# Patient Record
Sex: FEMALE | Race: WHITE | Employment: OTHER | URBAN - NONMETROPOLITAN AREA
[De-identification: names, ages, dates, MRNs, and addresses within clinical notes are randomized per-mention and may not be internally consistent; named-entity substitution may affect disease eponyms.]

---

## 2019-11-11 ENCOUNTER — APPOINTMENT (OUTPATIENT)
Dept: GENERAL RADIOLOGY | Age: 54
DRG: 640 | End: 2019-11-11
Payer: MEDICARE

## 2019-11-11 ENCOUNTER — HOSPITAL ENCOUNTER (INPATIENT)
Age: 54
LOS: 2 days | Discharge: HOME | DRG: 640 | End: 2019-11-13
Attending: FAMILY MEDICINE | Admitting: INTERNAL MEDICINE
Payer: MEDICARE

## 2019-11-11 DIAGNOSIS — E87.20 METABOLIC ACIDOSIS: ICD-10-CM

## 2019-11-11 DIAGNOSIS — E87.79 OTHER HYPERVOLEMIA: Primary | ICD-10-CM

## 2019-11-11 DIAGNOSIS — N18.6 END STAGE RENAL FAILURE ON DIALYSIS (HCC): ICD-10-CM

## 2019-11-11 DIAGNOSIS — E87.5 HYPERKALEMIA: ICD-10-CM

## 2019-11-11 DIAGNOSIS — Z99.2 END STAGE RENAL FAILURE ON DIALYSIS (HCC): ICD-10-CM

## 2019-11-11 PROBLEM — E87.70 FLUID OVERLOAD: Status: ACTIVE | Noted: 2019-11-11

## 2019-11-11 LAB
ALBUMIN SERPL-MCNC: 3.8 G/DL (ref 3.5–5.1)
ALP BLD-CCNC: 91 U/L (ref 38–126)
ALT SERPL-CCNC: < 5 U/L (ref 11–66)
ANION GAP SERPL CALCULATED.3IONS-SCNC: 33 MEQ/L (ref 8–16)
AST SERPL-CCNC: 6 U/L (ref 5–40)
BASOPHILS # BLD: 0.2 %
BASOPHILS ABSOLUTE: 0 THOU/MM3 (ref 0–0.1)
BILIRUB SERPL-MCNC: 0.3 MG/DL (ref 0.3–1.2)
BUN BLDV-MCNC: 127 MG/DL (ref 7–22)
CALCIUM SERPL-MCNC: 8.4 MG/DL (ref 8.5–10.5)
CHLORIDE BLD-SCNC: 84 MEQ/L (ref 98–111)
CO2: 18 MEQ/L (ref 23–33)
CREAT SERPL-MCNC: 16.2 MG/DL (ref 0.4–1.2)
EKG ATRIAL RATE: 83 BPM
EKG P AXIS: 76 DEGREES
EKG P-R INTERVAL: 136 MS
EKG Q-T INTERVAL: 380 MS
EKG QRS DURATION: 92 MS
EKG QTC CALCULATION (BAZETT): 446 MS
EKG R AXIS: 47 DEGREES
EKG T AXIS: 89 DEGREES
EKG VENTRICULAR RATE: 83 BPM
EOSINOPHIL # BLD: 1.2 %
EOSINOPHILS ABSOLUTE: 0.2 THOU/MM3 (ref 0–0.4)
ERYTHROCYTE [DISTWIDTH] IN BLOOD BY AUTOMATED COUNT: 16 % (ref 11.5–14.5)
ERYTHROCYTE [DISTWIDTH] IN BLOOD BY AUTOMATED COUNT: 56.9 FL (ref 35–45)
FLU A ANTIGEN: NEGATIVE
FLU B ANTIGEN: NEGATIVE
GFR SERPL CREATININE-BSD FRML MDRD: 2 ML/MIN/1.73M2
GLUCOSE BLD-MCNC: 115 MG/DL (ref 70–108)
HCT VFR BLD CALC: 33.8 % (ref 37–47)
HEMOGLOBIN: 10.2 GM/DL (ref 12–16)
IMMATURE GRANS (ABS): 0.12 THOU/MM3 (ref 0–0.07)
IMMATURE GRANULOCYTES: 0.7 %
LYMPHOCYTES # BLD: 6.6 %
LYMPHOCYTES ABSOLUTE: 1.1 THOU/MM3 (ref 1–4.8)
MCH RBC QN AUTO: 29.3 PG (ref 26–33)
MCHC RBC AUTO-ENTMCNC: 30.2 GM/DL (ref 32.2–35.5)
MCV RBC AUTO: 97.1 FL (ref 81–99)
MONOCYTES # BLD: 5 %
MONOCYTES ABSOLUTE: 0.9 THOU/MM3 (ref 0.4–1.3)
NUCLEATED RED BLOOD CELLS: 0 /100 WBC
OSMOLALITY CALCULATION: 311.8 MOSMOL/KG (ref 275–300)
PLATELET # BLD: 416 THOU/MM3 (ref 130–400)
PMV BLD AUTO: 8.6 FL (ref 9.4–12.4)
POTASSIUM SERPL-SCNC: 5.5 MEQ/L (ref 3.5–5.2)
PRO-BNP: ABNORMAL PG/ML (ref 0–900)
RBC # BLD: 3.48 MILL/MM3 (ref 4.2–5.4)
SEG NEUTROPHILS: 86.3 %
SEGMENTED NEUTROPHILS ABSOLUTE COUNT: 14.9 THOU/MM3 (ref 1.8–7.7)
SODIUM BLD-SCNC: 135 MEQ/L (ref 135–145)
TOTAL PROTEIN: 6.6 G/DL (ref 6.1–8)
TROPONIN T: < 0.01 NG/ML
WBC # BLD: 17.3 THOU/MM3 (ref 4.8–10.8)

## 2019-11-11 PROCEDURE — 93010 ELECTROCARDIOGRAM REPORT: CPT | Performed by: INTERNAL MEDICINE

## 2019-11-11 PROCEDURE — 96374 THER/PROPH/DIAG INJ IV PUSH: CPT

## 2019-11-11 PROCEDURE — 84484 ASSAY OF TROPONIN QUANT: CPT

## 2019-11-11 PROCEDURE — 71046 X-RAY EXAM CHEST 2 VIEWS: CPT

## 2019-11-11 PROCEDURE — 6370000000 HC RX 637 (ALT 250 FOR IP): Performed by: PHYSICIAN ASSISTANT

## 2019-11-11 PROCEDURE — 94760 N-INVAS EAR/PLS OXIMETRY 1: CPT

## 2019-11-11 PROCEDURE — 99285 EMERGENCY DEPT VISIT HI MDM: CPT

## 2019-11-11 PROCEDURE — 6370000000 HC RX 637 (ALT 250 FOR IP): Performed by: FAMILY MEDICINE

## 2019-11-11 PROCEDURE — 85025 COMPLETE CBC W/AUTO DIFF WBC: CPT

## 2019-11-11 PROCEDURE — 5A1D70Z PERFORMANCE OF URINARY FILTRATION, INTERMITTENT, LESS THAN 6 HOURS PER DAY: ICD-10-PCS | Performed by: INTERNAL MEDICINE

## 2019-11-11 PROCEDURE — 99222 1ST HOSP IP/OBS MODERATE 55: CPT | Performed by: PHYSICIAN ASSISTANT

## 2019-11-11 PROCEDURE — 99221 1ST HOSP IP/OBS SF/LOW 40: CPT | Performed by: NURSE PRACTITIONER

## 2019-11-11 PROCEDURE — 83880 ASSAY OF NATRIURETIC PEPTIDE: CPT

## 2019-11-11 PROCEDURE — 90935 HEMODIALYSIS ONE EVALUATION: CPT

## 2019-11-11 PROCEDURE — 94640 AIRWAY INHALATION TREATMENT: CPT

## 2019-11-11 PROCEDURE — 87804 INFLUENZA ASSAY W/OPTIC: CPT

## 2019-11-11 PROCEDURE — 93005 ELECTROCARDIOGRAM TRACING: CPT | Performed by: FAMILY MEDICINE

## 2019-11-11 PROCEDURE — 36415 COLL VENOUS BLD VENIPUNCTURE: CPT

## 2019-11-11 PROCEDURE — 87040 BLOOD CULTURE FOR BACTERIA: CPT

## 2019-11-11 PROCEDURE — 6360000002 HC RX W HCPCS: Performed by: FAMILY MEDICINE

## 2019-11-11 PROCEDURE — 2580000003 HC RX 258: Performed by: PHYSICIAN ASSISTANT

## 2019-11-11 PROCEDURE — 80053 COMPREHEN METABOLIC PANEL: CPT

## 2019-11-11 PROCEDURE — 90935 HEMODIALYSIS ONE EVALUATION: CPT | Performed by: NURSE PRACTITIONER

## 2019-11-11 PROCEDURE — 2709999900 HC NON-CHARGEABLE SUPPLY

## 2019-11-11 PROCEDURE — 2140000000 HC CCU INTERMEDIATE R&B

## 2019-11-11 PROCEDURE — 6360000002 HC RX W HCPCS: Performed by: PHYSICIAN ASSISTANT

## 2019-11-11 RX ORDER — IPRATROPIUM BROMIDE AND ALBUTEROL SULFATE 2.5; .5 MG/3ML; MG/3ML
1 SOLUTION RESPIRATORY (INHALATION) ONCE
Status: COMPLETED | OUTPATIENT
Start: 2019-11-11 | End: 2019-11-11

## 2019-11-11 RX ORDER — IPRATROPIUM BROMIDE AND ALBUTEROL SULFATE 2.5; .5 MG/3ML; MG/3ML
1 SOLUTION RESPIRATORY (INHALATION) EVERY 4 HOURS PRN
Status: DISCONTINUED | OUTPATIENT
Start: 2019-11-11 | End: 2019-11-13

## 2019-11-11 RX ORDER — SODIUM CHLORIDE 0.9 % (FLUSH) 0.9 %
10 SYRINGE (ML) INJECTION PRN
Status: DISCONTINUED | OUTPATIENT
Start: 2019-11-11 | End: 2019-11-13 | Stop reason: HOSPADM

## 2019-11-11 RX ORDER — HYDROXYZINE HYDROCHLORIDE 25 MG/1
25 TABLET, FILM COATED ORAL 4 TIMES DAILY PRN
Status: ON HOLD | COMMUNITY
End: 2019-11-11

## 2019-11-11 RX ORDER — ONDANSETRON 2 MG/ML
4 INJECTION INTRAMUSCULAR; INTRAVENOUS EVERY 6 HOURS PRN
Status: DISCONTINUED | OUTPATIENT
Start: 2019-11-11 | End: 2019-11-13 | Stop reason: HOSPADM

## 2019-11-11 RX ORDER — NIFEDIPINE 30 MG/1
30 TABLET, FILM COATED, EXTENDED RELEASE ORAL DAILY
Status: ON HOLD | COMMUNITY
End: 2019-11-13 | Stop reason: SDUPTHER

## 2019-11-11 RX ORDER — HEPARIN SODIUM 5000 [USP'U]/ML
5000 INJECTION, SOLUTION INTRAVENOUS; SUBCUTANEOUS EVERY 8 HOURS SCHEDULED
Status: DISCONTINUED | OUTPATIENT
Start: 2019-11-11 | End: 2019-11-13 | Stop reason: HOSPADM

## 2019-11-11 RX ORDER — LOSARTAN POTASSIUM 50 MG/1
50 TABLET ORAL DAILY
Status: ON HOLD | COMMUNITY
End: 2019-11-13 | Stop reason: HOSPADM

## 2019-11-11 RX ORDER — GABAPENTIN 100 MG/1
100 CAPSULE ORAL 2 TIMES DAILY
Status: ON HOLD | COMMUNITY
End: 2019-11-13 | Stop reason: SDUPTHER

## 2019-11-11 RX ORDER — DOXEPIN HYDROCHLORIDE 10 MG/1
10 CAPSULE ORAL NIGHTLY
Status: ON HOLD | COMMUNITY
End: 2020-02-13 | Stop reason: SDUPTHER

## 2019-11-11 RX ORDER — HYDROXYZINE PAMOATE 25 MG/1
25 CAPSULE ORAL 4 TIMES DAILY PRN
Status: ON HOLD | COMMUNITY
End: 2020-02-13 | Stop reason: SDUPTHER

## 2019-11-11 RX ORDER — METHYLPREDNISOLONE SODIUM SUCCINATE 125 MG/2ML
125 INJECTION, POWDER, LYOPHILIZED, FOR SOLUTION INTRAMUSCULAR; INTRAVENOUS ONCE
Status: COMPLETED | OUTPATIENT
Start: 2019-11-11 | End: 2019-11-11

## 2019-11-11 RX ORDER — GABAPENTIN 300 MG/1
300 CAPSULE ORAL 4 TIMES DAILY
Status: ON HOLD | COMMUNITY
End: 2019-11-13 | Stop reason: HOSPADM

## 2019-11-11 RX ORDER — ALBUTEROL SULFATE 90 UG/1
1 AEROSOL, METERED RESPIRATORY (INHALATION) EVERY 6 HOURS PRN
Status: ON HOLD | COMMUNITY
End: 2020-02-13 | Stop reason: SDUPTHER

## 2019-11-11 RX ORDER — SODIUM CHLORIDE 0.9 % (FLUSH) 0.9 %
10 SYRINGE (ML) INJECTION EVERY 12 HOURS SCHEDULED
Status: DISCONTINUED | OUTPATIENT
Start: 2019-11-11 | End: 2019-11-13 | Stop reason: HOSPADM

## 2019-11-11 RX ORDER — ESCITALOPRAM OXALATE 20 MG/1
20 TABLET ORAL DAILY
Status: ON HOLD | COMMUNITY
End: 2020-02-13 | Stop reason: SDUPTHER

## 2019-11-11 RX ORDER — FUROSEMIDE 80 MG
80 TABLET ORAL 2 TIMES DAILY
Status: ON HOLD | COMMUNITY
End: 2019-11-13 | Stop reason: HOSPADM

## 2019-11-11 RX ORDER — FLUTICASONE FUROATE AND VILANTEROL 200; 25 UG/1; UG/1
1 POWDER RESPIRATORY (INHALATION) DAILY
Status: ON HOLD | COMMUNITY
End: 2020-02-13 | Stop reason: SDUPTHER

## 2019-11-11 RX ORDER — ACETAMINOPHEN 325 MG/1
650 TABLET ORAL EVERY 6 HOURS PRN
Status: DISCONTINUED | OUTPATIENT
Start: 2019-11-11 | End: 2019-11-13 | Stop reason: HOSPADM

## 2019-11-11 RX ADMIN — Medication 10 ML: at 19:48

## 2019-11-11 RX ADMIN — CEFTRIAXONE SODIUM 1 G: 1 INJECTION, POWDER, FOR SOLUTION INTRAMUSCULAR; INTRAVENOUS at 18:09

## 2019-11-11 RX ADMIN — HEPARIN SODIUM 5000 UNITS: 5000 INJECTION INTRAVENOUS; SUBCUTANEOUS at 19:46

## 2019-11-11 RX ADMIN — IPRATROPIUM BROMIDE AND ALBUTEROL SULFATE 1 AMPULE: .5; 3 SOLUTION RESPIRATORY (INHALATION) at 10:55

## 2019-11-11 RX ADMIN — ACETAMINOPHEN 650 MG: 325 TABLET ORAL at 22:56

## 2019-11-11 RX ADMIN — METHYLPREDNISOLONE SODIUM SUCCINATE 125 MG: 125 INJECTION, POWDER, FOR SOLUTION INTRAMUSCULAR; INTRAVENOUS at 10:56

## 2019-11-11 RX ADMIN — IPRATROPIUM BROMIDE AND ALBUTEROL SULFATE 1 AMPULE: .5; 3 SOLUTION RESPIRATORY (INHALATION) at 23:56

## 2019-11-11 ASSESSMENT — PAIN SCALES - GENERAL
PAINLEVEL_OUTOF10: 9
PAINLEVEL_OUTOF10: 10

## 2019-11-11 ASSESSMENT — PAIN DESCRIPTION - ORIENTATION: ORIENTATION: LOWER

## 2019-11-11 ASSESSMENT — ENCOUNTER SYMPTOMS
BACK PAIN: 0
VOMITING: 1
ABDOMINAL PAIN: 0
WHEEZING: 0
DIARRHEA: 0
NAUSEA: 1
RHINORRHEA: 0
COUGH: 0
EYE PAIN: 0
SORE THROAT: 0
EYE DISCHARGE: 0
SHORTNESS OF BREATH: 1

## 2019-11-11 ASSESSMENT — PAIN DESCRIPTION - DIRECTION: RADIATING_TOWARDS: LEFT

## 2019-11-11 ASSESSMENT — PAIN DESCRIPTION - LOCATION
LOCATION: BACK
LOCATION: BACK

## 2019-11-11 ASSESSMENT — PAIN - FUNCTIONAL ASSESSMENT: PAIN_FUNCTIONAL_ASSESSMENT: ACTIVITIES ARE NOT PREVENTED

## 2019-11-11 ASSESSMENT — PAIN DESCRIPTION - ONSET: ONSET: ON-GOING

## 2019-11-11 ASSESSMENT — PAIN DESCRIPTION - DESCRIPTORS: DESCRIPTORS: THROBBING

## 2019-11-11 ASSESSMENT — PAIN DESCRIPTION - PAIN TYPE: TYPE: CHRONIC PAIN

## 2019-11-11 ASSESSMENT — PAIN DESCRIPTION - PROGRESSION: CLINICAL_PROGRESSION: NOT CHANGED

## 2019-11-11 ASSESSMENT — PAIN DESCRIPTION - FREQUENCY: FREQUENCY: CONTINUOUS

## 2019-11-12 ENCOUNTER — TELEPHONE (OUTPATIENT)
Dept: FAMILY MEDICINE CLINIC | Age: 54
End: 2019-11-12

## 2019-11-12 LAB
ALLEN TEST: ABNORMAL
AMMONIA: 34 UMOL/L (ref 11–60)
ANION GAP SERPL CALCULATED.3IONS-SCNC: 23 MEQ/L (ref 8–16)
ANION GAP SERPL CALCULATED.3IONS-SCNC: 24 MEQ/L (ref 8–16)
BASE EXCESS (CALCULATED): 9.6 MMOL/L (ref -2.5–2.5)
BASOPHILS # BLD: 0.1 %
BASOPHILS ABSOLUTE: 0 THOU/MM3 (ref 0–0.1)
BUN BLDV-MCNC: 54 MG/DL (ref 7–22)
BUN BLDV-MCNC: 57 MG/DL (ref 7–22)
CALCIUM SERPL-MCNC: 9.2 MG/DL (ref 8.5–10.5)
CALCIUM SERPL-MCNC: 9.3 MG/DL (ref 8.5–10.5)
CHLORIDE BLD-SCNC: 81 MEQ/L (ref 98–111)
CHLORIDE BLD-SCNC: 82 MEQ/L (ref 98–111)
CO2: 27 MEQ/L (ref 23–33)
CO2: 28 MEQ/L (ref 23–33)
COLLECTED BY:: ABNORMAL
CREAT SERPL-MCNC: 10 MG/DL (ref 0.4–1.2)
CREAT SERPL-MCNC: 8.8 MG/DL (ref 0.4–1.2)
DEVICE: ABNORMAL
EOSINOPHIL # BLD: 0.5 %
EOSINOPHILS ABSOLUTE: 0.1 THOU/MM3 (ref 0–0.4)
ERYTHROCYTE [DISTWIDTH] IN BLOOD BY AUTOMATED COUNT: 15.5 % (ref 11.5–14.5)
ERYTHROCYTE [DISTWIDTH] IN BLOOD BY AUTOMATED COUNT: 15.9 % (ref 11.5–14.5)
ERYTHROCYTE [DISTWIDTH] IN BLOOD BY AUTOMATED COUNT: 53.1 FL (ref 35–45)
ERYTHROCYTE [DISTWIDTH] IN BLOOD BY AUTOMATED COUNT: 53.6 FL (ref 35–45)
FOLATE: 8.7 NG/ML (ref 4.8–24.2)
GFR SERPL CREATININE-BSD FRML MDRD: 4 ML/MIN/1.73M2
GFR SERPL CREATININE-BSD FRML MDRD: 5 ML/MIN/1.73M2
GLUCOSE BLD-MCNC: 124 MG/DL (ref 70–108)
GLUCOSE BLD-MCNC: 126 MG/DL (ref 70–108)
HCO3: 35 MMOL/L (ref 23–28)
HCT VFR BLD CALC: 33.9 % (ref 37–47)
HCT VFR BLD CALC: 34.3 % (ref 37–47)
HEMOGLOBIN: 10.8 GM/DL (ref 12–16)
HEMOGLOBIN: 10.9 GM/DL (ref 12–16)
IFIO2: 3
IMMATURE GRANS (ABS): 0.09 THOU/MM3 (ref 0–0.07)
IMMATURE GRANULOCYTES: 0.6 %
IRON SATURATION: 41 % (ref 20–50)
IRON: 76 UG/DL (ref 50–170)
LACTIC ACID: 0.9 MMOL/L (ref 0.5–2.2)
LV EF: 55 %
LVEF MODALITY: NORMAL
LYMPHOCYTES # BLD: 7.2 %
LYMPHOCYTES ABSOLUTE: 1 THOU/MM3 (ref 1–4.8)
MCH RBC QN AUTO: 29.4 PG (ref 26–33)
MCH RBC QN AUTO: 29.8 PG (ref 26–33)
MCHC RBC AUTO-ENTMCNC: 31.8 GM/DL (ref 32.2–35.5)
MCHC RBC AUTO-ENTMCNC: 31.9 GM/DL (ref 32.2–35.5)
MCV RBC AUTO: 92.5 FL (ref 81–99)
MCV RBC AUTO: 93.4 FL (ref 81–99)
MONOCYTES # BLD: 7.6 %
MONOCYTES ABSOLUTE: 1.1 THOU/MM3 (ref 0.4–1.3)
NUCLEATED RED BLOOD CELLS: 0 /100 WBC
O2 SATURATION: 93 %
PCO2: 48 MMHG (ref 35–45)
PH BLOOD GAS: 7.47 (ref 7.35–7.45)
PLATELET # BLD: 400 THOU/MM3 (ref 130–400)
PLATELET # BLD: 406 THOU/MM3 (ref 130–400)
PMV BLD AUTO: 8.6 FL (ref 9.4–12.4)
PMV BLD AUTO: 8.6 FL (ref 9.4–12.4)
PO2: 64 MMHG (ref 71–104)
POTASSIUM REFLEX MAGNESIUM: 4.2 MEQ/L (ref 3.5–5.2)
POTASSIUM SERPL-SCNC: 4.3 MEQ/L (ref 3.5–5.2)
PROCALCITONIN: 0.48 NG/ML (ref 0.01–0.09)
RBC # BLD: 3.63 MILL/MM3 (ref 4.2–5.4)
RBC # BLD: 3.71 MILL/MM3 (ref 4.2–5.4)
SEG NEUTROPHILS: 84 %
SEGMENTED NEUTROPHILS ABSOLUTE COUNT: 12.2 THOU/MM3 (ref 1.8–7.7)
SODIUM BLD-SCNC: 132 MEQ/L (ref 135–145)
SODIUM BLD-SCNC: 133 MEQ/L (ref 135–145)
SOURCE, BLOOD GAS: ABNORMAL
TOTAL IRON BINDING CAPACITY: 184 UG/DL (ref 171–450)
TSH SERPL DL<=0.05 MIU/L-ACNC: 2.17 UIU/ML (ref 0.4–4.2)
VITAMIN B-12: 424 PG/ML (ref 211–911)
WBC # BLD: 14.5 THOU/MM3 (ref 4.8–10.8)
WBC # BLD: 14.6 THOU/MM3 (ref 4.8–10.8)

## 2019-11-12 PROCEDURE — 99232 SBSQ HOSP IP/OBS MODERATE 35: CPT | Performed by: INTERNAL MEDICINE

## 2019-11-12 PROCEDURE — 2700000000 HC OXYGEN THERAPY PER DAY

## 2019-11-12 PROCEDURE — 36415 COLL VENOUS BLD VENIPUNCTURE: CPT

## 2019-11-12 PROCEDURE — 82140 ASSAY OF AMMONIA: CPT

## 2019-11-12 PROCEDURE — 97116 GAIT TRAINING THERAPY: CPT

## 2019-11-12 PROCEDURE — 6360000002 HC RX W HCPCS: Performed by: PHYSICIAN ASSISTANT

## 2019-11-12 PROCEDURE — 6370000000 HC RX 637 (ALT 250 FOR IP): Performed by: HOSPITALIST

## 2019-11-12 PROCEDURE — 84443 ASSAY THYROID STIM HORMONE: CPT

## 2019-11-12 PROCEDURE — 94640 AIRWAY INHALATION TREATMENT: CPT

## 2019-11-12 PROCEDURE — 6370000000 HC RX 637 (ALT 250 FOR IP): Performed by: PHYSICIAN ASSISTANT

## 2019-11-12 PROCEDURE — 83550 IRON BINDING TEST: CPT

## 2019-11-12 PROCEDURE — 93306 TTE W/DOPPLER COMPLETE: CPT

## 2019-11-12 PROCEDURE — 36600 WITHDRAWAL OF ARTERIAL BLOOD: CPT

## 2019-11-12 PROCEDURE — 94760 N-INVAS EAR/PLS OXIMETRY 1: CPT

## 2019-11-12 PROCEDURE — 84145 PROCALCITONIN (PCT): CPT

## 2019-11-12 PROCEDURE — 2709999900 HC NON-CHARGEABLE SUPPLY

## 2019-11-12 PROCEDURE — 99233 SBSQ HOSP IP/OBS HIGH 50: CPT | Performed by: HOSPITALIST

## 2019-11-12 PROCEDURE — 87040 BLOOD CULTURE FOR BACTERIA: CPT

## 2019-11-12 PROCEDURE — 2580000003 HC RX 258: Performed by: PHYSICIAN ASSISTANT

## 2019-11-12 PROCEDURE — 80048 BASIC METABOLIC PNL TOTAL CA: CPT

## 2019-11-12 PROCEDURE — 82746 ASSAY OF FOLIC ACID SERUM: CPT

## 2019-11-12 PROCEDURE — 97162 PT EVAL MOD COMPLEX 30 MIN: CPT

## 2019-11-12 PROCEDURE — 85025 COMPLETE CBC W/AUTO DIFF WBC: CPT

## 2019-11-12 PROCEDURE — 83605 ASSAY OF LACTIC ACID: CPT

## 2019-11-12 PROCEDURE — 83540 ASSAY OF IRON: CPT

## 2019-11-12 PROCEDURE — 82607 VITAMIN B-12: CPT

## 2019-11-12 PROCEDURE — 94669 MECHANICAL CHEST WALL OSCILL: CPT

## 2019-11-12 PROCEDURE — 2140000000 HC CCU INTERMEDIATE R&B

## 2019-11-12 PROCEDURE — 85027 COMPLETE CBC AUTOMATED: CPT

## 2019-11-12 PROCEDURE — 82803 BLOOD GASES ANY COMBINATION: CPT

## 2019-11-12 RX ORDER — ESCITALOPRAM OXALATE 20 MG/1
20 TABLET ORAL DAILY
Status: DISCONTINUED | OUTPATIENT
Start: 2019-11-12 | End: 2019-11-13 | Stop reason: HOSPADM

## 2019-11-12 RX ORDER — CALCIUM CARBONATE 200(500)MG
500 TABLET,CHEWABLE ORAL 3 TIMES DAILY PRN
Status: DISCONTINUED | OUTPATIENT
Start: 2019-11-12 | End: 2019-11-13 | Stop reason: HOSPADM

## 2019-11-12 RX ORDER — HYDRALAZINE HYDROCHLORIDE 20 MG/ML
5 INJECTION INTRAMUSCULAR; INTRAVENOUS ONCE
Status: COMPLETED | OUTPATIENT
Start: 2019-11-12 | End: 2019-11-12

## 2019-11-12 RX ORDER — NIFEDIPINE 60 MG/1
60 TABLET, EXTENDED RELEASE ORAL DAILY
Status: DISCONTINUED | OUTPATIENT
Start: 2019-11-13 | End: 2019-11-13 | Stop reason: HOSPADM

## 2019-11-12 RX ORDER — LOSARTAN POTASSIUM 50 MG/1
50 TABLET ORAL DAILY
Status: DISCONTINUED | OUTPATIENT
Start: 2019-11-13 | End: 2019-11-13

## 2019-11-12 RX ORDER — NIFEDIPINE 30 MG/1
30 TABLET, EXTENDED RELEASE ORAL DAILY
Status: DISCONTINUED | OUTPATIENT
Start: 2019-11-12 | End: 2019-11-12

## 2019-11-12 RX ORDER — FUROSEMIDE 40 MG/1
80 TABLET ORAL 2 TIMES DAILY
Status: DISCONTINUED | OUTPATIENT
Start: 2019-11-12 | End: 2019-11-12

## 2019-11-12 RX ORDER — LOSARTAN POTASSIUM 50 MG/1
50 TABLET ORAL DAILY
Status: DISCONTINUED | OUTPATIENT
Start: 2019-11-12 | End: 2019-11-12

## 2019-11-12 RX ORDER — DOXEPIN HYDROCHLORIDE 10 MG/1
10 CAPSULE ORAL NIGHTLY
Status: DISCONTINUED | OUTPATIENT
Start: 2019-11-12 | End: 2019-11-13 | Stop reason: HOSPADM

## 2019-11-12 RX ORDER — NICOTINE 21 MG/24HR
1 PATCH, TRANSDERMAL 24 HOURS TRANSDERMAL DAILY
Status: DISCONTINUED | OUTPATIENT
Start: 2019-11-12 | End: 2019-11-13 | Stop reason: HOSPADM

## 2019-11-12 RX ADMIN — FUROSEMIDE 80 MG: 40 TABLET ORAL at 08:29

## 2019-11-12 RX ADMIN — ONDANSETRON 4 MG: 2 INJECTION INTRAMUSCULAR; INTRAVENOUS at 00:57

## 2019-11-12 RX ADMIN — Medication 10 ML: at 14:04

## 2019-11-12 RX ADMIN — HYDRALAZINE HYDROCHLORIDE 5 MG: 20 INJECTION, SOLUTION INTRAMUSCULAR; INTRAVENOUS at 00:33

## 2019-11-12 RX ADMIN — CEFTRIAXONE SODIUM 1 G: 1 INJECTION, POWDER, FOR SOLUTION INTRAMUSCULAR; INTRAVENOUS at 16:00

## 2019-11-12 RX ADMIN — LOSARTAN POTASSIUM 50 MG: 50 TABLET, FILM COATED ORAL at 08:29

## 2019-11-12 RX ADMIN — Medication 2 PUFF: at 13:10

## 2019-11-12 RX ADMIN — ONDANSETRON 4 MG: 2 INJECTION INTRAMUSCULAR; INTRAVENOUS at 14:05

## 2019-11-12 RX ADMIN — HEPARIN SODIUM 5000 UNITS: 5000 INJECTION INTRAVENOUS; SUBCUTANEOUS at 11:59

## 2019-11-12 RX ADMIN — ONDANSETRON 4 MG: 2 INJECTION INTRAMUSCULAR; INTRAVENOUS at 20:26

## 2019-11-12 RX ADMIN — DOXEPIN HYDROCHLORIDE 10 MG: 10 CAPSULE ORAL at 20:43

## 2019-11-12 RX ADMIN — HEPARIN SODIUM 5000 UNITS: 5000 INJECTION INTRAVENOUS; SUBCUTANEOUS at 03:05

## 2019-11-12 RX ADMIN — HYDRALAZINE HYDROCHLORIDE 5 MG: 20 INJECTION, SOLUTION INTRAMUSCULAR; INTRAVENOUS at 03:05

## 2019-11-12 RX ADMIN — HEPARIN SODIUM 5000 UNITS: 5000 INJECTION INTRAVENOUS; SUBCUTANEOUS at 20:26

## 2019-11-12 RX ADMIN — ACETAMINOPHEN 650 MG: 325 TABLET ORAL at 12:04

## 2019-11-12 RX ADMIN — NIFEDIPINE 30 MG: 30 TABLET, FILM COATED, EXTENDED RELEASE ORAL at 08:29

## 2019-11-12 RX ADMIN — Medication 10 ML: at 08:09

## 2019-11-12 RX ADMIN — ESCITALOPRAM 20 MG: 20 TABLET, FILM COATED ORAL at 14:02

## 2019-11-12 RX ADMIN — ACETAMINOPHEN 650 MG: 325 TABLET ORAL at 18:07

## 2019-11-12 RX ADMIN — Medication 10 ML: at 20:26

## 2019-11-12 RX ADMIN — ANTACID TABLETS 500 MG: 500 TABLET, CHEWABLE ORAL at 18:07

## 2019-11-12 RX ADMIN — ONDANSETRON 4 MG: 2 INJECTION INTRAMUSCULAR; INTRAVENOUS at 08:07

## 2019-11-12 RX ADMIN — Medication 10 ML: at 16:02

## 2019-11-12 ASSESSMENT — PAIN DESCRIPTION - FREQUENCY
FREQUENCY: CONTINUOUS

## 2019-11-12 ASSESSMENT — PAIN DESCRIPTION - PAIN TYPE
TYPE: CHRONIC PAIN

## 2019-11-12 ASSESSMENT — PAIN DESCRIPTION - DIRECTION
RADIATING_TOWARDS: ACROSS ABDOMEN

## 2019-11-12 ASSESSMENT — PAIN DESCRIPTION - DESCRIPTORS
DESCRIPTORS: SHARP

## 2019-11-12 ASSESSMENT — PAIN SCALES - GENERAL
PAINLEVEL_OUTOF10: 7
PAINLEVEL_OUTOF10: 3
PAINLEVEL_OUTOF10: 7
PAINLEVEL_OUTOF10: 0
PAINLEVEL_OUTOF10: 7
PAINLEVEL_OUTOF10: 10
PAINLEVEL_OUTOF10: 3
PAINLEVEL_OUTOF10: 8

## 2019-11-12 ASSESSMENT — PAIN DESCRIPTION - ORIENTATION
ORIENTATION: RIGHT;LEFT
ORIENTATION: LOWER
ORIENTATION: RIGHT;LEFT

## 2019-11-12 ASSESSMENT — PAIN DESCRIPTION - ONSET
ONSET: ON-GOING

## 2019-11-12 ASSESSMENT — PAIN - FUNCTIONAL ASSESSMENT
PAIN_FUNCTIONAL_ASSESSMENT: ACTIVITIES ARE NOT PREVENTED

## 2019-11-12 ASSESSMENT — PAIN DESCRIPTION - LOCATION
LOCATION: ABDOMEN
LOCATION: BACK
LOCATION: ABDOMEN
LOCATION: ABDOMEN

## 2019-11-12 ASSESSMENT — PAIN DESCRIPTION - PROGRESSION
CLINICAL_PROGRESSION: NOT CHANGED

## 2019-11-13 ENCOUNTER — APPOINTMENT (OUTPATIENT)
Dept: CT IMAGING | Age: 54
DRG: 640 | End: 2019-11-13
Payer: MEDICARE

## 2019-11-13 VITALS
WEIGHT: 154.1 LBS | BODY MASS INDEX: 27.3 KG/M2 | RESPIRATION RATE: 18 BRPM | HEIGHT: 63 IN | OXYGEN SATURATION: 92 % | SYSTOLIC BLOOD PRESSURE: 112 MMHG | DIASTOLIC BLOOD PRESSURE: 70 MMHG | HEART RATE: 81 BPM | TEMPERATURE: 98.5 F

## 2019-11-13 LAB
ANION GAP SERPL CALCULATED.3IONS-SCNC: 25 MEQ/L (ref 8–16)
BASOPHILS # BLD: 0.2 %
BASOPHILS ABSOLUTE: 0 THOU/MM3 (ref 0–0.1)
BUN BLDV-MCNC: 66 MG/DL (ref 7–22)
CALCIUM SERPL-MCNC: 9 MG/DL (ref 8.5–10.5)
CHLORIDE BLD-SCNC: 83 MEQ/L (ref 98–111)
CO2: 30 MEQ/L (ref 23–33)
CREAT SERPL-MCNC: 11.9 MG/DL (ref 0.4–1.2)
EOSINOPHIL # BLD: 0.9 %
EOSINOPHILS ABSOLUTE: 0.1 THOU/MM3 (ref 0–0.4)
ERYTHROCYTE [DISTWIDTH] IN BLOOD BY AUTOMATED COUNT: 15.6 % (ref 11.5–14.5)
ERYTHROCYTE [DISTWIDTH] IN BLOOD BY AUTOMATED COUNT: 53.2 FL (ref 35–45)
GFR SERPL CREATININE-BSD FRML MDRD: 3 ML/MIN/1.73M2
GLUCOSE BLD-MCNC: 107 MG/DL (ref 70–108)
HBV SURFACE AB TITR SER: NEGATIVE {TITER}
HCT VFR BLD CALC: 33.9 % (ref 37–47)
HEMOGLOBIN: 10.8 GM/DL (ref 12–16)
HEPATITIS B CORE IGM ANTIBODY: NEGATIVE
HEPATITIS B SURFACE ANTIGEN: NEGATIVE
IMMATURE GRANS (ABS): 0.1 THOU/MM3 (ref 0–0.07)
IMMATURE GRANULOCYTES: 0.6 %
LYMPHOCYTES # BLD: 11 %
LYMPHOCYTES ABSOLUTE: 1.8 THOU/MM3 (ref 1–4.8)
MCH RBC QN AUTO: 29.6 PG (ref 26–33)
MCHC RBC AUTO-ENTMCNC: 31.9 GM/DL (ref 32.2–35.5)
MCV RBC AUTO: 92.9 FL (ref 81–99)
MONOCYTES # BLD: 7.9 %
MONOCYTES ABSOLUTE: 1.3 THOU/MM3 (ref 0.4–1.3)
NUCLEATED RED BLOOD CELLS: 0 /100 WBC
PLATELET # BLD: 416 THOU/MM3 (ref 130–400)
PMV BLD AUTO: 8.9 FL (ref 9.4–12.4)
POTASSIUM SERPL-SCNC: 4.4 MEQ/L (ref 3.5–5.2)
RBC # BLD: 3.65 MILL/MM3 (ref 4.2–5.4)
SEG NEUTROPHILS: 79.4 %
SEGMENTED NEUTROPHILS ABSOLUTE COUNT: 13 THOU/MM3 (ref 1.8–7.7)
SODIUM BLD-SCNC: 138 MEQ/L (ref 135–145)
WBC # BLD: 16.4 THOU/MM3 (ref 4.8–10.8)

## 2019-11-13 PROCEDURE — 87340 HEPATITIS B SURFACE AG IA: CPT

## 2019-11-13 PROCEDURE — 86705 HEP B CORE ANTIBODY IGM: CPT

## 2019-11-13 PROCEDURE — 97165 OT EVAL LOW COMPLEX 30 MIN: CPT

## 2019-11-13 PROCEDURE — 94761 N-INVAS EAR/PLS OXIMETRY MLT: CPT

## 2019-11-13 PROCEDURE — 94669 MECHANICAL CHEST WALL OSCILL: CPT

## 2019-11-13 PROCEDURE — 70450 CT HEAD/BRAIN W/O DYE: CPT

## 2019-11-13 PROCEDURE — 36415 COLL VENOUS BLD VENIPUNCTURE: CPT

## 2019-11-13 PROCEDURE — 97530 THERAPEUTIC ACTIVITIES: CPT

## 2019-11-13 PROCEDURE — 97535 SELF CARE MNGMENT TRAINING: CPT

## 2019-11-13 PROCEDURE — 99239 HOSP IP/OBS DSCHRG MGMT >30: CPT | Performed by: HOSPITALIST

## 2019-11-13 PROCEDURE — 6370000000 HC RX 637 (ALT 250 FOR IP): Performed by: HOSPITALIST

## 2019-11-13 PROCEDURE — 90935 HEMODIALYSIS ONE EVALUATION: CPT

## 2019-11-13 PROCEDURE — 2709999900 HC NON-CHARGEABLE SUPPLY

## 2019-11-13 PROCEDURE — 80048 BASIC METABOLIC PNL TOTAL CA: CPT

## 2019-11-13 PROCEDURE — 2580000003 HC RX 258: Performed by: PHYSICIAN ASSISTANT

## 2019-11-13 PROCEDURE — 6370000000 HC RX 637 (ALT 250 FOR IP): Performed by: PHYSICIAN ASSISTANT

## 2019-11-13 PROCEDURE — 94640 AIRWAY INHALATION TREATMENT: CPT

## 2019-11-13 PROCEDURE — 90935 HEMODIALYSIS ONE EVALUATION: CPT | Performed by: NURSE PRACTITIONER

## 2019-11-13 PROCEDURE — 86706 HEP B SURFACE ANTIBODY: CPT

## 2019-11-13 PROCEDURE — 6360000002 HC RX W HCPCS: Performed by: PHYSICIAN ASSISTANT

## 2019-11-13 PROCEDURE — 2700000000 HC OXYGEN THERAPY PER DAY

## 2019-11-13 PROCEDURE — 85025 COMPLETE CBC W/AUTO DIFF WBC: CPT

## 2019-11-13 RX ORDER — NICOTINE 21 MG/24HR
1 PATCH, TRANSDERMAL 24 HOURS TRANSDERMAL DAILY
Qty: 30 PATCH | Refills: 3 | Status: ON HOLD | OUTPATIENT
Start: 2019-11-14 | End: 2020-02-13 | Stop reason: SDUPTHER

## 2019-11-13 RX ORDER — CEFDINIR 300 MG/1
300 CAPSULE ORAL DAILY
Qty: 4 CAPSULE | Refills: 0 | Status: SHIPPED | OUTPATIENT
Start: 2019-11-13 | End: 2019-11-13 | Stop reason: SDUPTHER

## 2019-11-13 RX ORDER — PRAZOSIN HYDROCHLORIDE 1 MG/1
1 CAPSULE ORAL 2 TIMES DAILY
Status: DISCONTINUED | OUTPATIENT
Start: 2019-11-13 | End: 2019-11-13 | Stop reason: HOSPADM

## 2019-11-13 RX ORDER — NIFEDIPINE 30 MG/1
30 TABLET, FILM COATED, EXTENDED RELEASE ORAL DAILY
Qty: 30 TABLET | Refills: 1 | Status: ON HOLD | OUTPATIENT
Start: 2019-11-13 | End: 2019-11-16 | Stop reason: HOSPADM

## 2019-11-13 RX ORDER — CEFDINIR 300 MG/1
300 CAPSULE ORAL EVERY OTHER DAY
Qty: 2 CAPSULE | Refills: 0 | Status: ON HOLD | OUTPATIENT
Start: 2019-11-13 | End: 2019-11-16 | Stop reason: HOSPADM

## 2019-11-13 RX ORDER — ALBUTEROL SULFATE 90 UG/1
2 AEROSOL, METERED RESPIRATORY (INHALATION) EVERY 6 HOURS PRN
Status: DISCONTINUED | OUTPATIENT
Start: 2019-11-13 | End: 2019-11-13 | Stop reason: HOSPADM

## 2019-11-13 RX ORDER — PRAZOSIN HYDROCHLORIDE 1 MG/1
1 CAPSULE ORAL 2 TIMES DAILY
Qty: 30 CAPSULE | Refills: 1 | Status: ON HOLD | OUTPATIENT
Start: 2019-11-13 | End: 2019-11-16 | Stop reason: HOSPADM

## 2019-11-13 RX ORDER — GABAPENTIN 100 MG/1
100 CAPSULE ORAL DAILY
Qty: 90 CAPSULE | Refills: 0 | Status: ON HOLD | OUTPATIENT
Start: 2019-11-13 | End: 2020-01-03 | Stop reason: HOSPADM

## 2019-11-13 RX ADMIN — Medication 10 ML: at 09:37

## 2019-11-13 RX ADMIN — ESCITALOPRAM 20 MG: 20 TABLET, FILM COATED ORAL at 09:31

## 2019-11-13 RX ADMIN — HEPARIN SODIUM 5000 UNITS: 5000 INJECTION INTRAVENOUS; SUBCUTANEOUS at 03:19

## 2019-11-13 RX ADMIN — LOSARTAN POTASSIUM 50 MG: 50 TABLET, FILM COATED ORAL at 09:34

## 2019-11-13 RX ADMIN — ONDANSETRON 4 MG: 2 INJECTION INTRAMUSCULAR; INTRAVENOUS at 03:19

## 2019-11-13 RX ADMIN — IPRATROPIUM BROMIDE AND ALBUTEROL SULFATE 1 AMPULE: .5; 3 SOLUTION RESPIRATORY (INHALATION) at 06:47

## 2019-11-13 RX ADMIN — NIFEDIPINE 60 MG: 60 TABLET, FILM COATED, EXTENDED RELEASE ORAL at 09:34

## 2019-11-13 RX ADMIN — ACETAMINOPHEN 650 MG: 325 TABLET ORAL at 00:29

## 2019-11-13 RX ADMIN — Medication 2 PUFF: at 06:48

## 2019-11-13 RX ADMIN — TIOTROPIUM BROMIDE 18 MCG: 18 CAPSULE ORAL; RESPIRATORY (INHALATION) at 06:48

## 2019-11-13 ASSESSMENT — PAIN DESCRIPTION - DIRECTION: RADIATING_TOWARDS: ACROSS ABDOMEN

## 2019-11-13 ASSESSMENT — PAIN SCALES - GENERAL
PAINLEVEL_OUTOF10: 7
PAINLEVEL_OUTOF10: 6
PAINLEVEL_OUTOF10: 8
PAINLEVEL_OUTOF10: 0
PAINLEVEL_OUTOF10: 0

## 2019-11-13 ASSESSMENT — PAIN DESCRIPTION - LOCATION
LOCATION: ABDOMEN

## 2019-11-13 ASSESSMENT — PAIN DESCRIPTION - FREQUENCY: FREQUENCY: CONTINUOUS

## 2019-11-13 ASSESSMENT — PAIN DESCRIPTION - PAIN TYPE
TYPE: ACUTE PAIN
TYPE: ACUTE PAIN

## 2019-11-13 ASSESSMENT — PAIN DESCRIPTION - DESCRIPTORS: DESCRIPTORS: CRAMPING

## 2019-11-13 ASSESSMENT — PAIN DESCRIPTION - PROGRESSION: CLINICAL_PROGRESSION: NOT CHANGED

## 2019-11-13 ASSESSMENT — PAIN DESCRIPTION - ORIENTATION
ORIENTATION: UPPER
ORIENTATION: MID

## 2019-11-13 ASSESSMENT — PAIN DESCRIPTION - ONSET: ONSET: ON-GOING

## 2019-11-13 ASSESSMENT — PAIN - FUNCTIONAL ASSESSMENT: PAIN_FUNCTIONAL_ASSESSMENT: ACTIVITIES ARE NOT PREVENTED

## 2019-11-14 ENCOUNTER — HOSPITAL ENCOUNTER (INPATIENT)
Age: 54
LOS: 2 days | Discharge: LEFT BEFORE DISCHARGE | DRG: 383 | End: 2019-11-16
Attending: INTERNAL MEDICINE | Admitting: INTERNAL MEDICINE
Payer: MEDICARE

## 2019-11-14 ENCOUNTER — APPOINTMENT (OUTPATIENT)
Dept: CT IMAGING | Age: 54
DRG: 383 | End: 2019-11-14
Payer: MEDICARE

## 2019-11-14 ENCOUNTER — APPOINTMENT (OUTPATIENT)
Dept: GENERAL RADIOLOGY | Age: 54
DRG: 383 | End: 2019-11-14
Payer: MEDICARE

## 2019-11-14 DIAGNOSIS — R77.8 ELEVATED TROPONIN: ICD-10-CM

## 2019-11-14 DIAGNOSIS — R10.84 GENERALIZED ABDOMINAL PAIN: Primary | ICD-10-CM

## 2019-11-14 PROBLEM — R10.9 ABDOMINAL PAIN: Status: ACTIVE | Noted: 2019-11-14

## 2019-11-14 LAB
ALBUMIN SERPL-MCNC: 3.8 G/DL (ref 3.5–5.1)
ALP BLD-CCNC: 91 U/L (ref 38–126)
ALT SERPL-CCNC: < 5 U/L (ref 11–66)
ANION GAP SERPL CALCULATED.3IONS-SCNC: 21 MEQ/L (ref 8–16)
AST SERPL-CCNC: 6 U/L (ref 5–40)
BASOPHILS # BLD: 0.4 %
BASOPHILS ABSOLUTE: 0.1 THOU/MM3 (ref 0–0.1)
BILIRUB SERPL-MCNC: 0.3 MG/DL (ref 0.3–1.2)
BUN BLDV-MCNC: 43 MG/DL (ref 7–22)
CALCIUM SERPL-MCNC: 8.9 MG/DL (ref 8.5–10.5)
CHLORIDE BLD-SCNC: 84 MEQ/L (ref 98–111)
CO2: 31 MEQ/L (ref 23–33)
CREAT SERPL-MCNC: 8.5 MG/DL (ref 0.4–1.2)
EKG ATRIAL RATE: 80 BPM
EKG P AXIS: 77 DEGREES
EKG P-R INTERVAL: 130 MS
EKG Q-T INTERVAL: 446 MS
EKG QRS DURATION: 86 MS
EKG QTC CALCULATION (BAZETT): 514 MS
EKG R AXIS: 46 DEGREES
EKG T AXIS: 81 DEGREES
EKG VENTRICULAR RATE: 80 BPM
EOSINOPHIL # BLD: 0.5 %
EOSINOPHILS ABSOLUTE: 0.1 THOU/MM3 (ref 0–0.4)
ERYTHROCYTE [DISTWIDTH] IN BLOOD BY AUTOMATED COUNT: 15.5 % (ref 11.5–14.5)
ERYTHROCYTE [DISTWIDTH] IN BLOOD BY AUTOMATED COUNT: 54 FL (ref 35–45)
ETHYL ALCOHOL, SERUM: < 0.01 %
FLU A ANTIGEN: NEGATIVE
FLU B ANTIGEN: NEGATIVE
GFR SERPL CREATININE-BSD FRML MDRD: 5 ML/MIN/1.73M2
GLUCOSE BLD-MCNC: 102 MG/DL (ref 70–108)
GROUP A STREP CULTURE, REFLEX: NEGATIVE
HCT VFR BLD CALC: 36.6 % (ref 37–47)
HEMOGLOBIN: 11.2 GM/DL (ref 12–16)
IMMATURE GRANS (ABS): 0.12 THOU/MM3 (ref 0–0.07)
IMMATURE GRANULOCYTES: 0.7 %
LACTIC ACID: 1.4 MMOL/L (ref 0.5–2.2)
LIPASE: 17.9 U/L (ref 5.6–51.3)
LYMPHOCYTES # BLD: 12.2 %
LYMPHOCYTES ABSOLUTE: 2 THOU/MM3 (ref 1–4.8)
MAGNESIUM: 2.5 MG/DL (ref 1.6–2.4)
MCH RBC QN AUTO: 29.2 PG (ref 26–33)
MCHC RBC AUTO-ENTMCNC: 30.6 GM/DL (ref 32.2–35.5)
MCV RBC AUTO: 95.3 FL (ref 81–99)
MONOCYTES # BLD: 8.7 %
MONOCYTES ABSOLUTE: 1.5 THOU/MM3 (ref 0.4–1.3)
NUCLEATED RED BLOOD CELLS: 0 /100 WBC
OSMOLALITY CALCULATION: 283 MOSMOL/KG (ref 275–300)
PLATELET # BLD: 373 THOU/MM3 (ref 130–400)
PMV BLD AUTO: 8.8 FL (ref 9.4–12.4)
POTASSIUM SERPL-SCNC: 3.9 MEQ/L (ref 3.5–5.2)
PRO-BNP: ABNORMAL PG/ML (ref 0–900)
RBC # BLD: 3.84 MILL/MM3 (ref 4.2–5.4)
REFLEX THROAT C + S: NORMAL
SEG NEUTROPHILS: 77.5 %
SEGMENTED NEUTROPHILS ABSOLUTE COUNT: 13 THOU/MM3 (ref 1.8–7.7)
SODIUM BLD-SCNC: 136 MEQ/L (ref 135–145)
TOTAL PROTEIN: 7.4 G/DL (ref 6.1–8)
TROPONIN T: 0.02 NG/ML
TROPONIN T: 0.02 NG/ML
TROPONIN T: 0.04 NG/ML
WBC # BLD: 16.8 THOU/MM3 (ref 4.8–10.8)

## 2019-11-14 PROCEDURE — 74176 CT ABD & PELVIS W/O CONTRAST: CPT

## 2019-11-14 PROCEDURE — 94640 AIRWAY INHALATION TREATMENT: CPT

## 2019-11-14 PROCEDURE — 87804 INFLUENZA ASSAY W/OPTIC: CPT

## 2019-11-14 PROCEDURE — 6360000002 HC RX W HCPCS: Performed by: INTERNAL MEDICINE

## 2019-11-14 PROCEDURE — C9113 INJ PANTOPRAZOLE SODIUM, VIA: HCPCS | Performed by: INTERNAL MEDICINE

## 2019-11-14 PROCEDURE — 2580000003 HC RX 258: Performed by: INTERNAL MEDICINE

## 2019-11-14 PROCEDURE — 94760 N-INVAS EAR/PLS OXIMETRY 1: CPT

## 2019-11-14 PROCEDURE — 6360000002 HC RX W HCPCS: Performed by: PHYSICIAN ASSISTANT

## 2019-11-14 PROCEDURE — 93010 ELECTROCARDIOGRAM REPORT: CPT | Performed by: INTERNAL MEDICINE

## 2019-11-14 PROCEDURE — 99223 1ST HOSP IP/OBS HIGH 75: CPT | Performed by: INTERNAL MEDICINE

## 2019-11-14 PROCEDURE — 99285 EMERGENCY DEPT VISIT HI MDM: CPT

## 2019-11-14 PROCEDURE — 87070 CULTURE OTHR SPECIMN AEROBIC: CPT

## 2019-11-14 PROCEDURE — 84484 ASSAY OF TROPONIN QUANT: CPT

## 2019-11-14 PROCEDURE — 1200000003 HC TELEMETRY R&B

## 2019-11-14 PROCEDURE — 83690 ASSAY OF LIPASE: CPT

## 2019-11-14 PROCEDURE — 93005 ELECTROCARDIOGRAM TRACING: CPT | Performed by: PHYSICIAN ASSISTANT

## 2019-11-14 PROCEDURE — C9113 INJ PANTOPRAZOLE SODIUM, VIA: HCPCS | Performed by: PHYSICIAN ASSISTANT

## 2019-11-14 PROCEDURE — 71045 X-RAY EXAM CHEST 1 VIEW: CPT

## 2019-11-14 PROCEDURE — 6370000000 HC RX 637 (ALT 250 FOR IP): Performed by: INTERNAL MEDICINE

## 2019-11-14 PROCEDURE — 83605 ASSAY OF LACTIC ACID: CPT

## 2019-11-14 PROCEDURE — 6370000000 HC RX 637 (ALT 250 FOR IP): Performed by: PHYSICIAN ASSISTANT

## 2019-11-14 PROCEDURE — 96375 TX/PRO/DX INJ NEW DRUG ADDON: CPT

## 2019-11-14 PROCEDURE — 99221 1ST HOSP IP/OBS SF/LOW 40: CPT | Performed by: INTERNAL MEDICINE

## 2019-11-14 PROCEDURE — 36415 COLL VENOUS BLD VENIPUNCTURE: CPT

## 2019-11-14 PROCEDURE — 96374 THER/PROPH/DIAG INJ IV PUSH: CPT

## 2019-11-14 PROCEDURE — 80053 COMPREHEN METABOLIC PANEL: CPT

## 2019-11-14 PROCEDURE — 83880 ASSAY OF NATRIURETIC PEPTIDE: CPT

## 2019-11-14 PROCEDURE — 2580000003 HC RX 258: Performed by: PHYSICIAN ASSISTANT

## 2019-11-14 PROCEDURE — 85025 COMPLETE CBC W/AUTO DIFF WBC: CPT

## 2019-11-14 PROCEDURE — 83735 ASSAY OF MAGNESIUM: CPT

## 2019-11-14 PROCEDURE — 87880 STREP A ASSAY W/OPTIC: CPT

## 2019-11-14 PROCEDURE — G0480 DRUG TEST DEF 1-7 CLASSES: HCPCS

## 2019-11-14 RX ORDER — ESCITALOPRAM OXALATE 20 MG/1
20 TABLET ORAL DAILY
Status: DISCONTINUED | OUTPATIENT
Start: 2019-11-14 | End: 2019-11-16 | Stop reason: HOSPADM

## 2019-11-14 RX ORDER — ALBUTEROL SULFATE 90 UG/1
1 AEROSOL, METERED RESPIRATORY (INHALATION) EVERY 6 HOURS PRN
Status: DISCONTINUED | OUTPATIENT
Start: 2019-11-14 | End: 2019-11-16 | Stop reason: HOSPADM

## 2019-11-14 RX ORDER — PANTOPRAZOLE SODIUM 40 MG/10ML
40 INJECTION, POWDER, LYOPHILIZED, FOR SOLUTION INTRAVENOUS ONCE
Status: COMPLETED | OUTPATIENT
Start: 2019-11-14 | End: 2019-11-14

## 2019-11-14 RX ORDER — SODIUM CHLORIDE 0.9 % (FLUSH) 0.9 %
10 SYRINGE (ML) INJECTION EVERY 12 HOURS SCHEDULED
Status: DISCONTINUED | OUTPATIENT
Start: 2019-11-14 | End: 2019-11-16 | Stop reason: HOSPADM

## 2019-11-14 RX ORDER — ONDANSETRON 2 MG/ML
4 INJECTION INTRAMUSCULAR; INTRAVENOUS ONCE
Status: COMPLETED | OUTPATIENT
Start: 2019-11-14 | End: 2019-11-14

## 2019-11-14 RX ORDER — PANTOPRAZOLE SODIUM 40 MG/10ML
40 INJECTION, POWDER, LYOPHILIZED, FOR SOLUTION INTRAVENOUS DAILY
Status: DISCONTINUED | OUTPATIENT
Start: 2019-11-14 | End: 2019-11-15

## 2019-11-14 RX ORDER — SUCRALFATE 1 G/1
1 TABLET ORAL ONCE
Status: COMPLETED | OUTPATIENT
Start: 2019-11-14 | End: 2019-11-14

## 2019-11-14 RX ORDER — SODIUM CHLORIDE 0.9 % (FLUSH) 0.9 %
10 SYRINGE (ML) INJECTION PRN
Status: DISCONTINUED | OUTPATIENT
Start: 2019-11-14 | End: 2019-11-16 | Stop reason: HOSPADM

## 2019-11-14 RX ORDER — FENTANYL CITRATE 50 UG/ML
25 INJECTION, SOLUTION INTRAMUSCULAR; INTRAVENOUS ONCE
Status: COMPLETED | OUTPATIENT
Start: 2019-11-14 | End: 2019-11-14

## 2019-11-14 RX ORDER — DOXEPIN HYDROCHLORIDE 10 MG/1
10 CAPSULE ORAL NIGHTLY
Status: DISCONTINUED | OUTPATIENT
Start: 2019-11-14 | End: 2019-11-16 | Stop reason: HOSPADM

## 2019-11-14 RX ORDER — 0.9 % SODIUM CHLORIDE 0.9 %
500 INTRAVENOUS SOLUTION INTRAVENOUS ONCE
Status: COMPLETED | OUTPATIENT
Start: 2019-11-14 | End: 2019-11-14

## 2019-11-14 RX ORDER — GABAPENTIN 100 MG/1
100 CAPSULE ORAL DAILY
Status: DISCONTINUED | OUTPATIENT
Start: 2019-11-14 | End: 2019-11-16 | Stop reason: HOSPADM

## 2019-11-14 RX ORDER — PRAZOSIN HYDROCHLORIDE 1 MG/1
1 CAPSULE ORAL 2 TIMES DAILY
Status: DISCONTINUED | OUTPATIENT
Start: 2019-11-14 | End: 2019-11-15

## 2019-11-14 RX ORDER — ONDANSETRON 2 MG/ML
4 INJECTION INTRAMUSCULAR; INTRAVENOUS EVERY 6 HOURS PRN
Status: DISCONTINUED | OUTPATIENT
Start: 2019-11-14 | End: 2019-11-16 | Stop reason: HOSPADM

## 2019-11-14 RX ORDER — NICOTINE 21 MG/24HR
1 PATCH, TRANSDERMAL 24 HOURS TRANSDERMAL DAILY
Status: DISCONTINUED | OUTPATIENT
Start: 2019-11-14 | End: 2019-11-16 | Stop reason: HOSPADM

## 2019-11-14 RX ORDER — NIFEDIPINE 30 MG/1
30 TABLET, EXTENDED RELEASE ORAL DAILY
Status: DISCONTINUED | OUTPATIENT
Start: 2019-11-14 | End: 2019-11-15

## 2019-11-14 RX ORDER — HYDROXYZINE PAMOATE 25 MG/1
25 CAPSULE ORAL 4 TIMES DAILY PRN
Status: DISCONTINUED | OUTPATIENT
Start: 2019-11-14 | End: 2019-11-16 | Stop reason: HOSPADM

## 2019-11-14 RX ADMIN — CALCIUM ACETATE 2001 MG: 667 CAPSULE ORAL at 18:35

## 2019-11-14 RX ADMIN — DOXEPIN HYDROCHLORIDE 10 MG: 10 CAPSULE ORAL at 21:17

## 2019-11-14 RX ADMIN — NIFEDIPINE 30 MG: 30 TABLET, FILM COATED, EXTENDED RELEASE ORAL at 18:35

## 2019-11-14 RX ADMIN — PANTOPRAZOLE SODIUM 40 MG: 40 INJECTION, POWDER, FOR SOLUTION INTRAVENOUS at 18:37

## 2019-11-14 RX ADMIN — Medication 2 PUFF: at 17:34

## 2019-11-14 RX ADMIN — PANTOPRAZOLE SODIUM 40 MG: 40 INJECTION, POWDER, FOR SOLUTION INTRAVENOUS at 13:10

## 2019-11-14 RX ADMIN — LIDOCAINE HYDROCHLORIDE: 20 SOLUTION ORAL; TOPICAL at 13:09

## 2019-11-14 RX ADMIN — GABAPENTIN 100 MG: 100 CAPSULE ORAL at 18:35

## 2019-11-14 RX ADMIN — FENTANYL CITRATE 25 MCG: 50 INJECTION, SOLUTION INTRAMUSCULAR; INTRAVENOUS at 17:34

## 2019-11-14 RX ADMIN — ESCITALOPRAM 20 MG: 20 TABLET, FILM COATED ORAL at 18:35

## 2019-11-14 RX ADMIN — SODIUM CHLORIDE 500 ML: 9 INJECTION, SOLUTION INTRAVENOUS at 13:09

## 2019-11-14 RX ADMIN — Medication 10 ML: at 21:18

## 2019-11-14 RX ADMIN — ONDANSETRON 4 MG: 2 INJECTION INTRAMUSCULAR; INTRAVENOUS at 13:09

## 2019-11-14 RX ADMIN — SUCRALFATE 1 G: 1 TABLET ORAL at 14:41

## 2019-11-14 ASSESSMENT — ENCOUNTER SYMPTOMS
DIARRHEA: 0
NAUSEA: 1
RHINORRHEA: 0
BACK PAIN: 0
SHORTNESS OF BREATH: 0
VOMITING: 1
ABDOMINAL PAIN: 1
EYE PAIN: 0
EYE DISCHARGE: 0
COUGH: 1
WHEEZING: 1
SORE THROAT: 1

## 2019-11-14 ASSESSMENT — PAIN DESCRIPTION - LOCATION
LOCATION: ABDOMEN
LOCATION: ABDOMEN

## 2019-11-14 ASSESSMENT — PAIN DESCRIPTION - PROGRESSION
CLINICAL_PROGRESSION: NOT CHANGED

## 2019-11-14 ASSESSMENT — PAIN SCALES - GENERAL
PAINLEVEL_OUTOF10: 4
PAINLEVEL_OUTOF10: 10
PAINLEVEL_OUTOF10: 10

## 2019-11-14 ASSESSMENT — PAIN DESCRIPTION - FREQUENCY: FREQUENCY: CONTINUOUS

## 2019-11-14 ASSESSMENT — PAIN DESCRIPTION - ORIENTATION: ORIENTATION: MID;LOWER

## 2019-11-14 ASSESSMENT — PAIN DESCRIPTION - PAIN TYPE
TYPE: ACUTE PAIN
TYPE: ACUTE PAIN

## 2019-11-14 ASSESSMENT — PAIN DESCRIPTION - DESCRIPTORS: DESCRIPTORS: THROBBING;ACHING

## 2019-11-14 ASSESSMENT — PAIN DESCRIPTION - ONSET: ONSET: ON-GOING

## 2019-11-14 ASSESSMENT — PAIN - FUNCTIONAL ASSESSMENT: PAIN_FUNCTIONAL_ASSESSMENT: ACTIVITIES ARE NOT PREVENTED

## 2019-11-15 ENCOUNTER — APPOINTMENT (OUTPATIENT)
Dept: ULTRASOUND IMAGING | Age: 54
DRG: 383 | End: 2019-11-15
Payer: MEDICARE

## 2019-11-15 PROBLEM — J44.9 COPD (CHRONIC OBSTRUCTIVE PULMONARY DISEASE) (HCC): Status: ACTIVE | Noted: 2019-11-15

## 2019-11-15 LAB
ANION GAP SERPL CALCULATED.3IONS-SCNC: 22 MEQ/L (ref 8–16)
BUN BLDV-MCNC: 50 MG/DL (ref 7–22)
CALCIUM SERPL-MCNC: 9.3 MG/DL (ref 8.5–10.5)
CHLORIDE BLD-SCNC: 84 MEQ/L (ref 98–111)
CO2: 30 MEQ/L (ref 23–33)
CREAT SERPL-MCNC: 10.2 MG/DL (ref 0.4–1.2)
ERYTHROCYTE [DISTWIDTH] IN BLOOD BY AUTOMATED COUNT: 15.4 % (ref 11.5–14.5)
ERYTHROCYTE [DISTWIDTH] IN BLOOD BY AUTOMATED COUNT: 15.5 % (ref 11.5–14.5)
ERYTHROCYTE [DISTWIDTH] IN BLOOD BY AUTOMATED COUNT: 53.4 FL (ref 35–45)
ERYTHROCYTE [DISTWIDTH] IN BLOOD BY AUTOMATED COUNT: 53.9 FL (ref 35–45)
GFR SERPL CREATININE-BSD FRML MDRD: 4 ML/MIN/1.73M2
GLUCOSE BLD-MCNC: 87 MG/DL (ref 70–108)
HCT VFR BLD CALC: 33.3 % (ref 37–47)
HCT VFR BLD CALC: 34.9 % (ref 37–47)
HEMOGLOBIN: 10.3 GM/DL (ref 12–16)
HEMOGLOBIN: 10.9 GM/DL (ref 12–16)
LACTIC ACID: 1.3 MMOL/L (ref 0.5–2.2)
MCH RBC QN AUTO: 29.3 PG (ref 26–33)
MCH RBC QN AUTO: 29.6 PG (ref 26–33)
MCHC RBC AUTO-ENTMCNC: 30.9 GM/DL (ref 32.2–35.5)
MCHC RBC AUTO-ENTMCNC: 31.2 GM/DL (ref 32.2–35.5)
MCV RBC AUTO: 94.8 FL (ref 81–99)
MCV RBC AUTO: 94.9 FL (ref 81–99)
PLATELET # BLD: 344 THOU/MM3 (ref 130–400)
PLATELET # BLD: 351 THOU/MM3 (ref 130–400)
PMV BLD AUTO: 9.1 FL (ref 9.4–12.4)
PMV BLD AUTO: 9.2 FL (ref 9.4–12.4)
POTASSIUM REFLEX MAGNESIUM: 5.2 MEQ/L (ref 3.5–5.2)
RBC # BLD: 3.51 MILL/MM3 (ref 4.2–5.4)
RBC # BLD: 3.68 MILL/MM3 (ref 4.2–5.4)
SODIUM BLD-SCNC: 136 MEQ/L (ref 135–145)
WBC # BLD: 15.3 THOU/MM3 (ref 4.8–10.8)
WBC # BLD: 15.8 THOU/MM3 (ref 4.8–10.8)

## 2019-11-15 PROCEDURE — 6370000000 HC RX 637 (ALT 250 FOR IP): Performed by: INTERNAL MEDICINE

## 2019-11-15 PROCEDURE — 1200000003 HC TELEMETRY R&B

## 2019-11-15 PROCEDURE — C9113 INJ PANTOPRAZOLE SODIUM, VIA: HCPCS | Performed by: INTERNAL MEDICINE

## 2019-11-15 PROCEDURE — 2700000000 HC OXYGEN THERAPY PER DAY

## 2019-11-15 PROCEDURE — 94640 AIRWAY INHALATION TREATMENT: CPT

## 2019-11-15 PROCEDURE — 99233 SBSQ HOSP IP/OBS HIGH 50: CPT | Performed by: INTERNAL MEDICINE

## 2019-11-15 PROCEDURE — 6360000002 HC RX W HCPCS: Performed by: INTERNAL MEDICINE

## 2019-11-15 PROCEDURE — 6370000000 HC RX 637 (ALT 250 FOR IP): Performed by: FAMILY MEDICINE

## 2019-11-15 PROCEDURE — 94761 N-INVAS EAR/PLS OXIMETRY MLT: CPT

## 2019-11-15 PROCEDURE — 36415 COLL VENOUS BLD VENIPUNCTURE: CPT

## 2019-11-15 PROCEDURE — 83605 ASSAY OF LACTIC ACID: CPT

## 2019-11-15 PROCEDURE — 5A1D70Z PERFORMANCE OF URINARY FILTRATION, INTERMITTENT, LESS THAN 6 HOURS PER DAY: ICD-10-PCS | Performed by: INTERNAL MEDICINE

## 2019-11-15 PROCEDURE — 2580000003 HC RX 258: Performed by: INTERNAL MEDICINE

## 2019-11-15 PROCEDURE — 85027 COMPLETE CBC AUTOMATED: CPT

## 2019-11-15 PROCEDURE — 90935 HEMODIALYSIS ONE EVALUATION: CPT

## 2019-11-15 PROCEDURE — 90935 HEMODIALYSIS ONE EVALUATION: CPT | Performed by: NURSE PRACTITIONER

## 2019-11-15 PROCEDURE — 80048 BASIC METABOLIC PNL TOTAL CA: CPT

## 2019-11-15 RX ORDER — DICYCLOMINE HCL 20 MG
20 TABLET ORAL ONCE
Status: COMPLETED | OUTPATIENT
Start: 2019-11-15 | End: 2019-11-15

## 2019-11-15 RX ORDER — PANTOPRAZOLE SODIUM 40 MG/10ML
40 INJECTION, POWDER, LYOPHILIZED, FOR SOLUTION INTRAVENOUS 2 TIMES DAILY
Status: DISCONTINUED | OUTPATIENT
Start: 2019-11-15 | End: 2019-11-16 | Stop reason: HOSPADM

## 2019-11-15 RX ORDER — SUCRALFATE 1 G/1
1 TABLET ORAL EVERY 6 HOURS SCHEDULED
Status: DISCONTINUED | OUTPATIENT
Start: 2019-11-15 | End: 2019-11-16 | Stop reason: HOSPADM

## 2019-11-15 RX ORDER — SODIUM CHLORIDE 9 MG/ML
INJECTION, SOLUTION INTRAVENOUS CONTINUOUS
Status: DISCONTINUED | OUTPATIENT
Start: 2019-11-15 | End: 2019-11-16 | Stop reason: HOSPADM

## 2019-11-15 RX ORDER — 0.9 % SODIUM CHLORIDE 0.9 %
250 INTRAVENOUS SOLUTION INTRAVENOUS ONCE
Status: COMPLETED | OUTPATIENT
Start: 2019-11-15 | End: 2019-11-15

## 2019-11-15 RX ORDER — ALUMINUM HYDROXIDE 320 MG/5ML
320 LIQUID ORAL EVERY 6 HOURS PRN
Status: DISCONTINUED | OUTPATIENT
Start: 2019-11-15 | End: 2019-11-15

## 2019-11-15 RX ORDER — FENTANYL CITRATE 50 UG/ML
25 INJECTION, SOLUTION INTRAMUSCULAR; INTRAVENOUS EVERY 4 HOURS PRN
Status: DISCONTINUED | OUTPATIENT
Start: 2019-11-15 | End: 2019-11-15

## 2019-11-15 RX ORDER — SODIUM CHLORIDE 9 MG/ML
INJECTION, SOLUTION INTRAVENOUS ONCE
Status: COMPLETED | OUTPATIENT
Start: 2019-11-15 | End: 2019-11-15

## 2019-11-15 RX ORDER — DIPHENHYDRAMINE HYDROCHLORIDE 50 MG/ML
25 INJECTION INTRAMUSCULAR; INTRAVENOUS
Status: DISCONTINUED | OUTPATIENT
Start: 2019-11-15 | End: 2019-11-16 | Stop reason: HOSPADM

## 2019-11-15 RX ORDER — FENTANYL CITRATE 50 UG/ML
50 INJECTION, SOLUTION INTRAMUSCULAR; INTRAVENOUS EVERY 4 HOURS PRN
Status: DISCONTINUED | OUTPATIENT
Start: 2019-11-15 | End: 2019-11-16 | Stop reason: HOSPADM

## 2019-11-15 RX ORDER — CALCIUM CARBONATE 200(500)MG
500 TABLET,CHEWABLE ORAL 3 TIMES DAILY PRN
Status: DISCONTINUED | OUTPATIENT
Start: 2019-11-15 | End: 2019-11-16 | Stop reason: HOSPADM

## 2019-11-15 RX ORDER — MIDODRINE HYDROCHLORIDE 10 MG/1
10 TABLET ORAL ONCE
Status: COMPLETED | OUTPATIENT
Start: 2019-11-15 | End: 2019-11-15

## 2019-11-15 RX ORDER — FENTANYL CITRATE 50 UG/ML
25 INJECTION, SOLUTION INTRAMUSCULAR; INTRAVENOUS
Status: DISCONTINUED | OUTPATIENT
Start: 2019-11-15 | End: 2019-11-16 | Stop reason: HOSPADM

## 2019-11-15 RX ADMIN — Medication 10 ML: at 10:50

## 2019-11-15 RX ADMIN — SODIUM CHLORIDE: 9 INJECTION, SOLUTION INTRAVENOUS at 16:22

## 2019-11-15 RX ADMIN — TIOTROPIUM BROMIDE 18 MCG: 18 CAPSULE ORAL; RESPIRATORY (INHALATION) at 07:34

## 2019-11-15 RX ADMIN — SUCRALFATE 1 G: 1 TABLET ORAL at 16:26

## 2019-11-15 RX ADMIN — SODIUM CHLORIDE 250 ML: 9 INJECTION, SOLUTION INTRAVENOUS at 22:10

## 2019-11-15 RX ADMIN — MIDODRINE HYDROCHLORIDE 10 MG: 10 TABLET ORAL at 23:26

## 2019-11-15 RX ADMIN — Medication 2 PUFF: at 07:34

## 2019-11-15 RX ADMIN — ESCITALOPRAM 20 MG: 20 TABLET, FILM COATED ORAL at 10:50

## 2019-11-15 RX ADMIN — DIPHENHYDRAMINE HYDROCHLORIDE 25 MG: 50 INJECTION, SOLUTION INTRAMUSCULAR; INTRAVENOUS at 08:28

## 2019-11-15 RX ADMIN — SODIUM CHLORIDE: 9 INJECTION, SOLUTION INTRAVENOUS at 13:00

## 2019-11-15 RX ADMIN — Medication 2 PUFF: at 17:23

## 2019-11-15 RX ADMIN — PANTOPRAZOLE SODIUM 40 MG: 40 INJECTION, POWDER, FOR SOLUTION INTRAVENOUS at 10:50

## 2019-11-15 RX ADMIN — PANTOPRAZOLE SODIUM 40 MG: 40 INJECTION, POWDER, FOR SOLUTION INTRAVENOUS at 22:00

## 2019-11-15 RX ADMIN — SUCRALFATE 1 G: 1 TABLET ORAL at 23:27

## 2019-11-15 RX ADMIN — HYDROXYZINE PAMOATE 25 MG: 25 CAPSULE ORAL at 06:29

## 2019-11-15 RX ADMIN — DICYCLOMINE HYDROCHLORIDE 20 MG: 20 TABLET ORAL at 06:27

## 2019-11-15 RX ADMIN — GABAPENTIN 100 MG: 100 CAPSULE ORAL at 06:30

## 2019-11-15 RX ADMIN — FENTANYL CITRATE 25 MCG: 50 INJECTION, SOLUTION INTRAMUSCULAR; INTRAVENOUS at 12:01

## 2019-11-15 RX ADMIN — LIDOCAINE HYDROCHLORIDE: 20 SOLUTION ORAL; TOPICAL at 13:34

## 2019-11-15 ASSESSMENT — PAIN DESCRIPTION - PAIN TYPE
TYPE: ACUTE PAIN

## 2019-11-15 ASSESSMENT — PAIN DESCRIPTION - FREQUENCY
FREQUENCY: CONTINUOUS

## 2019-11-15 ASSESSMENT — PAIN DESCRIPTION - PROGRESSION

## 2019-11-15 ASSESSMENT — PAIN DESCRIPTION - ORIENTATION
ORIENTATION: MID;LOWER
ORIENTATION: LOWER
ORIENTATION: LOWER;MID;UPPER;RIGHT;LEFT

## 2019-11-15 ASSESSMENT — PAIN SCALES - GENERAL
PAINLEVEL_OUTOF10: 10
PAINLEVEL_OUTOF10: 10
PAINLEVEL_OUTOF10: 8
PAINLEVEL_OUTOF10: 9
PAINLEVEL_OUTOF10: 10

## 2019-11-15 ASSESSMENT — PAIN DESCRIPTION - ONSET
ONSET: ON-GOING

## 2019-11-15 ASSESSMENT — PAIN - FUNCTIONAL ASSESSMENT
PAIN_FUNCTIONAL_ASSESSMENT: ACTIVITIES ARE NOT PREVENTED
PAIN_FUNCTIONAL_ASSESSMENT: ACTIVITIES ARE NOT PREVENTED

## 2019-11-15 ASSESSMENT — PAIN DESCRIPTION - DESCRIPTORS
DESCRIPTORS: THROBBING;ACHING
DESCRIPTORS: CONSTANT
DESCRIPTORS: CONSTANT
DESCRIPTORS: THROBBING;ACHING

## 2019-11-15 ASSESSMENT — PAIN DESCRIPTION - LOCATION
LOCATION: ABDOMEN

## 2019-11-16 ENCOUNTER — APPOINTMENT (OUTPATIENT)
Dept: ULTRASOUND IMAGING | Age: 54
DRG: 383 | End: 2019-11-16
Payer: MEDICARE

## 2019-11-16 VITALS
TEMPERATURE: 98.1 F | SYSTOLIC BLOOD PRESSURE: 88 MMHG | BODY MASS INDEX: 27.55 KG/M2 | HEART RATE: 70 BPM | OXYGEN SATURATION: 97 % | RESPIRATION RATE: 16 BRPM | DIASTOLIC BLOOD PRESSURE: 50 MMHG | WEIGHT: 155.5 LBS | HEIGHT: 63 IN

## 2019-11-16 LAB
ANION GAP SERPL CALCULATED.3IONS-SCNC: 23 MEQ/L (ref 8–16)
BUN BLDV-MCNC: 38 MG/DL (ref 7–22)
CALCIUM SERPL-MCNC: 8.5 MG/DL (ref 8.5–10.5)
CHLORIDE BLD-SCNC: 89 MEQ/L (ref 98–111)
CO2: 22 MEQ/L (ref 23–33)
CREAT SERPL-MCNC: 8.8 MG/DL (ref 0.4–1.2)
ERYTHROCYTE [DISTWIDTH] IN BLOOD BY AUTOMATED COUNT: 15.4 % (ref 11.5–14.5)
ERYTHROCYTE [DISTWIDTH] IN BLOOD BY AUTOMATED COUNT: 53.1 FL (ref 35–45)
GFR SERPL CREATININE-BSD FRML MDRD: 5 ML/MIN/1.73M2
GLUCOSE BLD-MCNC: 79 MG/DL (ref 70–108)
HCT VFR BLD CALC: 32.2 % (ref 37–47)
HEMOGLOBIN: 10.1 GM/DL (ref 12–16)
MCH RBC QN AUTO: 29.8 PG (ref 26–33)
MCHC RBC AUTO-ENTMCNC: 31.4 GM/DL (ref 32.2–35.5)
MCV RBC AUTO: 95 FL (ref 81–99)
PLATELET # BLD: 361 THOU/MM3 (ref 130–400)
PMV BLD AUTO: 9.3 FL (ref 9.4–12.4)
POTASSIUM SERPL-SCNC: 3.7 MEQ/L (ref 3.5–5.2)
RBC # BLD: 3.39 MILL/MM3 (ref 4.2–5.4)
SODIUM BLD-SCNC: 134 MEQ/L (ref 135–145)
THROAT/NOSE CULTURE: NORMAL
WBC # BLD: 14.6 THOU/MM3 (ref 4.8–10.8)

## 2019-11-16 PROCEDURE — 76705 ECHO EXAM OF ABDOMEN: CPT

## 2019-11-16 PROCEDURE — 85027 COMPLETE CBC AUTOMATED: CPT

## 2019-11-16 PROCEDURE — 99239 HOSP IP/OBS DSCHRG MGMT >30: CPT | Performed by: INTERNAL MEDICINE

## 2019-11-16 PROCEDURE — 6370000000 HC RX 637 (ALT 250 FOR IP): Performed by: INTERNAL MEDICINE

## 2019-11-16 PROCEDURE — 36415 COLL VENOUS BLD VENIPUNCTURE: CPT

## 2019-11-16 PROCEDURE — 2580000003 HC RX 258: Performed by: INTERNAL MEDICINE

## 2019-11-16 PROCEDURE — 80048 BASIC METABOLIC PNL TOTAL CA: CPT

## 2019-11-16 RX ORDER — SUCRALFATE 1 G/1
1 TABLET ORAL 4 TIMES DAILY
Qty: 28 TABLET | Refills: 3 | Status: ON HOLD | OUTPATIENT
Start: 2019-11-16 | End: 2020-02-13 | Stop reason: HOSPADM

## 2019-11-16 RX ORDER — PANTOPRAZOLE SODIUM 40 MG/1
40 TABLET, DELAYED RELEASE ORAL
Qty: 90 TABLET | Refills: 1 | Status: ON HOLD | OUTPATIENT
Start: 2019-11-16 | End: 2019-11-25 | Stop reason: SDUPTHER

## 2019-11-16 RX ADMIN — SODIUM CHLORIDE: 9 INJECTION, SOLUTION INTRAVENOUS at 01:18

## 2019-11-16 RX ADMIN — SUCRALFATE 1 G: 1 TABLET ORAL at 04:53

## 2019-11-16 ASSESSMENT — PAIN DESCRIPTION - PROGRESSION
CLINICAL_PROGRESSION: NOT CHANGED

## 2019-11-16 ASSESSMENT — PAIN DESCRIPTION - LOCATION: LOCATION: ABDOMEN

## 2019-11-16 ASSESSMENT — PAIN DESCRIPTION - ONSET: ONSET: PROGRESSIVE

## 2019-11-16 ASSESSMENT — PAIN SCALES - GENERAL: PAINLEVEL_OUTOF10: 8

## 2019-11-16 ASSESSMENT — PAIN DESCRIPTION - DESCRIPTORS: DESCRIPTORS: ACHING;CONSTANT;BURNING;STABBING

## 2019-11-16 ASSESSMENT — PAIN DESCRIPTION - FREQUENCY: FREQUENCY: CONTINUOUS

## 2019-11-16 ASSESSMENT — PAIN DESCRIPTION - PAIN TYPE: TYPE: ACUTE PAIN

## 2019-11-16 ASSESSMENT — PAIN DESCRIPTION - ORIENTATION: ORIENTATION: RIGHT;LEFT;MID;UPPER;LOWER

## 2019-11-17 LAB
BLOOD CULTURE, ROUTINE: ABNORMAL
BLOOD CULTURE, ROUTINE: NORMAL
ORGANISM: ABNORMAL
ORGANISM: ABNORMAL

## 2019-11-23 ENCOUNTER — HOSPITAL ENCOUNTER (INPATIENT)
Age: 54
LOS: 2 days | Discharge: HOME | DRG: 383 | End: 2019-11-25
Attending: EMERGENCY MEDICINE | Admitting: INTERNAL MEDICINE
Payer: MEDICARE

## 2019-11-23 ENCOUNTER — APPOINTMENT (OUTPATIENT)
Dept: GENERAL RADIOLOGY | Age: 54
DRG: 383 | End: 2019-11-23
Payer: MEDICARE

## 2019-11-23 DIAGNOSIS — D64.9 ACUTE ON CHRONIC ANEMIA: ICD-10-CM

## 2019-11-23 DIAGNOSIS — N18.6 ESRD ON HEMODIALYSIS (HCC): ICD-10-CM

## 2019-11-23 DIAGNOSIS — R10.13 EPIGASTRIC PAIN: Primary | ICD-10-CM

## 2019-11-23 DIAGNOSIS — Z99.2 ESRD ON HEMODIALYSIS (HCC): ICD-10-CM

## 2019-11-23 PROBLEM — R77.8 ELEVATED TROPONIN: Status: ACTIVE | Noted: 2019-11-23

## 2019-11-23 PROBLEM — J96.11 CHRONIC RESPIRATORY FAILURE WITH HYPOXIA (HCC): Status: ACTIVE | Noted: 2019-11-23

## 2019-11-23 PROBLEM — R79.89 ELEVATED TROPONIN: Status: ACTIVE | Noted: 2019-11-23

## 2019-11-23 PROBLEM — F17.200 CURRENT SMOKER: Status: ACTIVE | Noted: 2019-11-23

## 2019-11-23 PROBLEM — Z91.199 MEDICALLY NONCOMPLIANT: Status: ACTIVE | Noted: 2019-11-23

## 2019-11-23 PROBLEM — J44.1 COPD EXACERBATION (HCC): Status: ACTIVE | Noted: 2019-11-15

## 2019-11-23 PROBLEM — F41.9 ANXIETY DISORDER: Status: ACTIVE | Noted: 2019-11-23

## 2019-11-23 LAB
ALBUMIN SERPL-MCNC: 3.3 G/DL (ref 3.5–5.1)
ALP BLD-CCNC: 91 U/L (ref 38–126)
ALT SERPL-CCNC: < 5 U/L (ref 11–66)
ANION GAP SERPL CALCULATED.3IONS-SCNC: 19 MEQ/L (ref 8–16)
AST SERPL-CCNC: 6 U/L (ref 5–40)
BASOPHILS # BLD: 0.8 %
BASOPHILS ABSOLUTE: 0.1 THOU/MM3 (ref 0–0.1)
BILIRUB SERPL-MCNC: 0.2 MG/DL (ref 0.3–1.2)
BUN BLDV-MCNC: 40 MG/DL (ref 7–22)
CALCIUM SERPL-MCNC: 8.1 MG/DL (ref 8.5–10.5)
CHLORIDE BLD-SCNC: 92 MEQ/L (ref 98–111)
CO2: 28 MEQ/L (ref 23–33)
CREAT SERPL-MCNC: 10.5 MG/DL (ref 0.4–1.2)
EKG ATRIAL RATE: 92 BPM
EKG P AXIS: 81 DEGREES
EKG P-R INTERVAL: 136 MS
EKG Q-T INTERVAL: 376 MS
EKG QRS DURATION: 84 MS
EKG QTC CALCULATION (BAZETT): 464 MS
EKG R AXIS: 40 DEGREES
EKG T AXIS: 78 DEGREES
EKG VENTRICULAR RATE: 92 BPM
EOSINOPHIL # BLD: 3 %
EOSINOPHILS ABSOLUTE: 0.2 THOU/MM3 (ref 0–0.4)
ERYTHROCYTE [DISTWIDTH] IN BLOOD BY AUTOMATED COUNT: 15 % (ref 11.5–14.5)
ERYTHROCYTE [DISTWIDTH] IN BLOOD BY AUTOMATED COUNT: 53.1 FL (ref 35–45)
GFR SERPL CREATININE-BSD FRML MDRD: 4 ML/MIN/1.73M2
GLUCOSE BLD-MCNC: 99 MG/DL (ref 70–108)
HCT VFR BLD CALC: 26.8 % (ref 37–47)
HEMOGLOBIN: 8.2 GM/DL (ref 12–16)
IMMATURE GRANS (ABS): 0.03 THOU/MM3 (ref 0–0.07)
IMMATURE GRANULOCYTES: 0.4 %
LIPASE: 28.9 U/L (ref 5.6–51.3)
LYMPHOCYTES # BLD: 9.2 %
LYMPHOCYTES ABSOLUTE: 0.7 THOU/MM3 (ref 1–4.8)
MCH RBC QN AUTO: 29.3 PG (ref 26–33)
MCHC RBC AUTO-ENTMCNC: 30.6 GM/DL (ref 32.2–35.5)
MCV RBC AUTO: 95.7 FL (ref 81–99)
MONOCYTES # BLD: 9.9 %
MONOCYTES ABSOLUTE: 0.7 THOU/MM3 (ref 0.4–1.3)
NUCLEATED RED BLOOD CELLS: 0 /100 WBC
OSMOLALITY CALCULATION: 287.3 MOSMOL/KG (ref 275–300)
PLATELET # BLD: 266 THOU/MM3 (ref 130–400)
PMV BLD AUTO: 9.3 FL (ref 9.4–12.4)
POTASSIUM SERPL-SCNC: 4.2 MEQ/L (ref 3.5–5.2)
RBC # BLD: 2.8 MILL/MM3 (ref 4.2–5.4)
SEG NEUTROPHILS: 76.7 %
SEGMENTED NEUTROPHILS ABSOLUTE COUNT: 5.4 THOU/MM3 (ref 1.8–7.7)
SODIUM BLD-SCNC: 139 MEQ/L (ref 135–145)
TOTAL PROTEIN: 6.2 G/DL (ref 6.1–8)
TROPONIN T: 0.02 NG/ML
WBC # BLD: 7.1 THOU/MM3 (ref 4.8–10.8)

## 2019-11-23 PROCEDURE — 84484 ASSAY OF TROPONIN QUANT: CPT

## 2019-11-23 PROCEDURE — 96374 THER/PROPH/DIAG INJ IV PUSH: CPT

## 2019-11-23 PROCEDURE — 99999 PR OFFICE/OUTPT VISIT,PROCEDURE ONLY: CPT | Performed by: INTERNAL MEDICINE

## 2019-11-23 PROCEDURE — 85025 COMPLETE CBC W/AUTO DIFF WBC: CPT

## 2019-11-23 PROCEDURE — 96372 THER/PROPH/DIAG INJ SC/IM: CPT

## 2019-11-23 PROCEDURE — 99223 1ST HOSP IP/OBS HIGH 75: CPT | Performed by: INTERNAL MEDICINE

## 2019-11-23 PROCEDURE — 74022 RADEX COMPL AQT ABD SERIES: CPT

## 2019-11-23 PROCEDURE — 36415 COLL VENOUS BLD VENIPUNCTURE: CPT

## 2019-11-23 PROCEDURE — 1200000003 HC TELEMETRY R&B

## 2019-11-23 PROCEDURE — 96375 TX/PRO/DX INJ NEW DRUG ADDON: CPT

## 2019-11-23 PROCEDURE — C9113 INJ PANTOPRAZOLE SODIUM, VIA: HCPCS | Performed by: EMERGENCY MEDICINE

## 2019-11-23 PROCEDURE — 99285 EMERGENCY DEPT VISIT HI MDM: CPT

## 2019-11-23 PROCEDURE — 93005 ELECTROCARDIOGRAM TRACING: CPT | Performed by: EMERGENCY MEDICINE

## 2019-11-23 PROCEDURE — 83690 ASSAY OF LIPASE: CPT

## 2019-11-23 PROCEDURE — 6360000002 HC RX W HCPCS: Performed by: EMERGENCY MEDICINE

## 2019-11-23 PROCEDURE — 80053 COMPREHEN METABOLIC PANEL: CPT

## 2019-11-23 RX ORDER — NICOTINE 21 MG/24HR
1 PATCH, TRANSDERMAL 24 HOURS TRANSDERMAL DAILY
Status: DISCONTINUED | OUTPATIENT
Start: 2019-11-24 | End: 2019-11-25 | Stop reason: HOSPADM

## 2019-11-23 RX ORDER — SODIUM CHLORIDE 0.9 % (FLUSH) 0.9 %
10 SYRINGE (ML) INJECTION EVERY 12 HOURS SCHEDULED
Status: DISCONTINUED | OUTPATIENT
Start: 2019-11-23 | End: 2019-11-25 | Stop reason: HOSPADM

## 2019-11-23 RX ORDER — SODIUM CHLORIDE 0.9 % (FLUSH) 0.9 %
10 SYRINGE (ML) INJECTION PRN
Status: DISCONTINUED | OUTPATIENT
Start: 2019-11-23 | End: 2019-11-25 | Stop reason: HOSPADM

## 2019-11-23 RX ORDER — ONDANSETRON 2 MG/ML
4 INJECTION INTRAMUSCULAR; INTRAVENOUS EVERY 6 HOURS PRN
Status: DISCONTINUED | OUTPATIENT
Start: 2019-11-23 | End: 2019-11-25 | Stop reason: HOSPADM

## 2019-11-23 RX ORDER — HEPARIN SODIUM 5000 [USP'U]/ML
5000 INJECTION, SOLUTION INTRAVENOUS; SUBCUTANEOUS EVERY 8 HOURS
Status: DISCONTINUED | OUTPATIENT
Start: 2019-11-24 | End: 2019-11-25 | Stop reason: HOSPADM

## 2019-11-23 RX ORDER — NALBUPHINE HCL 10 MG/ML
10 AMPUL (ML) INJECTION ONCE
Status: DISCONTINUED | OUTPATIENT
Start: 2019-11-23 | End: 2019-11-23

## 2019-11-23 RX ORDER — ESCITALOPRAM OXALATE 20 MG/1
20 TABLET ORAL DAILY
Status: DISCONTINUED | OUTPATIENT
Start: 2019-11-24 | End: 2019-11-25 | Stop reason: HOSPADM

## 2019-11-23 RX ORDER — GABAPENTIN 100 MG/1
100 CAPSULE ORAL DAILY
Status: DISCONTINUED | OUTPATIENT
Start: 2019-11-24 | End: 2019-11-25 | Stop reason: HOSPADM

## 2019-11-23 RX ORDER — SUCRALFATE 1 G/1
1 TABLET ORAL 4 TIMES DAILY
Status: DISCONTINUED | OUTPATIENT
Start: 2019-11-24 | End: 2019-11-25 | Stop reason: HOSPADM

## 2019-11-23 RX ORDER — POLYETHYLENE GLYCOL 3350 17 G/17G
17 POWDER, FOR SOLUTION ORAL DAILY PRN
Status: DISCONTINUED | OUTPATIENT
Start: 2019-11-23 | End: 2019-11-25 | Stop reason: HOSPADM

## 2019-11-23 RX ORDER — SODIUM CHLORIDE 9 MG/ML
10 INJECTION INTRAVENOUS DAILY
Status: DISCONTINUED | OUTPATIENT
Start: 2019-11-24 | End: 2019-11-23

## 2019-11-23 RX ORDER — PANTOPRAZOLE SODIUM 40 MG/10ML
40 INJECTION, POWDER, LYOPHILIZED, FOR SOLUTION INTRAVENOUS 2 TIMES DAILY
Status: DISCONTINUED | OUTPATIENT
Start: 2019-11-24 | End: 2019-11-25

## 2019-11-23 RX ORDER — ONDANSETRON 2 MG/ML
4 INJECTION INTRAMUSCULAR; INTRAVENOUS ONCE
Status: COMPLETED | OUTPATIENT
Start: 2019-11-23 | End: 2019-11-23

## 2019-11-23 RX ORDER — HYDROXYZINE HYDROCHLORIDE 50 MG/ML
50 INJECTION, SOLUTION INTRAMUSCULAR EVERY 6 HOURS PRN
Status: DISCONTINUED | OUTPATIENT
Start: 2019-11-23 | End: 2019-11-23

## 2019-11-23 RX ORDER — METHYLPREDNISOLONE SODIUM SUCCINATE 125 MG/2ML
80 INJECTION, POWDER, LYOPHILIZED, FOR SOLUTION INTRAMUSCULAR; INTRAVENOUS ONCE
Status: COMPLETED | OUTPATIENT
Start: 2019-11-24 | End: 2019-11-24

## 2019-11-23 RX ORDER — DOXEPIN HYDROCHLORIDE 10 MG/1
10 CAPSULE ORAL NIGHTLY
Status: DISCONTINUED | OUTPATIENT
Start: 2019-11-24 | End: 2019-11-25 | Stop reason: HOSPADM

## 2019-11-23 RX ORDER — HYDROXYZINE PAMOATE 25 MG/1
25 CAPSULE ORAL 4 TIMES DAILY PRN
Status: DISCONTINUED | OUTPATIENT
Start: 2019-11-23 | End: 2019-11-25 | Stop reason: HOSPADM

## 2019-11-23 RX ORDER — ACETAMINOPHEN 325 MG/1
650 TABLET ORAL EVERY 6 HOURS PRN
Status: DISCONTINUED | OUTPATIENT
Start: 2019-11-23 | End: 2019-11-25 | Stop reason: HOSPADM

## 2019-11-23 RX ORDER — PANTOPRAZOLE SODIUM 40 MG/10ML
40 INJECTION, POWDER, LYOPHILIZED, FOR SOLUTION INTRAVENOUS ONCE
Status: COMPLETED | OUTPATIENT
Start: 2019-11-23 | End: 2019-11-23

## 2019-11-23 RX ORDER — PREDNISONE 20 MG/1
40 TABLET ORAL DAILY
Status: DISCONTINUED | OUTPATIENT
Start: 2019-11-24 | End: 2019-11-25

## 2019-11-23 RX ORDER — MORPHINE SULFATE 4 MG/ML
4 INJECTION, SOLUTION INTRAMUSCULAR; INTRAVENOUS ONCE
Status: DISCONTINUED | OUTPATIENT
Start: 2019-11-23 | End: 2019-11-23

## 2019-11-23 RX ORDER — ALBUTEROL SULFATE 2.5 MG/3ML
2.5 SOLUTION RESPIRATORY (INHALATION) EVERY 6 HOURS PRN
Status: DISCONTINUED | OUTPATIENT
Start: 2019-11-23 | End: 2019-11-25 | Stop reason: HOSPADM

## 2019-11-23 RX ADMIN — HYDROXYZINE HYDROCHLORIDE 50 MG: 50 INJECTION, SOLUTION INTRAMUSCULAR at 21:49

## 2019-11-23 RX ADMIN — PANTOPRAZOLE SODIUM 40 MG: 40 INJECTION, POWDER, FOR SOLUTION INTRAVENOUS at 21:49

## 2019-11-23 RX ADMIN — ONDANSETRON 4 MG: 2 INJECTION INTRAMUSCULAR; INTRAVENOUS at 21:49

## 2019-11-23 ASSESSMENT — ENCOUNTER SYMPTOMS
NAUSEA: 1
WHEEZING: 0
EYE PAIN: 0
PHOTOPHOBIA: 0
CHEST TIGHTNESS: 0
CONSTIPATION: 0
SHORTNESS OF BREATH: 0
ABDOMINAL DISTENTION: 0
COUGH: 0
VOMITING: 0
SORE THROAT: 0
BACK PAIN: 0
RHINORRHEA: 0
EYE ITCHING: 0
STRIDOR: 0
EYE DISCHARGE: 0
DIARRHEA: 0
ABDOMINAL PAIN: 1
EYE REDNESS: 0

## 2019-11-23 ASSESSMENT — PAIN DESCRIPTION - ORIENTATION
ORIENTATION: MID
ORIENTATION: MID;LOWER

## 2019-11-23 ASSESSMENT — PAIN DESCRIPTION - LOCATION
LOCATION: ABDOMEN
LOCATION: ABDOMEN

## 2019-11-23 ASSESSMENT — PAIN DESCRIPTION - PAIN TYPE
TYPE: ACUTE PAIN
TYPE: ACUTE PAIN

## 2019-11-23 ASSESSMENT — PAIN SCALES - GENERAL
PAINLEVEL_OUTOF10: 9
PAINLEVEL_OUTOF10: 8

## 2019-11-23 ASSESSMENT — PAIN DESCRIPTION - DESCRIPTORS: DESCRIPTORS: STABBING

## 2019-11-24 LAB
ADENOVIRUS F 40 41 PCR: NOT DETECTED
ANION GAP SERPL CALCULATED.3IONS-SCNC: 20 MEQ/L (ref 8–16)
ASTROVIRUS PCR: NOT DETECTED
BUN BLDV-MCNC: 45 MG/DL (ref 7–22)
CALCIUM SERPL-MCNC: 8 MG/DL (ref 8.5–10.5)
CAMPYLOBACTER PCR: NOT DETECTED
CHLORIDE BLD-SCNC: 92 MEQ/L (ref 98–111)
CLOSTRIDIUM DIFFICILE, PCR: NOT DETECTED
CO2: 25 MEQ/L (ref 23–33)
CREAT SERPL-MCNC: 11.3 MG/DL (ref 0.4–1.2)
CRYPTOSPORIDIUM PCR: NOT DETECTED
CYCLOSPORA CAYETANENSIS PCR: NOT DETECTED
E COLI 0157 PCR: NORMAL
E COLI ENTEROAGGREGATIVE PCR: NOT DETECTED
E COLI ENTEROPATHOGENIC PCR: NOT DETECTED
E COLI ENTEROTOXIGENIC PCR: NOT DETECTED
E COLI SHIGA LIKE TOXIN PCR: NOT DETECTED
E COLI SHIGELLA/ENTEROINVASIVE PCR: NOT DETECTED
E HISTOLYTICA GI FILM ARRAY: NOT DETECTED
ERYTHROCYTE [DISTWIDTH] IN BLOOD BY AUTOMATED COUNT: 14.9 % (ref 11.5–14.5)
ERYTHROCYTE [DISTWIDTH] IN BLOOD BY AUTOMATED COUNT: 53.7 FL (ref 35–45)
GFR SERPL CREATININE-BSD FRML MDRD: 4 ML/MIN/1.73M2
GIARDIA LAMBLIA PCR: NOT DETECTED
GLUCOSE BLD-MCNC: 114 MG/DL (ref 70–108)
GLUCOSE BLD-MCNC: 123 MG/DL (ref 70–108)
GLUCOSE BLD-MCNC: 203 MG/DL (ref 70–108)
HCT VFR BLD CALC: 27.5 % (ref 37–47)
HEMOCCULT STL QL: NEGATIVE
HEMOGLOBIN: 8.1 GM/DL (ref 12–16)
MCH RBC QN AUTO: 28.8 PG (ref 26–33)
MCHC RBC AUTO-ENTMCNC: 29.5 GM/DL (ref 32.2–35.5)
MCV RBC AUTO: 97.9 FL (ref 81–99)
NOROVIRUS GI GII PCR: NOT DETECTED
PLATELET # BLD: 236 THOU/MM3 (ref 130–400)
PLESIOMONAS SHIGELLOIDES PCR: NOT DETECTED
PMV BLD AUTO: 9.1 FL (ref 9.4–12.4)
POTASSIUM SERPL-SCNC: 5.2 MEQ/L (ref 3.5–5.2)
RBC # BLD: 2.81 MILL/MM3 (ref 4.2–5.4)
ROTAVIRUS A PCR: NOT DETECTED
SALMONELLA PCR: NOT DETECTED
SAPOVIRUS PCR: NOT DETECTED
SODIUM BLD-SCNC: 137 MEQ/L (ref 135–145)
TROPONIN T: 0.01 NG/ML
TROPONIN T: 0.01 NG/ML
VIBRIO CHOLERAE PCR: NOT DETECTED
VIBRIO PCR: NOT DETECTED
WBC # BLD: 5 THOU/MM3 (ref 4.8–10.8)
YERSINIA ENTEROCOLITICA PCR: NOT DETECTED

## 2019-11-24 PROCEDURE — 36415 COLL VENOUS BLD VENIPUNCTURE: CPT

## 2019-11-24 PROCEDURE — 6360000002 HC RX W HCPCS: Performed by: INTERNAL MEDICINE

## 2019-11-24 PROCEDURE — 1200000003 HC TELEMETRY R&B

## 2019-11-24 PROCEDURE — 99232 SBSQ HOSP IP/OBS MODERATE 35: CPT | Performed by: OPHTHALMOLOGY

## 2019-11-24 PROCEDURE — 87507 IADNA-DNA/RNA PROBE TQ 12-25: CPT

## 2019-11-24 PROCEDURE — 6370000000 HC RX 637 (ALT 250 FOR IP): Performed by: INTERNAL MEDICINE

## 2019-11-24 PROCEDURE — 85027 COMPLETE CBC AUTOMATED: CPT

## 2019-11-24 PROCEDURE — 80048 BASIC METABOLIC PNL TOTAL CA: CPT

## 2019-11-24 PROCEDURE — C9113 INJ PANTOPRAZOLE SODIUM, VIA: HCPCS | Performed by: INTERNAL MEDICINE

## 2019-11-24 PROCEDURE — 2700000000 HC OXYGEN THERAPY PER DAY

## 2019-11-24 PROCEDURE — 94640 AIRWAY INHALATION TREATMENT: CPT

## 2019-11-24 PROCEDURE — 84484 ASSAY OF TROPONIN QUANT: CPT

## 2019-11-24 PROCEDURE — 2580000003 HC RX 258: Performed by: INTERNAL MEDICINE

## 2019-11-24 PROCEDURE — 82272 OCCULT BLD FECES 1-3 TESTS: CPT

## 2019-11-24 PROCEDURE — 94761 N-INVAS EAR/PLS OXIMETRY MLT: CPT

## 2019-11-24 PROCEDURE — 82948 REAGENT STRIP/BLOOD GLUCOSE: CPT

## 2019-11-24 PROCEDURE — 99221 1ST HOSP IP/OBS SF/LOW 40: CPT | Performed by: INTERNAL MEDICINE

## 2019-11-24 RX ORDER — SODIUM CHLORIDE 0.9 % (FLUSH) 0.9 %
10 SYRINGE (ML) INJECTION PRN
Status: CANCELLED | OUTPATIENT
Start: 2019-11-24

## 2019-11-24 RX ORDER — SODIUM CHLORIDE 0.9 % (FLUSH) 0.9 %
10 SYRINGE (ML) INJECTION EVERY 12 HOURS SCHEDULED
Status: CANCELLED | OUTPATIENT
Start: 2019-11-24

## 2019-11-24 RX ADMIN — SUCRALFATE 1 G: 1 TABLET ORAL at 12:30

## 2019-11-24 RX ADMIN — ACETAMINOPHEN 650 MG: 325 TABLET ORAL at 08:20

## 2019-11-24 RX ADMIN — HEPARIN SODIUM 5000 UNITS: 5000 INJECTION INTRAVENOUS; SUBCUTANEOUS at 00:37

## 2019-11-24 RX ADMIN — SUCRALFATE 1 G: 1 TABLET ORAL at 19:45

## 2019-11-24 RX ADMIN — HYDROXYZINE PAMOATE 25 MG: 25 CAPSULE ORAL at 16:39

## 2019-11-24 RX ADMIN — PREDNISONE 40 MG: 20 TABLET ORAL at 08:21

## 2019-11-24 RX ADMIN — HEPARIN SODIUM 5000 UNITS: 5000 INJECTION INTRAVENOUS; SUBCUTANEOUS at 16:41

## 2019-11-24 RX ADMIN — HEPARIN SODIUM 5000 UNITS: 5000 INJECTION INTRAVENOUS; SUBCUTANEOUS at 08:19

## 2019-11-24 RX ADMIN — SUCRALFATE 1 G: 1 TABLET ORAL at 16:41

## 2019-11-24 RX ADMIN — CALCIUM ACETATE 2001 MG: 667 CAPSULE ORAL at 08:21

## 2019-11-24 RX ADMIN — Medication 10 ML: at 19:45

## 2019-11-24 RX ADMIN — Medication 10 ML: at 00:37

## 2019-11-24 RX ADMIN — Medication 2 PUFF: at 16:14

## 2019-11-24 RX ADMIN — TIOTROPIUM BROMIDE 18 MCG: 18 CAPSULE ORAL; RESPIRATORY (INHALATION) at 07:37

## 2019-11-24 RX ADMIN — ALBUTEROL SULFATE 2.5 MG: 2.5 SOLUTION RESPIRATORY (INHALATION) at 00:47

## 2019-11-24 RX ADMIN — METHYLPREDNISOLONE SODIUM SUCCINATE 80 MG: 125 INJECTION, POWDER, FOR SOLUTION INTRAMUSCULAR; INTRAVENOUS at 00:32

## 2019-11-24 RX ADMIN — SUCRALFATE 1 G: 1 TABLET ORAL at 00:37

## 2019-11-24 RX ADMIN — HYDROXYZINE PAMOATE 25 MG: 25 CAPSULE ORAL at 08:21

## 2019-11-24 RX ADMIN — Medication 2 PUFF: at 07:37

## 2019-11-24 RX ADMIN — PANTOPRAZOLE SODIUM 40 MG: 40 INJECTION, POWDER, FOR SOLUTION INTRAVENOUS at 19:45

## 2019-11-24 RX ADMIN — GABAPENTIN 100 MG: 100 CAPSULE ORAL at 08:21

## 2019-11-24 RX ADMIN — DOXEPIN HYDROCHLORIDE 10 MG: 10 CAPSULE ORAL at 19:45

## 2019-11-24 RX ADMIN — ESCITALOPRAM 20 MG: 20 TABLET, FILM COATED ORAL at 08:21

## 2019-11-24 RX ADMIN — PANTOPRAZOLE SODIUM 40 MG: 40 INJECTION, POWDER, FOR SOLUTION INTRAVENOUS at 05:49

## 2019-11-24 RX ADMIN — SUCRALFATE 1 G: 1 TABLET ORAL at 08:21

## 2019-11-24 RX ADMIN — Medication 10 ML: at 08:29

## 2019-11-24 RX ADMIN — ACETAMINOPHEN 650 MG: 325 TABLET ORAL at 16:39

## 2019-11-24 ASSESSMENT — PAIN SCALES - GENERAL
PAINLEVEL_OUTOF10: 0
PAINLEVEL_OUTOF10: 9

## 2019-11-24 ASSESSMENT — PAIN DESCRIPTION - DESCRIPTORS: DESCRIPTORS: DISCOMFORT

## 2019-11-24 ASSESSMENT — PAIN DESCRIPTION - FREQUENCY: FREQUENCY: CONTINUOUS

## 2019-11-24 ASSESSMENT — PAIN DESCRIPTION - LOCATION: LOCATION: ABDOMEN

## 2019-11-24 ASSESSMENT — PAIN DESCRIPTION - PROGRESSION: CLINICAL_PROGRESSION: NOT CHANGED

## 2019-11-24 ASSESSMENT — PAIN - FUNCTIONAL ASSESSMENT: PAIN_FUNCTIONAL_ASSESSMENT: ACTIVITIES ARE NOT PREVENTED

## 2019-11-24 ASSESSMENT — PAIN DESCRIPTION - ONSET: ONSET: ON-GOING

## 2019-11-24 ASSESSMENT — PAIN DESCRIPTION - ORIENTATION: ORIENTATION: MID

## 2019-11-24 ASSESSMENT — PAIN DESCRIPTION - PAIN TYPE: TYPE: ACUTE PAIN

## 2019-11-25 ENCOUNTER — ANESTHESIA (OUTPATIENT)
Dept: ENDOSCOPY | Age: 54
DRG: 383 | End: 2019-11-25
Payer: MEDICARE

## 2019-11-25 ENCOUNTER — ANESTHESIA EVENT (OUTPATIENT)
Dept: ENDOSCOPY | Age: 54
DRG: 383 | End: 2019-11-25
Payer: MEDICARE

## 2019-11-25 VITALS
RESPIRATION RATE: 18 BRPM | BODY MASS INDEX: 23.04 KG/M2 | OXYGEN SATURATION: 91 % | TEMPERATURE: 98.3 F | DIASTOLIC BLOOD PRESSURE: 70 MMHG | HEIGHT: 63 IN | SYSTOLIC BLOOD PRESSURE: 141 MMHG | WEIGHT: 130 LBS | HEART RATE: 67 BPM

## 2019-11-25 VITALS
OXYGEN SATURATION: 96 % | RESPIRATION RATE: 19 BRPM | SYSTOLIC BLOOD PRESSURE: 139 MMHG | DIASTOLIC BLOOD PRESSURE: 91 MMHG

## 2019-11-25 PROBLEM — K20.90 ESOPHAGITIS: Status: ACTIVE | Noted: 2019-11-25

## 2019-11-25 PROBLEM — K29.00 ACUTE GASTRITIS WITHOUT HEMORRHAGE: Status: ACTIVE | Noted: 2019-11-25

## 2019-11-25 PROBLEM — K25.9 PREPYLORIC ULCER: Status: ACTIVE | Noted: 2019-11-25

## 2019-11-25 LAB
ANION GAP SERPL CALCULATED.3IONS-SCNC: 21 MEQ/L (ref 8–16)
BUN BLDV-MCNC: 59 MG/DL (ref 7–22)
CALCIUM SERPL-MCNC: 7.9 MG/DL (ref 8.5–10.5)
CHLORIDE BLD-SCNC: 91 MEQ/L (ref 98–111)
CO2: 26 MEQ/L (ref 23–33)
CREAT SERPL-MCNC: 12.3 MG/DL (ref 0.4–1.2)
ERYTHROCYTE [DISTWIDTH] IN BLOOD BY AUTOMATED COUNT: 14.6 % (ref 11.5–14.5)
ERYTHROCYTE [DISTWIDTH] IN BLOOD BY AUTOMATED COUNT: 51.1 FL (ref 35–45)
GFR SERPL CREATININE-BSD FRML MDRD: 3 ML/MIN/1.73M2
GLUCOSE BLD-MCNC: 101 MG/DL (ref 70–108)
HCT VFR BLD CALC: 25.9 % (ref 37–47)
HEMOGLOBIN: 7.7 GM/DL (ref 12–16)
MAGNESIUM: 2.4 MG/DL (ref 1.6–2.4)
MCH RBC QN AUTO: 28.5 PG (ref 26–33)
MCHC RBC AUTO-ENTMCNC: 29.7 GM/DL (ref 32.2–35.5)
MCV RBC AUTO: 95.9 FL (ref 81–99)
PHOSPHORUS: 7.1 MG/DL (ref 2.4–4.7)
PLATELET # BLD: 302 THOU/MM3 (ref 130–400)
PMV BLD AUTO: 9.1 FL (ref 9.4–12.4)
POTASSIUM SERPL-SCNC: 4.6 MEQ/L (ref 3.5–5.2)
RBC # BLD: 2.7 MILL/MM3 (ref 4.2–5.4)
SODIUM BLD-SCNC: 138 MEQ/L (ref 135–145)
TROPONIN T: 0.02 NG/ML
WBC # BLD: 7 THOU/MM3 (ref 4.8–10.8)

## 2019-11-25 PROCEDURE — 3700000000 HC ANESTHESIA ATTENDED CARE: Performed by: INTERNAL MEDICINE

## 2019-11-25 PROCEDURE — 36415 COLL VENOUS BLD VENIPUNCTURE: CPT

## 2019-11-25 PROCEDURE — 6360000002 HC RX W HCPCS: Performed by: INTERNAL MEDICINE

## 2019-11-25 PROCEDURE — 85027 COMPLETE CBC AUTOMATED: CPT

## 2019-11-25 PROCEDURE — 6370000000 HC RX 637 (ALT 250 FOR IP): Performed by: INTERNAL MEDICINE

## 2019-11-25 PROCEDURE — 3700000001 HC ADD 15 MINUTES (ANESTHESIA): Performed by: INTERNAL MEDICINE

## 2019-11-25 PROCEDURE — 6360000002 HC RX W HCPCS: Performed by: NURSE ANESTHETIST, CERTIFIED REGISTERED

## 2019-11-25 PROCEDURE — C9113 INJ PANTOPRAZOLE SODIUM, VIA: HCPCS | Performed by: INTERNAL MEDICINE

## 2019-11-25 PROCEDURE — 80048 BASIC METABOLIC PNL TOTAL CA: CPT

## 2019-11-25 PROCEDURE — 3609012400 HC EGD TRANSORAL BIOPSY SINGLE/MULTIPLE: Performed by: INTERNAL MEDICINE

## 2019-11-25 PROCEDURE — 2580000003 HC RX 258: Performed by: INTERNAL MEDICINE

## 2019-11-25 PROCEDURE — 90935 HEMODIALYSIS ONE EVALUATION: CPT

## 2019-11-25 PROCEDURE — 83735 ASSAY OF MAGNESIUM: CPT

## 2019-11-25 PROCEDURE — 90935 HEMODIALYSIS ONE EVALUATION: CPT | Performed by: INTERNAL MEDICINE

## 2019-11-25 PROCEDURE — 2500000003 HC RX 250 WO HCPCS: Performed by: NURSE ANESTHETIST, CERTIFIED REGISTERED

## 2019-11-25 PROCEDURE — 7100000000 HC PACU RECOVERY - FIRST 15 MIN: Performed by: INTERNAL MEDICINE

## 2019-11-25 PROCEDURE — 7100000001 HC PACU RECOVERY - ADDTL 15 MIN: Performed by: INTERNAL MEDICINE

## 2019-11-25 PROCEDURE — 5A1D70Z PERFORMANCE OF URINARY FILTRATION, INTERMITTENT, LESS THAN 6 HOURS PER DAY: ICD-10-PCS | Performed by: INTERNAL MEDICINE

## 2019-11-25 PROCEDURE — 88305 TISSUE EXAM BY PATHOLOGIST: CPT

## 2019-11-25 PROCEDURE — 99239 HOSP IP/OBS DSCHRG MGMT >30: CPT | Performed by: INTERNAL MEDICINE

## 2019-11-25 PROCEDURE — 94760 N-INVAS EAR/PLS OXIMETRY 1: CPT

## 2019-11-25 PROCEDURE — 84484 ASSAY OF TROPONIN QUANT: CPT

## 2019-11-25 PROCEDURE — 0DJ08ZZ INSPECTION OF UPPER INTESTINAL TRACT, VIA NATURAL OR ARTIFICIAL OPENING ENDOSCOPIC: ICD-10-PCS | Performed by: INTERNAL MEDICINE

## 2019-11-25 PROCEDURE — 84100 ASSAY OF PHOSPHORUS: CPT

## 2019-11-25 PROCEDURE — 94640 AIRWAY INHALATION TREATMENT: CPT

## 2019-11-25 RX ORDER — DIPHENHYDRAMINE HYDROCHLORIDE 50 MG/ML
12.5 INJECTION INTRAMUSCULAR; INTRAVENOUS
Status: DISCONTINUED | OUTPATIENT
Start: 2019-11-25 | End: 2019-11-25 | Stop reason: HOSPADM

## 2019-11-25 RX ORDER — SODIUM CHLORIDE 450 MG/100ML
INJECTION, SOLUTION INTRAVENOUS CONTINUOUS
Status: DISCONTINUED | OUTPATIENT
Start: 2019-11-25 | End: 2019-11-25 | Stop reason: HOSPADM

## 2019-11-25 RX ORDER — PANTOPRAZOLE SODIUM 40 MG/1
40 TABLET, DELAYED RELEASE ORAL 2 TIMES DAILY
Qty: 90 TABLET | Refills: 1 | Status: ON HOLD
Start: 2019-11-25 | End: 2020-02-13 | Stop reason: SDUPTHER

## 2019-11-25 RX ORDER — LIDOCAINE HYDROCHLORIDE 20 MG/ML
INJECTION, SOLUTION INFILTRATION; PERINEURAL PRN
Status: DISCONTINUED | OUTPATIENT
Start: 2019-11-25 | End: 2019-11-25 | Stop reason: SDUPTHER

## 2019-11-25 RX ORDER — POLYETHYLENE GLYCOL 3350 17 G/17G
17 POWDER, FOR SOLUTION ORAL DAILY PRN
Qty: 527 G | Refills: 1 | COMMUNITY
Start: 2019-11-25 | End: 2019-12-25

## 2019-11-25 RX ORDER — ALUMINUM HYDROXIDE 320 MG/5ML
LIQUID ORAL
Status: ON HOLD | COMMUNITY
Start: 2019-11-25 | End: 2021-01-01

## 2019-11-25 RX ORDER — PROPOFOL 10 MG/ML
INJECTION, EMULSION INTRAVENOUS PRN
Status: DISCONTINUED | OUTPATIENT
Start: 2019-11-25 | End: 2019-11-25 | Stop reason: SDUPTHER

## 2019-11-25 RX ORDER — SODIUM CHLORIDE 0.9 % (FLUSH) 0.9 %
10 SYRINGE (ML) INJECTION EVERY 12 HOURS SCHEDULED
Status: DISCONTINUED | OUTPATIENT
Start: 2019-11-25 | End: 2019-11-25 | Stop reason: SDUPTHER

## 2019-11-25 RX ORDER — PANTOPRAZOLE SODIUM 40 MG/1
40 TABLET, DELAYED RELEASE ORAL
Status: DISCONTINUED | OUTPATIENT
Start: 2019-11-25 | End: 2019-11-25 | Stop reason: HOSPADM

## 2019-11-25 RX ORDER — SODIUM CHLORIDE 0.9 % (FLUSH) 0.9 %
10 SYRINGE (ML) INJECTION PRN
Status: DISCONTINUED | OUTPATIENT
Start: 2019-11-25 | End: 2019-11-25 | Stop reason: SDUPTHER

## 2019-11-25 RX ADMIN — Medication 2 PUFF: at 07:36

## 2019-11-25 RX ADMIN — SODIUM CHLORIDE: 4.5 INJECTION, SOLUTION INTRAVENOUS at 13:43

## 2019-11-25 RX ADMIN — PANTOPRAZOLE SODIUM 40 MG: 40 TABLET, DELAYED RELEASE ORAL at 15:14

## 2019-11-25 RX ADMIN — HYDROXYZINE PAMOATE 25 MG: 25 CAPSULE ORAL at 00:41

## 2019-11-25 RX ADMIN — SUCRALFATE 1 G: 1 TABLET ORAL at 15:11

## 2019-11-25 RX ADMIN — GABAPENTIN 100 MG: 100 CAPSULE ORAL at 15:11

## 2019-11-25 RX ADMIN — ESCITALOPRAM 20 MG: 20 TABLET, FILM COATED ORAL at 15:11

## 2019-11-25 RX ADMIN — PANTOPRAZOLE SODIUM 40 MG: 40 INJECTION, POWDER, FOR SOLUTION INTRAVENOUS at 08:39

## 2019-11-25 RX ADMIN — Medication 2 PUFF: at 17:45

## 2019-11-25 RX ADMIN — CALCIUM ACETATE 2001 MG: 667 CAPSULE ORAL at 15:12

## 2019-11-25 RX ADMIN — Medication 10 ML: at 08:39

## 2019-11-25 RX ADMIN — PROPOFOL 100 MG: 10 INJECTION, EMULSION INTRAVENOUS at 13:43

## 2019-11-25 RX ADMIN — DIPHENHYDRAMINE HYDROCHLORIDE 12.5 MG: 50 INJECTION INTRAMUSCULAR; INTRAVENOUS at 07:11

## 2019-11-25 RX ADMIN — PROPOFOL 100 MG: 10 INJECTION, EMULSION INTRAVENOUS at 13:52

## 2019-11-25 RX ADMIN — ACETAMINOPHEN 650 MG: 325 TABLET ORAL at 00:41

## 2019-11-25 RX ADMIN — LIDOCAINE HYDROCHLORIDE 140 MG: 20 INJECTION, SOLUTION INFILTRATION; PERINEURAL at 13:44

## 2019-11-25 RX ADMIN — TIOTROPIUM BROMIDE 18 MCG: 18 CAPSULE ORAL; RESPIRATORY (INHALATION) at 07:36

## 2019-11-25 ASSESSMENT — LIFESTYLE VARIABLES: SMOKING_STATUS: 1

## 2019-11-25 ASSESSMENT — COPD QUESTIONNAIRES: CAT_SEVERITY: MODERATE

## 2019-11-25 ASSESSMENT — PAIN SCALES - GENERAL
PAINLEVEL_OUTOF10: 0
PAINLEVEL_OUTOF10: 0
PAINLEVEL_OUTOF10: 8

## 2019-11-25 ASSESSMENT — PAIN - FUNCTIONAL ASSESSMENT: PAIN_FUNCTIONAL_ASSESSMENT: 0-10

## 2019-12-23 PROBLEM — R77.8 ELEVATED TROPONIN: Status: RESOLVED | Noted: 2019-11-23 | Resolved: 2019-12-23

## 2019-12-23 PROBLEM — R79.89 ELEVATED TROPONIN: Status: RESOLVED | Noted: 2019-11-23 | Resolved: 2019-12-23

## 2019-12-29 ENCOUNTER — APPOINTMENT (OUTPATIENT)
Dept: CT IMAGING | Age: 54
DRG: 291 | End: 2019-12-29
Payer: MEDICARE

## 2019-12-29 ENCOUNTER — APPOINTMENT (OUTPATIENT)
Dept: GENERAL RADIOLOGY | Age: 54
DRG: 291 | End: 2019-12-29
Payer: MEDICARE

## 2019-12-29 ENCOUNTER — HOSPITAL ENCOUNTER (INPATIENT)
Age: 54
LOS: 5 days | Discharge: HOME | DRG: 291 | End: 2020-01-03
Attending: EMERGENCY MEDICINE | Admitting: INTERNAL MEDICINE
Payer: MEDICARE

## 2019-12-29 PROBLEM — I31.39 PERICARDIAL EFFUSION: Status: ACTIVE | Noted: 2019-12-29

## 2019-12-29 LAB
ABO: NORMAL
ALBUMIN SERPL-MCNC: 3.9 G/DL (ref 3.5–5.1)
ALP BLD-CCNC: 200 U/L (ref 38–126)
ALT SERPL-CCNC: 12 U/L (ref 11–66)
ANION GAP SERPL CALCULATED.3IONS-SCNC: 17 MEQ/L (ref 8–16)
ANION GAP SERPL CALCULATED.3IONS-SCNC: 19 MEQ/L (ref 8–16)
ANTIBODY SCREEN: NORMAL
APTT: 32.9 SECONDS (ref 22–38)
AST SERPL-CCNC: 12 U/L (ref 5–40)
BASE EXCESS (CALCULATED): 2.7 MMOL/L (ref -2.5–2.5)
BASE EXCESS MIXED: 8.1 MMOL/L (ref -2–3)
BASOPHILS # BLD: 0.8 %
BASOPHILS ABSOLUTE: 0.1 THOU/MM3 (ref 0–0.1)
BILIRUB SERPL-MCNC: 0.3 MG/DL (ref 0.3–1.2)
BUN BLDV-MCNC: 26 MG/DL (ref 7–22)
BUN BLDV-MCNC: 31 MG/DL (ref 7–22)
CALCIUM SERPL-MCNC: 8.3 MG/DL (ref 8.5–10.5)
CALCIUM SERPL-MCNC: 9 MG/DL (ref 8.5–10.5)
CHLORIDE BLD-SCNC: 96 MEQ/L (ref 98–111)
CHLORIDE BLD-SCNC: 96 MEQ/L (ref 98–111)
CO2: 22 MEQ/L (ref 23–33)
CO2: 28 MEQ/L (ref 23–33)
COLLECTED BY:: ABNORMAL
COLLECTED BY:: ABNORMAL
CREAT SERPL-MCNC: 8.8 MG/DL (ref 0.4–1.2)
CREAT SERPL-MCNC: 9.6 MG/DL (ref 0.4–1.2)
EKG ATRIAL RATE: 159 BPM
EKG ATRIAL RATE: 85 BPM
EKG P AXIS: 61 DEGREES
EKG P-R INTERVAL: 134 MS
EKG Q-T INTERVAL: 302 MS
EKG Q-T INTERVAL: 382 MS
EKG QRS DURATION: 78 MS
EKG QRS DURATION: 88 MS
EKG QTC CALCULATION (BAZETT): 454 MS
EKG QTC CALCULATION (BAZETT): 495 MS
EKG R AXIS: 26 DEGREES
EKG R AXIS: 26 DEGREES
EKG T AXIS: 164 DEGREES
EKG T AXIS: 77 DEGREES
EKG VENTRICULAR RATE: 162 BPM
EKG VENTRICULAR RATE: 85 BPM
EOSINOPHIL # BLD: 1.6 %
EOSINOPHILS ABSOLUTE: 0.2 THOU/MM3 (ref 0–0.4)
ERYTHROCYTE [DISTWIDTH] IN BLOOD BY AUTOMATED COUNT: 15 % (ref 11.5–14.5)
ERYTHROCYTE [DISTWIDTH] IN BLOOD BY AUTOMATED COUNT: 56.3 FL (ref 35–45)
FLU A ANTIGEN: NEGATIVE
FLU B ANTIGEN: NEGATIVE
GFR SERPL CREATININE-BSD FRML MDRD: 4 ML/MIN/1.73M2
GFR SERPL CREATININE-BSD FRML MDRD: 5 ML/MIN/1.73M2
GLUCOSE BLD-MCNC: 331 MG/DL (ref 70–108)
GLUCOSE BLD-MCNC: 340 MG/DL (ref 70–108)
GLUCOSE BLD-MCNC: 91 MG/DL (ref 70–108)
HCO3, MIXED: 36 MMOL/L (ref 23–28)
HCO3: 29 MMOL/L (ref 23–28)
HCT VFR BLD CALC: 25.9 % (ref 37–47)
HCT VFR BLD CALC: 28.5 % (ref 37–47)
HEMOGLOBIN: 7.1 GM/DL (ref 12–16)
HEMOGLOBIN: 8 GM/DL (ref 12–16)
HYPOCHROMIA: PRESENT
IMMATURE GRANS (ABS): 0.14 THOU/MM3 (ref 0–0.07)
IMMATURE GRANULOCYTES: 1.4 %
INR BLD: 1.15 (ref 0.85–1.13)
IRON SATURATION: 12 % (ref 20–50)
IRON: 18 UG/DL (ref 50–170)
LYMPHOCYTES # BLD: 13.2 %
LYMPHOCYTES ABSOLUTE: 1.3 THOU/MM3 (ref 1–4.8)
MAGNESIUM: 2.2 MG/DL (ref 1.6–2.4)
MAGNESIUM: 2.4 MG/DL (ref 1.6–2.4)
MCH RBC QN AUTO: 28.4 PG (ref 26–33)
MCHC RBC AUTO-ENTMCNC: 27.4 GM/DL (ref 32.2–35.5)
MCV RBC AUTO: 103.6 FL (ref 81–99)
MONOCYTES # BLD: 10.5 %
MONOCYTES ABSOLUTE: 1.1 THOU/MM3 (ref 0.4–1.3)
NUCLEATED RED BLOOD CELLS: 0 /100 WBC
O2 SAT, MIXED: 17 %
O2 SATURATION: 96 %
OSMOLALITY CALCULATION: 285.6 MOSMOL/KG (ref 275–300)
PCO2, MIXED VENOUS: 69 MMHG (ref 41–51)
PCO2: 51 MMHG (ref 35–45)
PH BLOOD GAS: 7.36 (ref 7.35–7.45)
PH, MIXED: 7.32 (ref 7.31–7.41)
PHOSPHORUS: 4.2 MG/DL (ref 2.4–4.7)
PLATELET # BLD: 383 THOU/MM3 (ref 130–400)
PMV BLD AUTO: 10.2 FL (ref 9.4–12.4)
PO2 MIXED: 16 MMHG (ref 25–40)
PO2: 86 MMHG (ref 71–104)
POTASSIUM SERPL-SCNC: 4.3 MEQ/L (ref 3.5–5.2)
POTASSIUM SERPL-SCNC: 4.8 MEQ/L (ref 3.5–5.2)
PRO-BNP: ABNORMAL PG/ML (ref 0–900)
RBC # BLD: 2.5 MILL/MM3 (ref 4.2–5.4)
RH FACTOR: NORMAL
SEG NEUTROPHILS: 72.5 %
SEGMENTED NEUTROPHILS ABSOLUTE COUNT: 7.4 THOU/MM3 (ref 1.8–7.7)
SITE: ABNORMAL
SODIUM BLD-SCNC: 137 MEQ/L (ref 135–145)
SODIUM BLD-SCNC: 141 MEQ/L (ref 135–145)
SOURCE, BLOOD GAS: ABNORMAL
TOTAL IRON BINDING CAPACITY: 152 UG/DL (ref 171–450)
TOTAL PROTEIN: 7.6 G/DL (ref 6.1–8)
TROPONIN T: 0.04 NG/ML
TROPONIN T: 0.04 NG/ML
TROPONIN T: 0.05 NG/ML
WBC # BLD: 10.2 THOU/MM3 (ref 4.8–10.8)

## 2019-12-29 PROCEDURE — 86923 COMPATIBILITY TEST ELECTRIC: CPT

## 2019-12-29 PROCEDURE — 87804 INFLUENZA ASSAY W/OPTIC: CPT

## 2019-12-29 PROCEDURE — 85014 HEMATOCRIT: CPT

## 2019-12-29 PROCEDURE — 36600 WITHDRAWAL OF ARTERIAL BLOOD: CPT

## 2019-12-29 PROCEDURE — 83540 ASSAY OF IRON: CPT

## 2019-12-29 PROCEDURE — 6370000000 HC RX 637 (ALT 250 FOR IP): Performed by: INTERNAL MEDICINE

## 2019-12-29 PROCEDURE — 6360000002 HC RX W HCPCS: Performed by: EMERGENCY MEDICINE

## 2019-12-29 PROCEDURE — 74176 CT ABD & PELVIS W/O CONTRAST: CPT

## 2019-12-29 PROCEDURE — 6370000000 HC RX 637 (ALT 250 FOR IP): Performed by: EMERGENCY MEDICINE

## 2019-12-29 PROCEDURE — 6370000000 HC RX 637 (ALT 250 FOR IP): Performed by: FAMILY MEDICINE

## 2019-12-29 PROCEDURE — 86900 BLOOD TYPING SEROLOGIC ABO: CPT

## 2019-12-29 PROCEDURE — 96374 THER/PROPH/DIAG INJ IV PUSH: CPT

## 2019-12-29 PROCEDURE — 87040 BLOOD CULTURE FOR BACTERIA: CPT

## 2019-12-29 PROCEDURE — C9113 INJ PANTOPRAZOLE SODIUM, VIA: HCPCS | Performed by: EMERGENCY MEDICINE

## 2019-12-29 PROCEDURE — 80048 BASIC METABOLIC PNL TOTAL CA: CPT

## 2019-12-29 PROCEDURE — 82728 ASSAY OF FERRITIN: CPT

## 2019-12-29 PROCEDURE — 82803 BLOOD GASES ANY COMBINATION: CPT

## 2019-12-29 PROCEDURE — 99285 EMERGENCY DEPT VISIT HI MDM: CPT

## 2019-12-29 PROCEDURE — 86850 RBC ANTIBODY SCREEN: CPT

## 2019-12-29 PROCEDURE — 99221 1ST HOSP IP/OBS SF/LOW 40: CPT | Performed by: NURSE PRACTITIONER

## 2019-12-29 PROCEDURE — 93005 ELECTROCARDIOGRAM TRACING: CPT | Performed by: EMERGENCY MEDICINE

## 2019-12-29 PROCEDURE — 85730 THROMBOPLASTIN TIME PARTIAL: CPT

## 2019-12-29 PROCEDURE — 83880 ASSAY OF NATRIURETIC PEPTIDE: CPT

## 2019-12-29 PROCEDURE — 2700000000 HC OXYGEN THERAPY PER DAY

## 2019-12-29 PROCEDURE — 84100 ASSAY OF PHOSPHORUS: CPT

## 2019-12-29 PROCEDURE — C9113 INJ PANTOPRAZOLE SODIUM, VIA: HCPCS | Performed by: INTERNAL MEDICINE

## 2019-12-29 PROCEDURE — 94761 N-INVAS EAR/PLS OXIMETRY MLT: CPT

## 2019-12-29 PROCEDURE — 85018 HEMOGLOBIN: CPT

## 2019-12-29 PROCEDURE — 84484 ASSAY OF TROPONIN QUANT: CPT

## 2019-12-29 PROCEDURE — 83735 ASSAY OF MAGNESIUM: CPT

## 2019-12-29 PROCEDURE — 83550 IRON BINDING TEST: CPT

## 2019-12-29 PROCEDURE — 93005 ELECTROCARDIOGRAM TRACING: CPT | Performed by: PHYSICIAN ASSISTANT

## 2019-12-29 PROCEDURE — 80053 COMPREHEN METABOLIC PANEL: CPT

## 2019-12-29 PROCEDURE — 82948 REAGENT STRIP/BLOOD GLUCOSE: CPT

## 2019-12-29 PROCEDURE — 99223 1ST HOSP IP/OBS HIGH 75: CPT | Performed by: INTERNAL MEDICINE

## 2019-12-29 PROCEDURE — 71045 X-RAY EXAM CHEST 1 VIEW: CPT

## 2019-12-29 PROCEDURE — 85025 COMPLETE CBC W/AUTO DIFF WBC: CPT

## 2019-12-29 PROCEDURE — 2140000000 HC CCU INTERMEDIATE R&B

## 2019-12-29 PROCEDURE — 36415 COLL VENOUS BLD VENIPUNCTURE: CPT

## 2019-12-29 PROCEDURE — 94640 AIRWAY INHALATION TREATMENT: CPT

## 2019-12-29 PROCEDURE — 85610 PROTHROMBIN TIME: CPT

## 2019-12-29 PROCEDURE — 96375 TX/PRO/DX INJ NEW DRUG ADDON: CPT

## 2019-12-29 PROCEDURE — 2709999900 HC NON-CHARGEABLE SUPPLY

## 2019-12-29 PROCEDURE — 86901 BLOOD TYPING SEROLOGIC RH(D): CPT

## 2019-12-29 PROCEDURE — 2580000003 HC RX 258: Performed by: INTERNAL MEDICINE

## 2019-12-29 PROCEDURE — 6360000002 HC RX W HCPCS: Performed by: INTERNAL MEDICINE

## 2019-12-29 PROCEDURE — 6360000002 HC RX W HCPCS: Performed by: FAMILY MEDICINE

## 2019-12-29 PROCEDURE — 83036 HEMOGLOBIN GLYCOSYLATED A1C: CPT

## 2019-12-29 RX ORDER — ESCITALOPRAM OXALATE 20 MG/1
20 TABLET ORAL DAILY
Status: DISCONTINUED | OUTPATIENT
Start: 2019-12-30 | End: 2020-01-03 | Stop reason: HOSPADM

## 2019-12-29 RX ORDER — HEPARIN SODIUM 1000 [USP'U]/ML
40 INJECTION, SOLUTION INTRAVENOUS; SUBCUTANEOUS PRN
Status: DISCONTINUED | OUTPATIENT
Start: 2019-12-29 | End: 2020-01-01

## 2019-12-29 RX ORDER — DEXTROSE MONOHYDRATE 50 MG/ML
100 INJECTION, SOLUTION INTRAVENOUS PRN
Status: DISCONTINUED | OUTPATIENT
Start: 2019-12-29 | End: 2020-01-03 | Stop reason: HOSPADM

## 2019-12-29 RX ORDER — PANTOPRAZOLE SODIUM 40 MG/1
40 TABLET, DELAYED RELEASE ORAL 2 TIMES DAILY
Status: CANCELLED | OUTPATIENT
Start: 2019-12-29

## 2019-12-29 RX ORDER — METHYLPREDNISOLONE SODIUM SUCCINATE 125 MG/2ML
125 INJECTION, POWDER, LYOPHILIZED, FOR SOLUTION INTRAMUSCULAR; INTRAVENOUS ONCE
Status: COMPLETED | OUTPATIENT
Start: 2019-12-29 | End: 2019-12-29

## 2019-12-29 RX ORDER — ONDANSETRON 2 MG/ML
4 INJECTION INTRAMUSCULAR; INTRAVENOUS ONCE
Status: COMPLETED | OUTPATIENT
Start: 2019-12-29 | End: 2019-12-29

## 2019-12-29 RX ORDER — FENTANYL CITRATE 50 UG/ML
25 INJECTION, SOLUTION INTRAMUSCULAR; INTRAVENOUS ONCE
Status: DISCONTINUED | OUTPATIENT
Start: 2019-12-29 | End: 2019-12-29

## 2019-12-29 RX ORDER — IPRATROPIUM BROMIDE AND ALBUTEROL SULFATE 2.5; .5 MG/3ML; MG/3ML
1 SOLUTION RESPIRATORY (INHALATION)
Status: DISCONTINUED | OUTPATIENT
Start: 2019-12-29 | End: 2019-12-29

## 2019-12-29 RX ORDER — PREDNISONE 20 MG/1
40 TABLET ORAL DAILY
Status: DISCONTINUED | OUTPATIENT
Start: 2019-12-30 | End: 2019-12-30

## 2019-12-29 RX ORDER — DEXTROSE MONOHYDRATE 25 G/50ML
12.5 INJECTION, SOLUTION INTRAVENOUS PRN
Status: DISCONTINUED | OUTPATIENT
Start: 2019-12-29 | End: 2020-01-03 | Stop reason: HOSPADM

## 2019-12-29 RX ORDER — HYDROXYZINE PAMOATE 25 MG/1
25 CAPSULE ORAL 4 TIMES DAILY PRN
Status: DISCONTINUED | OUTPATIENT
Start: 2019-12-29 | End: 2020-01-03 | Stop reason: HOSPADM

## 2019-12-29 RX ORDER — HEPARIN SODIUM 1000 [USP'U]/ML
80 INJECTION, SOLUTION INTRAVENOUS; SUBCUTANEOUS ONCE
Status: COMPLETED | OUTPATIENT
Start: 2019-12-29 | End: 2019-12-29

## 2019-12-29 RX ORDER — HEPARIN SODIUM 10000 [USP'U]/100ML
18 INJECTION, SOLUTION INTRAVENOUS CONTINUOUS
Status: DISCONTINUED | OUTPATIENT
Start: 2019-12-29 | End: 2020-01-01

## 2019-12-29 RX ORDER — ONDANSETRON 2 MG/ML
4 INJECTION INTRAMUSCULAR; INTRAVENOUS EVERY 6 HOURS PRN
Status: DISCONTINUED | OUTPATIENT
Start: 2019-12-29 | End: 2020-01-03 | Stop reason: HOSPADM

## 2019-12-29 RX ORDER — ALBUTEROL SULFATE 90 UG/1
1 AEROSOL, METERED RESPIRATORY (INHALATION) EVERY 6 HOURS PRN
Status: DISCONTINUED | OUTPATIENT
Start: 2019-12-29 | End: 2019-12-29

## 2019-12-29 RX ORDER — SODIUM CHLORIDE 0.9 % (FLUSH) 0.9 %
10 SYRINGE (ML) INJECTION EVERY 12 HOURS SCHEDULED
Status: DISCONTINUED | OUTPATIENT
Start: 2019-12-29 | End: 2020-01-03 | Stop reason: HOSPADM

## 2019-12-29 RX ORDER — METHYLPREDNISOLONE SODIUM SUCCINATE 40 MG/ML
40 INJECTION, POWDER, LYOPHILIZED, FOR SOLUTION INTRAMUSCULAR; INTRAVENOUS EVERY 12 HOURS
Status: DISCONTINUED | OUTPATIENT
Start: 2019-12-29 | End: 2019-12-29

## 2019-12-29 RX ORDER — BENZONATATE 100 MG/1
100 CAPSULE ORAL 3 TIMES DAILY PRN
Status: DISCONTINUED | OUTPATIENT
Start: 2019-12-29 | End: 2020-01-03 | Stop reason: HOSPADM

## 2019-12-29 RX ORDER — NICOTINE 21 MG/24HR
1 PATCH, TRANSDERMAL 24 HOURS TRANSDERMAL DAILY
Status: DISCONTINUED | OUTPATIENT
Start: 2019-12-29 | End: 2020-01-03 | Stop reason: HOSPADM

## 2019-12-29 RX ORDER — HEPARIN SODIUM 1000 [USP'U]/ML
80 INJECTION, SOLUTION INTRAVENOUS; SUBCUTANEOUS PRN
Status: DISCONTINUED | OUTPATIENT
Start: 2019-12-29 | End: 2020-01-01

## 2019-12-29 RX ORDER — IPRATROPIUM BROMIDE AND ALBUTEROL SULFATE 2.5; .5 MG/3ML; MG/3ML
1 SOLUTION RESPIRATORY (INHALATION) ONCE
Status: COMPLETED | OUTPATIENT
Start: 2019-12-29 | End: 2019-12-29

## 2019-12-29 RX ORDER — PANTOPRAZOLE SODIUM 40 MG/10ML
40 INJECTION, POWDER, LYOPHILIZED, FOR SOLUTION INTRAVENOUS 2 TIMES DAILY
Status: DISCONTINUED | OUTPATIENT
Start: 2019-12-29 | End: 2019-12-30

## 2019-12-29 RX ORDER — GABAPENTIN 100 MG/1
100 CAPSULE ORAL DAILY
Status: DISCONTINUED | OUTPATIENT
Start: 2019-12-29 | End: 2020-01-03 | Stop reason: HOSPADM

## 2019-12-29 RX ORDER — ACETAMINOPHEN 325 MG/1
650 TABLET ORAL EVERY 4 HOURS PRN
Status: DISCONTINUED | OUTPATIENT
Start: 2019-12-29 | End: 2020-01-03 | Stop reason: HOSPADM

## 2019-12-29 RX ORDER — DOXEPIN HYDROCHLORIDE 10 MG/1
10 CAPSULE ORAL NIGHTLY
Status: DISCONTINUED | OUTPATIENT
Start: 2019-12-29 | End: 2020-01-03 | Stop reason: HOSPADM

## 2019-12-29 RX ORDER — PANTOPRAZOLE SODIUM 40 MG/10ML
40 INJECTION, POWDER, LYOPHILIZED, FOR SOLUTION INTRAVENOUS ONCE
Status: COMPLETED | OUTPATIENT
Start: 2019-12-29 | End: 2019-12-29

## 2019-12-29 RX ORDER — NICOTINE POLACRILEX 4 MG
15 LOZENGE BUCCAL PRN
Status: DISCONTINUED | OUTPATIENT
Start: 2019-12-29 | End: 2020-01-03 | Stop reason: HOSPADM

## 2019-12-29 RX ORDER — SUCRALFATE 1 G/1
1 TABLET ORAL 4 TIMES DAILY
Status: DISCONTINUED | OUTPATIENT
Start: 2019-12-29 | End: 2020-01-03 | Stop reason: HOSPADM

## 2019-12-29 RX ORDER — SODIUM CHLORIDE 0.9 % (FLUSH) 0.9 %
10 SYRINGE (ML) INJECTION PRN
Status: DISCONTINUED | OUTPATIENT
Start: 2019-12-29 | End: 2020-01-03 | Stop reason: HOSPADM

## 2019-12-29 RX ORDER — SODIUM CHLORIDE 9 MG/ML
10 INJECTION INTRAVENOUS 2 TIMES DAILY
Status: DISCONTINUED | OUTPATIENT
Start: 2019-12-29 | End: 2019-12-30

## 2019-12-29 RX ADMIN — PANTOPRAZOLE SODIUM 40 MG: 40 INJECTION, POWDER, FOR SOLUTION INTRAVENOUS at 21:07

## 2019-12-29 RX ADMIN — SUCRALFATE 1 G: 1 TABLET ORAL at 21:13

## 2019-12-29 RX ADMIN — IPRATROPIUM BROMIDE AND ALBUTEROL SULFATE 1 AMPULE: .5; 3 SOLUTION RESPIRATORY (INHALATION) at 14:41

## 2019-12-29 RX ADMIN — ONDANSETRON 4 MG: 2 INJECTION INTRAMUSCULAR; INTRAVENOUS at 13:17

## 2019-12-29 RX ADMIN — PANTOPRAZOLE SODIUM 40 MG: 40 INJECTION, POWDER, FOR SOLUTION INTRAVENOUS at 13:17

## 2019-12-29 RX ADMIN — BENZONATATE 100 MG: 100 CAPSULE ORAL at 21:31

## 2019-12-29 RX ADMIN — IPRATROPIUM BROMIDE AND ALBUTEROL SULFATE 1 AMPULE: .5; 3 SOLUTION RESPIRATORY (INHALATION) at 12:23

## 2019-12-29 RX ADMIN — HEPARIN SODIUM 6140 UNITS: 1000 INJECTION INTRAVENOUS; SUBCUTANEOUS at 20:56

## 2019-12-29 RX ADMIN — Medication 10 ML: at 20:55

## 2019-12-29 RX ADMIN — SODIUM CHLORIDE, PRESERVATIVE FREE 10 ML: 5 INJECTION INTRAVENOUS at 21:08

## 2019-12-29 RX ADMIN — METHYLPREDNISOLONE SODIUM SUCCINATE 125 MG: 125 INJECTION, POWDER, FOR SOLUTION INTRAMUSCULAR; INTRAVENOUS at 13:17

## 2019-12-29 RX ADMIN — INSULIN LISPRO 2 UNITS: 100 INJECTION, SOLUTION INTRAVENOUS; SUBCUTANEOUS at 21:46

## 2019-12-29 RX ADMIN — HEPARIN SODIUM 18 UNITS/KG/HR: 10000 INJECTION, SOLUTION INTRAVENOUS at 20:55

## 2019-12-29 ASSESSMENT — ENCOUNTER SYMPTOMS
VOMITING: 1
CHEST TIGHTNESS: 0
NAUSEA: 0
STRIDOR: 0
ABDOMINAL PAIN: 1
WHEEZING: 0
SHORTNESS OF BREATH: 1
BLOOD IN STOOL: 0
DIARRHEA: 0
APNEA: 0
RHINORRHEA: 0
COUGH: 1
SORE THROAT: 0

## 2019-12-29 ASSESSMENT — PAIN DESCRIPTION - LOCATION
LOCATION: ABDOMEN
LOCATION: ABDOMEN

## 2019-12-29 ASSESSMENT — PAIN DESCRIPTION - DESCRIPTORS: DESCRIPTORS: STABBING

## 2019-12-29 ASSESSMENT — PAIN SCALES - GENERAL
PAINLEVEL_OUTOF10: 10
PAINLEVEL_OUTOF10: 10

## 2019-12-29 ASSESSMENT — PAIN DESCRIPTION - FREQUENCY: FREQUENCY: CONTINUOUS

## 2019-12-29 ASSESSMENT — PAIN - FUNCTIONAL ASSESSMENT: PAIN_FUNCTIONAL_ASSESSMENT: PREVENTS OR INTERFERES SOME ACTIVE ACTIVITIES AND ADLS

## 2019-12-29 ASSESSMENT — PAIN DESCRIPTION - ORIENTATION
ORIENTATION: RIGHT;LEFT;LOWER;MID
ORIENTATION: MID

## 2019-12-29 ASSESSMENT — PAIN DESCRIPTION - PAIN TYPE
TYPE: ACUTE PAIN
TYPE: ACUTE PAIN

## 2019-12-29 ASSESSMENT — PAIN DESCRIPTION - PROGRESSION: CLINICAL_PROGRESSION: NOT CHANGED

## 2019-12-29 ASSESSMENT — PAIN DESCRIPTION - ONSET: ONSET: ON-GOING

## 2019-12-29 NOTE — ED NOTES
ED to inpatient nurses report    Chief Complaint   Patient presents with    Shortness of Breath    Abdominal Pain      Present to ED from home  LOC: alert and orientated to name and place  Vital signs   Vitals:    12/29/19 1226 12/29/19 1310 12/29/19 1432 12/29/19 1537   BP:   (!) 115/92 116/82   Pulse:  82 90 88   Resp:  21 17 17   Temp:       TempSrc:       SpO2: 99% 100% 99% 100%   Weight:       Height:          Oxygen Baseline 94%    Current needs required 4L via NC  LDAs: 22g Rt. Forearm  Peripheral IV 12/29/19 Right Forearm (Active)   Site Assessment Intact;Dry;Clean 12/29/2019  2:32 PM   Line Status Normal saline locked 12/29/2019  2:32 PM   Dressing Status Intact;Dry;Clean 12/29/2019  2:32 PM   Dressing Intervention New 12/29/2019  1:18 PM     Mobility: Requires assistance * 1  Pending ED orders: none  Present condition: Stable. Pt sleeping on cot.     Electronically signed by Jeffrey Vences RN on 12/29/2019 at 104 Yomaira King RN  12/29/19 7979

## 2019-12-29 NOTE — ED NOTES
Pt resting on cot with eyes closed. Call light remains within reach. No concerns voiced.      Jo Ann Prabhakar RN  12/29/19 2178

## 2019-12-29 NOTE — ED NOTES
Unable to contact 3B after 4 attempts. Pt is being transported to . Charge nurse 46 Yomaira Chang RN notified.       [place]  12/29/19 6613

## 2019-12-29 NOTE — LETTER
Beneficiary Notification Letter  BPCI Advanced     Your Doctor or [address],    We wanted to let you know that your health care provider, [address], has volunteered to take part in our Centers for Medicare & Medicaid Services (CMS) Bundled Payments for [facility] (BPCI Advanced). This doesnt change your Medicare rights or benefits and you dont need to do anything. What are bundled payments? A bundled payment combines, or bundles together, payments that Medicare makes to your health care providers for the many different kinds of medical services you might get in a specific time period. In BPCI Advanced, this time period could include a hospital inpatient stay or outpatient procedure, plus 90 days. Why would Medicare bundle payments? Bundled payments are thought of as a value-based way to pay because health care providers are responsible for both the quality and cost of medical care they give. This is a relatively new way of paying health care providers compared to thefee-for-service way Medicare has traditionally paid, where providers are paid separately for each service they provide. Bundled payments encourage these providers to work together to provide better, more coordinated care during your hospital stay, or outpatient procedure, and through your recovery. What does BPCI Advance mean for me? Youre more likely to get even better care when hospitals, doctors, and other health care providers work together. In BPCI Advanced, hospitals, doctors, and other health care providers may be rewarded for providing better, more coordinated health care. Medicare will watch BPCI Advanced participants closely to make sure that you and other patients keep getting efficient, high quality care. What do I need to know about BPCI Advanced? Whats most important for you to know is that your Medicare rights and benefits wont change because your health care provider is participating in [facility]. Medicare will keep covering all of your medically necessary services. Even though Medicare will pay your doctor in a different way under BPCI Advanced, how much you have to pay wont change. Health care providers and suppliers who are enrolled in Medicare will submit their Medicare claims like they always have. Youll have all the same Medicare rights and protections, including the right to choose which hospital, doctor, or other health care provider you see. If you dont want to get care from a health care provider whos participating in [address], then youll have to choose a different health care provider whos not participating in the Model. How can I give feedback about my health care? Medicare might ask you to take a voluntary survey about the services and care you received from [facility] during your hospital stay or outpatient procedure and for a specific period of time afterwards. You can decide whether you want to take the voluntary survey, but if you do, itll help Medicare make BPCI Advanced and the care of other Medicare patients better. If you have concerns or complaints about your care, you can:   · Talk to your doctor or health care provider. · Contact your Beneficiary and Family Centered Care Quality Improvement   Organization [facility]). You can get your [facility]s phone number  at  Medicare.gov/contacts or by calling 1-800-MEDICARE. TTY users can call  4-338.262.9671. Where can I learn more about BPCI Advanced? Learn more about BPCI Advanced at https://innovation.cms.gov/initiatives/bpci-advanced/:  · A list of all the hospitals and physician group practices in the country participating in [address] [place] [address].   · All of the inpatient and outpatient Clinical Episodes that are currently included under BPCI Advanced. A Clinical Episode is a grouping of medical conditions or diagnoses that are included in the [facility].

## 2019-12-29 NOTE — H&P
infiltrate or atelectasis. 2. Cardiomegaly. **This report has been created using voice recognition software. It may contain minor errors which are inherent in voice recognition technology. **      Final report electronically signed by Dr Felipa Stephens on 12/29/2019 12:48 PM        Ct Abdomen Pelvis Wo Contrast Additional Contrast? None    Result Date: 12/29/2019  PROCEDURE: CT ABDOMEN PELVIS WO CONTRAST CLINICAL INFORMATION: 60-year-old female with epigastric pain. Gastritis. History of prepyloric ulcer. COMPARISON: CT 11/14/2019. TECHNIQUE: Axial 5 mm CT images were obtained through the abdomen and pelvis. No contrast was given. Coronal reconstructions were obtained. All CT scans at this facility use dose modulation, iterative reconstruction, and/or weight-based dosing when appropriate to reduce radiation dose to as low as reasonably achievable. FINDINGS: There is some atelectasis at the bilateral lung bases. There is a large pericardial effusion. The density is approximately 11 Hounsfield units. The heart is enlarged. Lack of IV contrast limits evaluation of the abdominal organs. The liver, gallbladder, spleen, and pancreas are within normal limits. There is a slightly nodular appearance to the bilateral adrenal glands, greater on the right side. The bilateral kidneys are severely atrophic. There is no evidence of a small bowel obstruction. There is a slightly thickened appearance in the region of the duodenum which could be on the basis of duodenitis. This has an improved appearance when compared to the prior exam. There is no evidence of a small bowel obstruction. There are diverticula throughout the colon. There is no abnormal colonic wall thickening or edema. The appendix appears normal with no inflammatory changes. The urinary bladder is nondistended. There is no free intraperitoneal air. The aorta demonstrates calcific atherosclerotic changes. There is no aneurysmal dilatation.  The osseous structures are intact. There are no acute fractures. 1. There is a large pericardial effusion. The density of the fluid is noncomplex in nature. 2. Cardiomegaly. 3. Thickened appearance of the duodenum which could be related to duodenitis. This appears improved when compared to the previous CT scan. 4. Slightly thickened appearance of the bilateral adrenal glands, worse on the right side. 5. Colonic diverticulosis without evidence of acute diverticulitis. 6. Severe atrophy of the bilateral kidneys. **This report has been created using voice recognition software. It may contain minor errors which are inherent in voice recognition technology. ** Final report electronically signed by Dr Marbella Cordova on 12/29/2019 1:44 PM    Xr Chest Portable    Result Date: 12/29/2019  PROCEDURE: XR CHEST PORTABLE CLINICAL INFORMATION: 52-year-old female with wheezing and upper abdominal pain for one day. COMPARISON: Chest x-ray dated 11/23/2019. TECHNIQUE: AP upright view of the chest was obtained. FINDINGS: A dual lumen central venous catheter is again seen on the right side terminating in the SVC. There is cardiomegaly. The pulmonary vasculature is within normal limits. There is no pleural effusion or pneumothorax. There are bibasilar opacities which may represent infiltrate or atelectasis. Visualized portions of the upper abdomen are within normal limits. The osseous structures are intact. No acute fractures or suspicious osseous lesions. 1. Bibasilar opacities which may represent infiltrate or atelectasis. 2. Cardiomegaly. **This report has been created using voice recognition software. It may contain minor errors which are inherent in voice recognition technology. ** Final report electronically signed by Dr Marbella Cordova on 12/29/2019 12:48 PM      Electronically signed by Ramon Rowland MD on 12/29/2019 at 5:05 PM

## 2019-12-29 NOTE — ED PROVIDER NOTES
(Dry) and shortness of breath. Negative for apnea, chest tightness, wheezing and stridor. Cardiovascular: Negative for chest pain, palpitations and leg swelling. Gastrointestinal: Positive for abdominal pain (Gastrointestinal ulcer) and vomiting (Clear liquid). Negative for blood in stool, diarrhea and nausea. Endocrine: Negative for polyuria. Genitourinary: Negative for difficulty urinating. Musculoskeletal: Negative for arthralgias. Skin: Negative for rash. Neurological: Negative for dizziness, seizures, syncope, facial asymmetry, speech difficulty, weakness, light-headedness, numbness and headaches. Hematological: Negative for adenopathy. All other systems reviewed and are negative. PAST MEDICAL HISTORY    has a past medical history of Anxiety disorder, Asthma, Chronic kidney disease, Chronic respiratory failure with hypoxia ([facility] [address] COPD (chronic obstructive pulmonary disease) ([facility] [address] Elevated troponin, Hemodialysis patient ([facility] [address] Hypertension, and Smoker. SURGICAL HISTORY      has a past surgical history that includes  section; other surgical history; Lithotripsy; cystourethroscopy (2003,2003); and Upper gastrointestinal endoscopy (Left, 2019).     CURRENT MEDICATIONS       Current Discharge Medication List      CONTINUE these medications which have NOT CHANGED    Details   pantoprazole (PROTONIX) 40 MG tablet Take 1 tablet by mouth 2 times daily  Qty: 90 tablet, Refills: 1      aluminum hydroxide (ALTERNGEL) 320 MG/5ML suspension 5-10 ml 4 times daily as needed for stomach pain      sucralfate (CARAFATE) 1 GM tablet Take 1 tablet by mouth 4 times daily  Qty: 28 tablet, Refills: 3      OXYGEN Inhale 3 L into the lungs continuous      doxepin (SINEQUAN) 10 MG capsule Take 10 mg by mouth nightly      hydrOXYzine (VISTARIL) 25 MG capsule Take 25 mg by mouth 4 times daily as needed for Anxiety      albuterol sulfate HFA (VENTOLIN HFA) 108 (90 Base) MCG/ACT inhaler Inhale 1 pancreatitis, UTI, diverticulitis, SBO, bowel perforation, and others    DIAGNOSTIC RESULTS     EKG: All EKG's are interpreted by the Emergency Department Physician who either signs or Co-signsthis chart in the absence of a cardiologist.  Erum Grady. Rate: 85 bpm  CA interval: 134 ms  QRS duration: 78 ms  QTc: 454 ms  P-R-T axes: 61, 26, 77  Normal sinus rhythm, no STEMI    RADIOLOGY: non-plain film images(s) such as CT,Ultrasound and MRI are read by the radiologist.    CT ABDOMEN PELVIS WO CONTRAST Additional Contrast? None   Final Result      1. There is a large pericardial effusion. The density of the fluid is noncomplex in nature. 2. Cardiomegaly. 3. Thickened appearance of the duodenum which could be related to duodenitis. This appears improved when compared to the previous CT scan. 4. Slightly thickened appearance of the bilateral adrenal glands, worse on the right side. 5. Colonic diverticulosis without evidence of acute diverticulitis. 6. Severe atrophy of the bilateral kidneys. **This report has been created using voice recognition software. It may contain minor errors which are inherent in voice recognition technology. **      Final report electronically signed by Dr Hossein De Souza on 12/29/2019 1:44 PM      XR CHEST PORTABLE   Final Result      1. Bibasilar opacities which may represent infiltrate or atelectasis. 2. Cardiomegaly. **This report has been created using voice recognition software. It may contain minor errors which are inherent in voice recognition technology. **      Final report electronically signed by Dr Hossein De Souza on 12/29/2019 12:48 PM        [] Visualized and interpreted by me   [x] Radiologist's Wet Read Report Reviewed   [] Discussed withRadiologist.    LABS:   Labs Reviewed   CBC WITH AUTO DIFFERENTIAL - Abnormal; Notable for the following components:       Result Value    RBC 2.50 (*)     Hemoglobin 7.1 (*)     Hematocrit 25.9 (*)     .6 (*) BLOOD   HEMOGLOBIN A1C   CBC WITH AUTO DIFFERENTIAL   BASIC METABOLIC PANEL   MAGNESIUM   IRON SATURATION   IRON   FERRITIN   APTT   POCT GLUCOSE   TYPE AND SCREEN   PREPARE RBC (CROSSMATCH)    Narrative:     [id number]     selected       EMERGENCY DEPARTMENT COURSE:   Vitals:    Vitals:    12/29/19 2029 12/29/19 2033 12/29/19 2042 12/29/19 2049   BP: 95/76 93/73 91/61 116/82   Pulse: 162 144 162 95   Resp: 22 22 22    Temp:    98.1 °F (36.7 °C)   TempSrc:    Oral   SpO2: 99% 99%     Weight:       Height:         12:08 PM: Labs, rapid influenza, CT abdomen pelvis without contrast, XR portable chest, Protonix, Zofran, Solu-Medrol, and Duoneb ordered     12:25 PM: Nasal cannula oxygen ordered     12:35 PM: Venous blood gas ordered    2:01 PM: I sent a text to Dr. Stephani Cooper, the patient's nephrologist, regarding a few questions I have about the patient's condition and dialytic treatment.     2:02 PM: Duoneb ordered     Based upon the patient's history, physical exam, and ED evaluation, I feel the patient's medical condition warrants admission to the hospital for further evaluation and treatment. I have discussed the patient with Dr. Stephani Cooper, Dr. Laron Hernandez, hospitalist who has agreed with the treatment plan and agreed to admit. I have transferred care of the patient to Dr. Stephani Cooper, Dr. Laron Hernandez, hospitalist. I have discussed the clinical presentation, work-up, and ED course thus far. Pending studies or tasks at this time are planned dialysis today or tomorrow and cardiology eval.    CRITICAL CARE:   none    CONSULTS:  Dr. Stephani Cooper (Nephrologist)  Jacob Hare. Sammy Zaragoza CNP (Nephrology)   Dr. Mani Banda (Internal Medicine)  Dr. Laron Hernandez (cardiology)    PROCEDURES:  None    FINAL IMPRESSION      1. Pericardial effusion    2. ESRD (end stage renal disease) ([facility] [address]          DISPOSITION/PLAN   Admitted under Dr. Mani Banda (Internal Medicine)    PATIENT REFERRED TO:  No follow-up provider specified.     DISCHARGE MEDICATIONS:  Current Discharge Medication List          (Please note that portions ofthis note were completed with a voice recognition program.  Efforts were made to edit the dictations but occasionally words are mis-transcribed.)    Scribe:  Hill Pisano 12/29/19 11:58 AM Scribing for and in the presence of @Valeria Brar MD@. Signed by: Gabriele Hutton, 12/29/19 9:26 PM    Provider:  I personally performed the services described in the documentation, reviewed and edited the documentation which was dictated to the scribe in my presence, and it accurately records my words and actions.     Palak Ortega MD@ 12/29/19 9:26 PM                  Palak Ortega MD  12/29/19 7870

## 2019-12-29 NOTE — CONSULTS
evidence of a pericardial   effusion. Ejection fraction is visually estimated at 55%. Overall left ventricular function is normal.   Aortic valve appears tricuspid. Aortic valve leaflets are somewhat thickened. Aortic valve leaflets are Mildly calcified. Trivial aortic regurgitation is noted. ASSESSMENT  1. End Stage Renal Disease on Hemodialysis M,W,F, AV fistula, HD cath. Volume status ok. Lytes ok. Plan for Hemodialysis in AM.   2. Essential Hypertension with nephrosclerosis   3. Chronic Obstructive Pulmonary Disease, Asthma with exacerbation, Tobacco use  4. Chronic resp failure on home O2  5. Abdominal pain with history of gastritis, duodenitis, prepyloric ulcer, non compliant with meds  6. Large pericardial effusion per CT of Abd, Not previously seen on echo 11/13/19, Cardiology consult pending  7. Anemia of chronic disease, consider GI loss. T sat 41, Iron 76 on 11/12/19. Will repeat in AM.  8. Hyperphosphatemia, PhosLo  9. Depression on Lexapro    Meds and labs reviewed  Active Problems:    * No active hospital problems. *  Resolved Problems:    * No resolved hospital problems. *    Discussed with Dr. Lauren Bolton. Patient was advised about impression and plan. Patient verbalizes understanding and agrees with plan of care.    Thank you Malorie Patton MD  for allowing us to participate in care of Macho KhanNARINDER CNP 12/29/2019 2:16 PM

## 2019-12-29 NOTE — ED NOTES
Bed: 021A  Expected date: 12/29/19  Expected time: 11:29 AM  Means of arrival: [facility]  Comments:     Akanksha Sadler RN  12/29/19 113

## 2019-12-29 NOTE — ED NOTES
Pt resting on cot with eyes closed. Vitals assessed. Call light within reach. No concerns voiced.      Mc Curiel, RN  12/29/19 1376

## 2019-12-30 LAB
ANION GAP SERPL CALCULATED.3IONS-SCNC: 15 MEQ/L (ref 8–16)
APTT: 50.6 SECONDS (ref 22–38)
APTT: 56.2 SECONDS (ref 22–38)
APTT: 57.8 SECONDS (ref 22–38)
AVERAGE GLUCOSE: 84 MG/DL (ref 70–126)
BASOPHILS # BLD: 0.3 %
BASOPHILS ABSOLUTE: 0 THOU/MM3 (ref 0–0.1)
BUN BLDV-MCNC: 35 MG/DL (ref 7–22)
CALCIUM SERPL-MCNC: 8.2 MG/DL (ref 8.5–10.5)
CHLORIDE BLD-SCNC: 97 MEQ/L (ref 98–111)
CO2: 26 MEQ/L (ref 23–33)
CREAT SERPL-MCNC: 9.9 MG/DL (ref 0.4–1.2)
EKG ATRIAL RATE: 88 BPM
EKG P AXIS: 61 DEGREES
EKG P-R INTERVAL: 138 MS
EKG Q-T INTERVAL: 378 MS
EKG QRS DURATION: 84 MS
EKG QTC CALCULATION (BAZETT): 457 MS
EKG R AXIS: 25 DEGREES
EKG T AXIS: 79 DEGREES
EKG VENTRICULAR RATE: 88 BPM
EOSINOPHIL # BLD: 0 %
EOSINOPHILS ABSOLUTE: 0 THOU/MM3 (ref 0–0.4)
ERYTHROCYTE [DISTWIDTH] IN BLOOD BY AUTOMATED COUNT: 14.7 % (ref 11.5–14.5)
ERYTHROCYTE [DISTWIDTH] IN BLOOD BY AUTOMATED COUNT: 54.8 FL (ref 35–45)
FERRITIN: 1162 NG/ML (ref 10–291)
FILM ARRAY ADENOVIRUS: NOT DETECTED
FILM ARRAY BORDETELLA PERTUSSIS: NOT DETECTED
FILM ARRAY CHLAMYDOPHILIA PNEUMONIAE: NOT DETECTED
FILM ARRAY CORONAVIRUS 229E: NOT DETECTED
FILM ARRAY CORONAVIRUS HKU1: NOT DETECTED
FILM ARRAY CORONAVIRUS NL63: NOT DETECTED
FILM ARRAY CORONAVIRUS OC43: NOT DETECTED
FILM ARRAY INFLUENZA A VIRUS 09H1: ABNORMAL
FILM ARRAY INFLUENZA A VIRUS H1: ABNORMAL
FILM ARRAY INFLUENZA A VIRUS H3: ABNORMAL
FILM ARRAY INFLUENZA A VIRUS: NOT DETECTED
FILM ARRAY INFLUENZA B: NOT DETECTED
FILM ARRAY METAPNEUMOVIRUS: NOT DETECTED
FILM ARRAY MYCOPLASMA PNEUMONIAE: NOT DETECTED
FILM ARRAY PARAINFLUENZA VIRUS 1: NOT DETECTED
FILM ARRAY PARAINFLUENZA VIRUS 2: NOT DETECTED
FILM ARRAY PARAINFLUENZA VIRUS 3: NOT DETECTED
FILM ARRAY PARAINFLUENZA VIRUS 4: NOT DETECTED
FILM ARRAY RESPIRATORY SYNCITIAL VIRUS: DETECTED
FILM ARRAY RHINOVIRUS/ENTEROVIRUS: NOT DETECTED
GFR SERPL CREATININE-BSD FRML MDRD: 4 ML/MIN/1.73M2
GLUCOSE BLD-MCNC: 116 MG/DL (ref 70–108)
GLUCOSE BLD-MCNC: 119 MG/DL (ref 70–108)
GLUCOSE BLD-MCNC: 80 MG/DL (ref 70–108)
GLUCOSE BLD-MCNC: 91 MG/DL (ref 70–108)
HBA1C MFR BLD: 4.8 % (ref 4.4–6.4)
HCT VFR BLD CALC: 25.1 % (ref 37–47)
HEMOGLOBIN: 7.2 GM/DL (ref 12–16)
HYPOCHROMIA: PRESENT
IMMATURE GRANS (ABS): 0.18 THOU/MM3 (ref 0–0.07)
IMMATURE GRANULOCYTES: 1.8 %
LYMPHOCYTES # BLD: 6.9 %
LYMPHOCYTES ABSOLUTE: 0.7 THOU/MM3 (ref 1–4.8)
MCH RBC QN AUTO: 29.3 PG (ref 26–33)
MCHC RBC AUTO-ENTMCNC: 28.7 GM/DL (ref 32.2–35.5)
MCV RBC AUTO: 102 FL (ref 81–99)
MONOCYTES # BLD: 5.9 %
MONOCYTES ABSOLUTE: 0.6 THOU/MM3 (ref 0.4–1.3)
MRSA SCREEN RT-PCR: POSITIVE
NUCLEATED RED BLOOD CELLS: 0 /100 WBC
PLATELET # BLD: 332 THOU/MM3 (ref 130–400)
PMV BLD AUTO: 10 FL (ref 9.4–12.4)
POTASSIUM REFLEX MAGNESIUM: 5.2 MEQ/L (ref 3.5–5.2)
PROCALCITONIN: 0.5 NG/ML (ref 0.01–0.09)
RBC # BLD: 2.46 MILL/MM3 (ref 4.2–5.4)
SEG NEUTROPHILS: 85.1 %
SEGMENTED NEUTROPHILS ABSOLUTE COUNT: 8.3 THOU/MM3 (ref 1.8–7.7)
SODIUM BLD-SCNC: 138 MEQ/L (ref 135–145)
WBC # BLD: 9.8 THOU/MM3 (ref 4.8–10.8)

## 2019-12-30 PROCEDURE — 87641 MR-STAPH DNA AMP PROBE: CPT

## 2019-12-30 PROCEDURE — 87077 CULTURE AEROBIC IDENTIFY: CPT

## 2019-12-30 PROCEDURE — 87147 CULTURE TYPE IMMUNOLOGIC: CPT

## 2019-12-30 PROCEDURE — 6370000000 HC RX 637 (ALT 250 FOR IP): Performed by: FAMILY MEDICINE

## 2019-12-30 PROCEDURE — 6360000002 HC RX W HCPCS: Performed by: INTERNAL MEDICINE

## 2019-12-30 PROCEDURE — 87633 RESP VIRUS 12-25 TARGETS: CPT

## 2019-12-30 PROCEDURE — 6360000002 HC RX W HCPCS: Performed by: FAMILY MEDICINE

## 2019-12-30 PROCEDURE — 84145 PROCALCITONIN (PCT): CPT

## 2019-12-30 PROCEDURE — 2580000003 HC RX 258: Performed by: INTERNAL MEDICINE

## 2019-12-30 PROCEDURE — 6370000000 HC RX 637 (ALT 250 FOR IP): Performed by: INTERNAL MEDICINE

## 2019-12-30 PROCEDURE — 99233 SBSQ HOSP IP/OBS HIGH 50: CPT | Performed by: INTERNAL MEDICINE

## 2019-12-30 PROCEDURE — 94640 AIRWAY INHALATION TREATMENT: CPT

## 2019-12-30 PROCEDURE — 87070 CULTURE OTHR SPECIMN AEROBIC: CPT

## 2019-12-30 PROCEDURE — 87205 SMEAR GRAM STAIN: CPT

## 2019-12-30 PROCEDURE — 6370000000 HC RX 637 (ALT 250 FOR IP): Performed by: PHYSICIAN ASSISTANT

## 2019-12-30 PROCEDURE — 80048 BASIC METABOLIC PNL TOTAL CA: CPT

## 2019-12-30 PROCEDURE — 2700000000 HC OXYGEN THERAPY PER DAY

## 2019-12-30 PROCEDURE — 90935 HEMODIALYSIS ONE EVALUATION: CPT

## 2019-12-30 PROCEDURE — 90935 HEMODIALYSIS ONE EVALUATION: CPT | Performed by: INTERNAL MEDICINE

## 2019-12-30 PROCEDURE — 36415 COLL VENOUS BLD VENIPUNCTURE: CPT

## 2019-12-30 PROCEDURE — 93307 TTE W/O DOPPLER COMPLETE: CPT

## 2019-12-30 PROCEDURE — 2709999900 HC NON-CHARGEABLE SUPPLY

## 2019-12-30 PROCEDURE — 93005 ELECTROCARDIOGRAM TRACING: CPT | Performed by: INTERNAL MEDICINE

## 2019-12-30 PROCEDURE — 5A1D70Z PERFORMANCE OF URINARY FILTRATION, INTERMITTENT, LESS THAN 6 HOURS PER DAY: ICD-10-PCS | Performed by: INTERNAL MEDICINE

## 2019-12-30 PROCEDURE — 94761 N-INVAS EAR/PLS OXIMETRY MLT: CPT

## 2019-12-30 PROCEDURE — 99223 1ST HOSP IP/OBS HIGH 75: CPT | Performed by: INTERNAL MEDICINE

## 2019-12-30 PROCEDURE — 82948 REAGENT STRIP/BLOOD GLUCOSE: CPT

## 2019-12-30 PROCEDURE — C9113 INJ PANTOPRAZOLE SODIUM, VIA: HCPCS | Performed by: INTERNAL MEDICINE

## 2019-12-30 PROCEDURE — 94669 MECHANICAL CHEST WALL OSCILL: CPT

## 2019-12-30 PROCEDURE — 2140000000 HC CCU INTERMEDIATE R&B

## 2019-12-30 PROCEDURE — 94760 N-INVAS EAR/PLS OXIMETRY 1: CPT

## 2019-12-30 PROCEDURE — 85025 COMPLETE CBC W/AUTO DIFF WBC: CPT

## 2019-12-30 PROCEDURE — 93010 ELECTROCARDIOGRAM REPORT: CPT | Performed by: INTERNAL MEDICINE

## 2019-12-30 PROCEDURE — 87186 SC STD MICRODIL/AGAR DIL: CPT

## 2019-12-30 PROCEDURE — 85730 THROMBOPLASTIN TIME PARTIAL: CPT

## 2019-12-30 RX ORDER — DIPHENHYDRAMINE HYDROCHLORIDE 50 MG/ML
12.5 INJECTION INTRAMUSCULAR; INTRAVENOUS
Status: DISCONTINUED | OUTPATIENT
Start: 2019-12-30 | End: 2020-01-03 | Stop reason: HOSPADM

## 2019-12-30 RX ORDER — PANTOPRAZOLE SODIUM 40 MG/1
40 TABLET, DELAYED RELEASE ORAL
Status: DISCONTINUED | OUTPATIENT
Start: 2019-12-30 | End: 2019-12-30

## 2019-12-30 RX ORDER — LEVALBUTEROL INHALATION SOLUTION 1.25 MG/3ML
1.25 SOLUTION RESPIRATORY (INHALATION)
Status: DISCONTINUED | OUTPATIENT
Start: 2019-12-30 | End: 2020-01-03 | Stop reason: HOSPADM

## 2019-12-30 RX ORDER — PANTOPRAZOLE SODIUM 40 MG/10ML
40 INJECTION, POWDER, LYOPHILIZED, FOR SOLUTION INTRAVENOUS 2 TIMES DAILY
Status: DISCONTINUED | OUTPATIENT
Start: 2019-12-30 | End: 2020-01-01

## 2019-12-30 RX ORDER — METHYLPREDNISOLONE SODIUM SUCCINATE 40 MG/ML
40 INJECTION, POWDER, LYOPHILIZED, FOR SOLUTION INTRAMUSCULAR; INTRAVENOUS EVERY 12 HOURS
Status: DISCONTINUED | OUTPATIENT
Start: 2019-12-30 | End: 2020-01-01

## 2019-12-30 RX ADMIN — SUCRALFATE 1 G: 1 TABLET ORAL at 13:04

## 2019-12-30 RX ADMIN — Medication 2 PUFF: at 20:00

## 2019-12-30 RX ADMIN — IPRATROPIUM BROMIDE 0.5 MG: 0.5 SOLUTION RESPIRATORY (INHALATION) at 08:25

## 2019-12-30 RX ADMIN — HEPARIN SODIUM 22 UNITS/KG/HR: 10000 INJECTION, SOLUTION INTRAVENOUS at 14:27

## 2019-12-30 RX ADMIN — Medication 3.3 MG: at 07:08

## 2019-12-30 RX ADMIN — Medication 3.3 MG: at 20:00

## 2019-12-30 RX ADMIN — Medication 3.3 MG: at 00:40

## 2019-12-30 RX ADMIN — VANCOMYCIN HYDROCHLORIDE 1000 MG: 1 INJECTION, POWDER, LYOPHILIZED, FOR SOLUTION INTRAVENOUS at 15:56

## 2019-12-30 RX ADMIN — ACETAMINOPHEN 650 MG: 325 TABLET ORAL at 13:09

## 2019-12-30 RX ADMIN — LIDOCAINE HYDROCHLORIDE: 20 SOLUTION ORAL; TOPICAL at 05:29

## 2019-12-30 RX ADMIN — Medication 2 PUFF: at 05:46

## 2019-12-30 RX ADMIN — Medication 3.3 MG: at 23:39

## 2019-12-30 RX ADMIN — IPRATROPIUM BROMIDE 0.5 MG: 0.5 SOLUTION RESPIRATORY (INHALATION) at 05:35

## 2019-12-30 RX ADMIN — IPRATROPIUM BROMIDE 0.5 MG: 0.5 SOLUTION RESPIRATORY (INHALATION) at 12:28

## 2019-12-30 RX ADMIN — ACETAMINOPHEN 650 MG: 325 TABLET ORAL at 21:04

## 2019-12-30 RX ADMIN — GABAPENTIN 100 MG: 100 CAPSULE ORAL at 13:04

## 2019-12-30 RX ADMIN — METHYLPREDNISOLONE SODIUM SUCCINATE 40 MG: 40 INJECTION, POWDER, FOR SOLUTION INTRAMUSCULAR; INTRAVENOUS at 17:02

## 2019-12-30 RX ADMIN — PANTOPRAZOLE SODIUM 40 MG: 40 INJECTION, POWDER, FOR SOLUTION INTRAVENOUS at 13:04

## 2019-12-30 RX ADMIN — SUCRALFATE 1 G: 1 TABLET ORAL at 17:02

## 2019-12-30 RX ADMIN — SODIUM CHLORIDE, PRESERVATIVE FREE 10 ML: 5 INJECTION INTRAVENOUS at 20:00

## 2019-12-30 RX ADMIN — IPRATROPIUM BROMIDE 0.5 MG: 0.5 SOLUTION RESPIRATORY (INHALATION) at 20:00

## 2019-12-30 RX ADMIN — Medication 25 MG: at 21:25

## 2019-12-30 RX ADMIN — IPRATROPIUM BROMIDE 0.5 MG: 0.5 SOLUTION RESPIRATORY (INHALATION) at 15:43

## 2019-12-30 RX ADMIN — SUCRALFATE 1 G: 1 TABLET ORAL at 20:00

## 2019-12-30 RX ADMIN — SODIUM CHLORIDE, PRESERVATIVE FREE 10 ML: 5 INJECTION INTRAVENOUS at 13:05

## 2019-12-30 RX ADMIN — CEFEPIME HYDROCHLORIDE 1 G: 1 INJECTION, POWDER, FOR SOLUTION INTRAMUSCULAR; INTRAVENOUS at 14:27

## 2019-12-30 RX ADMIN — AZITHROMYCIN MONOHYDRATE 500 MG: 500 INJECTION, POWDER, LYOPHILIZED, FOR SOLUTION INTRAVENOUS at 13:18

## 2019-12-30 RX ADMIN — ESCITALOPRAM 20 MG: 20 TABLET, FILM COATED ORAL at 13:04

## 2019-12-30 RX ADMIN — Medication 3.3 MG: at 03:37

## 2019-12-30 RX ADMIN — PANTOPRAZOLE SODIUM 40 MG: 40 INJECTION, POWDER, FOR SOLUTION INTRAVENOUS at 20:00

## 2019-12-30 RX ADMIN — Medication 3.3 MG: at 17:02

## 2019-12-30 RX ADMIN — DIPHENHYDRAMINE HYDROCHLORIDE 12.5 MG: 50 INJECTION, SOLUTION INTRAMUSCULAR; INTRAVENOUS at 08:51

## 2019-12-30 ASSESSMENT — PAIN SCALES - GENERAL
PAINLEVEL_OUTOF10: 0
PAINLEVEL_OUTOF10: 0
PAINLEVEL_OUTOF10: 8

## 2019-12-30 ASSESSMENT — PAIN DESCRIPTION - PAIN TYPE: TYPE: OTHER (COMMENT)

## 2019-12-30 ASSESSMENT — PAIN DESCRIPTION - LOCATION: LOCATION: ABDOMEN

## 2019-12-30 NOTE — PLAN OF CARE
Problem: Pain:  Goal: Pain level will decrease  Description  Pain level will decrease  Outcome: Ongoing  Note:   Ongoing assessment & interventions provided throughout shift. Reminded patient to report any pain, pressure, or shortness of breath to the nurse. Pain medications provided per physician's orders. Patient is satisfied with plan of care to control pain. Reminded patient the importance to reposition to help with discomfort     Problem: Falls - Risk of:  Goal: Will remain free from falls  Description  Will remain free from falls  Outcome: Ongoing  Note:   Bed locked and in lowest position, call light in reach, side rails up times two, non slip socks on when ambulating, reminded patient to use call light to call for assistance. Bed alarm on. Patient up with stand by assist     Problem: Discharge Planning:  Goal: Participates in care planning  Description  Participates in care planning  Outcome: Ongoing  Note:   Patient from home and plans to return there upon discharge. Will continue to monitor for discharge needs     Problem: Cardiac Output - Decreased:  Goal: Hemodynamic stability will improve  Description  Hemodynamic stability will improve  Outcome: Ongoing  Note:   Echo completed awaiting results. Cardiac monitor in place. Patient SR so far this shift. Will continue to monitor. Problem: Gas Exchange - Impaired:  Goal: Levels of oxygenation will improve  Description  Levels of oxygenation will improve  Outcome: Ongoing  Note:   Patient back to her home dose of oxygen to keep pulse ox above 90%. Lung sounds assessed. Breathing treatments as prescribed     Problem: Serum Glucose Level - Abnormal:  Goal: Ability to maintain appropriate glucose levels will improve to within specified parameters  Description  Ability to maintain appropriate glucose levels will improve to within specified parameters  Outcome: Ongoing  Note:   Blood glucose monitored.   Has not required insulin per parameters so far this shift. Problem: Skin Integrity - Impaired:  Goal: Will show no infection signs and symptoms  Description  Will show no infection signs and symptoms  Outcome: Ongoing  Note:   Ongoing assessment & interventions provided throughout shift. Skin assessments provided. Encouraging/assisting patient to turn as needed. Problem: Fluid Volume:  Goal: Will show no signs or symptoms of fluid imbalance  Description  Will show no signs or symptoms of fluid imbalance  Outcome: Ongoing  Note:   Patient received dialysis this am nephrology plans to have her attend again tomorrow. Approx. 3l removed during treatment. Will continue to monitor   Care plan reviewed with patient. Patient verbalizes understanding of the care plan and contributed to goal setting.

## 2019-12-30 NOTE — PLAN OF CARE
Problem: Impaired respiratory status  Goal: Clear lung sounds  Outcome: Not Met This Shift   Breath sounds are inspiratory and expiratory wheezes throughout. Continue with treatments to help improve breath sounds.

## 2019-12-30 NOTE — PROGRESS NOTES
Renal Progress Note    Assessment and Plan:    1. End stage kidney disease on hemodialysis. 2.  Large pericardial effusion by CAT scan  3. Hypertension primary  4. COPD  5. Deconditioning  6.   Normocytic anemia of chronic disease  PLAN:  Labs reviewed  CT of the thorax report reviewed  Medications reviewed  No changes  Cardiology evaluation pending for day large pericardial effusion  Hemodialysis in progress  Doing well so far  Hemodynamically stable  We will likely do daily dialysis for now for possible uremic pericarditis until seen by cardiology  Discussed with the patient  We will follow    Patient Active Problem List:     Fluid overload     ESRD (end stage renal disease) ([address]     Essential hypertension     COPD exacerbation ([address]     Chronic respiratory failure with hypoxia ([facility] [address]     Anxiety disorder     Current smoker     Normocytic anemia     Medically noncompliant     ESRD on hemodialysis ([facility] [address]     Acute gastritis without hemorrhage     Esophagitis     Prepyloric ulcer     Epigastric pain     Pericardial effusion      Subjective:   Admit Date: 12/29/2019    Interval History:   Patient is seen and examined   Seen for end-stage renal disease on hemodialysis  Awake and alert  Complains of shortness of breath  Stable blood pressure  Admitted for abdominal pain  CAT scan abdomen and pelvis was done that revealed a large pericardial effusion        Medications:   Scheduled Meds:   insulin lispro  0-6 Units Subcutaneous TID WC    insulin lispro  0-3 Units Subcutaneous Nightly    diphenhydrAMINE  12.5 mg Intravenous Q MWF    azithromycin  500 mg Intravenous Q24H    cefTRIAXone (ROCEPHIN) IV  1 g Intravenous Q24H    [Held by provider] doxepin  10 mg Oral Nightly    escitalopram  20 mg Oral Daily    mometasone-formoterol  2 puff Inhalation BID    gabapentin  100 mg Oral Daily    nicotine  1 patch Transdermal Daily    sucralfate  1 g Oral 4x Daily    sodium chloride flush  10 mL Intravenous 2 times edema  Musculoskeletal:Intact  Neuro:Alert and oriented with no deficit      Electronically signed by Pamela Gasca MD on 12/30/2019 at 10:52 AM

## 2019-12-30 NOTE — FLOWSHEET NOTE
12/30/19 1549   Encounter Summary   Services provided to: Patient   Referral/Consult From: Rounding   Continue Visiting Yes  (12/30/19)   Complexity of Encounter Moderate   Length of Encounter 15 minutes   Spiritual Assessment Completed Yes   Advance Care Planning Yes   Routine   Type Initial   Assessment Calm; Approachable   Intervention Nurtured hope   Outcome Acceptance;Comfort;Expressed gratitude   Advance Directives (For Healthcare)   Healthcare Directive No, patient does not have an advance directive for healthcare treatment   Advance Directives Documents given   S:  The 49 yr old patient stated that they would like information about Advance                       Directive/ Living Will/ Power . O:     There were no family members present at the time. The patient was                        receptive and attentive to my presence. A:  The patient was given an Advance Directive. I gave the pt. a copy of the                      Advance Directive (LW/POA) to complete after reviewed. P:             Spiritual support per request of the pt. or family when the document has                       been reviewed. I also offered emotional support and words of                       encouragement.

## 2019-12-30 NOTE — PLAN OF CARE
84   Resp: 20   Temp:    SpO2: 99%     Will continue to monitor. Problem: Gas Exchange - Impaired:  Goal: Levels of oxygenation will improve  Description  Levels of oxygenation will improve  Outcome: Ongoing  Note:   Pt O2 sat 99% on 4L NC. Pt wears 2L NC at home. Will continue to monitor and wean when appropriate. Problem: Serum Glucose Level - Abnormal:  Goal: Ability to maintain appropriate glucose levels will improve to within specified parameters  Description  Ability to maintain appropriate glucose levels will improve to within specified parameters  Outcome: Ongoing  Note:   Pt glucose 340 this shift, covered with insulin. Pt not diabetic, A1C pending. Will continue to monitor. Problem: Skin Integrity - Impaired:  Goal: Will show no infection signs and symptoms  Description  Will show no infection signs and symptoms  Outcome: Ongoing  Note:   Vitals:    12/29/19 2330   BP: 114/74   Pulse: 84   Resp: 20   Temp:    SpO2: 99%     Pt afebrile. No signs or symptoms of infection noted this shift. Will continue to monitor. Goal: Absence of new skin breakdown  Description  Absence of new skin breakdown  Outcome: Ongoing  Note:   No new signs of skin breakdown noted this shift. Pt is continent and able to turn self. Will continue to monitor. Problem: Fluid Volume:  Goal: Will show no signs or symptoms of fluid imbalance  Description  Will show no signs or symptoms of fluid imbalance  Outcome: Ongoing  Note:   No crackles noted in lungs. 1+ pitting edema noted in BLE. Hemo pt, will have hemo in AM. Will continue to monitor. Problem: Tissue Perfusion - Cardiopulmonary, Altered:  Goal: Absence of angina  Description  Absence of angina  Outcome: Ongoing  Note:   Pt denied chest pain this shift. Will continue to monitor. Care plan reviewed with patient. Patient verbalize understanding of the plan of care and contribute to goal setting.

## 2019-12-30 NOTE — CONSULTS
[address] [place] [address]                                  CONSULTATION    PATIENT NAME: Eyad Avendano                        :        1965  MED REC NO:   [id number]                           ROOM:       0021  ACCOUNT NO:   [de-identified]                           ADMIT DATE: 2019  PROVIDER:     Garfield Noguera. Nereida Perez M.D.    Larry Valladares:  2019    REASON OF CONSULTATION:  Pericardial effusion reported to be large per  the CAT scan of the abdomen that has been done on this admission in a  13-year-old female patient presented with shortness of breath and  abdominal pain. HISTORY OF PRESENT ILLNESS:  This is a 13-year-old  female  patient known to have COPD on home oxygen; end-stage renal disease, on  hemodialysis, presented to the emergency room yesterday because of  shortness of breath and abdominal discomfort and abdominal bloating and  marked worsening of her shortness of breath. The patient admitted that  she missed two of her previous dialysis recently and because she has  been having a cough and sort of upper respiratory tract infection, so  she missed her dialysis. In the emergency room, she had CAT scan  performed, CT of the abdomen and pelvis in view of her abdominal pain  that reported large pericardial effusion and hence Cardiology evaluation  was sought. The patient denied having any chest pain, shortness of  breath is today better because she just had her dialysis as inpatient. She is anemic with hemoglobin of 7.1. No chest pain. No history of coronary artery disease. REVIEW OF SYSTEMS:  Negative except the above-mentioned ones. PAST MEDICAL HISTORY:  Anxiety disorder; chronic kidney disease; chronic  respiratory failure; COPD, on home oxygen; elevated troponin; end-stage  renal disease, on hemodialysis; hypertension; smoker.     PAST SURGICAL HISTORY:  Endoscopy, lithotripsy, cystourethroscopy, and   section. FAMILY HISTORY:  No family history of premature coronary artery disease. SOCIAL HISTORY:  She is a current smoker. Smokes one pack a day for 25  years. No alcohol. No drug use. ALLERGIES:  She is allergic to CODEINE and MORPHINE.    HOME MEDICATIONS:  Prior to admission include albuterol, aluminium  hydroxide, PhosLo, doxepin, Lexapro, Neurontin, Vistaril, Nicoderm,  Protonix, Carafate, and Incruse Ellipta. PHYSICAL EXAMINATION:  GENERAL:  Looks sick, on oxygen with mild distress. VITAL SIGNS:  Blood pressure 118/71, pulse rate is 91, respiratory rate  18, and temperature 98.9. HEENT:  Pale conjunctivae. Anicteric sclerae. Pupils are equal and  reactive bilaterally to light. NECK:  No lymphadenopathy. No goiter. No JVD. CHEST:  Clear to auscultation and resonant to percussion, but bilateral  basal decreased air entry. CARDIOVASCULAR:  Arteries are felt; carotid, femoral.  S1, S2 well  heard. No murmur, gallop, or rhythm. ABDOMEN:  Soft, nontender, and nondistended. Bowel sounds are positive. GENITOURINARY:  No CVA, flank or suprapubic tenderness. LOCOMOTOR:  No cyanosis, clubbing, or muscle or joint tenderness. SKIN:  No rashes, ulcers, or nodules. CNS:  Alert, awake, and oriented to time and place. Cranial nerves are  intact. Sensation is intact to light touch and pain. Motor:  Normal  muscle strength and tone. Appropriate mood and affect. WORKUP:  Chest x-ray showed a bibasilar opacity with infiltrates versus  atelectasis. EKG done today showed sinus rhythm with possible old  anterior infarction. Nonspecific ST-T segment changes. EKG on arrival on 2019, yesterday, the patient was in atrial  fibrillation with rapid ventricular response of rate 162 beats per  minute, nonspecific ST-T abnormality, but today the patient is in sinus  rhythm. The ST-T changes are much better today while the patient is in  sinus rhythm.   I do not see any history of atrial fibrillation in the  documentation or in the past medical history. EKG on arrival, it was a sinus rhythm, rate 85 and subsequent EKG atrial  fibrillation rate 162 and now current EKG sinus rhythm rate 88. Further workup, sodium 138, potassium 5.2, BUN 39, creatinine 9.9. Hematology:  White blood cell 9.8, hemoglobin 7.1, platelets 086. As I stated CT of the abdomen showed a large pericardial effusion. The  patient had an echocardiogram in 11/2019, ejection fraction 55%. No  pericardial effusion noted or reported. ASSESSMENT:  1. Fluid overload and fluid retention with pulmonary congestion. Acute  diastolic congestive heart failure secondary to end-stage renal disease  and the patient is noncompliant. The patient missed two of her previous  dialysis. 2.  Acute-on-chronic hypoxic respiratory failure, basically in view of  the patient already has COPD and now she has hypoxic respiratory failure  because of the pulmonary congestion. 3.  The patient has mildly elevated troponin of 0.023 in 11/2019, now  0.046 a little elevated than before because of probably fluid overload  and end-stage renal disease that she has and she has acute congestive  heart failure today. 4.  So, I believe her elevated troponin seems to be secondary with no  chest pain. We will consider functional study when she is stable as  inpatient or outpatient. 5.  No abdominal pain today. 6.  Large pericardial effusion per the CAT scan reported and echo done  on 11/12/2019, ejection fraction was 55, but no pericardial effusion  reported at that time. 7.  Anemia severe. The patient may need to have blood transfusion and  keep the hemoglobin more than 8 gm percent. 8.  Atrial fibrillation, new onset with rapid ventricular rate and now  the patient is converted into normal sinus rhythm, but there was an  atrial fibrillation, so the patient certainly have a paroxysmal atrial  fibrillation.   She has high ZACHARIAH score with CHF. With ZACHARIAH score of at  least 2 because of hypertension and congestive heart failure and chronic  kidney disease. We will add to a ZACHARIAH-VASc score of 3. So the patient  needs oral anticoagulation at least long term and we will discuss with  the patient the need and we will address accordingly. PLAN AND RECOMMENDATION:  1. Elevated troponin. Functional study when she is stable. 2.  Large pericardial effusion. We will get an echocardiogram, assess  and see if we have to intervene now. 3.  New-onset paroxysmal atrial fibrillation. Consider adding beta  blocker and she needs oral anticoagulation, but because of anemia, it is  questionable clinical scenario. 4.  Anemia. Anemia seems to be anemia of chronic disease. MCV, MCH,  MCHC are not suggestive for blood loss anemia, so this seems to be  anemia of chronic disease. So the patient should be a good candidate  for oral anticoagulation. No history of bleeding, so compliance has  been advised. Oral anticoagulation to be discussed with the patient  yet. 5.  Based on the course, we will gauge further care. Thank you for allowing me to participate in the care of this patient. We will follow up with you. Nadya Credit.  Ajay Michel M.D.    D: 12/30/2019 12:54:55       T: 12/30/2019 14:28:26     [email or web address]  Job#: [id number]     Doc#: [id number]    CC:

## 2019-12-30 NOTE — PROGRESS NOTES
[address] [address]  PHYSICAL THERAPY MISSED TREATMENT NOTE  ACUTE CARE    Date: 2019  Patient Name: Aubrey Santana        MRN: [id number]   : 1965  (49 y.o.)  Gender: female   Referring Practitioner: Dr. Gomez  Diagnosis: pericardial effusion         REASON FOR MISSED TREATMENT:  Pt off of unit in dialysis.   Will attempt to see in pm .

## 2019-12-30 NOTE — PROGRESS NOTES
but cannot rule out ischemia.   -Patient does have chronic troponin elevation slightly above her baseline. - trending down  -She denies any chest pain. EKG shows sinus rhythm now. Possibly due to pericardial effusion, ?missed dialysis. - Continue to monitor  - Telemetry     Abdominal Pain / PUD - CT abd/pelvis shows thickening of duodenum, which is improved from prior.   - Non-Compliant with medications  - Start patient on IV Protonix. Continue IV since she will be on IV steroids and IV heparin drip  - no melena, monitor for bleeding.   - caution especially while on anticoagulation  - Continue sucralfate  - monitor CBC     ESRD on HD  - s/p HD today. - Nephrology on board    Hyperglycemia - without formal Dx of DM2. A1C unreliable in ESRD/anemic patient. Continue SSI, did receive IV solumedrol.      Adrenal Gland Thickening Bilaterally.      Anemia with Macrocytosis - normal B12 and folate 11/2019. Low iron, however elevated ferritin and low TIBC indicate chronic inflammation. - check soluble transferrin receptor  - Hgb is at 7.1. Was 7.7 on last discharge. No acute drop noted. Will continue to monitor. If Hgb<7, will transfuse    Tobacco abuse - counseling, NRT    Expected discharge date:  2-3 days    Disposition: pending         [] Home       [] TCU       [] Rehab       [] Psych       [] SNF       [] [place]       [] Other-    --------------------------------------------    Chief Complaint: SOB    Hospital Course: Patient admitted for above complaint. Has history of COPD, ESRD on HD. She was found to have evidence of large pericardial effusion on CT abdomen pelvis, further imaging reveaed pneumonia with elevated procal and bilateral infiltrates in bases, and also COPD exacerbation. See above for more information in the A/P. Subjective (past 24 hours):  Patient seen in dialysis.   She endorses development of body aches and worsening cough over last week, which lead to missed 3. Thickened appearance of the duodenum which could be related to duodenitis. This appears improved when compared to the previous CT scan. 4. Slightly thickened appearance of the bilateral adrenal glands, worse on the right side. 5. Colonic diverticulosis without evidence of acute diverticulitis. 6. Severe atrophy of the bilateral kidneys. **This report has been created using voice recognition software. It may contain minor errors which are inherent in voice recognition technology. **      Final report electronically signed by Dr Asuncion Thomas on 12/29/2019 1:44 PM      XR CHEST PORTABLE   Final Result      1. Bibasilar opacities which may represent infiltrate or atelectasis. 2. Cardiomegaly. **This report has been created using voice recognition software. It may contain minor errors which are inherent in voice recognition technology. **      Final report electronically signed by Dr Asuncion Thomas on 12/29/2019 12:48 PM          DVT prophylaxis: [] Lovenox                                  [] SCDs                                 [] SQ Heparin                                 [] Encourage ambulation           [x] Already on Anticoagulation     Code Status: Full Code    PT/OT Eval Status: TBD    Diet:   DIET RENAL; No Added Salt (3-4 GM)    Fluids: na    Tele:   [x] yes             [] no      Electronically signed by Mamadou Guido DO on 12/30/2019 at 7:07 AM

## 2019-12-30 NOTE — PROGRESS NOTES
Pharmacy Note  Vancomycin Consult    Ayana Boland is a 49 y.o. female started on Vancomycin for HAP; consult received from Dr. Christina Balbuena to manage therapy. Also receiving the following antibiotics: cefepime, azithromycin. Patient Active Problem List   Diagnosis    Fluid overload    ESRD (end stage renal disease) ([facility] [address]    Essential hypertension    COPD exacerbation (HCC)    Chronic respiratory failure with hypoxia (HCC)    Anxiety disorder    Current smoker    Normocytic anemia    Medically noncompliant    ESRD on hemodialysis (HCC)    Acute gastritis without hemorrhage    Esophagitis    Prepyloric ulcer    Epigastric pain    Pericardial effusion       Allergies:  Codeine and Morphine     Temp max: afebrile    Recent Labs     12/29/19 2127 12/30/19  0315   BUN 31* 35*       Recent Labs     12/29/19 2127 12/30/19  0315   CREATININE 9.6* 9.9*       Recent Labs     12/29/19  1240 12/30/19  0315   WBC 10.2 9.8         Intake/Output Summary (Last 24 hours) at 12/30/2019 1219  Last data filed at 12/30/2019 1156  Gross per 24 hour   Intake 528 ml   Output 3400 ml   Net -2872 ml       Culture Date      Source                       Results  12/29/2019          blood x 2                    no growth to date    Ht Readings from Last 1 Encounters:   12/29/19 5' 3\" (1.6 m)        Wt Readings from Last 1 Encounters:   12/30/19 162 lb 14.7 oz (73.9 kg)         Body mass index is 28.86 kg/m². Estimated Creatinine Clearance: 6 mL/min (A) (based on SCr of 9.9 mg/dL [facility])). Goal Trough Level: 10-20 mcg/mL    Assessment/Plan:  Will initiate vancomycin 1000 mg IV once. Timing of trough level will be determined based on culture results, renal function, and clinical response. Thank you for the consult. Will continue to follow.     Higinio Pozo PharmD, BCPS   12/30/2019 12:21 PM

## 2019-12-31 LAB
ANION GAP SERPL CALCULATED.3IONS-SCNC: 15 MEQ/L (ref 8–16)
APTT: 78.2 SECONDS (ref 22–38)
APTT: 82.4 SECONDS (ref 22–38)
APTT: 82.9 SECONDS (ref 22–38)
BASOPHILS # BLD: 0.1 %
BASOPHILS ABSOLUTE: 0 THOU/MM3 (ref 0–0.1)
BUN BLDV-MCNC: 26 MG/DL (ref 7–22)
CALCIUM SERPL-MCNC: 8.8 MG/DL (ref 8.5–10.5)
CHLORIDE BLD-SCNC: 87 MEQ/L (ref 98–111)
CO2: 26 MEQ/L (ref 23–33)
CREAT SERPL-MCNC: 6.3 MG/DL (ref 0.4–1.2)
EOSINOPHIL # BLD: 0.1 %
EOSINOPHILS ABSOLUTE: 0 THOU/MM3 (ref 0–0.4)
ERYTHROCYTE [DISTWIDTH] IN BLOOD BY AUTOMATED COUNT: 14.8 % (ref 11.5–14.5)
ERYTHROCYTE [DISTWIDTH] IN BLOOD BY AUTOMATED COUNT: 54.3 FL (ref 35–45)
GFR SERPL CREATININE-BSD FRML MDRD: 7 ML/MIN/1.73M2
GLUCOSE BLD-MCNC: 110 MG/DL (ref 70–108)
GLUCOSE BLD-MCNC: 94 MG/DL (ref 70–108)
HCT VFR BLD CALC: 29.1 % (ref 37–47)
HEMOGLOBIN: 8.3 GM/DL (ref 12–16)
HYPOCHROMIA: PRESENT
IMMATURE GRANS (ABS): 0.21 THOU/MM3 (ref 0–0.07)
IMMATURE GRANULOCYTES: 1.3 %
LYMPHOCYTES # BLD: 6.1 %
LYMPHOCYTES ABSOLUTE: 1 THOU/MM3 (ref 1–4.8)
MAGNESIUM: 2.1 MG/DL (ref 1.6–2.4)
MCH RBC QN AUTO: 28.8 PG (ref 26–33)
MCHC RBC AUTO-ENTMCNC: 28.5 GM/DL (ref 32.2–35.5)
MCV RBC AUTO: 101 FL (ref 81–99)
MONOCYTES # BLD: 4.7 %
MONOCYTES ABSOLUTE: 0.7 THOU/MM3 (ref 0.4–1.3)
NUCLEATED RED BLOOD CELLS: 0 /100 WBC
PLATELET # BLD: 347 THOU/MM3 (ref 130–400)
PLATELET ESTIMATE: ADEQUATE
PMV BLD AUTO: 9.8 FL (ref 9.4–12.4)
POTASSIUM SERPL-SCNC: 5.1 MEQ/L (ref 3.5–5.2)
RBC # BLD: 2.88 MILL/MM3 (ref 4.2–5.4)
SCAN OF BLOOD SMEAR: NORMAL
SEG NEUTROPHILS: 87.7 %
SEGMENTED NEUTROPHILS ABSOLUTE COUNT: 13.8 THOU/MM3 (ref 1.8–7.7)
SODIUM BLD-SCNC: 128 MEQ/L (ref 135–145)
STOMATOCYTES: ABNORMAL
WBC # BLD: 15.7 THOU/MM3 (ref 4.8–10.8)

## 2019-12-31 PROCEDURE — 90935 HEMODIALYSIS ONE EVALUATION: CPT | Performed by: INTERNAL MEDICINE

## 2019-12-31 PROCEDURE — 82948 REAGENT STRIP/BLOOD GLUCOSE: CPT

## 2019-12-31 PROCEDURE — 99232 SBSQ HOSP IP/OBS MODERATE 35: CPT | Performed by: NURSE PRACTITIONER

## 2019-12-31 PROCEDURE — 2709999900 HC NON-CHARGEABLE SUPPLY

## 2019-12-31 PROCEDURE — 94640 AIRWAY INHALATION TREATMENT: CPT

## 2019-12-31 PROCEDURE — C9113 INJ PANTOPRAZOLE SODIUM, VIA: HCPCS | Performed by: INTERNAL MEDICINE

## 2019-12-31 PROCEDURE — 94761 N-INVAS EAR/PLS OXIMETRY MLT: CPT

## 2019-12-31 PROCEDURE — 99233 SBSQ HOSP IP/OBS HIGH 50: CPT | Performed by: INTERNAL MEDICINE

## 2019-12-31 PROCEDURE — 2580000003 HC RX 258: Performed by: INTERNAL MEDICINE

## 2019-12-31 PROCEDURE — 6360000002 HC RX W HCPCS: Performed by: FAMILY MEDICINE

## 2019-12-31 PROCEDURE — 84238 ASSAY NONENDOCRINE RECEPTOR: CPT

## 2019-12-31 PROCEDURE — 80048 BASIC METABOLIC PNL TOTAL CA: CPT

## 2019-12-31 PROCEDURE — 6360000002 HC RX W HCPCS: Performed by: INTERNAL MEDICINE

## 2019-12-31 PROCEDURE — 6370000000 HC RX 637 (ALT 250 FOR IP): Performed by: INTERNAL MEDICINE

## 2019-12-31 PROCEDURE — 2700000000 HC OXYGEN THERAPY PER DAY

## 2019-12-31 PROCEDURE — 97162 PT EVAL MOD COMPLEX 30 MIN: CPT

## 2019-12-31 PROCEDURE — 85025 COMPLETE CBC W/AUTO DIFF WBC: CPT

## 2019-12-31 PROCEDURE — 83735 ASSAY OF MAGNESIUM: CPT

## 2019-12-31 PROCEDURE — 6370000000 HC RX 637 (ALT 250 FOR IP): Performed by: PHYSICIAN ASSISTANT

## 2019-12-31 PROCEDURE — 36415 COLL VENOUS BLD VENIPUNCTURE: CPT

## 2019-12-31 PROCEDURE — 5A1D70Z PERFORMANCE OF URINARY FILTRATION, INTERMITTENT, LESS THAN 6 HOURS PER DAY: ICD-10-PCS | Performed by: INTERNAL MEDICINE

## 2019-12-31 PROCEDURE — 85730 THROMBOPLASTIN TIME PARTIAL: CPT

## 2019-12-31 PROCEDURE — 2140000000 HC CCU INTERMEDIATE R&B

## 2019-12-31 PROCEDURE — 97165 OT EVAL LOW COMPLEX 30 MIN: CPT

## 2019-12-31 PROCEDURE — 90935 HEMODIALYSIS ONE EVALUATION: CPT

## 2019-12-31 RX ORDER — DIPHENHYDRAMINE HYDROCHLORIDE 50 MG/ML
12.5 INJECTION INTRAMUSCULAR; INTRAVENOUS ONCE
Status: COMPLETED | OUTPATIENT
Start: 2019-12-31 | End: 2019-12-31

## 2019-12-31 RX ADMIN — METHYLPREDNISOLONE SODIUM SUCCINATE 40 MG: 40 INJECTION, POWDER, FOR SOLUTION INTRAMUSCULAR; INTRAVENOUS at 19:56

## 2019-12-31 RX ADMIN — IPRATROPIUM BROMIDE 0.5 MG: 0.5 SOLUTION RESPIRATORY (INHALATION) at 16:04

## 2019-12-31 RX ADMIN — SODIUM CHLORIDE, PRESERVATIVE FREE 10 ML: 5 INJECTION INTRAVENOUS at 12:12

## 2019-12-31 RX ADMIN — IPRATROPIUM BROMIDE 0.5 MG: 0.5 SOLUTION RESPIRATORY (INHALATION) at 08:37

## 2019-12-31 RX ADMIN — Medication 3.3 MG: at 05:45

## 2019-12-31 RX ADMIN — ESCITALOPRAM 20 MG: 20 TABLET, FILM COATED ORAL at 12:08

## 2019-12-31 RX ADMIN — Medication 2 PUFF: at 19:30

## 2019-12-31 RX ADMIN — PANTOPRAZOLE SODIUM 40 MG: 40 INJECTION, POWDER, FOR SOLUTION INTRAVENOUS at 19:57

## 2019-12-31 RX ADMIN — HEPARIN SODIUM 24 UNITS/KG/HR: 10000 INJECTION, SOLUTION INTRAVENOUS at 21:17

## 2019-12-31 RX ADMIN — Medication 25 MG: at 20:01

## 2019-12-31 RX ADMIN — Medication 3.3 MG: at 14:28

## 2019-12-31 RX ADMIN — HEPARIN SODIUM 24 UNITS/KG/HR: 10000 INJECTION, SOLUTION INTRAVENOUS at 03:52

## 2019-12-31 RX ADMIN — Medication 2 PUFF: at 08:37

## 2019-12-31 RX ADMIN — PANTOPRAZOLE SODIUM 40 MG: 40 INJECTION, POWDER, FOR SOLUTION INTRAVENOUS at 12:07

## 2019-12-31 RX ADMIN — CEFEPIME HYDROCHLORIDE 1 G: 1 INJECTION, POWDER, FOR SOLUTION INTRAMUSCULAR; INTRAVENOUS at 12:29

## 2019-12-31 RX ADMIN — BENZONATATE 100 MG: 100 CAPSULE ORAL at 20:00

## 2019-12-31 RX ADMIN — IPRATROPIUM BROMIDE 0.5 MG: 0.5 SOLUTION RESPIRATORY (INHALATION) at 19:30

## 2019-12-31 RX ADMIN — DIPHENHYDRAMINE HYDROCHLORIDE 12.5 MG: 50 INJECTION, SOLUTION INTRAMUSCULAR; INTRAVENOUS at 09:50

## 2019-12-31 RX ADMIN — LEVALBUTEROL HYDROCHLORIDE 1.25 MG: 1.25 SOLUTION RESPIRATORY (INHALATION) at 05:52

## 2019-12-31 RX ADMIN — GABAPENTIN 100 MG: 100 CAPSULE ORAL at 12:08

## 2019-12-31 RX ADMIN — SODIUM CHLORIDE, PRESERVATIVE FREE 10 ML: 5 INJECTION INTRAVENOUS at 19:58

## 2019-12-31 RX ADMIN — METHYLPREDNISOLONE SODIUM SUCCINATE 40 MG: 40 INJECTION, POWDER, FOR SOLUTION INTRAMUSCULAR; INTRAVENOUS at 03:57

## 2019-12-31 RX ADMIN — ONDANSETRON 4 MG: 2 INJECTION INTRAMUSCULAR; INTRAVENOUS at 12:13

## 2019-12-31 RX ADMIN — SUCRALFATE 1 G: 1 TABLET ORAL at 12:08

## 2019-12-31 RX ADMIN — Medication 10 ML: at 19:57

## 2019-12-31 RX ADMIN — SUCRALFATE 1 G: 1 TABLET ORAL at 19:57

## 2019-12-31 RX ADMIN — AZITHROMYCIN MONOHYDRATE 500 MG: 500 INJECTION, POWDER, LYOPHILIZED, FOR SOLUTION INTRAVENOUS at 12:07

## 2019-12-31 ASSESSMENT — PAIN SCALES - GENERAL
PAINLEVEL_OUTOF10: 0

## 2019-12-31 NOTE — PROGRESS NOTES
(VANCOCIN) intermittent dosing (placeholder)   Other RX Placeholder    methylPREDNISolone  40 mg Intravenous Q12H    pantoprazole  40 mg Intravenous BID    [Held by provider] doxepin  10 mg Oral Nightly    escitalopram  20 mg Oral Daily    mometasone-formoterol  2 puff Inhalation BID    gabapentin  100 mg Oral Daily    nicotine  1 patch Transdermal Daily    sucralfate  1 g Oral 4x Daily    sodium chloride flush  10 mL Intravenous 2 times per day     Continuous Infusions:    heparin (porcine) 24 Units/kg/hr (19 0531)    dextrose      amiodarone      Followed by   Zhen Seat ON 2020] amiodarone       PRN Meds:  levalbuterol, menthol, doxyLAMINE succinate, [Held by provider] hydrOXYzine, sodium chloride flush, ondansetron, acetaminophen, perflutren lipid microspheres, benzonatate, heparin (porcine), heparin (porcine), ipratropium, glucose, dextrose, glucagon (rDNA), dextrose, amiodarone **FOLLOWED BY** amiodarone **FOLLOWED BY** [START ON 2020] amiodarone    Input/Output:       I/O last 3 completed shifts: In: 1756.1 [P.O.:250; I.V.:1106.1]  Out: 3400 .       Patient Vitals for the past 96 hrs (Last 3 readings):   Weight   19 0810 167 lb 1 oz (75.8 kg)   19 0345 167 lb (75.8 kg)   19 1156 162 lb 14.7 oz (73.9 kg)       Vital Signs:   Temperature:  Temp: 98.5 °F (36.9 °C)  TMax:   Temp (24hrs), Av.5 °F (36.9 °C), Min:97.8 °F (36.6 °C), Max:98.9 °F (37.2 °C)    Respirations:  Resp: 18  Pulse:   Pulse: 92  BP:    BP: (!) 140/90  BP Range: Systolic (09VLE), RML:356 , Min:101 , HFF:741       Diastolic (93MZL), OEI:78, Min:70, Max:90      Physical Examination:     General:  Awake, alert, nontoxic  HEENT: NC/AT/ MMM +right chest tunneled HD catheter  Chest:               Occasional rhonchi and end exp wheezes noted  Cardiac:  S1 S2  +left forearm AV fistula with thrill  Abdomen: Soft, non-tender,  Neuro:  No facial droop, No Asterixis  SKIN:  No rashes, good skin turgor. Extremities:  No edema, no cyanosis    Labs:       Recent Labs     12/29/19  1240 12/29/19 2127 12/30/19 0315 12/31/19  0418   WBC 10.2  --  9.8 15.7*   RBC 2.50*  --  2.46* 2.88*   HGB 7.1* 8.0* 7.2* 8.3*   HCT 25.9* 28.5* 25.1* 29.1*   .6*  --  102.0* 101.0*   MCH 28.4  --  29.3 28.8   MCHC 27.4*  --  28.7* 28.5*     --  332 347   MPV 10.2  --  10.0 9.8      BMP:   Recent Labs     12/29/19 2127 12/30/19 0315 12/31/19 0418    138 128*   K 4.8 5.2 5.1   CL 96* 97* 87*   CO2 22* 26 26   BUN 31* 35* 26*   CREATININE 9.6* 9.9* 6.3*   GLUCOSE 331* 116* 110*   CALCIUM 8.3* 8.2* 8.8      Phosphorus:     Recent Labs     12/29/19  1242   PHOS 4.2     Magnesium:    Recent Labs     12/29/19  1242 12/29/19 2127 12/31/19 0418   MG 2.4 2.2 2.1     Albumin:    Recent Labs     12/29/19  1242   LABALBU 3.9     BNP:    No results found for: BNP  IRINEO:    No results found for: IRINEO  SPEP:  Lab Results   Component Value Date    PROT 7.6 12/29/2019     UPEP:   No results found for: LABPE  C3:   No results found for: C3  C4:   No results found for: C4  MPO ANCA:   No results found for: MPO  PR3 ANCA:   No results found for: PR3  Anti-GBM:   No results found for: GBMABIGG  Hep BsAg:         Lab Results   Component Value Date    HEPBSAG Negative 11/13/2019     Hep C AB:        No results found for: HEPCAB    Urinalysis/Chemistries:    No results found for: NITRU, COLORU, PHUR, LABCAST, WBCUA, RBCUA, MUCUS, TRICHOMONAS, YEAST, BACTERIA, CLARITYU, SPECGRAV, LEUKOCYTESUR, UROBILINOGEN, BILIRUBINUR, BLOODU, GLUCOSEU, KETUA, AMORPHOUS  Urine Sodium:   No results found for: TEE  Urine Potassium:  No results found for: KUR  Urine Chloride:  No results found for: CLUR  Urine Osmolarity: No results found for: OSMOU  Urine Protein:   No components found for: TOTALPROTEIN, URINE   Urine Creatinine:   No results found for: LABCREA  Urine Eosinophils:  No components found for: UEOS        Impression and Plan:  1.  ESRD

## 2019-12-31 NOTE — FLOWSHEET NOTE
12/31/19 0810 12/31/19 1120   Vital Signs   BP (!) 140/90 (!) 150/92   Temp 98.5 °F (36.9 °C) 97.6 °F (36.4 °C)   Pulse 92 90   Resp 18  --    Weight 167 lb 1 oz (75.8 kg) 168 lb 3.4 oz (76.3 kg)   Weight Method  --  Bed scale   Percent Weight Change 0.04 0.69   Post-Hemodialysis Assessment   Post-Treatment Procedures  --  Blood returned; Access bleeding time < 10 minutes   Machine Disinfection Process  --  Acid/Vinegar Clean;Heat Disinfect; Exterior Machine Disinfection   Rinseback Volume (ml)  --  400 ml   Total Liters Processed (l/min)  --  62.1 l/min   Dialyzer Clearance  --  Lightly streaked   Duration of Treatment (minutes)  --  210 minutes   Heparin amount administered during treatment (units)  --  0 units   Hemodialysis Intake (ml)  --  400 ml   Hemodialysis Output (ml)  --  1984 ml   NET Removed (ml)  --  1584 ml   Tolerated Treatment  --  Good   stable 2 hour 45 minutes treatment, ordered 3.5 hours. ended 43 minutes early per patient request, Dr. Jaycee Elias aware. 1.5 liters net uf. unable to pull fluid due to nausea.

## 2019-12-31 NOTE — PROGRESS NOTES
Cardiology Progress Note      Patient:  [facility]  YOB: 1965  MRN: [id number]   Acct: [de-identified]  Admit Date:  12/29/2019  Primary Cardiologist: none  Seen by Dr. Denver Khat     Per prior cardiology consult note-  REASON OF CONSULTATION:  Pericardial effusion reported to be large per  the CAT scan of the abdomen that has been done on this admission in a  73-year-old female patient presented with shortness of breath and  abdominal pain.     HISTORY OF PRESENT ILLNESS:  This is a 73-year-old  female  patient known to have COPD on home oxygen; end-stage renal disease, on  hemodialysis, presented to the emergency room yesterday because of  shortness of breath and abdominal discomfort and abdominal bloating and  marked worsening of her shortness of breath. The patient admitted that  she missed two of her previous dialysis recently and because she has  been having a cough and sort of upper respiratory tract infection, so  she missed her dialysis. In the emergency room, she had CAT scan  performed, CT of the abdomen and pelvis in view of her abdominal pain  that reported large pericardial effusion and hence Cardiology evaluation  was sought. The patient denied having any chest pain, shortness of  breath is today better because she just had her dialysis as inpatient. She is anemic with hemoglobin of 7.1.     No chest pain. No history of coronary artery disease.       Subjective (Events in last 24 hours):    Pt in HD this am   Seen 16:44    Pt in bed - no chest pain or SOB or palpitations or dizziness   Tele SR HR 92     She denies orthopnea pr PND   BP stable     Discussed echo results with her - she was concerned re: heart murmer      Objective:   BP (!) 140/90   Pulse 92   Temp 98.5 °F (36.9 °C)   Resp 18   Ht 5' 3\" (1.6 m)   Wt 167 lb 1 oz (75.8 kg)   SpO2 97%   BMI 29.59 kg/m²        TELEMETRY: SR HR 92    Physical Exam:  General Appearance: alert and oriented to person, place and time, in no acute distress  Cardiovascular: normal rate, regular rhythm, normal S1 and S2, no murmurs, rubs, clicks, or gallops, distal pulses intact,   Pulmonary/Chest: clear to auscultation bilaterally- no wheezes, rales or rhonchi, normal air movement, no respiratory distress  Abdomen: soft, non-tender, non-distended, normal bowel sounds, no masses Extremities: no cyanosis, clubbing or edema, pulses present    Skin: warm and dry, no rash or erythema  Head: normocephalic and atraumatic  Musculoskeletal: normal range of motion, no joint swelling, deformity or tenderness  Neurological: alert, oriented, normal speech, no focal findings or movement disorder noted    Medications:    insulin lispro  0-6 Units Subcutaneous TID WC    insulin lispro  0-3 Units Subcutaneous Nightly    diphenhydrAMINE  12.5 mg Intravenous Q MWF    azithromycin  500 mg Intravenous Q24H    ipratropium  0.5 mg Nebulization 4x daily    cefepime  1 g Intravenous Q24H    vancomycin (VANCOCIN) intermittent dosing (placeholder)   Other RX Placeholder    methylPREDNISolone  40 mg Intravenous Q12H    pantoprazole  40 mg Intravenous BID    [Held by provider] doxepin  10 mg Oral Nightly    escitalopram  20 mg Oral Daily    mometasone-formoterol  2 puff Inhalation BID    gabapentin  100 mg Oral Daily    nicotine  1 patch Transdermal Daily    sucralfate  1 g Oral 4x Daily    sodium chloride flush  10 mL Intravenous 2 times per day      heparin (porcine) 24 Units/kg/hr (12/31/19 0531)    dextrose      amiodarone      Followed by   Kingsley Bravo ON 1/1/2020] amiodarone       levalbuterol, 1.25 mg, Q2H PRN  menthol, 1 lozenge, Q3H PRN  doxyLAMINE succinate, 25 mg, Nightly PRN  [Held by provider] hydrOXYzine, 25 mg, 4x Daily PRN  sodium chloride flush, 10 mL, PRN  ondansetron, 4 mg, Q6H PRN  acetaminophen, 650 mg, Q4H PRN  perflutren lipid microspheres, 1.5 mL, ONCE PRN  benzonatate, 100 mg, TID PRN  heparin (porcine), 80 Units/kg, PRN  heparin Effusion   No evidence of pleural effusion. Aorta / Great Vessels   -Aortic root dimension within normal limits.   -The Pulmonary artery is within normal limits. -IVC size is within normal limits with normal respiratory phasic changes. Lab Data:    Cardiac Enzymes:  No results for input(s): CKTOTAL, CKMB, CKMBINDEX, TROPONINI in the last 72 hours.     CBC:   Lab Results   Component Value Date    WBC 15.7 12/31/2019    RBC 2.88 12/31/2019    HGB 8.3 12/31/2019    HCT 29.1 12/31/2019     12/31/2019       CMP:    Lab Results   Component Value Date     12/31/2019    K 5.1 12/31/2019    K 5.2 12/30/2019    CL 87 12/31/2019    CO2 26 12/31/2019    BUN 26 12/31/2019    CREATININE 6.3 12/31/2019    LABGLOM 7 12/31/2019    GLUCOSE 110 12/31/2019    CALCIUM 8.8 12/31/2019       Hepatic Function Panel:    Lab Results   Component Value Date    ALKPHOS 200 12/29/2019    ALT 12 12/29/2019    AST 12 12/29/2019    PROT 7.6 12/29/2019    BILITOT 0.3 12/29/2019    LABALBU 3.9 12/29/2019       Magnesium:    Lab Results   Component Value Date    MG 2.1 12/31/2019       PT/INR:    Lab Results   Component Value Date    INR 1.15 12/29/2019       HgBA1c:    Lab Results   Component Value Date    LABA1C 4.8 12/29/2019       FLP:  No results found for: TRIG, HDL, LDLCALC, LDLDIRECT, LABVLDL    TSH:    Lab Results   Component Value Date    TSH 2.170 11/12/2019         Assessment:    Fluid overload - secondary to missing HD for 2 sessions    Mild elevated trop- secondary to the above    New onset AFB RVR of unknown duration-- now SR   [id number] 2-3    Moderate pericardial effusion by echo  Normal EF    ESRD-> HD    Severe anemia    Tobacco abuse      Plan:  · Hd planned per nephrology in attempt to decrease pericardial effusion and fluid overload-- check echo after (plan for Friday)  · Stress test before DC- plan for Thursday   · Continue IV heparin at present until repeat echo done - needs [address] at DC

## 2019-12-31 NOTE — PROGRESS NOTES
Nutrition Assessment    Type and Reason for Visit: Initial, Positive Nutrition Screen(unplanned wt. loss, poor po/appetite)    Nutrition Recommendations:   MD to advise if wants renal diet liberalized d/t low sodium level. Nutrition Assessment:    Pt. nutritionally compromised AEB lab values  At risk for further nutrition compromise r/t increased needs for HD,  and underlying medical condition (Fluid Overload Pericardial Effusion, Anemia, Atrial Fib, hx COPD on home O2, ESRD). Nutrition recommendations/interventions as per above. Malnutrition Assessment:  · Malnutrition Status: No malnutrition    Nutrition Risk Level: Moderate    Nutrient Needs:  · Estimated Daily Total Kcal: 1516-1895kcals (20-25kcals/kgm wt. of 75.8kgm 12/31)  · Estimated Daily Protein (g): 62-78 grams (1.2-1.5 grams protein/kgm IBW 52kgm)  Nutrition Diagnosis:   · Problem: Altered nutrition-related lab values  · Etiology: related to Renal dysfunction     Signs and symptoms:  as evidenced by Lab values    Objective Information:  · Nutrition-Focused Physical Findings: Pt. seen at HD. She mentions used to see MD at [facility] & now sees Dr Deshaun Cantu & receives HD  at [facility]. Per diet hx, pt. tends to consume 3 meals/day, pt. declines renal diet education. \" I hate the renal diet\". States had a BM today (not charted). Denies constipation/diarrhea. Mentions feels nauseated. Nurse with pt.  Pt. & sister do the cooking. Appetite not good. Doesn't add salt or cook with salt. Labs: (12/31) Na 128, BUN 26,   · Wound Type: None  · Current Nutrition Therapies:  · Oral Diet Orders: No Added Salt (3-4gm), Renal   · Oral Diet intake: %  · Oral Nutrition Supplement (ONS) Orders: None  · Anthropometric Measures:  · Ht: 5' 3\" (160 cm)   · Current Body Wt: 167 lb 1.7 oz (75.8 kg)  · Admission Body Wt: 169 lb 5 oz (76.8 kg)((12/29) + 1 RLE & LLE)  · Usual Body Wt: (per EMR: (11/14/19): 155# 8oz, (11/11/19): 154# 1.6oz)  · % Weight Change:  ,  8.4% wt.

## 2019-12-31 NOTE — PROGRESS NOTES
[facility]  Fine Motor Skills  Fine Motor Comment: No difficulty noted with doing her coloring activity       Activity Tolerance: Patient limited by fatigue  Pt was using 3L of O2. She uses 2L at her baseline. She had dialysis this AM and had nut eaten lunch yet. She stated she would do her self care later in the afternoon. She did not anticipate having any difficulty. Assessment:  Assessment: Pt presents with decreased endurance and limited IADLs secondary to pericardial effusion. She has been using no AD for ambulation and was on 2L of O2 at all times. She also reported smoking cigarettes-a pack per day. She had attempted quitting in the past and it did not go well, she stated. She was not interested in quitting. She does report having difficulty with anxiety and needs to use benedryl whenever having a hemodialysis treatment. She could reach her feet at this time. She had no pain complaints. Performance deficits / Impairments: Decreased endurance, Decreased high-level IADLs  Prognosis: Good  REQUIRES OT FOLLOW UP: No  No Skilled OT: Patient refusal, Safe to return home  Decision Making: Low Complexity  Safety Devices in place: Yes  Type of devices: Left in chair, Nurse notified, Call light within reach    Treatment Initiated: Treatment and education initiated within context of evaluation. Evaluation time included review of current medical information, gathering information related to past medical, social and functional history, completion of standardized testing, formal and informal observation of tasks, assessment of data and development of plan of care and goals. Treatment time included skilled education and facilitation of tasks to increase safety and independence with ADL's for improved functional independence and quality of life. Discussed energy conservation techniques with pt. Encouraged pt to pace her self and plan activities carefully so she doesn't waste her energy.   Pt stated she

## 2019-12-31 NOTE — PROGRESS NOTES
3.   - Started on heparin drip. Continue until repeat echo Friday. .    - spontaneously converted to NSR - this remains  - f/up echo - mod LA dilation, mod Peric Effusion. Normal EF. No thrombus noted. - Cardiology on board  - tele and monitor Mg, K.     Elevated troponin (suspected Type II NSTEMI) / Pro BNP - suspect supply demand mismatch (pericardial effusion, AF rvr) but cannot rule out ischemia.   -Patient does have chronic troponin elevation slightly above her baseline. - trending down  -She denies any chest pain. EKG shows sinus rhythm now. Possibly due to pericardial effusion, ?missed dialysis. - Stress tentatively planned for Thursday  - Telemetry     Abdominal Pain / PUD - CT abd/pelvis shows thickening of duodenum, which is improved from prior.   - Non-Compliant with medications  - Start patient on IV Protonix. Continue IV since she will be on IV steroids and IV heparin drip  - no melena, monitor for bleeding.   - caution especially while on anticoagulation  - Continue sucralfate  - monitor CBC     ESRD on HD  - s/p HD 12/30 and 12/31 for UF - attempting to remove fluid to assist with effusion.   - Nephrology on board    Hyperglycemia - without formal Dx of DM2. A1C unreliable in ESRD/anemic patient. Continue SSI, did receive IV solumedrol. Glucose resolved.      Adrenal Gland Thickening Bilaterally. Hyponatremia - due to fluid shifts/HD. Will monitor.      Anemia with Macrocytosis - normal B12 and folate 11/2019. Low iron, however elevated ferritin and low TIBC indicate chronic inflammation. - check soluble transferrin receptor  - Hgb is at 7.1. Was 7.7 on last discharge. -  No acute drop noted. Will continue to monitor - especially while on IV heparin.   - If Hgb<7, will transfuse . 8.3 today.      Tobacco abuse - counseling, NRT      Expected discharge date:  >2 days    Disposition: pending         [] Home       [] TCU       [] Rehab       [] Psych       [] SNF       [] Long Term [facility] 1724     12/29/19  1242 12/29/19  2127 12/30/19  0315 12/31/19  0418    137 138 128*   K 4.3 4.8 5.2 5.1   CL 96* 96* 97* 87*   CO2 28 22* 26 26   BUN 26* 31* 35* 26*   CREATININE 8.8* 9.6* 9.9* 6.3*   CALCIUM 9.0 8.3* 8.2* 8.8   PHOS 4.2  --   --   --      Recent Labs     12/29/19  1242   AST 12   ALT 12   BILITOT 0.3   ALKPHOS 200*     Recent Labs     12/29/19  1241   INR 1.15*     No results for input(s): CKTOTAL, TROPONINI in the last 72 hours. Microbiology:      Urinalysis:    No results found for: Negrito Abu, BACTERIA, RBCUA, BLOODU, SPECGRAV, Stephen[address]    Radiology:  CT ABDOMEN PELVIS WO CONTRAST Additional Contrast? None   Final Result      1. There is a large pericardial effusion. The density of the fluid is noncomplex in nature. 2. Cardiomegaly. 3. Thickened appearance of the duodenum which could be related to duodenitis. This appears improved when compared to the previous CT scan. 4. Slightly thickened appearance of the bilateral adrenal glands, worse on the right side. 5. Colonic diverticulosis without evidence of acute diverticulitis. 6. Severe atrophy of the bilateral kidneys. **This report has been created using voice recognition software. It may contain minor errors which are inherent in voice recognition technology. **      Final report electronically signed by Dr Yoana Jeffrey on 12/29/2019 1:44 PM      XR CHEST PORTABLE   Final Result      1. Bibasilar opacities which may represent infiltrate or atelectasis. 2. Cardiomegaly. **This report has been created using voice recognition software. It may contain minor errors which are inherent in voice recognition technology. **      Final report electronically signed by Dr Yoana Jeffrey on 12/29/2019 12:48 PM          DVT prophylaxis: [] Lovenox                                  [] SCDs                                 [] SQ Heparin                                 [] Encourage ambulation [x] Already on Anticoagulation     Code Status: Full Code    PT/OT Eval Status: yes    Diet:   DIET RENAL; No Added Salt (3-4 GM)    Fluids: na    Tele:   [x] yes             [] no      Electronically signed by Michelle Corbett DO on 12/31/2019 at 5:56 PM

## 2019-12-31 NOTE — PLAN OF CARE
Problem: Pain:  Goal: Pain level will decrease  Description  Pain level will decrease  12/30/2019 2352 by Laurel Lowery RN  Outcome: Ongoing  Note:   Pt denied pain this shift. Pain goal is no pain. Will continue to monitor and manage pain appropriately. Problem: Pain:  Goal: Control of acute pain  Description  Control of acute pain  Outcome: Ongoing  Note:   Pt denied acute pain this shift. Pain goal is no pain. Will continue to monitor and manage pain appropriately. Problem: Pain:  Goal: Control of chronic pain  Description  Control of chronic pain  Outcome: Ongoing  Note:   Pt denied chronic pain this shift. Pain goal is no pain. Will continue to monitor and manage pain appropriately. Problem: Falls - Risk of:  Goal: Will remain free from falls  Description  Will remain free from falls  12/30/2019 2352 by Laurel Lowery RN  Outcome: Ongoing  Note:   Pt free from falls this shift. Bed alarm on, 2/4 side rails up, call light in reach, fall band on, nonskid socks on, clear pathway, bed in locked and lowest position. Will continue to monitor. Problem: Falls - Risk of:  Goal: Absence of physical injury  Description  Absence of physical injury  Outcome: Ongoing  Note:   Pt free from physical injury this shift. Bed alarm on, 2/4 side rails up, call light in reach, fall band on, nonskid socks on, clear pathway, bed in locked and lowest position. Will continue to monitor. Problem: Discharge Planning:  Goal: Participates in care planning  Description  Participates in care planning  12/30/2019 2352 by Laurel Lowery RN  Outcome: Ongoing  Note:   Pt participates in care planning to the best of ability. Will continue to include in care planning. Problem: Discharge Planning:  Goal: Discharged to appropriate level of care  Description  Discharged to appropriate level of care  Outcome: Ongoing  Note:   Pt plans to be discharged to home with significant other when medically stable.  Will continue to

## 2020-01-01 ENCOUNTER — APPOINTMENT (OUTPATIENT)
Dept: GENERAL RADIOLOGY | Age: 55
DRG: 291 | End: 2020-01-01
Payer: MEDICARE

## 2020-01-01 PROBLEM — B33.8 RESPIRATORY SYNCYTIAL VIRUS: Status: ACTIVE | Noted: 2020-01-01

## 2020-01-01 LAB
ANION GAP SERPL CALCULATED.3IONS-SCNC: 12 MEQ/L (ref 8–16)
APTT: 56.3 SECONDS (ref 22–38)
BASOPHILS # BLD: 0.4 %
BASOPHILS ABSOLUTE: 0 THOU/MM3 (ref 0–0.1)
BUN BLDV-MCNC: 17 MG/DL (ref 7–22)
CALCIUM SERPL-MCNC: 8.6 MG/DL (ref 8.5–10.5)
CHLORIDE BLD-SCNC: 90 MEQ/L (ref 98–111)
CO2: 27 MEQ/L (ref 23–33)
CREAT SERPL-MCNC: 4.3 MG/DL (ref 0.4–1.2)
EOSINOPHIL # BLD: 1.5 %
EOSINOPHILS ABSOLUTE: 0.2 THOU/MM3 (ref 0–0.4)
ERYTHROCYTE [DISTWIDTH] IN BLOOD BY AUTOMATED COUNT: 15 % (ref 11.5–14.5)
ERYTHROCYTE [DISTWIDTH] IN BLOOD BY AUTOMATED COUNT: 53.1 FL (ref 35–45)
GFR SERPL CREATININE-BSD FRML MDRD: 11 ML/MIN/1.73M2
GLUCOSE BLD-MCNC: 113 MG/DL (ref 70–108)
GLUCOSE BLD-MCNC: 116 MG/DL (ref 70–108)
GLUCOSE BLD-MCNC: 159 MG/DL (ref 70–108)
GLUCOSE BLD-MCNC: 81 MG/DL (ref 70–108)
GLUCOSE BLD-MCNC: 90 MG/DL (ref 70–108)
HCT VFR BLD CALC: 25.5 % (ref 37–47)
HEMOGLOBIN: 7.5 GM/DL (ref 12–16)
IMMATURE GRANS (ABS): 0.17 THOU/MM3 (ref 0–0.07)
IMMATURE GRANULOCYTES: 1.6 %
LYMPHOCYTES # BLD: 15.3 %
LYMPHOCYTES ABSOLUTE: 1.6 THOU/MM3 (ref 1–4.8)
MCH RBC QN AUTO: 28.7 PG (ref 26–33)
MCHC RBC AUTO-ENTMCNC: 29.4 GM/DL (ref 32.2–35.5)
MCV RBC AUTO: 97.7 FL (ref 81–99)
MONOCYTES # BLD: 10.4 %
MONOCYTES ABSOLUTE: 1.1 THOU/MM3 (ref 0.4–1.3)
NUCLEATED RED BLOOD CELLS: 0 /100 WBC
PLATELET # BLD: 326 THOU/MM3 (ref 130–400)
PMV BLD AUTO: 9.6 FL (ref 9.4–12.4)
POTASSIUM SERPL-SCNC: 4.2 MEQ/L (ref 3.5–5.2)
PROCALCITONIN: 0.42 NG/ML (ref 0.01–0.09)
RBC # BLD: 2.61 MILL/MM3 (ref 4.2–5.4)
SEG NEUTROPHILS: 70.8 %
SEGMENTED NEUTROPHILS ABSOLUTE COUNT: 7.4 THOU/MM3 (ref 1.8–7.7)
SODIUM BLD-SCNC: 129 MEQ/L (ref 135–145)
SOLUBLE TRANSFERRIN RECEPT: 4.6 MG/L (ref 1.9–4.4)
VANCOMYCIN RANDOM: 9.6 UG/ML (ref 0.1–39.9)
WBC # BLD: 10.5 THOU/MM3 (ref 4.8–10.8)

## 2020-01-01 PROCEDURE — 94669 MECHANICAL CHEST WALL OSCILL: CPT

## 2020-01-01 PROCEDURE — 90935 HEMODIALYSIS ONE EVALUATION: CPT

## 2020-01-01 PROCEDURE — 2700000000 HC OXYGEN THERAPY PER DAY

## 2020-01-01 PROCEDURE — 2580000003 HC RX 258: Performed by: INTERNAL MEDICINE

## 2020-01-01 PROCEDURE — 71046 X-RAY EXAM CHEST 2 VIEWS: CPT

## 2020-01-01 PROCEDURE — 99232 SBSQ HOSP IP/OBS MODERATE 35: CPT | Performed by: PHYSICIAN ASSISTANT

## 2020-01-01 PROCEDURE — 2140000000 HC CCU INTERMEDIATE R&B

## 2020-01-01 PROCEDURE — 84145 PROCALCITONIN (PCT): CPT

## 2020-01-01 PROCEDURE — 85730 THROMBOPLASTIN TIME PARTIAL: CPT

## 2020-01-01 PROCEDURE — 99232 SBSQ HOSP IP/OBS MODERATE 35: CPT | Performed by: INTERNAL MEDICINE

## 2020-01-01 PROCEDURE — 94760 N-INVAS EAR/PLS OXIMETRY 1: CPT

## 2020-01-01 PROCEDURE — 85025 COMPLETE CBC W/AUTO DIFF WBC: CPT

## 2020-01-01 PROCEDURE — C9113 INJ PANTOPRAZOLE SODIUM, VIA: HCPCS | Performed by: INTERNAL MEDICINE

## 2020-01-01 PROCEDURE — 99233 SBSQ HOSP IP/OBS HIGH 50: CPT | Performed by: INTERNAL MEDICINE

## 2020-01-01 PROCEDURE — 6370000000 HC RX 637 (ALT 250 FOR IP): Performed by: INTERNAL MEDICINE

## 2020-01-01 PROCEDURE — 80048 BASIC METABOLIC PNL TOTAL CA: CPT

## 2020-01-01 PROCEDURE — 6360000002 HC RX W HCPCS: Performed by: INTERNAL MEDICINE

## 2020-01-01 PROCEDURE — 2709999900 HC NON-CHARGEABLE SUPPLY

## 2020-01-01 PROCEDURE — 94640 AIRWAY INHALATION TREATMENT: CPT

## 2020-01-01 PROCEDURE — 82948 REAGENT STRIP/BLOOD GLUCOSE: CPT

## 2020-01-01 PROCEDURE — 36415 COLL VENOUS BLD VENIPUNCTURE: CPT

## 2020-01-01 PROCEDURE — 6370000000 HC RX 637 (ALT 250 FOR IP): Performed by: PHYSICIAN ASSISTANT

## 2020-01-01 PROCEDURE — 80202 ASSAY OF VANCOMYCIN: CPT

## 2020-01-01 RX ORDER — PANTOPRAZOLE SODIUM 40 MG/1
40 TABLET, DELAYED RELEASE ORAL
Status: DISCONTINUED | OUTPATIENT
Start: 2020-01-01 | End: 2020-01-03 | Stop reason: HOSPADM

## 2020-01-01 RX ORDER — PREDNISONE 20 MG/1
40 TABLET ORAL DAILY
Status: DISCONTINUED | OUTPATIENT
Start: 2020-01-01 | End: 2020-01-03 | Stop reason: HOSPADM

## 2020-01-01 RX ADMIN — SUCRALFATE 1 G: 1 TABLET ORAL at 15:49

## 2020-01-01 RX ADMIN — PANTOPRAZOLE SODIUM 40 MG: 40 INJECTION, POWDER, FOR SOLUTION INTRAVENOUS at 10:22

## 2020-01-01 RX ADMIN — METHYLPREDNISOLONE SODIUM SUCCINATE 40 MG: 40 INJECTION, POWDER, FOR SOLUTION INTRAMUSCULAR; INTRAVENOUS at 05:17

## 2020-01-01 RX ADMIN — Medication 2 PUFF: at 22:15

## 2020-01-01 RX ADMIN — LEVALBUTEROL HYDROCHLORIDE 1.25 MG: 1.25 SOLUTION RESPIRATORY (INHALATION) at 07:53

## 2020-01-01 RX ADMIN — Medication 25 MG: at 20:17

## 2020-01-01 RX ADMIN — METOPROLOL TARTRATE 12.5 MG: 25 TABLET ORAL at 20:17

## 2020-01-01 RX ADMIN — ESCITALOPRAM 20 MG: 20 TABLET, FILM COATED ORAL at 10:21

## 2020-01-01 RX ADMIN — METOPROLOL TARTRATE 12.5 MG: 25 TABLET ORAL at 15:16

## 2020-01-01 RX ADMIN — GABAPENTIN 100 MG: 100 CAPSULE ORAL at 10:21

## 2020-01-01 RX ADMIN — PREDNISONE 40 MG: 20 TABLET ORAL at 15:16

## 2020-01-01 RX ADMIN — VANCOMYCIN HYDROCHLORIDE 750 MG: 1 INJECTION, POWDER, LYOPHILIZED, FOR SOLUTION INTRAVENOUS at 15:49

## 2020-01-01 RX ADMIN — ACETAMINOPHEN 650 MG: 325 TABLET ORAL at 10:29

## 2020-01-01 RX ADMIN — PANTOPRAZOLE SODIUM 40 MG: 40 TABLET, DELAYED RELEASE ORAL at 15:49

## 2020-01-01 RX ADMIN — CEFEPIME HYDROCHLORIDE 1 G: 1 INJECTION, POWDER, FOR SOLUTION INTRAMUSCULAR; INTRAVENOUS at 15:16

## 2020-01-01 RX ADMIN — IPRATROPIUM BROMIDE 0.5 MG: 0.5 SOLUTION RESPIRATORY (INHALATION) at 15:31

## 2020-01-01 RX ADMIN — IPRATROPIUM BROMIDE 0.5 MG: 0.5 SOLUTION RESPIRATORY (INHALATION) at 12:00

## 2020-01-01 RX ADMIN — SUCRALFATE 1 G: 1 TABLET ORAL at 20:17

## 2020-01-01 RX ADMIN — IPRATROPIUM BROMIDE 0.5 MG: 0.5 SOLUTION RESPIRATORY (INHALATION) at 07:46

## 2020-01-01 RX ADMIN — DIPHENHYDRAMINE HYDROCHLORIDE 12.5 MG: 50 INJECTION, SOLUTION INTRAMUSCULAR; INTRAVENOUS at 07:13

## 2020-01-01 RX ADMIN — SODIUM CHLORIDE, PRESERVATIVE FREE 10 ML: 5 INJECTION INTRAVENOUS at 20:18

## 2020-01-01 RX ADMIN — SUCRALFATE 1 G: 1 TABLET ORAL at 10:21

## 2020-01-01 RX ADMIN — BENZONATATE 100 MG: 100 CAPSULE ORAL at 20:17

## 2020-01-01 RX ADMIN — ACETAMINOPHEN 650 MG: 325 TABLET ORAL at 15:16

## 2020-01-01 RX ADMIN — IPRATROPIUM BROMIDE 0.5 MG: 0.5 SOLUTION RESPIRATORY (INHALATION) at 22:11

## 2020-01-01 RX ADMIN — Medication 2 PUFF: at 07:46

## 2020-01-01 RX ADMIN — Medication 10 ML: at 05:21

## 2020-01-01 ASSESSMENT — PAIN DESCRIPTION - ONSET: ONSET: OTHER (COMMENT)

## 2020-01-01 ASSESSMENT — PAIN SCALES - GENERAL
PAINLEVEL_OUTOF10: 7
PAINLEVEL_OUTOF10: 6

## 2020-01-01 ASSESSMENT — PAIN - FUNCTIONAL ASSESSMENT: PAIN_FUNCTIONAL_ASSESSMENT: ACTIVITIES ARE NOT PREVENTED

## 2020-01-01 ASSESSMENT — PAIN DESCRIPTION - PAIN TYPE: TYPE: ACUTE PAIN

## 2020-01-01 ASSESSMENT — PAIN DESCRIPTION - DESCRIPTORS: DESCRIPTORS: ACHING

## 2020-01-01 ASSESSMENT — PAIN DESCRIPTION - PROGRESSION: CLINICAL_PROGRESSION: NOT CHANGED

## 2020-01-01 ASSESSMENT — PAIN DESCRIPTION - LOCATION: LOCATION: ARM

## 2020-01-01 ASSESSMENT — PAIN DESCRIPTION - FREQUENCY: FREQUENCY: CONTINUOUS

## 2020-01-01 ASSESSMENT — PAIN DESCRIPTION - ORIENTATION: ORIENTATION: LEFT

## 2020-01-01 NOTE — FLOWSHEET NOTE
01/01/20 0645 01/01/20 0959   Vital Signs   /86 124/71   Temp 97.9 °F (36.6 °C) 98.4 °F (36.9 °C)   Pulse 85 93   Resp 19 20   SpO2 100 % 100 %   Weight 171 lb 8.3 oz (77.8 kg) 162 lb 11.2 oz (73.8 kg)   Weight Method Bed scale Bed scale   Percent Weight Change -0.05 -5.14   Post-Hemodialysis Assessment   Post-Treatment Procedures  --  Blood returned; Access bleeding time < 10 minutes; Catheter Capped, clamped with Saline x2 ports   Machine Disinfection Process  --  Acid/Vinegar Clean;Heat Disinfect; Exterior Machine Disinfection   Rinseback Volume (ml)  --  300 ml   Total Liters Processed (l/min)  --  44.5 l/min   Dialyzer Clearance  --  Lightly streaked   Duration of Treatment (minutes)  --  130 minutes   Heparin amount administered during treatment (units)  --  0 units   Hemodialysis Output (ml)  --  1500 ml   NET Removed (ml)  --  1200 ml   Tolerated Treatment  --  Poor   PT completed 2HR and 10 min.s TX and removed 1.2 liters of fluid. PT c/o of shortness of breath, agitated, c/o of arm pain, switched PT over to her catheter located on left chest. New dressing, clean dry and attached and new tego's applied. RT gave breathing TX by bedside. PT did better with breathing and her agitation after breathing TX. PT woke up and stated she wanted off the machine, d/t arm hurting. Offered PT ice for her arm, PT refused ice. PT stated, \"No I want off the machine\". Contacted Dr. Harshil Yuen, and asked if she would stay on the machine for another 30 min. w/o UF. PT refused, and stated \"I am done and I want to go back to my room\". Dr. Harshil Yuen gave order to take PT off. Terminated TX and returned all blood back to PT at end of [address]. Removed 15g needles X2 with clean gauze and light pressure. Prior to PT returning to her room her access is clean, dry and intact dressing. Printed record to be scanned into PT EMR. Report given to primary nurse.

## 2020-01-01 NOTE — PROGRESS NOTES
Cardiology Progress Note      Patient:  Tracie Henderson  YOB: 1965  MRN: [id number]   Acct: [de-identified]  Admit Date:  12/29/2019  Primary Cardiologist: none  Pt seen by dr Adwoa Alicea    Note per dr Jono Gonzales OF CONSULTATION:  Pericardial effusion reported to be large per  the CAT scan of the abdomen that has been done on this admission in a  28-year-old female patient presented with shortness of breath and  abdominal pain.     HISTORY OF PRESENT ILLNESS:  This is a 28-year-old  female  patient known to have COPD on home oxygen; end-stage renal disease, on  hemodialysis, presented to the emergency room yesterday because of  shortness of breath and abdominal discomfort and abdominal bloating and  marked worsening of her shortness of breath. The patient admitted that  she missed two of her previous dialysis recently and because she has  been having a cough and sort of upper respiratory tract infection, so  she missed her dialysis. In the emergency room, she had CAT scan  performed, CT of the abdomen and pelvis in view of her abdominal pain  that reported large pericardial effusion and hence Cardiology evaluation  was sought. The patient denied having any chest pain, shortness of  breath is today better because she just had her dialysis as inpatient. She is anemic with hemoglobin of 7.1.     No chest pain. No history of coronary artery disease. \"    Subjective (Events in last 24 hours): pt awake and alert. NAD.   No cp or sob      Objective:   /84   Pulse 92   Temp 98.7 °F (37.1 °C) (Oral)   Resp 18   Ht 5' 3\" (1.6 m)   Wt 162 lb 11.2 oz (73.8 kg)   SpO2 97%   BMI 28.82 kg/m²        TELEMETRY: nsr    Physical Exam:  General Appearance: alert and oriented to person, place and time, in no acute distress  Cardiovascular: normal rate, regular rhythm, normal S1 and S2, no murmurs, rubs, clicks, or gallops, distal pulses intact, no carotid bruits, no JVD  Pulmonary/Chest: clear to - improved - on home O2 - pt back to baseline   Acute diastolic CHF/Fluid overload with pulmonary congestion due to noncompliance with dialysis - improved post dialysis  ESRD  Elevated troponins due to above - denies chest pain  Ef 55 per echo 12/30/19  Moderate Pericardial effusion - new from echo 11/13/19   Severe anemia - attending following  New onset paroxysmal afib with RVR - currently NSR   chadsvasc = 3  Tobacco abuse    Plan:  · Npo after midnight  · Stress test In am  · Limited echo Friday  · Add lopressor 12.5 bid  · Monitor bp/hr  · Will need longterm OAC - start eliquis 5 bid upon dc - pt understands risk of bleeding and accepts risk to reduce risk of embolic phenomenon         Electronically signed by Tonie Granger PA-C on 1/1/2020 at 11:24 AM

## 2020-01-01 NOTE — PLAN OF CARE
Problem: Impaired respiratory status  Goal: Clear lung sounds  1/1/2020 0815 by Alfa Pavon RCP  Outcome: Ongoing    Improve breath sounds, increase aeration and decrease WOB.

## 2020-01-01 NOTE — PROGRESS NOTES
RSV pneumonia  6. Macrocytic anemia  7. HTN: Bps stable  8. New onset Afib  9. COPD    Dr. Garrett Saenz will resume care in the morning. Please don't hesitate to call with any questions.   Electronically signed by Josi Lorenzo DO on 1/1/2020 at 3:30 PM

## 2020-01-01 NOTE — PLAN OF CARE
Problem: Impaired respiratory status  Goal: Clear lung sounds  12/31/2019 1934 by Harry Lyn RCP  Outcome: Ongoing  Note:   Treatment will be continued as ordered to improve aeration.

## 2020-01-02 ENCOUNTER — TELEPHONE (OUTPATIENT)
Dept: FAMILY MEDICINE CLINIC | Age: 55
End: 2020-01-02

## 2020-01-02 ENCOUNTER — APPOINTMENT (OUTPATIENT)
Dept: NON INVASIVE DIAGNOSTICS | Age: 55
DRG: 291 | End: 2020-01-02
Payer: MEDICARE

## 2020-01-02 PROBLEM — N18.9 UREMIC PERICARDITIS: Status: ACTIVE | Noted: 2020-01-02

## 2020-01-02 PROBLEM — I32 UREMIC PERICARDITIS: Status: ACTIVE | Noted: 2020-01-02

## 2020-01-02 LAB
ANION GAP SERPL CALCULATED.3IONS-SCNC: 12 MEQ/L (ref 8–16)
BASOPHILS # BLD: 0.1 %
BASOPHILS ABSOLUTE: 0 THOU/MM3 (ref 0–0.1)
BUN BLDV-MCNC: 24 MG/DL (ref 7–22)
CALCIUM SERPL-MCNC: 8.5 MG/DL (ref 8.5–10.5)
CHLORIDE BLD-SCNC: 95 MEQ/L (ref 98–111)
CO2: 24 MEQ/L (ref 23–33)
CREAT SERPL-MCNC: 4.6 MG/DL (ref 0.4–1.2)
EOSINOPHIL # BLD: 0.1 %
EOSINOPHILS ABSOLUTE: 0 THOU/MM3 (ref 0–0.4)
ERYTHROCYTE [DISTWIDTH] IN BLOOD BY AUTOMATED COUNT: 14.7 % (ref 11.5–14.5)
ERYTHROCYTE [DISTWIDTH] IN BLOOD BY AUTOMATED COUNT: 52.7 FL (ref 35–45)
GFR SERPL CREATININE-BSD FRML MDRD: 10 ML/MIN/1.73M2
GLUCOSE BLD-MCNC: 123 MG/DL (ref 70–108)
GLUCOSE BLD-MCNC: 162 MG/DL (ref 70–108)
GLUCOSE BLD-MCNC: 89 MG/DL (ref 70–108)
GLUCOSE BLD-MCNC: 96 MG/DL (ref 70–108)
HCT VFR BLD CALC: 25.3 % (ref 37–47)
HEMOGLOBIN: 7.3 GM/DL (ref 12–16)
HYPOCHROMIA: PRESENT
IMMATURE GRANS (ABS): 0.19 THOU/MM3 (ref 0–0.07)
IMMATURE GRANULOCYTES: 1.4 %
LYMPHOCYTES # BLD: 8.1 %
LYMPHOCYTES ABSOLUTE: 1.1 THOU/MM3 (ref 1–4.8)
MCH RBC QN AUTO: 28.3 PG (ref 26–33)
MCHC RBC AUTO-ENTMCNC: 28.9 GM/DL (ref 32.2–35.5)
MCV RBC AUTO: 98.1 FL (ref 81–99)
MONOCYTES # BLD: 6.8 %
MONOCYTES ABSOLUTE: 0.9 THOU/MM3 (ref 0.4–1.3)
NUCLEATED RED BLOOD CELLS: 0 /100 WBC
PLATELET # BLD: 294 THOU/MM3 (ref 130–400)
PLATELET ESTIMATE: ADEQUATE
PMV BLD AUTO: 9.6 FL (ref 9.4–12.4)
POTASSIUM SERPL-SCNC: 4.4 MEQ/L (ref 3.5–5.2)
RBC # BLD: 2.58 MILL/MM3 (ref 4.2–5.4)
SCAN OF BLOOD SMEAR: NORMAL
SEG NEUTROPHILS: 83.5 %
SEGMENTED NEUTROPHILS ABSOLUTE COUNT: 11.3 THOU/MM3 (ref 1.8–7.7)
SODIUM BLD-SCNC: 131 MEQ/L (ref 135–145)
WBC # BLD: 13.5 THOU/MM3 (ref 4.8–10.8)

## 2020-01-02 PROCEDURE — 93017 CV STRESS TEST TRACING ONLY: CPT

## 2020-01-02 PROCEDURE — 78452 HT MUSCLE IMAGE SPECT MULT: CPT

## 2020-01-02 PROCEDURE — 2580000003 HC RX 258: Performed by: INTERNAL MEDICINE

## 2020-01-02 PROCEDURE — 2700000000 HC OXYGEN THERAPY PER DAY

## 2020-01-02 PROCEDURE — 86480 TB TEST CELL IMMUN MEASURE: CPT

## 2020-01-02 PROCEDURE — 82948 REAGENT STRIP/BLOOD GLUCOSE: CPT

## 2020-01-02 PROCEDURE — 6370000000 HC RX 637 (ALT 250 FOR IP): Performed by: INTERNAL MEDICINE

## 2020-01-02 PROCEDURE — 80048 BASIC METABOLIC PNL TOTAL CA: CPT

## 2020-01-02 PROCEDURE — 6370000000 HC RX 637 (ALT 250 FOR IP): Performed by: PHYSICIAN ASSISTANT

## 2020-01-02 PROCEDURE — 86038 ANTINUCLEAR ANTIBODIES: CPT

## 2020-01-02 PROCEDURE — 2140000000 HC CCU INTERMEDIATE R&B

## 2020-01-02 PROCEDURE — 6360000002 HC RX W HCPCS: Performed by: INTERNAL MEDICINE

## 2020-01-02 PROCEDURE — 6360000002 HC RX W HCPCS: Performed by: NURSE PRACTITIONER

## 2020-01-02 PROCEDURE — 36415 COLL VENOUS BLD VENIPUNCTURE: CPT

## 2020-01-02 PROCEDURE — 99999 PR OFFICE/OUTPT VISIT,PROCEDURE ONLY: CPT | Performed by: NURSE PRACTITIONER

## 2020-01-02 PROCEDURE — 90935 HEMODIALYSIS ONE EVALUATION: CPT

## 2020-01-02 PROCEDURE — 85025 COMPLETE CBC W/AUTO DIFF WBC: CPT

## 2020-01-02 PROCEDURE — 3430000000 HC RX DIAGNOSTIC RADIOPHARMACEUTICAL: Performed by: PHYSICIAN ASSISTANT

## 2020-01-02 PROCEDURE — 99233 SBSQ HOSP IP/OBS HIGH 50: CPT | Performed by: INTERNAL MEDICINE

## 2020-01-02 PROCEDURE — 6360000002 HC RX W HCPCS

## 2020-01-02 PROCEDURE — 94640 AIRWAY INHALATION TREATMENT: CPT

## 2020-01-02 PROCEDURE — 90935 HEMODIALYSIS ONE EVALUATION: CPT | Performed by: INTERNAL MEDICINE

## 2020-01-02 PROCEDURE — A9500 TC99M SESTAMIBI: HCPCS | Performed by: PHYSICIAN ASSISTANT

## 2020-01-02 PROCEDURE — 94761 N-INVAS EAR/PLS OXIMETRY MLT: CPT

## 2020-01-02 PROCEDURE — 2709999900 HC NON-CHARGEABLE SUPPLY

## 2020-01-02 PROCEDURE — 2580000003 HC RX 258: Performed by: NURSE PRACTITIONER

## 2020-01-02 RX ADMIN — SUCRALFATE 1 G: 1 TABLET ORAL at 20:34

## 2020-01-02 RX ADMIN — SUCRALFATE 1 G: 1 TABLET ORAL at 12:25

## 2020-01-02 RX ADMIN — IPRATROPIUM BROMIDE 0.5 MG: 0.5 SOLUTION RESPIRATORY (INHALATION) at 15:43

## 2020-01-02 RX ADMIN — SUCRALFATE 1 G: 1 TABLET ORAL at 17:11

## 2020-01-02 RX ADMIN — DIPHENHYDRAMINE HYDROCHLORIDE 12.5 MG: 50 INJECTION, SOLUTION INTRAMUSCULAR; INTRAVENOUS at 07:59

## 2020-01-02 RX ADMIN — IRON SUCROSE 300 MG: 20 INJECTION, SOLUTION INTRAVENOUS at 12:16

## 2020-01-02 RX ADMIN — PANTOPRAZOLE SODIUM 40 MG: 40 TABLET, DELAYED RELEASE ORAL at 17:15

## 2020-01-02 RX ADMIN — IPRATROPIUM BROMIDE 0.5 MG: 0.5 SOLUTION RESPIRATORY (INHALATION) at 20:20

## 2020-01-02 RX ADMIN — PREDNISONE 40 MG: 20 TABLET ORAL at 12:25

## 2020-01-02 RX ADMIN — METOPROLOL TARTRATE 25 MG: 25 TABLET ORAL at 12:25

## 2020-01-02 RX ADMIN — Medication 25 MG: at 20:34

## 2020-01-02 RX ADMIN — SODIUM CHLORIDE, PRESERVATIVE FREE 10 ML: 5 INJECTION INTRAVENOUS at 21:00

## 2020-01-02 RX ADMIN — Medication 11 MILLICURIE: at 13:10

## 2020-01-02 RX ADMIN — METOPROLOL TARTRATE 25 MG: 25 TABLET ORAL at 20:34

## 2020-01-02 RX ADMIN — Medication 2 PUFF: at 20:20

## 2020-01-02 RX ADMIN — IPRATROPIUM BROMIDE 0.5 MG: 0.5 SOLUTION RESPIRATORY (INHALATION) at 08:22

## 2020-01-02 RX ADMIN — SODIUM CHLORIDE, PRESERVATIVE FREE 10 ML: 5 INJECTION INTRAVENOUS at 12:17

## 2020-01-02 RX ADMIN — Medication 2 PUFF: at 08:22

## 2020-01-02 RX ADMIN — Medication 34.3 MILLICURIE: at 14:30

## 2020-01-02 RX ADMIN — ESCITALOPRAM 20 MG: 20 TABLET, FILM COATED ORAL at 12:25

## 2020-01-02 RX ADMIN — GABAPENTIN 100 MG: 100 CAPSULE ORAL at 12:25

## 2020-01-02 ASSESSMENT — PAIN SCALES - GENERAL
PAINLEVEL_OUTOF10: 0

## 2020-01-02 NOTE — FLOWSHEET NOTE
01/02/20 0740 01/02/20 1140   Vital Signs   BP (!) 143/87 (!) 164/104   Temp 97.2 °F (36.2 °C) 97.7 °F (36.5 °C)   Pulse 72 69   Resp 10  --    SpO2 100 %  --    Weight 169 lb 12.1 oz (77 kg) 166 lb 7.2 oz (75.5 kg)   Weight Method Bed scale Bed scale   Percent Weight Change 4.34 -1.95   Post-Hemodialysis Assessment   Post-Treatment Procedures  --  Blood returned; Access bleeding time < 10 minutes   Machine Disinfection Process  --  Acid/Vinegar Clean;Heat Disinfect; Exterior Machine Disinfection   Rinseback Volume (ml)  --  400 ml   Total Liters Processed (l/min)  --  80 l/min   Dialyzer Clearance  --  Lightly streaked   Duration of Treatment (minutes)  --  210 minutes   Heparin amount administered during treatment (units)  --  0 units   Hemodialysis Intake (ml)  --  400 ml   Hemodialysis Output (ml)  --  1900 ml   NET Removed (ml)  --  1500 ml   Tolerated Treatment  --  Good   Pt completed 3.5 HR TX and removed 1.5 liters of fluid. PT tolerated TX well. PT TX terminated and all blood returned back to PT. Removed 15g needles X2 with clean gauze and light pressure at sites. PT returned to her room with clean, dry and intact dressing at sites. Printed record to be scanned into PT EMR. Report given to primary nurse.

## 2020-01-02 NOTE — TELEPHONE ENCOUNTER
.Transition of Care visit scheduled.   1/7/2020  Patient is being discharged to home  Date of discharge 1/3  Discharge from facility [facility]  Reason for admission fluid overload, pe

## 2020-01-02 NOTE — PROGRESS NOTES
50 % 12 (L)   TIBC Latest Ref Range: 171 - 450 ug/dL 152 (L)        Summary 11/13/19   Pericardial Effusion   The pericardium was normal in appearance with no evidence of a pericardial   effusion. Ejection fraction is visually estimated at 55%. Overall left ventricular function is normal.   Aortic valve appears tricuspid. Aortic valve leaflets are somewhat thickened. Aortic valve leaflets are Mildly calcified. Trivial aortic regurgitation is noted. Summary 12/30/19   Normal left ventricle size and systolic function. Ejection fraction was estimated at 55 %. There were no regional left ventricular wall motion abnormalities and wall   thickness was within normal limits. The left atrium is Mildly dilated. There is moderate circumferential pericardial effusion with no evidence of   hemodynamic compromise. Maximum effusion size of 2 cm posteriorly and 1.4   cm anteriorly.- Recommend F/u echo in 4 days for progression   When this echo is compared with the echo from 11/13/19, Moderate   pericardial effusion is new . ASSESSMENT  1. End Stage Renal Disease on Hemodialysis, AV fistula, HD cath. Daily Hemodialysis because of pericardial effusion. Discussed compliance with out pt Hemodialysis and completion of full treatment. 2. Hyponatremia 2nd to End Stage Renal Disease and decreased ability to excrete free water   3. Pericardial effusion without hemodynamic compromise. Possibly 2nd to uremia   4. Paroxymal atrial fibrillation   5. Essential Hypertension with nephrosclerosis   6. Chronic Obstructive Pulmonary Disease, Asthma with exacerbation, Tobacco use  7. Chronic resp failure on home O2  8. Abdominal pain with history of gastritis, duodenitis, prepyloric ulcer, non compliant with meds  9. Anemia of chronic disease, consider GI loss, Iron deficiency. Start Venofer 300 mg IV q48hr x 3 days. Give during Hemodialysis.  Will FU with Iron supplement in out pt Hemodialysis     Principal Problem: Uremic pericarditis  Active Problems:    ESRD (end stage renal disease) (HCC)    COPD exacerbation (HCC)    Chronic respiratory failure with hypoxia (HCC)    Current smoker    Medically noncompliant    Prepyloric ulcer    Pericardial effusion    Respiratory syncytial virus  Resolved Problems:    * No resolved hospital problems.  *     Case Discussed with Dr. Brittney Fritz, APRN - CNP 7:54 AM 1/2/2020

## 2020-01-02 NOTE — PROGRESS NOTES
Cardiology Progress Note      Patient:  Mynor Chang  YOB: 1965  MRN: [id number]   Acct: [de-identified]  Admit Date:  12/29/2019  Primary Cardiologist: none  Seen by Dr. Abigail Parosn     Per prior cardiology consult note-  REASON OF CONSULTATION:  Pericardial effusion reported to be large per  the CAT scan of the abdomen that has been done on this admission in a  52-year-old female patient presented with shortness of breath and  abdominal pain.     HISTORY OF PRESENT ILLNESS:  This is a 52-year-old  female  patient known to have COPD on home oxygen; end-stage renal disease, on  hemodialysis, presented to the emergency room yesterday because of  shortness of breath and abdominal discomfort and abdominal bloating and  marked worsening of her shortness of breath. The patient admitted that  she missed two of her previous dialysis recently and because she has  been having a cough and sort of upper respiratory tract infection, so  she missed her dialysis. In the emergency room, she had CAT scan  performed, CT of the abdomen and pelvis in view of her abdominal pain  that reported large pericardial effusion and hence Cardiology evaluation  was sought. The patient denied having any chest pain, shortness of  breath is today better because she just had her dialysis as inpatient. She is anemic with hemoglobin of 7.1.     No chest pain. No history of coronary artery disease.       Subjective (Events in last 24 hours):    Pt in HD this am   Seen 16:44    Pt in bed - no chest pain or SOB or palpitations or dizziness   Tele SR HR 92     She denies orthopnea pr PND   BP stable     Discussed echo results with her - she was concerned re: heart murmer      1/2/2020  No c/o today   PAFB noted  VSS    Iv heparin has been stopped per attending     Objective:   BP (!) 144/79   Pulse 76   Temp 97.7 °F (36.5 °C)   Resp 16   Ht 5' 3\" (1.6 m)   Wt 166 lb 7.2 oz (75.5 kg)   SpO2 100% Comment: patient in dialysis no o2
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
## 2020-01-03 VITALS
HEART RATE: 70 BPM | TEMPERATURE: 97.8 F | RESPIRATION RATE: 18 BRPM | SYSTOLIC BLOOD PRESSURE: 125 MMHG | DIASTOLIC BLOOD PRESSURE: 84 MMHG | BODY MASS INDEX: 29.8 KG/M2 | WEIGHT: 168.21 LBS | HEIGHT: 63 IN | OXYGEN SATURATION: 90 %

## 2020-01-03 LAB
ANA SCREEN: NORMAL
GLUCOSE BLD-MCNC: 81 MG/DL (ref 70–108)
GLUCOSE BLD-MCNC: 85 MG/DL (ref 70–108)
GRAM STAIN RESULT: ABNORMAL
ORGANISM: ABNORMAL
ORGANISM: ABNORMAL
PROCALCITONIN: 0.25 NG/ML (ref 0.01–0.09)
RESPIRATORY CULTURE: ABNORMAL

## 2020-01-03 PROCEDURE — 99232 SBSQ HOSP IP/OBS MODERATE 35: CPT | Performed by: NURSE PRACTITIONER

## 2020-01-03 PROCEDURE — 99239 HOSP IP/OBS DSCHRG MGMT >30: CPT | Performed by: INTERNAL MEDICINE

## 2020-01-03 PROCEDURE — 82948 REAGENT STRIP/BLOOD GLUCOSE: CPT

## 2020-01-03 PROCEDURE — 93307 TTE W/O DOPPLER COMPLETE: CPT

## 2020-01-03 PROCEDURE — 6360000002 HC RX W HCPCS: Performed by: INTERNAL MEDICINE

## 2020-01-03 PROCEDURE — 6370000000 HC RX 637 (ALT 250 FOR IP): Performed by: INTERNAL MEDICINE

## 2020-01-03 PROCEDURE — 84145 PROCALCITONIN (PCT): CPT

## 2020-01-03 PROCEDURE — 94640 AIRWAY INHALATION TREATMENT: CPT

## 2020-01-03 PROCEDURE — 36415 COLL VENOUS BLD VENIPUNCTURE: CPT

## 2020-01-03 PROCEDURE — 90935 HEMODIALYSIS ONE EVALUATION: CPT | Performed by: INTERNAL MEDICINE

## 2020-01-03 PROCEDURE — 94669 MECHANICAL CHEST WALL OSCILL: CPT

## 2020-01-03 RX ORDER — PREDNISONE 20 MG/1
40 TABLET ORAL DAILY
Qty: 20 TABLET | Refills: 0 | Status: SHIPPED | OUTPATIENT
Start: 2020-01-04 | End: 2020-01-08 | Stop reason: SDUPTHER

## 2020-01-03 RX ADMIN — IPRATROPIUM BROMIDE 0.5 MG: 0.5 SOLUTION RESPIRATORY (INHALATION) at 08:04

## 2020-01-03 RX ADMIN — SUCRALFATE 1 G: 1 TABLET ORAL at 13:25

## 2020-01-03 RX ADMIN — METOPROLOL TARTRATE 25 MG: 25 TABLET ORAL at 10:23

## 2020-01-03 RX ADMIN — SUCRALFATE 1 G: 1 TABLET ORAL at 10:23

## 2020-01-03 RX ADMIN — DIPHENHYDRAMINE HYDROCHLORIDE 12.5 MG: 50 INJECTION, SOLUTION INTRAMUSCULAR; INTRAVENOUS at 07:49

## 2020-01-03 RX ADMIN — Medication 2 PUFF: at 08:04

## 2020-01-03 RX ADMIN — GABAPENTIN 100 MG: 100 CAPSULE ORAL at 10:23

## 2020-01-03 RX ADMIN — ESCITALOPRAM 20 MG: 20 TABLET, FILM COATED ORAL at 10:23

## 2020-01-03 RX ADMIN — PREDNISONE 40 MG: 20 TABLET ORAL at 10:23

## 2020-01-03 RX ADMIN — PANTOPRAZOLE SODIUM 40 MG: 40 TABLET, DELAYED RELEASE ORAL at 04:14

## 2020-01-03 ASSESSMENT — PAIN SCALES - GENERAL
PAINLEVEL_OUTOF10: 0

## 2020-01-03 NOTE — PROGRESS NOTES
125/84   Pulse 70   Temp 97.8 °F (36.6 °C) (Oral)   Resp 18   Ht 5' 3\" (1.6 m)   Wt 168 lb 3.4 oz (76.3 kg)   SpO2 90%   BMI 29.80 kg/m²        TELEMETRY: SR HR 92    Physical Exam:  General Appearance: alert and oriented to person, place and time, in no acute distress  Cardiovascular: normal rate, regular rhythm, normal S1 and S2, no murmurs, rubs, clicks, or gallops, distal pulses intact,   Pulmonary/Chest: clear to auscultation bilaterally- no wheezes, rales or rhonchi, normal air movement, no respiratory distress  Abdomen: soft, non-tender, non-distended, normal bowel sounds, no masses Extremities: no cyanosis, clubbing or edema, pulses present    Skin: warm and dry, no rash or erythema  Head: normocephalic and atraumatic  Musculoskeletal: normal range of motion, no joint swelling, deformity or tenderness  Neurological: alert, oriented, normal speech, no focal findings or movement disorder noted    Medications:    metoprolol tartrate  25 mg Oral BID    iron sucrose  300 mg Intravenous Q48H    predniSONE  40 mg Oral Daily    pantoprazole  40 mg Oral BID AC    insulin lispro  0-6 Units Subcutaneous TID WC    insulin lispro  0-3 Units Subcutaneous Nightly    diphenhydrAMINE  12.5 mg Intravenous Q MWF    ipratropium  0.5 mg Nebulization 4x daily    [Held by provider] doxepin  10 mg Oral Nightly    escitalopram  20 mg Oral Daily    mometasone-formoterol  2 puff Inhalation BID    gabapentin  100 mg Oral Daily    nicotine  1 patch Transdermal Daily    sucralfate  1 g Oral 4x Daily    sodium chloride flush  10 mL Intravenous 2 times per day      dextrose       levalbuterol, 1.25 mg, Q2H PRN  menthol, 1 lozenge, Q3H PRN  doxyLAMINE succinate, 25 mg, Nightly PRN  [Held by provider] hydrOXYzine, 25 mg, 4x Daily PRN  sodium chloride flush, 10 mL, PRN  ondansetron, 4 mg, Q6H PRN  acetaminophen, 650 mg, Q4H PRN  perflutren lipid microspheres, 1.5 mL, ONCE PRN  benzonatate, 100 mg, TID PRN  ipratropium, 0.5 mg, Q6H PRN  glucose, 15 g, PRN  dextrose, 12.5 g, PRN  glucagon (rDNA), 1 mg, PRN  dextrose, 100 mL/hr, PRN        Diagnostics:  EK-DEC-2019 05:31:45 [facility] ROUTINE RETRIEVAL  Normal sinus rhythm  Cannot rule out Anterior infarct , age undetermined  Abnormal ECG  When compared with ECG of 29-DEC-2019 20:23,  Sinus rhythm has replaced Atrial fibrillation  Ventricular rate has decreased BY 74 BPM  ST no longer depressed in Inferior leads  ST no longer depressed in Lateral leads  T wave inversion no longer evident in Lateral leads  Confirmed by ASH VILLALBA ([id number]) on 2019 5:48:33 PM        Echo:  Pending limited echo  Electronically signed by Flower Man MD (Interpreting   physician) on 2019 at 08:44 PM   ----------------------------------------------------------------      Findings      Mitral Valve   The mitral valve structure was normal with normal leaflet separation. Aortic Valve   The aortic valve was trileaflet with normal thickness and cuspal   separation. Tricuspid Valve   The tricuspid valve structure was normal with normal leaflet separation. Pulmonic Valve   The pulmonic valve leaflets exhibited normal thickness, no calcification,   and normal cuspal separation. Left Atrium   The left atrium is Mildly dilated. Left Ventricle   Normal left ventricle size and systolic function. Ejection fraction was   estimated at 55 %. There were no regional left ventricular wall motion   abnormalities and wall thickness was within normal limits. Right Atrium   Right atrial size was normal.      Right Ventricle   The right ventricular size was normal with normal systolic function and   wall thickness. Pericardial Effusion   There is moderate circumferential pericardial effusion with no evidence of   hemodynamic compromise. Maximum size if 2 cm posteriorly and 1.4 cm   anteriorly. Pleural Effusion   No evidence of pleural effusion.

## 2020-01-03 NOTE — PROGRESS NOTES
01/01/20  0332 01/02/20  0639   * 131*   K 4.2 4.4   CL 90* 95*   CO2 27 24   BUN 17 24*   CREATININE 4.3* 4.6*   GLUCOSE 81 89   CALCIUM 8.6 8.5   LABGLOM 11* 10*     Troponin: No results for input(s): TROPONINI in the last 72 hours. BNP: No results for input(s): BNP in the last 72 hours. INR: No results for input(s): INR in the last 72 hours. Lipids: No results for input(s): CHOL, LDLDIRECT, TRIG, HDL, AMYLASE, LIPASE in the last 72 hours. Liver: No results for input(s): AST, ALT, ALKPHOS, PROT, LABALBU, BILITOT in the last 72 hours. Invalid input(s): BILDIR  Iron:  No results for input(s): IRONS, FERRITIN in the last 72 hours. Invalid input(s): LABIRONS    Objective:   Vitals: /84   Pulse 70   Temp 97.8 °F (36.6 °C) (Oral)   Resp 18   Ht 5' 3\" (1.6 m)   Wt 168 lb 3.4 oz (76.3 kg)   SpO2 90%   BMI 29.80 kg/m²    Wt Readings from Last 3 Encounters:   01/03/20 168 lb 3.4 oz (76.3 kg)   11/25/19 130 lb (59 kg)   11/16/19 155 lb 8 oz (70.5 kg)      24HR INTAKE/OUTPUT:      Intake/Output Summary (Last 24 hours) at 1/3/2020 1220  Last data filed at 1/3/2020 0937  Gross per 24 hour   Intake 1050 ml   Output 900 ml   Net 150 ml       Constitutional:  Alert, awake, no apparent distress   Skin:normal   HEENT:Pupils are reactive . Throat is clear   Neck:supple with no thyromegaly  Cardiovascular:  S1, S2 without murmur rubs  Respiratory: Clear to auscultation  Abdomen: +bs, soft, nontender  Ext: No LE edema  Musculoskeletal:Intact  Neuro:Alert and oriented with no deficit      Electronically signed by Rani Guzman MD on 1/3/2020 at 12:20 PM

## 2020-01-03 NOTE — TELEPHONE ENCOUNTER
[facility] Transitions Initial Follow Up Call    Outreach made within 2 business days of discharge: Yes    Patient: Tali Mejias Patient : 1965   MRN: [id number]  Reason for Admission: There are no discharge diagnoses documented for the most recent discharge. Discharge Date: 19       Spoke with:  = no hippa     Discharge department/facility: home    TCM Interactive Patient Contact:  Was patient able to fill all prescriptions: Yes  Was patient instructed to bring all medications to the follow-up visit: Yes  Is patient taking all medications as directed in the discharge summary?  Yes  Does patient understand their discharge instructions: Yes  Does patient have questions or concerns that need addressed prior to 7-14 day follow up office visit: no    Scheduled appointment with PCP within 7-14 days    Follow Up  Future Appointments   Date Time Provider [place]   2020  1:30 PM Karly Goodwin  [address]   2020  9:00 AM Carroll Bullard   2020  9:30 AM Claudeen Marek, MD [address] [facility] Alvah Aase, LPN

## 2020-01-03 NOTE — CARE COORDINATION
1/2/20, 1:06 PM    DISCHARGE ON GOING EVALUATION    KAREN [facility] day: 4  Location: -21/021-A Reason for admit: Pericardial effusion [I31.3]   Procedure: 12/31 Dialysis  1/1 Dialysis  1/2 Dialysis  Treatment Plan of Care: Hospitalist, Nephrology and Cardiology following. Pt is scheduled for stress test today at 1400. Per Nephrology, possible discharge tomorrow, pending stress test results. Barriers to Discharge: Stress test today. PCP: No primary care provider on file. Readmission Risk Score: 29%  Patient Goals/Plan/Treatment Preferences: Pt lives at home with her S.O. Current with home O2 through [address] [place] [address] in [facility].  Call placed and PCP appt made with [facility], placed on AVS.
1/3/20, 10:40 AM    DISCHARGE ON GOING EVALUATION    Tamiko Simental day: 5  Location: -21/021-A Reason for admit: Pericardial effusion [I31.3]   Procedure: 12/31 Dialysis  1/1 Dialysis  1/2 Dialysis  1/2 Lexiscan stress test - not suggestive for ischemia or MI.   1/3 Dialysis - limited run  Treatment Plan of Care: Hospitalist, Nephrology and Cardiology following. Stress test done yesterday and negative for ischemia. Per Dr. Flaca Desai, Nephrology is ok with discharge today, waiting on ok from Cardiology. Echo to be done today. Barriers to Discharge: Echo and results, home O2 portable tank  PCP: No primary care provider on file. Readmission Risk Score: 30%  Patient Goals/Plan/Treatment Preferences: Pt lives at home with her S.O. Current with home O2 through [address] [place] [address] in [facility]. Call placed and PCP appt made with [facility], placed on AVS. Discussed IMM with pt as she is a possible discharge home today or tomorrow. Call placed to [address] [place] [address] regarding pt not having a portable O2 tank and that pt is possible discharge later today. They have a tank at Dr. Neel Nava office if someone can go pick it up. Talked with pt, her S.O. can go get it before noon. 1/3/20, 11:00 AM    Patient goals/plan/ treatment preferences discussed by  and . Patient goals/plan/ treatment preferences reviewed with patient/ family. Patient/ family verbalize understanding of discharge plan and are in agreement with goal/plan/treatment preferences. Understanding was demonstrated using the teach back method. AVS provided by RN at time of discharge, which includes all necessary medical information pertaining to the patients current course of illness, treatment, post-discharge goals of care, and treatment preferences.         IMM Letter  IMM Letter given to Patient/Family/Significant other/Guardian/POA/by[de-identified]   IMM Letter date given[de-identified] 01/03/20  IMM Letter time given[de-identified] 1719
1/3/20, 12:45 PM    DISCHARGE PLANNING EVALUATION  Spoke with Selena at [facility]. They are able to transport Gracia by [id number] [address] [facility] with an oxygen tank to get her home. She does not have a working portable oxygen tank to get home with.
12/31/19, 3:09 PM  DISCHARGE ON GOING EVALUATION    KAREN [facility] day: 2  Location: 3B-21/021-A Reason for admit: Pericardial effusion [I31.3]   Treatment plan of care:   Pt admitted yesterday for abdominal pain and SOB. Pt in dialysis this morning. Pt continues on IV atbx, insulin sliding scale, O2 @ 3-4L, bronchodilators, heparin gtt. Echo ordered tor today. PT/OT, nephrology and cardiology consulted. Barriers to Discharge: Needs to complete 3 more runs of dialysis  PCP: No primary care provider on file. - Last appt was to be with Dr. Eve Myers 11/19/19, but patient missed this appointment. Pt states, \"I couldn't make the appointment because I was in and out of the hospital.\" Attempted to call the office to reschedule an appointment, however the office is closed. Attempted to call [phone] ([facility] center), but they are closed as well. Readmission Risk Score: 38%  Patient Goals/Discharge Plan/Treatment Preferences[de-identified] Spoke with patient today. She states she lives at home with her boyfriend. She is current with home O2 through [address] [place] [address] in [facility] [place] [facility]. Pt states that she needs a portable O2 tank. She states that she was never provided with one. (However she has been going to/from dialysis without her O2?) Call placed to [address] [place] [address], but message states they are closed today. Left message for the office. Pt will need PCP appointment scheduled at discharge. [address] [place] [address] will need called when they reopen after the holidays to see if they are able to provide patient with a portable O2 tank. Pt denies needing home health or any other needs.
days or less: no  Readmission Risk Score: 40%    Discharge Planning Evaluation  Current Residence:  Private Residence  Living Arrangements:  Spouse/Significant Other   Support Systems:  Spouse/Significant Other, Family Members  Current Services PTA:     Potential Assistance Needed:  N/A  Potential Assistance Purchasing Medications:  No  Does patient want to participate in local refill/ meds to beds program?  Yes  Type of Home Care Services:  None  Patient expects to be discharged to:  home  Expected Discharge date: Follow Up Appointment: Best Day/ Time:      Patient Goals/Plan/Treatment Preferences: Pt off floor in dialysis this morning. Per patient history on admission, she is from home with significant other and has home O2. She is current with dialysis M-W-F here in [facility]. Transportation/Food Security/Housekeeping Addressed:  No issues identified.     Evaluation: no

## 2020-01-03 NOTE — PROGRESS NOTES
CLINICAL PHARMACY: DISCHARGE MED RECONCILIATION/REVIEW    [facility]) Select Patient?: No  Total # of Interventions Recommended: 0   -   Total # Interventions Accepted: 0  Intervention Severity:   - Level 1 Intervention Present?: No   - Level 2 #: 0   - Level 3 #: 0   Time Spent (min): 15    Additional Documentation:

## 2020-01-03 NOTE — DISCHARGE SUMMARY
Hospital Medicine Discharge Summary      Patient Identification:   Bárbara Lance   : 1965  MRN: [id number]   Account: [de-identified]      Patient's PCP: No primary care provider on file. Admit Date: 2019     Discharge Date:   1/3/2020      Admitting Physician: Juanito Hines MD     Discharge Physician: Rudi Lepe MD     Discharge Diagnoses: Active Hospital Problems    Diagnosis Date Noted    Uremic pericarditis [N18.9, I32] 2020    Respiratory syncytial virus [B97.4] 2020    Pericardial effusion [I31.3] 2019    Prepyloric ulcer [K25.9] 2019    Chronic respiratory failure with hypoxia [facility]) [J96.11] 2019    Current smoker [F17.200] 2019    Medically noncompliant [Z91.19] 2019    COPD exacerbation ([facility] [J44.1] 11/15/2019    ESRD (end stage renal disease) ([facility] [N18.6]        The patient was seen and examined on day of discharge and this discharge summary is in conjunction with any daily progress note from day of discharge. Hospital Course:   SIGNED OUT GALILEA Lance is a 49 y.o. female admitted to [address] [place] [address] on 2019 for pericardial effusion. This pt has ESRD, poorly adherent. She has had other admissions for abdominal pain and was found to have ulcer disease. She had a CT of the abdomen which showed an unexpected pericardial effusion and she was admitted. Maira Langford She was followed by Cardiology and Nephrology. She underwent intensive daily dialysis although the sessions were often shorter than planned often due to pt request.      She had a pericardial rub during her stay, likely due to uremic pericarditis. It was lessened in intensity toward the end or her stay. She had short intermittent bursts of atrial fib during her stay and Cardiology recommended a NOAC despite the bleeding risks. As there are no guidelines on dosing with dialysis, the 2.5 mg dose was chosen.   She was given a card for the first non-focal.  Psychiatric:  Alert and oriented, thought content appropriate, normal insight      Labs: For convenience and continuity at follow-up the following most recent labs are provided:      CBC:    Lab Results   Component Value Date    WBC 13.5 01/02/2020    HGB 7.3 01/02/2020    HCT 25.3 01/02/2020     01/02/2020       Renal:    Lab Results   Component Value Date     01/02/2020    K 4.4 01/02/2020    K 5.2 12/30/2019    CL 95 01/02/2020    CO2 24 01/02/2020    BUN 24 01/02/2020    CREATININE 4.6 01/02/2020    CALCIUM 8.5 01/02/2020    PHOS 4.2 12/29/2019         Significant Diagnostic Studies    Radiology:   XR CHEST STANDARD (2 VW)   Final Result      1. Bibasilar opacities which may represent infiltrate or atelectasis. 2. Cardiomegaly. **This report has been created using voice recognition software. It may contain minor errors which are inherent in voice recognition technology. **      Final report electronically signed by Dr Yoana Jeffrey on 1/1/2020 12:57 PM      CT ABDOMEN PELVIS WO CONTRAST Additional Contrast? None   Final Result      1. There is a large pericardial effusion. The density of the fluid is noncomplex in nature. 2. Cardiomegaly. 3. Thickened appearance of the duodenum which could be related to duodenitis. This appears improved when compared to the previous CT scan. 4. Slightly thickened appearance of the bilateral adrenal glands, worse on the right side. 5. Colonic diverticulosis without evidence of acute diverticulitis. 6. Severe atrophy of the bilateral kidneys. **This report has been created using voice recognition software. It may contain minor errors which are inherent in voice recognition technology. **      Final report electronically signed by Dr Yoana Jeffrey on 12/29/2019 1:44 PM      XR CHEST PORTABLE   Final Result      1. Bibasilar opacities which may represent infiltrate or atelectasis. 2. Cardiomegaly.                **This report has been created using voice recognition software. It may contain minor errors which are inherent in voice recognition technology. **      Final report electronically signed by Dr Artur Salazar on 12/29/2019 12:48 PM             Consults:     IP CONSULT TO NEPHROLOGY  IP CONSULT TO CARDIOLOGY  IP CONSULT TO SPIRITUAL SERVICES  IP CONSULT TO PHARMACY    Disposition: Home  Condition at Discharge: Stable    Code Status:  Full Code     Patient Instructions:    Discharge lab work: Activity: activity as tolerated  Diet: DIET RENAL; No Added Salt (3-4 GM)      Follow-up visits:   No follow-up provider specified.        Discharge Medications:      Mariza Galeas [facility] Medication Instructions Y:[id number]    Printed on:01/03/20 5561   Medication Information                      albuterol sulfate HFA (VENTOLIN HFA) 108 (90 Base) MCG/ACT inhaler  Inhale 1 puff into the lungs every 6 hours as needed for Wheezing             aluminum hydroxide (ALTERNGEL) 320 MG/5ML suspension  5-10 ml 4 times daily as needed for stomach pain             apixaban (ELIQUIS) 2.5 MG TABS tablet  Take 1 tablet by mouth 2 times daily             calcium acetate (PHOSLO) 667 MG capsule  Take 2,001 mg by mouth 3 times daily (with meals)             doxepin (SINEQUAN) 10 MG capsule  Take 10 mg by mouth nightly             escitalopram (LEXAPRO) 20 MG tablet  Take 20 mg by mouth daily             Fluticasone furoate-vilanterol (BREO ELLIPTA) 200-25 MCG/INH AEPB inhaler  Inhale 1 puff into the lungs daily             hydrOXYzine (VISTARIL) 25 MG capsule  Take 25 mg by mouth 4 times daily as needed for Anxiety             metoprolol tartrate (LOPRESSOR) 25 MG tablet  Take 1 tablet by mouth 2 times daily             nicotine (NICODERM CQ) 21 MG/24HR  Place 1 patch onto the skin daily             pantoprazole (PROTONIX) 40 MG tablet  Take 1 tablet by mouth 2 times daily             predniSONE (DELTASONE) 20 MG tablet  Take 2 tablets by mouth daily for

## 2020-01-03 NOTE — FLOWSHEET NOTE
01/03/20 0726 01/03/20 0937   Vital Signs   BP (!) 155/90 129/83   Temp 98.5 °F (36.9 °C) 98.5 °F (36.9 °C)   Pulse 71 75   Resp  --  18   SpO2  --  95 %   Weight 168 lb 3.4 oz (76.3 kg) 168 lb 3.4 oz (76.3 kg)   Weight Method Bed scale Bed scale   Percent Weight Change 1.06 0   Post-Hemodialysis Assessment   Post-Treatment Procedures  --  Blood returned; Access bleeding time < 10 minutes; Catheter Capped, clamped with Saline x2 ports   Machine Disinfection Process  --  Acid/Vinegar Clean;Heat Disinfect; Exterior Machine Disinfection   Total Liters Processed (l/min)  --  29.6 l/min   Dialyzer Clearance  --  Lightly streaked   Duration of Treatment (minutes)  --  105 minutes   Hemodialysis Intake (ml)  --  700 ml   Hemodialysis Output (ml)  --  900 ml   NET Removed (ml)  --  200 ml   1.75 hr treatment completed of 3.5 hour treatment dt pt demanding off of machine secondary to anxiety. much education given to pt on importance of finishing prescribed dialysis tx. 1 hour into tx pt infiltrated venous needle after multiple times being reminded to keep arm still to prevent infiltration. Ice applied to pt arm to decrease swelling at the site of infiltration. pt then hooked up to machine with CVC to finish out tx. 200ml fluid removal. report called to primary nurse. tx to be scanned into EMR. Physician notified.

## 2020-01-04 LAB
BLOOD CULTURE, ROUTINE: NORMAL
BLOOD CULTURE, ROUTINE: NORMAL

## 2020-01-05 LAB
QUANTI TB GOLD PLUS: NEGATIVE
QUANTI TB1 MINUS NIL: 0 IU/ML (ref 0–0.34)
QUANTI TB2 MINUS NIL: 0 IU/ML (ref 0–0.34)
QUANTIFERON MITOGEN MINUS NIL: 8.14 IU/ML
QUANTIFERON NIL: 0.01 IU/ML

## 2020-01-08 RX ORDER — PREDNISONE 20 MG/1
40 TABLET ORAL DAILY
Qty: 20 TABLET | Refills: 0 | Status: SHIPPED | OUTPATIENT
Start: 2020-01-08 | End: 2020-01-18

## 2020-01-09 ENCOUNTER — TELEPHONE (OUTPATIENT)
Dept: PULMONOLOGY | Age: 55
End: 2020-01-09

## 2020-01-28 ENCOUNTER — APPOINTMENT (OUTPATIENT)
Dept: GENERAL RADIOLOGY | Age: 55
DRG: 291 | End: 2020-01-28
Payer: MEDICARE

## 2020-01-28 ENCOUNTER — HOSPITAL ENCOUNTER (INPATIENT)
Age: 55
LOS: 2 days | Discharge: HOME | DRG: 291 | End: 2020-01-30
Attending: INTERNAL MEDICINE | Admitting: INTERNAL MEDICINE
Payer: MEDICARE

## 2020-01-28 PROBLEM — E87.70 VOLUME OVERLOAD: Status: ACTIVE | Noted: 2020-01-28

## 2020-01-28 LAB
ALBUMIN SERPL-MCNC: 3.4 G/DL (ref 3.5–5.1)
ALP BLD-CCNC: 134 U/L (ref 38–126)
ALT SERPL-CCNC: 22 U/L (ref 11–66)
ANION GAP SERPL CALCULATED.3IONS-SCNC: 18 MEQ/L (ref 8–16)
AST SERPL-CCNC: 15 U/L (ref 5–40)
BASOPHILIA: ABNORMAL
BASOPHILS # BLD: 0.6 %
BASOPHILS ABSOLUTE: 0.1 THOU/MM3 (ref 0–0.1)
BILIRUB SERPL-MCNC: 0.3 MG/DL (ref 0.3–1.2)
BUN BLDV-MCNC: 69 MG/DL (ref 7–22)
CALCIUM SERPL-MCNC: 8 MG/DL (ref 8.5–10.5)
CHLORIDE BLD-SCNC: 97 MEQ/L (ref 98–111)
CO2: 24 MEQ/L (ref 23–33)
CREAT SERPL-MCNC: 9.9 MG/DL (ref 0.4–1.2)
EKG ATRIAL RATE: 78 BPM
EKG P AXIS: 54 DEGREES
EKG P-R INTERVAL: 156 MS
EKG Q-T INTERVAL: 384 MS
EKG QRS DURATION: 74 MS
EKG QTC CALCULATION (BAZETT): 437 MS
EKG R AXIS: 13 DEGREES
EKG T AXIS: 105 DEGREES
EKG VENTRICULAR RATE: 78 BPM
EOSINOPHIL # BLD: 3.6 %
EOSINOPHILS ABSOLUTE: 0.3 THOU/MM3 (ref 0–0.4)
ERYTHROCYTE [DISTWIDTH] IN BLOOD BY AUTOMATED COUNT: 17.9 % (ref 11.5–14.5)
ERYTHROCYTE [DISTWIDTH] IN BLOOD BY AUTOMATED COUNT: 63.9 FL (ref 35–45)
GFR SERPL CREATININE-BSD FRML MDRD: 4 ML/MIN/1.73M2
GLUCOSE BLD-MCNC: 87 MG/DL (ref 70–108)
HCT VFR BLD CALC: 31.4 % (ref 37–47)
HEMOGLOBIN: 9 GM/DL (ref 12–16)
HYPOCHROMIA: PRESENT
IMMATURE GRANS (ABS): 0.07 THOU/MM3 (ref 0–0.07)
IMMATURE GRANULOCYTES: 0.8 %
LYMPHOCYTES # BLD: 14.6 %
LYMPHOCYTES ABSOLUTE: 1.3 THOU/MM3 (ref 1–4.8)
MCH RBC QN AUTO: 29 PG (ref 26–33)
MCHC RBC AUTO-ENTMCNC: 28.7 GM/DL (ref 32.2–35.5)
MCV RBC AUTO: 101.3 FL (ref 81–99)
MONOCYTES # BLD: 9.8 %
MONOCYTES ABSOLUTE: 0.9 THOU/MM3 (ref 0.4–1.3)
NUCLEATED RED BLOOD CELLS: 0 /100 WBC
OSMOLALITY CALCULATION: 297 MOSMOL/KG (ref 275–300)
PLATELET # BLD: 220 THOU/MM3 (ref 130–400)
PMV BLD AUTO: 9.6 FL (ref 9.4–12.4)
POTASSIUM SERPL-SCNC: 6 MEQ/L (ref 3.5–5.2)
PRO-BNP: ABNORMAL PG/ML (ref 0–900)
RBC # BLD: 3.1 MILL/MM3 (ref 4.2–5.4)
SCAN OF BLOOD SMEAR: NORMAL
SEG NEUTROPHILS: 70.6 %
SEGMENTED NEUTROPHILS ABSOLUTE COUNT: 6.1 THOU/MM3 (ref 1.8–7.7)
SODIUM BLD-SCNC: 139 MEQ/L (ref 135–145)
TOTAL PROTEIN: 6 G/DL (ref 6.1–8)
TROPONIN T: 0.04 NG/ML
WBC # BLD: 8.7 THOU/MM3 (ref 4.8–10.8)

## 2020-01-28 PROCEDURE — 71046 X-RAY EXAM CHEST 2 VIEWS: CPT

## 2020-01-28 PROCEDURE — 99285 EMERGENCY DEPT VISIT HI MDM: CPT

## 2020-01-28 PROCEDURE — 73630 X-RAY EXAM OF FOOT: CPT

## 2020-01-28 PROCEDURE — 73590 X-RAY EXAM OF LOWER LEG: CPT

## 2020-01-28 PROCEDURE — 83880 ASSAY OF NATRIURETIC PEPTIDE: CPT

## 2020-01-28 PROCEDURE — 99223 1ST HOSP IP/OBS HIGH 75: CPT | Performed by: NURSE PRACTITIONER

## 2020-01-28 PROCEDURE — 84484 ASSAY OF TROPONIN QUANT: CPT

## 2020-01-28 PROCEDURE — 6370000000 HC RX 637 (ALT 250 FOR IP): Performed by: NURSE PRACTITIONER

## 2020-01-28 PROCEDURE — 94640 AIRWAY INHALATION TREATMENT: CPT

## 2020-01-28 PROCEDURE — 1200000003 HC TELEMETRY R&B

## 2020-01-28 PROCEDURE — 36415 COLL VENOUS BLD VENIPUNCTURE: CPT

## 2020-01-28 PROCEDURE — 85025 COMPLETE CBC W/AUTO DIFF WBC: CPT

## 2020-01-28 PROCEDURE — 80053 COMPREHEN METABOLIC PANEL: CPT

## 2020-01-28 PROCEDURE — 93005 ELECTROCARDIOGRAM TRACING: CPT | Performed by: PHYSICIAN ASSISTANT

## 2020-01-28 PROCEDURE — 6360000002 HC RX W HCPCS: Performed by: PHYSICIAN ASSISTANT

## 2020-01-28 PROCEDURE — 6370000000 HC RX 637 (ALT 250 FOR IP): Performed by: PHYSICIAN ASSISTANT

## 2020-01-28 RX ORDER — METHYLPREDNISOLONE SODIUM SUCCINATE 125 MG/2ML
125 INJECTION, POWDER, LYOPHILIZED, FOR SOLUTION INTRAMUSCULAR; INTRAVENOUS ONCE
Status: DISCONTINUED | OUTPATIENT
Start: 2020-01-28 | End: 2020-01-30 | Stop reason: HOSPADM

## 2020-01-28 RX ORDER — ESCITALOPRAM OXALATE 20 MG/1
20 TABLET ORAL DAILY
Status: DISCONTINUED | OUTPATIENT
Start: 2020-01-29 | End: 2020-01-30 | Stop reason: HOSPADM

## 2020-01-28 RX ORDER — ALBUTEROL SULFATE 90 UG/1
1 AEROSOL, METERED RESPIRATORY (INHALATION) EVERY 6 HOURS PRN
Status: DISCONTINUED | OUTPATIENT
Start: 2020-01-28 | End: 2020-01-30 | Stop reason: HOSPADM

## 2020-01-28 RX ORDER — NICOTINE 21 MG/24HR
1 PATCH, TRANSDERMAL 24 HOURS TRANSDERMAL DAILY
Status: DISCONTINUED | OUTPATIENT
Start: 2020-01-29 | End: 2020-01-28

## 2020-01-28 RX ORDER — SODIUM CHLORIDE 0.9 % (FLUSH) 0.9 %
10 SYRINGE (ML) INJECTION PRN
Status: DISCONTINUED | OUTPATIENT
Start: 2020-01-28 | End: 2020-01-30 | Stop reason: HOSPADM

## 2020-01-28 RX ORDER — PANTOPRAZOLE SODIUM 40 MG/1
40 TABLET, DELAYED RELEASE ORAL 2 TIMES DAILY
Status: DISCONTINUED | OUTPATIENT
Start: 2020-01-28 | End: 2020-01-30 | Stop reason: HOSPADM

## 2020-01-28 RX ORDER — ONDANSETRON 2 MG/ML
4 INJECTION INTRAMUSCULAR; INTRAVENOUS EVERY 6 HOURS PRN
Status: DISCONTINUED | OUTPATIENT
Start: 2020-01-28 | End: 2020-01-30 | Stop reason: HOSPADM

## 2020-01-28 RX ORDER — NICOTINE 21 MG/24HR
1 PATCH, TRANSDERMAL 24 HOURS TRANSDERMAL DAILY
Status: DISCONTINUED | OUTPATIENT
Start: 2020-01-28 | End: 2020-01-29

## 2020-01-28 RX ORDER — HYDROXYZINE PAMOATE 25 MG/1
25 CAPSULE ORAL 4 TIMES DAILY PRN
Status: DISCONTINUED | OUTPATIENT
Start: 2020-01-28 | End: 2020-01-30 | Stop reason: HOSPADM

## 2020-01-28 RX ORDER — SUCRALFATE 1 G/1
1 TABLET ORAL 4 TIMES DAILY
Status: DISCONTINUED | OUTPATIENT
Start: 2020-01-29 | End: 2020-01-30 | Stop reason: HOSPADM

## 2020-01-28 RX ORDER — SODIUM POLYSTYRENE SULFONATE 15 G/60ML
15 SUSPENSION ORAL; RECTAL ONCE
Status: COMPLETED | OUTPATIENT
Start: 2020-01-28 | End: 2020-01-28

## 2020-01-28 RX ORDER — TRAMADOL HYDROCHLORIDE 50 MG/1
50 TABLET ORAL ONCE
Status: COMPLETED | OUTPATIENT
Start: 2020-01-28 | End: 2020-01-28

## 2020-01-28 RX ORDER — ACETAMINOPHEN 325 MG/1
650 TABLET ORAL EVERY 4 HOURS PRN
Status: DISCONTINUED | OUTPATIENT
Start: 2020-01-28 | End: 2020-01-30 | Stop reason: HOSPADM

## 2020-01-28 RX ORDER — PANTOPRAZOLE SODIUM 40 MG/1
40 TABLET, DELAYED RELEASE ORAL ONCE
Status: COMPLETED | OUTPATIENT
Start: 2020-01-28 | End: 2020-01-28

## 2020-01-28 RX ORDER — IPRATROPIUM BROMIDE AND ALBUTEROL SULFATE 2.5; .5 MG/3ML; MG/3ML
1 SOLUTION RESPIRATORY (INHALATION) ONCE
Status: COMPLETED | OUTPATIENT
Start: 2020-01-28 | End: 2020-01-28

## 2020-01-28 RX ORDER — DOXEPIN HYDROCHLORIDE 10 MG/1
10 CAPSULE ORAL NIGHTLY
Status: DISCONTINUED | OUTPATIENT
Start: 2020-01-29 | End: 2020-01-30 | Stop reason: HOSPADM

## 2020-01-28 RX ORDER — SODIUM CHLORIDE 0.9 % (FLUSH) 0.9 %
10 SYRINGE (ML) INJECTION EVERY 12 HOURS SCHEDULED
Status: DISCONTINUED | OUTPATIENT
Start: 2020-01-28 | End: 2020-01-30 | Stop reason: HOSPADM

## 2020-01-28 RX ADMIN — ALBUTEROL SULFATE 2.5 MG: 2.5 SOLUTION RESPIRATORY (INHALATION) at 19:39

## 2020-01-28 RX ADMIN — TRAMADOL HYDROCHLORIDE 50 MG: 50 TABLET, FILM COATED ORAL at 19:53

## 2020-01-28 RX ADMIN — METOPROLOL TARTRATE 25 MG: 25 TABLET ORAL at 23:54

## 2020-01-28 RX ADMIN — PANTOPRAZOLE SODIUM 40 MG: 40 TABLET, DELAYED RELEASE ORAL at 23:53

## 2020-01-28 RX ADMIN — SODIUM POLYSTYRENE SULFONATE 15 G: 15 SUSPENSION ORAL; RECTAL at 23:54

## 2020-01-28 RX ADMIN — IPRATROPIUM BROMIDE AND ALBUTEROL SULFATE 1 AMPULE: .5; 3 SOLUTION RESPIRATORY (INHALATION) at 19:38

## 2020-01-28 RX ADMIN — PANTOPRAZOLE SODIUM 40 MG: 40 TABLET, DELAYED RELEASE ORAL at 21:07

## 2020-01-28 RX ADMIN — APIXABAN 5 MG: 5 TABLET, FILM COATED ORAL at 23:54

## 2020-01-28 ASSESSMENT — ENCOUNTER SYMPTOMS
SORE THROAT: 0
SHORTNESS OF BREATH: 1
DIARRHEA: 0
BACK PAIN: 0
CONSTIPATION: 0
VOMITING: 0
RHINORRHEA: 0
COUGH: 1
ABDOMINAL PAIN: 0
PHOTOPHOBIA: 0
NAUSEA: 0

## 2020-01-28 ASSESSMENT — PAIN DESCRIPTION - LOCATION
LOCATION: FOOT
LOCATION: LEG

## 2020-01-28 ASSESSMENT — PAIN DESCRIPTION - ORIENTATION
ORIENTATION: RIGHT;LEFT
ORIENTATION: RIGHT;LEFT

## 2020-01-28 ASSESSMENT — PAIN DESCRIPTION - PAIN TYPE
TYPE: ACUTE PAIN
TYPE: ACUTE PAIN

## 2020-01-28 ASSESSMENT — PAIN SCALES - GENERAL
PAINLEVEL_OUTOF10: 8
PAINLEVEL_OUTOF10: 9
PAINLEVEL_OUTOF10: 9
PAINLEVEL_OUTOF10: 10
PAINLEVEL_OUTOF10: 8
PAINLEVEL_OUTOF10: 8

## 2020-01-28 ASSESSMENT — PAIN DESCRIPTION - ONSET: ONSET: ON-GOING

## 2020-01-28 ASSESSMENT — PAIN DESCRIPTION - FREQUENCY: FREQUENCY: CONTINUOUS

## 2020-01-28 ASSESSMENT — PAIN DESCRIPTION - PROGRESSION: CLINICAL_PROGRESSION: NOT CHANGED

## 2020-01-28 ASSESSMENT — PAIN - FUNCTIONAL ASSESSMENT: PAIN_FUNCTIONAL_ASSESSMENT: PREVENTS OR INTERFERES SOME ACTIVE ACTIVITIES AND ADLS

## 2020-01-28 ASSESSMENT — PAIN DESCRIPTION - DESCRIPTORS: DESCRIPTORS: ACHING

## 2020-01-28 NOTE — ED NOTES
Reassessment of the patients Fall and Shortness of Breath   is unchanged, the patients pain reassessment is a 8/10, waiting for provider to assess pt. Side rails up times 2, call light in reach, will continue to monitor.        Vikki Potter RN  01/28/20 9350

## 2020-01-28 NOTE — ED PROVIDER NOTES
[facility] EMERGENCY DEPT      CHIEF COMPLAINT       Chief Complaint   Patient presents with    Fall    Shortness of Breath       Nurses Notes reviewed and I agree except as noted in the HPI. HISTORY OF PRESENT ILLNESS    Tracie Henderson is a 49 y.o. female who presents for shortness of breath and bilateral distal leg pain following a fall two day ago, rating her current pain 9/10 in severity. Patient reports that she was getting out of bed when her legs gave out and her feet ended up behind her. She reports this occurring 3 different times. She states that she is no longer ambulatory. She denies hitting her head and LOC. Patient is on Eliquis. Patient states that she is chronically short of breath, but has noted an increase in the last 2 days. She reports associated productive cough and nasal congestion. Patient reports being compliant with her Monday/Wednesday/Friday dialysis regimen. Patient reports a history of smoking. She denies fever, chills, and diaphoresis. Patient denies chest pain, lightheadedness and dizziness. Patient denies neck pain, back pain and abdominal pain. She denies nausea, vomiting, diarrhea and constipation. Patient denies numbness and weakness to bilateral lower extremity. She denies difficulty urinating, dysuria, hematuria and urinary frequency. Patient has a past medical history of COPD, CKD, and HTN. There are no other complaints, symptoms, or concerns on initial encounter. Location/Symptom: bilateral distal leg pain   Timing/Onset: 3 days ago   Context/Setting: following a fall   Quality: achy   Duration: constant   Modifying Factors: ambulation   Severity: 9/10    REVIEW OF SYSTEMS     Review of Systems   Constitutional: Negative for fever. HENT: Positive for congestion (nasal). Negative for rhinorrhea and sore throat. Eyes: Negative for photophobia. Respiratory: Positive for cough (productive) and shortness of breath. Cardiovascular: Negative for chest pain. daily    PANTOPRAZOLE (PROTONIX) 40 MG TABLET    Take 1 tablet by mouth 2 times daily    SUCRALFATE (CARAFATE) 1 GM TABLET    Take 1 tablet by mouth 4 times daily       ALLERGIES     is allergic to codeine and morphine. FAMILY HISTORY     She indicated that the status of her sister is unknown.   family history includes Asthma in her sister. SOCIAL HISTORY    reports that she has been smoking cigarettes. She has a 25.00 pack-year smoking history. She has quit using smokeless tobacco. She reports previous alcohol use. She reports previous drug use. PHYSICAL EXAM     INITIAL VITALS:  height is 5' 3\" (1.6 m) and weight is 153 lb (69.4 kg). Her oral temperature is 97.8 °F (36.6 °C). Her blood pressure is 144/87 (abnormal) and her pulse is 74. Her respiration is 17 and oxygen saturation is 100%. Physical Exam  Vitals signs and nursing note reviewed. Constitutional:       General: She is not in acute distress. Appearance: She is well-developed. She is not toxic-appearing or diaphoretic. HENT:      Head: Normocephalic and atraumatic. Right Ear: Hearing normal.      Left Ear: Hearing normal.      Nose: Nose normal. No nasal deformity. Eyes:      General: Lids are normal.      Conjunctiva/sclera: Conjunctivae normal.   Neck:      Musculoskeletal: No neck rigidity. Trachea: Trachea normal. No tracheal deviation. Cardiovascular:      Rate and Rhythm: Normal rate and regular rhythm. Pulses:           Dorsalis pedis pulses are 2+ on the right side and 2+ on the left side. Posterior tibial pulses are 2+ on the right side and 2+ on the left side. Pulmonary:      Effort: Pulmonary effort is normal. No tachypnea. Breath sounds: Wheezing (Inspiratory and expiratory ) present. Abdominal:      General: There is no distension. Palpations: Abdomen is soft. Musculoskeletal:      Right shoulder: She exhibits no swelling. Right lower le+ Pitting Edema present.       Left lower le+ Pitting Edema present. Comments: Ecchymosis to dorsum of left foot    Skin:     General: Skin is warm and dry. Coloration: Skin is not pale. Findings: No rash. Neurological:      Mental Status: She is alert. GCS: GCS eye subscore is 4. GCS verbal subscore is 5. GCS motor subscore is 6. Sensory: No sensory deficit. Gait: Gait normal.   Psychiatric:         Speech: Speech normal.         Behavior: Behavior normal. Behavior is cooperative. Thought Content: Thought content normal.         DIFFERENTIAL DIAGNOSIS:   Including but not limited to: CHF, COPD exacerbation, dependent edema, foot fracture, foot sprain, fibular fracture, dehydration, anemia, medical noncompliant, ACS    DIAGNOSTIC RESULTS     EKG: All EKG's are interpreted by theEmergency Department Physician who either signs or Co-signs this chart in the absence of a cardiologist.  Ventricular Rate 78  BPM   Atrial Rate 78  BPM   P-R Interval 156  ms   QRS Duration 74  ms   Q-T Interval 384  ms   QTc Calculation (Bazett)Me 437  ms   P Axis 54  degrees   R Axis 13  degrees   T Axis 105  degrees   Testing Performed By     Lab - Abbreviation Name Director Address Valid Date Range   184-Unknown [facility] Unknown Unknown 12 1018-Present   Narrative & Impression     Normal sinus rhythm  ST & T wave abnormality, consider lateral ischemia  Abnormal ECG  When compared with ECG of 30-DEC-2019 05:31,  ST now depressed in Lateral leads  T wave inversion now evident in Lateral leads         RADIOLOGY: non-plain film images(s) such as CT,Ultrasound and MRI are read by the radiologist.  Plain radiographic images are visualized and preliminarily interpreted by the emergency physician unless otherwise stated below. XR FOOT RIGHT (MIN 3 VIEWS)   Final Result    No acute fracture or dislocation. Soft tissue swelling of the foot and ankle. **This report has been created using voice recognition software.  It dislocation. Soft tissue swelling of the dorsum of the foot and ankle. 2016: consulted Eve Riggins CNP for admission Who graciously accepted. MDM:  Patient was seen by me in the emergency department for shortness of breath, leg swelling, and leg pain after a fall. She had generalized weakness and difficulty walking since her fall. Old records reviewed. Vital signs reviewed and noticed stable. Physical exam revealed bilateral pedal edema and a commotions to the left foot. Diminished breath sounds was Noted and inspiratory and expiratory wheezes noted. The patient was in no respiratory distress. Patient Breath sounds were minimally improved with nebulized treatments. However the patient stated she felt no better. Laboratory work revealed hyperkalemia of six, creatinine's 9.9, chronically elevated troponin at .045 but an elevated BNP at greater than 70,000 which was more than normal. I discussed this patient with my attending physician, Dr. Mode Edouard, Specifically in regards to hyperkalemia. He advised no treatment since albuterol had been given, there were no EKG changes, And patient would be dialyzed tomorrow. With the patient's lack of improvement and laboratory findings I thought it prudent for her to be admitted to the hospital. I consulted with Eve Riggins CNP who graciously accepted admission. CRITICAL CARE:   None    CONSULTS:  Milagro Geronimo CNP    PROCEDURES:  None    FINAL IMPRESSION      1. COPD exacerbation ([address]    2. Dyspnea and respiratory abnormalities    3. Acute pulmonary edema (HCC)    4. Hyperkalemia    5. ESRD (end stage renal disease) on dialysis ([address]          DISPOSITION/PLAN     1. COPD exacerbation ([address]    2. Dyspnea and respiratory abnormalities    3. Acute pulmonary edema (HCC)    4. Hyperkalemia    5.  ESRD (end stage renal disease) on dialysis [facility])    Admission    (Please note that portions of this note were completed with a voice recognition program.  Efforts

## 2020-01-29 LAB
ALBUMIN SERPL-MCNC: 3.3 G/DL (ref 3.5–5.1)
ANION GAP SERPL CALCULATED.3IONS-SCNC: 20 MEQ/L (ref 8–16)
BUN BLDV-MCNC: 72 MG/DL (ref 7–22)
CALCIUM SERPL-MCNC: 8 MG/DL (ref 8.5–10.5)
CHLORIDE BLD-SCNC: 98 MEQ/L (ref 98–111)
CO2: 22 MEQ/L (ref 23–33)
CREAT SERPL-MCNC: 10.4 MG/DL (ref 0.4–1.2)
GFR SERPL CREATININE-BSD FRML MDRD: 4 ML/MIN/1.73M2
GLUCOSE BLD-MCNC: 86 MG/DL (ref 70–108)
PHOSPHORUS: 8 MG/DL (ref 2.4–4.7)
POTASSIUM SERPL-SCNC: 5.8 MEQ/L (ref 3.5–5.2)
SODIUM BLD-SCNC: 140 MEQ/L (ref 135–145)

## 2020-01-29 PROCEDURE — 1200000003 HC TELEMETRY R&B

## 2020-01-29 PROCEDURE — 80069 RENAL FUNCTION PANEL: CPT

## 2020-01-29 PROCEDURE — 2700000000 HC OXYGEN THERAPY PER DAY

## 2020-01-29 PROCEDURE — 90935 HEMODIALYSIS ONE EVALUATION: CPT

## 2020-01-29 PROCEDURE — 90935 HEMODIALYSIS ONE EVALUATION: CPT | Performed by: INTERNAL MEDICINE

## 2020-01-29 PROCEDURE — 5A1D70Z PERFORMANCE OF URINARY FILTRATION, INTERMITTENT, LESS THAN 6 HOURS PER DAY: ICD-10-PCS | Performed by: INTERNAL MEDICINE

## 2020-01-29 PROCEDURE — 6370000000 HC RX 637 (ALT 250 FOR IP): Performed by: NURSE PRACTITIONER

## 2020-01-29 PROCEDURE — 93010 ELECTROCARDIOGRAM REPORT: CPT | Performed by: NUCLEAR MEDICINE

## 2020-01-29 PROCEDURE — 99221 1ST HOSP IP/OBS SF/LOW 40: CPT | Performed by: INTERNAL MEDICINE

## 2020-01-29 PROCEDURE — 94640 AIRWAY INHALATION TREATMENT: CPT

## 2020-01-29 PROCEDURE — 94761 N-INVAS EAR/PLS OXIMETRY MLT: CPT

## 2020-01-29 PROCEDURE — 2580000003 HC RX 258: Performed by: NURSE PRACTITIONER

## 2020-01-29 PROCEDURE — 36415 COLL VENOUS BLD VENIPUNCTURE: CPT

## 2020-01-29 PROCEDURE — 6370000000 HC RX 637 (ALT 250 FOR IP): Performed by: PHYSICIAN ASSISTANT

## 2020-01-29 PROCEDURE — 99232 SBSQ HOSP IP/OBS MODERATE 35: CPT | Performed by: PHYSICIAN ASSISTANT

## 2020-01-29 RX ORDER — NICOTINE 21 MG/24HR
1 PATCH, TRANSDERMAL 24 HOURS TRANSDERMAL NIGHTLY
Status: DISCONTINUED | OUTPATIENT
Start: 2020-01-29 | End: 2020-01-30 | Stop reason: HOSPADM

## 2020-01-29 RX ADMIN — APIXABAN 5 MG: 5 TABLET, FILM COATED ORAL at 11:57

## 2020-01-29 RX ADMIN — CALCIUM ACETATE 2001 MG: 667 CAPSULE ORAL at 06:46

## 2020-01-29 RX ADMIN — DOXEPIN HYDROCHLORIDE 10 MG: 10 CAPSULE ORAL at 02:17

## 2020-01-29 RX ADMIN — Medication 2 PUFF: at 08:33

## 2020-01-29 RX ADMIN — ACETAMINOPHEN 650 MG: 325 TABLET ORAL at 20:19

## 2020-01-29 RX ADMIN — Medication 1 PUFF: at 08:32

## 2020-01-29 RX ADMIN — HYDROXYZINE PAMOATE 25 MG: 25 CAPSULE ORAL at 20:19

## 2020-01-29 RX ADMIN — SUCRALFATE 1 G: 1 TABLET ORAL at 11:57

## 2020-01-29 RX ADMIN — SUCRALFATE 1 G: 1 TABLET ORAL at 16:13

## 2020-01-29 RX ADMIN — PANTOPRAZOLE SODIUM 40 MG: 40 TABLET, DELAYED RELEASE ORAL at 06:46

## 2020-01-29 RX ADMIN — METOPROLOL TARTRATE 25 MG: 25 TABLET ORAL at 20:18

## 2020-01-29 RX ADMIN — PANTOPRAZOLE SODIUM 40 MG: 40 TABLET, DELAYED RELEASE ORAL at 20:19

## 2020-01-29 RX ADMIN — HYDROXYZINE PAMOATE 25 MG: 25 CAPSULE ORAL at 02:16

## 2020-01-29 RX ADMIN — DOXEPIN HYDROCHLORIDE 10 MG: 10 CAPSULE ORAL at 20:18

## 2020-01-29 RX ADMIN — APIXABAN 5 MG: 5 TABLET, FILM COATED ORAL at 20:18

## 2020-01-29 RX ADMIN — SUCRALFATE 1 G: 1 TABLET ORAL at 02:17

## 2020-01-29 RX ADMIN — SUCRALFATE 1 G: 1 TABLET ORAL at 06:46

## 2020-01-29 RX ADMIN — Medication 10 ML: at 11:59

## 2020-01-29 RX ADMIN — Medication 10 ML: at 20:19

## 2020-01-29 RX ADMIN — ESCITALOPRAM 20 MG: 20 TABLET, FILM COATED ORAL at 11:58

## 2020-01-29 RX ADMIN — METOPROLOL TARTRATE 25 MG: 25 TABLET ORAL at 06:45

## 2020-01-29 RX ADMIN — SUCRALFATE 1 G: 1 TABLET ORAL at 20:18

## 2020-01-29 RX ADMIN — Medication 2 PUFF: at 18:56

## 2020-01-29 ASSESSMENT — PAIN DESCRIPTION - LOCATION: LOCATION: LEG

## 2020-01-29 ASSESSMENT — PAIN DESCRIPTION - ORIENTATION: ORIENTATION: RIGHT;LEFT

## 2020-01-29 ASSESSMENT — PAIN DESCRIPTION - FREQUENCY: FREQUENCY: CONTINUOUS

## 2020-01-29 ASSESSMENT — PAIN DESCRIPTION - DESCRIPTORS: DESCRIPTORS: ACHING

## 2020-01-29 ASSESSMENT — PAIN DESCRIPTION - ONSET: ONSET: ON-GOING

## 2020-01-29 ASSESSMENT — ENCOUNTER SYMPTOMS
NAUSEA: 0
VOMITING: 0
ABDOMINAL PAIN: 0
SHORTNESS OF BREATH: 1
COUGH: 0

## 2020-01-29 ASSESSMENT — PAIN SCALES - GENERAL
PAINLEVEL_OUTOF10: 9
PAINLEVEL_OUTOF10: 8
PAINLEVEL_OUTOF10: 7
PAINLEVEL_OUTOF10: 8

## 2020-01-29 ASSESSMENT — PAIN DESCRIPTION - PROGRESSION: CLINICAL_PROGRESSION: NOT CHANGED

## 2020-01-29 ASSESSMENT — PAIN DESCRIPTION - PAIN TYPE
TYPE: ACUTE PAIN

## 2020-01-29 ASSESSMENT — PAIN - FUNCTIONAL ASSESSMENT: PAIN_FUNCTIONAL_ASSESSMENT: PREVENTS OR INTERFERES SOME ACTIVE ACTIVITIES AND ADLS

## 2020-01-29 NOTE — CONSULTS
nicotine (NICODERM CQ) 21 MG/24HR Place 1 patch onto the skin daily 11/14/19  Yes Salvador Garber MD   doxepin (SINEQUAN) 10 MG capsule Take 10 mg by mouth nightly   Yes Historical Provider, MD   calcium acetate (PHOSLO) 667 MG capsule Take 2,001 mg by mouth 3 times daily (with meals)   Yes Historical Provider, MD   escitalopram (LEXAPRO) 20 MG tablet Take 20 mg by mouth daily   Yes Historical Provider, MD   Fluticasone furoate-vilanterol (BREO ELLIPTA) 200-25 MCG/INH AEPB inhaler Inhale 1 puff into the lungs daily   Yes Historical Provider, MD   aluminum hydroxide (ALTERNGEL) 320 MG/5ML suspension 5-10 ml 4 times daily as needed for stomach pain 11/25/19   Mike Jefferson MD   hydrOXYzine (VISTARIL) 25 MG capsule Take 25 mg by mouth 4 times daily as needed for Anxiety    Historical Provider, MD   albuterol sulfate HFA (VENTOLIN HFA) 108 (90 Base) MCG/ACT inhaler Inhale 1 puff into the lungs every 6 hours as needed for Wheezing    Historical Provider, MD     Allergies: Codeine and Morphine  IP meds : Scheduled Meds:   methylPREDNISolone  125 mg Intramuscular Once    nicotine  1 patch Transdermal Daily    apixaban  5 mg Oral BID    calcium acetate  2,001 mg Oral TID WC    doxepin  10 mg Oral Nightly    escitalopram  20 mg Oral Daily    mometasone-formoterol  2 puff Inhalation BID    metoprolol tartrate  25 mg Oral BID    pantoprazole  40 mg Oral BID    sucralfate  1 g Oral 4x Daily    sodium chloride flush  10 mL Intravenous 2 times per day     Continuous Infusions:  Review of Systems Physical Exam   Review of Systems   Constitutional: Positive for fatigue. Negative for chills and fever. HENT: Negative. Respiratory: Positive for shortness of breath. Negative for cough. Cardiovascular: Positive for leg swelling. Negative for chest pain. Gastrointestinal: Negative for abdominal pain, nausea and vomiting. Genitourinary: Negative for dysuria, hematuria and urgency.    Musculoskeletal:        Leg 3. 4* 3.3*   BILITOT 0.3  --    ALKPHOS 134*  --    AST 15  --    ALT 22  --        Radiology : Chest x-ray shows mild bilateral interstitial infiltrates consistent with pulmonary edema and mild cardiomegaly. Other :     Assessment    Renal -ESRD on hemodialysis Monday Wednesday Friday  - Patient currently in clinical fluid overload. Seen during dialysis tolerating procedure well  - Blood pressure maintaining well and also ultrafiltration of 4 L.  - We may need to consider dialysis/ultrafiltration again in the morning. Hyperkalemia secondary to end-stage renal disease dialyzed on a 2 potassium bath  Mild acidosis  Fluid overload secondary to noncompliance and ESRD plan as mentioned above  Anemia of end-stage renal disease  Essential hypertension  Leg pain status post fall  meds reviewed and D/W patient and the dialysis staff      **This report has been created using voice recognition software. It maycontain minor  errors which are inherent in voice recognition technology. **    Clay Diego M.D  [facility].

## 2020-01-29 NOTE — ED NOTES
Pt informed on POC and plan to admit. Pt understanding. Pt reporting no change in pain. Pt made comfortable on cot and given warm blanket for comfort. Pt alert and oriented. Inpatient hospitalist in room at this time.       José Antonio Hartman RN  01/28/20 2031

## 2020-01-29 NOTE — H&P
History & Physical        Patient:  Shruthi Baker  YOB: 1965    MRN: [id number]     Acct: [de-identified]    PCP: No primary care provider on file. Date of Admission: 1/28/2020    Date of Service: Pt seen/examined on 01/28/20  and Admitted to Inpatient with expected LOS greater than two midnights due to medical therapy. ASSESSMENT/PLAN:    1. Dyspnea--likely secondary to #2 #3; on 2L and satting 94%  2. Possible volume overload--involve nephrology; patient does not appear in acute distress at this time so we will plan for hemodialysis run in the morning if okay with nephrology  3. Acute on chronic diastolic heart failure--echo from January 3, 2020 reveals an EF of 55%; chronic elevation of her BNP;   4. Hyperkalemia--treat with Kayexalate; check in the morning; maintain telemetry  5. End-stage renal disease--dialysis Monday Wednesday and Friday; follows with Dr. Jessica Woods; consult nephrology  6. Moderate circumferential pericardial effusion--noted from echo January 3, 2020 however no evidence of hemodynamic compromise and no significant interval changes from 12/30/2019  7. Chronic troponin elevation  8.  Chronic macrocytic anemia--stable      Chief Complaint: Shortness of breath      History Of Present Illness:    49 y.o. female who presented to [address] [place] [address] with shortness of breath; patient states she fell 2 days ago and has been complaining of pain to her feet and legs and currently rates 10 out of 10; she also relates to shortness of breath\" swollen all over\"; does have end-stage renal disease and is on hemodialysis on Monday Wednesday and Friday and she follows with Dr. Jessica Woods; states she had her hemodialysis treatment yesterday; states she has a mild cough with some yellow sputum along with some nasal congestion; she does not urinate; she also has a history of hypertension, she denies a history of diabetes; in the ER she was noted to have a potassium of 6.0, BUN 69, creatinine 9.9; her kg/m²     General appearance:  No apparent distress, appears stated age and cooperative. HEENT:  Normal cephalic, atraumatic without obvious deformity. Pupils equal, round, and reactive to light. Conjunctivae/corneas clear. Neck: Supple, with full range of motion. No jugular venous distention. Trachea midline. Respiratory:  Normal respiratory effort. Clear to auscultation, bilaterally without Rales/Wheezes/Rhonchi. Cardiovascular:  Regular rate and rhythm with normal S1/S2 without murmurs, rubs or gallops. Abdomen: Soft, non-tender, non-distended with normal bowel sounds. Musculoskeletal:  No clubbing, cyanosis or edema bilaterally. Full range of motion without deformity. Skin: Skin color, texture, turgor normal.    Neurologic:  Neurovascularly intact without any focal sensory/motor deficits. Cranial nerves: II-XII intact, grossly non-focal.  Psychiatric:  Alert and oriented, thought content appropriate  Capillary Refill: Brisk,< 3 seconds   Peripheral Pulses: +2 palpable, equal bilaterally       Labs:     Recent Labs     01/28/20 1838   WBC 8.7   HGB 9.0*   HCT 31.4*        Recent Labs     01/28/20  1838      K 6.0*   CL 97*   CO2 24   BUN 69*   CREATININE 9.9*   CALCIUM 8.0*     Recent Labs     01/28/20  1838   AST 15   ALT 22   BILITOT 0.3   ALKPHOS 134*     No results for input(s): INR in the last 72 hours. Recent Labs     01/28/20  1838   TROPONINT 0.045*       Urinalysis:    No results found for: Gianni Kenya, BACTERIA, RBCUA, Maria A Moron, [place] [facility]    Radiology:     Xr Chest Standard (2 Vw)    Result Date: 1/28/2020  FINDINGS: Lungs/pleura: There are mild bilateral interstitial infiltrates which may represent pulmonary edema and/or fibrosis. There is no consolidation. There is upper lobe scarring best seen on the lateral view, difficult to lateralize. No pleural effusion. No pneumothorax. Heart: There is stable mild cardiomegaly. Mediastinum/jon: No obvious mass or adenopathy. Skeleton: No acute/significant bone or joint abnormality. Lines/tubes/devices: Stable position of the right jugular PermCath, tip in the central SVC region. Mild bilateral interstitial infiltrates consistent with pulmonary edema and/or fibrosis. No consolidation. Stable mild cardiomegaly. \    Xr Tibia Fibula Left (2 Views)    Result Date: 1/28/2020  FINDINGS: Bones/joints: No fracture or dislocation. No joint space narrowing or erosive changes. There is a calcaneal spur at the insertion of the Achilles tendon. Soft tissues: There is soft tissue swelling of the lower leg from the knee to the ankle. There is a subcentimeter dystrophic or vascular calcification anterior to the mid tibia. Soft tissue swelling of the lower leg. No fracture or dislocation. Xr Tibia Fibula Right (2 Views)    Result Date: 1/28/2020  FINDINGS: Bones/joints: No fracture or dislocation. No joint space narrowing or erosive changes. There is a calcaneal spur at the insertion of the Achilles tendon. Soft tissues: There is soft tissue swelling of the lower leg from the knee to the ankle. No acute bone or joint abnormality. Soft tissue swelling of the lower leg. Xr Foot Left (min 3 Views)    Result Date: 1/28/2020  FINDINGS: Bones/joints: No fracture or dislocation. There is minimal first MTP joint DJD. There are no erosive changes. There are calcaneal spurs at the insertion of the Achilles tendon and plantar fascia. Soft tissues: There is soft tissue swelling of the ankle and dorsum of the foot. No acute fracture or dislocation. Soft tissue swelling of the dorsum of the foot and ankle. Xr Foot Right (min 3 Views)    Result Date: 1/28/2020  FINDINGS: Bones/joints: No fracture or dislocation. There is mild first MTP joint DJD. There are no erosions. There are calcaneal spurs at the insertion of the Achilles tendon and plantar fascia. Soft tissues:  There is soft tissue swelling about the ankle and involving the dorsum

## 2020-01-29 NOTE — PROGRESS NOTES
evaluation. Subjective (past 24 hours): Pt had HD this morning. She states she is feeling better since. Persistent swelling in legs, she states it is improved from day prior. Pt feels SOB is improving. ROS: 12 point review of systems completed. Pertinent positives as noted in HPI. All other systems reviewed and negative. PMH: As noted in HPI and      Diagnosis Date    Anxiety disorder     Asthma     Chronic kidney disease     Chronic respiratory failure with hypoxia (HCC)     COPD (chronic obstructive pulmonary disease) (HCC)     Elevated troponin     Hemodialysis patient ([address]     M-W-F in [address] [facility] [place], operated by [facility] Hypertension     Smoker          Procedure Laterality Date     SECTION      CYSTOURETHROSCOPY  2003,2003    LITHOTRIPSY      03    OTHER SURGICAL HISTORY      lysis of adhesions    UPPER GASTROINTESTINAL ENDOSCOPY Left 2019    EGD BIOPSY performed by Marlene Smith MD at [facility]     FH:       Problem Relation Age of Onset   Jacek Foreman Asthma Sister      SH:  reports that she has been smoking cigarettes. She has a 25.00 pack-year smoking history. She has quit using smokeless tobacco. She reports previous alcohol use. She reports previous drug use. Allergies: Codeine and Morphine    Medications:  Reviewed.   Infusion Medications   Scheduled Medications    nicotine  1 patch Transdermal Nightly    methylPREDNISolone  125 mg Intramuscular Once    apixaban  5 mg Oral BID    calcium acetate  2,001 mg Oral TID WC    doxepin  10 mg Oral Nightly    escitalopram  20 mg Oral Daily    mometasone-formoterol  2 puff Inhalation BID    metoprolol tartrate  25 mg Oral BID    pantoprazole  40 mg Oral BID    sucralfate  1 g Oral 4x Daily    sodium chloride flush  10 mL Intravenous 2 times per day     PRN Meds: albuterol sulfate HFA, hydrOXYzine, sodium chloride flush, ondansetron, acetaminophen    I/O:     Intake/Output Summary (Last 24 hours) at 2020 1357  Last data STANDARD (2 VW)   Final Result      Mild bilateral interstitial infiltrates consistent with pulmonary edema and/or fibrosis. No consolidation. Stable mild cardiomegaly. **This report has been created using voice recognition software. It may contain minor errors which are inherent in voice recognition technology. **      Final report electronically signed by Dr. Lauren Hein on 1/28/2020 7:38 PM        Xr Chest Standard (2 Vw)    Result Date: 1/28/2020  PROCEDURE: XR CHEST (2 VW) CLINICAL INFORMATION: sob. COMPARISON: Chest x-ray dated 1/1/2020 TECHNIQUE: Frontal and lateral views of the chest were obtained. FINDINGS: Lungs/pleura: There are mild bilateral interstitial infiltrates which may represent pulmonary edema and/or fibrosis. There is no consolidation. There is upper lobe scarring best seen on the lateral view, difficult to lateralize. No pleural effusion. No pneumothorax. Heart: There is stable mild cardiomegaly. Mediastinum/jon: No obvious mass or adenopathy. Skeleton: No acute/significant bone or joint abnormality. Lines/tubes/devices: Stable position of the right jugular PermCath, tip in the central SVC region. Mild bilateral interstitial infiltrates consistent with pulmonary edema and/or fibrosis. No consolidation. Stable mild cardiomegaly. **This report has been created using voice recognition software. It may contain minor errors which are inherent in voice recognition technology. ** Final report electronically signed by Dr. Lauren Hein on 1/28/2020 7:38 PM    Xr Tibia Fibula Left (2 Views)    Result Date: 1/28/2020  PROCEDURE: XR TIBIA FIBULA LEFT (2 VIEWS) CLINICAL INFORMATION: fall. COMPARISON: No prior study. TECHNIQUE: AP and lateral views of the left lower leg. FINDINGS: Bones/joints: No fracture or dislocation. No joint space narrowing or erosive changes. There is a calcaneal spur at the insertion of the Achilles tendon. Soft tissues:  There is soft tissue swelling of the lower Dr. Hunter Zarate on 1/28/2020 7:49 PM    Xr Foot Right (min 3 Views)    Result Date: 1/28/2020  PROCEDURE: XR FOOT RIGHT (MIN 3 VIEWS) CLINICAL INFORMATION: fall. COMPARISON: No prior study. TECHNIQUE: 4 views of the right foot. FINDINGS: Bones/joints: No fracture or dislocation. There is mild first MTP joint DJD. There are no erosions. There are calcaneal spurs at the insertion of the Achilles tendon and plantar fascia. Soft tissues: There is soft tissue swelling about the ankle and involving the dorsum of the foot. No acute fracture or dislocation. Soft tissue swelling of the foot and ankle. **This report has been created using voice recognition software. It may contain minor errors which are inherent in voice recognition technology. ** Final report electronically signed by Dr. Hunter Zarate on 1/28/2020 7:36 PM      Tele:   [x] yes             [] no      Active Hospital Problems    Diagnosis Date Noted    Volume overload [E87.70] 01/28/2020       Electronically signed by Angelica Salinas PA-C on 1/29/2020 at 1:57 PM

## 2020-01-29 NOTE — PROGRESS NOTES
Pt admitted to  6K27 from ED. Complaints: Shortness of breath. Vital signs obtained. Assessment and data collection initiated. Two nurse skin assessment performed by Ajay Lopez RN and Carolin Mejia RN. Oriented to room. Policies and procedures for 6[facility] explained. All questions answered with no further questions at this time. Fall prevention and safety brochure discussed with patient. Bed alarm on. Call light in reach.

## 2020-01-29 NOTE — PLAN OF CARE
Problem: Falls - Risk of:  Goal: Will remain free from falls  Description  Will remain free from falls  Outcome: Ongoing  Note:   No falls this shift, call light in reach. Bed alarm on. Up with 1 assist to bedside commode. Problem: Pain:  Goal: Pain level will decrease  Description  Pain level will decrease  Outcome: Ongoing  Note:   Complains of left leg/ankle pain rated 7/10, pain goal no pain. Offered pt prn tylenol but she refused. Repositioned for comfort. Problem: Impaired respiratory status  Goal: Clear lung sounds  1/29/2020 0839 by Alden Ramachandran RCP  Outcome: Ongoing  Note:   Mdi to maintain open airways     Problem: Activity:  Goal: Fatigue will decrease  Description  Fatigue will decrease  Outcome: Ongoing  Note:   Pt falls asleep easy      Problem: Fluid Volume:  Goal: Will show no signs or symptoms of fluid imbalance  Description  Will show no signs or symptoms of fluid imbalance  Outcome: Ongoing  Note:   Accurate I/O in progress, hemodialysis today     Problem: Nutritional:  Goal: Ability to identify appropriate dietary choices will improve  Description  Ability to identify appropriate dietary choices will improve  Outcome: Ongoing  Note:   Tolerating renal diet     Problem: Physical Regulation:  Goal: Ability to maintain clinical measurements within normal limits will improve  Description  Ability to maintain clinical measurements within normal limits will improve  Outcome: Ongoing  Note:   Vital signs stable, telemetry monitor on      Problem: Skin Integrity:  Goal: Skin integrity will be maintained  Description  Skin integrity will be maintained  Outcome: Ongoing  Note:   No new skin breakdown noted, pt turns and repositions self frequently     Care plan reviewed with patient. Patient verbalize understanding of the plan of care and contribute to goal setting.

## 2020-01-29 NOTE — CARE COORDINATION
1/29/20, 9:15 AM  DISCHARGE PLANNING EVALUATION:    Tracie Henderson       Admitted from: ER 1/28/2020/ [address] day: 1   Location: [facility]/027- Reason for admit: Volume overload [E87.70] Status: IP   Admit order signed?: yes  PMH:  has a past medical history of Anxiety disorder, Asthma, Chronic kidney disease, Chronic respiratory failure with hypoxia ([facility] [address] COPD (chronic obstructive pulmonary disease) ([facility] [address] Elevated troponin, Hemodialysis patient ([facility] [address] Hypertension, and Smoker. Medications:  Scheduled Meds:   methylPREDNISolone  125 mg Intramuscular Once    nicotine  1 patch Transdermal Daily    apixaban  5 mg Oral BID    calcium acetate  2,001 mg Oral TID WC    doxepin  10 mg Oral Nightly    escitalopram  20 mg Oral Daily    mometasone-formoterol  2 puff Inhalation BID    metoprolol tartrate  25 mg Oral BID    pantoprazole  40 mg Oral BID    sucralfate  1 g Oral 4x Daily    sodium chloride flush  10 mL Intravenous 2 times per day     Continuous Infusions:   Pertinent Info/Orders/Treatment Plan:  K+ 5.8, creat 10.4, Ca+8.0, Ca+ 8.0. IV solumedrolX1. HD today, nephrology following. Diet: DIET RENAL; Low Potassium   Smoking status:  reports that she has been smoking cigarettes. She has a 25.00 pack-year smoking history. She has quit using smokeless tobacco.   PCP: No primary care provider on file.   Readmission 30 days or less: no  Readmission Risk Score: 37%    Discharge Planning Evaluation  Current Residence:  Private Residence  Living Arrangements:  Spouse/Significant Other   Support Systems:  Spouse/Significant Other  Current Services PTA:     Potential Assistance Needed:  N/A  Potential Assistance Purchasing Medications:  No  Does patient want to participate in local refill/ meds to beds program?  Yes  Type of Home Care Services:  None  Patient expects to be discharged to:  home  Expected Discharge date:  01/31/20  Follow Up Appointment: Best Day/ Time: Tuesday PM    Patient Goals/Plan/Treatment Preferences: Negrita Sprague is currently off the unit. Per report, is from home with s.o and has oxygen provided by [facility] in [facility]. MWF HD. Will follow for needs. Transportation/Food Security/Housekeeping Addressed:  No issues identified.     Evaluation: no

## 2020-01-29 NOTE — ED NOTES
Upon first contact with patient this RN receives bedside shift report from Barbara Mcgill, [facility]. Pt in cot alert and oriented. Pt smiling with RN and requesting ice chips. Pt requesting pain medication. Tal Elias informed. Pt is a dialysis pt and is a MWF pt. Pt went to dialysis yesterday. Pt also requesting breathing treatment. PA informed as well. Pt has expiratory wheezing and coughing with clear mucus. Pt on 2L O2 at home all the time. Pt on 2L here. Pt on monitor. Vitals updated.           Onel Love RN  01/28/20 9054

## 2020-01-29 NOTE — ED NOTES
Pt medicated and educated. Pt resting with eyes closed in cot. Pt snoring. Pt wakes to RN voice. Pt is medicated. Will continue to monitor until going FARHEEN. vitals updated.       Nciole Espinal RN  01/28/20 2110

## 2020-01-30 VITALS
TEMPERATURE: 98.8 F | DIASTOLIC BLOOD PRESSURE: 75 MMHG | HEIGHT: 63 IN | WEIGHT: 165.34 LBS | OXYGEN SATURATION: 97 % | RESPIRATION RATE: 19 BRPM | BODY MASS INDEX: 29.3 KG/M2 | HEART RATE: 69 BPM | SYSTOLIC BLOOD PRESSURE: 161 MMHG

## 2020-01-30 LAB
ANION GAP SERPL CALCULATED.3IONS-SCNC: 18 MEQ/L (ref 8–16)
BUN BLDV-MCNC: 43 MG/DL (ref 7–22)
CALCIUM SERPL-MCNC: 8.3 MG/DL (ref 8.5–10.5)
CHLORIDE BLD-SCNC: 94 MEQ/L (ref 98–111)
CO2: 25 MEQ/L (ref 23–33)
CREAT SERPL-MCNC: 8.2 MG/DL (ref 0.4–1.2)
ERYTHROCYTE [DISTWIDTH] IN BLOOD BY AUTOMATED COUNT: 17.3 % (ref 11.5–14.5)
ERYTHROCYTE [DISTWIDTH] IN BLOOD BY AUTOMATED COUNT: 62.2 FL (ref 35–45)
GFR SERPL CREATININE-BSD FRML MDRD: 5 ML/MIN/1.73M2
GLUCOSE BLD-MCNC: 79 MG/DL (ref 70–108)
HCT VFR BLD CALC: 33.8 % (ref 37–47)
HEMOGLOBIN: 9.8 GM/DL (ref 12–16)
MCH RBC QN AUTO: 29.1 PG (ref 26–33)
MCHC RBC AUTO-ENTMCNC: 29 GM/DL (ref 32.2–35.5)
MCV RBC AUTO: 100.3 FL (ref 81–99)
PLATELET # BLD: 190 THOU/MM3 (ref 130–400)
PMV BLD AUTO: 9.3 FL (ref 9.4–12.4)
POTASSIUM SERPL-SCNC: 4.7 MEQ/L (ref 3.5–5.2)
PRO-BNP: ABNORMAL PG/ML (ref 0–900)
RBC # BLD: 3.37 MILL/MM3 (ref 4.2–5.4)
SODIUM BLD-SCNC: 137 MEQ/L (ref 135–145)
WBC # BLD: 6 THOU/MM3 (ref 4.8–10.8)

## 2020-01-30 PROCEDURE — 2700000000 HC OXYGEN THERAPY PER DAY

## 2020-01-30 PROCEDURE — 85027 COMPLETE CBC AUTOMATED: CPT

## 2020-01-30 PROCEDURE — 90935 HEMODIALYSIS ONE EVALUATION: CPT

## 2020-01-30 PROCEDURE — 94760 N-INVAS EAR/PLS OXIMETRY 1: CPT

## 2020-01-30 PROCEDURE — 6370000000 HC RX 637 (ALT 250 FOR IP): Performed by: PHYSICIAN ASSISTANT

## 2020-01-30 PROCEDURE — 99239 HOSP IP/OBS DSCHRG MGMT >30: CPT | Performed by: PHYSICIAN ASSISTANT

## 2020-01-30 PROCEDURE — 90935 HEMODIALYSIS ONE EVALUATION: CPT | Performed by: NURSE PRACTITIONER

## 2020-01-30 PROCEDURE — 6370000000 HC RX 637 (ALT 250 FOR IP): Performed by: NURSE PRACTITIONER

## 2020-01-30 PROCEDURE — 83880 ASSAY OF NATRIURETIC PEPTIDE: CPT

## 2020-01-30 PROCEDURE — 94640 AIRWAY INHALATION TREATMENT: CPT

## 2020-01-30 PROCEDURE — 36415 COLL VENOUS BLD VENIPUNCTURE: CPT

## 2020-01-30 PROCEDURE — 80048 BASIC METABOLIC PNL TOTAL CA: CPT

## 2020-01-30 RX ADMIN — CALCIUM ACETATE 2001 MG: 667 CAPSULE ORAL at 07:42

## 2020-01-30 RX ADMIN — Medication 3.3 MG: at 01:46

## 2020-01-30 RX ADMIN — Medication 2 PUFF: at 05:50

## 2020-01-30 RX ADMIN — Medication 1 PUFF: at 05:50

## 2020-01-30 ASSESSMENT — PAIN DESCRIPTION - PROGRESSION
CLINICAL_PROGRESSION: NOT CHANGED

## 2020-01-30 ASSESSMENT — PAIN DESCRIPTION - ONSET
ONSET: ON-GOING
ONSET: ON-GOING

## 2020-01-30 ASSESSMENT — PAIN DESCRIPTION - PAIN TYPE
TYPE: ACUTE PAIN

## 2020-01-30 ASSESSMENT — PAIN DESCRIPTION - ORIENTATION
ORIENTATION: RIGHT;LEFT
ORIENTATION: RIGHT;LEFT

## 2020-01-30 ASSESSMENT — PAIN DESCRIPTION - FREQUENCY
FREQUENCY: CONTINUOUS
FREQUENCY: CONTINUOUS

## 2020-01-30 ASSESSMENT — PAIN SCALES - GENERAL
PAINLEVEL_OUTOF10: 8
PAINLEVEL_OUTOF10: 7
PAINLEVEL_OUTOF10: 7

## 2020-01-30 ASSESSMENT — PAIN DESCRIPTION - LOCATION
LOCATION: FOOT
LOCATION: ANKLE;FOOT

## 2020-01-30 ASSESSMENT — PAIN DESCRIPTION - DESCRIPTORS
DESCRIPTORS: ACHING
DESCRIPTORS: ACHING

## 2020-01-30 NOTE — FLOWSHEET NOTE
01/30/20 0930 01/30/20 1258   Vital Signs   BP (!) 149/84 (!) 161/75   Temp 98.7 °F (37.1 °C) 98.8 °F (37.1 °C)   Pulse 65 69   Resp 16 19   Weight 173 lb 8 oz (78.7 kg) 165 lb 5.5 oz (75 kg)   Weight Method  --  Bed scale   Percent Weight Change 0.52 -4.7   Post-Hemodialysis Assessment   Post-Treatment Procedures  --  Blood returned; Access bleeding time < 10 minutes   Machine Disinfection Process  --  Acid/Vinegar Clean;Heat Disinfect; Exterior Machine Disinfection   Rinseback Volume (ml)  --  300 ml   Total Liters Processed (l/min)  --  59.9 l/min   Dialyzer Clearance  --  Lightly streaked   Duration of Treatment (minutes)  --  210 minutes   Heparin amount administered during treatment (units)  --  0 units   Hemodialysis Output (ml)  --  4000 ml   NET Removed (ml)  --  3700 ml   Tolerated Treatment  --  Good   Stable 3 hour treatment. 3.7L removed during dialysis. Sites held x10 min each & dressings dry and intact. Report given to primary RN.  Treatment record printed for scanning

## 2020-01-30 NOTE — PLAN OF CARE
Problem: Falls - Risk of:  Goal: Will remain free from falls  Description  Will remain free from falls  Outcome: Completed  Goal: Absence of physical injury  Description  Absence of physical injury  Outcome: Completed     Problem: Pain:  Goal: Pain level will decrease  Description  Pain level will decrease  Outcome: Completed  Goal: Control of acute pain  Description  Control of acute pain  Outcome: Completed  Goal: Control of chronic pain  Description  Control of chronic pain  Outcome: Completed     Problem: Impaired respiratory status  Goal: Clear lung sounds  Outcome: Completed     Problem:  Activity:  Goal: Fatigue will decrease  Description  Fatigue will decrease  Outcome: Completed     Problem: Fluid Volume:  Goal: Will show no signs or symptoms of fluid imbalance  Description  Will show no signs or symptoms of fluid imbalance  Outcome: Completed     Problem: Nutritional:  Goal: Ability to identify appropriate dietary choices will improve  Description  Ability to identify appropriate dietary choices will improve  Outcome: Completed     Problem: Physical Regulation:  Goal: Ability to maintain clinical measurements within normal limits will improve  Description  Ability to maintain clinical measurements within normal limits will improve  Outcome: Completed  Goal: Complications related to the disease process, condition or treatment will be avoided or minimized  Description  Complications related to the disease process, condition or treatment will be avoided or minimized  Outcome: Completed     Problem: Skin Integrity:  Goal: Skin integrity will be maintained  Description  Skin integrity will be maintained  Outcome: Completed     Problem: Infection - Methicillin-Resistant Staphylococcus Aureus Infection:  Goal: Absence of methicillin-resistant Staphylococcus aureus infection  Description  Absence of methicillin-resistant Staphylococcus aureus infection  Outcome: Completed     Problem: Discharge Planning:  Goal: Discharged to appropriate level of care  Description  Discharged to appropriate level of care  Outcome: Completed     Problem: Nutrition  Goal: Optimal nutrition therapy  1/30/2020 1641 by Giles Berg RN  Outcome: Completed  1/30/2020 1405 by Shelby Oakley RD, LD  Outcome: Ongoing

## 2020-01-30 NOTE — CARE COORDINATION
1/30/20, 2:08 PM    Laney is requesting discharge today due to having a court appointment tomorrow. Gracia plans home with her s.o at discharge. She is a MWF HD at [facility]. She has home oxygen, and is interested in [facility] for nursing services only. List provided and Laney would like [facility]. Referral made, they will not see her until she has been seen by new PCP. New PCP appt scheduled with Toribio Faye for 2/4/2020 @ 1300. Patient goals/plan/ treatment preferences discussed by  and . Patient goals/plan/ treatment preferences reviewed with patient/ family. Patient/ family verbalize understanding of discharge plan and are in agreement with goal/plan/treatment preferences. Understanding was demonstrated using the teach back method. AVS provided by RN at time of discharge, which includes all necessary medical information pertaining to the patients current course of illness, treatment, post-discharge goals of care, and treatment preferences.         IMM Letter  IMM Letter given to Patient/Family/Significant other/Guardian/POA/by[de-identified] staff  IMM Letter date given[de-identified] 01/28/20  IMM Letter time given[de-identified] 2027

## 2020-01-30 NOTE — FLOWSHEET NOTE
01/29/20 2022   Provider Notification   Reason for Communication Patient request  (Patient requesting cough drops)   Provider Name [facility]   Provider Notification Physician Assistant   Method of Communication Secure Message   Response See orders   Notification Time 2022

## 2020-01-30 NOTE — PROGRESS NOTES
A home oxygen evaluation has been completed. [x]Patient is an inpatient. It is expected that the patient will be discharged within the next 48 hours. Qualified provider to write order for home prescription if patient qualifies. Social service/care managers will arrange for home oxygen. If patient is active, arrange for Home Medical supplier to assess for Oxygen Conserving Device per pulse oximetry. []Patient is an outpatient. Results will be faxed to the ordering provider. Qualified provider to write order for home prescription if patient qualifies and arranges for home oxygen. Patient was placed on room air for 20 minutes. SpO2 was 87 % on room air at rest. Patients SpO2 was below 89% and qualified for home oxygen. Oxygen was applied at 2 lpm via nasal cannula to maintain a SpO2 between 90-92% while at rest. Actual SpO2 was 92 %. Patient can ambulate for exercise flow rate. Patients was ambulated, SpO2 was 91% on 4 lpm to maintain SpO2 between 90-92% while exercising. .     Note: For any SpO2 at 58% see policy and procedure for possible qualifications.

## 2020-01-30 NOTE — FLOWSHEET NOTE
01/30/20 0906   Provider Notification   Reason for Communication Review case   Provider Name Hunter Corado   Provider Notification Physician Assistant   Method of Communication Secure Message   Response Other (Comment)   Notification Time 0633   Contacted provider pt states needing to be discharged today for court tomorrow am, or she will go to group home. Sly Outhouse states she will stop by after dialysis and evaluate her.

## 2020-01-30 NOTE — PLAN OF CARE
Problem: Nutrition  Goal: Optimal nutrition therapy  Outcome: Ongoing   Nutrition Problem: Increased nutrient needs  Intervention: Food and/or Nutrient Delivery: Continue current diet(Offered ONS, pt declined all.  )  Nutritional Goals: Pt will consume 75% or more of meal during LOS.

## 2020-01-30 NOTE — PROGRESS NOTES
Nutrition Assessment    Type and Reason for Visit: Initial, Positive Nutrition Screen(poor oral intake,unplanned weight loss)    Nutrition Recommendations:   Continue current diet. Nutrition Assessment:   Pt. nutritionally compromised AEB reports of poor appetite a few days prior to admit. At risk for further nutrition compromise r/t increased nutrient needs due to known losses from dialysis, and underlying medical condition (fluid overload, history of hemodialysis, COPD). Encouraged oral intake, good protein sources, and diet/fluid compliance. Nutrition recommendations/interventions as per above. Malnutrition Assessment:  · Malnutrition Status: No malnutrition    Nutrition Risk Level: Moderate    Nutrient Needs:  · Estimated Daily Total Kcal: ~ 0901-5327 kcals (20-25 kcals/kg/day based on 79 kg)   · Estimated Daily Protein (g): 73 grams (1.4 grams/kg/day based on 52 kg IBW) or more    Nutrition Diagnosis:   · Problem: Increased nutrient needs  · Etiology: related to Insufficient energy/nutrient consumption, Renal dysfunction     Signs and symptoms:  as evidenced by Diet history of poor intake, Known losses from dialysis    Objective Information:  · Nutrition-Focused Physical Findings: Overweight. Eating lunch, has most of meal ate already. Very vague with answers asked, stated appetite poor \"since she has been sick, a couple of days but then states appetite returned a few days ago\". Follows with outpt dialysis dietitians, they have suggested protein supplements but \"I dislike them all\". Reports recent fluid overload issues but claims to follow low sodium diet and fluid restriction. States she takes phosphorus binder, phoslo. She had no idea what her dry wt is.    · Wound Type: None  · Current Nutrition Therapies:  · Oral Diet Orders: Renal, Low Potassium   · Oral Diet intake: 51-75%, %  · Oral Nutrition Supplement (ONS) Orders: None(Declines, she dislikes them all)  · ONS intake:

## 2020-01-30 NOTE — PROGRESS NOTES
rest.  ABDOMEN: soft, non tender  NEUROLOGICAL: Patient is alert and oriented to person, place, and time. Recent and remote memory intact. Thought is coherant. SKIN: no rash, No significant bruises on exposed surfaces  MUSCULOSKELETAL: Movement is coordinated. Moves all extremities   EXTREMITIES: Distal lower extremity temp is warm, 1+ lower extremity edema. PSYCHIATRIC: mood and affect appropriate. Medications:   Med reviewed  Scheduled Meds:   nicotine  1 patch Transdermal Nightly    methylPREDNISolone  125 mg Intramuscular Once    apixaban  5 mg Oral BID    calcium acetate  2,001 mg Oral TID WC    doxepin  10 mg Oral Nightly    escitalopram  20 mg Oral Daily    mometasone-formoterol  2 puff Inhalation BID    metoprolol tartrate  25 mg Oral BID    pantoprazole  40 mg Oral BID    sucralfate  1 g Oral 4x Daily    sodium chloride flush  10 mL Intravenous 2 times per day     Continuous Infusions:  PRN Meds menthol, albuterol sulfate HFA, hydrOXYzine, sodium chloride flush, ondansetron, acetaminophen   Labs:   Labs reviewed  Recent Labs     01/28/20 1838 01/29/20  0628 01/30/20  0625    140 137   K 6.0* 5.8* 4.7   CL 97* 98 94*   CO2 24 22* 25   BUN 69* 72* 43*   CREATININE 9.9* 10.4* 8.2*   LABGLOM 4* 4* 5*   GLUCOSE 87 86 79   CALCIUM 8.0* 8.0* 8.3*     Recent Labs     01/28/20  1838 01/30/20  0625   WBC 8.7 6.0   RBC 3.10* 3.37*   HGB 9.0* 9.8*   HCT 31.4* 33.8*   .3* 100.3*   MCH 29.0 29.1    190     Xr Chest Standard (2 Vw)  Result Date: 1/28/2020  Mild bilateral interstitial infiltrates consistent with pulmonary edema and/or fibrosis. No consolidation. Stable mild cardiomegaly. ASSESSMENT:  1. End Stage Renal Disease on Hemodialysis, Tunneled HD cath, Functioning AV fistula, Fluid overload, Repeat HD today. Pt states that she must be in Court at 8:30 AM 1/31, otherwise \"will go to [facility]. \" [id number] [address] for discharge from renal aspect.  Pt agrees to go to her regularly scheduled Hemodialysis treatment at 12/30 on 1/31. 2. Hyperkalemia resolved. Change from 2 K to 3 K bath today  3. Acute on chronic HFpEF 55%  4. Paroxymal atrial fibrillation   5. Essential Hypertension with nephrosclerosis   6. Chronic Obstructive Pulmonary Disease, Asthma. Tobacco use  7. Chronic resp failure on home O2  8. Anemia of chronic disease, consider GI loss, Iron deficiency. Will FU in out pt Hemodialysis     BMP in AM  Active Problems:    Volume overload  Resolved Problems:    * No resolved hospital problems. *     Case Discussed with Dr. July Nguyễn, NARINDER - CNP 8:43 AM 1/30/2020     ADDENDUM:  Pt seen during hemodialysis, Hemodynamically stable, 3 K bath, Goal Ultrafiltration 3.6 L.    NARINDER Alexander CNP 01/30/20 9:55 AM

## 2020-01-30 NOTE — PROGRESS NOTES
Discharge teaching and instructions for diagnosis/procedure of Fluid Overload completed with patient using teachback method. AVS reviewed. Printed prescriptions given to patient. Patient voiced understanding regarding prescriptions, follow up appointments, and care of self at home. Discharged in a wheelchair to  home with support per [facility] cab.

## 2020-01-30 NOTE — FLOWSHEET NOTE
This  arrived to provide spiritual care to patient. Patient is not available as she is out for dialysis. No encounter was made.  will follow up for continue support later on today.

## 2020-01-30 NOTE — PLAN OF CARE
aureus infection  Outcome: Ongoing  Note:   Contact isolation precautions being followed. Problem: Discharge Planning:  Goal: Discharged to appropriate level of care  Description  Discharged to appropriate level of care  Outcome: Ongoing  Note:   Patient plans to discharge home with sister. Care plan reviewed with patient. Patient verbalize understanding of the plan of care and contribute to goal setting.

## 2020-01-30 NOTE — DISCHARGE SUMMARY
Hospitalist Discharge Summary    Patient: Karthik Jain  YOB: 1965  MRN: [id number]   Acct: [de-identified]    Primary Care Physician: No primary care provider on file. Admit date  1/28/2020    Discharge date:  1/30/2020  Disposition: home       Discharge Assessment and Plan:    Volume overload: 2/2 noncompliance with ESRD and acute CHF exacerbation. Pt with ESRD on HD MWF. Nephrology consult appreciated. Dialysis with ultrafiltration of 4L on 1/29 with some improvement. Additional dialysis 1/30. Pt volume status greatly improved. She agrees to go to her regularly scheduled HD tx tomorrow, 1/31. Okay for discharge from nephro standpoint.      Acute on chronic diastolic HFpEF: Echo 1/3- EF 55%. BNP >70k (baseline ~25k). On BB, continued. Improved following dialysis with ultrafiltration. BNP OP, follow up with PCP. Education on fluid restrictions, daily weights.      ESRD on HD: dialysis MWF, follows with Dr. Birdie Wilkerson. Nephrology consulted. See #1.      Chronic hypoxic respiratory failure: secondary to decompensated CHF and COPD. Pt requires 2LNC at rest and 4LNC with activity. Patient was evaluated today for the diagnosis of CHF. I entered a DME order for home oxygen because the diagnosis and testing requires the patient to have supplemental oxygen. Condition will improve or be benefited by oxygen use. The patient is  able to perform good mobility in a home setting and therefore does require the use of a portable oxygen system. The need for this equipment was discussed with the patient and she understands and is in agreement. Hyperkalemia: s/p Kayexalate. 2/2 ESRD. Nephro following. Resolved. BMP OP, follow up with nephrology and PCP as scheduled.      Anemia of chronic disease: in setting of ESRD. Hgb 9.0, consistent with baseline. CBC OP, follow up with nephrology and PCP.      Hx COPD: cont Dulera.      Tobacco abuse: cessation counseling. Nicotine patch offered.          Chief Complaint on presentation: SOB    Initial H&P / Hospital Course: 49 y. o. female who presented to [facility] with shortness of breath; patient states she fell 2 days ago and has been complaining of pain to her feet and legs and currently rates 10 out of 10; she also relates to shortness of breath\" swollen all over\"; does have end-stage renal disease and is on hemodialysis on Monday Wednesday and Friday and she follows with Dr. Radha Benz she had her hemodialysis treatment yesterday; states she has a mild cough with some yellow sputum along with some nasal congestion; she does not urinate; she also has a history of hypertension, she denies a history of diabetes; in the ER she was noted to have a potassium of 6.0, BUN 69, creatinine 9.9; her BNP was greater than 70,000; she is being admitted to the hospital service for further care and evaluation. 1/29-  Pt had HD this morning. She states she is feeling better since. Persistent swelling in legs, she states it is improved from day prior. Pt feels SOB is improving. Subjective (day of discharge): Pt seen after dialysis today. Volume status greatly improved. LE edema and bibasilar crackles improved. Pt has SOB with exertion but consistent with baseline. Pt states she has to be discharged today because she has court tomorrow morning and if she does not go she \"will go to group home. \" Pt states she will be done in time to make it to her HD appointment tomorrow afternoon. Expressed importance of compliance with dialysis. Pt expresses understanding and states she feels safe for discharge today.        Physical Exam:-  Vitals:   Patient Vitals for the past 24 hrs:   BP Temp Temp src Pulse Resp SpO2 Weight   01/30/20 1258 (!) 161/75 98.8 °F (37.1 °C) -- 69 19 -- 165 lb 5.5 oz (75 kg)   01/30/20 0930 (!) 149/84 98.7 °F (37.1 °C) -- 65 16 -- 173 lb 8 oz (78.7 kg)   01/30/20 0730 126/74 97.6 °F (36.4 °C) Oral 62 16 97 % --   01/30/20 0551 -- -- -- -- 16 92 % --   01/30/20 Glom Filt Rate 5 (A) ml/min/1.73m2        Microbiology:    Blood culture #1:   Lab Results   Component Value Date    BC No growth-preliminary No growth  12/29/2019     Blood culture #2:No results found for: Ant Bains  Organism:    Lab Results   Component Value Date    LABGRAM  12/30/2019     Quality of sputum specimen: Specimen acceptable. Many segmented neutrophils observed. Rare epithelial cells observed. Many gram negative bacilli. Moderate gram positive cocci occurring singly and in pairs. Moderate gram positive bacilli. Rare budding yeast.     MRSA culture only:No results found for: [facility]  Urine culture: No results found for: LABURIN  Lab Results   Component Value Date    ORG Staphylococcus aureus 12/30/2019    ORG Haemophilus influenzae 12/30/2019      Respiratory culture: No results found for: CULTRESP  Aerobic and Anaerobic :  No results found for: LABAERO  No results found for: LABANAE    Urinalysis:   No results found for: Gibran Gladwyne, BACTERIA, RBCUA, BLOODU, SPECGRAV, [address]    Radiology:  Xr Chest Standard (2 Vw)    Result Date: 1/28/2020  PROCEDURE: XR CHEST (2 VW) CLINICAL INFORMATION: sob. COMPARISON: Chest x-ray dated 1/1/2020 TECHNIQUE: Frontal and lateral views of the chest were obtained. FINDINGS: Lungs/pleura: There are mild bilateral interstitial infiltrates which may represent pulmonary edema and/or fibrosis. There is no consolidation. There is upper lobe scarring best seen on the lateral view, difficult to lateralize. No pleural effusion. No pneumothorax. Heart: There is stable mild cardiomegaly. Mediastinum/jon: No obvious mass or adenopathy. Skeleton: No acute/significant bone or joint abnormality. Lines/tubes/devices: Stable position of the right jugular PermCath, tip in the central SVC region. Mild bilateral interstitial infiltrates consistent with pulmonary edema and/or fibrosis. No consolidation. Stable mild cardiomegaly.  **This report has been created using voice recognition software. It may contain minor errors which are inherent in voice recognition technology. ** Final report electronically signed by Dr. Ariela Mas on 1/28/2020 7:38 PM    Xr Tibia Fibula Left (2 Views)    Result Date: 1/28/2020  PROCEDURE: XR TIBIA FIBULA LEFT (2 VIEWS) CLINICAL INFORMATION: fall. COMPARISON: No prior study. TECHNIQUE: AP and lateral views of the left lower leg. FINDINGS: Bones/joints: No fracture or dislocation. No joint space narrowing or erosive changes. There is a calcaneal spur at the insertion of the Achilles tendon. Soft tissues: There is soft tissue swelling of the lower leg from the knee to the ankle. There is a subcentimeter dystrophic or vascular calcification anterior to the mid tibia. Soft tissue swelling of the lower leg. No fracture or dislocation. **This report has been created using voice recognition software. It may contain minor errors which are inherent in voice recognition technology. ** Final report electronically signed by Dr. Ariela Mas on 1/28/2020 7:30 PM    Xr Tibia Fibula Right (2 Views)    Result Date: 1/28/2020  PROCEDURE: XR TIBIA FIBULA RIGHT (2 VIEWS) CLINICAL INFORMATION: fall. COMPARISON: No prior study. TECHNIQUE: AP and lateral views of the right lower leg. FINDINGS: Bones/joints: No fracture or dislocation. No joint space narrowing or erosive changes. There is a calcaneal spur at the insertion of the Achilles tendon. Soft tissues: There is soft tissue swelling of the lower leg from the knee to the ankle. No acute bone or joint abnormality. Soft tissue swelling of the lower leg. **This report has been created using voice recognition software. It may contain minor errors which are inherent in voice recognition technology. ** Final report electronically signed by Dr. Ariela Mas on 1/28/2020 7:31 PM    Xr Foot Left (min 3 Views)    Result Date: 1/28/2020  PROCEDURE: XR FOOT LEFT (MIN 3 VIEWS) CLINICAL INFORMATION: fall. COMPARISON: No prior study. TECHNIQUE: 4 views of the left foot. FINDINGS: Bones/joints: No fracture or dislocation. There is minimal first MTP joint DJD. There are no erosive changes. There are calcaneal spurs at the insertion of the Achilles tendon and plantar fascia. Soft tissues: There is soft tissue swelling of the ankle and dorsum of the foot. No acute fracture or dislocation. Soft tissue swelling of the dorsum of the foot and ankle. **This report has been created using voice recognition software. It may contain minor errors which are inherent in voice recognition technology. ** Final report electronically signed by Dr. Leighton Leavitt on 1/28/2020 7:49 PM    Xr Foot Right (min 3 Views)    Result Date: 1/28/2020  PROCEDURE: XR FOOT RIGHT (MIN 3 VIEWS) CLINICAL INFORMATION: fall. COMPARISON: No prior study. TECHNIQUE: 4 views of the right foot. FINDINGS: Bones/joints: No fracture or dislocation. There is mild first MTP joint DJD. There are no erosions. There are calcaneal spurs at the insertion of the Achilles tendon and plantar fascia. Soft tissues: There is soft tissue swelling about the ankle and involving the dorsum of the foot. No acute fracture or dislocation. Soft tissue swelling of the foot and ankle. **This report has been created using voice recognition software. It may contain minor errors which are inherent in voice recognition technology. ** Final report electronically signed by Dr. Leighton Leavitt on 1/28/2020 7:36 PM       Consults:   IP CONSULT TO [address] [place] [address],[address]    Discharge Medications:      Medication List      ASK your doctor about these medications    aluminum hydroxide 320 MG/5ML suspension  Commonly known as:  ALTERNGEL  5-10 ml 4 times daily as needed for stomach pain     apixaban 5 MG Tabs tablet  Commonly known as:  Eliquis  Take 1 tablet by mouth 2 times daily     Breo Ellipta 200-25 MCG/INH Aepb inhaler  Generic drug:  Fluticasone furoate-vilanterol     calcium acetate 667 MG

## 2020-02-10 ASSESSMENT — PAIN DESCRIPTION - PAIN TYPE: TYPE: ACUTE PAIN

## 2020-02-10 ASSESSMENT — PAIN SCALES - GENERAL: PAINLEVEL_OUTOF10: 9

## 2020-02-10 ASSESSMENT — PAIN DESCRIPTION - LOCATION: LOCATION: ABDOMEN

## 2020-02-11 ENCOUNTER — APPOINTMENT (OUTPATIENT)
Dept: GENERAL RADIOLOGY | Age: 55
DRG: 291 | End: 2020-02-11
Payer: MEDICARE

## 2020-02-11 ENCOUNTER — HOSPITAL ENCOUNTER (INPATIENT)
Age: 55
LOS: 7 days | Discharge: HOME | DRG: 291 | End: 2020-02-18
Attending: INTERNAL MEDICINE | Admitting: INTERNAL MEDICINE
Payer: MEDICARE

## 2020-02-11 LAB
ANION GAP SERPL CALCULATED.3IONS-SCNC: 21 MEQ/L (ref 8–16)
BASOPHILS # BLD: 1.2 %
BASOPHILS ABSOLUTE: 0.1 THOU/MM3 (ref 0–0.1)
BUN BLDV-MCNC: 57 MG/DL (ref 7–22)
CALCIUM SERPL-MCNC: 8.7 MG/DL (ref 8.5–10.5)
CHLORIDE BLD-SCNC: 94 MEQ/L (ref 98–111)
CO2: 20 MEQ/L (ref 23–33)
CREAT SERPL-MCNC: 13 MG/DL (ref 0.4–1.2)
EKG ATRIAL RATE: 78 BPM
EKG P AXIS: 60 DEGREES
EKG P-R INTERVAL: 134 MS
EKG Q-T INTERVAL: 440 MS
EKG QRS DURATION: 82 MS
EKG QTC CALCULATION (BAZETT): 501 MS
EKG R AXIS: 44 DEGREES
EKG T AXIS: 74 DEGREES
EKG VENTRICULAR RATE: 78 BPM
EOSINOPHIL # BLD: 2.1 %
EOSINOPHILS ABSOLUTE: 0.2 THOU/MM3 (ref 0–0.4)
ERYTHROCYTE [DISTWIDTH] IN BLOOD BY AUTOMATED COUNT: 16.6 % (ref 11.5–14.5)
ERYTHROCYTE [DISTWIDTH] IN BLOOD BY AUTOMATED COUNT: 58.5 FL (ref 35–45)
FLU A ANTIGEN: NEGATIVE
FLU B ANTIGEN: NEGATIVE
GFR SERPL CREATININE-BSD FRML MDRD: 3 ML/MIN/1.73M2
GLUCOSE BLD-MCNC: 89 MG/DL (ref 70–108)
HCT VFR BLD CALC: 37.3 % (ref 37–47)
HEMOGLOBIN: 11 GM/DL (ref 12–16)
IMMATURE GRANS (ABS): 0.03 THOU/MM3 (ref 0–0.07)
IMMATURE GRANULOCYTES: 0.4 %
LACTIC ACID, SEPSIS: 0.9 MMOL/L (ref 0.5–1.9)
LACTIC ACID, SEPSIS: 1.4 MMOL/L (ref 0.5–1.9)
LYMPHOCYTES # BLD: 16.4 %
LYMPHOCYTES ABSOLUTE: 1.4 THOU/MM3 (ref 1–4.8)
MAGNESIUM: 2.5 MG/DL (ref 1.6–2.4)
MCH RBC QN AUTO: 29.3 PG (ref 26–33)
MCHC RBC AUTO-ENTMCNC: 29.5 GM/DL (ref 32.2–35.5)
MCV RBC AUTO: 99.2 FL (ref 81–99)
MONOCYTES # BLD: 5.7 %
MONOCYTES ABSOLUTE: 0.5 THOU/MM3 (ref 0.4–1.3)
NUCLEATED RED BLOOD CELLS: 0 /100 WBC
OSMOLALITY CALCULATION: 285.4 MOSMOL/KG (ref 275–300)
PLATELET # BLD: 252 THOU/MM3 (ref 130–400)
PMV BLD AUTO: 9.6 FL (ref 9.4–12.4)
POTASSIUM SERPL-SCNC: 5.2 MEQ/L (ref 3.5–5.2)
PRO-BNP: ABNORMAL PG/ML (ref 0–900)
PROCALCITONIN: 0.33 NG/ML (ref 0.01–0.09)
RBC # BLD: 3.76 MILL/MM3 (ref 4.2–5.4)
SEG NEUTROPHILS: 74.2 %
SEGMENTED NEUTROPHILS ABSOLUTE COUNT: 6.2 THOU/MM3 (ref 1.8–7.7)
SODIUM BLD-SCNC: 135 MEQ/L (ref 135–145)
TROPONIN T: 0.07 NG/ML
TROPONIN T: 0.1 NG/ML
WBC # BLD: 8.4 THOU/MM3 (ref 4.8–10.8)

## 2020-02-11 PROCEDURE — 2580000003 HC RX 258: Performed by: STUDENT IN AN ORGANIZED HEALTH CARE EDUCATION/TRAINING PROGRAM

## 2020-02-11 PROCEDURE — 6370000000 HC RX 637 (ALT 250 FOR IP): Performed by: INTERNAL MEDICINE

## 2020-02-11 PROCEDURE — 83880 ASSAY OF NATRIURETIC PEPTIDE: CPT

## 2020-02-11 PROCEDURE — 6370000000 HC RX 637 (ALT 250 FOR IP): Performed by: STUDENT IN AN ORGANIZED HEALTH CARE EDUCATION/TRAINING PROGRAM

## 2020-02-11 PROCEDURE — 36415 COLL VENOUS BLD VENIPUNCTURE: CPT

## 2020-02-11 PROCEDURE — 1200000003 HC TELEMETRY R&B

## 2020-02-11 PROCEDURE — 94761 N-INVAS EAR/PLS OXIMETRY MLT: CPT

## 2020-02-11 PROCEDURE — 90935 HEMODIALYSIS ONE EVALUATION: CPT

## 2020-02-11 PROCEDURE — 87804 INFLUENZA ASSAY W/OPTIC: CPT

## 2020-02-11 PROCEDURE — 71046 X-RAY EXAM CHEST 2 VIEWS: CPT

## 2020-02-11 PROCEDURE — 6370000000 HC RX 637 (ALT 250 FOR IP): Performed by: NURSE PRACTITIONER

## 2020-02-11 PROCEDURE — 80048 BASIC METABOLIC PNL TOTAL CA: CPT

## 2020-02-11 PROCEDURE — 6360000002 HC RX W HCPCS: Performed by: PHYSICIAN ASSISTANT

## 2020-02-11 PROCEDURE — 2709999900 HC NON-CHARGEABLE SUPPLY

## 2020-02-11 PROCEDURE — 2700000000 HC OXYGEN THERAPY PER DAY

## 2020-02-11 PROCEDURE — 99285 EMERGENCY DEPT VISIT HI MDM: CPT

## 2020-02-11 PROCEDURE — 99222 1ST HOSP IP/OBS MODERATE 55: CPT | Performed by: INTERNAL MEDICINE

## 2020-02-11 PROCEDURE — 83605 ASSAY OF LACTIC ACID: CPT

## 2020-02-11 PROCEDURE — 84145 PROCALCITONIN (PCT): CPT

## 2020-02-11 PROCEDURE — 85025 COMPLETE CBC W/AUTO DIFF WBC: CPT

## 2020-02-11 PROCEDURE — 6370000000 HC RX 637 (ALT 250 FOR IP): Performed by: PHYSICIAN ASSISTANT

## 2020-02-11 PROCEDURE — 83735 ASSAY OF MAGNESIUM: CPT

## 2020-02-11 PROCEDURE — 93005 ELECTROCARDIOGRAM TRACING: CPT | Performed by: PHYSICIAN ASSISTANT

## 2020-02-11 PROCEDURE — 87040 BLOOD CULTURE FOR BACTERIA: CPT

## 2020-02-11 PROCEDURE — 5A1D70Z PERFORMANCE OF URINARY FILTRATION, INTERMITTENT, LESS THAN 6 HOURS PER DAY: ICD-10-PCS | Performed by: INTERNAL MEDICINE

## 2020-02-11 PROCEDURE — 94640 AIRWAY INHALATION TREATMENT: CPT

## 2020-02-11 PROCEDURE — 84484 ASSAY OF TROPONIN QUANT: CPT

## 2020-02-11 PROCEDURE — 6360000002 HC RX W HCPCS: Performed by: STUDENT IN AN ORGANIZED HEALTH CARE EDUCATION/TRAINING PROGRAM

## 2020-02-11 PROCEDURE — 99221 1ST HOSP IP/OBS SF/LOW 40: CPT | Performed by: NURSE PRACTITIONER

## 2020-02-11 PROCEDURE — 90935 HEMODIALYSIS ONE EVALUATION: CPT | Performed by: NURSE PRACTITIONER

## 2020-02-11 PROCEDURE — 96374 THER/PROPH/DIAG INJ IV PUSH: CPT

## 2020-02-11 RX ORDER — SODIUM CHLORIDE 0.9 % (FLUSH) 0.9 %
10 SYRINGE (ML) INJECTION PRN
Status: DISCONTINUED | OUTPATIENT
Start: 2020-02-11 | End: 2020-02-18 | Stop reason: HOSPADM

## 2020-02-11 RX ORDER — NICOTINE 21 MG/24HR
1 PATCH, TRANSDERMAL 24 HOURS TRANSDERMAL DAILY
Status: DISCONTINUED | OUTPATIENT
Start: 2020-02-12 | End: 2020-02-11

## 2020-02-11 RX ORDER — ACETAMINOPHEN 325 MG/1
650 TABLET ORAL EVERY 4 HOURS PRN
Status: DISCONTINUED | OUTPATIENT
Start: 2020-02-11 | End: 2020-02-18 | Stop reason: HOSPADM

## 2020-02-11 RX ORDER — ONDANSETRON 2 MG/ML
4 INJECTION INTRAMUSCULAR; INTRAVENOUS EVERY 6 HOURS PRN
Status: DISCONTINUED | OUTPATIENT
Start: 2020-02-11 | End: 2020-02-18 | Stop reason: HOSPADM

## 2020-02-11 RX ORDER — ESCITALOPRAM OXALATE 20 MG/1
20 TABLET ORAL DAILY
Status: DISCONTINUED | OUTPATIENT
Start: 2020-02-12 | End: 2020-02-18 | Stop reason: HOSPADM

## 2020-02-11 RX ORDER — GABAPENTIN 100 MG/1
100 CAPSULE ORAL 2 TIMES DAILY
Status: ON HOLD | COMMUNITY
End: 2020-02-13 | Stop reason: SDUPTHER

## 2020-02-11 RX ORDER — ALBUTEROL SULFATE 90 UG/1
2 AEROSOL, METERED RESPIRATORY (INHALATION) EVERY 6 HOURS PRN
Status: DISCONTINUED | OUTPATIENT
Start: 2020-02-11 | End: 2020-02-18 | Stop reason: HOSPADM

## 2020-02-11 RX ORDER — DOXEPIN HYDROCHLORIDE 10 MG/1
10 CAPSULE ORAL NIGHTLY
Status: DISCONTINUED | OUTPATIENT
Start: 2020-02-11 | End: 2020-02-18 | Stop reason: HOSPADM

## 2020-02-11 RX ORDER — TRAMADOL HYDROCHLORIDE 50 MG/1
50 TABLET ORAL EVERY 6 HOURS PRN
Status: DISCONTINUED | OUTPATIENT
Start: 2020-02-11 | End: 2020-02-14

## 2020-02-11 RX ORDER — IPRATROPIUM BROMIDE AND ALBUTEROL SULFATE 2.5; .5 MG/3ML; MG/3ML
1 SOLUTION RESPIRATORY (INHALATION) ONCE
Status: COMPLETED | OUTPATIENT
Start: 2020-02-11 | End: 2020-02-11

## 2020-02-11 RX ORDER — BUDESONIDE AND FORMOTEROL FUMARATE DIHYDRATE 160; 4.5 UG/1; UG/1
2 AEROSOL RESPIRATORY (INHALATION) 2 TIMES DAILY
Status: DISCONTINUED | OUTPATIENT
Start: 2020-02-11 | End: 2020-02-11

## 2020-02-11 RX ORDER — HYDROXYZINE PAMOATE 25 MG/1
25 CAPSULE ORAL 4 TIMES DAILY PRN
Status: DISCONTINUED | OUTPATIENT
Start: 2020-02-11 | End: 2020-02-18 | Stop reason: HOSPADM

## 2020-02-11 RX ORDER — NICOTINE 21 MG/24HR
1 PATCH, TRANSDERMAL 24 HOURS TRANSDERMAL NIGHTLY
Status: DISCONTINUED | OUTPATIENT
Start: 2020-02-11 | End: 2020-02-18 | Stop reason: HOSPADM

## 2020-02-11 RX ORDER — METHYLPREDNISOLONE SODIUM SUCCINATE 125 MG/2ML
125 INJECTION, POWDER, LYOPHILIZED, FOR SOLUTION INTRAMUSCULAR; INTRAVENOUS ONCE
Status: COMPLETED | OUTPATIENT
Start: 2020-02-11 | End: 2020-02-11

## 2020-02-11 RX ORDER — POLYETHYLENE GLYCOL 3350 17 G/17G
17 POWDER, FOR SOLUTION ORAL DAILY PRN
Status: DISCONTINUED | OUTPATIENT
Start: 2020-02-11 | End: 2020-02-18 | Stop reason: HOSPADM

## 2020-02-11 RX ORDER — SODIUM CHLORIDE 0.9 % (FLUSH) 0.9 %
10 SYRINGE (ML) INJECTION EVERY 12 HOURS SCHEDULED
Status: DISCONTINUED | OUTPATIENT
Start: 2020-02-11 | End: 2020-02-18 | Stop reason: HOSPADM

## 2020-02-11 RX ORDER — DIPHENHYDRAMINE HCL 25 MG
25 TABLET ORAL ONCE
Status: COMPLETED | OUTPATIENT
Start: 2020-02-12 | End: 2020-02-12

## 2020-02-11 RX ORDER — IPRATROPIUM BROMIDE AND ALBUTEROL SULFATE 2.5; .5 MG/3ML; MG/3ML
1 SOLUTION RESPIRATORY (INHALATION)
Status: DISCONTINUED | OUTPATIENT
Start: 2020-02-11 | End: 2020-02-12

## 2020-02-11 RX ORDER — DIPHENHYDRAMINE HCL 25 MG
25 TABLET ORAL ONCE
Status: DISCONTINUED | OUTPATIENT
Start: 2020-02-11 | End: 2020-02-13

## 2020-02-11 RX ORDER — METHYLPREDNISOLONE SODIUM SUCCINATE 40 MG/ML
60 INJECTION, POWDER, LYOPHILIZED, FOR SOLUTION INTRAMUSCULAR; INTRAVENOUS DAILY
Status: DISCONTINUED | OUTPATIENT
Start: 2020-02-12 | End: 2020-02-12

## 2020-02-11 RX ORDER — METOPROLOL TARTRATE 50 MG/1
25 TABLET, FILM COATED ORAL ONCE
Status: COMPLETED | OUTPATIENT
Start: 2020-02-11 | End: 2020-02-11

## 2020-02-11 RX ORDER — GABAPENTIN 100 MG/1
100 CAPSULE ORAL 2 TIMES DAILY
Status: DISCONTINUED | OUTPATIENT
Start: 2020-02-11 | End: 2020-02-18 | Stop reason: HOSPADM

## 2020-02-11 RX ORDER — PANTOPRAZOLE SODIUM 40 MG/1
40 TABLET, DELAYED RELEASE ORAL 2 TIMES DAILY
Status: DISCONTINUED | OUTPATIENT
Start: 2020-02-11 | End: 2020-02-18 | Stop reason: HOSPADM

## 2020-02-11 RX ADMIN — APIXABAN 5 MG: 5 TABLET, FILM COATED ORAL at 20:18

## 2020-02-11 RX ADMIN — DOXEPIN HYDROCHLORIDE 10 MG: 10 CAPSULE ORAL at 20:18

## 2020-02-11 RX ADMIN — METHYLPREDNISOLONE SODIUM SUCCINATE 125 MG: 125 INJECTION, POWDER, FOR SOLUTION INTRAMUSCULAR; INTRAVENOUS at 10:30

## 2020-02-11 RX ADMIN — METOPROLOL TARTRATE 25 MG: 50 TABLET, FILM COATED ORAL at 18:12

## 2020-02-11 RX ADMIN — ONDANSETRON 4 MG: 2 INJECTION INTRAMUSCULAR; INTRAVENOUS at 18:49

## 2020-02-11 RX ADMIN — SODIUM CHLORIDE, PRESERVATIVE FREE 10 ML: 5 INJECTION INTRAVENOUS at 20:18

## 2020-02-11 RX ADMIN — IPRATROPIUM BROMIDE AND ALBUTEROL SULFATE 1 AMPULE: .5; 3 SOLUTION RESPIRATORY (INHALATION) at 20:31

## 2020-02-11 RX ADMIN — IPRATROPIUM BROMIDE AND ALBUTEROL SULFATE 1 AMPULE: .5; 3 SOLUTION RESPIRATORY (INHALATION) at 09:40

## 2020-02-11 RX ADMIN — ALBUTEROL SULFATE 2.5 MG: 2.5 SOLUTION RESPIRATORY (INHALATION) at 12:12

## 2020-02-11 RX ADMIN — TRAMADOL HYDROCHLORIDE 50 MG: 50 TABLET, FILM COATED ORAL at 20:07

## 2020-02-11 RX ADMIN — GABAPENTIN 100 MG: 100 CAPSULE ORAL at 19:13

## 2020-02-11 RX ADMIN — Medication 2 PUFF: at 20:31

## 2020-02-11 ASSESSMENT — PAIN DESCRIPTION - LOCATION
LOCATION: ABDOMEN
LOCATION: ABDOMEN
LOCATION: CHEST

## 2020-02-11 ASSESSMENT — ENCOUNTER SYMPTOMS
SINUS PRESSURE: 0
DIARRHEA: 0
PHOTOPHOBIA: 0
WHEEZING: 0
COUGH: 1
SORE THROAT: 0
CONSTIPATION: 0
CHEST TIGHTNESS: 1
EYE PAIN: 0
BLOOD IN STOOL: 0
SHORTNESS OF BREATH: 1
ABDOMINAL PAIN: 0
VOMITING: 0
TROUBLE SWALLOWING: 0
EYE DISCHARGE: 0
NAUSEA: 0
VOICE CHANGE: 0
COLOR CHANGE: 0

## 2020-02-11 ASSESSMENT — PAIN DESCRIPTION - DESCRIPTORS
DESCRIPTORS: SHARP
DESCRIPTORS: THROBBING

## 2020-02-11 ASSESSMENT — PAIN SCALES - GENERAL
PAINLEVEL_OUTOF10: 8
PAINLEVEL_OUTOF10: 9
PAINLEVEL_OUTOF10: 9
PAINLEVEL_OUTOF10: 7

## 2020-02-11 ASSESSMENT — PAIN DESCRIPTION - PROGRESSION: CLINICAL_PROGRESSION: GRADUALLY WORSENING

## 2020-02-11 ASSESSMENT — PAIN DESCRIPTION - FREQUENCY
FREQUENCY: INTERMITTENT
FREQUENCY: CONTINUOUS
FREQUENCY: CONTINUOUS

## 2020-02-11 ASSESSMENT — PAIN DESCRIPTION - ORIENTATION
ORIENTATION: OTHER (COMMENT)
ORIENTATION: LOWER;RIGHT;LEFT

## 2020-02-11 ASSESSMENT — PAIN - FUNCTIONAL ASSESSMENT: PAIN_FUNCTIONAL_ASSESSMENT: PREVENTS OR INTERFERES SOME ACTIVE ACTIVITIES AND ADLS

## 2020-02-11 ASSESSMENT — PAIN DESCRIPTION - PAIN TYPE
TYPE: ACUTE PAIN
TYPE: ACUTE PAIN

## 2020-02-11 ASSESSMENT — PAIN DESCRIPTION - ONSET
ONSET: ON-GOING
ONSET: PROGRESSIVE

## 2020-02-11 NOTE — FLOWSHEET NOTE
02/11/20 1240 02/11/20 1545   Vital Signs   BP (!) 181/100 (!) 192/109   Temp 98.1 °F (36.7 °C) 98.1 °F (36.7 °C)   Pulse 73 91   Resp 15 14   Post-Hemodialysis Assessment   Post-Treatment Procedures  --  Blood returned; Access bleeding time < 10 minutes   Machine Disinfection Process  --  Acid/Vinegar Clean;Heat Disinfect; Exterior Machine Disinfection   Rinseback Volume (ml)  --  400 ml   Total Liters Processed (l/min)  --  62.4 l/min   Dialyzer Clearance  --  Lightly streaked   Duration of Treatment (minutes)  --  165 minutes   Heparin amount administered during treatment (units)  --  0 units   Hemodialysis Output (ml)  --  3400 ml   NET Removed (ml)  --  4000 ml   Tolerated Treatment  --  Good   Treatment stopped 15min early due to patient request. 2.7L removed during dialysis. Sites held x10min each & dressings dry and intact. Report given to primary RN.  Treatment record printed for scanning
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
## 2020-02-11 NOTE — ED PROVIDER NOTES
adenopathy. Psychiatric/Behavioral: Negative for confusion. PAST MEDICAL HISTORY    has a past medical history of Anxiety disorder, Asthma, Chronic kidney disease, Chronic respiratory failure with hypoxia ([facility] [address] COPD (chronic obstructive pulmonary disease) ([facility] [address] Elevated troponin, Hemodialysis patient ([facility] [address] Hypertension, and Smoker. SURGICAL HISTORY      has a past surgical history that includes  section; other surgical history; Lithotripsy; cystourethroscopy (2003,2003); and Upper gastrointestinal endoscopy (Left, 2019). CURRENT MEDICATIONS       Previous Medications    ALBUTEROL SULFATE HFA (VENTOLIN HFA) 108 (90 BASE) MCG/ACT INHALER    Inhale 1 puff into the lungs every 6 hours as needed for Wheezing    ALUMINUM HYDROXIDE (ALTERNGEL) 320 MG/5ML SUSPENSION    5-10 ml 4 times daily as needed for stomach pain    APIXABAN (ELIQUIS) 5 MG TABS TABLET    Take 1 tablet by mouth 2 times daily    CALCIUM ACETATE (PHOSLO) 667 MG CAPSULE    Take 2,001 mg by mouth 3 times daily (with meals)    DOXEPIN (SINEQUAN) 10 MG CAPSULE    Take 10 mg by mouth nightly    ESCITALOPRAM (LEXAPRO) 20 MG TABLET    Take 20 mg by mouth daily    FLUTICASONE FUROATE-VILANTEROL (BREO ELLIPTA) 200-25 MCG/INH AEPB INHALER    Inhale 1 puff into the lungs daily    HYDROXYZINE (VISTARIL) 25 MG CAPSULE    Take 25 mg by mouth 4 times daily as needed for Anxiety    METOPROLOL TARTRATE (LOPRESSOR) 25 MG TABLET    Take 1 tablet by mouth 2 times daily    NICOTINE (NICODERM CQ) 21 MG/24HR    Place 1 patch onto the skin daily    PANTOPRAZOLE (PROTONIX) 40 MG TABLET    Take 1 tablet by mouth 2 times daily    SUCRALFATE (CARAFATE) 1 GM TABLET    Take 1 tablet by mouth 4 times daily       ALLERGIES     is allergic to codeine and morphine. FAMILY HISTORY     She indicated that the status of her sister is unknown.   family history includes Asthma in her sister.     SOCIAL HISTORY      reports that she has been smoking cigarettes. She has a 25.00 pack-year smoking history. She has quit using smokeless tobacco. She reports previous alcohol use. She reports previous drug use. PHYSICAL EXAM     INITIAL VITALS:  height is 5' 3\" (1.6 m) and weight is 160 lb (72.6 kg). Her temperature is 98.1 °F (36.7 °C). Her blood pressure is 181/100 (abnormal) and her pulse is 73. Her respiration is 15 and oxygen saturation is 100%. Physical Exam  Vitals signs and nursing note reviewed. Constitutional:       General: She is not in acute distress. Appearance: She is well-developed. She is not diaphoretic. HENT:      Head: Normocephalic and atraumatic. Eyes:      Conjunctiva/sclera: Conjunctivae normal.   Neck:      Musculoskeletal: Normal range of motion and neck supple. Thyroid: No thyromegaly. Cardiovascular:      Rate and Rhythm: Normal rate and regular rhythm. Heart sounds: Normal heart sounds. No murmur. Pulmonary:      Effort: Pulmonary effort is normal. No respiratory distress. Breath sounds: Wheezing present. No decreased breath sounds or rhonchi. Comments: Diffuse expiratory and inspiratory wheezes throughout. Chest:      Chest wall: No tenderness. Abdominal:      General: Bowel sounds are normal. There is no distension. Palpations: Abdomen is soft. Tenderness: There is no abdominal tenderness. Musculoskeletal: Normal range of motion. Skin:     General: Skin is warm and dry. Coloration: Skin is not pale. Findings: No erythema or rash. Neurological:      Mental Status: She is alert and oriented to person, place, and time. Psychiatric:         Behavior: Behavior normal.         Thought Content:  Thought content normal.         Judgment: Judgment normal.         DIFFERENTIAL DIAGNOSIS:   Pulmonary edema, COPD exacerbation, Pneumonia     DIAGNOSTIC RESULTS     EKG: All EKG's are interpreted by the Emergency Department Physician who either signs or Co-signs this chart in the absence of a cardiologist.    EKG Emergency (Preliminary result)    Component (Lab Inquiry)   Collection Time Result Time Ventricular Rate Atrial Rate P-R Interval QRS Duration Q-T Interval   02/11/20 09:18:07 02/11/20 12:50:55 78 78 134 82 440       Collection Time Result Time QTc Calculation (Bazett) P Axis R Axis T Axis   02/11/20 09:18:07 02/11/20 12:50:55 501 60 44 74         Preliminary result                Narrative:    Normal sinus rhythm  Possible Left atrial enlargement  Cannot rule out Inferior infarct , age undetermined  Prolonged QT interval or tu fusion, consider myocardial disease, electrolyte imbalance, or drug effects  Abnormal ECG  When compared with ECG of 28-JAN-2020 16:58,  Minimal criteria for Inferior infarct are now Present  T wave inversion no longer evident in Lateral leads  QT has lengthened                  RADIOLOGY: non-plain film images(s) such as CT, Ultrasound and MRI are read by the radiologist.    XR CHEST STANDARD (2 VW)   Final Result   1. Mild cardiomegaly. 2. Small right-sided pleural effusion. 3. Mild dependent bibasilar airspace opacities may represent mild atelectasis or pneumonia. **This report has been created using voice recognition software. It may contain minor errors which are inherent in voice recognition technology. **      Final report electronically signed by Dr. Liseth Beltran on 2/11/2020 9:50 AM          LABS:     Labs Reviewed   CBC WITH AUTO DIFFERENTIAL - Abnormal; Notable for the following components:       Result Value    RBC 3.76 (*)     Hemoglobin 11.0 (*)     MCV 99.2 (*)     MCHC 29.5 (*)     RDW-CV 16.6 (*)     RDW-SD 58.5 (*)     All other components within normal limits   BASIC METABOLIC PANEL - Abnormal; Notable for the following components:    Chloride 94 (*)     CO2 20 (*)     BUN 57 (*)     CREATININE 13.0 (*)     All other components within normal limits   TROPONIN - Abnormal; Notable for the following components:    Troponin T 0.099 (*)     All other components within normal limits   MAGNESIUM - Abnormal; Notable for the following components:    Magnesium 2.5 (*)     All other components within normal limits   BRAIN NATRIURETIC PEPTIDE - Abnormal; Notable for the following components:    Pro-BNP > 29067.0 (*)     All other components within normal limits   PROCALCITONIN - Abnormal; Notable for the following components:    Procalcitonin 0.33 (*)     All other components within normal limits   ANION GAP - Abnormal; Notable for the following components:    Anion Gap 21.0 (*)     All other components within normal limits   GLOMERULAR FILTRATION RATE, ESTIMATED - Abnormal; Notable for the following components:    Est, Glom Filt Rate 3 (*)     All other components within normal limits   RAPID INFLUENZA A/B ANTIGENS   CULTURE BLOOD #1   CULTURE BLOOD #2   LACTATE, SEPSIS   LACTATE, SEPSIS   OSMOLALITY   TROPONIN   TROPONIN       EMERGENCY DEPARTMENT COURSE:   Vitals:    Vitals:    02/11/20 0940 02/11/20 1038 02/11/20 1207 02/11/20 1240   BP:  (!) 185/109  (!) 181/100   Pulse:  73 70 73   Resp:    15   Temp:    98.1 °F (36.7 °C)   SpO2: 99% 100%     Weight:       Height:           9:23 AM: The patient was seen and evaluated. MDM:  Pt was still wheezing and SOB after 2 aerosols and solumedrol. Pt has elevated procalcitonin and troponin. CRITICAL CARE:   None     CONSULTS:  Marian BAJWA CNP     PROCEDURES:  None    FINAL IMPRESSION      1. COPD exacerbation (HCC)    2. Elevated troponin    3. Elevated procalcitonin          DISPOSITION/PLAN   Admit      PATIENTREFERRED TO:  No follow-up provider specified. DISCHARGE MEDICATIONS:  New Prescriptions    No medications on file       (Please note that portions of this note were completed with a voice recognition program.  Efforts were made to edit the dictations but occasionally words are mis-transcribed.)    The patient was given an opportunity to see the Emergency Attending.  The patient voiced understanding that I was a Mid-Level Provider and was inagreement with being seen independently by myself. Scribe:  Brandy Suh 2/11/20 9:23 AM Scribing for and in the presence of [employer], PA-C. Signed by: Gabriele Rivas, 02/11/20 3:10 PM    Provider:  I personally performed the services described in thedocumentation, reviewed and edited the documentation which was dictated to the scribe in my presence, and it accurately records my words and actions.     Yvan Landeros PA-C 2/11/20 3:10 PM        Jamar Brunner, [id number]  02/11/20 8260

## 2020-02-11 NOTE — CONSULTS
Nephrology Consult Note    Patient - Grazyna Hebert   MRN -  [id number]      - 1965   49 y.o. Date of Admission -  2020  9:13 AM        Date of evaluation -  2020 12:45 PM    Reason for Consult  Management of End Stage Renal Disease    Requesting Physician:  No att. providers found  History Information Obtained From: Nursing staff, Electronic medical records, Patient,    [address] [facility] / HPI:   The patient is a 49 y.o. female with past medical history of DM II, HTN, CAD, ESRD on HDM,W,F who presented with c/o of gradually worsening shortness of breath that began 1-2 days ago associated with cough and wheezing. Denies fever. Reports shortness of breath worse with activity. Last Hemodialysis 2020. Missed Monday treatment. BP up 185/. Active Problems:    COPD exacerbation ([facility] [address]  Resolved Problems:    * No resolved hospital problems. *       Past Medical History:      Diagnosis Date    Anxiety disorder     Asthma     Chronic kidney disease     Chronic respiratory failure with hypoxia (HCC)     COPD (chronic obstructive pulmonary disease) (HCC)     Elevated troponin     Hemodialysis patient ([address]     [facility]F in [facility]    Hypertension     Smoker        Past Surgical History:        Procedure Laterality Date     SECTION      CYSTOURETHROSCOPY  2003,2003    LITHOTRIPSY      03    OTHER SURGICAL HISTORY      lysis of adhesions    UPPER GASTROINTESTINAL ENDOSCOPY Left 2019    EGD BIOPSY performed by Danika Patel MD at [facility]       Family History:       Problem Relation Age of Onset    Asthma Sister        Allergies:  Codeine and Morphine    Social and Occupational History:    TOBACCO:   Social History     Tobacco Use   Smoking Status Current Every Day Smoker    Packs/day: 1.00    Years: 25.00    Pack years: 25.00    Types: Cigarettes   Smokeless Tobacco Former User     ETOH:   reports previous alcohol use. reports previous drug use.     Home Fluid overload,  Missed Hemodialysis treatment. Hemodialysis today and repeat in AM, Give Benadryl 25 mg PO just prior to Hemodialysis. Functioning AV fistula, Planning Possible removal of tunneled Hemodialysis cath as AV fistula functioning well. Pt seen during hemodialysis, Hemodynamically stable, 3 K bath. Goal Ultrafiltration 3 L.  2. Acute on chronic HFpEF 55%  3. Paroxymal atrial fibrillation   4. Essential Hypertension with nephrosclerosis, expect improvement with Hemodialysis and Ultrafiltration   5. Chronic Obstructive Pulmonary Disease, Asthma. Tobacco use  6. Chronic resp failure on home O2  7. Anemia of chronic disease, consider GI loss, Iron deficiency. Meds and labs reviewed  Active Problems:    COPD exacerbation ([facility] [address]  Resolved Problems:    * No resolved hospital problems. *    Discussed with Dr. Surjit Aburto. Patient was advised about impression and plan. Patient verbalizes understanding and agrees with plan of care.      NARINDER Crespo - CNP 2/11/2020 12:45 PM

## 2020-02-11 NOTE — ED NOTES
Reassessment of the patients Shortness of Breath   has moderately improved, the patients pain reassessment is a 3/10, Side rails up times 2, call light in reach, will continue to monitor.      Jamar Guzman RN  02/11/20 1038

## 2020-02-11 NOTE — ED NOTES
ED to inpatient nurses report    Chief Complaint   Patient presents with    Shortness of Breath      Present to ED from home  LOC: alert and orientated to name, place, date  Vital signs   Vitals:    02/11/20 0916 02/11/20 0940 02/11/20 1038 02/11/20 1207   BP:   (!) 185/109    Pulse: 78  73 70   Resp: 24      Temp: 98 °F (36.7 °C)      SpO2: 97% 99% 100%    Weight: 160 lb (72.6 kg)      Height: 5' 3\" (1.6 m)         Oxygen Baseline 91%RA    Current needs required *none   LDAs:   Peripheral IV 02/11/20 Right (Active)   Site Assessment Clean;Dry; Intact 2/11/2020 10:30 AM   Line Status Blood return noted; Flushed;Normal saline locked;Specimen collected 2/11/2020 10:30 AM   Dressing Status Intact; Clean;Dry 2/11/2020 10:30 AM     Mobility: Requires assistance * 1  Pending ED orders: none  Present condition: stable    Electronically signed by Scot Kwok RN on 2/11/2020 at 12:43 PM     Scot Kwok RN  02/11/20 [id number]

## 2020-02-12 LAB
ANION GAP SERPL CALCULATED.3IONS-SCNC: 18 MEQ/L (ref 8–16)
BUN BLDV-MCNC: 37 MG/DL (ref 7–22)
CALCIUM SERPL-MCNC: 8.6 MG/DL (ref 8.5–10.5)
CHLORIDE BLD-SCNC: 96 MEQ/L (ref 98–111)
CO2: 24 MEQ/L (ref 23–33)
CREAT SERPL-MCNC: 8.2 MG/DL (ref 0.4–1.2)
ERYTHROCYTE [DISTWIDTH] IN BLOOD BY AUTOMATED COUNT: 16.7 % (ref 11.5–14.5)
ERYTHROCYTE [DISTWIDTH] IN BLOOD BY AUTOMATED COUNT: 57.6 FL (ref 35–45)
GFR SERPL CREATININE-BSD FRML MDRD: 5 ML/MIN/1.73M2
GLUCOSE BLD-MCNC: 94 MG/DL (ref 70–108)
HCT VFR BLD CALC: 35.6 % (ref 37–47)
HEMOGLOBIN: 10.5 GM/DL (ref 12–16)
LACTIC ACID: 2 MMOL/L (ref 0.5–2.2)
MCH RBC QN AUTO: 29.3 PG (ref 26–33)
MCHC RBC AUTO-ENTMCNC: 29.5 GM/DL (ref 32.2–35.5)
MCV RBC AUTO: 99.4 FL (ref 81–99)
PLATELET # BLD: 273 THOU/MM3 (ref 130–400)
PMV BLD AUTO: 9.8 FL (ref 9.4–12.4)
POTASSIUM REFLEX MAGNESIUM: 4.9 MEQ/L (ref 3.5–5.2)
RBC # BLD: 3.58 MILL/MM3 (ref 4.2–5.4)
SODIUM BLD-SCNC: 138 MEQ/L (ref 135–145)
TROPONIN T: 0.06 NG/ML
TROPONIN T: 0.07 NG/ML
WBC # BLD: 6.4 THOU/MM3 (ref 4.8–10.8)

## 2020-02-12 PROCEDURE — 85027 COMPLETE CBC AUTOMATED: CPT

## 2020-02-12 PROCEDURE — 99232 SBSQ HOSP IP/OBS MODERATE 35: CPT | Performed by: INTERNAL MEDICINE

## 2020-02-12 PROCEDURE — 84484 ASSAY OF TROPONIN QUANT: CPT

## 2020-02-12 PROCEDURE — 94760 N-INVAS EAR/PLS OXIMETRY 1: CPT

## 2020-02-12 PROCEDURE — 99233 SBSQ HOSP IP/OBS HIGH 50: CPT | Performed by: PHYSICIAN ASSISTANT

## 2020-02-12 PROCEDURE — 6370000000 HC RX 637 (ALT 250 FOR IP): Performed by: INTERNAL MEDICINE

## 2020-02-12 PROCEDURE — 2700000000 HC OXYGEN THERAPY PER DAY

## 2020-02-12 PROCEDURE — 94640 AIRWAY INHALATION TREATMENT: CPT

## 2020-02-12 PROCEDURE — 83605 ASSAY OF LACTIC ACID: CPT

## 2020-02-12 PROCEDURE — 36415 COLL VENOUS BLD VENIPUNCTURE: CPT

## 2020-02-12 PROCEDURE — 6370000000 HC RX 637 (ALT 250 FOR IP): Performed by: NURSE PRACTITIONER

## 2020-02-12 PROCEDURE — 2580000003 HC RX 258: Performed by: STUDENT IN AN ORGANIZED HEALTH CARE EDUCATION/TRAINING PROGRAM

## 2020-02-12 PROCEDURE — 90935 HEMODIALYSIS ONE EVALUATION: CPT

## 2020-02-12 PROCEDURE — 80048 BASIC METABOLIC PNL TOTAL CA: CPT

## 2020-02-12 PROCEDURE — 1200000003 HC TELEMETRY R&B

## 2020-02-12 PROCEDURE — 90935 HEMODIALYSIS ONE EVALUATION: CPT | Performed by: NURSE PRACTITIONER

## 2020-02-12 PROCEDURE — 6370000000 HC RX 637 (ALT 250 FOR IP): Performed by: STUDENT IN AN ORGANIZED HEALTH CARE EDUCATION/TRAINING PROGRAM

## 2020-02-12 PROCEDURE — 97165 OT EVAL LOW COMPLEX 30 MIN: CPT

## 2020-02-12 PROCEDURE — 6360000002 HC RX W HCPCS: Performed by: STUDENT IN AN ORGANIZED HEALTH CARE EDUCATION/TRAINING PROGRAM

## 2020-02-12 RX ORDER — IPRATROPIUM BROMIDE AND ALBUTEROL SULFATE 2.5; .5 MG/3ML; MG/3ML
1 SOLUTION RESPIRATORY (INHALATION) EVERY 4 HOURS PRN
Status: DISCONTINUED | OUTPATIENT
Start: 2020-02-12 | End: 2020-02-14

## 2020-02-12 RX ADMIN — TRAMADOL HYDROCHLORIDE 50 MG: 50 TABLET, FILM COATED ORAL at 19:11

## 2020-02-12 RX ADMIN — METHYLPREDNISOLONE SODIUM SUCCINATE 60 MG: 40 INJECTION, POWDER, FOR SOLUTION INTRAMUSCULAR; INTRAVENOUS at 18:37

## 2020-02-12 RX ADMIN — PANTOPRAZOLE SODIUM 40 MG: 40 TABLET, DELAYED RELEASE ORAL at 20:38

## 2020-02-12 RX ADMIN — IPRATROPIUM BROMIDE AND ALBUTEROL SULFATE 1 AMPULE: .5; 3 SOLUTION RESPIRATORY (INHALATION) at 12:02

## 2020-02-12 RX ADMIN — ESCITALOPRAM 20 MG: 20 TABLET, FILM COATED ORAL at 09:30

## 2020-02-12 RX ADMIN — IPRATROPIUM BROMIDE AND ALBUTEROL SULFATE 1 AMPULE: .5; 3 SOLUTION RESPIRATORY (INHALATION) at 07:59

## 2020-02-12 RX ADMIN — METOPROLOL TARTRATE 25 MG: 25 TABLET, FILM COATED ORAL at 09:28

## 2020-02-12 RX ADMIN — APIXABAN 5 MG: 5 TABLET, FILM COATED ORAL at 09:31

## 2020-02-12 RX ADMIN — AZITHROMYCIN MONOHYDRATE 500 MG: 500 INJECTION, POWDER, LYOPHILIZED, FOR SOLUTION INTRAVENOUS at 18:37

## 2020-02-12 RX ADMIN — GABAPENTIN 100 MG: 100 CAPSULE ORAL at 09:27

## 2020-02-12 RX ADMIN — TRAMADOL HYDROCHLORIDE 50 MG: 50 TABLET, FILM COATED ORAL at 10:04

## 2020-02-12 RX ADMIN — APIXABAN 5 MG: 5 TABLET, FILM COATED ORAL at 20:38

## 2020-02-12 RX ADMIN — SODIUM CHLORIDE, PRESERVATIVE FREE 10 ML: 5 INJECTION INTRAVENOUS at 20:39

## 2020-02-12 RX ADMIN — DOXEPIN HYDROCHLORIDE 10 MG: 10 CAPSULE ORAL at 20:38

## 2020-02-12 RX ADMIN — Medication 2 PUFF: at 07:59

## 2020-02-12 RX ADMIN — IPRATROPIUM BROMIDE AND ALBUTEROL SULFATE 1 AMPULE: .5; 3 SOLUTION RESPIRATORY (INHALATION) at 17:18

## 2020-02-12 RX ADMIN — GABAPENTIN 100 MG: 100 CAPSULE ORAL at 20:38

## 2020-02-12 RX ADMIN — SODIUM CHLORIDE, PRESERVATIVE FREE 10 ML: 5 INJECTION INTRAVENOUS at 09:25

## 2020-02-12 RX ADMIN — DIPHENHYDRAMINE HCL 25 MG: 25 TABLET ORAL at 10:32

## 2020-02-12 RX ADMIN — Medication 2 PUFF: at 21:16

## 2020-02-12 ASSESSMENT — PAIN DESCRIPTION - PROGRESSION
CLINICAL_PROGRESSION: GRADUALLY WORSENING
CLINICAL_PROGRESSION: GRADUALLY IMPROVING
CLINICAL_PROGRESSION: GRADUALLY WORSENING

## 2020-02-12 ASSESSMENT — PAIN DESCRIPTION - LOCATION
LOCATION: ABDOMEN

## 2020-02-12 ASSESSMENT — PAIN SCALES - GENERAL
PAINLEVEL_OUTOF10: 8
PAINLEVEL_OUTOF10: 7
PAINLEVEL_OUTOF10: 7
PAINLEVEL_OUTOF10: 6
PAINLEVEL_OUTOF10: 8
PAINLEVEL_OUTOF10: 8
PAINLEVEL_OUTOF10: 9

## 2020-02-12 ASSESSMENT — PAIN DESCRIPTION - ORIENTATION
ORIENTATION: OTHER (COMMENT)
ORIENTATION: LOWER
ORIENTATION: LOWER

## 2020-02-12 ASSESSMENT — PAIN DESCRIPTION - FREQUENCY
FREQUENCY: INTERMITTENT
FREQUENCY: CONTINUOUS

## 2020-02-12 ASSESSMENT — PAIN DESCRIPTION - ONSET
ONSET: ON-GOING
ONSET: ON-GOING

## 2020-02-12 ASSESSMENT — PAIN DESCRIPTION - DESCRIPTORS
DESCRIPTORS: ACHING;SHARP
DESCRIPTORS: SHARP
DESCRIPTORS: SHARP

## 2020-02-12 ASSESSMENT — PAIN DESCRIPTION - PAIN TYPE
TYPE: ACUTE PAIN
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
## 2020-02-12 NOTE — PROGRESS NOTES
Nephrology Progress Note    Patient - Mitchell Seo   MRN -  [id number]   Acct # - [de-identified]      - 1965    49 y.o. Admit Date: 2020  Hospital Day: 1  Location: --A  Date of evaluation -  2020    Subjective:   CC: shortness of breath   Denies shortness of breath on 2 lpm  Hemodialysis  with 3 L Ultrafiltration   Feels much better  BP Range: Systolic (66QRT), YTR:404 , Min:103 , PSZ:099      Diastolic (09GPA), YEJ:81, Min:57, Max:109    Objective:   VITALS:  /72   Pulse 71   Temp 97.1 °F (36.2 °C) (Oral)   Resp 17   Ht 5' 3\" (1.6 m)   Wt 168 lb 4.8 oz (76.3 kg)   SpO2 96%   BMI 29.81 kg/m²    Patient Vitals for the past 24 hrs:   BP Temp Temp src Pulse Resp SpO2 Height Weight   20 0815 114/72 97.1 °F (36.2 °C) Oral 71 17 96 % -- --   20 0802 -- -- -- -- -- 99 % -- --   20 0318 113/63 98.2 °F (36.8 °C) Oral 75 16 95 % -- 168 lb 4.8 oz (76.3 kg)   20 2318 (!) 110/57 98 °F (36.7 °C) Axillary 72 18 93 % -- --   20 106/63 -- -- 72 -- -- -- --   20 -- -- -- -- 20 98 % -- --   20 108/69 -- -- 69 -- -- -- --   20 103/62 98 °F (36.7 °C) Oral 72 20 98 % -- --   20 1715 (!) 143/80 98.1 °F (36.7 °C) Oral -- 18 94 % 5' 3\" (1.6 m) 168 lb 4.8 oz (76.3 kg)   20 1545 (!) 192/109 98.1 °F (36.7 °C) -- 91 14 -- -- --   20 1240 (!) 181/100 98.1 °F (36.7 °C) -- 73 15 -- -- --   20 1207 -- -- -- 70 -- -- -- --   20 1038 (!) 185/109 -- -- 73 -- 100 % -- --     2 L/min      Intake/Output Summary (Last 24 hours) at 2020 1011  Last data filed at 2020 2741  Gross per 24 hour   Intake 1456.67 ml   Output 3400 ml   Net -1943.33 ml       Admission weight: 160 lb (72.6 kg)  Patient Vitals for the past 96 hrs (Last 3 readings):   Weight   20 0318 168 lb 4.8 oz (76.3 kg)   20 1715 168 lb 4.8 oz (76.3 kg)   20 0916 160 lb (72.6 kg)       EXAM:  CONSTITUTIONAL:  No acute distress. Pleasant  HEENT:  Head is normocephalic, Extraocular movement intact. Neck is supple. Voice is clear. CARDIOVASCULAR:  S1, S2  regular rate and rhythm. RESPIRATORY: Clear to ausculation bilaterally. Equal breath sounds. Exp wheezes. No shortness of breath noted at rest.  ABDOMEN: soft, non tender  NEUROLOGICAL: Patient is alert and oriented to person, place, and time. Recent and remote memory intact. Thought is coherant. SKIN: no rash, No significant bruises on exposed surfaces  MUSCULOSKELETAL: Movement is coordinated. Moves all extremities   EXTREMITIES: Distal lower extremity temp is warm, No lower extremity edema. Upper extremity AV Fistula   PSYCHIATRIC: mood and affect appropriate. Medications:   Med reviewed  Scheduled Meds:   apixaban  5 mg Oral BID    doxepin  10 mg Oral Nightly    escitalopram  20 mg Oral Daily    metoprolol tartrate  25 mg Oral BID    pantoprazole  40 mg Oral BID    sodium chloride flush  10 mL Intravenous 2 times per day    azithromycin  500 mg Intravenous Q24H    methylPREDNISolone  60 mg Intravenous Daily    ipratropium-albuterol  1 ampule Inhalation Q4H WA    diphenhydrAMINE  25 mg Oral Once    diphenhydrAMINE  25 mg Oral Once    mometasone-formoterol  2 puff Inhalation BID    gabapentin  100 mg Oral BID    nicotine  1 patch Transdermal Nightly     Continuous Infusions:  PRN Meds hydrOXYzine, sodium chloride flush, ondansetron, polyethylene glycol, acetaminophen, albuterol sulfate HFA, traMADol   Labs:   Labs reviewed  Recent Labs     02/11/20  0956 02/12/20  0552    138   K 5.2 4.9   CL 94* 96*   CO2 20* 24   BUN 57* 37*   CREATININE 13.0* 8.2*   LABGLOM 3* 5*   GLUCOSE 89 94   MG 2.5*  --    CALCIUM 8.7 8.6     Recent Labs     02/11/20  0956 02/12/20  0552   WBC 8.4 6.4   RBC 3.76* 3.58*   HGB 11.0* 10.5*   HCT 37.3 35.6*   MCV 99.2* 99.4*   MCH 29.3 29.3    273     Xr Chest Standard (2 Vw)  Result Date: 2/11/2020  1. Mild cardiomegaly.  2. Small right-sided pleural effusion. 3. Mild dependent bibasilar airspace opacities may represent mild atelectasis or pneumonia. ASSESSMENT:  1. End Stage Renal Disease on Hemodialysis, Fluid overload,  Missed Hemodialysis treatment. Repeat Hemodialysis today. Functioning AV fistula, Will remove tunneled Hemodialysis cath as AV fistula functioning well. Discussed with Dr Radhika Caro  2. Acute on chronic HFpEF 55%  3. Paroxymal atrial fibrillation   4. Essential Hypertension with nephrosclerosis, expect improvement with Hemodialysis and Ultrafiltration   5. Chronic Obstructive Pulmonary Disease, Asthma. Tobacco use  6. Chronic resp failure on home O2  7. Anemia of chronic disease, consider GI loss, Iron deficiency. Active Problems:    COPD exacerbation ([address]  Resolved Problems:    * No resolved hospital problems. NARINDER Desouza CNP 10:11 AM 2/12/2020     Pt seen during hemodialysis, Hemodynamically stable  3 K bath, goal Ultrafiltration 1600 ml  Ok for discharge from renal aspect.    FU at Out Pt dialysis Unit per Out Pt schedule    NARINDER Acuna CNP 02/12/20 12:59 PM

## 2020-02-12 NOTE — PLAN OF CARE
SW consult received. SW assessment completed, see note dated 2/12/2020. Shelter List provided. [facility] for transport.

## 2020-02-12 NOTE — FLOWSHEET NOTE
[facility]  PHYSICAL THERAPY MISSED TREATMENT NOTE  ACUTE CARE  STRZ RENAL TELEMETRY 6K              Missed Treatment  Per OT's eval, pt is independent with all mobility and gait . Will defer PT eval.Pt denies any needs. See dictated H&P for details.    22yo  at 38-3wks GA by first-trimester ultrasound, presents with contractions for the past 3hours. Prenatal course; O+Ab-, GBS unknown. Good compliance with initial PNC, but has not been in for OB appts for the past 6weeks. GDM diagnosed in March, initial MNT but also hasn't been checking her glucose for the past 6 weeks. Admits to THC during pregnancy, and methamphetamine, last use last week. UDS positive for methamphetamine and THC.   H/o depression/BRIAN and was restarted on Sertraline during pregnancy, but stopped taking the medication.     Presents with BP's ranging from 150-190/110-120's, and complaints of severe headache 8/10 for the past 2 days. Denies diplopia, with mild right abdominal pain.     SCE 1/10/-4. FHT category 1.     CBC/CMP normal, with 300mg protein in spot urine check, and uric acid 7.1. HbA1c 5.2.    A/P; 22yo  at 38-3weeks GA with severe pre-eclampsia.  OB Dr. Turpin consulted. Magnesium sulfate 4g load with maintenance dosing, Nifedipine prn BP's >160/or 110, and active management with pitocin for induction. 4Hr CMP/CBC ordered monitoring, with strict I/O's and routine seizure precautions.

## 2020-02-12 NOTE — PROGRESS NOTES
No shortness of breath. Breathing symmetrical across the chest. 18 respirations per minute. Pulse ox 98 on 2 liters of oxygen nasal canula. Capillary refill less then 3 seconds.

## 2020-02-12 NOTE — CARE COORDINATION
20, 7:11 AM  DISCHARGE PLANNING EVALUATION:    Roosevelt Garsia       Admitted from: ED  2020/ [facility] day: 1   Location: --A Reason for admit: COPD exacerbation ([facility] [address] [J44.1] Status: IP  Admit order signed?: yes  PMH:  has a past medical history of Anxiety disorder, Asthma, Chronic kidney disease, Chronic respiratory failure with hypoxia ([facility] [address] COPD (chronic obstructive pulmonary disease) ([facility] [address] Elevated troponin, Hemodialysis patient ([facility] [address] Hypertension, and Smoker. Procedure: na  Pertinent abnormal Imagin2020  CXR  1. Mild cardiomegaly. 2. Small right-sided pleural effusion. 3. Mild dependent bibasilar airspace opacities may represent mild atelectasis or pneumonia. Medications:  Scheduled Meds:   apixaban  5 mg Oral BID    doxepin  10 mg Oral Nightly    escitalopram  20 mg Oral Daily    metoprolol tartrate  25 mg Oral BID    pantoprazole  40 mg Oral BID    sodium chloride flush  10 mL Intravenous 2 times per day    azithromycin  500 mg Intravenous Q24H    methylPREDNISolone  60 mg Intravenous Daily    ipratropium-albuterol  1 ampule Inhalation Q4H WA    diphenhydrAMINE  25 mg Oral Once    diphenhydrAMINE  25 mg Oral Once    mometasone-formoterol  2 puff Inhalation BID    gabapentin  100 mg Oral BID    nicotine  1 patch Transdermal Nightly     Continuous Infusions:   Pertinent Info/Orders/Treatment Plan:  Telemetry hx Atrial fib, IV Azithromycin, IV steroids, med nebs, AV fistula with checks, HD as ordered, Nicotine patch      Diet: DIET RENAL; No Added Salt (3-4 GM); Daily Fluid Restriction: 1500 ml   Smoking status:  reports that she has been smoking cigarettes. She has a 25.00 pack-year smoking history. She has quit using smokeless tobacco.   PCP: No primary care provider on file.   Readmission 30 days or less: no  Readmission Risk Score: 37%    Discharge Planning Evaluation  Current Residence:  Private Residence  Living Arrangements:  Spouse/Significant Other Support Systems:  Spouse/Significant Other  Current Services PTA:     Potential Assistance Needed:  N/A  Potential Assistance Purchasing Medications:  No  Does patient want to participate in local refill/ meds to beds program?  Yes  Type of Home Care Services:  None  Patient expects to be discharged to:  Home  Expected Discharge date:  02/14/20  Follow Up Appointment: Best Day/ Time: Tuesday PM    Patient Goals/Plan/Treatment Preferences: Spoke with Sarina Hilliard; she is homeless and is working with Lauren Olson and Sherrell Borwn is assisting with area shelters. She is established HD patient on M-W-F  at [facility] on [address] and her chair time is 12:30 pm. She has no current PCP; copy of accepting physicians given to patient and she will decide at later time. Transportation/Food Security/Housekeeping Addressed:  No issues identified.  Evaluation: yes        2/12/20, 11:23 AM    Patient goals/plan/ treatment preferences discussed by  and . Patient goals/plan/ treatment preferences reviewed with patient/ family. Patient/ family verbalize understanding of discharge plan and are in agreement with goal/plan/treatment preferences. Understanding was demonstrated using the teach back method. AVS provided by RN at time of discharge, which includes all necessary medical information pertaining to the patients current course of illness, treatment, post-discharge goals of care, and treatment preferences.
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
## 2020-02-13 LAB
ALLEN TEST: POSITIVE
ANION GAP SERPL CALCULATED.3IONS-SCNC: 18 MEQ/L (ref 8–16)
BASE EXCESS (CALCULATED): 0.9 MMOL/L (ref -2.5–2.5)
BUN BLDV-MCNC: 27 MG/DL (ref 7–22)
CALCIUM SERPL-MCNC: 8.7 MG/DL (ref 8.5–10.5)
CHLORIDE BLD-SCNC: 93 MEQ/L (ref 98–111)
CO2: 24 MEQ/L (ref 23–33)
COLLECTED BY:: ABNORMAL
CREAT SERPL-MCNC: 5.9 MG/DL (ref 0.4–1.2)
DEVICE: ABNORMAL
ERYTHROCYTE [DISTWIDTH] IN BLOOD BY AUTOMATED COUNT: 16.8 % (ref 11.5–14.5)
ERYTHROCYTE [DISTWIDTH] IN BLOOD BY AUTOMATED COUNT: 59.4 FL (ref 35–45)
GFR SERPL CREATININE-BSD FRML MDRD: 7 ML/MIN/1.73M2
GLUCOSE BLD-MCNC: 98 MG/DL (ref 70–108)
HCO3: 25 MMOL/L (ref 23–28)
HCT VFR BLD CALC: 32.9 % (ref 37–47)
HEMOGLOBIN: 9.6 GM/DL (ref 12–16)
IFIO2: 2
MCH RBC QN AUTO: 29.8 PG (ref 26–33)
MCHC RBC AUTO-ENTMCNC: 29.2 GM/DL (ref 32.2–35.5)
MCV RBC AUTO: 102.2 FL (ref 81–99)
O2 SATURATION: 88 %
PCO2: 38 MMHG (ref 35–45)
PH BLOOD GAS: 7.43 (ref 7.35–7.45)
PLATELET # BLD: 233 THOU/MM3 (ref 130–400)
PMV BLD AUTO: 9.9 FL (ref 9.4–12.4)
PO2: 52 MMHG (ref 71–104)
POTASSIUM SERPL-SCNC: 5.1 MEQ/L (ref 3.5–5.2)
RBC # BLD: 3.22 MILL/MM3 (ref 4.2–5.4)
REASON FOR REJECTION: NORMAL
REJECTED TEST: NORMAL
SODIUM BLD-SCNC: 135 MEQ/L (ref 135–145)
SOURCE, BLOOD GAS: ABNORMAL
WBC # BLD: 10.6 THOU/MM3 (ref 4.8–10.8)

## 2020-02-13 PROCEDURE — 2700000000 HC OXYGEN THERAPY PER DAY

## 2020-02-13 PROCEDURE — 2580000003 HC RX 258: Performed by: STUDENT IN AN ORGANIZED HEALTH CARE EDUCATION/TRAINING PROGRAM

## 2020-02-13 PROCEDURE — 80048 BASIC METABOLIC PNL TOTAL CA: CPT

## 2020-02-13 PROCEDURE — 82803 BLOOD GASES ANY COMBINATION: CPT

## 2020-02-13 PROCEDURE — 94760 N-INVAS EAR/PLS OXIMETRY 1: CPT

## 2020-02-13 PROCEDURE — 85027 COMPLETE CBC AUTOMATED: CPT

## 2020-02-13 PROCEDURE — 99232 SBSQ HOSP IP/OBS MODERATE 35: CPT | Performed by: NURSE PRACTITIONER

## 2020-02-13 PROCEDURE — 36600 WITHDRAWAL OF ARTERIAL BLOOD: CPT

## 2020-02-13 PROCEDURE — 6360000002 HC RX W HCPCS: Performed by: STUDENT IN AN ORGANIZED HEALTH CARE EDUCATION/TRAINING PROGRAM

## 2020-02-13 PROCEDURE — 6370000000 HC RX 637 (ALT 250 FOR IP): Performed by: STUDENT IN AN ORGANIZED HEALTH CARE EDUCATION/TRAINING PROGRAM

## 2020-02-13 PROCEDURE — 99233 SBSQ HOSP IP/OBS HIGH 50: CPT | Performed by: PHYSICIAN ASSISTANT

## 2020-02-13 PROCEDURE — 6370000000 HC RX 637 (ALT 250 FOR IP): Performed by: INTERNAL MEDICINE

## 2020-02-13 PROCEDURE — 1200000003 HC TELEMETRY R&B

## 2020-02-13 PROCEDURE — 36415 COLL VENOUS BLD VENIPUNCTURE: CPT

## 2020-02-13 PROCEDURE — 94640 AIRWAY INHALATION TREATMENT: CPT

## 2020-02-13 RX ORDER — NICOTINE 21 MG/24HR
1 PATCH, TRANSDERMAL 24 HOURS TRANSDERMAL DAILY
Qty: 30 PATCH | Refills: 3 | Status: ON HOLD | OUTPATIENT
Start: 2020-02-13 | End: 2021-01-01

## 2020-02-13 RX ORDER — DIPHENHYDRAMINE HCL 25 MG
25 TABLET ORAL ONCE
Status: DISCONTINUED | OUTPATIENT
Start: 2020-02-14 | End: 2020-02-17

## 2020-02-13 RX ORDER — ALBUTEROL SULFATE 90 UG/1
1 AEROSOL, METERED RESPIRATORY (INHALATION) EVERY 6 HOURS PRN
Qty: 1 INHALER | Refills: 3 | Status: ON HOLD | OUTPATIENT
Start: 2020-02-13 | End: 2021-01-01

## 2020-02-13 RX ORDER — ESCITALOPRAM OXALATE 20 MG/1
20 TABLET ORAL DAILY
Qty: 30 TABLET | Refills: 1 | Status: SHIPPED | OUTPATIENT
Start: 2020-02-13 | End: 2021-01-01 | Stop reason: ALTCHOICE

## 2020-02-13 RX ORDER — FLUTICASONE FUROATE AND VILANTEROL 200; 25 UG/1; UG/1
1 POWDER RESPIRATORY (INHALATION) DAILY
Qty: 1 EACH | Refills: 1 | Status: ON HOLD | OUTPATIENT
Start: 2020-02-13 | End: 2021-01-01

## 2020-02-13 RX ORDER — GABAPENTIN 100 MG/1
100 CAPSULE ORAL 2 TIMES DAILY
Qty: 60 CAPSULE | Refills: 0 | Status: ON HOLD | OUTPATIENT
Start: 2020-02-13 | End: 2021-01-01

## 2020-02-13 RX ORDER — PANTOPRAZOLE SODIUM 40 MG/1
40 TABLET, DELAYED RELEASE ORAL 2 TIMES DAILY
Qty: 90 TABLET | Refills: 1 | Status: ON HOLD | OUTPATIENT
Start: 2020-02-13 | End: 2021-01-01

## 2020-02-13 RX ORDER — DOXEPIN HYDROCHLORIDE 10 MG/1
10 CAPSULE ORAL NIGHTLY
Qty: 30 CAPSULE | Refills: 1 | Status: SHIPPED | OUTPATIENT
Start: 2020-02-13 | End: 2021-01-01 | Stop reason: ALTCHOICE

## 2020-02-13 RX ORDER — HYDROXYZINE PAMOATE 25 MG/1
25 CAPSULE ORAL 4 TIMES DAILY PRN
Qty: 30 CAPSULE | Refills: 0 | Status: ON HOLD | OUTPATIENT
Start: 2020-02-13 | End: 2021-01-01

## 2020-02-13 RX ADMIN — GABAPENTIN 100 MG: 100 CAPSULE ORAL at 19:32

## 2020-02-13 RX ADMIN — SODIUM CHLORIDE, PRESERVATIVE FREE 10 ML: 5 INJECTION INTRAVENOUS at 08:13

## 2020-02-13 RX ADMIN — METOPROLOL TARTRATE 25 MG: 25 TABLET, FILM COATED ORAL at 08:10

## 2020-02-13 RX ADMIN — METOPROLOL TARTRATE 25 MG: 25 TABLET, FILM COATED ORAL at 19:32

## 2020-02-13 RX ADMIN — ONDANSETRON 4 MG: 2 INJECTION INTRAMUSCULAR; INTRAVENOUS at 19:32

## 2020-02-13 RX ADMIN — Medication 2 PUFF: at 17:40

## 2020-02-13 RX ADMIN — DOXEPIN HYDROCHLORIDE 10 MG: 10 CAPSULE ORAL at 19:32

## 2020-02-13 RX ADMIN — APIXABAN 5 MG: 5 TABLET, FILM COATED ORAL at 08:09

## 2020-02-13 RX ADMIN — ESCITALOPRAM 20 MG: 20 TABLET, FILM COATED ORAL at 08:10

## 2020-02-13 RX ADMIN — PANTOPRAZOLE SODIUM 40 MG: 40 TABLET, DELAYED RELEASE ORAL at 19:32

## 2020-02-13 RX ADMIN — PANTOPRAZOLE SODIUM 40 MG: 40 TABLET, DELAYED RELEASE ORAL at 08:12

## 2020-02-13 RX ADMIN — SODIUM CHLORIDE, PRESERVATIVE FREE 10 ML: 5 INJECTION INTRAVENOUS at 19:32

## 2020-02-13 RX ADMIN — APIXABAN 5 MG: 5 TABLET, FILM COATED ORAL at 19:32

## 2020-02-13 RX ADMIN — GABAPENTIN 100 MG: 100 CAPSULE ORAL at 08:10

## 2020-02-13 RX ADMIN — Medication 2 PUFF: at 09:13

## 2020-02-13 ASSESSMENT — PAIN SCALES - GENERAL: PAINLEVEL_OUTOF10: 0

## 2020-02-13 NOTE — PROGRESS NOTES
CLINICAL PHARMACY: DISCHARGE MED RECONCILIATION/REVIEW    [facility]) Select Patient?: No  Total # of Interventions Recommended: 1 - New Order #: 1   -   Total # Interventions Accepted: 1  Intervention Severity:   - Level 1 Intervention Present?: No   - Level 2 #: 1   - Level 3 #: 0   Time Spent (min): 30    Naty Turcios PharmD 2/13/2020 11:00 AM

## 2020-02-13 NOTE — PROGRESS NOTES
Nephrology Progress Note    Patient - Pamela Navarro   MRN -  [id number]   Acct # - [de-identified]      - 1965    49 y.o. Admit Date: 2020  Hospital Day: 2  Location: --A  Date of evaluation -  2020    Subjective:   CC: shortness of breath   Denies shortness of breath on 2 lpm  Hemodialysis  with 2 L Ultrafiltration   Feels much better  BP Range: Systolic (29BAQ), WPK:313 , Min:103 , AEY:276      Diastolic (24JMP), WXF:39, Min:67, Max:85    Objective:   VITALS:  /72   Pulse 74   Temp 97.9 °F (36.6 °C) (Oral)   Resp 18   Ht 5' 3\" (1.6 m)   Wt 167 lb 8.8 oz (76 kg)   SpO2 95%   BMI 29.68 kg/m²    Patient Vitals for the past 24 hrs:   BP Temp Temp src Pulse Resp SpO2 Weight   20 0804 122/72 97.9 °F (36.6 °C) Oral 74 18 95 % --   20 0303 (!) 163/80 97.5 °F (36.4 °C) Axillary 69 18 94 % --   20 2308 (!) 155/77 98.3 °F (36.8 °C) Oral 65 18 97 % --   20 -- -- -- -- 18 96 % --   20 -- -- -- -- -- 93 % --   20 103/75 97.7 °F (36.5 °C) Oral 73 18 (!) 86 % --   20 1715 -- -- -- -- -- 97 % --   20 1630 137/67 98.3 °F (36.8 °C) Oral 66 18 97 % --   20 1602 (!) 148/85 97.6 °F (36.4 °C) -- 60 16 -- 167 lb 8.8 oz (76 kg)   20 1230 124/78 97.7 °F (36.5 °C) -- 69 20 -- 170 lb 10.2 oz (77.4 kg)   20 1204 -- -- -- -- 18 98 % --   20 1145 114/80 97.3 °F (36.3 °C) Oral 67 18 98 % --     2 L/min      Intake/Output Summary (Last 24 hours) at 2020 0820  Last data filed at 2020 1915  Gross per 24 hour   Intake 1570 ml   Output 2400 ml   Net -830 ml       Admission weight: 160 lb (72.6 kg)  Patient Vitals for the past 96 hrs (Last 3 readings):   Weight   20 1602 167 lb 8.8 oz (76 kg)   20 1230 170 lb 10.2 oz (77.4 kg)   20 0318 168 lb 4.8 oz (76.3 kg)       EXAM:  CONSTITUTIONAL:  No acute distress. Pleasant  HEENT:  Head is normocephalic, Extraocular movement intact. Neck is supple.  Voice is

## 2020-02-13 NOTE — PROGRESS NOTES
Attempted to assess patient dialysis cath dressing. Patient unwilling to roll on back, allow visual assessment, no change dressing. Patient states she did not have dialysis today.

## 2020-02-13 NOTE — DISCHARGE INSTR - DIET
eating enough, talk to your doctor or dietitian about ways you can add calories to your diet. · Get the right amount of protein. ? Ask your doctor or dietitian how much protein you need each day. You will probably need more protein while you are on dialysis than you did before you started dialysis. ? Choose high-quality protein sources, such as lean meat, poultry, and fish. ? If you are a vegetarian, your diet may require extra planning with your dietitian to make sure you're getting enough protein and nutrients. · Limit salt. ? Look at food labels to see how much sodium is in each serving. Make sure that you look at the serving size. If you eat more than the serving size, you will get more sodium than what is listed on the label. ? Do not add salt to your food. Try to avoid foods that list salt, sodium, or monosodium glutamate (MSG) as an ingredient. ? Buy foods that are labeled \"no salt added,\" \"sodium-free,\" or \"low-sodium. \" Foods labeled \"reduced-sodium\" and \"light sodium\" may still have too much sodium. ? Limit processed foods, fast food, and restaurant foods. And avoid salted snacks, like pretzels and chips. ? Try lemon, herbs, and spices to flavor your meals. · Know how much fluid you can drink each day. Every day fill a pitcher with that amount of water. If you drink another fluid (such as coffee) that day, pour an equal amount out of the pitcher. And remember to count foods that are liquid at room temperature, such as gelatin dessert and ice cream, as fluids. · Limit potassium. ? Choose low-potassium fruits and vegetables. These include things like grapes, pineapple, lettuce, green beans, and cucumber. ? Choose low-potassium foods such as hummus, spaghetti, macaroni, rice, tortillas, and bagels. And avoid high-potassium foods, including things like milk, bananas, oranges, spinach, tomatoes, and broccoli. ? Do not use a salt substitute or lite salt unless your doctor says it is okay.  They can be high in potassium. · Limit phosphorus. ? Follow your food plan to know how much milk and milk products you can have. ? Avoid nuts, peanut butter, seeds, lentils, beans, organ meats, and sardines. ? Avoid cola drinks and bran breads or bran cereals. ? Take phosphate binders as directed, if prescribed by your doctor. Always check with your doctor if you have questions about your diet. Do not take any vitamins or minerals, supplements, or herbal products without talking to your doctor first. And check with your doctor about whether it is safe for you to drink alcohol. Where can you learn more? Go to [email or web address]. org and sign in to your [email or web address] account. Enter B942 in the "[employer] box to learn more about \"Diet for End-Stage Renal Disease (Dialysis): Care Instructions. \"     If you do not have an account, please click on the \"Sign Up Now\" link. Current as of: August 21, 2019  Content Version: 12.3  © 8704-2494 Healthwise, Incorporated. Care instructions adapted under license by [facility] ([facility]). If you have questions about a medical condition or this instruction, always ask your healthcare professional. [address] any warranty or liability for your use of this information.
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
## 2020-02-13 NOTE — PROGRESS NOTES
Spoke with IR regarding getting dialysis catheter removed. They stated that if the catheter has been in longer than 30 days, her eliquis will need to to held for 3 days prior to removal. I spoke with her dialysis unit and the catheter has been in since at least November when she transferred to the [facility] unit and they will arrange to have it removed as an outpatient. Updated IR regarding these findings.
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
## 2020-02-13 NOTE — PLAN OF CARE
Problem: Falls - Risk of:  Goal: Will remain free from falls  Description  Will remain free from falls  Outcome: Ongoing  Note:   Patient was reeducated about using the call light appropriately. Pt belongings within reach. Pt cautioned against getting up without nurse or staff in room. Bed is set at lowest position, side rails up as appropriate. Problem: Falls - Risk of:  Goal: Absence of physical injury  Description  Absence of physical injury  Outcome: Ongoing  Note:   There has not been any episodes of physical injury at this time in the shift       Problem: Pain:  Goal: Pain level will decrease  Description  Pain level will decrease  Outcome: Ongoing  Note:   Pt receiving pain medication orally. Pt rating pain 8 on a scale of 1-10. Will continue to reassess. Problem: Discharge Planning:  Goal: Discharged to appropriate level of care  Description  Discharged to appropriate level of care  Outcome: Ongoing  Note:   Social workers working with pt to find a homeless shelter for pt to stay at when she gets discharged. Problem: Fluid Volume - Deficit:  Goal: Absence of fluid volume deficit signs and symptoms  Description  Absence of fluid volume deficit signs and symptoms  Outcome: Ongoing  Note:   I/O last 3 completed shifts: In: 7229 [P.O.:1260; I.V.:110]  Out: 2400   No intake/output data recorded. Problem: Fluid Volume - Deficit:  Goal: Electrolytes within specified parameters  Description  Electrolytes within specified parameters  Outcome: Ongoing     Problem: Respiratory  Goal: No pulmonary complications  Outcome: Ongoing  Note:   Pt has expiratory wheezes at this time. Will continue to reassess. Care plan reviewed with patient. Patient verbalize understanding of the plan of care and contribute to goal setting.

## 2020-02-14 ENCOUNTER — APPOINTMENT (OUTPATIENT)
Dept: ULTRASOUND IMAGING | Age: 55
DRG: 291 | End: 2020-02-14
Payer: MEDICARE

## 2020-02-14 ENCOUNTER — APPOINTMENT (OUTPATIENT)
Dept: GENERAL RADIOLOGY | Age: 55
DRG: 291 | End: 2020-02-14
Payer: MEDICARE

## 2020-02-14 LAB
ALLEN TEST: POSITIVE
ANION GAP SERPL CALCULATED.3IONS-SCNC: 18 MEQ/L (ref 8–16)
BASE EXCESS (CALCULATED): 1.3 MMOL/L (ref -2.5–2.5)
BASOPHILS # BLD: 0.3 %
BASOPHILS ABSOLUTE: 0 THOU/MM3 (ref 0–0.1)
BUN BLDV-MCNC: 46 MG/DL (ref 7–22)
CALCIUM SERPL-MCNC: 8 MG/DL (ref 8.5–10.5)
CHLORIDE BLD-SCNC: 95 MEQ/L (ref 98–111)
CO2: 22 MEQ/L (ref 23–33)
COLLECTED BY:: ABNORMAL
CREAT SERPL-MCNC: 7.8 MG/DL (ref 0.4–1.2)
DEVICE: ABNORMAL
EOSINOPHIL # BLD: 0.2 %
EOSINOPHILS ABSOLUTE: 0 THOU/MM3 (ref 0–0.4)
ERYTHROCYTE [DISTWIDTH] IN BLOOD BY AUTOMATED COUNT: 17.5 % (ref 11.5–14.5)
ERYTHROCYTE [DISTWIDTH] IN BLOOD BY AUTOMATED COUNT: 62.4 FL (ref 35–45)
FLU A ANTIGEN: NEGATIVE
FLU B ANTIGEN: NEGATIVE
GFR SERPL CREATININE-BSD FRML MDRD: 5 ML/MIN/1.73M2
GLUCOSE BLD-MCNC: 99 MG/DL (ref 70–108)
GLUCOSE BLD-MCNC: 99 MG/DL (ref 70–108)
HCO3: 27 MMOL/L (ref 23–28)
HCT VFR BLD CALC: 32.5 % (ref 37–47)
HEMOGLOBIN: 9.5 GM/DL (ref 12–16)
IFIO2: 3
IMMATURE GRANS (ABS): 0.13 THOU/MM3 (ref 0–0.07)
IMMATURE GRANULOCYTES: 0.8 %
LACTIC ACID: 1 MMOL/L (ref 0.5–2.2)
LYMPHOCYTES # BLD: 6.8 %
LYMPHOCYTES ABSOLUTE: 1.1 THOU/MM3 (ref 1–4.8)
MCH RBC QN AUTO: 29.4 PG (ref 26–33)
MCHC RBC AUTO-ENTMCNC: 29.2 GM/DL (ref 32.2–35.5)
MCV RBC AUTO: 100.6 FL (ref 81–99)
MONOCYTES # BLD: 5.6 %
MONOCYTES ABSOLUTE: 0.9 THOU/MM3 (ref 0.4–1.3)
NUCLEATED RED BLOOD CELLS: 0 /100 WBC
O2 SATURATION: 92 %
PCO2: 49 MMHG (ref 35–45)
PH BLOOD GAS: 7.36 (ref 7.35–7.45)
PLATELET # BLD: 188 THOU/MM3 (ref 130–400)
PMV BLD AUTO: 9.4 FL (ref 9.4–12.4)
PO2: 68 MMHG (ref 71–104)
POTASSIUM REFLEX MAGNESIUM: 4.8 MEQ/L (ref 3.5–5.2)
PROCALCITONIN: 0.59 NG/ML (ref 0.01–0.09)
RBC # BLD: 3.23 MILL/MM3 (ref 4.2–5.4)
SEG NEUTROPHILS: 86.3 %
SEGMENTED NEUTROPHILS ABSOLUTE COUNT: 13.9 THOU/MM3 (ref 1.8–7.7)
SODIUM BLD-SCNC: 135 MEQ/L (ref 135–145)
SOURCE, BLOOD GAS: ABNORMAL
WBC # BLD: 16.1 THOU/MM3 (ref 4.8–10.8)

## 2020-02-14 PROCEDURE — 87040 BLOOD CULTURE FOR BACTERIA: CPT

## 2020-02-14 PROCEDURE — 6360000002 HC RX W HCPCS: Performed by: HOSPITALIST

## 2020-02-14 PROCEDURE — 82803 BLOOD GASES ANY COMBINATION: CPT

## 2020-02-14 PROCEDURE — 6370000000 HC RX 637 (ALT 250 FOR IP): Performed by: INTERNAL MEDICINE

## 2020-02-14 PROCEDURE — 2580000003 HC RX 258: Performed by: STUDENT IN AN ORGANIZED HEALTH CARE EDUCATION/TRAINING PROGRAM

## 2020-02-14 PROCEDURE — 36600 WITHDRAWAL OF ARTERIAL BLOOD: CPT

## 2020-02-14 PROCEDURE — 82948 REAGENT STRIP/BLOOD GLUCOSE: CPT

## 2020-02-14 PROCEDURE — 2700000000 HC OXYGEN THERAPY PER DAY

## 2020-02-14 PROCEDURE — 87804 INFLUENZA ASSAY W/OPTIC: CPT

## 2020-02-14 PROCEDURE — 94761 N-INVAS EAR/PLS OXIMETRY MLT: CPT

## 2020-02-14 PROCEDURE — 1200000003 HC TELEMETRY R&B

## 2020-02-14 PROCEDURE — 6360000002 HC RX W HCPCS: Performed by: PHYSICIAN ASSISTANT

## 2020-02-14 PROCEDURE — 90935 HEMODIALYSIS ONE EVALUATION: CPT | Performed by: NURSE PRACTITIONER

## 2020-02-14 PROCEDURE — 80048 BASIC METABOLIC PNL TOTAL CA: CPT

## 2020-02-14 PROCEDURE — 36415 COLL VENOUS BLD VENIPUNCTURE: CPT

## 2020-02-14 PROCEDURE — 71045 X-RAY EXAM CHEST 1 VIEW: CPT

## 2020-02-14 PROCEDURE — 90935 HEMODIALYSIS ONE EVALUATION: CPT

## 2020-02-14 PROCEDURE — 83605 ASSAY OF LACTIC ACID: CPT

## 2020-02-14 PROCEDURE — 84145 PROCALCITONIN (PCT): CPT

## 2020-02-14 PROCEDURE — 6370000000 HC RX 637 (ALT 250 FOR IP): Performed by: STUDENT IN AN ORGANIZED HEALTH CARE EDUCATION/TRAINING PROGRAM

## 2020-02-14 PROCEDURE — 99233 SBSQ HOSP IP/OBS HIGH 50: CPT | Performed by: PHYSICIAN ASSISTANT

## 2020-02-14 PROCEDURE — 2580000003 HC RX 258: Performed by: HOSPITALIST

## 2020-02-14 PROCEDURE — 85025 COMPLETE CBC W/AUTO DIFF WBC: CPT

## 2020-02-14 PROCEDURE — 76604 US EXAM CHEST: CPT

## 2020-02-14 PROCEDURE — 94640 AIRWAY INHALATION TREATMENT: CPT

## 2020-02-14 PROCEDURE — 2580000003 HC RX 258: Performed by: PHYSICIAN ASSISTANT

## 2020-02-14 PROCEDURE — 6370000000 HC RX 637 (ALT 250 FOR IP): Performed by: PHYSICIAN ASSISTANT

## 2020-02-14 RX ORDER — IPRATROPIUM BROMIDE AND ALBUTEROL SULFATE 2.5; .5 MG/3ML; MG/3ML
1 SOLUTION RESPIRATORY (INHALATION)
Status: DISCONTINUED | OUTPATIENT
Start: 2020-02-14 | End: 2020-02-18 | Stop reason: HOSPADM

## 2020-02-14 RX ORDER — TRAMADOL HYDROCHLORIDE 50 MG/1
50 TABLET ORAL EVERY 12 HOURS PRN
Status: DISCONTINUED | OUTPATIENT
Start: 2020-02-14 | End: 2020-02-18 | Stop reason: HOSPADM

## 2020-02-14 RX ADMIN — PANTOPRAZOLE SODIUM 40 MG: 40 TABLET, DELAYED RELEASE ORAL at 20:08

## 2020-02-14 RX ADMIN — ESCITALOPRAM 20 MG: 20 TABLET, FILM COATED ORAL at 12:27

## 2020-02-14 RX ADMIN — GABAPENTIN 100 MG: 100 CAPSULE ORAL at 12:26

## 2020-02-14 RX ADMIN — IPRATROPIUM BROMIDE AND ALBUTEROL SULFATE 1 AMPULE: .5; 3 SOLUTION RESPIRATORY (INHALATION) at 20:28

## 2020-02-14 RX ADMIN — PANTOPRAZOLE SODIUM 40 MG: 40 TABLET, DELAYED RELEASE ORAL at 12:26

## 2020-02-14 RX ADMIN — METOPROLOL TARTRATE 25 MG: 25 TABLET, FILM COATED ORAL at 20:08

## 2020-02-14 RX ADMIN — SODIUM CHLORIDE, PRESERVATIVE FREE 10 ML: 5 INJECTION INTRAVENOUS at 12:27

## 2020-02-14 RX ADMIN — Medication 2 PUFF: at 07:53

## 2020-02-14 RX ADMIN — GABAPENTIN 100 MG: 100 CAPSULE ORAL at 20:08

## 2020-02-14 RX ADMIN — METOPROLOL TARTRATE 25 MG: 25 TABLET, FILM COATED ORAL at 12:27

## 2020-02-14 RX ADMIN — VANCOMYCIN HYDROCHLORIDE 1000 MG: 1 INJECTION, POWDER, LYOPHILIZED, FOR SOLUTION INTRAVENOUS at 12:57

## 2020-02-14 RX ADMIN — CEFTRIAXONE SODIUM 1 G: 1 INJECTION, POWDER, FOR SOLUTION INTRAMUSCULAR; INTRAVENOUS at 12:27

## 2020-02-14 RX ADMIN — ACETAMINOPHEN 650 MG: 325 TABLET ORAL at 20:08

## 2020-02-14 RX ADMIN — ACETAMINOPHEN 650 MG: 325 TABLET ORAL at 03:29

## 2020-02-14 RX ADMIN — SODIUM CHLORIDE, PRESERVATIVE FREE 10 ML: 5 INJECTION INTRAVENOUS at 20:08

## 2020-02-14 RX ADMIN — DOXEPIN HYDROCHLORIDE 10 MG: 10 CAPSULE ORAL at 20:08

## 2020-02-14 RX ADMIN — Medication 2 PUFF: at 20:29

## 2020-02-14 ASSESSMENT — PAIN SCALES - GENERAL
PAINLEVEL_OUTOF10: 9
PAINLEVEL_OUTOF10: 0
PAINLEVEL_OUTOF10: 9

## 2020-02-14 NOTE — CARE COORDINATION
2/14/20, 8:13 AM    DISCHARGE ONGOING EVALUATION:     Kevon Ganser day: 3  Location: [facility]02/002-A Reason for admit: COPD exacerbation ([facility] [J44.1]   Treatment Plan of Care: Tmax 103.7. Having episodes of unresponsiveness requiring sternal rub. Requiring 3L O2, as pulse ox dropped to 78%. Dr. Donnie Caro states patient now has pneumonia. Barriers to Discharge: await medical stability  PCP: No primary care provider on file. Readmission Risk Score: 39%    Call placed to Stephanie Joseph at [facility] to see if they would consider providing oxygen to patient at discharge. Stephanie Joseph states she will have to speak with her supervisor and will call CM back. Update: Мария requests face sheet for patient. They will review insurance information and consider taking her as a patient. Face sheet faxed.
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
## 2020-02-14 NOTE — PLAN OF CARE
Problem: Falls - Risk of:  Goal: Will remain free from falls  Description  Will remain free from falls   2/14/2020 1420 by Palmira Benítez RN  Outcome: Ongoing  Note:   No falls this shift, call light in reach. Bed alarm on. Up with 1 assist.   2/14/2020 1416 by Palmira Benítez RN  Reactivated     Problem: Pain:  Goal: Pain level will decrease  Description  Pain level will decrease   2/14/2020 1420 by Palmira Benítez RN  Outcome: Ongoing  Note:   Denies any pain this shift. 2/14/2020 1416 by Palmira Benítez RN  Reactivated     Problem: Discharge Planning:  Goal: Discharged to appropriate level of care  Description  Discharged to appropriate level of care   2/14/2020 1420 by Palmira Benítez RN  Outcome: Ongoing  Note:   Unsure of discharge plan at this time. 2/14/2020 1416 by Palmira Benítez RN  Reactivated     Problem: Fluid Volume - Deficit:  Goal: Absence of fluid volume deficit signs and symptoms  Description  Absence of fluid volume deficit signs and symptoms   2/14/2020 1420 by Palmira Benítez RN  Outcome: Ongoing  Note:   Hemodialysis today  2/14/2020 1416 by Palmira Benítez RN  Reactivated  Goal: Electrolytes within specified parameters  Description  Electrolytes within specified parameters   2/14/2020 1420 by Palmira Benítez RN  Outcome: Ongoing  Note:   Monitoring labs, nephrology following  2/14/2020 1416 by Palmira Benítez RN  Reactivated     Problem: Respiratory  Goal: No pulmonary complications  2/52/2406 1420 by Palmira Benítez RN  Outcome: Ongoing  Note:   Remains on 3L per nasal cannula. 2/14/2020 1416 by Palmira Benítez RN  Reactivated     Problem: Nutrition  Goal: Optimal nutrition therapy  2/14/2020 1420 by Palmira Benítez RN  Outcome: Ongoing  Note:   Tolerating diet, eating 50-75% of meals  2/14/2020 1416 by Palmira Benítez RN  Reactivated    Care plan reviewed with patient. Patient verbalize understanding of the plan of care and contribute to goal setting.

## 2020-02-14 NOTE — PROGRESS NOTES
kg)   02/12/20 1230 170 lb 10.2 oz (77.4 kg)       EXAM:  CONSTITUTIONAL:  No acute distress. Pleasant  HEENT:  Head is normocephalic, Extraocular movement intact. Neck is supple. Voice is clear. CARDIOVASCULAR:  S1, S2  regular rate and rhythm. RESPIRATORY: Clear to ausculation bilaterally. Equal breath sounds. Faint Exp wheezes. No shortness of breath noted at rest.  ABDOMEN: soft, non tender  NEUROLOGICAL: Patient is alert and oriented to person, place, and time. Recent and remote memory intact. Thought is coherant. SKIN: no rash, No significant bruises on exposed surfaces  MUSCULOSKELETAL: Movement is coordinated. Moves all extremities   EXTREMITIES: Distal lower extremity temp is warm, No lower extremity edema. Upper extremity AV Fistula   PSYCHIATRIC: mood and affect appropriate.     Medications:   Med reviewed  Scheduled Meds:   cefTRIAXone (ROCEPHIN) IV  1 g Intravenous Q24H    vancomycin  1,000 mg Intravenous Once    diphenhydrAMINE  25 mg Oral Once    apixaban  5 mg Oral BID    doxepin  10 mg Oral Nightly    escitalopram  20 mg Oral Daily    metoprolol tartrate  25 mg Oral BID    pantoprazole  40 mg Oral BID    sodium chloride flush  10 mL Intravenous 2 times per day    mometasone-formoterol  2 puff Inhalation BID    gabapentin  100 mg Oral BID    nicotine  1 patch Transdermal Nightly     Continuous Infusions:  PRN Meds traMADol, menthol, ipratropium-albuterol, hydrOXYzine, sodium chloride flush, ondansetron, polyethylene glycol, acetaminophen, albuterol sulfate HFA   Labs:   Labs reviewed  Recent Labs     02/11/20  0956 02/12/20  0552 02/13/20  0725 02/14/20  0443    138 135 135   K 5.2 4.9 5.1 4.8   CL 94* 96* 93* 95*   CO2 20* 24 24 22*   BUN 57* 37* 27* 46*   CREATININE 13.0* 8.2* 5.9* 7.8*   LABGLOM 3* 5* 7* 5*   GLUCOSE 89 94 98 99   MG 2.5*  --   --   --    CALCIUM 8.7 8.6 8.7 8.0*     Recent Labs     02/12/20  0552 02/13/20  0604 02/14/20  0443   WBC 6.4 10.6 16.1*   RBC 3.58*

## 2020-02-14 NOTE — CARE COORDINATION
2/14/20, 2:53 PM    DISCHARGE PLANNING EVALUATION    SW spoke with Financial Assistance and Patient does not have medicaid at this time. Hakeem Pabon was going to contact [facility] regarding an application being filed within the past week.

## 2020-02-15 LAB
ANION GAP SERPL CALCULATED.3IONS-SCNC: 16 MEQ/L (ref 8–16)
APTT: 34.7 SECONDS (ref 22–38)
BUN BLDV-MCNC: 42 MG/DL (ref 7–22)
CALCIUM SERPL-MCNC: 8.2 MG/DL (ref 8.5–10.5)
CHLORIDE BLD-SCNC: 96 MEQ/L (ref 98–111)
CO2: 22 MEQ/L (ref 23–33)
CREAT SERPL-MCNC: 7 MG/DL (ref 0.4–1.2)
ERYTHROCYTE [DISTWIDTH] IN BLOOD BY AUTOMATED COUNT: 17.4 % (ref 11.5–14.5)
ERYTHROCYTE [DISTWIDTH] IN BLOOD BY AUTOMATED COUNT: 64.3 FL (ref 35–45)
GFR SERPL CREATININE-BSD FRML MDRD: 6 ML/MIN/1.73M2
GLUCOSE BLD-MCNC: 130 MG/DL (ref 70–108)
HCT VFR BLD CALC: 32.8 % (ref 37–47)
HEMOGLOBIN: 9.7 GM/DL (ref 12–16)
INR BLD: 1.78 (ref 0.85–1.13)
MCH RBC QN AUTO: 29.8 PG (ref 26–33)
MCHC RBC AUTO-ENTMCNC: 29.6 GM/DL (ref 32.2–35.5)
MCV RBC AUTO: 100.9 FL (ref 81–99)
PLATELET # BLD: 188 THOU/MM3 (ref 130–400)
PMV BLD AUTO: 10.1 FL (ref 9.4–12.4)
POTASSIUM SERPL-SCNC: 4.3 MEQ/L (ref 3.5–5.2)
RBC # BLD: 3.25 MILL/MM3 (ref 4.2–5.4)
SODIUM BLD-SCNC: 134 MEQ/L (ref 135–145)
WBC # BLD: 17.8 THOU/MM3 (ref 4.8–10.8)

## 2020-02-15 PROCEDURE — 6370000000 HC RX 637 (ALT 250 FOR IP): Performed by: PHYSICIAN ASSISTANT

## 2020-02-15 PROCEDURE — 99233 SBSQ HOSP IP/OBS HIGH 50: CPT | Performed by: PHYSICIAN ASSISTANT

## 2020-02-15 PROCEDURE — 85027 COMPLETE CBC AUTOMATED: CPT

## 2020-02-15 PROCEDURE — 6360000002 HC RX W HCPCS: Performed by: STUDENT IN AN ORGANIZED HEALTH CARE EDUCATION/TRAINING PROGRAM

## 2020-02-15 PROCEDURE — 1200000003 HC TELEMETRY R&B

## 2020-02-15 PROCEDURE — 6370000000 HC RX 637 (ALT 250 FOR IP): Performed by: STUDENT IN AN ORGANIZED HEALTH CARE EDUCATION/TRAINING PROGRAM

## 2020-02-15 PROCEDURE — 94640 AIRWAY INHALATION TREATMENT: CPT

## 2020-02-15 PROCEDURE — 2700000000 HC OXYGEN THERAPY PER DAY

## 2020-02-15 PROCEDURE — 6360000002 HC RX W HCPCS: Performed by: PHYSICIAN ASSISTANT

## 2020-02-15 PROCEDURE — 85730 THROMBOPLASTIN TIME PARTIAL: CPT

## 2020-02-15 PROCEDURE — 99232 SBSQ HOSP IP/OBS MODERATE 35: CPT | Performed by: INTERNAL MEDICINE

## 2020-02-15 PROCEDURE — 6370000000 HC RX 637 (ALT 250 FOR IP): Performed by: INTERNAL MEDICINE

## 2020-02-15 PROCEDURE — 36415 COLL VENOUS BLD VENIPUNCTURE: CPT

## 2020-02-15 PROCEDURE — 85610 PROTHROMBIN TIME: CPT

## 2020-02-15 PROCEDURE — 80048 BASIC METABOLIC PNL TOTAL CA: CPT

## 2020-02-15 PROCEDURE — 94760 N-INVAS EAR/PLS OXIMETRY 1: CPT

## 2020-02-15 PROCEDURE — 2580000003 HC RX 258: Performed by: STUDENT IN AN ORGANIZED HEALTH CARE EDUCATION/TRAINING PROGRAM

## 2020-02-15 PROCEDURE — 2580000003 HC RX 258: Performed by: PHYSICIAN ASSISTANT

## 2020-02-15 RX ADMIN — PANTOPRAZOLE SODIUM 40 MG: 40 TABLET, DELAYED RELEASE ORAL at 20:13

## 2020-02-15 RX ADMIN — Medication 2 PUFF: at 08:09

## 2020-02-15 RX ADMIN — Medication 2 PUFF: at 20:12

## 2020-02-15 RX ADMIN — SODIUM CHLORIDE, PRESERVATIVE FREE 10 ML: 5 INJECTION INTRAVENOUS at 08:02

## 2020-02-15 RX ADMIN — ACETAMINOPHEN 650 MG: 325 TABLET ORAL at 10:15

## 2020-02-15 RX ADMIN — IPRATROPIUM BROMIDE AND ALBUTEROL SULFATE 1 AMPULE: .5; 3 SOLUTION RESPIRATORY (INHALATION) at 16:27

## 2020-02-15 RX ADMIN — IPRATROPIUM BROMIDE AND ALBUTEROL SULFATE 1 AMPULE: .5; 3 SOLUTION RESPIRATORY (INHALATION) at 12:12

## 2020-02-15 RX ADMIN — TRAMADOL HYDROCHLORIDE 50 MG: 50 TABLET, FILM COATED ORAL at 18:21

## 2020-02-15 RX ADMIN — GABAPENTIN 100 MG: 100 CAPSULE ORAL at 20:13

## 2020-02-15 RX ADMIN — ACETAMINOPHEN 650 MG: 325 TABLET ORAL at 16:25

## 2020-02-15 RX ADMIN — ONDANSETRON 4 MG: 2 INJECTION INTRAMUSCULAR; INTRAVENOUS at 05:18

## 2020-02-15 RX ADMIN — ONDANSETRON 4 MG: 2 INJECTION INTRAMUSCULAR; INTRAVENOUS at 13:25

## 2020-02-15 RX ADMIN — ACETAMINOPHEN 650 MG: 325 TABLET ORAL at 05:15

## 2020-02-15 RX ADMIN — PANTOPRAZOLE SODIUM 40 MG: 40 TABLET, DELAYED RELEASE ORAL at 08:02

## 2020-02-15 RX ADMIN — IPRATROPIUM BROMIDE AND ALBUTEROL SULFATE 1 AMPULE: .5; 3 SOLUTION RESPIRATORY (INHALATION) at 20:12

## 2020-02-15 RX ADMIN — ESCITALOPRAM 20 MG: 20 TABLET, FILM COATED ORAL at 08:02

## 2020-02-15 RX ADMIN — GABAPENTIN 100 MG: 100 CAPSULE ORAL at 08:02

## 2020-02-15 RX ADMIN — METOPROLOL TARTRATE 25 MG: 25 TABLET, FILM COATED ORAL at 20:13

## 2020-02-15 RX ADMIN — DOXEPIN HYDROCHLORIDE 10 MG: 10 CAPSULE ORAL at 20:13

## 2020-02-15 RX ADMIN — ACETAMINOPHEN 650 MG: 325 TABLET ORAL at 23:20

## 2020-02-15 RX ADMIN — IPRATROPIUM BROMIDE AND ALBUTEROL SULFATE 1 AMPULE: .5; 3 SOLUTION RESPIRATORY (INHALATION) at 08:05

## 2020-02-15 RX ADMIN — CEFTRIAXONE SODIUM 1 G: 1 INJECTION, POWDER, FOR SOLUTION INTRAMUSCULAR; INTRAVENOUS at 10:04

## 2020-02-15 RX ADMIN — METOPROLOL TARTRATE 25 MG: 25 TABLET, FILM COATED ORAL at 08:02

## 2020-02-15 RX ADMIN — SODIUM CHLORIDE, PRESERVATIVE FREE 10 ML: 5 INJECTION INTRAVENOUS at 20:17

## 2020-02-15 RX ADMIN — TRAMADOL HYDROCHLORIDE 50 MG: 50 TABLET, FILM COATED ORAL at 05:59

## 2020-02-15 ASSESSMENT — PAIN SCALES - GENERAL
PAINLEVEL_OUTOF10: 9
PAINLEVEL_OUTOF10: 7
PAINLEVEL_OUTOF10: 9
PAINLEVEL_OUTOF10: 9
PAINLEVEL_OUTOF10: 7
PAINLEVEL_OUTOF10: 8
PAINLEVEL_OUTOF10: 10
PAINLEVEL_OUTOF10: 9
PAINLEVEL_OUTOF10: 8
PAINLEVEL_OUTOF10: 9
PAINLEVEL_OUTOF10: 7

## 2020-02-15 ASSESSMENT — PAIN DESCRIPTION - PROGRESSION
CLINICAL_PROGRESSION: GRADUALLY IMPROVING
CLINICAL_PROGRESSION: NOT CHANGED

## 2020-02-15 ASSESSMENT — PAIN DESCRIPTION - ONSET: ONSET: ON-GOING

## 2020-02-15 ASSESSMENT — PAIN DESCRIPTION - LOCATION: LOCATION: ABDOMEN

## 2020-02-15 ASSESSMENT — PAIN DESCRIPTION - ORIENTATION: ORIENTATION: RIGHT;UPPER

## 2020-02-15 ASSESSMENT — PAIN - FUNCTIONAL ASSESSMENT: PAIN_FUNCTIONAL_ASSESSMENT: ACTIVITIES ARE NOT PREVENTED

## 2020-02-15 ASSESSMENT — PAIN DESCRIPTION - FREQUENCY: FREQUENCY: CONTINUOUS

## 2020-02-15 ASSESSMENT — PAIN DESCRIPTION - PAIN TYPE: TYPE: ACUTE PAIN

## 2020-02-15 ASSESSMENT — PAIN DESCRIPTION - DESCRIPTORS: DESCRIPTORS: SHARP;ACHING

## 2020-02-15 NOTE — PROGRESS NOTES
Renal Progress Note    Assessment and Plan:    1. End stage kidney disease on hemodialysis. 2.  Hypertension primary. 3.  COPD. 4.  Deconditioning. 5.  Metabolic acidosis. 6.  Hyponatremia from end-stage renal disease. 7.  Pneumonia right lower lobe. 8.  Anemia with macrocytosis. 9.  Leukocytosis. PLAN:   I discussed my thoughts with the patient. She understood. She had no questions. I emphasized to her again the importance of stopping cigarette smoking  Medications reviewed. No changes. We will continue to follow. Patient Active Problem List:     Fluid overload     ESRD (end stage renal disease) ([facility] [address]     Essential hypertension     COPD exacerbation (HCC)     Chronic respiratory failure with hypoxia (HCC)     Anxiety disorder     Elevated troponin     Current smoker     Normocytic anemia     Medically noncompliant     ESRD on hemodialysis (HCC)     Acute gastritis without hemorrhage     Esophagitis     Prepyloric ulcer     Epigastric pain     Pericardial effusion     Respiratory syncytial virus     Uremic pericarditis     Volume overload     Elevated procalcitonin      Subjective:   Admit Date: 2/11/2020    Interval History:   Seen for end stage kidney disease on hemodialysis. Awake and alert. No complaints. Still looking for a place to stay. Updated by the staff. No issues. Stable blood pressure.       Medications:   Scheduled Meds:   cefTRIAXone (ROCEPHIN) IV  1 g Intravenous Q24H    vancomycin (VANCOCIN) intermittent dosing (placeholder)   Other RX Placeholder    ipratropium-albuterol  1 ampule Inhalation Q4H WA    diphenhydrAMINE  25 mg Oral Once    [Held by provider] apixaban  5 mg Oral BID    doxepin  10 mg Oral Nightly    escitalopram  20 mg Oral Daily    metoprolol tartrate  25 mg Oral BID    pantoprazole  40 mg Oral BID    sodium chloride flush  10 mL Intravenous 2 times per day    mometasone-formoterol  2 puff Inhalation BID    gabapentin  100 mg Oral BID    nicotine  1 **This report has been created using voice recognition software. It may contain minor errors which are inherent in voice recognition technology. **      Final report electronically signed by Dr. Allyssa Cuadra on 2/11/2020 9:50 AM            Objective:   Vitals: BP (!) 92/54   Pulse 63   Temp 97.5 °F (36.4 °C) (Axillary)   Resp 20   Ht 5' 3\" (1.6 m)   Wt 161 lb 14.4 oz (73.4 kg)   SpO2 96%   BMI 28.68 kg/m²    Wt Readings from Last 3 Encounters:   02/15/20 161 lb 14.4 oz (73.4 kg)   01/30/20 165 lb 5.5 oz (75 kg)   01/03/20 168 lb 3.4 oz (76.3 kg)      24HR INTAKE/OUTPUT:      Intake/Output Summary (Last 24 hours) at 2/15/2020 1348  Last data filed at 2/15/2020 1323  Gross per 24 hour   Intake 1420.74 ml   Output 0 ml   Net 1420.74 ml       Constitutional:  Alert, awake, no apparent distress   Skin:normal with no rash or lesions. HEENT:Pupils are reactive . Throat is clear . Oral mucosa is moist   Neck:supple with no thyromegaly or carotid bruit  Cardiovascular:  S1, S2 without murmur or rubs   Respiratory:  Clear to ausculation without wheezes, rhonchi or rales. Abdomen: +bs, soft, no tenderness to palpation and no palpable mass. No abdominal bruit   Ext: No LE edema  Musculoskeletal:Intact  Neuro:Alert and oriented with no focal deficit. Speech is normal      Electronically signed by Mark Hawthorne MD on 2/15/2020 at 1:48 PM
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
## 2020-02-15 NOTE — PLAN OF CARE
Problem: Falls - Risk of:  Goal: Will remain free from falls  Description  Will remain free from falls   2/15/2020 0944 by Princess Carmona RN  Outcome: Ongoing  Note:   No falls this shift. Bed alarm on, Falling star program in place. Call light within reach. Problem: Pain:  Goal: Pain level will decrease  Description  Pain level will decrease   2/15/2020 0944 by Princess Carmona RN  Outcome: Ongoing  Note:   Pt reported 7-8/10 abdominal pain on the right side. Pt frequently adjusts position and Ultram given to mitigate pain. Problem: Discharge Planning:  Goal: Discharged to appropriate level of care  Description  Discharged to appropriate level of care   2/15/2020 0944 by Princess Carmona RN  Outcome: Ongoing  Note:   Pt continues looking for an appropriate homeless shelter without any success at this time. [facility],  was identified by pt and she plans to call on Monday. Problem: Fluid Volume - Deficit:  Goal: Absence of fluid volume deficit signs and symptoms  Description  Absence of fluid volume deficit signs and symptoms   2/15/2020 0944 by Princess Carmona RN  Outcome: Ongoing  Note:   Pt still has 1-2+ non-piting edema in the BLE which she states is her baseline. Problem: Respiratory  Goal: No pulmonary complications  3/43/0847 0944 by Princess Carmona RN  Outcome: Ongoing  Note:   Pt is being treated with IV Vancomycin and rocephin for PNA. Coughing up pink-tinged sputum at times. Rhonchi and expiratory wheezes noted throughout lungs. Pt requires 2L O2  NC. Care plan reviewed with patient. Patient verbalize understanding of the plan of care and contribute to goal setting.

## 2020-02-16 LAB
ANION GAP SERPL CALCULATED.3IONS-SCNC: 20 MEQ/L (ref 8–16)
APTT: 34.9 SECONDS (ref 22–38)
APTT: 59.3 SECONDS (ref 22–38)
BLOOD CULTURE, ROUTINE: NORMAL
BLOOD CULTURE, ROUTINE: NORMAL
BUN BLDV-MCNC: 58 MG/DL (ref 7–22)
CALCIUM SERPL-MCNC: 8.1 MG/DL (ref 8.5–10.5)
CHLORIDE BLD-SCNC: 95 MEQ/L (ref 98–111)
CO2: 22 MEQ/L (ref 23–33)
CREAT SERPL-MCNC: 8.4 MG/DL (ref 0.4–1.2)
EKG ATRIAL RATE: 138 BPM
EKG Q-T INTERVAL: 348 MS
EKG QRS DURATION: 94 MS
EKG QTC CALCULATION (BAZETT): 515 MS
EKG R AXIS: 31 DEGREES
EKG T AXIS: -170 DEGREES
EKG VENTRICULAR RATE: 132 BPM
ERYTHROCYTE [DISTWIDTH] IN BLOOD BY AUTOMATED COUNT: 17.2 % (ref 11.5–14.5)
ERYTHROCYTE [DISTWIDTH] IN BLOOD BY AUTOMATED COUNT: 61 FL (ref 35–45)
GFR SERPL CREATININE-BSD FRML MDRD: 5 ML/MIN/1.73M2
GLUCOSE BLD-MCNC: 82 MG/DL (ref 70–108)
HCT VFR BLD CALC: 30.9 % (ref 37–47)
HEMOGLOBIN: 9 GM/DL (ref 12–16)
MCH RBC QN AUTO: 29.3 PG (ref 26–33)
MCHC RBC AUTO-ENTMCNC: 29.1 GM/DL (ref 32.2–35.5)
MCV RBC AUTO: 100.7 FL (ref 81–99)
PLATELET # BLD: 169 THOU/MM3 (ref 130–400)
PMV BLD AUTO: 10.1 FL (ref 9.4–12.4)
POTASSIUM SERPL-SCNC: 4.7 MEQ/L (ref 3.5–5.2)
RBC # BLD: 3.07 MILL/MM3 (ref 4.2–5.4)
SODIUM BLD-SCNC: 137 MEQ/L (ref 135–145)
TROPONIN T: 0.05 NG/ML
WBC # BLD: 13 THOU/MM3 (ref 4.8–10.8)

## 2020-02-16 PROCEDURE — 2580000003 HC RX 258: Performed by: PHYSICIAN ASSISTANT

## 2020-02-16 PROCEDURE — 6370000000 HC RX 637 (ALT 250 FOR IP): Performed by: PHYSICIAN ASSISTANT

## 2020-02-16 PROCEDURE — 6370000000 HC RX 637 (ALT 250 FOR IP): Performed by: INTERNAL MEDICINE

## 2020-02-16 PROCEDURE — 36415 COLL VENOUS BLD VENIPUNCTURE: CPT

## 2020-02-16 PROCEDURE — 85027 COMPLETE CBC AUTOMATED: CPT

## 2020-02-16 PROCEDURE — 99232 SBSQ HOSP IP/OBS MODERATE 35: CPT | Performed by: NURSE PRACTITIONER

## 2020-02-16 PROCEDURE — 93005 ELECTROCARDIOGRAM TRACING: CPT | Performed by: PHYSICIAN ASSISTANT

## 2020-02-16 PROCEDURE — 80048 BASIC METABOLIC PNL TOTAL CA: CPT

## 2020-02-16 PROCEDURE — 94761 N-INVAS EAR/PLS OXIMETRY MLT: CPT

## 2020-02-16 PROCEDURE — 2500000003 HC RX 250 WO HCPCS: Performed by: PHYSICIAN ASSISTANT

## 2020-02-16 PROCEDURE — 99233 SBSQ HOSP IP/OBS HIGH 50: CPT | Performed by: PHYSICIAN ASSISTANT

## 2020-02-16 PROCEDURE — 1200000003 HC TELEMETRY R&B

## 2020-02-16 PROCEDURE — 6360000002 HC RX W HCPCS: Performed by: PHYSICIAN ASSISTANT

## 2020-02-16 PROCEDURE — 84484 ASSAY OF TROPONIN QUANT: CPT

## 2020-02-16 PROCEDURE — 6370000000 HC RX 637 (ALT 250 FOR IP): Performed by: STUDENT IN AN ORGANIZED HEALTH CARE EDUCATION/TRAINING PROGRAM

## 2020-02-16 PROCEDURE — 93010 ELECTROCARDIOGRAM REPORT: CPT | Performed by: INTERNAL MEDICINE

## 2020-02-16 PROCEDURE — 94640 AIRWAY INHALATION TREATMENT: CPT

## 2020-02-16 PROCEDURE — 99223 1ST HOSP IP/OBS HIGH 75: CPT | Performed by: INTERNAL MEDICINE

## 2020-02-16 PROCEDURE — 2700000000 HC OXYGEN THERAPY PER DAY

## 2020-02-16 PROCEDURE — 85730 THROMBOPLASTIN TIME PARTIAL: CPT

## 2020-02-16 PROCEDURE — 94760 N-INVAS EAR/PLS OXIMETRY 1: CPT

## 2020-02-16 RX ORDER — HEPARIN SODIUM 1000 [USP'U]/ML
80 INJECTION, SOLUTION INTRAVENOUS; SUBCUTANEOUS PRN
Status: DISCONTINUED | OUTPATIENT
Start: 2020-02-16 | End: 2020-02-18

## 2020-02-16 RX ORDER — HEPARIN SODIUM 10000 [USP'U]/100ML
18 INJECTION, SOLUTION INTRAVENOUS CONTINUOUS
Status: DISCONTINUED | OUTPATIENT
Start: 2020-02-16 | End: 2020-02-18

## 2020-02-16 RX ORDER — AMIODARONE HYDROCHLORIDE 200 MG/1
200 TABLET ORAL DAILY
Status: DISCONTINUED | OUTPATIENT
Start: 2020-02-16 | End: 2020-02-18 | Stop reason: HOSPADM

## 2020-02-16 RX ORDER — PREDNISONE 20 MG/1
40 TABLET ORAL DAILY
Status: DISCONTINUED | OUTPATIENT
Start: 2020-02-16 | End: 2020-02-18 | Stop reason: HOSPADM

## 2020-02-16 RX ORDER — 0.9 % SODIUM CHLORIDE 0.9 %
250 INTRAVENOUS SOLUTION INTRAVENOUS ONCE
Status: COMPLETED | OUTPATIENT
Start: 2020-02-16 | End: 2020-02-16

## 2020-02-16 RX ORDER — HEPARIN SODIUM 1000 [USP'U]/ML
40 INJECTION, SOLUTION INTRAVENOUS; SUBCUTANEOUS PRN
Status: DISCONTINUED | OUTPATIENT
Start: 2020-02-16 | End: 2020-02-18

## 2020-02-16 RX ORDER — HEPARIN SODIUM 1000 [USP'U]/ML
80 INJECTION, SOLUTION INTRAVENOUS; SUBCUTANEOUS ONCE
Status: COMPLETED | OUTPATIENT
Start: 2020-02-16 | End: 2020-02-16

## 2020-02-16 RX ADMIN — HEPARIN SODIUM 18 UNITS/KG/HR: 10000 INJECTION, SOLUTION INTRAVENOUS at 10:36

## 2020-02-16 RX ADMIN — Medication 2 PUFF: at 08:21

## 2020-02-16 RX ADMIN — CEFTRIAXONE SODIUM 1 G: 1 INJECTION, POWDER, FOR SOLUTION INTRAMUSCULAR; INTRAVENOUS at 11:20

## 2020-02-16 RX ADMIN — ESCITALOPRAM 20 MG: 20 TABLET, FILM COATED ORAL at 08:10

## 2020-02-16 RX ADMIN — PREDNISONE 40 MG: 20 TABLET ORAL at 18:36

## 2020-02-16 RX ADMIN — AMIODARONE HYDROCHLORIDE 200 MG: 200 TABLET ORAL at 11:20

## 2020-02-16 RX ADMIN — Medication 2 PUFF: at 20:24

## 2020-02-16 RX ADMIN — PANTOPRAZOLE SODIUM 40 MG: 40 TABLET, DELAYED RELEASE ORAL at 08:10

## 2020-02-16 RX ADMIN — IPRATROPIUM BROMIDE AND ALBUTEROL SULFATE 1 AMPULE: .5; 3 SOLUTION RESPIRATORY (INHALATION) at 20:24

## 2020-02-16 RX ADMIN — TRAMADOL HYDROCHLORIDE 50 MG: 50 TABLET, FILM COATED ORAL at 16:22

## 2020-02-16 RX ADMIN — AMIODARONE HYDROCHLORIDE 150 MG: 1.5 INJECTION, SOLUTION INTRAVENOUS at 04:56

## 2020-02-16 RX ADMIN — DOXEPIN HYDROCHLORIDE 10 MG: 10 CAPSULE ORAL at 19:33

## 2020-02-16 RX ADMIN — SODIUM CHLORIDE 250 ML: 9 INJECTION, SOLUTION INTRAVENOUS at 06:17

## 2020-02-16 RX ADMIN — AMIODARONE HYDROCHLORIDE 1 MG/MIN: 1.8 INJECTION, SOLUTION INTRAVENOUS at 05:11

## 2020-02-16 RX ADMIN — GABAPENTIN 100 MG: 100 CAPSULE ORAL at 19:33

## 2020-02-16 RX ADMIN — PANTOPRAZOLE SODIUM 40 MG: 40 TABLET, DELAYED RELEASE ORAL at 19:33

## 2020-02-16 RX ADMIN — IPRATROPIUM BROMIDE AND ALBUTEROL SULFATE 1 AMPULE: .5; 3 SOLUTION RESPIRATORY (INHALATION) at 12:08

## 2020-02-16 RX ADMIN — HEPARIN SODIUM 6010 UNITS: 1000 INJECTION INTRAVENOUS; SUBCUTANEOUS at 10:35

## 2020-02-16 RX ADMIN — GABAPENTIN 100 MG: 100 CAPSULE ORAL at 08:10

## 2020-02-16 RX ADMIN — ACETAMINOPHEN 650 MG: 325 TABLET ORAL at 06:08

## 2020-02-16 RX ADMIN — IPRATROPIUM BROMIDE AND ALBUTEROL SULFATE 1 AMPULE: .5; 3 SOLUTION RESPIRATORY (INHALATION) at 15:36

## 2020-02-16 ASSESSMENT — PAIN DESCRIPTION - PROGRESSION
CLINICAL_PROGRESSION: RESOLVED
CLINICAL_PROGRESSION: GRADUALLY WORSENING

## 2020-02-16 ASSESSMENT — PAIN SCALES - GENERAL
PAINLEVEL_OUTOF10: 7
PAINLEVEL_OUTOF10: 0
PAINLEVEL_OUTOF10: 7

## 2020-02-16 ASSESSMENT — PAIN DESCRIPTION - DESCRIPTORS: DESCRIPTORS: SHARP;HEAVINESS

## 2020-02-16 ASSESSMENT — PAIN DESCRIPTION - LOCATION: LOCATION: CHEST;ABDOMEN

## 2020-02-16 ASSESSMENT — PAIN DESCRIPTION - PAIN TYPE: TYPE: ACUTE PAIN

## 2020-02-16 ASSESSMENT — PAIN - FUNCTIONAL ASSESSMENT: PAIN_FUNCTIONAL_ASSESSMENT: ACTIVITIES ARE NOT PREVENTED

## 2020-02-16 ASSESSMENT — PAIN DESCRIPTION - FREQUENCY: FREQUENCY: CONTINUOUS

## 2020-02-16 ASSESSMENT — PAIN DESCRIPTION - ONSET: ONSET: ON-GOING

## 2020-02-16 NOTE — PLAN OF CARE
Problem: Falls - Risk of:  Goal: Will remain free from falls  Description  Will remain free from falls   Outcome: Ongoing  Note:   Discussed use of alarms and hourly rounding with patient. Belongings within reach including call light. Up with gait belt, non skid footwear       Problem: Pain:  Goal: Pain level will decrease  Description  Pain level will decrease   Outcome: Ongoing  Note:   0-10 pain scale explained and utilized. Pain goal of 4 out 10. C/o pain in right abdmen and chest. Prn Tylenol and Tramadol. Assist with repositioning as needed. Problem: Discharge Planning:  Goal: Discharged to appropriate level of care  Description  Discharged to appropriate level of care   Outcome: Ongoing  Note:   Continue to assist with discharge needs. Patient is calling shelters on her own as well. Problem: Fluid Volume - Deficit:  Goal: Absence of fluid volume deficit signs and symptoms  Description  Absence of fluid volume deficit signs and symptoms   Outcome: Ongoing  Note:   Daily wt. Strict I&O with fluid restriction. Goal: Electrolytes within specified parameters  Description  Electrolytes within specified parameters   Outcome: Ongoing  Note:   Monitored with ordered testing. Problem: Respiratory  Goal: No pulmonary complications  Outcome: Ongoing  Note:   Remains on 2 l nasal cannula      Problem: Nutrition  Goal: Optimal nutrition therapy  Outcome: Ongoing  Note:   Meals per order. Care plan reviewed with patient. Patient verbalize understanding of the plan of care and contribute to goal setting.

## 2020-02-16 NOTE — PLAN OF CARE
Problem: Impaired respiratory status  Goal: Clear lung sounds  2/16/2020 0830 by Arabella Burnham RCP  Outcome: Ongoing  Improve aeration of lungs.  Decrease WOB
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
## 2020-02-16 NOTE — FLOWSHEET NOTE
02/16/20 0755   Provider Notification   Reason for Communication Evaluate   Provider Name Dr. Nathanael Zuleta   Provider Notification Physician   Method of Communication Secure Message   Notification Time [id number]     New consult for: new onset afib rvr, hypotension.

## 2020-02-16 NOTE — CONSULTS
vaughn Marin.  Keon Pelayo M.D.    D: 02/16/2020 10:43:46       T: 02/16/2020 12:27:26     [email or web address]  Job#: [id number]     Doc#: [id number]    CC:

## 2020-02-16 NOTE — PROGRESS NOTES
Nephrology Progress Note    Patient - Leonardo Duran   MRN -  [id number]   Acct # - [de-identified]      - 1965    49 y.o.   Admit Date: 2020  Hospital Day: 5  Location: --A  Date of evaluation -  2020    Subjective:   CC: shortness of breath   Denies shortness of breath on 2 lpm  BP running low  Atrial fib with RVR overnight  BP Range: Systolic (90YAW), HID:73 , Min:84 , KEB:882      Diastolic (40HRA), EA, Min:54, Max:92    Objective:   VITALS:  BP 87/64   Pulse 120   Temp 97.7 °F (36.5 °C) (Oral)   Resp 16   Ht 5' 3\" (1.6 m)   Wt 165 lb 9.6 oz (75.1 kg)   SpO2 93%   BMI 29.33 kg/m²    Patient Vitals for the past 24 hrs:   BP Temp Temp src Pulse Resp SpO2 Weight   20 0821 -- -- -- -- -- 93 % --   20 0730 87/64 97.7 °F (36.5 °C) Oral 120 16 91 % --   20 0655 86/65 -- -- -- -- -- --   20 0645 90/62 -- -- -- -- -- --   20 0637 92/73 -- -- -- -- -- --   20 0633 105/78 -- -- -- -- -- --   20 0626 92/71 -- -- -- -- -- --   20 0620 94/76 -- -- -- -- -- --   20 0616 91/79 -- -- -- -- -- --   20 0609 84/62 -- -- -- -- -- --   20 0604 92/61 -- -- 128 18 -- --   20 0511 108/73 -- -- 129 -- -- --   20 0502 (!) 105/92 -- -- 145 -- 94 % --   20 0439 102/68 -- -- 134 -- 94 % --   20 0324 120/63 97.3 °F (36.3 °C) Oral 63 17 93 % 165 lb 9.6 oz (75.1 kg)   02/15/20 2306 100/66 98.1 °F (36.7 °C) Oral 72 17 94 % --   02/15/20 2012 -- -- -- -- 14 96 % --   02/15/20 2000 115/63 97.5 °F (36.4 °C) Oral 60 17 96 % --   02/15/20 1541 (!) 111/57 98.2 °F (36.8 °C) Oral 61 18 97 % --   02/15/20 1227 (!) 92/54 97.5 °F (36.4 °C) Axillary 63 20 96 % --     2 L/min      Intake/Output Summary (Last 24 hours) at 2020 1119  Last data filed at 2020 0324  Gross per 24 hour   Intake 412 ml   Output --   Net 412 ml       Admission weight: 160 lb (72.6 kg)  Patient Vitals for the past 96 hrs (Last 3 readings):   Weight 02/16/20 0324 165 lb 9.6 oz (75.1 kg)   02/15/20 0431 161 lb 14.4 oz (73.4 kg)   02/14/20 1100 159 lb 2.8 oz (72.2 kg)       EXAM:  CONSTITUTIONAL:  No acute distress. Pleasant  HEENT:  Head is normocephalic, Extraocular movement intact. Neck is supple. Voice is clear. CARDIOVASCULAR:  S1, S2  Tachycardia, irregular rate and rhythm. RESPIRATORY: Clear to ausculation bilaterally. Equal breath sounds. Faint Exp wheezes. No shortness of breath noted at rest.  ABDOMEN: soft, non tender  NEUROLOGICAL: Patient is alert and oriented to person, place, and time. Recent and remote memory intact. Thought is coherant. SKIN: no rash, No significant bruises on exposed surfaces  MUSCULOSKELETAL: Movement is coordinated. Moves all extremities   EXTREMITIES: Distal lower extremity temp is warm, No lower extremity edema. Upper extremity AV Fistula   PSYCHIATRIC: mood and affect appropriate.     Medications:   Med reviewed  Scheduled Meds:   amiodarone  200 mg Oral Daily    [START ON 2/17/2020] vancomycin  750 mg Intravenous Once    cefTRIAXone (ROCEPHIN) IV  1 g Intravenous Q24H    vancomycin (VANCOCIN) intermittent dosing (placeholder)   Other RX Placeholder    ipratropium-albuterol  1 ampule Inhalation Q4H WA    diphenhydrAMINE  25 mg Oral Once    [Held by provider] apixaban  5 mg Oral BID    doxepin  10 mg Oral Nightly    escitalopram  20 mg Oral Daily    metoprolol tartrate  25 mg Oral BID    pantoprazole  40 mg Oral BID    sodium chloride flush  10 mL Intravenous 2 times per day    mometasone-formoterol  2 puff Inhalation BID    gabapentin  100 mg Oral BID    nicotine  1 patch Transdermal Nightly     Continuous Infusions:   amiodarone 1 mg/min (02/16/20 0511)    Followed by   [facility] amiodarone      heparin (porcine) 18 Units/kg/hr (02/16/20 1036)     PRN Meds heparin (porcine), heparin (porcine), traMADol, menthol, hydrOXYzine, sodium chloride flush, ondansetron, polyethylene glycol, acetaminophen, albuterol sulfate HFA   Labs:   Labs reviewed  Recent Labs     02/14/20 0443 02/15/20  0814 02/16/20  0658    134* 137   K 4.8 4.3 4.7   CL 95* 96* 95*   CO2 22* 22* 22*   BUN 46* 42* 58*   CREATININE 7.8* 7.0* 8.4*   LABGLOM 5* 6* 5*   GLUCOSE 99 130* 82   CALCIUM 8.0* 8.2* 8.1*     Recent Labs     02/14/20  0443 02/15/20  0814 02/16/20  0658   WBC 16.1* 17.8* 13.0*   RBC 3.23* 3.25* 3.07*   HGB 9.5* 9.7* 9.0*   HCT 32.5* 32.8* 30.9*   .6* 100.9* 100.7*   MCH 29.4 29.8 29.3    188 169     Xr Chest Standard (2 Vw) 2/14/20  1. Right lower lobe patchy atelectasis/infiltrate changes with small right effusion. 2. Stable cardiac enlargement. 3. Stable appearance of Central venous catheter    ASSESSMENT:  1. End Stage Renal Disease on Hemodialysis,  Functioning AV fistula. Plan to removeTunneled Hemodialysis cath in AM. Eliquis on hold. Hemodialysis in AM  2. Fever, Leukocytosis. Remove HD cath, Cultures neg  3. Acute on chronic HFpEF 55%, resolved  4. Paroxymal atrial fibrillation with RVR, Amiodarone, heparin, metoprolol  5. Hx of Essential Hypertension with nephrosclerosis  6. Chronic Obstructive Pulmonary Disease, Asthma. Tobacco use  7. Chronic resp failure on home O2  8. Anemia of chronic disease, consider GI loss, Iron deficiency    Active Problems:    COPD exacerbation (HCC)    Elevated troponin    Elevated procalcitonin  Resolved Problems:    * No resolved hospital problems.  Priti Indioalexsandra, APRN - CNP 11:19 AM 2/16/2020

## 2020-02-16 NOTE — PROGRESS NOTES
9097 RN to room blood pressure 84/62. Pt placed in trendelberg per Hanna RN and updated RR RN. Primary RN to call provider  [address] [place] [address] Dr ETIENNE contacted. Orders for 0.9% bolus 250 ml to infuse 125 ml/hr  0617 bolus started. 0620 BP 94/76.  RR RN updated  4909 /78

## 2020-02-17 ENCOUNTER — APPOINTMENT (OUTPATIENT)
Dept: INTERVENTIONAL RADIOLOGY/VASCULAR | Age: 55
DRG: 291 | End: 2020-02-17
Payer: MEDICARE

## 2020-02-17 LAB
ANION GAP SERPL CALCULATED.3IONS-SCNC: 24 MEQ/L (ref 8–16)
APTT: 52.6 SECONDS (ref 22–38)
APTT: 57.9 SECONDS (ref 22–38)
APTT: 61.9 SECONDS (ref 22–38)
BUN BLDV-MCNC: 74 MG/DL (ref 7–22)
CALCIUM SERPL-MCNC: 8.3 MG/DL (ref 8.5–10.5)
CHLORIDE BLD-SCNC: 94 MEQ/L (ref 98–111)
CO2: 16 MEQ/L (ref 23–33)
CREAT SERPL-MCNC: 10.3 MG/DL (ref 0.4–1.2)
ERYTHROCYTE [DISTWIDTH] IN BLOOD BY AUTOMATED COUNT: 17 % (ref 11.5–14.5)
ERYTHROCYTE [DISTWIDTH] IN BLOOD BY AUTOMATED COUNT: 61.4 FL (ref 35–45)
GFR SERPL CREATININE-BSD FRML MDRD: 4 ML/MIN/1.73M2
GLUCOSE BLD-MCNC: 161 MG/DL (ref 70–108)
HCT VFR BLD CALC: 30.7 % (ref 37–47)
HEMOGLOBIN: 8.9 GM/DL (ref 12–16)
HEPATITIS B SURFACE ANTIGEN: NEGATIVE
MCH RBC QN AUTO: 29.1 PG (ref 26–33)
MCHC RBC AUTO-ENTMCNC: 29 GM/DL (ref 32.2–35.5)
MCV RBC AUTO: 100.3 FL (ref 81–99)
PLATELET # BLD: 173 THOU/MM3 (ref 130–400)
PMV BLD AUTO: 10.7 FL (ref 9.4–12.4)
POTASSIUM SERPL-SCNC: 5.2 MEQ/L (ref 3.5–5.2)
RBC # BLD: 3.06 MILL/MM3 (ref 4.2–5.4)
SODIUM BLD-SCNC: 134 MEQ/L (ref 135–145)
WBC # BLD: 5.8 THOU/MM3 (ref 4.8–10.8)

## 2020-02-17 PROCEDURE — 85027 COMPLETE CBC AUTOMATED: CPT

## 2020-02-17 PROCEDURE — 94640 AIRWAY INHALATION TREATMENT: CPT

## 2020-02-17 PROCEDURE — 2580000003 HC RX 258: Performed by: STUDENT IN AN ORGANIZED HEALTH CARE EDUCATION/TRAINING PROGRAM

## 2020-02-17 PROCEDURE — 6370000000 HC RX 637 (ALT 250 FOR IP): Performed by: PHYSICIAN ASSISTANT

## 2020-02-17 PROCEDURE — 36589 REMOVAL TUNNELED CV CATH: CPT | Performed by: RADIOLOGY

## 2020-02-17 PROCEDURE — 2709999900 HC NON-CHARGEABLE SUPPLY

## 2020-02-17 PROCEDURE — 36415 COLL VENOUS BLD VENIPUNCTURE: CPT

## 2020-02-17 PROCEDURE — 94760 N-INVAS EAR/PLS OXIMETRY 1: CPT

## 2020-02-17 PROCEDURE — 2580000003 HC RX 258: Performed by: PHARMACIST

## 2020-02-17 PROCEDURE — 1200000003 HC TELEMETRY R&B

## 2020-02-17 PROCEDURE — 93307 TTE W/O DOPPLER COMPLETE: CPT

## 2020-02-17 PROCEDURE — 6370000000 HC RX 637 (ALT 250 FOR IP)

## 2020-02-17 PROCEDURE — 90935 HEMODIALYSIS ONE EVALUATION: CPT

## 2020-02-17 PROCEDURE — 6370000000 HC RX 637 (ALT 250 FOR IP): Performed by: STUDENT IN AN ORGANIZED HEALTH CARE EDUCATION/TRAINING PROGRAM

## 2020-02-17 PROCEDURE — 6370000000 HC RX 637 (ALT 250 FOR IP): Performed by: INTERNAL MEDICINE

## 2020-02-17 PROCEDURE — 2580000003 HC RX 258: Performed by: PHYSICIAN ASSISTANT

## 2020-02-17 PROCEDURE — 6360000002 HC RX W HCPCS: Performed by: PHYSICIAN ASSISTANT

## 2020-02-17 PROCEDURE — 99233 SBSQ HOSP IP/OBS HIGH 50: CPT | Performed by: PHYSICIAN ASSISTANT

## 2020-02-17 PROCEDURE — 6360000002 HC RX W HCPCS: Performed by: PHARMACIST

## 2020-02-17 PROCEDURE — 2500000003 HC RX 250 WO HCPCS

## 2020-02-17 PROCEDURE — 80048 BASIC METABOLIC PNL TOTAL CA: CPT

## 2020-02-17 PROCEDURE — 85730 THROMBOPLASTIN TIME PARTIAL: CPT

## 2020-02-17 PROCEDURE — 99232 SBSQ HOSP IP/OBS MODERATE 35: CPT | Performed by: INTERNAL MEDICINE

## 2020-02-17 PROCEDURE — 2700000000 HC OXYGEN THERAPY PER DAY

## 2020-02-17 PROCEDURE — 87340 HEPATITIS B SURFACE AG IA: CPT

## 2020-02-17 RX ORDER — DIPHENHYDRAMINE HCL 25 MG
25 TABLET ORAL EVERY 6 HOURS PRN
Status: DISCONTINUED | OUTPATIENT
Start: 2020-02-17 | End: 2020-02-18 | Stop reason: HOSPADM

## 2020-02-17 RX ADMIN — ESCITALOPRAM 20 MG: 20 TABLET, FILM COATED ORAL at 11:43

## 2020-02-17 RX ADMIN — IPRATROPIUM BROMIDE AND ALBUTEROL SULFATE 1 AMPULE: .5; 3 SOLUTION RESPIRATORY (INHALATION) at 21:25

## 2020-02-17 RX ADMIN — GABAPENTIN 100 MG: 100 CAPSULE ORAL at 20:14

## 2020-02-17 RX ADMIN — GABAPENTIN 100 MG: 100 CAPSULE ORAL at 11:44

## 2020-02-17 RX ADMIN — DOXEPIN HYDROCHLORIDE 10 MG: 10 CAPSULE ORAL at 20:14

## 2020-02-17 RX ADMIN — TRAMADOL HYDROCHLORIDE 50 MG: 50 TABLET, FILM COATED ORAL at 20:14

## 2020-02-17 RX ADMIN — SODIUM CHLORIDE, PRESERVATIVE FREE 10 ML: 5 INJECTION INTRAVENOUS at 20:16

## 2020-02-17 RX ADMIN — VANCOMYCIN HYDROCHLORIDE 750 MG: 750 INJECTION, POWDER, LYOPHILIZED, FOR SOLUTION INTRAVENOUS at 15:33

## 2020-02-17 RX ADMIN — DIPHENHYDRAMINE HCL 25 MG: 25 TABLET ORAL at 08:20

## 2020-02-17 RX ADMIN — IPRATROPIUM BROMIDE AND ALBUTEROL SULFATE 1 AMPULE: .5; 3 SOLUTION RESPIRATORY (INHALATION) at 11:54

## 2020-02-17 RX ADMIN — Medication 2 PUFF: at 08:29

## 2020-02-17 RX ADMIN — SODIUM CHLORIDE, PRESERVATIVE FREE 10 ML: 5 INJECTION INTRAVENOUS at 11:41

## 2020-02-17 RX ADMIN — CEFTRIAXONE SODIUM 1 G: 1 INJECTION, POWDER, FOR SOLUTION INTRAMUSCULAR; INTRAVENOUS at 11:45

## 2020-02-17 RX ADMIN — PANTOPRAZOLE SODIUM 40 MG: 40 TABLET, DELAYED RELEASE ORAL at 20:14

## 2020-02-17 RX ADMIN — Medication 2 PUFF: at 21:25

## 2020-02-17 RX ADMIN — METOPROLOL TARTRATE 25 MG: 25 TABLET, FILM COATED ORAL at 20:14

## 2020-02-17 RX ADMIN — PANTOPRAZOLE SODIUM 40 MG: 40 TABLET, DELAYED RELEASE ORAL at 11:51

## 2020-02-17 RX ADMIN — IPRATROPIUM BROMIDE AND ALBUTEROL SULFATE 1 AMPULE: .5; 3 SOLUTION RESPIRATORY (INHALATION) at 08:23

## 2020-02-17 RX ADMIN — PREDNISONE 40 MG: 20 TABLET ORAL at 11:43

## 2020-02-17 RX ADMIN — HEPARIN SODIUM 22 UNITS/KG/HR: 10000 INJECTION, SOLUTION INTRAVENOUS at 03:01

## 2020-02-17 RX ADMIN — AMIODARONE HYDROCHLORIDE 200 MG: 200 TABLET ORAL at 11:44

## 2020-02-17 RX ADMIN — TRAMADOL HYDROCHLORIDE 50 MG: 50 TABLET, FILM COATED ORAL at 04:30

## 2020-02-17 ASSESSMENT — PAIN SCALES - GENERAL
PAINLEVEL_OUTOF10: 7
PAINLEVEL_OUTOF10: 0
PAINLEVEL_OUTOF10: 8

## 2020-02-17 NOTE — PROGRESS NOTES
Infusions:   heparin (porcine) Stopped (02/17/20 1341)       CBC:   Recent Labs     02/15/20  0814 02/16/20  0658 02/17/20  0610   WBC 17.8* 13.0* 5.8   HGB 9.7* 9.0* 8.9*    169 173     CMP:    Recent Labs     02/15/20  0814 02/16/20  0658 02/17/20  0610   * 137 134*   K 4.3 4.7 5.2   CL 96* 95* 94*   CO2 22* 22* 16*   BUN 42* 58* 74*   CREATININE 7.0* 8.4* 10.3*   GLUCOSE 130* 82 161*   CALCIUM 8.2* 8.1* 8.3*   LABGLOM 6* 5* 4*     Troponin: No results for input(s): TROPONINI in the last 72 hours. BNP: No results for input(s): BNP in the last 72 hours. INR:   Recent Labs     02/15/20  0814   INR 1.78*     Lipids: No results for input(s): CHOL, LDLDIRECT, TRIG, HDL, AMYLASE, LIPASE in the last 72 hours. Liver: No results for input(s): AST, ALT, ALKPHOS, PROT, LABALBU, BILITOT in the last 72 hours. Invalid input(s): BILDIR  Iron:  No results for input(s): IRONS, FERRITIN in the last 72 hours. Invalid input(s): LABIRONS  US CHEST INCLUDING MEDIASTINUM   Final Result   There is only a small right pleural effusion               **This report has been created using voice recognition software. It may contain minor errors which are inherent in voice recognition technology. **      Final report electronically signed by Dr. Marietta Tucker on 2/14/2020 12:37 PM      XR CHEST PORTABLE   Final Result   . 1. Right lower lobe patchy atelectasis/infiltrate changes with small right effusion. 2. Stable cardiac enlargement. 3. Stable appearance of Central venous catheter   **This report has been created using voice recognition software. It may contain minor errors which are inherent in voice recognition technology. **      Final report electronically signed by Dr. Derrell Davey on 2/14/2020 4:37 AM      XR CHEST STANDARD (2 VW)   Final Result   1. Mild cardiomegaly. 2. Small right-sided pleural effusion. 3. Mild dependent bibasilar airspace opacities may represent mild atelectasis or pneumonia. **This report has been created using voice recognition software. It may contain minor errors which are inherent in voice recognition technology. **      Final report electronically signed by Dr. Niels Patiño on 2/11/2020 9:50 AM      IR  [address]    (Results Pending)         Objective:   Vitals: /60   Pulse 82   Temp 97.7 °F (36.5 °C) (Oral)   Resp 18   Ht 5' 3\" (1.6 m)   Wt 158 lb 11.7 oz (72 kg)   SpO2 92%   BMI 28.12 kg/m²    Wt Readings from Last 3 Encounters:   02/17/20 158 lb 11.7 oz (72 kg)   01/30/20 165 lb 5.5 oz (75 kg)   01/03/20 168 lb 3.4 oz (76.3 kg)      24HR INTAKE/OUTPUT:      Intake/Output Summary (Last 24 hours) at 2/17/2020 1346  Last data filed at 2/17/2020 1338  Gross per 24 hour   Intake 2407 ml   Output 3400 ml   Net -993 ml       Constitutional:  Alert, awake, no apparent distress   Skin:normal with no rash or lesions. HEENT:Pupils are reactive . Throat is clear . Oral mucosa is moist   Neck:supple with no thyromegaly or carotid bruit  Cardiovascular:  S1, S2 without murmur or rubs   Respiratory:  Clear to ausculation without wheezes, rhonchi or rales. Abdomen: +bs, soft, no tenderness to palpation and no palpable mass. No abdominal bruit   Ext: No LE edema  Musculoskeletal:Intact  Neuro:Alert and oriented with no focal deficit. Speech is normal      Electronically signed by Sol Melgar MD on 2/17/2020 at 1:46 PM
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
## 2020-02-17 NOTE — PLAN OF CARE
Problem: Falls - Risk of:  Goal: Will remain free from falls  Description  Will remain free from falls   Outcome: Ongoing  Note:   No falls noted this shift. Continue falling star program. Bed alarm on, bed in low position. Call light and personal belongings in reach. Patient uses call light appropriately. Problem: Pain:  Goal: Pain level will decrease  Description  Pain level will decrease   Outcome: Ongoing  Note:   Patient free from pain this shift. Pain rated on 0-10 pain rating scale. Will continue to reassess. Problem: Discharge Planning:  Goal: Discharged to appropriate level of care  Description  Discharged to appropriate level of care   Outcome: Ongoing  Note:   Patient plans to be discharged to shelter when medically stable she is currently trying to find one. Waiting on getting patient oxygen at discharge     Problem: Fluid Volume - Deficit:  Goal: Absence of fluid volume deficit signs and symptoms  Description  Absence of fluid volume deficit signs and symptoms   Outcome: Ongoing  Note:   Patient had hemodialysis today they removed 3L. Patient has trace edema BLE. Patient has SOB with exertion on 2L NC to keep oxygen saturation above 90%     Problem: Respiratory  Goal: No pulmonary complications  Outcome: Ongoing  Note:   Patient has SOB with exertion. Patient requiring 2L NC to keep oxygen saturation above 90%     Problem: DISCHARGE BARRIERS  Goal: Patient's continuum of care needs are met  Outcome: Ongoing  Note:   \Patient plans to be discharged to shelter when medically stable she is currently trying to find one.  Waiting on getting patient oxygen at discharge     Problem: Nutrition  Goal: Optimal nutrition therapy  Outcome: Ongoing  Note:   Patient on renal diet no added salt with 1500 ml fluid restriction tolerating well      Problem: Impaired respiratory status  Goal: Clear lung sounds  2/17/2020 0825 by Ramy Avendano RCP  Outcome: Ongoing  Note:   Tx to help improve lung aeration. Care plan reviewed with patient and family. Patient and family verbalize understanding of the plan of care and contribute to goal setting.
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
## 2020-02-17 NOTE — FLOWSHEET NOTE
02/17/20 0738 02/17/20 1100   Vital Signs   /68 134/85   Temp 97.2 °F (36.2 °C) 97.1 °F (36.2 °C)   Pulse 67 76   Resp  --  25   Weight 165 lb 5.5 oz (75 kg) 158 lb 11.7 oz (72 kg)   Weight Method Bed scale Bed scale   Percent Weight Change  --  -4   Post-Hemodialysis Assessment   Post-Treatment Procedures  --  Blood returned; Access bleeding time < 10 minutes   Machine Disinfection Process  --  Acid/Vinegar Clean;Heat Disinfect; Exterior Machine Disinfection   Rinseback Volume (ml)  --  400 ml   Total Liters Processed (l/min)  --  59.3 l/min   Dialyzer Clearance  --  Lightly streaked   Duration of Treatment (minutes)  --  180 minutes   Heparin amount administered during treatment (units)  --  0 units   Hemodialysis Intake (ml)  --  400 ml   Hemodialysis Output (ml)  --  3400 ml   NET Removed (ml)  --  3000 ml   Tolerated Treatment  --  Good   stable 3 hour treatment. 3 liters net uf. Benadryl administered during treatment. pressure held both cannulation sites x 10 minutes, dressings dry and intact. treatment record placed in scan pile/.

## 2020-02-18 VITALS
TEMPERATURE: 97.7 F | HEART RATE: 63 BPM | SYSTOLIC BLOOD PRESSURE: 143 MMHG | DIASTOLIC BLOOD PRESSURE: 73 MMHG | WEIGHT: 158.73 LBS | RESPIRATION RATE: 15 BRPM | HEIGHT: 63 IN | BODY MASS INDEX: 28.12 KG/M2 | OXYGEN SATURATION: 92 %

## 2020-02-18 LAB
ANION GAP SERPL CALCULATED.3IONS-SCNC: 18 MEQ/L (ref 8–16)
APTT: 62.6 SECONDS (ref 22–38)
APTT: 74.6 SECONDS (ref 22–38)
BUN BLDV-MCNC: 43 MG/DL (ref 7–22)
CALCIUM SERPL-MCNC: 8.3 MG/DL (ref 8.5–10.5)
CHLORIDE BLD-SCNC: 93 MEQ/L (ref 98–111)
CO2: 22 MEQ/L (ref 23–33)
CREAT SERPL-MCNC: 6.3 MG/DL (ref 0.4–1.2)
ERYTHROCYTE [DISTWIDTH] IN BLOOD BY AUTOMATED COUNT: 17 % (ref 11.5–14.5)
ERYTHROCYTE [DISTWIDTH] IN BLOOD BY AUTOMATED COUNT: 61.7 FL (ref 35–45)
GFR SERPL CREATININE-BSD FRML MDRD: 7 ML/MIN/1.73M2
GLUCOSE BLD-MCNC: 145 MG/DL (ref 70–108)
HCT VFR BLD CALC: 30.1 % (ref 37–47)
HEMOGLOBIN: 8.8 GM/DL (ref 12–16)
MCH RBC QN AUTO: 29.2 PG (ref 26–33)
MCHC RBC AUTO-ENTMCNC: 29.2 GM/DL (ref 32.2–35.5)
MCV RBC AUTO: 100 FL (ref 81–99)
PLATELET # BLD: 205 THOU/MM3 (ref 130–400)
PMV BLD AUTO: 10.3 FL (ref 9.4–12.4)
POTASSIUM SERPL-SCNC: 4.4 MEQ/L (ref 3.5–5.2)
RBC # BLD: 3.01 MILL/MM3 (ref 4.2–5.4)
SODIUM BLD-SCNC: 133 MEQ/L (ref 135–145)
WBC # BLD: 5.8 THOU/MM3 (ref 4.8–10.8)

## 2020-02-18 PROCEDURE — 80048 BASIC METABOLIC PNL TOTAL CA: CPT

## 2020-02-18 PROCEDURE — 85027 COMPLETE CBC AUTOMATED: CPT

## 2020-02-18 PROCEDURE — 2580000003 HC RX 258: Performed by: STUDENT IN AN ORGANIZED HEALTH CARE EDUCATION/TRAINING PROGRAM

## 2020-02-18 PROCEDURE — 85730 THROMBOPLASTIN TIME PARTIAL: CPT

## 2020-02-18 PROCEDURE — 6370000000 HC RX 637 (ALT 250 FOR IP): Performed by: PHYSICIAN ASSISTANT

## 2020-02-18 PROCEDURE — 36415 COLL VENOUS BLD VENIPUNCTURE: CPT

## 2020-02-18 PROCEDURE — 99239 HOSP IP/OBS DSCHRG MGMT >30: CPT | Performed by: PHYSICIAN ASSISTANT

## 2020-02-18 PROCEDURE — 94761 N-INVAS EAR/PLS OXIMETRY MLT: CPT

## 2020-02-18 PROCEDURE — 6360000002 HC RX W HCPCS: Performed by: PHYSICIAN ASSISTANT

## 2020-02-18 PROCEDURE — 6370000000 HC RX 637 (ALT 250 FOR IP): Performed by: INTERNAL MEDICINE

## 2020-02-18 PROCEDURE — 94640 AIRWAY INHALATION TREATMENT: CPT

## 2020-02-18 PROCEDURE — 99232 SBSQ HOSP IP/OBS MODERATE 35: CPT | Performed by: PHYSICIAN ASSISTANT

## 2020-02-18 PROCEDURE — 99232 SBSQ HOSP IP/OBS MODERATE 35: CPT | Performed by: NURSE PRACTITIONER

## 2020-02-18 PROCEDURE — 6370000000 HC RX 637 (ALT 250 FOR IP): Performed by: STUDENT IN AN ORGANIZED HEALTH CARE EDUCATION/TRAINING PROGRAM

## 2020-02-18 RX ORDER — AMOXICILLIN AND CLAVULANATE POTASSIUM 500; 125 MG/1; MG/1
1 TABLET, FILM COATED ORAL
Status: DISCONTINUED | OUTPATIENT
Start: 2020-02-18 | End: 2020-02-18 | Stop reason: HOSPADM

## 2020-02-18 RX ORDER — PREDNISONE 20 MG/1
40 TABLET ORAL DAILY
Qty: 6 TABLET | Refills: 0 | Status: SHIPPED | OUTPATIENT
Start: 2020-02-19 | End: 2020-02-22

## 2020-02-18 RX ORDER — AMOXICILLIN AND CLAVULANATE POTASSIUM 500; 125 MG/1; MG/1
1 TABLET, FILM COATED ORAL
Qty: 7 TABLET | Refills: 0 | Status: SHIPPED | OUTPATIENT
Start: 2020-02-19 | End: 2020-02-26

## 2020-02-18 RX ORDER — AMIODARONE HYDROCHLORIDE 200 MG/1
200 TABLET ORAL DAILY
Qty: 30 TABLET | Refills: 1 | Status: SHIPPED | OUTPATIENT
Start: 2020-02-19 | End: 2021-01-01 | Stop reason: ALTCHOICE

## 2020-02-18 RX ADMIN — ESCITALOPRAM 20 MG: 20 TABLET, FILM COATED ORAL at 08:27

## 2020-02-18 RX ADMIN — PANTOPRAZOLE SODIUM 40 MG: 40 TABLET, DELAYED RELEASE ORAL at 08:27

## 2020-02-18 RX ADMIN — APIXABAN 5 MG: 5 TABLET, FILM COATED ORAL at 12:04

## 2020-02-18 RX ADMIN — AMOXICILLIN AND CLAVULANATE POTASSIUM 1 TABLET: 500; 125 TABLET, FILM COATED ORAL at 09:33

## 2020-02-18 RX ADMIN — GABAPENTIN 100 MG: 100 CAPSULE ORAL at 08:31

## 2020-02-18 RX ADMIN — PREDNISONE 40 MG: 20 TABLET ORAL at 08:26

## 2020-02-18 RX ADMIN — IPRATROPIUM BROMIDE AND ALBUTEROL SULFATE 1 AMPULE: .5; 3 SOLUTION RESPIRATORY (INHALATION) at 07:54

## 2020-02-18 RX ADMIN — Medication 2 PUFF: at 07:54

## 2020-02-18 RX ADMIN — AMIODARONE HYDROCHLORIDE 200 MG: 200 TABLET ORAL at 08:26

## 2020-02-18 RX ADMIN — SODIUM CHLORIDE, PRESERVATIVE FREE 10 ML: 5 INJECTION INTRAVENOUS at 08:31

## 2020-02-18 RX ADMIN — IPRATROPIUM BROMIDE AND ALBUTEROL SULFATE 1 AMPULE: .5; 3 SOLUTION RESPIRATORY (INHALATION) at 12:37

## 2020-02-18 RX ADMIN — METOPROLOL TARTRATE 25 MG: 25 TABLET, FILM COATED ORAL at 08:26

## 2020-02-18 RX ADMIN — HEPARIN SODIUM 24 UNITS/KG/HR: 10000 INJECTION, SOLUTION INTRAVENOUS at 01:16

## 2020-02-18 NOTE — PROGRESS NOTES
A home oxygen evaluation has been completed. [x]Patient is an inpatient. It is expected that the patient will be discharged within the next 48 hours. Qualified provider to write order for home prescription if patient qualifies. Social service/care managers will arrange for home oxygen. If patient is active, arrange for Home Medical supplier to assess for Oxygen Conserving Device per pulse oximetry. []Patient is an outpatient. Results will be faxed to the ordering provider. Qualified provider to write order for home prescription if patient qualifies and arranges for home oxygen. Patient was placed on room air for 70 minutes. SpO2 was 93 % on room air at rest. Patients SpO2 was 89% or above and did not qualify for home oxygen. Patient was walked for 10 minutes. SpO2 was 84 % during walking. Patients SpO2 was below 89% and qualified for home oxygen. Oxygen was applied at 2 lpm via nasal cannula to maintain a SpO2 between 90-92% while walking. Actual SpO2 was 92 %. Note: For any SpO2 at 82% see policy and procedure for possible qualifications.

## 2020-02-18 NOTE — PROGRESS NOTES
CLINICAL PHARMACY: DISCHARGE MED RECONCILIATION/REVIEW    [facility]) Select Patient?: No  Total # of Interventions Recommended: 0   -   Total # Interventions Accepted: 0  Intervention Severity:   - Level 1 Intervention Present?: No   - Level 2 #: 0   - Level 3 #: 0   Time Spent (min): 15    Eren IsaacsD, BCPS  2/18/2020  11:41 AM

## 2020-02-18 NOTE — PROGRESS NOTES
Nephrology Progress Note    Patient - Ervin Penaloza   MRN -  [id number]   Acct # - [de-identified]      - 1965    49 y.o. Admit Date: 2020  Hospital Day: 7  Location: --A  Date of evaluation -  2020    Subjective:   CC: shortness of breath   Denies shortness of breath on 2 lpm  Hemodialysis  with 3 L Ultrafiltration   BP Range: Systolic (25YCO), VSW:895 , Min:108 , HJF:173      Diastolic (54DJL), UNK:36, Min:60, Max:85    Objective:   VITALS:  /74   Pulse 68   Temp 97.6 °F (36.4 °C) (Oral)   Resp 17   Ht 5' 3\" (1.6 m)   Wt 158 lb 11.7 oz (72 kg)   SpO2 98%   BMI 28.12 kg/m²    Patient Vitals for the past 24 hrs:   BP Temp Temp src Pulse Resp SpO2 Weight   20 0714 133/74 97.6 °F (36.4 °C) Oral 68 17 -- --   20 0440 130/70 97.4 °F (36.3 °C) Oral 63 18 98 % --   20 2341 120/60 97.5 °F (36.4 °C) Oral 67 18 92 % --   20 (!) 153/80 98 °F (36.7 °C) Oral 70 18 95 % --   20 1640 135/74 97.8 °F (36.6 °C) Oral 73 17 92 % --   20 1130 108/60 97.7 °F (36.5 °C) Oral 82 18 92 % --   20 1129 -- -- -- -- -- (!) 87 % --   20 1100 134/85 97.1 °F (36.2 °C) -- 76 25 -- 158 lb 11.7 oz (72 kg)     2 L/min      Intake/Output Summary (Last 24 hours) at 2020 8486  Last data filed at 2020 0440  Gross per 24 hour   Intake 1712 ml   Output 3400 ml   Net -1688 ml       Admission weight: 160 lb (72.6 kg)  Patient Vitals for the past 96 hrs (Last 3 readings):   Weight   20 1100 158 lb 11.7 oz (72 kg)   20 0738 165 lb 5.5 oz (75 kg)   20 0324 165 lb 9.6 oz (75.1 kg)       EXAM:  CONSTITUTIONAL:  No acute distress. Pleasant  HEENT:  Head is normocephalic, Extraocular movement intact. Neck is supple. Voice is clear. CARDIOVASCULAR:  S1, S2  Tachycardia, irregular rate and rhythm. RESPIRATORY: Clear to ausculation bilaterally. Equal breath sounds. No wheezes.  No shortness of breath noted at rest.  ABDOMEN: soft, non Out Pt schedule   2. Acute on chronic HFpEF 55%, resolved  3. Paroxymal atrial fibrillation   4. Hx of Essential Hypertension with nephrosclerosis  5. Chronic Obstructive Pulmonary Disease, Asthma. Tobacco use  6. Chronic resp failure on home O2  7. Anemia of chronic disease, Iron deficiency    Active Problems:    COPD exacerbation (HCC)    Elevated troponin    Elevated procalcitonin  Resolved Problems:    * No resolved hospital problems.  NARINDER Jose - CNP 8:33 AM 2/18/2020

## 2020-02-18 NOTE — PLAN OF CARE
Problem: Falls - Risk of:  Goal: Will remain free from falls  Description  Will remain free from falls  Outcome: Ongoing  Note:   Patient was reeducated about using the call light appropriately. Pt belongings within reach. Pt cautioned against getting up without nurse or staff in room. Bed is set at lowest position, side rails up as appropriate. Problem: Falls - Risk of:  Goal: Absence of physical injury  Description  Absence of physical injury  Outcome: Ongoing  Note:   There has not been any episodes of physical injury at this time in the shift       Problem: Pain:  Goal: Pain level will decrease  Description  Pain level will decrease  Outcome: Ongoing  Note:   Pt not complaining of any pain at this time, will continue to reassess. Problem: Discharge Planning:  Goal: Discharged to appropriate level of care  Description  Discharged to appropriate level of care  Outcome: Ongoing  Note:   Social workers working with pt to find a homeless shelter for pt to stay at when she gets discharged. Problem: Respiratory  Goal: No pulmonary complications  Outcome: Ongoing  Note:   Pt lungs are clear at this time. Will continue to reassess. Care plan reviewed with patient. Patient verbalize understanding of the plan of care and contribute to goal setting.

## 2020-02-18 NOTE — PLAN OF CARE
Problem: Nutrition  Goal: Optimal nutrition therapy  2/18/2020 1126 by Lisbeth Hutchinson RD, LUKASZ  Outcome: Ongoing   Nutrition Problem: Increased nutrient needs  Intervention: Food and/or Nutrient Delivery: Continue current diet  Nutritional Goals: Patient will consume 75% or more of meals during LOS.

## 2020-02-18 NOTE — DISCHARGE SUMMARY
Rate 132 BPM    Atrial Rate 138 BPM    QRS Duration 94 ms    Q-T Interval 348 ms    QTc Calculation (Bazett) 515 ms    R Axis 31 degrees    T Axis -170 degrees   Basic Metabolic Panel    Collection Time: 02/16/20  6:58 AM   Result Value Ref Range    Sodium 137 135 - 145 meq/L    Potassium 4.7 3.5 - 5.2 meq/L    Chloride 95 (L) 98 - 111 meq/L    CO2 22 (L) 23 - 33 meq/L    Glucose 82 70 - 108 mg/dL    BUN 58 (H) 7 - 22 mg/dL    CREATININE 8.4 (HH) 0.4 - 1.2 mg/dL    Calcium 8.1 (L) 8.5 - 10.5 mg/dL   CBC    Collection Time: 02/16/20  6:58 AM   Result Value Ref Range    WBC 13.0 (H) 4.8 - 10.8 thou/mm3    RBC 3.07 (L) 4.20 - 5.40 mill/mm3    Hemoglobin 9.0 (L) 12.0 - 16.0 gm/dl    Hematocrit 30.9 (L) 37.0 - 47.0 %    .7 (H) 81.0 - 99.0 fL    MCH 29.3 26.0 - 33.0 pg    MCHC 29.1 (L) 32.2 - 35.5 gm/dl    RDW-CV 17.2 (H) 11.5 - 14.5 %    RDW-SD 61.0 (H) 35.0 - 45.0 fL    Platelets 225 217 - 242 thou/mm3    MPV 10.1 9.4 - 12.4 fL   Anion Gap    Collection Time: 02/16/20  6:58 AM   Result Value Ref Range    Anion Gap 20.0 (H) 8.0 - 16.0 meq/L   Glomerular Filtration Rate, Estimated    Collection Time: 02/16/20  6:58 AM   Result Value Ref Range    Est, Glom Filt Rate 5 (A) ml/min/1.73m2   Troponin    Collection Time: 02/16/20  6:58 AM   Result Value Ref Range    Troponin T 0.052 (A) ng/ml   APTT    Collection Time: 02/16/20  9:11 AM   Result Value Ref Range    aPTT 34.9 22.0 - 38.0 seconds   APTT    Collection Time: 02/16/20  4:51 PM   Result Value Ref Range    aPTT 59.3 (H) 22.0 - 38.0 seconds   APTT    Collection Time: 02/16/20 11:39 PM   Result Value Ref Range    aPTT 52.6 (H) 22.0 - 38.0 seconds   Basic Metabolic Panel    Collection Time: 02/17/20  6:10 AM   Result Value Ref Range    Sodium 134 (L) 135 - 145 meq/L    Potassium 5.2 3.5 - 5.2 meq/L    Chloride 94 (L) 98 - 111 meq/L    CO2 16 (L) 23 - 33 meq/L    Glucose 161 (H) 70 - 108 mg/dL    BUN 74 (H) 7 - 22 mg/dL    CREATININE 10.3 (HH) 0.4 - 1.2 mg/dL Calcium 8.3 (L) 8.5 - 10.5 mg/dL   CBC    Collection Time: 02/17/20  6:10 AM   Result Value Ref Range    WBC 5.8 4.8 - 10.8 thou/mm3    RBC 3.06 (L) 4.20 - 5.40 mill/mm3    Hemoglobin 8.9 (L) 12.0 - 16.0 gm/dl    Hematocrit 30.7 (L) 37.0 - 47.0 %    .3 (H) 81.0 - 99.0 fL    MCH 29.1 26.0 - 33.0 pg    MCHC 29.0 (L) 32.2 - 35.5 gm/dl    RDW-CV 17.0 (H) 11.5 - 14.5 %    RDW-SD 61.4 (H) 35.0 - 45.0 fL    Platelets 550 026 - 493 thou/mm3    MPV 10.7 9.4 - 12.4 fL   APTT    Collection Time: 02/17/20  6:10 AM   Result Value Ref Range    aPTT 61.9 (H) 22.0 - 38.0 seconds   Anion Gap    Collection Time: 02/17/20  6:10 AM   Result Value Ref Range    Anion Gap 24.0 (H) 8.0 - 16.0 meq/L   Glomerular Filtration Rate, Estimated    Collection Time: 02/17/20  6:10 AM   Result Value Ref Range    Est, Glom Filt Rate 4 (A) ml/min/1.73m2   Hepatitis B surface antigen    Collection Time: 02/17/20  7:49 AM   Result Value Ref Range    Hepatitis B Surface Ag Negative    APTT    Collection Time: 02/17/20 11:47 AM   Result Value Ref Range    aPTT 57.9 (H) 22.0 - 38.0 seconds   APTT    Collection Time: 02/18/20  1:57 AM   Result Value Ref Range    aPTT 62.6 (H) 22.0 - 38.0 seconds   Basic Metabolic Panel    Collection Time: 02/18/20  1:57 AM   Result Value Ref Range    Sodium 133 (L) 135 - 145 meq/L    Potassium 4.4 3.5 - 5.2 meq/L    Chloride 93 (L) 98 - 111 meq/L    CO2 22 (L) 23 - 33 meq/L    Glucose 145 (H) 70 - 108 mg/dL    BUN 43 (H) 7 - 22 mg/dL    CREATININE 6.3 (HH) 0.4 - 1.2 mg/dL    Calcium 8.3 (L) 8.5 - 10.5 mg/dL   CBC    Collection Time: 02/18/20  1:57 AM   Result Value Ref Range    WBC 5.8 4.8 - 10.8 thou/mm3    RBC 3.01 (L) 4.20 - 5.40 mill/mm3    Hemoglobin 8.8 (L) 12.0 - 16.0 gm/dl    Hematocrit 30.1 (L) 37.0 - 47.0 %    .0 (H) 81.0 - 99.0 fL    MCH 29.2 26.0 - 33.0 pg    MCHC 29.2 (L) 32.2 - 35.5 gm/dl    RDW-CV 17.0 (H) 11.5 - 14.5 %    RDW-SD 61.7 (H) 35.0 - 45.0 fL    Platelets 305 836 - 404 thou/mm3    MPV cardiomegaly. 2. Small right-sided pleural effusion. 3. Mild dependent bibasilar airspace opacities may represent mild atelectasis or pneumonia. **This report has been created using voice recognition software. It may contain minor errors which are inherent in voice recognition technology. ** Final report electronically signed by Dr. Glenna Goldman on 2/11/2020 9:50 AM       Consults:   IP CONSULT TO NEPHROLOGY  IP CONSULT TO SOCIAL WORK  STR ED TO IP CONSULT  IP CONSULT TO CARDIOLOGY    Discharge Medications:      Medication List      CONTINUE taking these medications    albuterol sulfate  (90 Base) MCG/ACT inhaler  Commonly known as:  Ventolin HFA  Inhale 1 puff into the lungs every 6 hours as needed for Wheezing  Notes to patient:  Rescue inhaler      aluminum hydroxide 320 MG/5ML suspension  Commonly known as:  ALTERNGEL  5-10 ml 4 times daily as needed for stomach pain     apixaban 5 MG Tabs tablet  Commonly known as:  Eliquis  Take 1 tablet by mouth 2 times daily     calcium acetate 667 MG capsule  Commonly known as:  PHOSLO  Notes to patient:  Lowers phosphorus levels. Take with each meal      doxepin 10 MG capsule  Commonly known as:  SINEQUAN  Take 1 capsule by mouth nightly     escitalopram 20 MG tablet  Commonly known as:  LEXAPRO  Take 1 tablet by mouth daily     Fluticasone furoate-vilanterol 200-25 MCG/INH Aepb inhaler  Commonly known as:  Breo Ellipta  Inhale 1 puff into the lungs daily  Notes to patient:  Maintenance inhaler     gabapentin 100 MG capsule  Commonly known as:  NEURONTIN  Take 1 capsule by mouth 2 times daily for 30 days.      hydrOXYzine 25 MG capsule  Commonly known as:  VISTARIL  Take 1 capsule by mouth 4 times daily as needed for Anxiety     metoprolol tartrate 25 MG tablet  Commonly known as:  LOPRESSOR  Take 1 tablet by mouth 2 times daily     nicotine 21 MG/24HR  Commonly known as:  NICODERM CQ  Place 1 patch onto the skin daily     pantoprazole 40 MG tablet  Commonly known

## 2020-02-18 NOTE — PLAN OF CARE
Problem: Falls - Risk of:  Goal: Will remain free from falls  Description  Will remain free from falls   2/18/2020 1332 by Amanda Carrillo RN  Outcome: Ongoing  Note:   No falls noted this shift. Continue falling star program. Bed alarm on, bed in low position. Call light and personal belongings in reach. Patient uses call light appropriately. Problem: Pain:  Goal: Pain level will decrease  Description  Pain level will decrease   2/18/2020 1332 by Amanda Carrillo RN  Outcome: Ongoing  Note:   Patient free from pain this shift. Pain rated on 0-10 pain rating scale. Will continue to reassess. Problem: Discharge Planning:  Goal: Discharged to appropriate level of care  Description  Discharged to appropriate level of care   2/18/2020 1332 by Amanda Carrillo RN  Outcome: Ongoing  Note:   Patient plans to be discharged to shelter or home with her daughter when medically stable      Problem: Fluid Volume - Deficit:  Goal: Absence of fluid volume deficit signs and symptoms  Description  Absence of fluid volume deficit signs and symptoms   2/18/2020 1332 by Amanda Carrillo RN  Outcome: Ongoing  Note:   Patient to have hemodialysis tomorrow as scheduled. No signs of swelling BLE. Problem: Respiratory  Goal: No pulmonary complications  4/31/7205 1332 by Amanda Carrillo RN  Outcome: Ongoing  Note:   Patient SOB with exertion. 2L NC to keep oxygen saturation above 90% patient to be discharged with it      Problem: Nutrition  Goal: Optimal nutrition therapy  2/18/2020 1332 by Amanda Carrillo RN  Outcome: Ongoing  Note:   Patient on renal diet tolerating well    Care plan reviewed with patient and family. Patient and family verbalize understanding of the plan of care and contribute to goal setting.

## 2020-02-18 NOTE — PROGRESS NOTES
visually estimated at 60%. Overall left ventricular function is normal.   Aortic valve appears tricuspid. Thickened aortic valve leaflets noted. Aortic valve leaflets are Mildly calcified. No evidence of any pericardial effusion. Signature      ----------------------------------------------------------------   Electronically signed by Edna Bates MD (Interpreting   physician) on 02/17/2020 at 04:37 PM    Lab Data:    Cardiac Enzymes:  No results for input(s): CKTOTAL, CKMB, CKMBINDEX, TROPONINI in the last 72 hours.     CBC:   Lab Results   Component Value Date    WBC 5.8 02/18/2020    RBC 3.01 02/18/2020    HGB 8.8 02/18/2020    HCT 30.1 02/18/2020     02/18/2020       CMP:    Lab Results   Component Value Date     02/18/2020    K 4.4 02/18/2020    K 4.8 02/14/2020    CL 93 02/18/2020    CO2 22 02/18/2020    BUN 43 02/18/2020    CREATININE 6.3 02/18/2020    LABGLOM 7 02/18/2020    GLUCOSE 145 02/18/2020    CALCIUM 8.3 02/18/2020       Hepatic Function Panel:    Lab Results   Component Value Date    ALKPHOS 134 01/28/2020    ALT 22 01/28/2020    AST 15 01/28/2020    PROT 6.0 01/28/2020    BILITOT 0.3 01/28/2020    LABALBU 3.3 01/29/2020       Magnesium:    Lab Results   Component Value Date    MG 2.5 02/11/2020       PT/INR:    Lab Results   Component Value Date    INR 1.78 02/15/2020       HgBA1c:    Lab Results   Component Value Date    LABA1C 4.8 12/29/2019       FLP:  No results found for: TRIG, HDL, LDLCALC, LDLDIRECT, LABVLDL    TSH:    Lab Results   Component Value Date    TSH 2.170 11/12/2019         Assessment:    PAF RVR - currently NSR  Acute on chronic diastolic CHF/fluid overload from missing HD  Ef 60 per echo 2/17/20  PNA  Chronically elevated troponins - denies any cp, sob has resolved after diaysis  ESRD - on HD  HTN  Hx moderate pericardial effusion 1/3/20, no pericardial effusion 2/17/20  Hx COPD - on home O2  Tobacco abuse    Plan:  · Normal tsh 11/12/19  · Normal AST/ALT 1/28/20  · Keep mag >2 and K >4  · Cont amio/BB  Patient is advised of amiodarone toxicity and the need for follow-up tests for evaluation that include:  1. Yearly PA \ LATchest xray   2. Hepatic panel  3.   TSH  4. Yearly Eye exams  · Cont [address] eliquis - pt understands risk of bleeding and accepts risk to reduce risk of embolic phenomenon  · Will see prn  · F/up dr Diamond Alfonso 2-4 weeks         Electronically signed by Timbo Zimmerman PA-C on 2/18/2020 at 10:35 AM

## 2020-02-19 LAB
BLOOD CULTURE, ROUTINE: NORMAL
BLOOD CULTURE, ROUTINE: NORMAL

## 2020-02-28 NOTE — PROGRESS NOTES
CLINICAL PHARMACY NOTE: MEDS TO [address] Patient?: No  Total # of Prescriptions Filled: 11    The following medications were delivered to the patient:  Hydroxyzine pamoate 25mg  Eliquis 5mg  Escitalopram 20mg  Breo Ellipta  Nicotine 21mg patch  Pantoprazole 40mg  Doxepin 10mg  Metoprolol Tartrate 25mg  Amiodarone 200mg  Augmentin 500mg  Prednisone 20mg  Total # of Interventions Completed: 2  Time Spent (min): 30    Additional Documentation:

## 2020-03-13 PROBLEM — R79.89 ELEVATED TROPONIN: Status: RESOLVED | Noted: 2019-11-23 | Resolved: 2020-03-13

## 2020-03-13 PROBLEM — R77.8 ELEVATED TROPONIN: Status: RESOLVED | Noted: 2019-11-23 | Resolved: 2020-03-13

## 2020-12-17 ENCOUNTER — APPOINTMENT (OUTPATIENT)
Dept: GENERAL RADIOLOGY | Age: 55
DRG: 004 | End: 2020-12-17
Payer: MEDICARE

## 2020-12-17 ENCOUNTER — HOSPITAL ENCOUNTER (INPATIENT)
Age: 55
LOS: 20 days | Discharge: NURSING, REHAB OR LONG-TERM CARE | DRG: 004 | End: 2021-01-06
Attending: EMERGENCY MEDICINE | Admitting: SURGERY
Payer: MEDICARE

## 2020-12-17 DIAGNOSIS — T20.00XA BURN OF FACE, UNSPECIFIED BURN DEGREE, INITIAL ENCOUNTER: Primary | ICD-10-CM

## 2020-12-17 PROBLEM — T20.20XA SECOND DEGREE BURN OF FACE: Status: ACTIVE | Noted: 2020-12-17

## 2020-12-17 PROBLEM — T31.10 BURNS INVOLV 10-19% OF BODY SURFACE W/LESS THAN 10% THIRD DEGREE BURNS: Status: ACTIVE | Noted: 2020-12-17

## 2020-12-17 PROBLEM — N18.9 UREMIC PERICARDITIS: Status: RESOLVED | Noted: 2020-01-02 | Resolved: 2020-12-17

## 2020-12-17 PROBLEM — T25.221A SECOND DEGREE BURN OF RIGHT FOOT: Status: ACTIVE | Noted: 2020-12-17

## 2020-12-17 PROBLEM — B33.8 RESPIRATORY SYNCYTIAL VIRUS: Status: RESOLVED | Noted: 2020-01-01 | Resolved: 2020-12-17

## 2020-12-17 PROBLEM — I31.39 PERICARDIAL EFFUSION: Status: RESOLVED | Noted: 2019-12-29 | Resolved: 2020-12-17

## 2020-12-17 PROBLEM — E87.70 VOLUME OVERLOAD: Status: RESOLVED | Noted: 2020-01-28 | Resolved: 2020-12-17

## 2020-12-17 PROBLEM — T25.222A SECOND DEGREE BURN OF LEFT FOOT: Status: ACTIVE | Noted: 2020-12-17

## 2020-12-17 PROBLEM — J44.1 COPD EXACERBATION (HCC): Status: RESOLVED | Noted: 2019-11-15 | Resolved: 2020-12-17

## 2020-12-17 PROBLEM — I32 UREMIC PERICARDITIS: Status: RESOLVED | Noted: 2020-01-02 | Resolved: 2020-12-17

## 2020-12-17 PROBLEM — E87.70 FLUID OVERLOAD: Status: RESOLVED | Noted: 2019-11-11 | Resolved: 2020-12-17

## 2020-12-17 PROBLEM — D64.9 NORMOCYTIC ANEMIA: Status: RESOLVED | Noted: 2019-11-23 | Resolved: 2020-12-17

## 2020-12-17 PROBLEM — K25.9 PREPYLORIC ULCER: Status: RESOLVED | Noted: 2019-11-25 | Resolved: 2020-12-17

## 2020-12-17 PROBLEM — T24.201A SECOND DEGREE BURN OF RIGHT LEG: Status: ACTIVE | Noted: 2020-12-17

## 2020-12-17 PROBLEM — K20.90 ESOPHAGITIS: Status: RESOLVED | Noted: 2019-11-25 | Resolved: 2020-12-17

## 2020-12-17 PROBLEM — T24.202A SECOND DEGREE BURN OF LEFT LEG: Status: ACTIVE | Noted: 2020-12-17

## 2020-12-17 LAB
ABO/RH: NORMAL
ABSOLUTE EOS #: 0.18 K/UL (ref 0–0.44)
ABSOLUTE IMMATURE GRANULOCYTE: 0.09 K/UL (ref 0–0.3)
ABSOLUTE LYMPH #: 1.61 K/UL (ref 1.1–3.7)
ABSOLUTE MONO #: 0.83 K/UL (ref 0.1–1.2)
ALLEN TEST: ABNORMAL
ALLEN TEST: POSITIVE
ANION GAP SERPL CALCULATED.3IONS-SCNC: 14 MMOL/L (ref 9–17)
ANION GAP SERPL CALCULATED.3IONS-SCNC: 15 MMOL/L (ref 9–17)
ANTIBODY SCREEN: NEGATIVE
ARM BAND NUMBER: NORMAL
BASOPHILS # BLD: 1 % (ref 0–2)
BASOPHILS ABSOLUTE: 0.07 K/UL (ref 0–0.2)
BLOOD BANK SPECIMEN: ABNORMAL
BUN BLDV-MCNC: 34 MG/DL (ref 6–20)
BUN BLDV-MCNC: 36 MG/DL (ref 6–20)
BUN/CREAT BLD: ABNORMAL (ref 9–20)
CALCIUM IONIZED: 1.09 MMOL/L (ref 1.13–1.33)
CALCIUM SERPL-MCNC: 8 MG/DL (ref 8.6–10.4)
CARBOXYHEMOGLOBIN: 2.3 % (ref 0–5)
CARBOXYHEMOGLOBIN: ABNORMAL %
CHLORIDE BLD-SCNC: 101 MMOL/L (ref 98–107)
CHLORIDE BLD-SCNC: 98 MMOL/L (ref 98–107)
CO2: 17 MMOL/L (ref 20–31)
CO2: 20 MMOL/L (ref 20–31)
CREAT SERPL-MCNC: 7.41 MG/DL (ref 0.5–0.9)
CREAT SERPL-MCNC: 7.98 MG/DL (ref 0.5–0.9)
DIFFERENTIAL TYPE: ABNORMAL
EOSINOPHILS RELATIVE PERCENT: 2 % (ref 1–4)
ETHANOL PERCENT: <0.01 %
ETHANOL: <10 MG/DL
EXPIRATION DATE: NORMAL
FIO2: 100
FIO2: 100
FIO2: 50
FIO2: ABNORMAL
GFR AFRICAN AMERICAN: 6 ML/MIN
GFR AFRICAN AMERICAN: 7 ML/MIN
GFR NON-AFRICAN AMERICAN: 5 ML/MIN
GFR NON-AFRICAN AMERICAN: 6 ML/MIN
GFR SERPL CREATININE-BSD FRML MDRD: ABNORMAL ML/MIN/{1.73_M2}
GLUCOSE BLD-MCNC: 123 MG/DL (ref 65–105)
GLUCOSE BLD-MCNC: 123 MG/DL (ref 70–99)
GLUCOSE BLD-MCNC: 125 MG/DL (ref 70–99)
GLUCOSE BLD-MCNC: 133 MG/DL (ref 65–105)
HCG QUALITATIVE: NEGATIVE
HCO3 VENOUS: ABNORMAL MMOL/L (ref 24–30)
HCT VFR BLD CALC: 34.9 % (ref 36.3–47.1)
HCT VFR BLD CALC: 39.2 % (ref 36.3–47.1)
HEMOGLOBIN: 10.2 G/DL (ref 11.9–15.1)
HEMOGLOBIN: 11.2 G/DL (ref 11.9–15.1)
IMMATURE GRANULOCYTES: 1 %
INR BLD: 1
LYMPHOCYTES # BLD: 15 % (ref 24–43)
MAGNESIUM: 2.4 MG/DL (ref 1.6–2.6)
MCH RBC QN AUTO: 31 PG (ref 25.2–33.5)
MCH RBC QN AUTO: 31.5 PG (ref 25.2–33.5)
MCHC RBC AUTO-ENTMCNC: 28.6 G/DL (ref 28.4–34.8)
MCHC RBC AUTO-ENTMCNC: 29.2 G/DL (ref 28.4–34.8)
MCV RBC AUTO: 106.1 FL (ref 82.6–102.9)
MCV RBC AUTO: 110.4 FL (ref 82.6–102.9)
METHEMOGLOBIN: ABNORMAL %
MODE: ABNORMAL
MONOCYTES # BLD: 8 % (ref 3–12)
NEGATIVE BASE EXCESS, ART: 2 (ref 0–2)
NEGATIVE BASE EXCESS, ART: 3 (ref 0–2)
NEGATIVE BASE EXCESS, ART: 4 (ref 0–2)
NEGATIVE BASE EXCESS, ART: 6 (ref 0–2)
NEGATIVE BASE EXCESS, VEN: ABNORMAL MMOL/L (ref 0–2)
NOTIFICATION TIME: ABNORMAL
NOTIFICATION: ABNORMAL
NRBC AUTOMATED: 0 PER 100 WBC
NRBC AUTOMATED: 0 PER 100 WBC
O2 DEVICE/FLOW/%: ABNORMAL
O2 SAT, VEN: ABNORMAL %
OXYHEMOGLOBIN: ABNORMAL % (ref 95–98)
PARTIAL THROMBOPLASTIN TIME: 28.5 SEC (ref 20.5–30.5)
PATIENT TEMP: ABNORMAL
PCO2, VEN, TEMP ADJ: ABNORMAL MMHG (ref 39–55)
PCO2, VEN: ABNORMAL (ref 39–55)
PDW BLD-RTO: 18.8 % (ref 11.8–14.4)
PDW BLD-RTO: 18.9 % (ref 11.8–14.4)
PEEP/CPAP: ABNORMAL
PH VENOUS: ABNORMAL (ref 7.32–7.42)
PH, VEN, TEMP ADJ: ABNORMAL (ref 7.32–7.42)
PHOSPHORUS: 5.7 MG/DL (ref 2.6–4.5)
PLATELET # BLD: 100 K/UL (ref 138–453)
PLATELET # BLD: 145 K/UL (ref 138–453)
PLATELET ESTIMATE: ABNORMAL
PMV BLD AUTO: 8.5 FL (ref 8.1–13.5)
PMV BLD AUTO: 9.2 FL (ref 8.1–13.5)
PO2, VEN, TEMP ADJ: ABNORMAL MMHG (ref 30–50)
PO2, VEN: ABNORMAL (ref 30–50)
POC HCO3: 22.4 MMOL/L (ref 21–28)
POC HCO3: 22.8 MMOL/L (ref 21–28)
POC HCO3: 23.2 MMOL/L (ref 21–28)
POC HCO3: 23.3 MMOL/L (ref 21–28)
POC HCO3: 25.2 MMOL/L (ref 21–28)
POC HCO3: 25.8 MMOL/L (ref 21–28)
POC HCO3: 26.9 MMOL/L (ref 21–28)
POC LACTIC ACID: 0.7 MMOL/L (ref 0.56–1.39)
POC LACTIC ACID: 0.85 MMOL/L (ref 0.56–1.39)
POC LACTIC ACID: 1.26 MMOL/L (ref 0.56–1.39)
POC LACTIC ACID: 1.28 MMOL/L (ref 0.56–1.39)
POC LACTIC ACID: 1.35 MMOL/L (ref 0.56–1.39)
POC O2 SATURATION: 100 % (ref 94–98)
POC O2 SATURATION: 95 % (ref 94–98)
POC O2 SATURATION: 96 % (ref 94–98)
POC O2 SATURATION: 97 % (ref 94–98)
POC O2 SATURATION: 98 % (ref 94–98)
POC PCO2 TEMP: ABNORMAL MM HG
POC PCO2: 55 MM HG (ref 35–48)
POC PCO2: 59.7 MM HG (ref 35–48)
POC PCO2: 60.7 MM HG (ref 35–48)
POC PCO2: 61.7 MM HG (ref 35–48)
POC PCO2: 62.7 MM HG (ref 35–48)
POC PCO2: 63.4 MM HG (ref 35–48)
POC PCO2: 67.5 MM HG (ref 35–48)
POC PH TEMP: ABNORMAL
POC PH: 7.17 (ref 7.35–7.45)
POC PH: 7.18 (ref 7.35–7.45)
POC PH: 7.19 (ref 7.35–7.45)
POC PH: 7.19 (ref 7.35–7.45)
POC PH: 7.22 (ref 7.35–7.45)
POC PH: 7.23 (ref 7.35–7.45)
POC PH: 7.24 (ref 7.35–7.45)
POC PO2 TEMP: ABNORMAL MM HG
POC PO2: 106.2 MM HG (ref 83–108)
POC PO2: 109.7 MM HG (ref 83–108)
POC PO2: 112.6 MM HG (ref 83–108)
POC PO2: 117.2 MM HG (ref 83–108)
POC PO2: 135.3 MM HG (ref 83–108)
POC PO2: 540.1 MM HG (ref 83–108)
POC PO2: 97.7 MM HG (ref 83–108)
POSITIVE BASE EXCESS, ART: ABNORMAL (ref 0–3)
POSITIVE BASE EXCESS, VEN: ABNORMAL MMOL/L (ref 0–2)
POTASSIUM SERPL-SCNC: 4.5 MMOL/L (ref 3.7–5.3)
POTASSIUM SERPL-SCNC: 5.3 MMOL/L (ref 3.7–5.3)
PROTHROMBIN TIME: 10.4 SEC (ref 9–12)
PSV: ABNORMAL
PT. POSITION: ABNORMAL
RBC # BLD: 3.29 M/UL (ref 3.95–5.11)
RBC # BLD: 3.55 M/UL (ref 3.95–5.11)
RBC # BLD: ABNORMAL 10*6/UL
RESPIRATORY RATE: ABNORMAL
SAMPLE SITE: ABNORMAL
SARS-COV-2, RAPID: NOT DETECTED
SARS-COV-2: NORMAL
SARS-COV-2: NORMAL
SEG NEUTROPHILS: 73 % (ref 36–65)
SEGMENTED NEUTROPHILS ABSOLUTE COUNT: 7.71 K/UL (ref 1.5–8.1)
SET RATE: ABNORMAL
SODIUM BLD-SCNC: 129 MMOL/L (ref 135–144)
SODIUM BLD-SCNC: 136 MMOL/L (ref 135–144)
SOURCE: NORMAL
TCO2 (CALC), ART: 24 MMOL/L (ref 22–29)
TCO2 (CALC), ART: 25 MMOL/L (ref 22–29)
TCO2 (CALC), ART: 27 MMOL/L (ref 22–29)
TCO2 (CALC), ART: 28 MMOL/L (ref 22–29)
TCO2 (CALC), ART: 29 MMOL/L (ref 22–29)
TEXT FOR RESPIRATORY: ABNORMAL
TOTAL HB: ABNORMAL G/DL (ref 12–16)
TOTAL RATE: ABNORMAL
TROPONIN INTERP: ABNORMAL
TROPONIN T: ABNORMAL NG/ML
TROPONIN, HIGH SENSITIVITY: 44 NG/L (ref 0–14)
VT: ABNORMAL
WBC # BLD: 10.5 K/UL (ref 3.5–11.3)
WBC # BLD: 11.5 K/UL (ref 3.5–11.3)
WBC # BLD: ABNORMAL 10*3/UL

## 2020-12-17 PROCEDURE — 2720000010 HC SURG SUPPLY STERILE

## 2020-12-17 PROCEDURE — 82803 BLOOD GASES ANY COMBINATION: CPT

## 2020-12-17 PROCEDURE — 6360000002 HC RX W HCPCS

## 2020-12-17 PROCEDURE — 6360000002 HC RX W HCPCS: Performed by: STUDENT IN AN ORGANIZED HEALTH CARE EDUCATION/TRAINING PROGRAM

## 2020-12-17 PROCEDURE — 82947 ASSAY GLUCOSE BLOOD QUANT: CPT

## 2020-12-17 PROCEDURE — 3E1F88Z IRRIGATION OF RESPIRATORY TRACT USING IRRIGATING SUBSTANCE, VIA NATURAL OR ARTIFICIAL OPENING ENDOSCOPIC: ICD-10-PCS | Performed by: SURGERY

## 2020-12-17 PROCEDURE — 2700000000 HC OXYGEN THERAPY PER DAY

## 2020-12-17 PROCEDURE — 94002 VENT MGMT INPAT INIT DAY: CPT

## 2020-12-17 PROCEDURE — 86900 BLOOD TYPING SEROLOGIC ABO: CPT

## 2020-12-17 PROCEDURE — 94761 N-INVAS EAR/PLS OXIMETRY MLT: CPT

## 2020-12-17 PROCEDURE — 71045 X-RAY EXAM CHEST 1 VIEW: CPT

## 2020-12-17 PROCEDURE — 94770 HC ETCO2 MONITOR DAILY: CPT

## 2020-12-17 PROCEDURE — 6360000002 HC RX W HCPCS: Performed by: NURSE PRACTITIONER

## 2020-12-17 PROCEDURE — 82330 ASSAY OF CALCIUM: CPT

## 2020-12-17 PROCEDURE — 84100 ASSAY OF PHOSPHORUS: CPT

## 2020-12-17 PROCEDURE — 99283 EMERGENCY DEPT VISIT LOW MDM: CPT

## 2020-12-17 PROCEDURE — 36556 INSERT NON-TUNNEL CV CATH: CPT

## 2020-12-17 PROCEDURE — 80307 DRUG TEST PRSMV CHEM ANLYZR: CPT

## 2020-12-17 PROCEDURE — 2000000000 HC ICU R&B

## 2020-12-17 PROCEDURE — 84703 CHORIONIC GONADOTROPIN ASSAY: CPT

## 2020-12-17 PROCEDURE — 87641 MR-STAPH DNA AMP PROBE: CPT

## 2020-12-17 PROCEDURE — 93005 ELECTROCARDIOGRAM TRACING: CPT | Performed by: STUDENT IN AN ORGANIZED HEALTH CARE EDUCATION/TRAINING PROGRAM

## 2020-12-17 PROCEDURE — 36620 INSERTION CATHETER ARTERY: CPT

## 2020-12-17 PROCEDURE — 99222 1ST HOSP IP/OBS MODERATE 55: CPT | Performed by: INTERNAL MEDICINE

## 2020-12-17 PROCEDURE — U0003 INFECTIOUS AGENT DETECTION BY NUCLEIC ACID (DNA OR RNA); SEVERE ACUTE RESPIRATORY SYNDROME CORONAVIRUS 2 (SARS-COV-2) (CORONAVIRUS DISEASE [COVID-19]), AMPLIFIED PROBE TECHNIQUE, MAKING USE OF HIGH THROUGHPUT TECHNOLOGIES AS DESCRIBED BY CMS-2020-01-R: HCPCS

## 2020-12-17 PROCEDURE — 86850 RBC ANTIBODY SCREEN: CPT

## 2020-12-17 PROCEDURE — 2500000003 HC RX 250 WO HCPCS

## 2020-12-17 PROCEDURE — 37799 UNLISTED PX VASCULAR SURGERY: CPT

## 2020-12-17 PROCEDURE — 85025 COMPLETE CBC W/AUTO DIFF WBC: CPT

## 2020-12-17 PROCEDURE — 80051 ELECTROLYTE PANEL: CPT

## 2020-12-17 PROCEDURE — G0480 DRUG TEST DEF 1-7 CLASSES: HCPCS

## 2020-12-17 PROCEDURE — 94640 AIRWAY INHALATION TREATMENT: CPT

## 2020-12-17 PROCEDURE — 2500000003 HC RX 250 WO HCPCS: Performed by: STUDENT IN AN ORGANIZED HEALTH CARE EDUCATION/TRAINING PROGRAM

## 2020-12-17 PROCEDURE — 80048 BASIC METABOLIC PNL TOTAL CA: CPT

## 2020-12-17 PROCEDURE — 36415 COLL VENOUS BLD VENIPUNCTURE: CPT

## 2020-12-17 PROCEDURE — 85027 COMPLETE CBC AUTOMATED: CPT

## 2020-12-17 PROCEDURE — 2500000003 HC RX 250 WO HCPCS: Performed by: NURSE PRACTITIONER

## 2020-12-17 PROCEDURE — 6370000000 HC RX 637 (ALT 250 FOR IP): Performed by: STUDENT IN AN ORGANIZED HEALTH CARE EDUCATION/TRAINING PROGRAM

## 2020-12-17 PROCEDURE — 82375 ASSAY CARBOXYHB QUANT: CPT

## 2020-12-17 PROCEDURE — 85610 PROTHROMBIN TIME: CPT

## 2020-12-17 PROCEDURE — 2580000003 HC RX 258: Performed by: NURSE PRACTITIONER

## 2020-12-17 PROCEDURE — 85730 THROMBOPLASTIN TIME PARTIAL: CPT

## 2020-12-17 PROCEDURE — 6370000000 HC RX 637 (ALT 250 FOR IP): Performed by: NURSE PRACTITIONER

## 2020-12-17 PROCEDURE — 0BJ08ZZ INSPECTION OF TRACHEOBRONCHIAL TREE, VIA NATURAL OR ARTIFICIAL OPENING ENDOSCOPIC: ICD-10-PCS | Performed by: SURGERY

## 2020-12-17 PROCEDURE — 82805 BLOOD GASES W/O2 SATURATION: CPT

## 2020-12-17 PROCEDURE — 82565 ASSAY OF CREATININE: CPT

## 2020-12-17 PROCEDURE — 36600 WITHDRAWAL OF ARTERIAL BLOOD: CPT

## 2020-12-17 PROCEDURE — 84484 ASSAY OF TROPONIN QUANT: CPT

## 2020-12-17 PROCEDURE — U0002 COVID-19 LAB TEST NON-CDC: HCPCS

## 2020-12-17 PROCEDURE — 84520 ASSAY OF UREA NITROGEN: CPT

## 2020-12-17 PROCEDURE — 5A1955Z RESPIRATORY VENTILATION, GREATER THAN 96 CONSECUTIVE HOURS: ICD-10-PCS | Performed by: SURGERY

## 2020-12-17 PROCEDURE — 86901 BLOOD TYPING SEROLOGIC RH(D): CPT

## 2020-12-17 PROCEDURE — 2580000003 HC RX 258: Performed by: STUDENT IN AN ORGANIZED HEALTH CARE EDUCATION/TRAINING PROGRAM

## 2020-12-17 PROCEDURE — 83605 ASSAY OF LACTIC ACID: CPT

## 2020-12-17 PROCEDURE — 83735 ASSAY OF MAGNESIUM: CPT

## 2020-12-17 RX ORDER — HYDROXYZINE HYDROCHLORIDE 25 MG/1
25 TABLET, FILM COATED ORAL 4 TIMES DAILY PRN
Status: DISCONTINUED | OUTPATIENT
Start: 2020-12-17 | End: 2020-12-22

## 2020-12-17 RX ORDER — PROPOFOL 10 MG/ML
INJECTION, EMULSION INTRAVENOUS
Status: DISCONTINUED
Start: 2020-12-17 | End: 2020-12-17

## 2020-12-17 RX ORDER — DOXEPIN HYDROCHLORIDE 10 MG/1
10 CAPSULE ORAL NIGHTLY
Status: DISCONTINUED | OUTPATIENT
Start: 2020-12-17 | End: 2021-01-06 | Stop reason: HOSPADM

## 2020-12-17 RX ORDER — ALBUTEROL SULFATE 2.5 MG/3ML
2.5 SOLUTION RESPIRATORY (INHALATION) EVERY 6 HOURS PRN
Status: DISCONTINUED | OUTPATIENT
Start: 2020-12-17 | End: 2021-01-06 | Stop reason: HOSPADM

## 2020-12-17 RX ORDER — 0.9 % SODIUM CHLORIDE 0.9 %
1000 INTRAVENOUS SOLUTION INTRAVENOUS ONCE
Status: COMPLETED | OUTPATIENT
Start: 2020-12-17 | End: 2020-12-17

## 2020-12-17 RX ORDER — SODIUM CHLORIDE, SODIUM LACTATE, POTASSIUM CHLORIDE, AND CALCIUM CHLORIDE .6; .31; .03; .02 G/100ML; G/100ML; G/100ML; G/100ML
1000 INJECTION, SOLUTION INTRAVENOUS ONCE
Status: COMPLETED | OUTPATIENT
Start: 2020-12-17 | End: 2020-12-17

## 2020-12-17 RX ORDER — ACETAMINOPHEN 500 MG
1000 TABLET ORAL EVERY 8 HOURS SCHEDULED
Status: DISCONTINUED | OUTPATIENT
Start: 2020-12-17 | End: 2021-01-06 | Stop reason: HOSPADM

## 2020-12-17 RX ORDER — OXYCODONE HYDROCHLORIDE 5 MG/1
5 TABLET ORAL EVERY 4 HOURS
Status: DISCONTINUED | OUTPATIENT
Start: 2020-12-17 | End: 2020-12-19

## 2020-12-17 RX ORDER — CHLORHEXIDINE GLUCONATE 0.12 MG/ML
15 RINSE ORAL 2 TIMES DAILY
Status: DISCONTINUED | OUTPATIENT
Start: 2020-12-17 | End: 2020-12-26

## 2020-12-17 RX ORDER — SODIUM CHLORIDE, SODIUM LACTATE, POTASSIUM CHLORIDE, CALCIUM CHLORIDE 600; 310; 30; 20 MG/100ML; MG/100ML; MG/100ML; MG/100ML
INJECTION, SOLUTION INTRAVENOUS CONTINUOUS
Status: DISCONTINUED | OUTPATIENT
Start: 2020-12-17 | End: 2020-12-18

## 2020-12-17 RX ORDER — CALCIUM GLUCONATE 20 MG/ML
1 INJECTION, SOLUTION INTRAVENOUS ONCE
Status: COMPLETED | OUTPATIENT
Start: 2020-12-17 | End: 2020-12-17

## 2020-12-17 RX ORDER — SENNA PLUS 8.6 MG/1
1 TABLET ORAL NIGHTLY
Status: DISCONTINUED | OUTPATIENT
Start: 2020-12-17 | End: 2021-01-06 | Stop reason: HOSPADM

## 2020-12-17 RX ORDER — IPRATROPIUM BROMIDE AND ALBUTEROL SULFATE 2.5; .5 MG/3ML; MG/3ML
1 SOLUTION RESPIRATORY (INHALATION)
Status: DISCONTINUED | OUTPATIENT
Start: 2020-12-17 | End: 2020-12-21

## 2020-12-17 RX ORDER — PROPOFOL 10 MG/ML
10 INJECTION, EMULSION INTRAVENOUS CONTINUOUS
Status: DISCONTINUED | OUTPATIENT
Start: 2020-12-17 | End: 2020-12-26

## 2020-12-17 RX ORDER — GABAPENTIN 100 MG/1
100 CAPSULE ORAL 2 TIMES DAILY
Status: DISCONTINUED | OUTPATIENT
Start: 2020-12-17 | End: 2020-12-31

## 2020-12-17 RX ORDER — AMIODARONE HYDROCHLORIDE 200 MG/1
200 TABLET ORAL DAILY
Status: DISCONTINUED | OUTPATIENT
Start: 2020-12-17 | End: 2021-01-06 | Stop reason: HOSPADM

## 2020-12-17 RX ORDER — NOREPINEPHRINE BIT/0.9 % NACL 16MG/250ML
2 INFUSION BOTTLE (ML) INTRAVENOUS CONTINUOUS
Status: DISCONTINUED | OUTPATIENT
Start: 2020-12-17 | End: 2021-01-04

## 2020-12-17 RX ORDER — CALCIUM ACETATE 667 MG/1
2001 CAPSULE ORAL
Status: DISCONTINUED | OUTPATIENT
Start: 2020-12-17 | End: 2021-01-06 | Stop reason: HOSPADM

## 2020-12-17 RX ORDER — DIAPER,BRIEF,INFANT-TODD,DISP
EACH MISCELLANEOUS DAILY
Status: DISCONTINUED | OUTPATIENT
Start: 2020-12-17 | End: 2020-12-17

## 2020-12-17 RX ORDER — MAGNESIUM SULFATE IN WATER 40 MG/ML
2 INJECTION, SOLUTION INTRAVENOUS ONCE
Status: COMPLETED | OUTPATIENT
Start: 2020-12-17 | End: 2020-12-17

## 2020-12-17 RX ORDER — FENTANYL CITRATE 50 UG/ML
25 INJECTION, SOLUTION INTRAMUSCULAR; INTRAVENOUS
Status: DISCONTINUED | OUTPATIENT
Start: 2020-12-17 | End: 2020-12-21

## 2020-12-17 RX ORDER — ALBUTEROL SULFATE 2.5 MG/3ML
2.5 SOLUTION RESPIRATORY (INHALATION) EVERY 4 HOURS
Status: DISCONTINUED | OUTPATIENT
Start: 2020-12-17 | End: 2020-12-18

## 2020-12-17 RX ORDER — 0.9 % SODIUM CHLORIDE 0.9 %
500 INTRAVENOUS SOLUTION INTRAVENOUS ONCE
Status: COMPLETED | OUTPATIENT
Start: 2020-12-17 | End: 2020-12-17

## 2020-12-17 RX ORDER — NOREPINEPHRINE BIT/0.9 % NACL 16MG/250ML
INFUSION BOTTLE (ML) INTRAVENOUS
Status: COMPLETED
Start: 2020-12-17 | End: 2020-12-17

## 2020-12-17 RX ORDER — LORAZEPAM 2 MG/ML
2 INJECTION INTRAMUSCULAR ONCE
Status: DISCONTINUED | OUTPATIENT
Start: 2020-12-17 | End: 2020-12-17

## 2020-12-17 RX ORDER — LORAZEPAM 2 MG/ML
INJECTION INTRAMUSCULAR
Status: DISCONTINUED
Start: 2020-12-17 | End: 2020-12-17

## 2020-12-17 RX ORDER — ONDANSETRON 2 MG/ML
4 INJECTION INTRAMUSCULAR; INTRAVENOUS EVERY 6 HOURS PRN
Status: DISCONTINUED | OUTPATIENT
Start: 2020-12-17 | End: 2020-12-22

## 2020-12-17 RX ORDER — ESCITALOPRAM OXALATE 10 MG/1
20 TABLET ORAL DAILY
Status: DISCONTINUED | OUTPATIENT
Start: 2020-12-17 | End: 2021-01-06 | Stop reason: HOSPADM

## 2020-12-17 RX ORDER — FENTANYL CITRATE 50 UG/ML
75 INJECTION, SOLUTION INTRAMUSCULAR; INTRAVENOUS ONCE
Status: DISCONTINUED | OUTPATIENT
Start: 2020-12-17 | End: 2020-12-17

## 2020-12-17 RX ORDER — PANTOPRAZOLE SODIUM 40 MG/1
40 TABLET, DELAYED RELEASE ORAL 2 TIMES DAILY
Status: DISCONTINUED | OUTPATIENT
Start: 2020-12-17 | End: 2020-12-18

## 2020-12-17 RX ORDER — GINSENG 100 MG
CAPSULE ORAL 2 TIMES DAILY
Status: DISCONTINUED | OUTPATIENT
Start: 2020-12-17 | End: 2021-01-06 | Stop reason: HOSPADM

## 2020-12-17 RX ORDER — MAGNESIUM HYDROXIDE 1200 MG/15ML
1000 LIQUID ORAL CONTINUOUS
Status: DISCONTINUED | OUTPATIENT
Start: 2020-12-17 | End: 2020-12-22

## 2020-12-17 RX ORDER — CHLORHEXIDINE GLUCONATE 4 G/100ML
SOLUTION TOPICAL DAILY PRN
Status: DISCONTINUED | OUTPATIENT
Start: 2020-12-17 | End: 2021-01-06 | Stop reason: HOSPADM

## 2020-12-17 RX ORDER — BUDESONIDE AND FORMOTEROL FUMARATE DIHYDRATE 80; 4.5 UG/1; UG/1
2 AEROSOL RESPIRATORY (INHALATION) 2 TIMES DAILY
Status: DISCONTINUED | OUTPATIENT
Start: 2020-12-17 | End: 2021-01-06 | Stop reason: HOSPADM

## 2020-12-17 RX ORDER — HEPARIN SODIUM 5000 [USP'U]/ML
5000 INJECTION, SOLUTION INTRAVENOUS; SUBCUTANEOUS EVERY 8 HOURS SCHEDULED
Status: DISCONTINUED | OUTPATIENT
Start: 2020-12-17 | End: 2020-12-18

## 2020-12-17 RX ORDER — FENTANYL CITRATE 50 UG/ML
100 INJECTION, SOLUTION INTRAMUSCULAR; INTRAVENOUS
Status: DISCONTINUED | OUTPATIENT
Start: 2020-12-17 | End: 2020-12-18

## 2020-12-17 RX ORDER — 0.9 % SODIUM CHLORIDE 0.9 %
500 INTRAVENOUS SOLUTION INTRAVENOUS ONCE
Status: DISCONTINUED | OUTPATIENT
Start: 2020-12-17 | End: 2020-12-17

## 2020-12-17 RX ADMIN — PANTOPRAZOLE SODIUM 40 MG: 40 TABLET, DELAYED RELEASE ORAL at 21:04

## 2020-12-17 RX ADMIN — GABAPENTIN 100 MG: 100 CAPSULE ORAL at 11:37

## 2020-12-17 RX ADMIN — SODIUM CHLORIDE, POTASSIUM CHLORIDE, SODIUM LACTATE AND CALCIUM CHLORIDE 1000 ML: 600; 310; 30; 20 INJECTION, SOLUTION INTRAVENOUS at 14:14

## 2020-12-17 RX ADMIN — ACETAMINOPHEN 1000 MG: 500 TABLET ORAL at 21:04

## 2020-12-17 RX ADMIN — ALBUTEROL SULFATE 2.5 MG: 2.5 SOLUTION RESPIRATORY (INHALATION) at 22:26

## 2020-12-17 RX ADMIN — CHLORHEXIDINE GLUCONATE 0.12% ORAL RINSE 15 ML: 1.2 LIQUID ORAL at 22:05

## 2020-12-17 RX ADMIN — SENNOSIDES 8.6 MG: 8.6 TABLET, FILM COATED ORAL at 21:04

## 2020-12-17 RX ADMIN — IPRATROPIUM BROMIDE AND ALBUTEROL SULFATE 1 AMPULE: .5; 3 SOLUTION RESPIRATORY (INHALATION) at 20:26

## 2020-12-17 RX ADMIN — OXYCODONE HYDROCHLORIDE 5 MG: 5 TABLET ORAL at 11:13

## 2020-12-17 RX ADMIN — HEPARIN SODIUM 5000 UNITS: 5000 INJECTION INTRAVENOUS; SUBCUTANEOUS at 15:36

## 2020-12-17 RX ADMIN — BACITRACIN: 500 OINTMENT TOPICAL at 11:33

## 2020-12-17 RX ADMIN — CALCIUM ACETATE 2001 MG: 667 CAPSULE ORAL at 16:13

## 2020-12-17 RX ADMIN — CHLORHEXIDINE GLUCONATE 0.12% ORAL RINSE 15 ML: 1.2 LIQUID ORAL at 11:34

## 2020-12-17 RX ADMIN — Medication 2 MG: at 14:14

## 2020-12-17 RX ADMIN — PROPOFOL 5 MCG/KG/MIN: 10 INJECTION, EMULSION INTRAVENOUS at 08:55

## 2020-12-17 RX ADMIN — PANTOPRAZOLE SODIUM 40 MG: 40 TABLET, DELAYED RELEASE ORAL at 11:37

## 2020-12-17 RX ADMIN — AMIODARONE HYDROCHLORIDE 200 MG: 200 TABLET ORAL at 11:37

## 2020-12-17 RX ADMIN — BACITRACIN ZINC: 500 OINTMENT TOPICAL at 10:17

## 2020-12-17 RX ADMIN — OXYCODONE HYDROCHLORIDE 5 MG: 5 TABLET ORAL at 21:04

## 2020-12-17 RX ADMIN — SODIUM CHLORIDE 500 ML: 0.9 INJECTION, SOLUTION INTRAVENOUS at 13:40

## 2020-12-17 RX ADMIN — SODIUM CHLORIDE, POTASSIUM CHLORIDE, SODIUM LACTATE AND CALCIUM CHLORIDE 100 ML/HR: 600; 310; 30; 20 INJECTION, SOLUTION INTRAVENOUS at 09:45

## 2020-12-17 RX ADMIN — ALBUTEROL SULFATE 2.5 MG: 2.5 SOLUTION RESPIRATORY (INHALATION) at 21:06

## 2020-12-17 RX ADMIN — CALCIUM GLUCONATE 1 G: 20 INJECTION, SOLUTION INTRAVENOUS at 15:51

## 2020-12-17 RX ADMIN — ALBUTEROL SULFATE 2.5 MG: 2.5 SOLUTION RESPIRATORY (INHALATION) at 13:00

## 2020-12-17 RX ADMIN — ESCITALOPRAM OXALATE 20 MG: 10 TABLET ORAL at 11:37

## 2020-12-17 RX ADMIN — GABAPENTIN 100 MG: 100 CAPSULE ORAL at 21:04

## 2020-12-17 RX ADMIN — ALBUTEROL SULFATE 2.5 MG: 2.5 SOLUTION RESPIRATORY (INHALATION) at 21:43

## 2020-12-17 RX ADMIN — HEPARIN SODIUM 5000 UNITS: 5000 INJECTION INTRAVENOUS; SUBCUTANEOUS at 21:08

## 2020-12-17 RX ADMIN — VASOPRESSIN 0.04 UNITS/MIN: 20 INJECTION INTRAVENOUS at 16:18

## 2020-12-17 RX ADMIN — FENTANYL CITRATE 100 MCG: 50 INJECTION, SOLUTION INTRAMUSCULAR; INTRAVENOUS at 15:35

## 2020-12-17 RX ADMIN — FENTANYL CITRATE 100 MCG: 50 INJECTION, SOLUTION INTRAMUSCULAR; INTRAVENOUS at 11:11

## 2020-12-17 RX ADMIN — SILVER SULFADIAZINE 1 APPLICATOR: 10 CREAM TOPICAL at 10:14

## 2020-12-17 RX ADMIN — MAGNESIUM SULFATE 2 G: 2 INJECTION INTRAVENOUS at 16:19

## 2020-12-17 RX ADMIN — SILVER SULFADIAZINE: 10 CREAM TOPICAL at 22:03

## 2020-12-17 RX ADMIN — PROPOFOL 15 MCG/KG/MIN: 10 INJECTION, EMULSION INTRAVENOUS at 17:10

## 2020-12-17 RX ADMIN — BACITRACIN: 500 OINTMENT TOPICAL at 21:05

## 2020-12-17 RX ADMIN — ACETAMINOPHEN 1000 MG: 500 TABLET ORAL at 11:37

## 2020-12-17 RX ADMIN — VASOPRESSIN 0.04 UNITS/MIN: 20 INJECTION INTRAVENOUS at 23:37

## 2020-12-17 RX ADMIN — IPRATROPIUM BROMIDE AND ALBUTEROL SULFATE 1 AMPULE: .5; 3 SOLUTION RESPIRATORY (INHALATION) at 12:00

## 2020-12-17 RX ADMIN — SODIUM CHLORIDE 1000 ML: 9 INJECTION, SOLUTION INTRAVENOUS at 15:04

## 2020-12-17 RX ADMIN — IPRATROPIUM BROMIDE AND ALBUTEROL SULFATE 1 AMPULE: .5; 3 SOLUTION RESPIRATORY (INHALATION) at 15:10

## 2020-12-17 ASSESSMENT — PULMONARY FUNCTION TESTS
PIF_VALUE: 36
PIF_VALUE: 29
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
## 2020-12-17 NOTE — CARE COORDINATION
Case Management Initial Discharge Plan  Herminia Matute to sister Betty Hunt over the phone to discuss discharge plans. Information verified: address, contacts, phone number, , insurance No    Emergency Contact/Next of Kin name & number: Rhiannon Jackson [phone]    PCP: No primary care provider on file. Date of last visit: sees at facility    Insurance Provider: Medicare    Discharge Planning    Living Arrangements:  Other (Comment)(ECF)   Support Systems:  Children, Spouse/Significant Other, Family Members(ECF staff)    Home has 1 story    Patient able to perform ADL's:Independent for a lot of things but sister states she hasnt seen her in a while-not sure    Current Services (outpatient & in home) none  DME  W/C, home O2    Receiving oral anticoagulation therapy? Yes    If indicated:   Physician managing anticoagulation treatment: Garrett Lesch, PAC  Where does patient obtain lab work for ATC treatment? n/a      Potential Assistance Needed:  [facility]    Patient agreeable to home care: No  [place] of choice provided:  n/a    Prior SNF/Rehab Placement and Facility: Current at [facility]. to SNF/Rehab: Yes  [place] of choice provided: n/a     Evaluation: no    Expected Discharge date:  20    Patient expects to be discharged to:  Back to [facility]  Follow Up Appointment: Best Day/ Time:      Transportation provider: ambulance  Transportation arrangements needed for discharge: Yes    Readmission Risk              Risk of Unplanned Readmission:        18             Does patient have a readmission risk score greater than 14?: Yes  If yes, follow-up appointment must be made within 7 days of discharge. Goals of Care: Burns healing      Discharge Plan: Back to [facility] [employer] term pt and resides at [facility]    1530 Spoke with Blake Guerrero, one of the 3 daughters and she said Quinn Jameson, older sister has POA for medical decision making for pt. She would like a psych consult and they would like to obtain guardianship. Called spiritual care and spoke to [facility]. They do not do guardianship, only medical POA. Krzysztof Martin with daughter Claude Nip. MD also in room and will address the psych consult after pt gets extubated. Notified Claude Nip guardianship is not done here. They would have to contact an  or family court.     Electronically signed by Yanci Aguirre RN on 12/17/20 at 2:44 PM EST
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
## 2020-12-17 NOTE — PROGRESS NOTES
12/17/20 0915   ETT (adult)   Placement Date/Time: 12/17/20 0200   Type: Cuffed  Tube Size: 7 mm  Location: Oral  Placement Verified By[de-identified] Auscultation;Capnometry; Chest X-ray  Secured at: 23 cm  Placed By: In place on arrival to facility  Measured From: Lips  Comments: recieved pat. .. Secured at 24 cm   Measured From [address] By   (Fronie Host)     ETT advanced 1cm per CXR. [place] removed and twill placed. ETT now secured at 24cm at the lip. Patient tolerated well.
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
## 2020-12-17 NOTE — PROCEDURES
PROCEDURE NOTE - CENTRAL VENOUS LINE PLACEMENT    PATIENT NAME: Maday Richard RECORD NO. [id number]  DATE: 12/17/2020  SURGEON: Dr. Winnie Agustin, PGY-2  PRIMARY CARE PHYSICIAN: No primary care provider on file. PREOPERATIVE DIAGNOSIS:  Need for IV access  POSTOPERATIVE DIAGNOSIS:  Same  PROCEDURE PERFORMED:  Right Femoral Vein Central Line Insertion  ESTIMATED BLOOD LOSS:  Less than 07TT  COMPLICATIONS:  None immediately appreciated. OPERATIVE NOTE PREPARED BY: Marlene Cosby     DISCUSSION:  Car Kwok is a 48y.o.-year-old female who requires additional IV access and central pressure monitoring. The history and physical examination were reviewed and confirmed. The diagnoses, proposed procedure, risks, possible complications, benefits and alternatives were discussed with the patient or family. The procedure was considered emergent. PROCEDURE:  A timeout was initiated by the bedside nurse and was confirmed by those present. The patient was placed in a supine position. The skin overlying the Right Femoral Vein was prepped with chlorhexadine and draped in sterile fashion. Under ultrasound guidance, the introducer needle was inserted into the femoral vein returning dark red non pulsatile blood. A guidewire was placed through the center of the needle with no resistance. A small incision was made in the skin with a #11 scalpel blade. The dilator was inserted into the skin and vein over guidewire using Seldinger technique. The dilator was then removed and the catheter was placed in the vein over the guidewire using Seldinger technique. The guidewire was then removed and all ports aspirated and flushed appropriately. The catheter then secured using silk suture and a temporary sterile dressing was applied. No immediate complication was evident. All sponge, instrument and needle counts were correct at the completion of the procedure.       Jono Penaloza DO  General Surgery PGY-2

## 2020-12-17 NOTE — PROGRESS NOTES
12/17/20 0851   Vent Information   FiO2  100 %  (Increased for bronch per trauma)     Patient tolerated procedure well.
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
## 2020-12-17 NOTE — PROGRESS NOTES
Occupational Therapy Not Seen Note    DATE: 2020  Name: Mariana Chirinos  : 1965  MRN: [id number]    Patient not available for Occupational Therapy due to:    Sedation (propofol)/Intubated    Next Scheduled Treatment: 2020    Electronically signed by PEMA Collier on 2020 at 12:01 PM
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
## 2020-12-17 NOTE — PROCEDURES
Arterial Line Placement Procedure Note    DATE: 12/17/2020  RE: Lucila Hartmann   1965    PREOPERATIVE DIAGNOSIS:  Hypotension    POSTOPERATIVE DIAGNOSIS:  Hypotension    INDICATION:  Need for invasive blood pressure monitoring. OPERATION PERFORMED:  Placement of a 20 Gauge  Arterial line in the right radial artery      SURGEON: Dr. Lcuho Yi, PGY2    ANESTHESIA:  None    Procedure: Sterile technique was initiated before the skin over the right radial artery was prepped with chloraprep and draped in sterile fashion. Under ultrasound guidance, the vessel was identified with a 22 Ga. introducer needle and a guide wire was placed without difficulty. Then a 20 Ga. catheter was easily threaded. Good waveform obtained on monitor and line withdrew and flushed well. A-line was secured with skin sutures and dressed in sterile fashion.      Complications: None    Ronna Villalpando DO  General Surgery PGY-2

## 2020-12-17 NOTE — PROGRESS NOTES
12/17/20 7536   Provider Notification   Reason for Communication Critical Value (comment)  (pH 7.18)   Provider Name Dr. Radha Page   Provider Notification Resident   Method of Communication Face to face   Response Other (Comment)  (increased RR to 20)   Notification Time [id number]

## 2020-12-18 ENCOUNTER — APPOINTMENT (OUTPATIENT)
Dept: GENERAL RADIOLOGY | Age: 55
DRG: 004 | End: 2020-12-18
Payer: MEDICARE

## 2020-12-18 LAB
ABSOLUTE EOS #: 0.12 K/UL (ref 0–0.44)
ABSOLUTE IMMATURE GRANULOCYTE: 0.1 K/UL (ref 0–0.3)
ABSOLUTE LYMPH #: 0.91 K/UL (ref 1.1–3.7)
ABSOLUTE MONO #: 1.13 K/UL (ref 0.1–1.2)
ALLEN TEST: ABNORMAL
ALLEN TEST: POSITIVE
ANION GAP SERPL CALCULATED.3IONS-SCNC: 16 MMOL/L (ref 9–17)
BASOPHILS # BLD: 1 % (ref 0–2)
BASOPHILS ABSOLUTE: 0.07 K/UL (ref 0–0.2)
BUN BLDV-MCNC: 40 MG/DL (ref 6–20)
BUN/CREAT BLD: ABNORMAL (ref 9–20)
CALCIUM IONIZED: 1.11 MMOL/L (ref 1.13–1.33)
CALCIUM SERPL-MCNC: 8.1 MG/DL (ref 8.6–10.4)
CHLORIDE BLD-SCNC: 100 MMOL/L (ref 98–107)
CO2: 17 MMOL/L (ref 20–31)
CREAT SERPL-MCNC: 7.88 MG/DL (ref 0.5–0.9)
DIFFERENTIAL TYPE: ABNORMAL
EKG ATRIAL RATE: 55 BPM
EKG P AXIS: 78 DEGREES
EKG P-R INTERVAL: 182 MS
EKG Q-T INTERVAL: 500 MS
EKG QRS DURATION: 102 MS
EKG QTC CALCULATION (BAZETT): 478 MS
EKG R AXIS: 25 DEGREES
EKG T AXIS: 82 DEGREES
EKG VENTRICULAR RATE: 55 BPM
EOSINOPHILS RELATIVE PERCENT: 1 % (ref 1–4)
FIO2: 40
FIO2: 40
FIO2: 50
GFR AFRICAN AMERICAN: 6 ML/MIN
GFR NON-AFRICAN AMERICAN: 5 ML/MIN
GFR SERPL CREATININE-BSD FRML MDRD: ABNORMAL ML/MIN/{1.73_M2}
GFR SERPL CREATININE-BSD FRML MDRD: ABNORMAL ML/MIN/{1.73_M2}
GLUCOSE BLD-MCNC: 126 MG/DL (ref 65–105)
GLUCOSE BLD-MCNC: 145 MG/DL (ref 65–105)
GLUCOSE BLD-MCNC: 169 MG/DL (ref 74–100)
GLUCOSE BLD-MCNC: 171 MG/DL (ref 70–99)
GLUCOSE BLD-MCNC: 177 MG/DL (ref 65–105)
GLUCOSE BLD-MCNC: 183 MG/DL (ref 65–105)
GLUCOSE BLD-MCNC: 208 MG/DL (ref 74–100)
HCT VFR BLD CALC: 35.3 % (ref 36.3–47.1)
HEMOGLOBIN: 10.5 G/DL (ref 11.9–15.1)
IMMATURE GRANULOCYTES: 1 %
LV EF: 65 %
LVEF MODALITY: NORMAL
LYMPHOCYTES # BLD: 7 % (ref 24–43)
MAGNESIUM: 2.8 MG/DL (ref 1.6–2.6)
MCH RBC QN AUTO: 31.3 PG (ref 25.2–33.5)
MCHC RBC AUTO-ENTMCNC: 29.7 G/DL (ref 28.4–34.8)
MCV RBC AUTO: 105.4 FL (ref 82.6–102.9)
MODE: ABNORMAL
MONOCYTES # BLD: 9 % (ref 3–12)
MRSA, DNA, NASAL: ABNORMAL
NEGATIVE BASE EXCESS, ART: 1 (ref 0–2)
NEGATIVE BASE EXCESS, ART: 1 (ref 0–2)
NEGATIVE BASE EXCESS, ART: 2 (ref 0–2)
NEGATIVE BASE EXCESS, ART: 7 (ref 0–2)
NEGATIVE BASE EXCESS, ART: 8 (ref 0–2)
NEGATIVE BASE EXCESS, ART: ABNORMAL (ref 0–2)
NRBC AUTOMATED: 0 PER 100 WBC
O2 DEVICE/FLOW/%: ABNORMAL
PATIENT TEMP: ABNORMAL
PDW BLD-RTO: 18.2 % (ref 11.8–14.4)
PHOSPHORUS: 6.6 MG/DL (ref 2.6–4.5)
PLATELET # BLD: 128 K/UL (ref 138–453)
PLATELET ESTIMATE: ABNORMAL
PMV BLD AUTO: 9.6 FL (ref 8.1–13.5)
POC HCO3: 21.9 MMOL/L (ref 21–28)
POC HCO3: 21.9 MMOL/L (ref 21–28)
POC HCO3: 24.5 MMOL/L (ref 21–28)
POC HCO3: 26.4 MMOL/L (ref 21–28)
POC HCO3: 26.4 MMOL/L (ref 21–28)
POC HCO3: 26.5 MMOL/L (ref 21–28)
POC LACTIC ACID: 0.91 MMOL/L (ref 0.56–1.39)
POC LACTIC ACID: 0.99 MMOL/L (ref 0.56–1.39)
POC LACTIC ACID: 1.03 MMOL/L (ref 0.56–1.39)
POC LACTIC ACID: 1.36 MMOL/L (ref 0.56–1.39)
POC O2 SATURATION: 81 % (ref 94–98)
POC O2 SATURATION: 92 % (ref 94–98)
POC O2 SATURATION: 94 % (ref 94–98)
POC O2 SATURATION: 98 % (ref 94–98)
POC PCO2 TEMP: ABNORMAL MM HG
POC PCO2: 50.7 MM HG (ref 35–48)
POC PCO2: 51 MM HG (ref 35–48)
POC PCO2: 53.5 MM HG (ref 35–48)
POC PCO2: 56.3 MM HG (ref 35–48)
POC PCO2: 58.8 MM HG (ref 35–48)
POC PCO2: 62.5 MM HG (ref 35–48)
POC PH TEMP: ABNORMAL
POC PH: 7.15 (ref 7.35–7.45)
POC PH: 7.18 (ref 7.35–7.45)
POC PH: 7.28 (ref 7.35–7.45)
POC PH: 7.29 (ref 7.35–7.45)
POC PH: 7.3 (ref 7.35–7.45)
POC PH: 7.33 (ref 7.35–7.45)
POC PO2 TEMP: ABNORMAL MM HG
POC PO2: 123 MM HG (ref 83–108)
POC PO2: 128.4 MM HG (ref 83–108)
POC PO2: 134.3 MM HG (ref 83–108)
POC PO2: 50.8 MM HG (ref 83–108)
POC PO2: 69.2 MM HG (ref 83–108)
POC PO2: 82.1 MM HG (ref 83–108)
POSITIVE BASE EXCESS, ART: 0 (ref 0–3)
POSITIVE BASE EXCESS, ART: ABNORMAL (ref 0–3)
POTASSIUM SERPL-SCNC: 5.1 MMOL/L (ref 3.7–5.3)
RBC # BLD: 3.35 M/UL (ref 3.95–5.11)
RBC # BLD: ABNORMAL 10*6/UL
SAMPLE SITE: ABNORMAL
SEG NEUTROPHILS: 81 % (ref 36–65)
SEGMENTED NEUTROPHILS ABSOLUTE COUNT: 9.93 K/UL (ref 1.5–8.1)
SODIUM BLD-SCNC: 133 MMOL/L (ref 135–144)
SPECIMEN DESCRIPTION: ABNORMAL
TCO2 (CALC), ART: 24 MMOL/L (ref 22–29)
TCO2 (CALC), ART: 24 MMOL/L (ref 22–29)
TCO2 (CALC), ART: 26 MMOL/L (ref 22–29)
TCO2 (CALC), ART: 28 MMOL/L (ref 22–29)
TROPONIN INTERP: ABNORMAL
TROPONIN INTERP: ABNORMAL
TROPONIN T: ABNORMAL NG/ML
TROPONIN T: ABNORMAL NG/ML
TROPONIN, HIGH SENSITIVITY: 52 NG/L (ref 0–14)
TROPONIN, HIGH SENSITIVITY: 59 NG/L (ref 0–14)
WBC # BLD: 12.3 K/UL (ref 3.5–11.3)
WBC # BLD: ABNORMAL 10*3/UL

## 2020-12-18 PROCEDURE — 6360000002 HC RX W HCPCS: Performed by: STUDENT IN AN ORGANIZED HEALTH CARE EDUCATION/TRAINING PROGRAM

## 2020-12-18 PROCEDURE — 2000000000 HC ICU R&B

## 2020-12-18 PROCEDURE — 94003 VENT MGMT INPAT SUBQ DAY: CPT

## 2020-12-18 PROCEDURE — 37799 UNLISTED PX VASCULAR SURGERY: CPT

## 2020-12-18 PROCEDURE — 82947 ASSAY GLUCOSE BLOOD QUANT: CPT

## 2020-12-18 PROCEDURE — 6370000000 HC RX 637 (ALT 250 FOR IP): Performed by: STUDENT IN AN ORGANIZED HEALTH CARE EDUCATION/TRAINING PROGRAM

## 2020-12-18 PROCEDURE — 6370000000 HC RX 637 (ALT 250 FOR IP): Performed by: NURSE PRACTITIONER

## 2020-12-18 PROCEDURE — 84484 ASSAY OF TROPONIN QUANT: CPT

## 2020-12-18 PROCEDURE — 94761 N-INVAS EAR/PLS OXIMETRY MLT: CPT

## 2020-12-18 PROCEDURE — C9113 INJ PANTOPRAZOLE SODIUM, VIA: HCPCS | Performed by: NURSE PRACTITIONER

## 2020-12-18 PROCEDURE — 90935 HEMODIALYSIS ONE EVALUATION: CPT

## 2020-12-18 PROCEDURE — 83735 ASSAY OF MAGNESIUM: CPT

## 2020-12-18 PROCEDURE — 94640 AIRWAY INHALATION TREATMENT: CPT

## 2020-12-18 PROCEDURE — 84100 ASSAY OF PHOSPHORUS: CPT

## 2020-12-18 PROCEDURE — 71045 X-RAY EXAM CHEST 1 VIEW: CPT

## 2020-12-18 PROCEDURE — 5A1D70Z PERFORMANCE OF URINARY FILTRATION, INTERMITTENT, LESS THAN 6 HOURS PER DAY: ICD-10-PCS | Performed by: INTERNAL MEDICINE

## 2020-12-18 PROCEDURE — 94770 HC ETCO2 MONITOR DAILY: CPT

## 2020-12-18 PROCEDURE — 90935 HEMODIALYSIS ONE EVALUATION: CPT | Performed by: INTERNAL MEDICINE

## 2020-12-18 PROCEDURE — 80048 BASIC METABOLIC PNL TOTAL CA: CPT

## 2020-12-18 PROCEDURE — 83605 ASSAY OF LACTIC ACID: CPT

## 2020-12-18 PROCEDURE — 93306 TTE W/DOPPLER COMPLETE: CPT

## 2020-12-18 PROCEDURE — 2500000003 HC RX 250 WO HCPCS: Performed by: STUDENT IN AN ORGANIZED HEALTH CARE EDUCATION/TRAINING PROGRAM

## 2020-12-18 PROCEDURE — 2700000000 HC OXYGEN THERAPY PER DAY

## 2020-12-18 PROCEDURE — 2580000003 HC RX 258: Performed by: STUDENT IN AN ORGANIZED HEALTH CARE EDUCATION/TRAINING PROGRAM

## 2020-12-18 PROCEDURE — 82803 BLOOD GASES ANY COMBINATION: CPT

## 2020-12-18 PROCEDURE — 6360000002 HC RX W HCPCS: Performed by: NURSE PRACTITIONER

## 2020-12-18 PROCEDURE — 82330 ASSAY OF CALCIUM: CPT

## 2020-12-18 PROCEDURE — 2500000003 HC RX 250 WO HCPCS: Performed by: NURSE PRACTITIONER

## 2020-12-18 PROCEDURE — 85025 COMPLETE CBC W/AUTO DIFF WBC: CPT

## 2020-12-18 PROCEDURE — 2580000003 HC RX 258: Performed by: NURSE PRACTITIONER

## 2020-12-18 RX ORDER — ACETYLCYSTEINE 200 MG/ML
600 SOLUTION ORAL; RESPIRATORY (INHALATION) EVERY 4 HOURS
Status: DISCONTINUED | OUTPATIENT
Start: 2020-12-18 | End: 2020-12-21

## 2020-12-18 RX ORDER — ALBUTEROL SULFATE 2.5 MG/3ML
2.5 SOLUTION RESPIRATORY (INHALATION) EVERY 4 HOURS
Status: DISCONTINUED | OUTPATIENT
Start: 2020-12-18 | End: 2020-12-21

## 2020-12-18 RX ORDER — DEXTROSE MONOHYDRATE 50 MG/ML
100 INJECTION, SOLUTION INTRAVENOUS PRN
Status: DISCONTINUED | OUTPATIENT
Start: 2020-12-18 | End: 2020-12-18

## 2020-12-18 RX ORDER — METHYLPREDNISOLONE SODIUM SUCCINATE 40 MG/ML
40 INJECTION, POWDER, LYOPHILIZED, FOR SOLUTION INTRAMUSCULAR; INTRAVENOUS DAILY
Status: DISCONTINUED | OUTPATIENT
Start: 2020-12-18 | End: 2020-12-19

## 2020-12-18 RX ORDER — DEXTROSE MONOHYDRATE 25 G/50ML
12.5 INJECTION, SOLUTION INTRAVENOUS PRN
Status: DISCONTINUED | OUTPATIENT
Start: 2020-12-18 | End: 2020-12-22

## 2020-12-18 RX ORDER — NICOTINE POLACRILEX 4 MG
15 LOZENGE BUCCAL PRN
Status: DISCONTINUED | OUTPATIENT
Start: 2020-12-18 | End: 2020-12-22

## 2020-12-18 RX ORDER — PANTOPRAZOLE SODIUM 40 MG/10ML
40 INJECTION, POWDER, LYOPHILIZED, FOR SOLUTION INTRAVENOUS DAILY
Status: DISCONTINUED | OUTPATIENT
Start: 2020-12-18 | End: 2020-12-22

## 2020-12-18 RX ORDER — SODIUM CHLORIDE 9 MG/ML
10 INJECTION INTRAVENOUS DAILY
Status: DISCONTINUED | OUTPATIENT
Start: 2020-12-18 | End: 2020-12-24

## 2020-12-18 RX ADMIN — SILVER SULFADIAZINE: 10 CREAM TOPICAL at 15:30

## 2020-12-18 RX ADMIN — FENTANYL CITRATE 100 MCG: 50 INJECTION, SOLUTION INTRAMUSCULAR; INTRAVENOUS at 02:39

## 2020-12-18 RX ADMIN — ALBUTEROL SULFATE 2.5 MG: 2.5 SOLUTION RESPIRATORY (INHALATION) at 23:32

## 2020-12-18 RX ADMIN — OXYCODONE HYDROCHLORIDE 5 MG: 5 TABLET ORAL at 12:09

## 2020-12-18 RX ADMIN — PANTOPRAZOLE SODIUM 40 MG: 40 INJECTION, POWDER, FOR SOLUTION INTRAVENOUS at 14:21

## 2020-12-18 RX ADMIN — VASOPRESSIN 0.04 UNITS/MIN: 20 INJECTION INTRAVENOUS at 17:32

## 2020-12-18 RX ADMIN — ESCITALOPRAM OXALATE 20 MG: 10 TABLET ORAL at 08:53

## 2020-12-18 RX ADMIN — HEPARIN SODIUM: 5000 INJECTION INTRAVENOUS; SUBCUTANEOUS at 17:30

## 2020-12-18 RX ADMIN — Medication 25 MCG/HR: at 11:10

## 2020-12-18 RX ADMIN — BACITRACIN: 500 OINTMENT TOPICAL at 15:30

## 2020-12-18 RX ADMIN — INSULIN LISPRO 2 UNITS: 100 INJECTION, SOLUTION INTRAVENOUS; SUBCUTANEOUS at 01:05

## 2020-12-18 RX ADMIN — OXYCODONE HYDROCHLORIDE 5 MG: 5 TABLET ORAL at 20:54

## 2020-12-18 RX ADMIN — CALCIUM ACETATE 2001 MG: 667 CAPSULE ORAL at 17:12

## 2020-12-18 RX ADMIN — GABAPENTIN 100 MG: 100 CAPSULE ORAL at 08:53

## 2020-12-18 RX ADMIN — Medication 10 ML: at 16:53

## 2020-12-18 RX ADMIN — ALBUTEROL SULFATE 2.5 MG: 2.5 SOLUTION RESPIRATORY (INHALATION) at 04:47

## 2020-12-18 RX ADMIN — CALCIUM ACETATE 2001 MG: 667 CAPSULE ORAL at 12:09

## 2020-12-18 RX ADMIN — AMIODARONE HYDROCHLORIDE 200 MG: 200 TABLET ORAL at 08:53

## 2020-12-18 RX ADMIN — INSULIN LISPRO 2 UNITS: 100 INJECTION, SOLUTION INTRAVENOUS; SUBCUTANEOUS at 14:23

## 2020-12-18 RX ADMIN — APIXABAN 5 MG: 5 TABLET, FILM COATED ORAL at 20:54

## 2020-12-18 RX ADMIN — OXYCODONE HYDROCHLORIDE 5 MG: 5 TABLET ORAL at 17:12

## 2020-12-18 RX ADMIN — ACETAMINOPHEN 1000 MG: 500 TABLET ORAL at 05:18

## 2020-12-18 RX ADMIN — HEPARIN SODIUM: 5000 INJECTION INTRAVENOUS; SUBCUTANEOUS at 21:48

## 2020-12-18 RX ADMIN — OXYCODONE HYDROCHLORIDE 5 MG: 5 TABLET ORAL at 00:21

## 2020-12-18 RX ADMIN — SENNOSIDES 8.6 MG: 8.6 TABLET, FILM COATED ORAL at 20:54

## 2020-12-18 RX ADMIN — SODIUM CHLORIDE, POTASSIUM CHLORIDE, SODIUM LACTATE AND CALCIUM CHLORIDE: 600; 310; 30; 20 INJECTION, SOLUTION INTRAVENOUS at 06:08

## 2020-12-18 RX ADMIN — HEPARIN SODIUM: 5000 INJECTION INTRAVENOUS; SUBCUTANEOUS at 09:26

## 2020-12-18 RX ADMIN — Medication 15 MCG/KG/MIN: at 08:00

## 2020-12-18 RX ADMIN — IPRATROPIUM BROMIDE AND ALBUTEROL SULFATE 1 AMPULE: .5; 3 SOLUTION RESPIRATORY (INHALATION) at 11:36

## 2020-12-18 RX ADMIN — HEPARIN SODIUM: 5000 INJECTION INTRAVENOUS; SUBCUTANEOUS at 13:08

## 2020-12-18 RX ADMIN — INSULIN LISPRO 2 UNITS: 100 INJECTION, SOLUTION INTRAVENOUS; SUBCUTANEOUS at 08:52

## 2020-12-18 RX ADMIN — ACETAMINOPHEN 1000 MG: 500 TABLET ORAL at 21:15

## 2020-12-18 RX ADMIN — IPRATROPIUM BROMIDE AND ALBUTEROL SULFATE 1 AMPULE: .5; 3 SOLUTION RESPIRATORY (INHALATION) at 20:19

## 2020-12-18 RX ADMIN — CALCIUM ACETATE 2001 MG: 667 CAPSULE ORAL at 08:53

## 2020-12-18 RX ADMIN — METHYLPREDNISOLONE SODIUM SUCCINATE 40 MG: 40 INJECTION, POWDER, FOR SOLUTION INTRAMUSCULAR; INTRAVENOUS at 14:22

## 2020-12-18 RX ADMIN — PROPOFOL 5 MCG/KG/MIN: 10 INJECTION, EMULSION INTRAVENOUS at 17:34

## 2020-12-18 RX ADMIN — Medication 600 MG: at 23:32

## 2020-12-18 RX ADMIN — VASOPRESSIN 0.04 UNITS/MIN: 20 INJECTION INTRAVENOUS at 10:00

## 2020-12-18 RX ADMIN — IPRATROPIUM BROMIDE AND ALBUTEROL SULFATE 1 AMPULE: .5; 3 SOLUTION RESPIRATORY (INHALATION) at 15:32

## 2020-12-18 RX ADMIN — DOXEPIN HYDROCHLORIDE 10 MG: 10 CAPSULE ORAL at 20:54

## 2020-12-18 RX ADMIN — PROPOFOL 15 MCG/KG/MIN: 10 INJECTION, EMULSION INTRAVENOUS at 05:23

## 2020-12-18 RX ADMIN — ACETAMINOPHEN 1000 MG: 500 TABLET ORAL at 14:22

## 2020-12-18 RX ADMIN — CHLORHEXIDINE GLUCONATE 0.12% ORAL RINSE 15 ML: 1.2 LIQUID ORAL at 20:45

## 2020-12-18 RX ADMIN — HEPARIN SODIUM 5000 UNITS: 5000 INJECTION INTRAVENOUS; SUBCUTANEOUS at 05:32

## 2020-12-18 RX ADMIN — IPRATROPIUM BROMIDE AND ALBUTEROL SULFATE 1 AMPULE: .5; 3 SOLUTION RESPIRATORY (INHALATION) at 08:00

## 2020-12-18 RX ADMIN — BACITRACIN: 500 OINTMENT TOPICAL at 20:54

## 2020-12-18 RX ADMIN — OXYCODONE HYDROCHLORIDE 5 MG: 5 TABLET ORAL at 08:53

## 2020-12-18 RX ADMIN — ALBUTEROL SULFATE 2.5 MG: 2.5 SOLUTION RESPIRATORY (INHALATION) at 00:17

## 2020-12-18 RX ADMIN — GABAPENTIN 100 MG: 100 CAPSULE ORAL at 20:54

## 2020-12-18 RX ADMIN — OXYCODONE HYDROCHLORIDE 5 MG: 5 TABLET ORAL at 05:00

## 2020-12-18 RX ADMIN — Medication 600 MG: at 15:33

## 2020-12-18 RX ADMIN — Medication 600 MG: at 11:35

## 2020-12-18 RX ADMIN — CHLORHEXIDINE GLUCONATE 0.12% ORAL RINSE 15 ML: 1.2 LIQUID ORAL at 09:00

## 2020-12-18 RX ADMIN — DOXEPIN HYDROCHLORIDE 10 MG: 10 CAPSULE ORAL at 00:21

## 2020-12-18 RX ADMIN — INSULIN LISPRO 2 UNITS: 100 INJECTION, SOLUTION INTRAVENOUS; SUBCUTANEOUS at 20:55

## 2020-12-18 RX ADMIN — Medication 600 MG: at 20:18

## 2020-12-18 RX ADMIN — ALBUTEROL SULFATE 2.5 MG: 2.5 SOLUTION RESPIRATORY (INHALATION) at 08:00

## 2020-12-18 ASSESSMENT — PULMONARY FUNCTION TESTS
PIF_VALUE: 25
PIF_VALUE: 24
PIF_VALUE: 25
PIF_VALUE: 24
PIF_VALUE: 27
PIF_VALUE: 25
PIF_VALUE: 27
PIF_VALUE: 25

## 2020-12-18 ASSESSMENT — PAIN SCALES - GENERAL
PAINLEVEL_OUTOF10: 4
PAINLEVEL_OUTOF10: 8
PAINLEVEL_OUTOF10: 0
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
## 2020-12-18 NOTE — PROGRESS NOTES
Dialysis Post Treatment Note  Vitals:    12/18/20 1308   BP: (!) 105/59   Pulse: 72   Resp: 14   Temp: 97.2 °F (36.2 °C)   SpO2: 90%     Pre-Weight = 99kg  Post-weight = Weight: 216 lb 0.8 oz (98 kg)  Total Liters Processed = Total Liters Processed (l/min): 82 l/min  Rinseback Volume (mL) = Rinseback Volume (ml): 300 ml  Net Removal (mL) = NET Removed (ml): 1500 ml  Patient's dry weight=84.6  Type of access used=LLF  Length of treatment=210    Pt tolerated well w/ pressor support, no complications noted.

## 2020-12-18 NOTE — PROGRESS NOTES
Physical Therapy  DATE: 2020    NAME: Lexy Grace  MRN: [id number]   : 1965    Patient not seen this date for Physical Therapy due to:  [] Blood transfusion in progress  [x] Hemodialysis: Echo AM, Hemo PM. PT will check back 20. [] Patient Declined  [] Spine Precautions   [] Strict Bedrest  [] Surgery/ Procedure  [] Testing      [] Other        [] PT is being discontinued at this time. Patient independent. No further needs. [] PT is being discontinued at this time due to declining physical/ medical status. Therapy is not appropriate at this time.     Jackson Bennett, PT
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
## 2020-12-18 NOTE — PLAN OF CARE
Problem: OXYGENATION/RESPIRATORY FUNCTION  Goal: Patient will maintain patent airway  12/17/2020 2132 by Nonie Oppenheim, RCP  Outcome: Ongoing     Problem: OXYGENATION/RESPIRATORY FUNCTION  Goal: Patient will achieve/maintain normal respiratory rate/effort  Description: Respiratory rate and effort will be within normal limits for the patient  12/17/2020 2132 by Nonie Oppenheim, RCP  Outcome: Ongoing     Problem: MECHANICAL VENTILATION  Goal: Patient will maintain patent airway  12/17/2020 2132 by Nonie Oppenheim, RCP  Outcome: Ongoing     Problem: MECHANICAL VENTILATION  Goal: Oral health is maintained or improved  12/17/2020 2132 by Nonie Oppenheim, RCP  Outcome: Ongoing     Problem: MECHANICAL VENTILATION  Goal: ET tube will be managed safely  12/17/2020 2132 by Nonie Oppenheim, RCP  Outcome: Ongoing     Problem: MECHANICAL VENTILATION  Goal: Ability to express needs and understand communication  12/17/2020 2132 by Nonie Oppenheim, RCP  Outcome: Ongoing     Problem: MECHANICAL VENTILATION  Goal: Mobility/activity is maintained at optimum level for patient  12/17/2020 2132 by Nonie Oppenheim, RCP  Outcome: Ongoing     Problem: SKIN INTEGRITY  Goal: Skin integrity is maintained or improved  12/17/2020 2132 by Nonie Oppenheim, RCP  Outcome: Ongoing

## 2020-12-18 NOTE — PROGRESS NOTES
12/18/20 1012   Vent Information   Vent Mode   (CPAP/PS )   Pressure Support 15 cmH20   FiO2  40 %   PEEP/CPAP 10     Changes made per Trauma.

## 2020-12-18 NOTE — PROGRESS NOTES
[facility]  Occupational Therapy Not Seen Note    DATE: 2020  Name: Bishnu Ross  : 1965  MRN: [id number]    Patient not available for Occupational Therapy due to:    [x] Testing: ECHO    [] Hemodialysis    [] Blood Transfusion in Progress    []Refusal by Patient:    [] Surgery/Procedure:    [] Strict Bedrest    [] Sedation    [] Spine Precautions     [] Pt being transferred to palliative care at this time. Spoke with pt/family and OT services to be defered. [] Pt independent with functional mobility and functional tasks.  Pt with no OT acute care needs at this time, will defer OT eval.    [x] Other: Per chart review, pt intubated    Next Scheduled Treatment: check back PM or     Danyel Dobson

## 2020-12-18 NOTE — PLAN OF CARE
Problem: OXYGENATION/RESPIRATORY FUNCTION  Goal: Patient will maintain patent airway  12/18/2020 0851 by Melanie Billingsley  Outcome: Ongoing     Problem: OXYGENATION/RESPIRATORY FUNCTION  Goal: Patient will achieve/maintain normal respiratory rate/effort  Description: Respiratory rate and effort will be within normal limits for the patient  12/18/2020 0851 by Melanie Billingsley  Outcome: Ongoing     Problem: MECHANICAL VENTILATION  Goal: Patient will maintain patent airway  12/18/2020 0851 by Melanie Billingsley  Outcome: Ongoing     Problem: MECHANICAL VENTILATION  Goal: Oral health is maintained or improved  12/18/2020 0851 by Melanie Billingsley  Outcome: Ongoing     Problem: MECHANICAL VENTILATION  Goal: ET tube will be managed safely  12/18/2020 0851 by Melanie Billingsley  Outcome: Ongoing     Problem: MECHANICAL VENTILATION  Goal: Ability to express needs and understand communication  12/18/2020 0851 by Melanie Billingsley  Outcome: Ongoing     Problem: MECHANICAL VENTILATION  Goal: Mobility/activity is maintained at optimum level for patient  12/18/2020 0851 by Melanie Billingsley  Outcome: Ongoing     Problem: SKIN INTEGRITY  Goal: Skin integrity is maintained or improved  12/18/2020 0851 by Melanie Billingsley  Outcome: Ongoing

## 2020-12-18 NOTE — PLAN OF CARE
Problem: OXYGENATION/RESPIRATORY FUNCTION  Goal: Patient will maintain patent airway  12/17/2020 2021 by Fercho South RN  Outcome: Ongoing  12/17/2020 0755 by Timmy Montero  Outcome: Ongoing  12/17/2020 0641 by Sujey Sterling RCP  Outcome: Ongoing  Goal: Patient will achieve/maintain normal respiratory rate/effort  Description: Respiratory rate and effort will be within normal limits for the patient  12/17/2020 2021 by Fercho South RN  Outcome: Ongoing  12/17/2020 0755 by Timmy Montero  Outcome: Ongoing  12/17/2020 0641 by Sujey Sterling RCP  Outcome: Ongoing     Problem: MECHANICAL VENTILATION  Goal: Patient will maintain patent airway  12/17/2020 2021 by Fercho South RN  Outcome: Ongoing  12/17/2020 0755 by Timmy Montero  Outcome: Ongoing  12/17/2020 0641 by Sujey Sterling RCP  Outcome: Ongoing  Goal: Oral health is maintained or improved  12/17/2020 2021 by Fercho South RN  Outcome: Ongoing  12/17/2020 0755 by Timmy Montero  Outcome: Ongoing  12/17/2020 0641 by Sujey Sterling RCP  Outcome: Ongoing  Goal: ET tube will be managed safely  12/17/2020 2021 by Fercho South RN  Outcome: Ongoing  12/17/2020 0755 by Timmy Montero  Outcome: Ongoing  12/17/2020 0641 by Sujey Sterling RCP  Outcome: Ongoing  Goal: Ability to express needs and understand communication  12/17/2020 2021 by Fercho South RN  Outcome: Ongoing  12/17/2020 0755 by Timmy Montero  Outcome: Ongoing  12/17/2020 0641 by Sujey Sterling RCP  Outcome: Ongoing  Goal: Mobility/activity is maintained at optimum level for patient  12/17/2020 2021 by Fercho South RN  Outcome: Ongoing  12/17/2020 0755 by Timmy Montero  Outcome: Ongoing  12/17/2020 0641 by Sujey Sterling RCP  Outcome: Ongoing     Problem: SKIN INTEGRITY  Goal: Skin integrity is maintained or improved  12/17/2020 2021 by Fercho South RN  Outcome: Ongoing 12/17/2020 0755 by Vijay Garcia  Outcome: Ongoing  12/17/2020 0641 by Lavelle Hogue RCP  Outcome: Ongoing     Problem: Restraint Use - Violent/Self-Destructive Behavior:  Goal: Absence of restraint indications  Description: Absence of restraint indications  Outcome: Ongoing  Goal: Absence of restraint-related injury  Description: Absence of restraint-related injury  Outcome: Ongoing     Problem: Pain:  Goal: Pain level will decrease  Description: Pain level will decrease  Outcome: Ongoing  Goal: Control of acute pain  Description: Control of acute pain  Outcome: Ongoing  Goal: Control of chronic pain  Description: Control of chronic pain  Outcome: Ongoing     Problem: Nutrition  Goal: Optimal nutrition therapy  Description: Nutrition Problem #1: Inadequate oral intake  Intervention: Food and/or Nutrient Delivery: (Monitor TF tolerance; suggest goal rate of 50 mL/hr to provide 1800 kcal and 113 g pro/day.)  Nutritional Goals: meet % of estimated nutrient needs     Outcome: Ongoing

## 2020-12-18 NOTE — PLAN OF CARE
Problem: OXYGENATION/RESPIRATORY FUNCTION  Goal: Patient will maintain patent airway  12/18/2020 0546 by Lino Gomez RN  Outcome: Ongoing  12/17/2020 2132 by Noah Kunz RCP  Outcome: Ongoing  12/17/2020 2021 by Barry Melton RN  Outcome: Ongoing  Goal: Patient will achieve/maintain normal respiratory rate/effort  Description: Respiratory rate and effort will be within normal limits for the patient  12/18/2020 0546 by Lino Gomez RN  Outcome: Ongoing  12/17/2020 2132 by Noah Kunz RCP  Outcome: Ongoing  12/17/2020 2021 by Barry Melton RN  Outcome: Ongoing     Problem: Restraint Use - Violent/Self-Destructive Behavior:  Goal: Absence of restraint indications  Description: Absence of restraint indications  12/18/2020 0546 by Lino Gomez RN  Outcome: Met This Shift  12/17/2020 2021 by Barry Melton RN  Outcome: Ongoing  Goal: Absence of restraint-related injury  Description: Absence of restraint-related injury  12/18/2020 0546 by Lino Gomez RN  Outcome: Met This Shift  12/17/2020 2021 by Barry Melton RN  Outcome: Ongoing     Problem: Nutrition  Goal: Optimal nutrition therapy  Description: Nutrition Problem #1: Inadequate oral intake  Intervention: Food and/or Nutrient Delivery: (Monitor TF tolerance; suggest goal rate of 50 mL/hr to provide 1800 kcal and 113 g pro/day.)  Nutritional Goals: meet % of estimated nutrient needs     12/18/2020 0546 by Lino Gomez RN  Outcome: Ongoing  12/17/2020 2021 by Barry Melton RN  Outcome: Ongoing
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
## 2020-12-19 ENCOUNTER — APPOINTMENT (OUTPATIENT)
Dept: GENERAL RADIOLOGY | Age: 55
DRG: 004 | End: 2020-12-19
Payer: MEDICARE

## 2020-12-19 PROBLEM — T20.00XA FACE BURNS: Status: ACTIVE | Noted: 2020-12-17

## 2020-12-19 PROBLEM — F17.210 SMOKING GREATER THAN 40 PACK YEARS: Status: ACTIVE | Noted: 2019-11-23

## 2020-12-19 LAB
ABSOLUTE EOS #: 0 K/UL (ref 0–0.4)
ABSOLUTE IMMATURE GRANULOCYTE: 0 K/UL (ref 0–0.3)
ABSOLUTE LYMPH #: 1.14 K/UL (ref 1–4.8)
ABSOLUTE MONO #: 0.68 K/UL (ref 0.1–0.8)
ALLEN TEST: ABNORMAL
ALLEN TEST: ABNORMAL
ANION GAP SERPL CALCULATED.3IONS-SCNC: 12 MMOL/L (ref 9–17)
ANION GAP SERPL CALCULATED.3IONS-SCNC: 9 MMOL/L (ref 9–17)
BASOPHILS # BLD: 0 % (ref 0–2)
BASOPHILS ABSOLUTE: 0 K/UL (ref 0–0.2)
BUN BLDV-MCNC: 34 MG/DL (ref 6–20)
BUN BLDV-MCNC: 39 MG/DL (ref 6–20)
BUN/CREAT BLD: ABNORMAL (ref 9–20)
BUN/CREAT BLD: ABNORMAL (ref 9–20)
CALCIUM IONIZED: 1.09 MMOL/L (ref 1.13–1.33)
CALCIUM SERPL-MCNC: 8 MG/DL (ref 8.6–10.4)
CALCIUM SERPL-MCNC: 8.4 MG/DL (ref 8.6–10.4)
CHLORIDE BLD-SCNC: 93 MMOL/L (ref 98–107)
CHLORIDE BLD-SCNC: 95 MMOL/L (ref 98–107)
CO2: 23 MMOL/L (ref 20–31)
CO2: 23 MMOL/L (ref 20–31)
CREAT SERPL-MCNC: 6.09 MG/DL (ref 0.5–0.9)
CREAT SERPL-MCNC: 6.32 MG/DL (ref 0.5–0.9)
DIFFERENTIAL TYPE: ABNORMAL
EOSINOPHILS RELATIVE PERCENT: 0 % (ref 1–4)
FIO2: 50
FIO2: 50
GFR AFRICAN AMERICAN: 8 ML/MIN
GFR AFRICAN AMERICAN: 9 ML/MIN
GFR NON-AFRICAN AMERICAN: 7 ML/MIN
GFR NON-AFRICAN AMERICAN: 7 ML/MIN
GFR SERPL CREATININE-BSD FRML MDRD: ABNORMAL ML/MIN/{1.73_M2}
GLUCOSE BLD-MCNC: 121 MG/DL (ref 65–105)
GLUCOSE BLD-MCNC: 123 MG/DL (ref 65–105)
GLUCOSE BLD-MCNC: 127 MG/DL (ref 74–100)
GLUCOSE BLD-MCNC: 129 MG/DL (ref 70–99)
GLUCOSE BLD-MCNC: 140 MG/DL (ref 65–105)
GLUCOSE BLD-MCNC: 145 MG/DL (ref 65–105)
GLUCOSE BLD-MCNC: 145 MG/DL (ref 65–105)
GLUCOSE BLD-MCNC: 147 MG/DL (ref 70–99)
GLUCOSE BLD-MCNC: 151 MG/DL (ref 65–105)
HCT VFR BLD CALC: 31.2 % (ref 36.3–47.1)
HEMOGLOBIN: 9.3 G/DL (ref 11.9–15.1)
IMMATURE GRANULOCYTES: 0 %
LYMPHOCYTES # BLD: 10 % (ref 24–44)
MAGNESIUM: 2.4 MG/DL (ref 1.6–2.6)
MCH RBC QN AUTO: 31.2 PG (ref 25.2–33.5)
MCHC RBC AUTO-ENTMCNC: 29.8 G/DL (ref 28.4–34.8)
MCV RBC AUTO: 104.7 FL (ref 82.6–102.9)
MODE: ABNORMAL
MODE: ABNORMAL
MONOCYTES # BLD: 6 % (ref 1–7)
MORPHOLOGY: ABNORMAL
MORPHOLOGY: ABNORMAL
NEGATIVE BASE EXCESS, ART: 2 (ref 0–2)
NEGATIVE BASE EXCESS, ART: ABNORMAL (ref 0–2)
NRBC AUTOMATED: 0 PER 100 WBC
O2 DEVICE/FLOW/%: ABNORMAL
O2 DEVICE/FLOW/%: ABNORMAL
PATIENT TEMP: ABNORMAL
PATIENT TEMP: ABNORMAL
PDW BLD-RTO: 18.1 % (ref 11.8–14.4)
PHOSPHORUS: 4.9 MG/DL (ref 2.6–4.5)
PLATELET # BLD: 92 K/UL (ref 138–453)
PLATELET ESTIMATE: ABNORMAL
PMV BLD AUTO: 9.3 FL (ref 8.1–13.5)
POC HCO3: 25.4 MMOL/L (ref 21–28)
POC HCO3: 27.7 MMOL/L (ref 21–28)
POC LACTIC ACID: 0.42 MMOL/L (ref 0.56–1.39)
POC LACTIC ACID: 0.63 MMOL/L (ref 0.56–1.39)
POC O2 SATURATION: 96 % (ref 94–98)
POC O2 SATURATION: 99 % (ref 94–98)
POC PCO2 TEMP: ABNORMAL MM HG
POC PCO2 TEMP: ABNORMAL MM HG
POC PCO2: 53.3 MM HG (ref 35–48)
POC PCO2: 64.1 MM HG (ref 35–48)
POC PH TEMP: ABNORMAL
POC PH TEMP: ABNORMAL
POC PH: 7.24 (ref 7.35–7.45)
POC PH: 7.29 (ref 7.35–7.45)
POC PO2 TEMP: ABNORMAL MM HG
POC PO2 TEMP: ABNORMAL MM HG
POC PO2: 151.1 MM HG (ref 83–108)
POC PO2: 92.7 MM HG (ref 83–108)
POSITIVE BASE EXCESS, ART: 0 (ref 0–3)
POSITIVE BASE EXCESS, ART: ABNORMAL (ref 0–3)
POTASSIUM SERPL-SCNC: 5.4 MMOL/L (ref 3.7–5.3)
POTASSIUM SERPL-SCNC: 5.5 MMOL/L (ref 3.7–5.3)
RBC # BLD: 2.98 M/UL (ref 3.95–5.11)
RBC # BLD: ABNORMAL 10*6/UL
SAMPLE SITE: ABNORMAL
SAMPLE SITE: ABNORMAL
SEG NEUTROPHILS: 84 % (ref 36–66)
SEGMENTED NEUTROPHILS ABSOLUTE COUNT: 9.58 K/UL (ref 1.8–7.7)
SODIUM BLD-SCNC: 125 MMOL/L (ref 135–144)
SODIUM BLD-SCNC: 130 MMOL/L (ref 135–144)
TCO2 (CALC), ART: 27 MMOL/L (ref 22–29)
TCO2 (CALC), ART: 30 MMOL/L (ref 22–29)
WBC # BLD: 11.4 K/UL (ref 3.5–11.3)
WBC # BLD: ABNORMAL 10*3/UL

## 2020-12-19 PROCEDURE — 2500000003 HC RX 250 WO HCPCS: Performed by: STUDENT IN AN ORGANIZED HEALTH CARE EDUCATION/TRAINING PROGRAM

## 2020-12-19 PROCEDURE — 6370000000 HC RX 637 (ALT 250 FOR IP): Performed by: STUDENT IN AN ORGANIZED HEALTH CARE EDUCATION/TRAINING PROGRAM

## 2020-12-19 PROCEDURE — 94640 AIRWAY INHALATION TREATMENT: CPT

## 2020-12-19 PROCEDURE — 6370000000 HC RX 637 (ALT 250 FOR IP): Performed by: NURSE PRACTITIONER

## 2020-12-19 PROCEDURE — 6360000002 HC RX W HCPCS: Performed by: STUDENT IN AN ORGANIZED HEALTH CARE EDUCATION/TRAINING PROGRAM

## 2020-12-19 PROCEDURE — 6360000002 HC RX W HCPCS: Performed by: NURSE PRACTITIONER

## 2020-12-19 PROCEDURE — 71045 X-RAY EXAM CHEST 1 VIEW: CPT

## 2020-12-19 PROCEDURE — 2700000000 HC OXYGEN THERAPY PER DAY

## 2020-12-19 PROCEDURE — 83735 ASSAY OF MAGNESIUM: CPT

## 2020-12-19 PROCEDURE — 82330 ASSAY OF CALCIUM: CPT

## 2020-12-19 PROCEDURE — 83605 ASSAY OF LACTIC ACID: CPT

## 2020-12-19 PROCEDURE — 80048 BASIC METABOLIC PNL TOTAL CA: CPT

## 2020-12-19 PROCEDURE — 6370000000 HC RX 637 (ALT 250 FOR IP): Performed by: GENERAL PRACTICE

## 2020-12-19 PROCEDURE — 85025 COMPLETE CBC W/AUTO DIFF WBC: CPT

## 2020-12-19 PROCEDURE — 82803 BLOOD GASES ANY COMBINATION: CPT

## 2020-12-19 PROCEDURE — 99232 SBSQ HOSP IP/OBS MODERATE 35: CPT | Performed by: INTERNAL MEDICINE

## 2020-12-19 PROCEDURE — 84100 ASSAY OF PHOSPHORUS: CPT

## 2020-12-19 PROCEDURE — 37799 UNLISTED PX VASCULAR SURGERY: CPT

## 2020-12-19 PROCEDURE — 2500000003 HC RX 250 WO HCPCS: Performed by: NURSE PRACTITIONER

## 2020-12-19 PROCEDURE — 2580000003 HC RX 258: Performed by: NURSE PRACTITIONER

## 2020-12-19 PROCEDURE — 99222 1ST HOSP IP/OBS MODERATE 55: CPT | Performed by: INTERNAL MEDICINE

## 2020-12-19 PROCEDURE — C9113 INJ PANTOPRAZOLE SODIUM, VIA: HCPCS | Performed by: NURSE PRACTITIONER

## 2020-12-19 PROCEDURE — 2000000000 HC ICU R&B

## 2020-12-19 PROCEDURE — 2580000003 HC RX 258: Performed by: STUDENT IN AN ORGANIZED HEALTH CARE EDUCATION/TRAINING PROGRAM

## 2020-12-19 PROCEDURE — 90935 HEMODIALYSIS ONE EVALUATION: CPT

## 2020-12-19 PROCEDURE — 94761 N-INVAS EAR/PLS OXIMETRY MLT: CPT

## 2020-12-19 PROCEDURE — 94003 VENT MGMT INPAT SUBQ DAY: CPT

## 2020-12-19 PROCEDURE — 94770 HC ETCO2 MONITOR DAILY: CPT

## 2020-12-19 RX ORDER — CALCIUM GLUCONATE 20 MG/ML
1 INJECTION, SOLUTION INTRAVENOUS ONCE
Status: COMPLETED | OUTPATIENT
Start: 2020-12-19 | End: 2020-12-19

## 2020-12-19 RX ORDER — POLYETHYLENE GLYCOL 3350 17 G/17G
17 POWDER, FOR SOLUTION ORAL DAILY
Status: DISCONTINUED | OUTPATIENT
Start: 2020-12-19 | End: 2021-01-06 | Stop reason: HOSPADM

## 2020-12-19 RX ORDER — OXYCODONE HYDROCHLORIDE 5 MG/1
5 TABLET ORAL EVERY 4 HOURS
Status: DISCONTINUED | OUTPATIENT
Start: 2020-12-19 | End: 2020-12-20

## 2020-12-19 RX ORDER — OXYCODONE HYDROCHLORIDE 5 MG/1
5 TABLET ORAL EVERY 4 HOURS PRN
Status: DISCONTINUED | OUTPATIENT
Start: 2020-12-19 | End: 2020-12-19

## 2020-12-19 RX ORDER — DOCUSATE SODIUM 100 MG/1
100 CAPSULE, LIQUID FILLED ORAL DAILY
Status: DISCONTINUED | OUTPATIENT
Start: 2020-12-19 | End: 2020-12-19

## 2020-12-19 RX ADMIN — VASOPRESSIN 0.04 UNITS/MIN: 20 INJECTION INTRAVENOUS at 17:05

## 2020-12-19 RX ADMIN — DOCUSATE SODIUM 100 MG: 50 LIQUID ORAL at 11:43

## 2020-12-19 RX ADMIN — VASOPRESSIN 0.04 UNITS/MIN: 20 INJECTION INTRAVENOUS at 00:12

## 2020-12-19 RX ADMIN — INSULIN LISPRO 3 UNITS: 100 INJECTION, SOLUTION INTRAVENOUS; SUBCUTANEOUS at 11:51

## 2020-12-19 RX ADMIN — APIXABAN 5 MG: 5 TABLET, FILM COATED ORAL at 08:35

## 2020-12-19 RX ADMIN — GABAPENTIN 100 MG: 100 CAPSULE ORAL at 20:29

## 2020-12-19 RX ADMIN — Medication 8 MCG/MIN: at 08:42

## 2020-12-19 RX ADMIN — Medication 600 MG: at 08:21

## 2020-12-19 RX ADMIN — ESCITALOPRAM OXALATE 20 MG: 10 TABLET ORAL at 08:24

## 2020-12-19 RX ADMIN — ACETAMINOPHEN 1000 MG: 500 TABLET ORAL at 06:10

## 2020-12-19 RX ADMIN — Medication 75 MCG/HR: at 11:43

## 2020-12-19 RX ADMIN — IPRATROPIUM BROMIDE AND ALBUTEROL SULFATE 1 AMPULE: .5; 3 SOLUTION RESPIRATORY (INHALATION) at 11:32

## 2020-12-19 RX ADMIN — Medication 600 MG: at 23:15

## 2020-12-19 RX ADMIN — ALBUTEROL SULFATE 2.5 MG: 2.5 SOLUTION RESPIRATORY (INHALATION) at 23:15

## 2020-12-19 RX ADMIN — IPRATROPIUM BROMIDE AND ALBUTEROL SULFATE 1 AMPULE: .5; 3 SOLUTION RESPIRATORY (INHALATION) at 08:20

## 2020-12-19 RX ADMIN — ACETAMINOPHEN 1000 MG: 500 TABLET ORAL at 21:48

## 2020-12-19 RX ADMIN — ACETAMINOPHEN 1000 MG: 500 TABLET ORAL at 13:14

## 2020-12-19 RX ADMIN — SILVER SULFADIAZINE: 10 CREAM TOPICAL at 20:29

## 2020-12-19 RX ADMIN — HEPARIN SODIUM: 5000 INJECTION INTRAVENOUS; SUBCUTANEOUS at 13:30

## 2020-12-19 RX ADMIN — CALCIUM GLUCONATE 1 G: 20 INJECTION, SOLUTION INTRAVENOUS at 06:34

## 2020-12-19 RX ADMIN — CALCIUM ACETATE 2001 MG: 667 CAPSULE ORAL at 17:58

## 2020-12-19 RX ADMIN — OXYCODONE HYDROCHLORIDE 5 MG: 5 TABLET ORAL at 17:58

## 2020-12-19 RX ADMIN — CHLORHEXIDINE GLUCONATE 0.12% ORAL RINSE 15 ML: 1.2 LIQUID ORAL at 20:29

## 2020-12-19 RX ADMIN — Medication 600 MG: at 03:22

## 2020-12-19 RX ADMIN — SENNOSIDES 8.6 MG: 8.6 TABLET, FILM COATED ORAL at 20:29

## 2020-12-19 RX ADMIN — OXYCODONE HYDROCHLORIDE 5 MG: 5 TABLET ORAL at 04:31

## 2020-12-19 RX ADMIN — PANTOPRAZOLE SODIUM 40 MG: 40 INJECTION, POWDER, FOR SOLUTION INTRAVENOUS at 08:23

## 2020-12-19 RX ADMIN — CALCIUM ACETATE 2001 MG: 667 CAPSULE ORAL at 11:43

## 2020-12-19 RX ADMIN — Medication 10 ML: at 08:23

## 2020-12-19 RX ADMIN — BACITRACIN: 500 OINTMENT TOPICAL at 08:23

## 2020-12-19 RX ADMIN — HEPARIN SODIUM: 5000 INJECTION INTRAVENOUS; SUBCUTANEOUS at 09:30

## 2020-12-19 RX ADMIN — SILVER SULFADIAZINE: 10 CREAM TOPICAL at 08:23

## 2020-12-19 RX ADMIN — POLYETHYLENE GLYCOL 3350 17 G: 17 POWDER, FOR SOLUTION ORAL at 08:23

## 2020-12-19 RX ADMIN — HEPARIN SODIUM: 5000 INJECTION INTRAVENOUS; SUBCUTANEOUS at 02:01

## 2020-12-19 RX ADMIN — IPRATROPIUM BROMIDE AND ALBUTEROL SULFATE 1 AMPULE: .5; 3 SOLUTION RESPIRATORY (INHALATION) at 15:45

## 2020-12-19 RX ADMIN — IPRATROPIUM BROMIDE AND ALBUTEROL SULFATE 1 AMPULE: .5; 3 SOLUTION RESPIRATORY (INHALATION) at 20:00

## 2020-12-19 RX ADMIN — Medication 600 MG: at 11:33

## 2020-12-19 RX ADMIN — OXYCODONE HYDROCHLORIDE 5 MG: 5 TABLET ORAL at 21:48

## 2020-12-19 RX ADMIN — HEPARIN SODIUM: 5000 INJECTION INTRAVENOUS; SUBCUTANEOUS at 05:45

## 2020-12-19 RX ADMIN — HEPARIN SODIUM: 5000 INJECTION INTRAVENOUS; SUBCUTANEOUS at 21:54

## 2020-12-19 RX ADMIN — METHYLPREDNISOLONE SODIUM SUCCINATE 40 MG: 40 INJECTION, POWDER, FOR SOLUTION INTRAMUSCULAR; INTRAVENOUS at 08:23

## 2020-12-19 RX ADMIN — DOXEPIN HYDROCHLORIDE 10 MG: 10 CAPSULE ORAL at 20:29

## 2020-12-19 RX ADMIN — ALBUTEROL SULFATE 2.5 MG: 2.5 SOLUTION RESPIRATORY (INHALATION) at 03:23

## 2020-12-19 RX ADMIN — OXYCODONE HYDROCHLORIDE 5 MG: 5 TABLET ORAL at 00:00

## 2020-12-19 RX ADMIN — INSULIN LISPRO 3 UNITS: 100 INJECTION, SOLUTION INTRAVENOUS; SUBCUTANEOUS at 16:04

## 2020-12-19 RX ADMIN — AMIODARONE HYDROCHLORIDE 200 MG: 200 TABLET ORAL at 08:23

## 2020-12-19 RX ADMIN — OXYCODONE HYDROCHLORIDE 5 MG: 5 TABLET ORAL at 13:14

## 2020-12-19 RX ADMIN — INSULIN LISPRO 3 UNITS: 100 INJECTION, SOLUTION INTRAVENOUS; SUBCUTANEOUS at 20:13

## 2020-12-19 RX ADMIN — HEPARIN SODIUM: 5000 INJECTION INTRAVENOUS; SUBCUTANEOUS at 17:30

## 2020-12-19 RX ADMIN — APIXABAN 5 MG: 5 TABLET, FILM COATED ORAL at 20:29

## 2020-12-19 RX ADMIN — CALCIUM ACETATE 2001 MG: 667 CAPSULE ORAL at 08:23

## 2020-12-19 RX ADMIN — INSULIN LISPRO 3 UNITS: 100 INJECTION, SOLUTION INTRAVENOUS; SUBCUTANEOUS at 00:00

## 2020-12-19 RX ADMIN — Medication 600 MG: at 15:46

## 2020-12-19 RX ADMIN — BACITRACIN: 500 OINTMENT TOPICAL at 20:29

## 2020-12-19 RX ADMIN — OXYCODONE HYDROCHLORIDE 5 MG: 5 TABLET ORAL at 10:27

## 2020-12-19 RX ADMIN — VASOPRESSIN 0.04 UNITS/MIN: 20 INJECTION INTRAVENOUS at 08:37

## 2020-12-19 RX ADMIN — GABAPENTIN 100 MG: 100 CAPSULE ORAL at 08:35

## 2020-12-19 RX ADMIN — Medication 600 MG: at 20:00

## 2020-12-19 ASSESSMENT — PULMONARY FUNCTION TESTS
PIF_VALUE: 31
PIF_VALUE: 28
PIF_VALUE: 31
PIF_VALUE: 25
PIF_VALUE: 28
PIF_VALUE: 32
PIF_VALUE: 25
PIF_VALUE: 25
PIF_VALUE: 32
PIF_VALUE: 31
PIF_VALUE: 32
PIF_VALUE: 32
PIF_VALUE: 31
PIF_VALUE: 25
PIF_VALUE: 22
PIF_VALUE: 25
PIF_VALUE: 30
PIF_VALUE: 31
PIF_VALUE: 30
PIF_VALUE: 28
PIF_VALUE: 25

## 2020-12-19 NOTE — PROGRESS NOTES
Patient continued to have no patient effort with the vent and End tidal kept increasing. ABG also has progressivly got worse from this morning and last night.  Will contact trauma and continue to monitor     12/19/20 0608   Vent Information   Vent Mode SIMV/VC   Vt Ordered 470 mL   Rate Set 18 bmp   Pressure Support 15 cmH20   FiO2  50 %   SpO2 100 %   Sensitivity   (-1)   PEEP/CPAP 10   I Time/ I Time % 0.8 s
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
## 2020-12-19 NOTE — PLAN OF CARE
Problem: OXYGENATION/RESPIRATORY FUNCTION  Goal: Patient will maintain patent airway  Outcome: Ongoing     Problem: OXYGENATION/RESPIRATORY FUNCTION  Goal: Patient will achieve/maintain normal respiratory rate/effort  Description: Respiratory rate and effort will be within normal limits for the patient  Outcome: Ongoing     Problem: MECHANICAL VENTILATION  Goal: Patient will maintain patent airway  Outcome: Ongoing     Problem: MECHANICAL VENTILATION  Goal: Oral health is maintained or improved  Outcome: Ongoing     Problem: MECHANICAL VENTILATION  Goal: ET tube will be managed safely  Outcome: Ongoing     Problem: MECHANICAL VENTILATION  Goal: Ability to express needs and understand communication  Outcome: Ongoing     Problem: MECHANICAL VENTILATION  Goal: Mobility/activity is maintained at optimum level for patient  Outcome: Ongoing     Problem: SKIN INTEGRITY  Goal: Skin integrity is maintained or improved  Outcome: Ongoing     Problem: Restraint Use - Violent/Self-Destructive Behavior:  Goal: Absence of restraint indications  Description: Absence of restraint indications  Outcome: Completed  Goal: Absence of restraint-related injury  Description: Absence of restraint-related injury  Outcome: Completed     Problem: Pain:  Goal: Pain level will decrease  Description: Pain level will decrease  Outcome: Ongoing  Goal: Control of acute pain  Description: Control of acute pain  Outcome: Ongoing  Goal: Control of chronic pain  Description: Control of chronic pain  Outcome: Ongoing     Problem: Nutrition  Goal: Optimal nutrition therapy  Description: Nutrition Problem #1: Inadequate oral intake  Intervention: Food and/or Nutrient Delivery: (Monitor TF tolerance; suggest goal rate of 50 mL/hr to provide 1800 kcal and 113 g pro/day.)  Nutritional Goals: meet % of estimated nutrient needs     Outcome: Ongoing     Problem: Skin Integrity:  Goal: Will show no infection signs and symptoms Description: Will show no infection signs and symptoms  Outcome: Ongoing  Goal: Absence of new skin breakdown  Description: Absence of new skin breakdown  Outcome: Ongoing     Problem: Falls - Risk of:  Goal: Will remain free from falls  Description: Will remain free from falls  Outcome: Ongoing  Goal: Absence of physical injury  Description: Absence of physical injury  Outcome: Ongoing

## 2020-12-19 NOTE — PLAN OF CARE
Problem: OXYGENATION/RESPIRATORY FUNCTION  Goal: Patient will maintain patent airway  12/19/2020 0858 by Mariama Baeza  Outcome: Ongoing       Problem: OXYGENATION/RESPIRATORY FUNCTION  Goal: Patient will achieve/maintain normal respiratory rate/effort  Description: Respiratory rate and effort will be within normal limits for the patient  12/19/2020 0858 by Mariama Baeza  Outcome: Ongoing     Problem: MECHANICAL VENTILATION  Goal: Patient will maintain patent airway  12/19/2020 0858 by Mariama Baeza  Outcome: Ongoing     Problem: MECHANICAL VENTILATION  Goal: Oral health is maintained or improved  12/19/2020 0858 by Mariama Baeza  Outcome: Ongoing     Problem: MECHANICAL VENTILATION  Goal: ET tube will be managed safely  12/19/2020 0858 by Mariama Baeza  Outcome: Ongoing     Problem: MECHANICAL VENTILATION  Goal: Ability to express needs and understand communication  12/19/2020 0858 by Mariama Baeza  Outcome: Ongoing     Problem: MECHANICAL VENTILATION  Goal: Mobility/activity is maintained at optimum level for patient  12/19/2020 0858 by Mariama Baeza  Outcome: Ongoing     Problem: SKIN INTEGRITY  Goal: Skin integrity is maintained or improved  12/19/2020 0858 by Mariama Baeza  Outcome: Ongoing
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
## 2020-12-19 NOTE — PLAN OF CARE
Problem: OXYGENATION/RESPIRATORY FUNCTION  Goal: Patient will maintain patent airway  12/19/2020 0519 by Ozzy Chu RCP  Outcome: Ongoing     Problem: OXYGENATION/RESPIRATORY FUNCTION  Goal: Patient will achieve/maintain normal respiratory rate/effort  Description: Respiratory rate and effort will be within normal limits for the patient  12/19/2020 0519 by Ozzy Chu RCP  Outcome: Ongoing     Problem: MECHANICAL VENTILATION  Goal: Patient will maintain patent airway  12/19/2020 0519 by Ozzy Chu RCP  Outcome: Ongoing     Problem: MECHANICAL VENTILATION  Goal: Oral health is maintained or improved  12/19/2020 0519 by Ozzy Chu RCP  Outcome: Ongoing     Problem: MECHANICAL VENTILATION  Goal: ET tube will be managed safely  12/19/2020 0519 by Ozzy Chu RCP  Outcome: Ongoing     Problem: MECHANICAL VENTILATION  Goal: Ability to express needs and understand communication  12/19/2020 0519 by Ozzy Chu RCP  Outcome: Ongoing     Problem: MECHANICAL VENTILATION  Goal: Mobility/activity is maintained at optimum level for patient  12/19/2020 0519 by Ozzy Chu RCP  Outcome: Ongoing     Problem: SKIN INTEGRITY  Goal: Skin integrity is maintained or improved  12/19/2020 0519 by Ozzy Chu RCP  Outcome: Ongoing
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
## 2020-12-20 ENCOUNTER — APPOINTMENT (OUTPATIENT)
Dept: GENERAL RADIOLOGY | Age: 55
DRG: 004 | End: 2020-12-20
Payer: MEDICARE

## 2020-12-20 LAB
ABSOLUTE EOS #: 0.1 K/UL (ref 0–0.44)
ABSOLUTE IMMATURE GRANULOCYTE: 0 K/UL (ref 0–0.3)
ABSOLUTE LYMPH #: 0.71 K/UL (ref 1.1–3.7)
ABSOLUTE MONO #: 0.81 K/UL (ref 0.1–1.2)
ALLEN TEST: ABNORMAL
ANION GAP SERPL CALCULATED.3IONS-SCNC: 12 MMOL/L (ref 9–17)
BASOPHILS # BLD: 0 % (ref 0–2)
BASOPHILS ABSOLUTE: 0 K/UL (ref 0–0.2)
BUN BLDV-MCNC: 31 MG/DL (ref 6–20)
BUN/CREAT BLD: ABNORMAL (ref 9–20)
CALCIUM IONIZED: 1.15 MMOL/L (ref 1.13–1.33)
CALCIUM SERPL-MCNC: 8.5 MG/DL (ref 8.6–10.4)
CHLORIDE BLD-SCNC: 94 MMOL/L (ref 98–107)
CO2: 25 MMOL/L (ref 20–31)
CREAT SERPL-MCNC: 4.13 MG/DL (ref 0.5–0.9)
DIFFERENTIAL TYPE: ABNORMAL
EOSINOPHILS RELATIVE PERCENT: 1 % (ref 1–4)
FIO2: 50
GFR AFRICAN AMERICAN: 14 ML/MIN
GFR NON-AFRICAN AMERICAN: 11 ML/MIN
GFR SERPL CREATININE-BSD FRML MDRD: ABNORMAL ML/MIN/{1.73_M2}
GFR SERPL CREATININE-BSD FRML MDRD: ABNORMAL ML/MIN/{1.73_M2}
GLUCOSE BLD-MCNC: 109 MG/DL (ref 65–105)
GLUCOSE BLD-MCNC: 112 MG/DL (ref 70–99)
GLUCOSE BLD-MCNC: 113 MG/DL (ref 65–105)
GLUCOSE BLD-MCNC: 118 MG/DL (ref 74–100)
GLUCOSE BLD-MCNC: 119 MG/DL (ref 65–105)
GLUCOSE BLD-MCNC: 131 MG/DL (ref 65–105)
GLUCOSE BLD-MCNC: 97 MG/DL (ref 65–105)
HCT VFR BLD CALC: 29.1 % (ref 36.3–47.1)
HEMOGLOBIN: 8.5 G/DL (ref 11.9–15.1)
IMMATURE GRANULOCYTES: 0 %
LYMPHOCYTES # BLD: 7 % (ref 24–43)
MAGNESIUM: 2.4 MG/DL (ref 1.6–2.6)
MCH RBC QN AUTO: 30.9 PG (ref 25.2–33.5)
MCHC RBC AUTO-ENTMCNC: 29.2 G/DL (ref 28.4–34.8)
MCV RBC AUTO: 105.8 FL (ref 82.6–102.9)
MODE: ABNORMAL
MONOCYTES # BLD: 8 % (ref 3–12)
MORPHOLOGY: ABNORMAL
MORPHOLOGY: ABNORMAL
NEGATIVE BASE EXCESS, ART: ABNORMAL (ref 0–2)
NRBC AUTOMATED: 0 PER 100 WBC
O2 DEVICE/FLOW/%: ABNORMAL
PATIENT TEMP: ABNORMAL
PDW BLD-RTO: 17.8 % (ref 11.8–14.4)
PHOSPHORUS: 4.3 MG/DL (ref 2.6–4.5)
PLATELET # BLD: ABNORMAL K/UL (ref 138–453)
PLATELET ESTIMATE: ABNORMAL
PLATELET, FLUORESCENCE: 104 K/UL (ref 138–453)
PLATELET, IMMATURE FRACTION: 2.3 % (ref 1.1–10.3)
PMV BLD AUTO: ABNORMAL FL (ref 8.1–13.5)
POC HCO3: 28 MMOL/L (ref 21–28)
POC O2 SATURATION: 92 % (ref 94–98)
POC PCO2 TEMP: ABNORMAL MM HG
POC PCO2: 56.1 MM HG (ref 35–48)
POC PH TEMP: ABNORMAL
POC PH: 7.31 (ref 7.35–7.45)
POC PO2 TEMP: ABNORMAL MM HG
POC PO2: 70 MM HG (ref 83–108)
POSITIVE BASE EXCESS, ART: 1 (ref 0–3)
POTASSIUM SERPL-SCNC: 4.4 MMOL/L (ref 3.7–5.3)
RBC # BLD: 2.75 M/UL (ref 3.95–5.11)
RBC # BLD: ABNORMAL 10*6/UL
SAMPLE SITE: ABNORMAL
SEG NEUTROPHILS: 84 % (ref 36–65)
SEGMENTED NEUTROPHILS ABSOLUTE COUNT: 8.48 K/UL (ref 1.5–8.1)
SODIUM BLD-SCNC: 131 MMOL/L (ref 135–144)
TCO2 (CALC), ART: 30 MMOL/L (ref 22–29)
WBC # BLD: 10.1 K/UL (ref 3.5–11.3)
WBC # BLD: ABNORMAL 10*3/UL

## 2020-12-20 PROCEDURE — 71045 X-RAY EXAM CHEST 1 VIEW: CPT

## 2020-12-20 PROCEDURE — 80048 BASIC METABOLIC PNL TOTAL CA: CPT

## 2020-12-20 PROCEDURE — 82803 BLOOD GASES ANY COMBINATION: CPT

## 2020-12-20 PROCEDURE — 94770 HC ETCO2 MONITOR DAILY: CPT

## 2020-12-20 PROCEDURE — 82330 ASSAY OF CALCIUM: CPT

## 2020-12-20 PROCEDURE — 6370000000 HC RX 637 (ALT 250 FOR IP): Performed by: STUDENT IN AN ORGANIZED HEALTH CARE EDUCATION/TRAINING PROGRAM

## 2020-12-20 PROCEDURE — 2700000000 HC OXYGEN THERAPY PER DAY

## 2020-12-20 PROCEDURE — 99232 SBSQ HOSP IP/OBS MODERATE 35: CPT | Performed by: INTERNAL MEDICINE

## 2020-12-20 PROCEDURE — C9113 INJ PANTOPRAZOLE SODIUM, VIA: HCPCS | Performed by: NURSE PRACTITIONER

## 2020-12-20 PROCEDURE — 2500000003 HC RX 250 WO HCPCS: Performed by: STUDENT IN AN ORGANIZED HEALTH CARE EDUCATION/TRAINING PROGRAM

## 2020-12-20 PROCEDURE — 85055 RETICULATED PLATELET ASSAY: CPT

## 2020-12-20 PROCEDURE — 94640 AIRWAY INHALATION TREATMENT: CPT

## 2020-12-20 PROCEDURE — 2580000003 HC RX 258: Performed by: STUDENT IN AN ORGANIZED HEALTH CARE EDUCATION/TRAINING PROGRAM

## 2020-12-20 PROCEDURE — 6360000002 HC RX W HCPCS: Performed by: STUDENT IN AN ORGANIZED HEALTH CARE EDUCATION/TRAINING PROGRAM

## 2020-12-20 PROCEDURE — 83735 ASSAY OF MAGNESIUM: CPT

## 2020-12-20 PROCEDURE — 2500000003 HC RX 250 WO HCPCS: Performed by: NURSE PRACTITIONER

## 2020-12-20 PROCEDURE — 2580000003 HC RX 258: Performed by: NURSE PRACTITIONER

## 2020-12-20 PROCEDURE — 84100 ASSAY OF PHOSPHORUS: CPT

## 2020-12-20 PROCEDURE — 94003 VENT MGMT INPAT SUBQ DAY: CPT

## 2020-12-20 PROCEDURE — 85025 COMPLETE CBC W/AUTO DIFF WBC: CPT

## 2020-12-20 PROCEDURE — 94761 N-INVAS EAR/PLS OXIMETRY MLT: CPT

## 2020-12-20 PROCEDURE — 2000000000 HC ICU R&B

## 2020-12-20 PROCEDURE — 37799 UNLISTED PX VASCULAR SURGERY: CPT

## 2020-12-20 PROCEDURE — 6370000000 HC RX 637 (ALT 250 FOR IP): Performed by: NURSE PRACTITIONER

## 2020-12-20 PROCEDURE — 99291 CRITICAL CARE FIRST HOUR: CPT | Performed by: INTERNAL MEDICINE

## 2020-12-20 PROCEDURE — 6370000000 HC RX 637 (ALT 250 FOR IP): Performed by: INTERNAL MEDICINE

## 2020-12-20 PROCEDURE — 6370000000 HC RX 637 (ALT 250 FOR IP): Performed by: GENERAL PRACTICE

## 2020-12-20 PROCEDURE — 82947 ASSAY GLUCOSE BLOOD QUANT: CPT

## 2020-12-20 PROCEDURE — 6360000002 HC RX W HCPCS: Performed by: NURSE PRACTITIONER

## 2020-12-20 RX ORDER — OXYCODONE HYDROCHLORIDE 5 MG/1
10 TABLET ORAL EVERY 4 HOURS
Status: DISCONTINUED | OUTPATIENT
Start: 2020-12-20 | End: 2020-12-21

## 2020-12-20 RX ORDER — BISACODYL 10 MG
10 SUPPOSITORY, RECTAL RECTAL ONCE
Status: COMPLETED | OUTPATIENT
Start: 2020-12-20 | End: 2020-12-20

## 2020-12-20 RX ORDER — MIDODRINE HYDROCHLORIDE 5 MG/1
10 TABLET ORAL EVERY 8 HOURS
Status: DISCONTINUED | OUTPATIENT
Start: 2020-12-20 | End: 2020-12-22

## 2020-12-20 RX ADMIN — CALCIUM ACETATE 2001 MG: 667 CAPSULE ORAL at 17:09

## 2020-12-20 RX ADMIN — OXYCODONE HYDROCHLORIDE 10 MG: 5 TABLET ORAL at 20:48

## 2020-12-20 RX ADMIN — DOXEPIN HYDROCHLORIDE 10 MG: 10 CAPSULE ORAL at 20:48

## 2020-12-20 RX ADMIN — OXYCODONE HYDROCHLORIDE 5 MG: 5 TABLET ORAL at 05:49

## 2020-12-20 RX ADMIN — Medication 10 MCG/MIN: at 21:48

## 2020-12-20 RX ADMIN — ALBUTEROL SULFATE 2.5 MG: 2.5 SOLUTION RESPIRATORY (INHALATION) at 04:06

## 2020-12-20 RX ADMIN — DOCUSATE SODIUM 100 MG: 50 LIQUID ORAL at 08:26

## 2020-12-20 RX ADMIN — HEPARIN SODIUM: 5000 INJECTION INTRAVENOUS; SUBCUTANEOUS at 14:27

## 2020-12-20 RX ADMIN — BISACODYL 10 MG: 10 SUPPOSITORY RECTAL at 12:54

## 2020-12-20 RX ADMIN — Medication 75 MCG/HR: at 09:23

## 2020-12-20 RX ADMIN — Medication 600 MG: at 16:10

## 2020-12-20 RX ADMIN — ALBUTEROL SULFATE 2.5 MG: 2.5 SOLUTION RESPIRATORY (INHALATION) at 23:51

## 2020-12-20 RX ADMIN — APIXABAN 5 MG: 5 TABLET, FILM COATED ORAL at 20:48

## 2020-12-20 RX ADMIN — IPRATROPIUM BROMIDE AND ALBUTEROL SULFATE 1 AMPULE: .5; 3 SOLUTION RESPIRATORY (INHALATION) at 20:00

## 2020-12-20 RX ADMIN — VASOPRESSIN 0.04 UNITS/MIN: 20 INJECTION INTRAVENOUS at 10:59

## 2020-12-20 RX ADMIN — CALCIUM ACETATE 2001 MG: 667 CAPSULE ORAL at 13:27

## 2020-12-20 RX ADMIN — CHLORHEXIDINE GLUCONATE 0.12% ORAL RINSE 15 ML: 1.2 LIQUID ORAL at 08:26

## 2020-12-20 RX ADMIN — Medication 10 ML: at 09:15

## 2020-12-20 RX ADMIN — OXYCODONE HYDROCHLORIDE 10 MG: 5 TABLET ORAL at 13:27

## 2020-12-20 RX ADMIN — HEPARIN SODIUM: 5000 INJECTION INTRAVENOUS; SUBCUTANEOUS at 09:21

## 2020-12-20 RX ADMIN — HEPARIN SODIUM: 5000 INJECTION INTRAVENOUS; SUBCUTANEOUS at 05:53

## 2020-12-20 RX ADMIN — GABAPENTIN 100 MG: 100 CAPSULE ORAL at 20:48

## 2020-12-20 RX ADMIN — AMIODARONE HYDROCHLORIDE 200 MG: 200 TABLET ORAL at 08:26

## 2020-12-20 RX ADMIN — VASOPRESSIN 0.04 UNITS/MIN: 20 INJECTION INTRAVENOUS at 02:15

## 2020-12-20 RX ADMIN — MIDODRINE HYDROCHLORIDE 10 MG: 5 TABLET ORAL at 15:34

## 2020-12-20 RX ADMIN — CHLORHEXIDINE GLUCONATE 0.12% ORAL RINSE 15 ML: 1.2 LIQUID ORAL at 20:48

## 2020-12-20 RX ADMIN — HEPARIN SODIUM: 5000 INJECTION INTRAVENOUS; SUBCUTANEOUS at 01:47

## 2020-12-20 RX ADMIN — ALBUTEROL SULFATE 2.5 MG: 2.5 SOLUTION RESPIRATORY (INHALATION) at 14:32

## 2020-12-20 RX ADMIN — SENNOSIDES 8.6 MG: 8.6 TABLET, FILM COATED ORAL at 20:48

## 2020-12-20 RX ADMIN — Medication 600 MG: at 20:00

## 2020-12-20 RX ADMIN — HEPARIN SODIUM: 5000 INJECTION INTRAVENOUS; SUBCUTANEOUS at 21:56

## 2020-12-20 RX ADMIN — ESCITALOPRAM OXALATE 20 MG: 10 TABLET ORAL at 08:30

## 2020-12-20 RX ADMIN — CALCIUM ACETATE 2001 MG: 667 CAPSULE ORAL at 08:26

## 2020-12-20 RX ADMIN — SILVER SULFADIAZINE: 10 CREAM TOPICAL at 20:49

## 2020-12-20 RX ADMIN — Medication 600 MG: at 23:51

## 2020-12-20 RX ADMIN — GABAPENTIN 100 MG: 100 CAPSULE ORAL at 08:30

## 2020-12-20 RX ADMIN — OXYCODONE HYDROCHLORIDE 5 MG: 5 TABLET ORAL at 01:23

## 2020-12-20 RX ADMIN — Medication 600 MG: at 04:07

## 2020-12-20 RX ADMIN — Medication 600 MG: at 12:18

## 2020-12-20 RX ADMIN — Medication 75 MCG/HR: at 21:47

## 2020-12-20 RX ADMIN — BACITRACIN: 500 OINTMENT TOPICAL at 08:27

## 2020-12-20 RX ADMIN — VASOPRESSIN 0.04 UNITS/MIN: 20 INJECTION INTRAVENOUS at 20:15

## 2020-12-20 RX ADMIN — HEPARIN SODIUM: 5000 INJECTION INTRAVENOUS; SUBCUTANEOUS at 18:06

## 2020-12-20 RX ADMIN — MIDODRINE HYDROCHLORIDE 10 MG: 5 TABLET ORAL at 23:10

## 2020-12-20 RX ADMIN — POLYETHYLENE GLYCOL 3350 17 G: 17 POWDER, FOR SOLUTION ORAL at 08:26

## 2020-12-20 RX ADMIN — OXYCODONE HYDROCHLORIDE 5 MG: 5 TABLET ORAL at 09:15

## 2020-12-20 RX ADMIN — ACETAMINOPHEN 1000 MG: 500 TABLET ORAL at 13:27

## 2020-12-20 RX ADMIN — IPRATROPIUM BROMIDE AND ALBUTEROL SULFATE 1 AMPULE: .5; 3 SOLUTION RESPIRATORY (INHALATION) at 16:11

## 2020-12-20 RX ADMIN — SILVER SULFADIAZINE: 10 CREAM TOPICAL at 08:27

## 2020-12-20 RX ADMIN — ACETAMINOPHEN 1000 MG: 500 TABLET ORAL at 23:11

## 2020-12-20 RX ADMIN — APIXABAN 5 MG: 5 TABLET, FILM COATED ORAL at 08:30

## 2020-12-20 RX ADMIN — ALBUTEROL SULFATE 2.5 MG: 2.5 SOLUTION RESPIRATORY (INHALATION) at 12:18

## 2020-12-20 RX ADMIN — PANTOPRAZOLE SODIUM 40 MG: 40 INJECTION, POWDER, FOR SOLUTION INTRAVENOUS at 09:15

## 2020-12-20 RX ADMIN — BACITRACIN: 500 OINTMENT TOPICAL at 20:48

## 2020-12-20 RX ADMIN — IPRATROPIUM BROMIDE AND ALBUTEROL SULFATE 1 AMPULE: .5; 3 SOLUTION RESPIRATORY (INHALATION) at 07:40

## 2020-12-20 RX ADMIN — OXYCODONE HYDROCHLORIDE 10 MG: 5 TABLET ORAL at 17:08

## 2020-12-20 RX ADMIN — ACETAMINOPHEN 1000 MG: 500 TABLET ORAL at 05:49

## 2020-12-20 ASSESSMENT — PULMONARY FUNCTION TESTS
PIF_VALUE: 30
PIF_VALUE: 26
PIF_VALUE: 29
PIF_VALUE: 32
PIF_VALUE: 29
PIF_VALUE: 32
PIF_VALUE: 31
PIF_VALUE: 35
PIF_VALUE: 31
PIF_VALUE: 33
PIF_VALUE: 29
PIF_VALUE: 42
PIF_VALUE: 32
PIF_VALUE: 29
PIF_VALUE: 34
PIF_VALUE: 30
PIF_VALUE: 32
PIF_VALUE: 31
PIF_VALUE: 34
PIF_VALUE: 56
PIF_VALUE: 34
PIF_VALUE: 30

## 2020-12-20 ASSESSMENT — PAIN SCALES - GENERAL
PAINLEVEL_OUTOF10: 7
PAINLEVEL_OUTOF10: 7
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
## 2020-12-20 NOTE — PROGRESS NOTES
Ventilator Bronchodilator assessment    Breath sounds: clear, diminished  Inspiratory Pressure: 28  Plateau Pressure: 22    Patient assessed at level 3          [x]    Bronchodilator Assessment    BRONCHODILATOR ASSESSMENT SCORE  Score 0 (Home) 1 2 3 4   Breath Sounds   []  Chronic Ventilator: Patient at baseline []  Mild Wheezes/ Clear []  Intermittent wheezes with good air entry [x]  Bilateral/unilateral wheezing with diminished air entry []  Insp/Exp wheeze and/or poor aeration   Ventilator Pressures   []  Chronic Ventilator []  Insp. Pressure less than 25 cm H20 []  Insp. Pressure less than 25 cm H20 [x]  Insp. Pressure exceeds 25 cm H20 []  Insp.  Pressure exceeds 30 cm H20   Plateau Pressure []  NA   []  Plateau Pressure less than 4  []  Plateau Pressure less than or equal to 5 [x]  Plateau Pressure greater than or equal to 6 []  Plateau Pressure greater than or equal to 8       [employer]  7:59 AM
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
## 2020-12-20 NOTE — PLAN OF CARE
Problem: OXYGENATION/RESPIRATORY FUNCTION  Goal: Patient will maintain patent airway  12/19/2020 2135 by Nazanin Saha RN  Outcome: Ongoing     Problem: OXYGENATION/RESPIRATORY FUNCTION  Goal: Patient will achieve/maintain normal respiratory rate/effort  Description: Respiratory rate and effort will be within normal limits for the patient  12/19/2020 2135 by Nazanin Saha RN  Outcome: Ongoing     Problem: MECHANICAL VENTILATION  Goal: Patient will maintain patent airway  12/19/2020 2135 by Nazanin Saha RN  Outcome: Ongoing     Problem: MECHANICAL VENTILATION  Goal: Oral health is maintained or improved  12/19/2020 2135 by Nazanin Saha RN  Outcome: Ongoing     Problem: MECHANICAL VENTILATION  Goal: ET tube will be managed safely  12/19/2020 2135 by Nazanin Saha RN  Outcome: Ongoing     Problem: MECHANICAL VENTILATION  Goal: Ability to express needs and understand communication  12/19/2020 2135 by Nazanin Saha RN  Outcome: Ongoing     Problem: MECHANICAL VENTILATION  Goal: Mobility/activity is maintained at optimum level for patient  12/19/2020 2135 by Nazanin Saha RN  Outcome: Ongoing     Problem: SKIN INTEGRITY  Goal: Skin integrity is maintained or improved  12/19/2020 2135 by Nazanin Saha RN  Outcome: Ongoing     Problem: Pain:  Goal: Pain level will decrease  Description: Pain level will decrease  Outcome: Ongoing     Problem: Pain:  Goal: Control of acute pain  Description: Control of acute pain  Outcome: Ongoing     Problem: Pain:  Goal: Control of chronic pain  Description: Control of chronic pain  Outcome: Ongoing     Problem: Nutrition  Goal: Optimal nutrition therapy  Description: Nutrition Problem #1: Inadequate oral intake  Intervention: Food and/or Nutrient Delivery: (Monitor TF tolerance; suggest goal rate of 50 mL/hr to provide 1800 kcal and 113 g pro/day.)  Nutritional Goals: meet % of estimated nutrient needs     Outcome: Ongoing     Problem: Skin Integrity:  Goal: Will show no infection signs and symptoms  Description: Will show no infection signs and symptoms  Outcome: Ongoing  Goal: Absence of new skin breakdown  Description: Absence of new skin breakdown  Outcome: Ongoing     Problem: Falls - Risk of:  Goal: Will remain free from falls  Description: Will remain free from falls  Outcome: Ongoing  Goal: Absence of physical injury  Description: Absence of physical injury  Outcome: Ongoing

## 2020-12-20 NOTE — PLAN OF CARE
Problem: OXYGENATION/RESPIRATORY FUNCTION  Goal: Patient will maintain patent airway  12/20/2020 0758 by Terrie Shelton RCP  Outcome: Ongoing     Problem: OXYGENATION/RESPIRATORY FUNCTION  Goal: Patient will achieve/maintain normal respiratory rate/effort  Description: Respiratory rate and effort will be within normal limits for the patient  12/20/2020 0758 by Terrie Shelton RCP  Outcome: Ongoing     Problem: MECHANICAL VENTILATION  Goal: Patient will maintain patent airway  12/20/2020 0758 by Terrie Shelton RCP  Outcome: Ongoing     Problem: MECHANICAL VENTILATION  Goal: Oral health is maintained or improved  12/20/2020 0758 by Terrie Shelton RCP  Outcome: Ongoing     Problem: MECHANICAL VENTILATION  Goal: ET tube will be managed safely  12/20/2020 0758 by Terrie Shelton RCP  Outcome: Ongoing     Problem: MECHANICAL VENTILATION  Goal: Ability to express needs and understand communication  12/20/2020 0758 by Terrie Shelton RCP  Outcome: Ongoing     Problem: MECHANICAL VENTILATION  Goal: Mobility/activity is maintained at optimum level for patient  12/20/2020 9539 by Terrie Shelton RCP  Outcome: Ongoing     Problem: SKIN INTEGRITY  Goal: Skin integrity is maintained or improved  12/20/2020 0758 by Terrie Shelton RCP  Outcome: Ongoing

## 2020-12-20 NOTE — PROGRESS NOTES
Dialysis Post Treatment Note  Vitals:    12/19/20 1845   BP: 123/61   Pulse: 68   Resp: 20   Temp: 98.6 °F (37 °C)   SpO2: 97%     Pre-Weight = 100.5kg  Post-weight = Weight: 221 lb 5.5 oz (100.4 kg)  Total Liters Processed = Total Liters Processed (l/min): 75.1 l/min  Rinseback Volume (mL) = Rinseback Volume (ml): 360 ml  Net Removal (mL) = NET Removed (ml): 150 ml  Patient's dry weight= unknown  Type of access used= L forearm AVF  Length of treatment= 3 hrs    Pt tolerated HD well. BP managed w levo, vaso per floor RN. Fistula site ecchymotic, soft ran well full treatment. No other issues.
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
## 2020-12-20 NOTE — PROGRESS NOTES
12/20/20 1035   Vent Information   Vent Mode CPAP  (Per aleta vincent )   Pressure Support 5 cmH20   FiO2  50 %   SpO2 95 %   SpO2/FiO2 ratio 190   PEEP/CPAP 5     Pt failed SBT. Low min volume, minimal patient effort. RN witnessed.

## 2020-12-20 NOTE — PLAN OF CARE
Problem: SKIN INTEGRITY  Goal: Skin integrity is maintained or improved  12/20/2020 1113 by Halina Conroy RN  Outcome: Ongoing     Problem: Pain:  Goal: Pain level will decrease  Description: Pain level will decrease  12/20/2020 1113 by Halina Conroy RN  Outcome: Ongoing     Problem: Nutrition  Goal: Optimal nutrition therapy  Description: Nutrition Problem #1: Inadequate oral intake  Intervention: Food and/or Nutrient Delivery: (Monitor TF tolerance; suggest goal rate of 50 mL/hr to provide 1800 kcal and 113 g pro/day.)  Nutritional Goals: meet % of estimated nutrient needs     12/20/2020 1113 by Halina Conroy RN  Outcome: Ongoing     Problem: Skin Integrity:  Goal: Will show no infection signs and symptoms  Description: Will show no infection signs and symptoms  12/20/2020 1113 by Halina Conroy RN  Outcome: Ongoing     Problem: Falls - Risk of:  Goal: Will remain free from falls  Description: Will remain free from falls  12/20/2020 1113 by Halina Conroy RN  Outcome: Ongoing

## 2020-12-21 ENCOUNTER — APPOINTMENT (OUTPATIENT)
Dept: GENERAL RADIOLOGY | Age: 55
DRG: 004 | End: 2020-12-21
Payer: MEDICARE

## 2020-12-21 ENCOUNTER — APPOINTMENT (OUTPATIENT)
Dept: DIALYSIS | Age: 55
DRG: 004 | End: 2020-12-21
Payer: MEDICARE

## 2020-12-21 LAB
ABSOLUTE EOS #: 0.35 K/UL (ref 0–0.4)
ABSOLUTE IMMATURE GRANULOCYTE: 0 K/UL (ref 0–0.3)
ABSOLUTE LYMPH #: 0.46 K/UL (ref 1–4.8)
ABSOLUTE MONO #: 0.81 K/UL (ref 0.1–0.8)
ALLEN TEST: ABNORMAL
ANION GAP SERPL CALCULATED.3IONS-SCNC: 12 MMOL/L (ref 9–17)
BASOPHILS # BLD: 1 % (ref 0–2)
BASOPHILS ABSOLUTE: 0.12 K/UL (ref 0–0.2)
BUN BLDV-MCNC: 46 MG/DL (ref 6–20)
BUN/CREAT BLD: ABNORMAL (ref 9–20)
CALCIUM IONIZED: 1.16 MMOL/L (ref 1.13–1.33)
CALCIUM SERPL-MCNC: 8.7 MG/DL (ref 8.6–10.4)
CHLORIDE BLD-SCNC: 93 MMOL/L (ref 98–107)
CO2: 24 MMOL/L (ref 20–31)
CREAT SERPL-MCNC: 5.52 MG/DL (ref 0.5–0.9)
DIFFERENTIAL TYPE: ABNORMAL
EOSINOPHILS RELATIVE PERCENT: 3 % (ref 1–4)
FIO2: 60
GFR AFRICAN AMERICAN: 10 ML/MIN
GFR NON-AFRICAN AMERICAN: 8 ML/MIN
GFR SERPL CREATININE-BSD FRML MDRD: ABNORMAL ML/MIN/{1.73_M2}
GFR SERPL CREATININE-BSD FRML MDRD: ABNORMAL ML/MIN/{1.73_M2}
GLUCOSE BLD-MCNC: 111 MG/DL (ref 65–105)
GLUCOSE BLD-MCNC: 119 MG/DL (ref 65–105)
GLUCOSE BLD-MCNC: 126 MG/DL (ref 65–105)
GLUCOSE BLD-MCNC: 130 MG/DL (ref 74–100)
GLUCOSE BLD-MCNC: 135 MG/DL (ref 70–99)
GLUCOSE BLD-MCNC: 81 MG/DL (ref 65–105)
GLUCOSE BLD-MCNC: 96 MG/DL (ref 65–105)
HCT VFR BLD CALC: 29.2 % (ref 36.3–47.1)
HEMOGLOBIN: 8.5 G/DL (ref 11.9–15.1)
IMMATURE GRANULOCYTES: 0 %
LYMPHOCYTES # BLD: 4 % (ref 24–44)
MAGNESIUM: 2.6 MG/DL (ref 1.6–2.6)
MCH RBC QN AUTO: 30.9 PG (ref 25.2–33.5)
MCHC RBC AUTO-ENTMCNC: 29.1 G/DL (ref 28.4–34.8)
MCV RBC AUTO: 106.2 FL (ref 82.6–102.9)
MODE: ABNORMAL
MONOCYTES # BLD: 7 % (ref 1–7)
MORPHOLOGY: ABNORMAL
MORPHOLOGY: ABNORMAL
NEGATIVE BASE EXCESS, ART: 2 (ref 0–2)
NRBC AUTOMATED: 0 PER 100 WBC
O2 DEVICE/FLOW/%: ABNORMAL
PATIENT TEMP: ABNORMAL
PDW BLD-RTO: 17.2 % (ref 11.8–14.4)
PHOSPHORUS: 4 MG/DL (ref 2.6–4.5)
PLATELET # BLD: 128 K/UL (ref 138–453)
PLATELET ESTIMATE: ABNORMAL
PMV BLD AUTO: 9.3 FL (ref 8.1–13.5)
POC HCO3: 25.1 MMOL/L (ref 21–28)
POC LACTIC ACID: 1.06 MMOL/L (ref 0.56–1.39)
POC O2 SATURATION: 94 % (ref 94–98)
POC PCO2 TEMP: ABNORMAL MM HG
POC PCO2: 54.4 MM HG (ref 35–48)
POC PH TEMP: ABNORMAL
POC PH: 7.27 (ref 7.35–7.45)
POC PO2 TEMP: ABNORMAL MM HG
POC PO2: 79.6 MM HG (ref 83–108)
POSITIVE BASE EXCESS, ART: ABNORMAL (ref 0–3)
POTASSIUM SERPL-SCNC: 4.9 MMOL/L (ref 3.7–5.3)
RBC # BLD: 2.75 M/UL (ref 3.95–5.11)
RBC # BLD: ABNORMAL 10*6/UL
SAMPLE SITE: ABNORMAL
SEG NEUTROPHILS: 85 % (ref 36–66)
SEGMENTED NEUTROPHILS ABSOLUTE COUNT: 9.76 K/UL (ref 1.8–7.7)
SODIUM BLD-SCNC: 129 MMOL/L (ref 135–144)
TCO2 (CALC), ART: 27 MMOL/L (ref 22–29)
WBC # BLD: 11.5 K/UL (ref 3.5–11.3)
WBC # BLD: ABNORMAL 10*3/UL

## 2020-12-21 PROCEDURE — 99232 SBSQ HOSP IP/OBS MODERATE 35: CPT | Performed by: INTERNAL MEDICINE

## 2020-12-21 PROCEDURE — 82330 ASSAY OF CALCIUM: CPT

## 2020-12-21 PROCEDURE — 2000000000 HC ICU R&B

## 2020-12-21 PROCEDURE — 2500000003 HC RX 250 WO HCPCS: Performed by: STUDENT IN AN ORGANIZED HEALTH CARE EDUCATION/TRAINING PROGRAM

## 2020-12-21 PROCEDURE — 85025 COMPLETE CBC W/AUTO DIFF WBC: CPT

## 2020-12-21 PROCEDURE — 37799 UNLISTED PX VASCULAR SURGERY: CPT

## 2020-12-21 PROCEDURE — 6370000000 HC RX 637 (ALT 250 FOR IP): Performed by: STUDENT IN AN ORGANIZED HEALTH CARE EDUCATION/TRAINING PROGRAM

## 2020-12-21 PROCEDURE — 6360000002 HC RX W HCPCS: Performed by: SURGERY

## 2020-12-21 PROCEDURE — 83735 ASSAY OF MAGNESIUM: CPT

## 2020-12-21 PROCEDURE — 36415 COLL VENOUS BLD VENIPUNCTURE: CPT

## 2020-12-21 PROCEDURE — C9113 INJ PANTOPRAZOLE SODIUM, VIA: HCPCS | Performed by: NURSE PRACTITIONER

## 2020-12-21 PROCEDURE — 2700000000 HC OXYGEN THERAPY PER DAY

## 2020-12-21 PROCEDURE — 6370000000 HC RX 637 (ALT 250 FOR IP): Performed by: SURGERY

## 2020-12-21 PROCEDURE — 2500000003 HC RX 250 WO HCPCS: Performed by: NURSE PRACTITIONER

## 2020-12-21 PROCEDURE — 6360000002 HC RX W HCPCS: Performed by: NURSE PRACTITIONER

## 2020-12-21 PROCEDURE — 90935 HEMODIALYSIS ONE EVALUATION: CPT

## 2020-12-21 PROCEDURE — 6370000000 HC RX 637 (ALT 250 FOR IP): Performed by: NURSE PRACTITIONER

## 2020-12-21 PROCEDURE — 82803 BLOOD GASES ANY COMBINATION: CPT

## 2020-12-21 PROCEDURE — 2580000003 HC RX 258: Performed by: SURGERY

## 2020-12-21 PROCEDURE — 94640 AIRWAY INHALATION TREATMENT: CPT

## 2020-12-21 PROCEDURE — 94761 N-INVAS EAR/PLS OXIMETRY MLT: CPT

## 2020-12-21 PROCEDURE — 80048 BASIC METABOLIC PNL TOTAL CA: CPT

## 2020-12-21 PROCEDURE — 71045 X-RAY EXAM CHEST 1 VIEW: CPT

## 2020-12-21 PROCEDURE — 94770 HC ETCO2 MONITOR DAILY: CPT

## 2020-12-21 PROCEDURE — 6360000002 HC RX W HCPCS: Performed by: STUDENT IN AN ORGANIZED HEALTH CARE EDUCATION/TRAINING PROGRAM

## 2020-12-21 PROCEDURE — 87040 BLOOD CULTURE FOR BACTERIA: CPT

## 2020-12-21 PROCEDURE — 83605 ASSAY OF LACTIC ACID: CPT

## 2020-12-21 PROCEDURE — 2580000003 HC RX 258: Performed by: STUDENT IN AN ORGANIZED HEALTH CARE EDUCATION/TRAINING PROGRAM

## 2020-12-21 PROCEDURE — 6370000000 HC RX 637 (ALT 250 FOR IP): Performed by: INTERNAL MEDICINE

## 2020-12-21 PROCEDURE — 84100 ASSAY OF PHOSPHORUS: CPT

## 2020-12-21 PROCEDURE — 94003 VENT MGMT INPAT SUBQ DAY: CPT

## 2020-12-21 PROCEDURE — 99291 CRITICAL CARE FIRST HOUR: CPT | Performed by: INTERNAL MEDICINE

## 2020-12-21 PROCEDURE — 2580000003 HC RX 258: Performed by: NURSE PRACTITIONER

## 2020-12-21 PROCEDURE — 82947 ASSAY GLUCOSE BLOOD QUANT: CPT

## 2020-12-21 RX ORDER — FENTANYL CITRATE 50 UG/ML
50 INJECTION, SOLUTION INTRAMUSCULAR; INTRAVENOUS
Status: DISCONTINUED | OUTPATIENT
Start: 2020-12-21 | End: 2020-12-26

## 2020-12-21 RX ORDER — IPRATROPIUM BROMIDE AND ALBUTEROL SULFATE 2.5; .5 MG/3ML; MG/3ML
1 SOLUTION RESPIRATORY (INHALATION)
Status: DISCONTINUED | OUTPATIENT
Start: 2020-12-21 | End: 2021-01-06 | Stop reason: HOSPADM

## 2020-12-21 RX ORDER — OXYCODONE HYDROCHLORIDE 5 MG/1
5 TABLET ORAL EVERY 4 HOURS
Status: DISCONTINUED | OUTPATIENT
Start: 2020-12-21 | End: 2020-12-25

## 2020-12-21 RX ORDER — ACETYLCYSTEINE 200 MG/ML
600 SOLUTION ORAL; RESPIRATORY (INHALATION) EVERY 4 HOURS
Status: DISCONTINUED | OUTPATIENT
Start: 2020-12-21 | End: 2020-12-28

## 2020-12-21 RX ORDER — ALBUTEROL SULFATE 2.5 MG/3ML
2.5 SOLUTION RESPIRATORY (INHALATION) EVERY 4 HOURS
Status: DISCONTINUED | OUTPATIENT
Start: 2020-12-21 | End: 2020-12-23

## 2020-12-21 RX ORDER — ALBUTEROL SULFATE 2.5 MG/3ML
2.5 SOLUTION RESPIRATORY (INHALATION) EVERY 4 HOURS
Status: DISCONTINUED | OUTPATIENT
Start: 2020-12-21 | End: 2020-12-21

## 2020-12-21 RX ADMIN — AMIODARONE HYDROCHLORIDE 200 MG: 200 TABLET ORAL at 08:00

## 2020-12-21 RX ADMIN — MIDODRINE HYDROCHLORIDE 10 MG: 5 TABLET ORAL at 05:34

## 2020-12-21 RX ADMIN — Medication 600 MG: at 19:26

## 2020-12-21 RX ADMIN — ACETAMINOPHEN 1000 MG: 500 TABLET ORAL at 22:29

## 2020-12-21 RX ADMIN — ACETAMINOPHEN 1000 MG: 500 TABLET ORAL at 13:09

## 2020-12-21 RX ADMIN — SILVER SULFADIAZINE: 10 CREAM TOPICAL at 08:00

## 2020-12-21 RX ADMIN — Medication 600 MG: at 11:42

## 2020-12-21 RX ADMIN — CHLORHEXIDINE GLUCONATE 0.12% ORAL RINSE 15 ML: 1.2 LIQUID ORAL at 22:29

## 2020-12-21 RX ADMIN — HEPARIN SODIUM: 5000 INJECTION INTRAVENOUS; SUBCUTANEOUS at 11:00

## 2020-12-21 RX ADMIN — OXYCODONE HYDROCHLORIDE 5 MG: 5 TABLET ORAL at 17:57

## 2020-12-21 RX ADMIN — GABAPENTIN 100 MG: 100 CAPSULE ORAL at 22:29

## 2020-12-21 RX ADMIN — APIXABAN 5 MG: 5 TABLET, FILM COATED ORAL at 22:30

## 2020-12-21 RX ADMIN — FENTANYL CITRATE 50 MCG: 50 INJECTION, SOLUTION INTRAMUSCULAR; INTRAVENOUS at 14:58

## 2020-12-21 RX ADMIN — ALBUTEROL SULFATE 2.5 MG: 2.5 SOLUTION RESPIRATORY (INHALATION) at 11:43

## 2020-12-21 RX ADMIN — SENNOSIDES 8.6 MG: 8.6 TABLET, FILM COATED ORAL at 22:29

## 2020-12-21 RX ADMIN — IPRATROPIUM BROMIDE AND ALBUTEROL SULFATE 1 AMPULE: .5; 3 SOLUTION RESPIRATORY (INHALATION) at 19:26

## 2020-12-21 RX ADMIN — HEPARIN SODIUM: 5000 INJECTION INTRAVENOUS; SUBCUTANEOUS at 01:57

## 2020-12-21 RX ADMIN — IPRATROPIUM BROMIDE AND ALBUTEROL SULFATE 1 AMPULE: .5; 3 SOLUTION RESPIRATORY (INHALATION) at 11:00

## 2020-12-21 RX ADMIN — FENTANYL CITRATE 50 MCG: 50 INJECTION, SOLUTION INTRAMUSCULAR; INTRAVENOUS at 12:56

## 2020-12-21 RX ADMIN — Medication 600 MG: at 08:30

## 2020-12-21 RX ADMIN — BACITRACIN: 500 OINTMENT TOPICAL at 22:33

## 2020-12-21 RX ADMIN — Medication 14 MCG/MIN: at 08:08

## 2020-12-21 RX ADMIN — Medication 10 ML: at 08:00

## 2020-12-21 RX ADMIN — GABAPENTIN 100 MG: 100 CAPSULE ORAL at 08:30

## 2020-12-21 RX ADMIN — Medication 600 MG: at 03:57

## 2020-12-21 RX ADMIN — HEPARIN SODIUM: 5000 INJECTION INTRAVENOUS; SUBCUTANEOUS at 23:50

## 2020-12-21 RX ADMIN — SILVER SULFADIAZINE: 10 CREAM TOPICAL at 22:30

## 2020-12-21 RX ADMIN — BACITRACIN: 500 OINTMENT TOPICAL at 08:00

## 2020-12-21 RX ADMIN — Medication 600 MG: at 23:50

## 2020-12-21 RX ADMIN — ESCITALOPRAM OXALATE 20 MG: 10 TABLET ORAL at 08:00

## 2020-12-21 RX ADMIN — ACETAMINOPHEN 1000 MG: 500 TABLET ORAL at 05:34

## 2020-12-21 RX ADMIN — CHLORHEXIDINE GLUCONATE 0.12% ORAL RINSE 15 ML: 1.2 LIQUID ORAL at 08:00

## 2020-12-21 RX ADMIN — MIDODRINE HYDROCHLORIDE 10 MG: 5 TABLET ORAL at 22:29

## 2020-12-21 RX ADMIN — Medication 600 MG: at 16:37

## 2020-12-21 RX ADMIN — ALBUTEROL SULFATE 2.5 MG: 2.5 SOLUTION RESPIRATORY (INHALATION) at 08:30

## 2020-12-21 RX ADMIN — BUDESONIDE AND FORMOTEROL FUMARATE DIHYDRATE 2 PUFF: 80; 4.5 AEROSOL RESPIRATORY (INHALATION) at 08:55

## 2020-12-21 RX ADMIN — MIDODRINE HYDROCHLORIDE 10 MG: 5 TABLET ORAL at 13:09

## 2020-12-21 RX ADMIN — OXYCODONE HYDROCHLORIDE 5 MG: 5 TABLET ORAL at 13:10

## 2020-12-21 RX ADMIN — BUDESONIDE AND FORMOTEROL FUMARATE DIHYDRATE 2 PUFF: 80; 4.5 AEROSOL RESPIRATORY (INHALATION) at 19:27

## 2020-12-21 RX ADMIN — ALBUTEROL SULFATE 2.5 MG: 2.5 SOLUTION RESPIRATORY (INHALATION) at 23:50

## 2020-12-21 RX ADMIN — HEPARIN SODIUM: 5000 INJECTION INTRAVENOUS; SUBCUTANEOUS at 14:56

## 2020-12-21 RX ADMIN — APIXABAN 5 MG: 5 TABLET, FILM COATED ORAL at 08:30

## 2020-12-21 RX ADMIN — IPRATROPIUM BROMIDE AND ALBUTEROL SULFATE 1 AMPULE: .5; 3 SOLUTION RESPIRATORY (INHALATION) at 16:37

## 2020-12-21 RX ADMIN — HEPARIN SODIUM: 5000 INJECTION INTRAVENOUS; SUBCUTANEOUS at 05:48

## 2020-12-21 RX ADMIN — VASOPRESSIN 0.04 UNITS/MIN: 20 INJECTION INTRAVENOUS at 13:15

## 2020-12-21 RX ADMIN — PANTOPRAZOLE SODIUM 40 MG: 40 INJECTION, POWDER, FOR SOLUTION INTRAVENOUS at 08:00

## 2020-12-21 RX ADMIN — OXYCODONE HYDROCHLORIDE 10 MG: 5 TABLET ORAL at 02:00

## 2020-12-21 RX ADMIN — CALCIUM ACETATE 2001 MG: 667 CAPSULE ORAL at 13:09

## 2020-12-21 RX ADMIN — OXYCODONE HYDROCHLORIDE 5 MG: 5 TABLET ORAL at 22:29

## 2020-12-21 RX ADMIN — FENTANYL CITRATE 50 MCG: 50 INJECTION, SOLUTION INTRAMUSCULAR; INTRAVENOUS at 22:29

## 2020-12-21 RX ADMIN — HEPARIN SODIUM: 5000 INJECTION INTRAVENOUS; SUBCUTANEOUS at 08:55

## 2020-12-21 RX ADMIN — FENTANYL CITRATE 50 MCG: 50 INJECTION, SOLUTION INTRAMUSCULAR; INTRAVENOUS at 18:51

## 2020-12-21 RX ADMIN — DOXEPIN HYDROCHLORIDE 10 MG: 10 CAPSULE ORAL at 22:30

## 2020-12-21 RX ADMIN — OXYCODONE HYDROCHLORIDE 5 MG: 5 TABLET ORAL at 09:20

## 2020-12-21 RX ADMIN — VASOPRESSIN 0.04 UNITS/MIN: 20 INJECTION INTRAVENOUS at 06:30

## 2020-12-21 RX ADMIN — VASOPRESSIN 0.04 UNITS/MIN: 20 INJECTION INTRAVENOUS at 22:29

## 2020-12-21 RX ADMIN — OXYCODONE HYDROCHLORIDE 10 MG: 5 TABLET ORAL at 05:34

## 2020-12-21 RX ADMIN — ALBUTEROL SULFATE 2.5 MG: 2.5 SOLUTION RESPIRATORY (INHALATION) at 03:56

## 2020-12-21 ASSESSMENT — PAIN SCALES - GENERAL
PAINLEVEL_OUTOF10: 3
PAINLEVEL_OUTOF10: 6

## 2020-12-21 ASSESSMENT — PULMONARY FUNCTION TESTS
PIF_VALUE: 31
PIF_VALUE: 27
PIF_VALUE: 16

## 2020-12-21 NOTE — PLAN OF CARE
Bilateral/unilateral wheezing with diminished air entry []  Insp/Exp wheeze and/or poor aeration   Ventilator Pressures   []  Chronic Ventilator []  Insp. Pressure less than 25 cm H20 []  Insp. Pressure less than 25 cm H20 [x]  Insp. Pressure exceeds 25 cm H20 []  Insp.  Pressure exceeds 30 cm H20   Plateau Pressure []  NA   []  Plateau Pressure less than 4  []  Plateau Pressure less than or equal to 5 [x]  Plateau Pressure greater than or equal to 6 []  Plateau Pressure greater than or equal to [address] [facility]  9:23 AM

## 2020-12-21 NOTE — PLAN OF CARE
Problem: OXYGENATION/RESPIRATORY FUNCTION  Goal: Patient will maintain patent airway  Outcome: Ongoing  Goal: Patient will achieve/maintain normal respiratory rate/effort  Description: Respiratory rate and effort will be within normal limits for the patient  Outcome: Ongoing     Problem: MECHANICAL VENTILATION  Goal: Patient will maintain patent airway  Outcome: Ongoing  Goal: Oral health is maintained or improved  Outcome: Ongoing  Goal: ET tube will be managed safely  Outcome: Ongoing  Goal: Ability to express needs and understand communication  Outcome: Ongoing  Goal: Mobility/activity is maintained at optimum level for patient  Outcome: Ongoing     Problem: SKIN INTEGRITY  Goal: Skin integrity is maintained or improved  Outcome: Ongoing     Problem: Pain:  Goal: Pain level will decrease  Description: Pain level will decrease  Outcome: Ongoing  Goal: Control of acute pain  Description: Control of acute pain  Outcome: Ongoing  Goal: Control of chronic pain  Description: Control of chronic pain  Outcome: Ongoing     Problem: Nutrition  Goal: Optimal nutrition therapy  Description: Nutrition Problem #1: Inadequate oral intake  Intervention: Food and/or Nutrient Delivery: (Monitor TF tolerance; suggest goal rate of 50 mL/hr to provide 1800 kcal and 113 g pro/day.)  Nutritional Goals: meet % of estimated nutrient needs     Outcome: Ongoing     Problem: Skin Integrity:  Goal: Will show no infection signs and symptoms  Description: Will show no infection signs and symptoms  Outcome: Ongoing  Goal: Absence of new skin breakdown  Description: Absence of new skin breakdown  Outcome: Ongoing     Problem: Falls - Risk of:  Goal: Will remain free from falls  Description: Will remain free from falls  Outcome: Ongoing  Goal: Absence of physical injury  Description: Absence of physical injury  Outcome: Ongoing   Electronically signed by Maria T Moser RN on 12/21/2020 at 8:03 AM
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
## 2020-12-21 NOTE — PROGRESS NOTES
[address]  Occupational Therapy Not Seen Note    DATE: 2020  Name: Ottoniel Merritt  : 1965  MRN: [id number]    Patient not available for Occupational Therapy due to:      [x] Other: intubated/sedated     Next Scheduled Treatment: Will check back as able    Harley Echeverria OTR/L

## 2020-12-21 NOTE — PROGRESS NOTES
Found ETT at the 24 cm ethan secured with Twill ties. Previous charting was at the 23 ethan. Opened 12-21 morning CXR to see placement is satisfactory at the 24 ethan. Twill ties changed and resecured at the 24 ethan.

## 2020-12-21 NOTE — PROGRESS NOTES
12/21/20 1602   Vent Information   Vent Mode CPAP   Pressure Support 8 cmH20   PEEP/CPAP 8   Vent Patient Data   Rate Measured 15 br/min   Vt Exhaled 555 mL   Minute Volume 9 Liters   As tolerated.  Return as HS
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
## 2020-12-22 ENCOUNTER — APPOINTMENT (OUTPATIENT)
Dept: GENERAL RADIOLOGY | Age: 55
DRG: 004 | End: 2020-12-22
Payer: MEDICARE

## 2020-12-22 LAB
ABSOLUTE EOS #: 0.33 K/UL (ref 0–0.44)
ABSOLUTE IMMATURE GRANULOCYTE: 0 K/UL (ref 0–0.3)
ABSOLUTE LYMPH #: 0.44 K/UL (ref 1.1–3.7)
ABSOLUTE MONO #: 1.22 K/UL (ref 0.1–1.2)
ALLEN TEST: ABNORMAL
ANION GAP SERPL CALCULATED.3IONS-SCNC: 14 MMOL/L (ref 9–17)
BASOPHILS # BLD: 0 % (ref 0–2)
BASOPHILS ABSOLUTE: 0 K/UL (ref 0–0.2)
BUN BLDV-MCNC: 37 MG/DL (ref 6–20)
BUN/CREAT BLD: ABNORMAL (ref 9–20)
CALCIUM IONIZED: 1.12 MMOL/L (ref 1.13–1.33)
CALCIUM SERPL-MCNC: 8.3 MG/DL (ref 8.6–10.4)
CHLORIDE BLD-SCNC: 95 MMOL/L (ref 98–107)
CO2: 25 MMOL/L (ref 20–31)
CREAT SERPL-MCNC: 4.5 MG/DL (ref 0.5–0.9)
DIFFERENTIAL TYPE: ABNORMAL
EOSINOPHILS RELATIVE PERCENT: 3 % (ref 1–4)
FIO2: 50
GFR AFRICAN AMERICAN: 12 ML/MIN
GFR NON-AFRICAN AMERICAN: 10 ML/MIN
GFR SERPL CREATININE-BSD FRML MDRD: ABNORMAL ML/MIN/{1.73_M2}
GFR SERPL CREATININE-BSD FRML MDRD: ABNORMAL ML/MIN/{1.73_M2}
GLUCOSE BLD-MCNC: 108 MG/DL (ref 70–99)
GLUCOSE BLD-MCNC: 121 MG/DL (ref 65–105)
GLUCOSE BLD-MCNC: 132 MG/DL (ref 65–105)
GLUCOSE BLD-MCNC: 143 MG/DL (ref 65–105)
HCT VFR BLD CALC: 28.2 % (ref 36.3–47.1)
HEMOGLOBIN: 8.1 G/DL (ref 11.9–15.1)
IMMATURE GRANULOCYTES: 0 %
LYMPHOCYTES # BLD: 4 % (ref 24–43)
MAGNESIUM: 2.4 MG/DL (ref 1.6–2.6)
MCH RBC QN AUTO: 30.3 PG (ref 25.2–33.5)
MCHC RBC AUTO-ENTMCNC: 28.7 G/DL (ref 28.4–34.8)
MCV RBC AUTO: 105.6 FL (ref 82.6–102.9)
MODE: ABNORMAL
MONOCYTES # BLD: 11 % (ref 3–12)
MORPHOLOGY: ABNORMAL
MORPHOLOGY: ABNORMAL
NEGATIVE BASE EXCESS, ART: ABNORMAL (ref 0–2)
NRBC AUTOMATED: 0 PER 100 WBC
O2 DEVICE/FLOW/%: ABNORMAL
PATIENT TEMP: ABNORMAL
PDW BLD-RTO: 17.2 % (ref 11.8–14.4)
PHOSPHORUS: 3 MG/DL (ref 2.6–4.5)
PLATELET # BLD: 155 K/UL (ref 138–453)
PLATELET ESTIMATE: ABNORMAL
PMV BLD AUTO: 9.7 FL (ref 8.1–13.5)
POC HCO3: 27.6 MMOL/L (ref 21–28)
POC HCO3: 28.1 MMOL/L (ref 21–28)
POC HCO3: 28.6 MMOL/L (ref 21–28)
POC HCO3: 29 MMOL/L (ref 21–28)
POC LACTIC ACID: 0.66 MMOL/L (ref 0.56–1.39)
POC LACTIC ACID: 0.69 MMOL/L (ref 0.56–1.39)
POC LACTIC ACID: 0.83 MMOL/L (ref 0.56–1.39)
POC LACTIC ACID: 0.88 MMOL/L (ref 0.56–1.39)
POC O2 SATURATION: 94 % (ref 94–98)
POC O2 SATURATION: 96 % (ref 94–98)
POC O2 SATURATION: 98 % (ref 94–98)
POC O2 SATURATION: 99 % (ref 94–98)
POC PCO2 TEMP: ABNORMAL MM HG
POC PCO2: 55 MM HG (ref 35–48)
POC PCO2: 57.4 MM HG (ref 35–48)
POC PCO2: 57.6 MM HG (ref 35–48)
POC PCO2: 60.4 MM HG (ref 35–48)
POC PH TEMP: ABNORMAL
POC PH: 7.28 (ref 7.35–7.45)
POC PH: 7.29 (ref 7.35–7.45)
POC PH: 7.3 (ref 7.35–7.45)
POC PH: 7.33 (ref 7.35–7.45)
POC PO2 TEMP: ABNORMAL MM HG
POC PO2: 124.4 MM HG (ref 83–108)
POC PO2: 134.4 MM HG (ref 83–108)
POC PO2: 77.1 MM HG (ref 83–108)
POC PO2: 93.7 MM HG (ref 83–108)
POSITIVE BASE EXCESS, ART: 0 (ref 0–3)
POSITIVE BASE EXCESS, ART: 1 (ref 0–3)
POSITIVE BASE EXCESS, ART: 2 (ref 0–3)
POSITIVE BASE EXCESS, ART: 2 (ref 0–3)
POTASSIUM SERPL-SCNC: 4.1 MMOL/L (ref 3.7–5.3)
RBC # BLD: 2.67 M/UL (ref 3.95–5.11)
RBC # BLD: ABNORMAL 10*6/UL
SAMPLE SITE: ABNORMAL
SEG NEUTROPHILS: 82 % (ref 36–65)
SEGMENTED NEUTROPHILS ABSOLUTE COUNT: 9.11 K/UL (ref 1.5–8.1)
SODIUM BLD-SCNC: 134 MMOL/L (ref 135–144)
TCO2 (CALC), ART: 29 MMOL/L (ref 22–29)
TCO2 (CALC), ART: 30 MMOL/L (ref 22–29)
TCO2 (CALC), ART: 30 MMOL/L (ref 22–29)
TCO2 (CALC), ART: 31 MMOL/L (ref 22–29)
WBC # BLD: 11.1 K/UL (ref 3.5–11.3)
WBC # BLD: ABNORMAL 10*3/UL

## 2020-12-22 PROCEDURE — 6360000002 HC RX W HCPCS: Performed by: SURGERY

## 2020-12-22 PROCEDURE — 6370000000 HC RX 637 (ALT 250 FOR IP): Performed by: STUDENT IN AN ORGANIZED HEALTH CARE EDUCATION/TRAINING PROGRAM

## 2020-12-22 PROCEDURE — 97110 THERAPEUTIC EXERCISES: CPT

## 2020-12-22 PROCEDURE — C9113 INJ PANTOPRAZOLE SODIUM, VIA: HCPCS | Performed by: NURSE PRACTITIONER

## 2020-12-22 PROCEDURE — 71045 X-RAY EXAM CHEST 1 VIEW: CPT

## 2020-12-22 PROCEDURE — 6370000000 HC RX 637 (ALT 250 FOR IP): Performed by: INTERNAL MEDICINE

## 2020-12-22 PROCEDURE — 2580000003 HC RX 258: Performed by: STUDENT IN AN ORGANIZED HEALTH CARE EDUCATION/TRAINING PROGRAM

## 2020-12-22 PROCEDURE — 90935 HEMODIALYSIS ONE EVALUATION: CPT

## 2020-12-22 PROCEDURE — 2000000000 HC ICU R&B

## 2020-12-22 PROCEDURE — 84100 ASSAY OF PHOSPHORUS: CPT

## 2020-12-22 PROCEDURE — 97162 PT EVAL MOD COMPLEX 30 MIN: CPT

## 2020-12-22 PROCEDURE — 05JY3ZZ INSPECTION OF UPPER VEIN, PERCUTANEOUS APPROACH: ICD-10-PCS | Performed by: SURGERY

## 2020-12-22 PROCEDURE — 6370000000 HC RX 637 (ALT 250 FOR IP): Performed by: NURSE PRACTITIONER

## 2020-12-22 PROCEDURE — 99232 SBSQ HOSP IP/OBS MODERATE 35: CPT | Performed by: INTERNAL MEDICINE

## 2020-12-22 PROCEDURE — 82947 ASSAY GLUCOSE BLOOD QUANT: CPT

## 2020-12-22 PROCEDURE — 6360000002 HC RX W HCPCS: Performed by: NURSE PRACTITIONER

## 2020-12-22 PROCEDURE — 94640 AIRWAY INHALATION TREATMENT: CPT

## 2020-12-22 PROCEDURE — 2580000003 HC RX 258: Performed by: GENERAL PRACTICE

## 2020-12-22 PROCEDURE — 94761 N-INVAS EAR/PLS OXIMETRY MLT: CPT

## 2020-12-22 PROCEDURE — 37799 UNLISTED PX VASCULAR SURGERY: CPT

## 2020-12-22 PROCEDURE — 6370000000 HC RX 637 (ALT 250 FOR IP): Performed by: GENERAL PRACTICE

## 2020-12-22 PROCEDURE — 85025 COMPLETE CBC W/AUTO DIFF WBC: CPT

## 2020-12-22 PROCEDURE — 83735 ASSAY OF MAGNESIUM: CPT

## 2020-12-22 PROCEDURE — 2500000003 HC RX 250 WO HCPCS: Performed by: STUDENT IN AN ORGANIZED HEALTH CARE EDUCATION/TRAINING PROGRAM

## 2020-12-22 PROCEDURE — 2700000000 HC OXYGEN THERAPY PER DAY

## 2020-12-22 PROCEDURE — 6360000002 HC RX W HCPCS: Performed by: STUDENT IN AN ORGANIZED HEALTH CARE EDUCATION/TRAINING PROGRAM

## 2020-12-22 PROCEDURE — 82803 BLOOD GASES ANY COMBINATION: CPT

## 2020-12-22 PROCEDURE — 94003 VENT MGMT INPAT SUBQ DAY: CPT

## 2020-12-22 PROCEDURE — 82330 ASSAY OF CALCIUM: CPT

## 2020-12-22 PROCEDURE — 80048 BASIC METABOLIC PNL TOTAL CA: CPT

## 2020-12-22 PROCEDURE — 2580000003 HC RX 258: Performed by: NURSE PRACTITIONER

## 2020-12-22 PROCEDURE — 94770 HC ETCO2 MONITOR DAILY: CPT

## 2020-12-22 PROCEDURE — 6370000000 HC RX 637 (ALT 250 FOR IP): Performed by: SURGERY

## 2020-12-22 PROCEDURE — 99291 CRITICAL CARE FIRST HOUR: CPT | Performed by: INTERNAL MEDICINE

## 2020-12-22 PROCEDURE — 83605 ASSAY OF LACTIC ACID: CPT

## 2020-12-22 PROCEDURE — 2580000003 HC RX 258: Performed by: SURGERY

## 2020-12-22 RX ORDER — SODIUM CHLORIDE 0.9 % (FLUSH) 0.9 %
10 SYRINGE (ML) INJECTION PRN
Status: DISCONTINUED | OUTPATIENT
Start: 2020-12-22 | End: 2021-01-06 | Stop reason: HOSPADM

## 2020-12-22 RX ORDER — HEPARIN SODIUM 1000 [USP'U]/ML
1900 INJECTION, SOLUTION INTRAVENOUS; SUBCUTANEOUS PRN
Status: DISCONTINUED | OUTPATIENT
Start: 2020-12-22 | End: 2020-12-23

## 2020-12-22 RX ORDER — HEPARIN SODIUM 5000 [USP'U]/ML
1600 INJECTION, SOLUTION INTRAVENOUS; SUBCUTANEOUS PRN
Status: DISCONTINUED | OUTPATIENT
Start: 2020-12-22 | End: 2020-12-22

## 2020-12-22 RX ORDER — ALBUMIN (HUMAN) 12.5 G/50ML
25 SOLUTION INTRAVENOUS PRN
Status: DISCONTINUED | OUTPATIENT
Start: 2020-12-22 | End: 2021-01-06 | Stop reason: HOSPADM

## 2020-12-22 RX ORDER — QUETIAPINE FUMARATE 25 MG/1
25 TABLET, FILM COATED ORAL 2 TIMES DAILY
Status: DISCONTINUED | OUTPATIENT
Start: 2020-12-22 | End: 2020-12-22

## 2020-12-22 RX ORDER — LANSOPRAZOLE
15 KIT EVERY MORNING
Status: DISCONTINUED | OUTPATIENT
Start: 2020-12-22 | End: 2021-01-06 | Stop reason: HOSPADM

## 2020-12-22 RX ORDER — SODIUM CHLORIDE 0.9 % (FLUSH) 0.9 %
10 SYRINGE (ML) INJECTION EVERY 12 HOURS SCHEDULED
Status: DISCONTINUED | OUTPATIENT
Start: 2020-12-22 | End: 2021-01-06 | Stop reason: HOSPADM

## 2020-12-22 RX ORDER — HEPARIN SODIUM 1000 [USP'U]/ML
1600 INJECTION, SOLUTION INTRAVENOUS; SUBCUTANEOUS PRN
Status: DISCONTINUED | OUTPATIENT
Start: 2020-12-22 | End: 2020-12-23

## 2020-12-22 RX ORDER — LIDOCAINE HYDROCHLORIDE 10 MG/ML
5 INJECTION, SOLUTION INFILTRATION; PERINEURAL ONCE
Status: DISCONTINUED | OUTPATIENT
Start: 2020-12-22 | End: 2020-12-23

## 2020-12-22 RX ORDER — HEPARIN SODIUM 5000 [USP'U]/ML
1900 INJECTION, SOLUTION INTRAVENOUS; SUBCUTANEOUS PRN
Status: DISCONTINUED | OUTPATIENT
Start: 2020-12-22 | End: 2020-12-22

## 2020-12-22 RX ORDER — QUETIAPINE FUMARATE 25 MG/1
50 TABLET, FILM COATED ORAL 2 TIMES DAILY
Status: DISCONTINUED | OUTPATIENT
Start: 2020-12-22 | End: 2020-12-23

## 2020-12-22 RX ADMIN — VASOPRESSIN 0.04 UNITS/MIN: 20 INJECTION INTRAVENOUS at 08:22

## 2020-12-22 RX ADMIN — VASOPRESSIN 0.04 UNITS/MIN: 20 INJECTION INTRAVENOUS at 16:44

## 2020-12-22 RX ADMIN — Medication 600 MG: at 16:14

## 2020-12-22 RX ADMIN — HEPARIN SODIUM: 5000 INJECTION INTRAVENOUS; SUBCUTANEOUS at 22:22

## 2020-12-22 RX ADMIN — Medication 600 MG: at 03:20

## 2020-12-22 RX ADMIN — FENTANYL CITRATE 50 MCG: 50 INJECTION, SOLUTION INTRAMUSCULAR; INTRAVENOUS at 22:18

## 2020-12-22 RX ADMIN — BACITRACIN: 500 OINTMENT TOPICAL at 08:11

## 2020-12-22 RX ADMIN — CALCIUM ACETATE 2001 MG: 667 CAPSULE ORAL at 18:44

## 2020-12-22 RX ADMIN — HEPARIN SODIUM: 5000 INJECTION INTRAVENOUS; SUBCUTANEOUS at 09:46

## 2020-12-22 RX ADMIN — ALBUTEROL SULFATE 2.5 MG: 2.5 SOLUTION RESPIRATORY (INHALATION) at 14:22

## 2020-12-22 RX ADMIN — Medication 600 MG: at 07:24

## 2020-12-22 RX ADMIN — CALCIUM ACETATE 2001 MG: 667 CAPSULE ORAL at 12:21

## 2020-12-22 RX ADMIN — FENTANYL CITRATE 50 MCG: 50 INJECTION, SOLUTION INTRAMUSCULAR; INTRAVENOUS at 04:24

## 2020-12-22 RX ADMIN — IPRATROPIUM BROMIDE AND ALBUTEROL SULFATE 1 AMPULE: .5; 3 SOLUTION RESPIRATORY (INHALATION) at 20:17

## 2020-12-22 RX ADMIN — Medication 600 MG: at 20:18

## 2020-12-22 RX ADMIN — Medication 10 ML: at 08:09

## 2020-12-22 RX ADMIN — OXYCODONE HYDROCHLORIDE 5 MG: 5 TABLET ORAL at 09:07

## 2020-12-22 RX ADMIN — SODIUM CHLORIDE, PRESERVATIVE FREE 10 ML: 5 INJECTION INTRAVENOUS at 20:41

## 2020-12-22 RX ADMIN — Medication 15 MG: at 13:53

## 2020-12-22 RX ADMIN — Medication 600 MG: at 11:29

## 2020-12-22 RX ADMIN — ALBUTEROL SULFATE 2.5 MG: 2.5 SOLUTION RESPIRATORY (INHALATION) at 09:46

## 2020-12-22 RX ADMIN — CALCIUM ACETATE 2001 MG: 667 CAPSULE ORAL at 09:04

## 2020-12-22 RX ADMIN — DOCUSATE SODIUM 100 MG: 50 LIQUID ORAL at 08:09

## 2020-12-22 RX ADMIN — HEPARIN SODIUM: 5000 INJECTION INTRAVENOUS; SUBCUTANEOUS at 17:42

## 2020-12-22 RX ADMIN — OXYCODONE HYDROCHLORIDE 5 MG: 5 TABLET ORAL at 00:48

## 2020-12-22 RX ADMIN — ACETAMINOPHEN 1000 MG: 500 TABLET ORAL at 06:41

## 2020-12-22 RX ADMIN — Medication 7 MCG/MIN: at 08:07

## 2020-12-22 RX ADMIN — OXYCODONE HYDROCHLORIDE 5 MG: 5 TABLET ORAL at 13:53

## 2020-12-22 RX ADMIN — QUETIAPINE FUMARATE 25 MG: 25 TABLET ORAL at 13:52

## 2020-12-22 RX ADMIN — FENTANYL CITRATE 50 MCG: 50 INJECTION, SOLUTION INTRAMUSCULAR; INTRAVENOUS at 09:07

## 2020-12-22 RX ADMIN — APIXABAN 5 MG: 5 TABLET, FILM COATED ORAL at 08:09

## 2020-12-22 RX ADMIN — BUDESONIDE AND FORMOTEROL FUMARATE DIHYDRATE 2 PUFF: 80; 4.5 AEROSOL RESPIRATORY (INHALATION) at 07:26

## 2020-12-22 RX ADMIN — HEPARIN SODIUM: 5000 INJECTION INTRAVENOUS; SUBCUTANEOUS at 14:22

## 2020-12-22 RX ADMIN — GABAPENTIN 100 MG: 100 CAPSULE ORAL at 20:40

## 2020-12-22 RX ADMIN — FENTANYL CITRATE 50 MCG: 50 INJECTION, SOLUTION INTRAMUSCULAR; INTRAVENOUS at 14:49

## 2020-12-22 RX ADMIN — HEPARIN SODIUM: 5000 INJECTION INTRAVENOUS; SUBCUTANEOUS at 03:20

## 2020-12-22 RX ADMIN — QUETIAPINE FUMARATE 50 MG: 25 TABLET ORAL at 20:40

## 2020-12-22 RX ADMIN — FENTANYL CITRATE 50 MCG: 50 INJECTION, SOLUTION INTRAMUSCULAR; INTRAVENOUS at 00:48

## 2020-12-22 RX ADMIN — AMIODARONE HYDROCHLORIDE 200 MG: 200 TABLET ORAL at 08:09

## 2020-12-22 RX ADMIN — OXYCODONE HYDROCHLORIDE 5 MG: 5 TABLET ORAL at 20:45

## 2020-12-22 RX ADMIN — PANTOPRAZOLE SODIUM 40 MG: 40 INJECTION, POWDER, FOR SOLUTION INTRAVENOUS at 08:09

## 2020-12-22 RX ADMIN — ACETAMINOPHEN 1000 MG: 500 TABLET ORAL at 22:29

## 2020-12-22 RX ADMIN — Medication 5 MG: at 20:40

## 2020-12-22 RX ADMIN — BACITRACIN: 500 OINTMENT TOPICAL at 20:40

## 2020-12-22 RX ADMIN — FENTANYL CITRATE 50 MCG: 50 INJECTION, SOLUTION INTRAMUSCULAR; INTRAVENOUS at 06:41

## 2020-12-22 RX ADMIN — IPRATROPIUM BROMIDE AND ALBUTEROL SULFATE 1 AMPULE: .5; 3 SOLUTION RESPIRATORY (INHALATION) at 16:14

## 2020-12-22 RX ADMIN — ALBUTEROL SULFATE 2.5 MG: 2.5 SOLUTION RESPIRATORY (INHALATION) at 17:42

## 2020-12-22 RX ADMIN — POLYETHYLENE GLYCOL 3350 17 G: 17 POWDER, FOR SOLUTION ORAL at 08:08

## 2020-12-22 RX ADMIN — ESCITALOPRAM OXALATE 20 MG: 10 TABLET ORAL at 08:09

## 2020-12-22 RX ADMIN — IPRATROPIUM BROMIDE AND ALBUTEROL SULFATE 1 AMPULE: .5; 3 SOLUTION RESPIRATORY (INHALATION) at 11:29

## 2020-12-22 RX ADMIN — GABAPENTIN 100 MG: 100 CAPSULE ORAL at 08:09

## 2020-12-22 RX ADMIN — ALBUTEROL SULFATE 2.5 MG: 2.5 SOLUTION RESPIRATORY (INHALATION) at 03:20

## 2020-12-22 RX ADMIN — ALBUTEROL SULFATE 2.5 MG: 2.5 SOLUTION RESPIRATORY (INHALATION) at 22:24

## 2020-12-22 RX ADMIN — SENNOSIDES 8.6 MG: 8.6 TABLET, FILM COATED ORAL at 21:30

## 2020-12-22 RX ADMIN — MIDODRINE HYDROCHLORIDE 10 MG: 5 TABLET ORAL at 08:09

## 2020-12-22 RX ADMIN — SILVER SULFADIAZINE: 10 CREAM TOPICAL at 20:40

## 2020-12-22 RX ADMIN — IPRATROPIUM BROMIDE AND ALBUTEROL SULFATE 1 AMPULE: .5; 3 SOLUTION RESPIRATORY (INHALATION) at 07:26

## 2020-12-22 RX ADMIN — APIXABAN 5 MG: 5 TABLET, FILM COATED ORAL at 20:40

## 2020-12-22 RX ADMIN — SILVER SULFADIAZINE: 10 CREAM TOPICAL at 08:10

## 2020-12-22 RX ADMIN — ACETAMINOPHEN 1000 MG: 500 TABLET ORAL at 13:53

## 2020-12-22 RX ADMIN — DOXEPIN HYDROCHLORIDE 10 MG: 10 CAPSULE ORAL at 20:40

## 2020-12-22 RX ADMIN — BUDESONIDE AND FORMOTEROL FUMARATE DIHYDRATE 2 PUFF: 80; 4.5 AEROSOL RESPIRATORY (INHALATION) at 20:17

## 2020-12-22 RX ADMIN — OXYCODONE HYDROCHLORIDE 5 MG: 5 TABLET ORAL at 17:14

## 2020-12-22 RX ADMIN — OXYCODONE HYDROCHLORIDE 5 MG: 5 TABLET ORAL at 06:41

## 2020-12-22 ASSESSMENT — PAIN SCALES - GENERAL
PAINLEVEL_OUTOF10: 7
PAINLEVEL_OUTOF10: 5
PAINLEVEL_OUTOF10: 5
PAINLEVEL_OUTOF10: 4

## 2020-12-22 ASSESSMENT — PAIN SCALES - WONG BAKER: WONGBAKER_NUMERICALRESPONSE: 4

## 2020-12-22 ASSESSMENT — PULMONARY FUNCTION TESTS
PIF_VALUE: 24
PIF_VALUE: 24
PIF_VALUE: 26

## 2020-12-22 ASSESSMENT — PAIN DESCRIPTION - LOCATION: LOCATION: LEG

## 2020-12-22 ASSESSMENT — PAIN DESCRIPTION - PAIN TYPE: TYPE: ACUTE PAIN

## 2020-12-22 ASSESSMENT — PAIN DESCRIPTION - DESCRIPTORS: DESCRIPTORS: DISCOMFORT
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
## 2020-12-22 NOTE — PLAN OF CARE
Problem: OXYGENATION/RESPIRATORY FUNCTION  Goal: Patient will maintain patent airway  12/22/2020 1245 by Jose Alfredo Soler RN  Outcome: Ongoing  12/22/2020 1045 by Ada Reese RCP  Outcome: Ongoing  Goal: Patient will achieve/maintain normal respiratory rate/effort  Description: Respiratory rate and effort will be within normal limits for the patient  12/22/2020 1245 by Jose Alfredo Soler RN  Outcome: Ongoing  12/22/2020 1045 by Ada Reese RCP  Outcome: Ongoing     Problem: MECHANICAL VENTILATION  Goal: Patient will maintain patent airway  12/22/2020 1245 by Jose Alfredo Soler RN  Outcome: Ongoing  12/22/2020 1045 by Ada Reese RCP  Outcome: Ongoing  Goal: Oral health is maintained or improved  12/22/2020 1245 by Jose Alfredo Soler RN  Outcome: Ongoing  12/22/2020 1045 by Ada Reese RCP  Outcome: Ongoing  Goal: ET tube will be managed safely  12/22/2020 1245 by Jose Alfredo Soler RN  Outcome: Ongoing  12/22/2020 1045 by Ada Reese RCP  Outcome: Ongoing  Goal: Ability to express needs and understand communication  12/22/2020 1245 by Jose Alfredo Soler RN  Outcome: Ongoing  12/22/2020 1045 by Ada Reese RCP  Outcome: Ongoing  Goal: Mobility/activity is maintained at optimum level for patient  12/22/2020 1245 by Jose Alfredo Soler RN  Outcome: Ongoing  12/22/2020 1045 by Ada Reese RCP  Outcome: Ongoing     Problem: SKIN INTEGRITY  Goal: Skin integrity is maintained or improved  12/22/2020 1245 by Jose Alfredo Soler RN  Outcome: Ongoing  12/22/2020 1045 by Ada Reese RCP  Outcome: Ongoing     Problem: Pain:  Goal: Pain level will decrease  Description: Pain level will decrease  Outcome: Ongoing  Goal: Control of acute pain  Description: Control of acute pain  Outcome: Ongoing     Problem: Nutrition  Goal: Optimal nutrition therapy  Description: Nutrition Problem #1: Inadequate oral intake
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
## 2020-12-22 NOTE — PLAN OF CARE
Problem: Restraint Use - Nonviolent/Non-Self-Destructive Behavior:  Goal: Absence of restraint indications  Description: Absence of restraint indications  Outcome: Not Met This Shift  Note: Continue with bilateral soft wrist restraints d/t pt reaching for ETT.
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
## 2020-12-22 NOTE — FLOWSHEET NOTE
Dialysis Post Treatment Note  Vitals:    12/21/20 1845   BP:    Pulse: 66   Resp: 18   Temp:    SpO2: 93%     Pre-Weight = 105.5kg  Post-weight = Weight: 225 lb 15.5 oz (102.5 kg)  Total Liters Processed = Total Liters Processed (l/min): 94.2 l/min  Rinseback Volume (mL) = Rinseback Volume (ml): 360 ml  Net Removal (mL) = NET Removed (ml): 3070 ml  Patient's dry weight= outpt. Dry weight 84.6kg, but last post outpt. Weight was 94.5kg  Type of access used= Left lower arm fistula  Length of treatment= 210 min. Hemodynamically stable throughout treatment on Levophed drip at 14 mcg./min, and cool dialysate temp of 36 degrees.

## 2020-12-22 NOTE — PLAN OF CARE
Problem: OXYGENATION/RESPIRATORY FUNCTION  Goal: Patient will maintain patent airway  Outcome: Ongoing     Problem: OXYGENATION/RESPIRATORY FUNCTION  Goal: Patient will achieve/maintain normal respiratory rate/effort  Description: Respiratory rate and effort will be within normal limits for the patient  Outcome: Ongoing     Problem: MECHANICAL VENTILATION  Goal: Patient will maintain patent airway  Outcome: Ongoing     Problem: MECHANICAL VENTILATION  Goal: Oral health is maintained or improved  Outcome: Ongoing     Problem: MECHANICAL VENTILATION  Goal: ET tube will be managed safely  Outcome: Ongoing     Problem: MECHANICAL VENTILATION  Goal: Ability to express needs and understand communication  Outcome: Ongoing     Problem: MECHANICAL VENTILATION  Goal: Mobility/activity is maintained at optimum level for patient  Outcome: Ongoing     Problem: SKIN INTEGRITY  Goal: Skin integrity is maintained or improved  Outcome: Ongoing   BRONCHOSPASM/BRONCHOCONSTRICTION     [x]         IMPROVE AERATION/BREATH SOUNDS  [x]   ADMINISTER BRONCHODILATOR THERAPY AS APPROPRIATE  [x]   ASSESS BREATH SOUNDS  [x]   IMPLEMENT AEROSOL/MDI PROTOCOL  [x]   PATIENT EDUCATION AS NEEDED   Ventilator Bronchodilator assessment    Breath sounds: CLEAR DIMINISHED  Inspiratory Pressure: 24  Plateau Pressure: 20    Patient assessed at level 2          [x]    Bronchodilator Assessment    BRONCHODILATOR ASSESSMENT SCORE  Score 0 (Home) 1 2 3 4   Breath Sounds   []  Chronic Ventilator: Patient at baseline []  Mild Wheezes/ Clear [x]  Intermittent wheezes with good air entry []  Bilateral/unilateral wheezing with diminished air entry []  Insp/Exp wheeze and/or poor aeration   Ventilator Pressures   []  Chronic Ventilator [x]  Insp. Pressure less than 25 cm H20 [x]  Insp. Pressure less than 25 cm H20 []  Insp. Pressure exceeds 25 cm H20 []  Insp.  Pressure exceeds 30 cm H20   Plateau Pressure []  NA   []  Plateau Pressure less than 4  [x] Plateau Pressure less than or equal to 5 []  Plateau Pressure greater than or equal to 6 []  Plateau Pressure greater than or equal to [address] [facility] Emory Lyles  10:45 AM

## 2020-12-22 NOTE — PLAN OF CARE
Problem: OXYGENATION/RESPIRATORY FUNCTION  Goal: Patient will maintain patent airway  12/21/2020 1933 by Francisco Condon, RCP  Outcome: Ongoing  12/21/2020 0922 by Chitra Ontiveros, RCP  Outcome: Ongoing  12/21/2020 0803 by Vincent Dakins, RN  Outcome: Ongoing  Goal: Patient will achieve/maintain normal respiratory rate/effort  Description: Respiratory rate and effort will be within normal limits for the patient  12/21/2020 1933 by Francisco Condon, RCP  Outcome: Ongoing  12/21/2020 0922 by Chitra Ontiveros, RCP  Outcome: Ongoing  12/21/2020 0803 by Vincent Dakins, RN  Outcome: Ongoing     Problem: MECHANICAL VENTILATION  Goal: Patient will maintain patent airway  12/21/2020 1933 by Francisco Condon, RCP  Outcome: Ongoing  12/21/2020 0922 by Chitra Ontiveros, RCP  Outcome: Ongoing  12/21/2020 0803 by Vincent Dakins, RN  Outcome: Ongoing  Goal: Oral health is maintained or improved  12/21/2020 1933 by Francisco Condon, RCP  Outcome: Ongoing  12/21/2020 0922 by Chitra Ontiveros, RCP  Outcome: Ongoing  12/21/2020 0803 by Vincent Dakins, RN  Outcome: Ongoing  Goal: ET tube will be managed safely  12/21/2020 1933 by Francisco Condon, RCP  Outcome: Ongoing  12/21/2020 0922 by Chitra Ontiveros, RCP  Outcome: Ongoing  12/21/2020 0803 by Vincent Dakins, RN  Outcome: Ongoing  Goal: Ability to express needs and understand communication  12/21/2020 1933 by Francisco Condon, RCP  Outcome: Ongoing  12/21/2020 0922 by Chitra Ontiveros, RCP  Outcome: Ongoing  12/21/2020 0803 by Vincent Dakins, RN  Outcome: Ongoing  Goal: Mobility/activity is maintained at optimum level for patient  12/21/2020 1933 by Francisco Condon, RCP  Outcome: Ongoing  12/21/2020 0922 by Chitra Ontiveros RCP  Outcome: Ongoing  12/21/2020 0803 by Vincent Dakins, RN  Outcome: Ongoing

## 2020-12-23 ENCOUNTER — APPOINTMENT (OUTPATIENT)
Dept: GENERAL RADIOLOGY | Age: 55
DRG: 004 | End: 2020-12-23
Payer: MEDICARE

## 2020-12-23 LAB
-: NORMAL
ABSOLUTE EOS #: 0.29 K/UL (ref 0–0.44)
ABSOLUTE IMMATURE GRANULOCYTE: 0.1 K/UL (ref 0–0.3)
ABSOLUTE LYMPH #: 0.67 K/UL (ref 1.1–3.7)
ABSOLUTE MONO #: 1.25 K/UL (ref 0.1–1.2)
ALLEN TEST: ABNORMAL
ALLEN TEST: ABNORMAL
ANION GAP SERPL CALCULATED.3IONS-SCNC: 10 MMOL/L (ref 9–17)
BASOPHILS # BLD: 0 % (ref 0–2)
BASOPHILS ABSOLUTE: 0 K/UL (ref 0–0.2)
BUN BLDV-MCNC: 30 MG/DL (ref 6–20)
BUN/CREAT BLD: ABNORMAL (ref 9–20)
CALCIUM IONIZED: 1.14 MMOL/L (ref 1.13–1.33)
CALCIUM SERPL-MCNC: 8.5 MG/DL (ref 8.6–10.4)
CHLORIDE BLD-SCNC: 99 MMOL/L (ref 98–107)
CO2: 26 MMOL/L (ref 20–31)
CREAT SERPL-MCNC: 3.46 MG/DL (ref 0.5–0.9)
DIFFERENTIAL TYPE: ABNORMAL
EOSINOPHILS RELATIVE PERCENT: 3 % (ref 1–4)
FIO2: 30
FIO2: 50
GFR AFRICAN AMERICAN: 17 ML/MIN
GFR NON-AFRICAN AMERICAN: 14 ML/MIN
GFR SERPL CREATININE-BSD FRML MDRD: ABNORMAL ML/MIN/{1.73_M2}
GFR SERPL CREATININE-BSD FRML MDRD: ABNORMAL ML/MIN/{1.73_M2}
GLUCOSE BLD-MCNC: 139 MG/DL (ref 70–99)
HCT VFR BLD CALC: 26.2 % (ref 36.3–47.1)
HEMOGLOBIN: 7.6 G/DL (ref 11.9–15.1)
IMMATURE GRANULOCYTES: 1 %
LYMPHOCYTES # BLD: 7 % (ref 24–43)
MAGNESIUM: 2.2 MG/DL (ref 1.6–2.6)
MCH RBC QN AUTO: 31.1 PG (ref 25.2–33.5)
MCHC RBC AUTO-ENTMCNC: 29 G/DL (ref 28.4–34.8)
MCV RBC AUTO: 107.4 FL (ref 82.6–102.9)
MODE: ABNORMAL
MODE: ABNORMAL
MONOCYTES # BLD: 13 % (ref 3–12)
MORPHOLOGY: ABNORMAL
MORPHOLOGY: ABNORMAL
NEGATIVE BASE EXCESS, ART: ABNORMAL (ref 0–2)
NEGATIVE BASE EXCESS, ART: ABNORMAL (ref 0–2)
NRBC AUTOMATED: 0 PER 100 WBC
O2 DEVICE/FLOW/%: ABNORMAL
O2 DEVICE/FLOW/%: ABNORMAL
PATIENT TEMP: ABNORMAL
PATIENT TEMP: ABNORMAL
PDW BLD-RTO: 17.1 % (ref 11.8–14.4)
PHOSPHORUS: 2.3 MG/DL (ref 2.6–4.5)
PLATELET # BLD: 221 K/UL (ref 138–453)
PLATELET ESTIMATE: ABNORMAL
PMV BLD AUTO: 10 FL (ref 8.1–13.5)
POC HCO3: 29.4 MMOL/L (ref 21–28)
POC HCO3: 29.7 MMOL/L (ref 21–28)
POC LACTIC ACID: 0.43 MMOL/L (ref 0.56–1.39)
POC LACTIC ACID: 0.44 MMOL/L (ref 0.56–1.39)
POC O2 SATURATION: 89 % (ref 94–98)
POC O2 SATURATION: 99 % (ref 94–98)
POC PCO2 TEMP: ABNORMAL MM HG
POC PCO2 TEMP: ABNORMAL MM HG
POC PCO2: 51.6 MM HG (ref 35–48)
POC PCO2: 58.3 MM HG (ref 35–48)
POC PH TEMP: ABNORMAL
POC PH TEMP: ABNORMAL
POC PH: 7.32 (ref 7.35–7.45)
POC PH: 7.36 (ref 7.35–7.45)
POC PO2 TEMP: ABNORMAL MM HG
POC PO2 TEMP: ABNORMAL MM HG
POC PO2: 140.2 MM HG (ref 83–108)
POC PO2: 59.7 MM HG (ref 83–108)
POSITIVE BASE EXCESS, ART: 3 (ref 0–3)
POSITIVE BASE EXCESS, ART: 3 (ref 0–3)
POTASSIUM SERPL-SCNC: 4.7 MMOL/L (ref 3.7–5.3)
RBC # BLD: 2.44 M/UL (ref 3.95–5.11)
RBC # BLD: ABNORMAL 10*6/UL
REASON FOR REJECTION: NORMAL
SAMPLE SITE: ABNORMAL
SAMPLE SITE: ABNORMAL
SEG NEUTROPHILS: 76 % (ref 36–65)
SEGMENTED NEUTROPHILS ABSOLUTE COUNT: 7.29 K/UL (ref 1.5–8.1)
SODIUM BLD-SCNC: 135 MMOL/L (ref 135–144)
TCO2 (CALC), ART: 31 MMOL/L (ref 22–29)
TCO2 (CALC), ART: 32 MMOL/L (ref 22–29)
WBC # BLD: 9.6 K/UL (ref 3.5–11.3)
WBC # BLD: ABNORMAL 10*3/UL
ZZ NTE CLEAN UP: ORDERED TEST: NORMAL
ZZ NTE WITH NAME CLEAN UP: SPECIMEN SOURCE: NORMAL

## 2020-12-23 PROCEDURE — 2580000003 HC RX 258: Performed by: GENERAL PRACTICE

## 2020-12-23 PROCEDURE — 94761 N-INVAS EAR/PLS OXIMETRY MLT: CPT

## 2020-12-23 PROCEDURE — 6370000000 HC RX 637 (ALT 250 FOR IP): Performed by: STUDENT IN AN ORGANIZED HEALTH CARE EDUCATION/TRAINING PROGRAM

## 2020-12-23 PROCEDURE — 2580000003 HC RX 258: Performed by: NURSE PRACTITIONER

## 2020-12-23 PROCEDURE — 6370000000 HC RX 637 (ALT 250 FOR IP): Performed by: NURSE PRACTITIONER

## 2020-12-23 PROCEDURE — 2700000000 HC OXYGEN THERAPY PER DAY

## 2020-12-23 PROCEDURE — 99232 SBSQ HOSP IP/OBS MODERATE 35: CPT | Performed by: INTERNAL MEDICINE

## 2020-12-23 PROCEDURE — 2500000003 HC RX 250 WO HCPCS: Performed by: STUDENT IN AN ORGANIZED HEALTH CARE EDUCATION/TRAINING PROGRAM

## 2020-12-23 PROCEDURE — 6360000002 HC RX W HCPCS: Performed by: STUDENT IN AN ORGANIZED HEALTH CARE EDUCATION/TRAINING PROGRAM

## 2020-12-23 PROCEDURE — 2500000003 HC RX 250 WO HCPCS: Performed by: NURSE PRACTITIONER

## 2020-12-23 PROCEDURE — 6360000002 HC RX W HCPCS: Performed by: SURGERY

## 2020-12-23 PROCEDURE — 6370000000 HC RX 637 (ALT 250 FOR IP): Performed by: SURGERY

## 2020-12-23 PROCEDURE — 94770 HC ETCO2 MONITOR DAILY: CPT

## 2020-12-23 PROCEDURE — 6370000000 HC RX 637 (ALT 250 FOR IP): Performed by: GENERAL PRACTICE

## 2020-12-23 PROCEDURE — 80048 BASIC METABOLIC PNL TOTAL CA: CPT

## 2020-12-23 PROCEDURE — 37799 UNLISTED PX VASCULAR SURGERY: CPT

## 2020-12-23 PROCEDURE — 84100 ASSAY OF PHOSPHORUS: CPT

## 2020-12-23 PROCEDURE — 82803 BLOOD GASES ANY COMBINATION: CPT

## 2020-12-23 PROCEDURE — 2000000000 HC ICU R&B

## 2020-12-23 PROCEDURE — 71045 X-RAY EXAM CHEST 1 VIEW: CPT

## 2020-12-23 PROCEDURE — 2580000003 HC RX 258: Performed by: SURGERY

## 2020-12-23 PROCEDURE — 83735 ASSAY OF MAGNESIUM: CPT

## 2020-12-23 PROCEDURE — 82330 ASSAY OF CALCIUM: CPT

## 2020-12-23 PROCEDURE — 94640 AIRWAY INHALATION TREATMENT: CPT

## 2020-12-23 PROCEDURE — 94003 VENT MGMT INPAT SUBQ DAY: CPT

## 2020-12-23 PROCEDURE — 2580000003 HC RX 258: Performed by: STUDENT IN AN ORGANIZED HEALTH CARE EDUCATION/TRAINING PROGRAM

## 2020-12-23 PROCEDURE — 85025 COMPLETE CBC W/AUTO DIFF WBC: CPT

## 2020-12-23 PROCEDURE — 97110 THERAPEUTIC EXERCISES: CPT

## 2020-12-23 PROCEDURE — 83605 ASSAY OF LACTIC ACID: CPT

## 2020-12-23 PROCEDURE — 90935 HEMODIALYSIS ONE EVALUATION: CPT

## 2020-12-23 PROCEDURE — 99291 CRITICAL CARE FIRST HOUR: CPT | Performed by: INTERNAL MEDICINE

## 2020-12-23 RX ORDER — ALBUTEROL SULFATE 2.5 MG/3ML
2.5 SOLUTION RESPIRATORY (INHALATION) 2 TIMES DAILY
Status: DISCONTINUED | OUTPATIENT
Start: 2020-12-24 | End: 2021-01-06 | Stop reason: HOSPADM

## 2020-12-23 RX ORDER — QUETIAPINE FUMARATE 100 MG/1
100 TABLET, FILM COATED ORAL 2 TIMES DAILY
Status: DISCONTINUED | OUTPATIENT
Start: 2020-12-23 | End: 2020-12-25

## 2020-12-23 RX ADMIN — ESCITALOPRAM OXALATE 20 MG: 10 TABLET ORAL at 08:00

## 2020-12-23 RX ADMIN — Medication 10 ML: at 08:02

## 2020-12-23 RX ADMIN — CALCIUM ACETATE 2001 MG: 667 CAPSULE ORAL at 17:10

## 2020-12-23 RX ADMIN — QUETIAPINE FUMARATE 100 MG: 100 TABLET ORAL at 21:22

## 2020-12-23 RX ADMIN — OXYCODONE HYDROCHLORIDE 5 MG: 5 TABLET ORAL at 09:22

## 2020-12-23 RX ADMIN — DOXEPIN HYDROCHLORIDE 10 MG: 10 CAPSULE ORAL at 21:22

## 2020-12-23 RX ADMIN — Medication 5 MG: at 21:22

## 2020-12-23 RX ADMIN — VASOPRESSIN 0.04 UNITS/MIN: 20 INJECTION INTRAVENOUS at 17:20

## 2020-12-23 RX ADMIN — OXYCODONE HYDROCHLORIDE 5 MG: 5 TABLET ORAL at 05:57

## 2020-12-23 RX ADMIN — APIXABAN 5 MG: 5 TABLET, FILM COATED ORAL at 21:22

## 2020-12-23 RX ADMIN — HEPARIN SODIUM: 5000 INJECTION INTRAVENOUS; SUBCUTANEOUS at 11:33

## 2020-12-23 RX ADMIN — ACETAMINOPHEN 1000 MG: 500 TABLET ORAL at 13:13

## 2020-12-23 RX ADMIN — HEPARIN SODIUM: 5000 INJECTION INTRAVENOUS; SUBCUTANEOUS at 19:45

## 2020-12-23 RX ADMIN — SILVER SULFADIAZINE: 10 CREAM TOPICAL at 08:01

## 2020-12-23 RX ADMIN — SILVER SULFADIAZINE: 10 CREAM TOPICAL at 21:23

## 2020-12-23 RX ADMIN — FENTANYL CITRATE 50 MCG: 50 INJECTION, SOLUTION INTRAMUSCULAR; INTRAVENOUS at 01:51

## 2020-12-23 RX ADMIN — OXYCODONE HYDROCHLORIDE 5 MG: 5 TABLET ORAL at 13:13

## 2020-12-23 RX ADMIN — ALBUTEROL SULFATE 2.5 MG: 2.5 SOLUTION RESPIRATORY (INHALATION) at 00:12

## 2020-12-23 RX ADMIN — Medication 12 MCG/MIN: at 08:49

## 2020-12-23 RX ADMIN — Medication 600 MG: at 00:12

## 2020-12-23 RX ADMIN — BACITRACIN: 500 OINTMENT TOPICAL at 08:01

## 2020-12-23 RX ADMIN — Medication 600 MG: at 19:45

## 2020-12-23 RX ADMIN — GABAPENTIN 100 MG: 100 CAPSULE ORAL at 21:22

## 2020-12-23 RX ADMIN — Medication 600 MG: at 07:47

## 2020-12-23 RX ADMIN — QUETIAPINE FUMARATE 100 MG: 100 TABLET ORAL at 09:21

## 2020-12-23 RX ADMIN — GABAPENTIN 100 MG: 100 CAPSULE ORAL at 08:00

## 2020-12-23 RX ADMIN — VASOPRESSIN 0.04 UNITS/MIN: 20 INJECTION INTRAVENOUS at 01:45

## 2020-12-23 RX ADMIN — IPRATROPIUM BROMIDE AND ALBUTEROL SULFATE 1 AMPULE: .5; 3 SOLUTION RESPIRATORY (INHALATION) at 07:46

## 2020-12-23 RX ADMIN — Medication 600 MG: at 23:38

## 2020-12-23 RX ADMIN — POLYETHYLENE GLYCOL 3350 17 G: 17 POWDER, FOR SOLUTION ORAL at 09:22

## 2020-12-23 RX ADMIN — IPRATROPIUM BROMIDE AND ALBUTEROL SULFATE 1 AMPULE: .5; 3 SOLUTION RESPIRATORY (INHALATION) at 11:32

## 2020-12-23 RX ADMIN — OXYCODONE HYDROCHLORIDE 5 MG: 5 TABLET ORAL at 01:51

## 2020-12-23 RX ADMIN — BACITRACIN: 500 OINTMENT TOPICAL at 21:23

## 2020-12-23 RX ADMIN — HEPARIN SODIUM: 5000 INJECTION INTRAVENOUS; SUBCUTANEOUS at 15:53

## 2020-12-23 RX ADMIN — IPRATROPIUM BROMIDE AND ALBUTEROL SULFATE 1 AMPULE: .5; 3 SOLUTION RESPIRATORY (INHALATION) at 19:45

## 2020-12-23 RX ADMIN — Medication 600 MG: at 03:49

## 2020-12-23 RX ADMIN — Medication 600 MG: at 15:53

## 2020-12-23 RX ADMIN — ALBUTEROL SULFATE 2.5 MG: 2.5 SOLUTION RESPIRATORY (INHALATION) at 23:37

## 2020-12-23 RX ADMIN — Medication 600 MG: at 11:33

## 2020-12-23 RX ADMIN — CHLORHEXIDINE GLUCONATE 0.12% ORAL RINSE 15 ML: 1.2 LIQUID ORAL at 21:22

## 2020-12-23 RX ADMIN — OXYCODONE HYDROCHLORIDE 5 MG: 5 TABLET ORAL at 17:10

## 2020-12-23 RX ADMIN — HEPARIN SODIUM: 5000 INJECTION INTRAVENOUS; SUBCUTANEOUS at 07:52

## 2020-12-23 RX ADMIN — FENTANYL CITRATE 50 MCG: 50 INJECTION, SOLUTION INTRAMUSCULAR; INTRAVENOUS at 10:24

## 2020-12-23 RX ADMIN — OXYCODONE HYDROCHLORIDE 5 MG: 5 TABLET ORAL at 21:21

## 2020-12-23 RX ADMIN — SODIUM CHLORIDE, PRESERVATIVE FREE 10 ML: 5 INJECTION INTRAVENOUS at 08:02

## 2020-12-23 RX ADMIN — AMIODARONE HYDROCHLORIDE 200 MG: 200 TABLET ORAL at 08:00

## 2020-12-23 RX ADMIN — SODIUM CHLORIDE, PRESERVATIVE FREE 10 ML: 5 INJECTION INTRAVENOUS at 21:24

## 2020-12-23 RX ADMIN — Medication 15 MG: at 08:00

## 2020-12-23 RX ADMIN — APIXABAN 5 MG: 5 TABLET, FILM COATED ORAL at 08:00

## 2020-12-23 RX ADMIN — DOCUSATE SODIUM 100 MG: 50 LIQUID ORAL at 08:00

## 2020-12-23 RX ADMIN — IPRATROPIUM BROMIDE AND ALBUTEROL SULFATE 1 AMPULE: .5; 3 SOLUTION RESPIRATORY (INHALATION) at 15:52

## 2020-12-23 RX ADMIN — VASOPRESSIN 0.04 UNITS/MIN: 20 INJECTION INTRAVENOUS at 09:43

## 2020-12-23 RX ADMIN — CHLORHEXIDINE GLUCONATE: 213 SOLUTION TOPICAL at 10:24

## 2020-12-23 RX ADMIN — BUDESONIDE AND FORMOTEROL FUMARATE DIHYDRATE 2 PUFF: 80; 4.5 AEROSOL RESPIRATORY (INHALATION) at 07:47

## 2020-12-23 RX ADMIN — ACETAMINOPHEN 1000 MG: 500 TABLET ORAL at 21:21

## 2020-12-23 RX ADMIN — HEPARIN SODIUM: 5000 INJECTION INTRAVENOUS; SUBCUTANEOUS at 03:49

## 2020-12-23 RX ADMIN — ACETAMINOPHEN 1000 MG: 500 TABLET ORAL at 05:57

## 2020-12-23 RX ADMIN — SENNOSIDES 8.6 MG: 8.6 TABLET, FILM COATED ORAL at 21:24

## 2020-12-23 ASSESSMENT — PAIN SCALES - GENERAL
PAINLEVEL_OUTOF10: 0
PAINLEVEL_OUTOF10: 0

## 2020-12-23 ASSESSMENT — PULMONARY FUNCTION TESTS
PIF_VALUE: 26
PIF_VALUE: 26
PIF_VALUE: 16
PIF_VALUE: 15
PIF_VALUE: 26
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
## 2020-12-23 NOTE — PROGRESS NOTES
Dialysis Post Treatment Note  Vitals:    12/23/20 1526   BP: (!) 107/51   Pulse: 65   Resp: 15   Temp: 97.7 °F (36.5 °C)   SpO2: 98     Pre-Weight = 102.3  Post-weight = Weight: 219 lb 5.7 oz (99.5 kg)  Total Liters Processed = Total Liters Processed (l/min): 47.7 l/min  Rinseback Volume (mL) = Rinseback Volume (ml): 300 ml  Net Removal (mL) = NET Removed (ml): 2360 ml  Type of access used=LLF  Length of treatment=120    Pt tolerated 2hr UF tx great.

## 2020-12-23 NOTE — PLAN OF CARE
Problem: OXYGENATION/RESPIRATORY FUNCTION  Goal: Patient will maintain patent airway  12/22/2020 2026 by Emory Butler RCP  Outcome: Ongoing  12/22/2020 1245 by Noreen Butler RN  Outcome: Ongoing  12/22/2020 1045 by Zach Tarango RCP  Outcome: Ongoing     Problem: OXYGENATION/RESPIRATORY FUNCTION  Goal: Patient will achieve/maintain normal respiratory rate/effort  Description: Respiratory rate and effort will be within normal limits for the patient  12/22/2020 2026 by Emory Butler RCP  Outcome: Ongoing  12/22/2020 1245 by Noreen Butler RN  Outcome: Ongoing  12/22/2020 1045 by Zach Tarango RCP  Outcome: Ongoing     Problem: MECHANICAL VENTILATION  Goal: Patient will maintain patent airway  12/22/2020 2026 by Emory Butler RCP  Outcome: Ongoing  12/22/2020 1245 by Noreen Butler RN  Outcome: Ongoing  12/22/2020 1045 by Zach Tarango RCP  Outcome: Ongoing     Problem: MECHANICAL VENTILATION  Goal: Oral health is maintained or improved  12/22/2020 2026 by Emory Butler RCP  Outcome: Ongoing  12/22/2020 1245 by Noreen Butler RN  Outcome: Ongoing  12/22/2020 1045 by Zach Tarango RCP  Outcome: Ongoing     Problem: MECHANICAL VENTILATION  Goal: ET tube will be managed safely  12/22/2020 2026 by Emory Butler RCP  Outcome: Ongoing  12/22/2020 1245 by Noreen Butler RN  Outcome: Ongoing  12/22/2020 1045 by Zach Tarango RCP  Outcome: Ongoing     Problem: MECHANICAL VENTILATION  Goal: Ability to express needs and understand communication  12/22/2020 2026 by Emory Butler RCP  Outcome: Ongoing  12/22/2020 1245 by Noreen Butler RN  Outcome: Ongoing  12/22/2020 1045 by Zach Tarango RCP  Outcome: Ongoing     Problem: MECHANICAL VENTILATION  Goal: Mobility/activity is maintained at optimum level for patient  12/22/2020 2026 by Emory Butler RCP  Outcome: Ongoing  12/22/2020 1245 by Noreen Butler RN Outcome: Ongoing  12/22/2020 1045 by Jewels Leon RCP  Outcome: Ongoing     Problem: SKIN INTEGRITY  Goal: Skin integrity is maintained or improved  12/22/2020 2026 by Shirley Alvarez RCP  Outcome: Ongoing  12/22/2020 1245 by Tatianna Velazquez RN  Outcome: Ongoing  12/22/2020 1045 by Jewels Leon RCP  Outcome: Ongoing   BRONCHOSPASM/BRONCHOCONSTRICTION     [x]         IMPROVE AERATION/BREATH SOUNDS  [x]   ADMINISTER BRONCHODILATOR THERAPY AS APPROPRIATE  [x]   ASSESS BREATH SOUNDS  []   IMPLEMENT AEROSOL/MDI PROTOCOL  [x]   PATIENT EDUCATION AS NEEDED
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
## 2020-12-23 NOTE — PLAN OF CARE
Problem: OXYGENATION/RESPIRATORY FUNCTION  Goal: Patient will maintain patent airway  12/22/2020 2026 by Janett Elder RCP  Outcome: Ongoing  Goal: Patient will achieve/maintain normal respiratory rate/effort  Description: Respiratory rate and effort will be within normal limits for the patient  12/22/2020 2026 by Janett Elder RCP  Outcome: Ongoing     Problem: MECHANICAL VENTILATION  Goal: Patient will maintain patent airway  12/22/2020 2026 by Janett Elder RCP  Outcome: Ongoing  Goal: Oral health is maintained or improved  12/22/2020 2026 by Janett Elder RCP  Outcome: Ongoing  Goal: ET tube will be managed safely  12/22/2020 2026 by Janett Elder RCP  Outcome: Ongoing  Goal: Ability to express needs and understand communication  12/22/2020 2026 by Janett Elder RCP  Outcome: Ongoing  Goal: Mobility/activity is maintained at optimum level for patient  12/22/2020 2026 by Janett Elder RCP  Outcome: Ongoing     Problem: SKIN INTEGRITY  Goal: Skin integrity is maintained or improved  12/22/2020 2026 by Janett Elder RCP  Outcome: Ongoing

## 2020-12-23 NOTE — PLAN OF CARE
Problem: OXYGENATION/RESPIRATORY FUNCTION  Goal: Patient will maintain patent airway  Outcome: Ongoing  Goal: Patient will achieve/maintain normal respiratory rate/effort  Description: Respiratory rate and effort will be within normal limits for the patient  Outcome: Ongoing     Problem: MECHANICAL VENTILATION  Goal: Patient will maintain patent airway  Outcome: Ongoing  Goal: Oral health is maintained or improved  Outcome: Ongoing  Goal: ET tube will be managed safely  Outcome: Ongoing  Goal: Ability to express needs and understand communication  Outcome: Ongoing  Goal: Mobility/activity is maintained at optimum level for patient  Outcome: Ongoing     Problem: SKIN INTEGRITY  Goal: Skin integrity is maintained or improved  Outcome: Ongoing     Problem: Pain:  Goal: Pain level will decrease  Description: Pain level will decrease  Outcome: Ongoing  Goal: Control of acute pain  Description: Control of acute pain  Outcome: Ongoing     Problem: Nutrition  Goal: Optimal nutrition therapy  Description: Nutrition Problem #1: Inadequate oral intake  Intervention: Food and/or Nutrient Delivery: (Monitor TF tolerance; suggest goal rate of 50 mL/hr to provide 1800 kcal and 113 g pro/day.)  Nutritional Goals: meet % of estimated nutrient needs     Outcome: Ongoing     Problem: Skin Integrity:  Goal: Will show no infection signs and symptoms  Description: Will show no infection signs and symptoms  Outcome: Ongoing  Goal: Absence of new skin breakdown  Description: Absence of new skin breakdown  Outcome: Ongoing     Problem: Restraint Use - Nonviolent/Non-Self-Destructive Behavior:  Goal: Absence of restraint-related injury  Description: Absence of restraint-related injury  12/23/2020 1219 by Ashley Gomes RN  Outcome: Ongoing  12/23/2020 0559 by Geovanny Mendoza RN  Outcome: Met This Shift     Problem: Musculor/Skeletal Functional Status  Goal: Highest potential functional level  Outcome: Ongoing Problem: Restraint Use - Nonviolent/Non-Self-Destructive Behavior:  Goal: Absence of restraint indications  Description: Absence of restraint indications  2020 1219 by Jae Apgar, RN  Outcome: Not Met This Shift  Note: Continue with bilateral soft wrist restraints d/t pt reaching for lines/tubes.   2020 0559 by Jordan Westbrook RN  Outcome: Not Met This Shift
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
## 2020-12-24 ENCOUNTER — APPOINTMENT (OUTPATIENT)
Dept: GENERAL RADIOLOGY | Age: 55
DRG: 004 | End: 2020-12-24
Payer: MEDICARE

## 2020-12-24 LAB
ABSOLUTE EOS #: 0.36 K/UL (ref 0–0.44)
ABSOLUTE IMMATURE GRANULOCYTE: 0.09 K/UL (ref 0–0.3)
ABSOLUTE LYMPH #: 0.72 K/UL (ref 1.1–3.7)
ABSOLUTE MONO #: 1.44 K/UL (ref 0.1–1.2)
ALLEN TEST: ABNORMAL
ANION GAP SERPL CALCULATED.3IONS-SCNC: 12 MMOL/L (ref 9–17)
BASOPHILS # BLD: 0 % (ref 0–2)
BASOPHILS ABSOLUTE: 0 K/UL (ref 0–0.2)
BUN BLDV-MCNC: 51 MG/DL (ref 6–20)
BUN/CREAT BLD: ABNORMAL (ref 9–20)
CALCIUM IONIZED: 1.17 MMOL/L (ref 1.13–1.33)
CALCIUM SERPL-MCNC: 8.9 MG/DL (ref 8.6–10.4)
CHLORIDE BLD-SCNC: 97 MMOL/L (ref 98–107)
CO2: 26 MMOL/L (ref 20–31)
CREAT SERPL-MCNC: 4.69 MG/DL (ref 0.5–0.9)
DIFFERENTIAL TYPE: ABNORMAL
EOSINOPHILS RELATIVE PERCENT: 4 % (ref 1–4)
FIO2: 40
GFR AFRICAN AMERICAN: 12 ML/MIN
GFR NON-AFRICAN AMERICAN: 10 ML/MIN
GFR SERPL CREATININE-BSD FRML MDRD: ABNORMAL ML/MIN/{1.73_M2}
GFR SERPL CREATININE-BSD FRML MDRD: ABNORMAL ML/MIN/{1.73_M2}
GLUCOSE BLD-MCNC: 119 MG/DL (ref 70–99)
HCT VFR BLD CALC: 27.9 % (ref 36.3–47.1)
HEMOGLOBIN: 7.9 G/DL (ref 11.9–15.1)
IMMATURE GRANULOCYTES: 1 %
LYMPHOCYTES # BLD: 8 % (ref 24–43)
MAGNESIUM: 2.6 MG/DL (ref 1.6–2.6)
MCH RBC QN AUTO: 30.6 PG (ref 25.2–33.5)
MCHC RBC AUTO-ENTMCNC: 28.3 G/DL (ref 28.4–34.8)
MCV RBC AUTO: 108.1 FL (ref 82.6–102.9)
MODE: ABNORMAL
MONOCYTES # BLD: 16 % (ref 3–12)
MORPHOLOGY: ABNORMAL
MORPHOLOGY: ABNORMAL
NEGATIVE BASE EXCESS, ART: ABNORMAL (ref 0–2)
NRBC AUTOMATED: 0 PER 100 WBC
O2 DEVICE/FLOW/%: ABNORMAL
PATIENT TEMP: ABNORMAL
PDW BLD-RTO: 16.9 % (ref 11.8–14.4)
PHOSPHORUS: 2.7 MG/DL (ref 2.6–4.5)
PLATELET # BLD: 242 K/UL (ref 138–453)
PLATELET ESTIMATE: ABNORMAL
PMV BLD AUTO: 9.2 FL (ref 8.1–13.5)
POC HCO3: 27.5 MMOL/L (ref 21–28)
POC LACTIC ACID: 0.83 MMOL/L (ref 0.56–1.39)
POC O2 SATURATION: 98 % (ref 94–98)
POC PCO2 TEMP: ABNORMAL MM HG
POC PCO2: 55.1 MM HG (ref 35–48)
POC PH TEMP: ABNORMAL
POC PH: 7.31 (ref 7.35–7.45)
POC PO2 TEMP: ABNORMAL MM HG
POC PO2: 115.9 MM HG (ref 83–108)
POSITIVE BASE EXCESS, ART: 1 (ref 0–3)
POTASSIUM SERPL-SCNC: 4.8 MMOL/L (ref 3.7–5.3)
RBC # BLD: 2.58 M/UL (ref 3.95–5.11)
RBC # BLD: ABNORMAL 10*6/UL
SAMPLE SITE: ABNORMAL
SEG NEUTROPHILS: 71 % (ref 36–65)
SEGMENTED NEUTROPHILS ABSOLUTE COUNT: 6.39 K/UL (ref 1.5–8.1)
SODIUM BLD-SCNC: 135 MMOL/L (ref 135–144)
TCO2 (CALC), ART: 29 MMOL/L (ref 22–29)
WBC # BLD: 9 K/UL (ref 3.5–11.3)
WBC # BLD: ABNORMAL 10*3/UL

## 2020-12-24 PROCEDURE — 2580000003 HC RX 258: Performed by: GENERAL PRACTICE

## 2020-12-24 PROCEDURE — 90935 HEMODIALYSIS ONE EVALUATION: CPT | Performed by: INTERNAL MEDICINE

## 2020-12-24 PROCEDURE — 2500000003 HC RX 250 WO HCPCS: Performed by: STUDENT IN AN ORGANIZED HEALTH CARE EDUCATION/TRAINING PROGRAM

## 2020-12-24 PROCEDURE — 80048 BASIC METABOLIC PNL TOTAL CA: CPT

## 2020-12-24 PROCEDURE — 2580000003 HC RX 258: Performed by: STUDENT IN AN ORGANIZED HEALTH CARE EDUCATION/TRAINING PROGRAM

## 2020-12-24 PROCEDURE — 6370000000 HC RX 637 (ALT 250 FOR IP): Performed by: STUDENT IN AN ORGANIZED HEALTH CARE EDUCATION/TRAINING PROGRAM

## 2020-12-24 PROCEDURE — 2580000003 HC RX 258: Performed by: SURGERY

## 2020-12-24 PROCEDURE — 6370000000 HC RX 637 (ALT 250 FOR IP): Performed by: INTERNAL MEDICINE

## 2020-12-24 PROCEDURE — 6370000000 HC RX 637 (ALT 250 FOR IP): Performed by: NURSE PRACTITIONER

## 2020-12-24 PROCEDURE — 85025 COMPLETE CBC W/AUTO DIFF WBC: CPT

## 2020-12-24 PROCEDURE — 37799 UNLISTED PX VASCULAR SURGERY: CPT

## 2020-12-24 PROCEDURE — 6360000002 HC RX W HCPCS: Performed by: SURGERY

## 2020-12-24 PROCEDURE — 71045 X-RAY EXAM CHEST 1 VIEW: CPT

## 2020-12-24 PROCEDURE — P9047 ALBUMIN (HUMAN), 25%, 50ML: HCPCS | Performed by: INTERNAL MEDICINE

## 2020-12-24 PROCEDURE — 6360000002 HC RX W HCPCS: Performed by: STUDENT IN AN ORGANIZED HEALTH CARE EDUCATION/TRAINING PROGRAM

## 2020-12-24 PROCEDURE — 84100 ASSAY OF PHOSPHORUS: CPT

## 2020-12-24 PROCEDURE — 82803 BLOOD GASES ANY COMBINATION: CPT

## 2020-12-24 PROCEDURE — 94003 VENT MGMT INPAT SUBQ DAY: CPT

## 2020-12-24 PROCEDURE — 94761 N-INVAS EAR/PLS OXIMETRY MLT: CPT

## 2020-12-24 PROCEDURE — 83605 ASSAY OF LACTIC ACID: CPT

## 2020-12-24 PROCEDURE — 83735 ASSAY OF MAGNESIUM: CPT

## 2020-12-24 PROCEDURE — 94770 HC ETCO2 MONITOR DAILY: CPT

## 2020-12-24 PROCEDURE — 6370000000 HC RX 637 (ALT 250 FOR IP): Performed by: SURGERY

## 2020-12-24 PROCEDURE — 90935 HEMODIALYSIS ONE EVALUATION: CPT

## 2020-12-24 PROCEDURE — 2000000000 HC ICU R&B

## 2020-12-24 PROCEDURE — 2700000000 HC OXYGEN THERAPY PER DAY

## 2020-12-24 PROCEDURE — 6360000002 HC RX W HCPCS: Performed by: INTERNAL MEDICINE

## 2020-12-24 PROCEDURE — 82330 ASSAY OF CALCIUM: CPT

## 2020-12-24 PROCEDURE — 6370000000 HC RX 637 (ALT 250 FOR IP): Performed by: GENERAL PRACTICE

## 2020-12-24 PROCEDURE — 2500000003 HC RX 250 WO HCPCS

## 2020-12-24 PROCEDURE — 94640 AIRWAY INHALATION TREATMENT: CPT

## 2020-12-24 RX ORDER — ALBUMIN (HUMAN) 12.5 G/50ML
25 SOLUTION INTRAVENOUS ONCE
Status: COMPLETED | OUTPATIENT
Start: 2020-12-24 | End: 2020-12-24

## 2020-12-24 RX ORDER — 0.9 % SODIUM CHLORIDE 0.9 %
150 INTRAVENOUS SOLUTION INTRAVENOUS PRN
Status: DISCONTINUED | OUTPATIENT
Start: 2020-12-24 | End: 2021-01-06 | Stop reason: HOSPADM

## 2020-12-24 RX ORDER — 0.9 % SODIUM CHLORIDE 0.9 %
250 INTRAVENOUS SOLUTION INTRAVENOUS PRN
Status: DISCONTINUED | OUTPATIENT
Start: 2020-12-24 | End: 2021-01-06 | Stop reason: HOSPADM

## 2020-12-24 RX ORDER — MIDODRINE HYDROCHLORIDE 5 MG/1
10 TABLET ORAL
Status: DISCONTINUED | OUTPATIENT
Start: 2020-12-24 | End: 2020-12-26

## 2020-12-24 RX ADMIN — OXYCODONE HYDROCHLORIDE 5 MG: 5 TABLET ORAL at 09:26

## 2020-12-24 RX ADMIN — CALCIUM ACETATE 2001 MG: 667 CAPSULE ORAL at 12:23

## 2020-12-24 RX ADMIN — BACITRACIN: 500 OINTMENT TOPICAL at 07:38

## 2020-12-24 RX ADMIN — ALBUTEROL SULFATE 2.5 MG: 2.5 SOLUTION RESPIRATORY (INHALATION) at 23:22

## 2020-12-24 RX ADMIN — ALBUTEROL SULFATE 2.5 MG: 2.5 SOLUTION RESPIRATORY (INHALATION) at 03:20

## 2020-12-24 RX ADMIN — ACETAMINOPHEN 1000 MG: 500 TABLET ORAL at 06:00

## 2020-12-24 RX ADMIN — Medication 600 MG: at 12:05

## 2020-12-24 RX ADMIN — HEPARIN SODIUM: 5000 INJECTION INTRAVENOUS; SUBCUTANEOUS at 12:05

## 2020-12-24 RX ADMIN — SILVER SULFADIAZINE: 10 CREAM TOPICAL at 07:37

## 2020-12-24 RX ADMIN — Medication 5 MG: at 21:14

## 2020-12-24 RX ADMIN — HEPARIN SODIUM: 5000 INJECTION INTRAVENOUS; SUBCUTANEOUS at 23:29

## 2020-12-24 RX ADMIN — QUETIAPINE FUMARATE 100 MG: 100 TABLET ORAL at 09:26

## 2020-12-24 RX ADMIN — VASOPRESSIN 0.04 UNITS/MIN: 20 INJECTION INTRAVENOUS at 19:00

## 2020-12-24 RX ADMIN — VASOPRESSIN 0.04 UNITS/MIN: 20 INJECTION INTRAVENOUS at 10:19

## 2020-12-24 RX ADMIN — Medication 600 MG: at 15:48

## 2020-12-24 RX ADMIN — OXYCODONE HYDROCHLORIDE 5 MG: 5 TABLET ORAL at 21:14

## 2020-12-24 RX ADMIN — CALCIUM ACETATE 2001 MG: 667 CAPSULE ORAL at 07:38

## 2020-12-24 RX ADMIN — APIXABAN 5 MG: 5 TABLET, FILM COATED ORAL at 21:14

## 2020-12-24 RX ADMIN — QUETIAPINE FUMARATE 100 MG: 100 TABLET ORAL at 21:14

## 2020-12-24 RX ADMIN — FENTANYL CITRATE 50 MCG: 50 INJECTION, SOLUTION INTRAMUSCULAR; INTRAVENOUS at 20:02

## 2020-12-24 RX ADMIN — Medication 600 MG: at 19:32

## 2020-12-24 RX ADMIN — Medication 600 MG: at 23:22

## 2020-12-24 RX ADMIN — POLYETHYLENE GLYCOL 3350 17 G: 17 POWDER, FOR SOLUTION ORAL at 09:26

## 2020-12-24 RX ADMIN — IPRATROPIUM BROMIDE AND ALBUTEROL SULFATE 1 AMPULE: .5; 3 SOLUTION RESPIRATORY (INHALATION) at 08:00

## 2020-12-24 RX ADMIN — ALBUMIN (HUMAN) 25 G: 0.25 INJECTION, SOLUTION INTRAVENOUS at 08:53

## 2020-12-24 RX ADMIN — Medication 600 MG: at 08:00

## 2020-12-24 RX ADMIN — ESCITALOPRAM OXALATE 20 MG: 10 TABLET ORAL at 07:38

## 2020-12-24 RX ADMIN — APIXABAN 5 MG: 5 TABLET, FILM COATED ORAL at 07:38

## 2020-12-24 RX ADMIN — ACETAMINOPHEN 1000 MG: 500 TABLET ORAL at 14:07

## 2020-12-24 RX ADMIN — MIDODRINE HYDROCHLORIDE 10 MG: 5 TABLET ORAL at 12:24

## 2020-12-24 RX ADMIN — HEPARIN SODIUM: 5000 INJECTION INTRAVENOUS; SUBCUTANEOUS at 15:49

## 2020-12-24 RX ADMIN — HEPARIN SODIUM: 5000 INJECTION INTRAVENOUS; SUBCUTANEOUS at 19:40

## 2020-12-24 RX ADMIN — VASOPRESSIN 0.04 UNITS/MIN: 20 INJECTION INTRAVENOUS at 04:15

## 2020-12-24 RX ADMIN — FENTANYL CITRATE 50 MCG: 50 INJECTION, SOLUTION INTRAMUSCULAR; INTRAVENOUS at 02:45

## 2020-12-24 RX ADMIN — BUDESONIDE AND FORMOTEROL FUMARATE DIHYDRATE 2 PUFF: 80; 4.5 AEROSOL RESPIRATORY (INHALATION) at 19:41

## 2020-12-24 RX ADMIN — MIDODRINE HYDROCHLORIDE 10 MG: 5 TABLET ORAL at 18:02

## 2020-12-24 RX ADMIN — IPRATROPIUM BROMIDE AND ALBUTEROL SULFATE 1 AMPULE: .5; 3 SOLUTION RESPIRATORY (INHALATION) at 19:32

## 2020-12-24 RX ADMIN — Medication 12 MCG/MIN: at 07:50

## 2020-12-24 RX ADMIN — Medication 15 MG: at 07:38

## 2020-12-24 RX ADMIN — OXYCODONE HYDROCHLORIDE 5 MG: 5 TABLET ORAL at 06:00

## 2020-12-24 RX ADMIN — DOXEPIN HYDROCHLORIDE 10 MG: 10 CAPSULE ORAL at 21:14

## 2020-12-24 RX ADMIN — IPRATROPIUM BROMIDE AND ALBUTEROL SULFATE 1 AMPULE: .5; 3 SOLUTION RESPIRATORY (INHALATION) at 12:05

## 2020-12-24 RX ADMIN — OXYCODONE HYDROCHLORIDE 5 MG: 5 TABLET ORAL at 02:00

## 2020-12-24 RX ADMIN — DOCUSATE SODIUM 100 MG: 50 LIQUID ORAL at 07:38

## 2020-12-24 RX ADMIN — SENNOSIDES 8.6 MG: 8.6 TABLET, FILM COATED ORAL at 21:14

## 2020-12-24 RX ADMIN — IPRATROPIUM BROMIDE AND ALBUTEROL SULFATE 1 AMPULE: .5; 3 SOLUTION RESPIRATORY (INHALATION) at 15:48

## 2020-12-24 RX ADMIN — Medication 600 MG: at 03:20

## 2020-12-24 RX ADMIN — OXYCODONE HYDROCHLORIDE 5 MG: 5 TABLET ORAL at 18:01

## 2020-12-24 RX ADMIN — SODIUM CHLORIDE, PRESERVATIVE FREE 10 ML: 5 INJECTION INTRAVENOUS at 21:05

## 2020-12-24 RX ADMIN — ACETAMINOPHEN 1000 MG: 500 TABLET ORAL at 21:14

## 2020-12-24 RX ADMIN — BUDESONIDE AND FORMOTEROL FUMARATE DIHYDRATE 2 PUFF: 80; 4.5 AEROSOL RESPIRATORY (INHALATION) at 08:14

## 2020-12-24 RX ADMIN — HEPARIN SODIUM: 5000 INJECTION INTRAVENOUS; SUBCUTANEOUS at 08:00

## 2020-12-24 RX ADMIN — OXYCODONE HYDROCHLORIDE 5 MG: 5 TABLET ORAL at 14:07

## 2020-12-24 RX ADMIN — AMIODARONE HYDROCHLORIDE 200 MG: 200 TABLET ORAL at 09:26

## 2020-12-24 RX ADMIN — CALCIUM ACETATE 2001 MG: 667 CAPSULE ORAL at 18:02

## 2020-12-24 RX ADMIN — GABAPENTIN 100 MG: 100 CAPSULE ORAL at 21:14

## 2020-12-24 RX ADMIN — GABAPENTIN 100 MG: 100 CAPSULE ORAL at 07:38

## 2020-12-24 RX ADMIN — SODIUM CHLORIDE, PRESERVATIVE FREE 10 ML: 5 INJECTION INTRAVENOUS at 07:39

## 2020-12-24 ASSESSMENT — PAIN SCALES - GENERAL
PAINLEVEL_OUTOF10: 0
PAINLEVEL_OUTOF10: 0
PAINLEVEL_OUTOF10: 3
PAINLEVEL_OUTOF10: 0

## 2020-12-24 ASSESSMENT — PULMONARY FUNCTION TESTS
PIF_VALUE: 26
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
## 2020-12-24 NOTE — PLAN OF CARE
Problem: OXYGENATION/RESPIRATORY FUNCTION  Goal: Patient will maintain patent airway  Outcome: Ongoing  Goal: Patient will achieve/maintain normal respiratory rate/effort  Description: Respiratory rate and effort will be within normal limits for the patient  Outcome: Ongoing     Problem: MECHANICAL VENTILATION  Goal: Patient will maintain patent airway  Outcome: Ongoing  Goal: Oral health is maintained or improved  Outcome: Ongoing  Goal: ET tube will be managed safely  Outcome: Ongoing  Goal: Ability to express needs and understand communication  Outcome: Ongoing  Goal: Mobility/activity is maintained at optimum level for patient  Outcome: Ongoing     Problem: SKIN INTEGRITY  Goal: Skin integrity is maintained or improved  12/24/2020 1236 by Jeri Martinez RN  Outcome: Ongoing  12/24/2020 0640 by Humberto Hodgkin, RN  Outcome: Ongoing     Problem: Pain:  Goal: Pain level will decrease  Description: Pain level will decrease  Outcome: Ongoing  Goal: Control of acute pain  Description: Control of acute pain  12/24/2020 1236 by Jeri Martinez RN  Outcome: Ongoing  12/24/2020 0640 by Humberto Hodgkin, RN  Outcome: Ongoing     Problem: Nutrition  Goal: Optimal nutrition therapy  Description: Nutrition Problem #1: Inadequate oral intake  Intervention: Food and/or Nutrient Delivery: (Monitor TF tolerance; suggest goal rate of 50 mL/hr to provide 1800 kcal and 113 g pro/day.)  Nutritional Goals: meet % of estimated nutrient needs     Outcome: Ongoing     Problem: Skin Integrity:  Goal: Will show no infection signs and symptoms  Description: Will show no infection signs and symptoms  Outcome: Ongoing  Goal: Absence of new skin breakdown  Description: Absence of new skin breakdown  Outcome: Ongoing     Problem: Restraint Use - Nonviolent/Non-Self-Destructive Behavior:  Goal: Absence of restraint-related injury  Description: Absence of restraint-related injury  Outcome: Ongoing     Problem: Musculor/Skeletal Functional Status  Goal: Highest potential functional level  Outcome: Ongoing     Problem: Restraint Use - Nonviolent/Non-Self-Destructive Behavior:  Goal: Absence of restraint indications  Description: Absence of restraint indications  12/24/2020 1236 by Saul Reagan RN  Outcome: Not Met This Shift  12/24/2020 0640 by Magalys Benjamin RN  Outcome: Not Met This Shift

## 2020-12-24 NOTE — PLAN OF CARE
Problem: Pain:  Goal: Control of acute pain  Description: Control of acute pain  Outcome: Ongoing     Problem: SKIN INTEGRITY  Goal: Skin integrity is maintained or improved  12/24/2020 0640 by Humberto Hodgkin, RN  Outcome: Ongoing     Problem: Restraint Use - Nonviolent/Non-Self-Destructive Behavior:  Goal: Absence of restraint indications  Description: Absence of restraint indications  Outcome: Not Met This Shift   Control of pain with scheduled and PRN pain meds, q2 hour turns, pt pulls at lines, tubes and equipment  Humberto Hodgkin, RN

## 2020-12-24 NOTE — PROGRESS NOTES
NEPHROLOGY DIALYSIS NOTE    PROCEDURE    Patient seen on Hemodialysis  12/24/2020 at 9:06 AM  Access cannulated without problems  Continues to be on pressors. TREATMENT ORDERS   See dialysis flowsheet for specifics on access, blood flow rate, dialysate baths, duration of dialysis, anticoagulation and other technical information. Dialysis Bath  K+ (Potassium): 3  Ca+ (Calcium): 2.25  Na+ (Sodium): 140  HCO3 (Bicarb): 32       INVESTIGATIONS     Last 3 CMP:    Recent Labs     12/22/20  0708 12/23/20  0715 12/24/20  0435   * 135 135   K 4.1 4.7 4.8   CL 95* 99 97*   CO2 25 26 26   BUN 37* 30* 51*   CREATININE 4.50* 3.46* 4.69*   CALCIUM 8.3* 8.5* 8.9       Last 3 CBC:  Recent Labs     12/22/20  0708 12/23/20  0714 12/24/20  0435   WBC 11.1 9.6 9.0   RBC 2.67* 2.44* 2.58*   HGB 8.1* 7.6* 7.9*   HCT 28.2* 26.2* 27.9*   .6* 107.4* 108.1*   MCH 30.3 31.1 30.6   MCHC 28.7 29.0 28.3*   RDW 17.2* 17.1* 16.9*    221 242   MPV 9.7 10.0 9.2         GFR: Estimated Creatinine Clearance: 16 mL/min (A) (based on SCr of 4.69 mg/dL (H)). Phosphorus:    Recent Labs     12/22/20  0708 12/23/20  0715 12/24/20  0435   PHOS 3.0 2.3* 2.7     Magnesium:   Recent Labs     12/22/20  0708 12/23/20  0715 12/24/20  0435   MG 2.4 2.2 2.6     Albumin: No results for input(s): LABALBU in the last 72 hours.   PTH                :No results found for: PTH           MEDICATIONS     Scheduled Meds:    albumin human  25 g Intravenous Once    midodrine  10 mg Oral TID WC    QUEtiapine  100 mg Oral BID    albuterol  2.5 mg Nebulization BID    inhalation builder   Inhalation Q4H    lansoprazole  15 mg Oral QAM    melatonin  5 mg Oral Nightly    sodium chloride flush  10 mL Intravenous 2 times per day    oxyCODONE  5 mg Oral Q4H    ipratropium-albuterol  1 ampule Inhalation Q4H WA    acetylcysteine  600 mg Inhalation Q4H    polyethylene glycol  17 g Oral Daily    docusate  100 mg Oral Daily    apixaban  5 mg Oral BID  amiodarone  200 mg Oral Daily    Calcium Acetate (Phos Binder)  2,001 mg Oral TID WC    doxepin  10 mg Oral Nightly    escitalopram  20 mg Oral Daily    budesonide-formoterol  2 puff Inhalation BID    gabapentin  100 mg Oral BID    acetaminophen  1,000 mg Oral 3 times per day    senna  1 tablet Oral Nightly    chlorhexidine  15 mL Mouth/Throat BID    bacitracin   Topical BID    silver sulfADIAZINE   Topical BID     Continuous Infusions:    propofol Stopped (12/19/20 0625)    norepinephrine 12 mcg/min (12/24/20 0750)    vasopressin (Septic Shock) infusion 0.04 Units/min (12/24/20 6915)     PRN Meds:  sodium chloride, sodium chloride, sodium chloride flush, albumin human, fentanNYL, albuterol, chlorhexidine  Home Meds:                Medications Prior to Admission: amiodarone (CORDARONE) 200 MG tablet, Take 1 tablet by mouth daily  hydrOXYzine (VISTARIL) 25 MG capsule, Take 1 capsule by mouth 4 times daily as needed for Anxiety  albuterol sulfate HFA (VENTOLIN HFA) 108 (90 Base) MCG/ACT inhaler, Inhale 1 puff into the lungs every 6 hours as needed for Wheezing  Fluticasone furoate-vilanterol (BREO ELLIPTA) 200-25 MCG/INH AEPB inhaler, Inhale 1 puff into the lungs daily  apixaban (ELIQUIS) 5 MG TABS tablet, Take 1 tablet by mouth 2 times daily  gabapentin (NEURONTIN) 100 MG capsule, Take 1 capsule by mouth 2 times daily for 30 days.   doxepin (SINEQUAN) 10 MG capsule, Take 1 capsule by mouth nightly  escitalopram (LEXAPRO) 20 MG tablet, Take 1 tablet by mouth daily  metoprolol tartrate (LOPRESSOR) 25 MG tablet, Take 1 tablet by mouth 2 times daily  nicotine (NICODERM CQ) 21 MG/24HR, Place 1 patch onto the skin daily  pantoprazole (PROTONIX) 40 MG tablet, Take 1 tablet by mouth 2 times daily  aluminum hydroxide (ALTERNGEL) 320 MG/5ML suspension, 5-10 ml 4 times daily as needed for stomach pain  calcium acetate (PHOSLO) 667 MG capsule, Take 2,001 mg by mouth 3 times daily (with meals)    EXAMINATION Vitals /65   Pulse 57   Temp 98.4 °F (36.9 °C) (Oral)   Resp 14   Ht 5' 3\" (1.6 m)   Wt 224 lb 6.9 oz (101.8 kg)   SpO2 100%   BMI 39.76 kg/m²   General appearance: Mechanical ventilation  HEENT: PERRLA  Respiratory::vesicular breath sounds,no wheeze/crackles  Cardiovascular:S1 S2 normal,no gallop or organic murmur. Abdomen:Non tender/non distended. Bowel sounds present  Extremities: No Cyanosis or Clubbing, present lower extremity edema  Neurological: Mechanical ventilation    ASSESSMENT AND PLAN     1. ESRD on intermittent maintenance hemodialysis Monday Wednesday Friday using AV fistula. [facility] unit Dr Trina Gillespie 2. Admitted with Jiménez - 10 - 15% TBSA  3. Circulatory shock on pressors  4. VDRF  5. Anemia  6. COPD     The patient was seen and examined while on dialysis. Professional oversight of the patients dialysis care,access care and dialysis related co-morbidities were addressed as necessary with the patient and /or staff. 3-4 kg off.   Will lower dialysate temperature and use midodrine    This note is created with the assistance of a speech-recognition program. While intending to generate a document that actually reflects the content of the visit, no guarantees can be provided that every mistake has been identified and corrected by editing    Gary Lee MD, [facility] Cassidy Mares), [address]   12/24/2020 9:06 AM    [facility] [place]

## 2020-12-24 NOTE — PLAN OF CARE
Problem: OXYGENATION/RESPIRATORY FUNCTION  Goal: Patient will maintain patent airway  12/23/2020 2016 by Иван Helton RCP  Outcome: Ongoing  12/23/2020 1219 by Myron Kaplan RN  Outcome: Ongoing     Problem: OXYGENATION/RESPIRATORY FUNCTION  Goal: Patient will achieve/maintain normal respiratory rate/effort  Description: Respiratory rate and effort will be within normal limits for the patient  12/23/2020 2016 by Иван Helton RCP  Outcome: Ongoing  12/23/2020 1219 by Myron Kaplan RN  Outcome: Ongoing     Problem: MECHANICAL VENTILATION  Goal: Patient will maintain patent airway  12/23/2020 2016 by Иван Helton RCP  Outcome: Ongoing  12/23/2020 1219 by Myron Kaplan RN  Outcome: Ongoing     Problem: MECHANICAL VENTILATION  Goal: Oral health is maintained or improved  12/23/2020 2016 by Иван Helton RCP  Outcome: Ongoing  12/23/2020 1219 by Myron Kaplan RN  Outcome: Ongoing     Problem: MECHANICAL VENTILATION  Goal: ET tube will be managed safely  12/23/2020 2016 by Иван Helton RCP  Outcome: Ongoing  12/23/2020 1219 by Myron Kaplan RN  Outcome: Ongoing     Problem: MECHANICAL VENTILATION  Goal: Ability to express needs and understand communication  12/23/2020 2016 by Иван Helton RCP  Outcome: Ongoing  12/23/2020 1219 by Myron Kaplan RN  Outcome: Ongoing     Problem: MECHANICAL VENTILATION  Goal: Mobility/activity is maintained at optimum level for patient  12/23/2020 2016 by Иван Helton RCP  Outcome: Ongoing  12/23/2020 1219 by Myron Kaplan RN  Outcome: Ongoing     Problem: SKIN INTEGRITY  Goal: Skin integrity is maintained or improved  12/23/2020 2016 by Иван Helton RCP  Outcome: Ongoing  12/23/2020 1219 by Myron Kaplan RN  Outcome: Ongoing     Problem: Skin Integrity:  Goal: Will show no infection signs and symptoms  Description: Will show no infection signs and symptoms 12/23/2020 2016 by Josefina Martell RCP  Outcome: Ongoing  12/23/2020 1219 by Karma Chaudhari RN  Outcome: Ongoing     Problem: Skin Integrity:  Goal: Absence of new skin breakdown  Description: Absence of new skin breakdown  12/23/2020 2016 by Josefina Martell RCP  Outcome: Ongoing  12/23/2020 1219 by Karma Chaudhari RN  Outcome: Ongoing   BRONCHOSPASM/BRONCHOCONSTRICTION     [x]         IMPROVE AERATION/BREATH SOUNDS  [x]   ADMINISTER BRONCHODILATOR THERAPY AS APPROPRIATE  [x]   ASSESS BREATH SOUNDS  []   IMPLEMENT AEROSOL/MDI PROTOCOL  [x]   PATIENT EDUCATION AS NEEDED

## 2020-12-24 NOTE — PLAN OF CARE
Problem: OXYGENATION/RESPIRATORY FUNCTION  Goal: Patient will maintain patent airway  12/23/2020 2016 by Carleen Narayan RCP  Outcome: Ongoing  Goal: Patient will achieve/maintain normal respiratory rate/effort  Description: Respiratory rate and effort will be within normal limits for the patient  12/23/2020 2016 by Carleen Narayan RCP  Outcome: Ongoing     Problem: MECHANICAL VENTILATION  Goal: Patient will maintain patent airway  12/23/2020 2016 by Carleen Narayan RCP  Outcome: Ongoing  Goal: Oral health is maintained or improved  12/23/2020 2016 by Carleen Narayan RCP  Outcome: Ongoing  Goal: ET tube will be managed safely  12/23/2020 2016 by Carleen Narayan RCP  Outcome: Ongoing  Goal: Ability to express needs and understand communication  12/23/2020 2016 by Carleen Narayan RCP  Outcome: Ongoing  Goal: Mobility/activity is maintained at optimum level for patient  12/23/2020 2016 by Carleen Narayan RCP  Outcome: Ongoing     Problem: SKIN INTEGRITY  Goal: Skin integrity is maintained or improved  12/24/2020 0640 by Kevin Huerta RN  Outcome: Ongoing  12/23/2020 2016 by Carleen Narayan RCP  Outcome: Ongoing

## 2020-12-24 NOTE — PROGRESS NOTES
Dialysis Post Treatment Note  Patient tolerated treatment well. Denies complaints at time of discharge. Vitals:    12/24/20 1130   BP: (!) 111/58   Pulse: 70   Resp: 17   Temp: 98.6 °F (37 °C)   SpO2:      Pre-Weight = 101.8  Post-weight = Weight: 218 lb 7.6 oz (99.1 kg)  Total Liters Processed = Total Liters Processed (l/min): 91.3 l/min  Rinseback Volume (mL) = Rinseback Volume (ml): 250 ml  Net Removal (mL) = 3950  Length of treatment=210  Access:   avf patent pt on levophed for bp, ialysis paco well.

## 2020-12-25 ENCOUNTER — APPOINTMENT (OUTPATIENT)
Dept: GENERAL RADIOLOGY | Age: 55
DRG: 004 | End: 2020-12-25
Payer: MEDICARE

## 2020-12-25 LAB
ABSOLUTE EOS #: 0.35 K/UL (ref 0–0.4)
ABSOLUTE IMMATURE GRANULOCYTE: 0 K/UL (ref 0–0.3)
ABSOLUTE LYMPH #: 1.39 K/UL (ref 1–4.8)
ABSOLUTE MONO #: 1.13 K/UL (ref 0.1–0.8)
ALLEN TEST: ABNORMAL
ANION GAP SERPL CALCULATED.3IONS-SCNC: 11 MMOL/L (ref 9–17)
BASOPHILS # BLD: 0 % (ref 0–2)
BASOPHILS ABSOLUTE: 0 K/UL (ref 0–0.2)
BUN BLDV-MCNC: 48 MG/DL (ref 6–20)
BUN/CREAT BLD: ABNORMAL (ref 9–20)
CALCIUM IONIZED: 1.23 MMOL/L (ref 1.13–1.33)
CALCIUM SERPL-MCNC: 9 MG/DL (ref 8.6–10.4)
CHLORIDE BLD-SCNC: 102 MMOL/L (ref 98–107)
CO2: 25 MMOL/L (ref 20–31)
CORTISOL COLLECTION INFO: NORMAL
CORTISOL: 11.2 UG/DL (ref 2.7–18.4)
CREAT SERPL-MCNC: 3.75 MG/DL (ref 0.5–0.9)
DIFFERENTIAL TYPE: ABNORMAL
EOSINOPHILS RELATIVE PERCENT: 4 % (ref 1–4)
FIO2: 40
GFR AFRICAN AMERICAN: 15 ML/MIN
GFR NON-AFRICAN AMERICAN: 13 ML/MIN
GFR SERPL CREATININE-BSD FRML MDRD: ABNORMAL ML/MIN/{1.73_M2}
GFR SERPL CREATININE-BSD FRML MDRD: ABNORMAL ML/MIN/{1.73_M2}
GLUCOSE BLD-MCNC: 128 MG/DL (ref 70–99)
GLUCOSE BLD-MCNC: 147 MG/DL (ref 74–100)
HCT VFR BLD CALC: 25.4 % (ref 36.3–47.1)
HCT VFR BLD CALC: 25.4 % (ref 36.3–47.1)
HEMOGLOBIN: 7.1 G/DL (ref 11.9–15.1)
HEMOGLOBIN: 7.3 G/DL (ref 11.9–15.1)
IMMATURE GRANULOCYTES: 0 %
LYMPHOCYTES # BLD: 16 % (ref 24–44)
MAGNESIUM: 2.5 MG/DL (ref 1.6–2.6)
MCH RBC QN AUTO: 31.1 PG (ref 25.2–33.5)
MCHC RBC AUTO-ENTMCNC: 28.7 G/DL (ref 28.4–34.8)
MCV RBC AUTO: 108.1 FL (ref 82.6–102.9)
MODE: ABNORMAL
MONOCYTES # BLD: 13 % (ref 1–7)
MORPHOLOGY: ABNORMAL
NEGATIVE BASE EXCESS, ART: ABNORMAL (ref 0–2)
NRBC AUTOMATED: 0 PER 100 WBC
O2 DEVICE/FLOW/%: ABNORMAL
PATIENT TEMP: ABNORMAL
PDW BLD-RTO: 16.6 % (ref 11.8–14.4)
PLATELET # BLD: 315 K/UL (ref 138–453)
PLATELET ESTIMATE: ABNORMAL
PMV BLD AUTO: 9.6 FL (ref 8.1–13.5)
POC HCO3: 27.4 MMOL/L (ref 21–28)
POC HCO3: 28.4 MMOL/L (ref 21–28)
POC HCO3: 28.6 MMOL/L (ref 21–28)
POC LACTIC ACID: 0.65 MMOL/L (ref 0.56–1.39)
POC LACTIC ACID: 0.66 MMOL/L (ref 0.56–1.39)
POC LACTIC ACID: 0.75 MMOL/L (ref 0.56–1.39)
POC O2 SATURATION: 94 % (ref 94–98)
POC O2 SATURATION: 94 % (ref 94–98)
POC O2 SATURATION: 96 % (ref 94–98)
POC PCO2 TEMP: ABNORMAL MM HG
POC PCO2: 52 MM HG (ref 35–48)
POC PCO2: 52.1 MM HG (ref 35–48)
POC PCO2: 53.1 MM HG (ref 35–48)
POC PH TEMP: ABNORMAL
POC PH: 7.33 (ref 7.35–7.45)
POC PH: 7.34 (ref 7.35–7.45)
POC PH: 7.34 (ref 7.35–7.45)
POC PO2 TEMP: ABNORMAL MM HG
POC PO2: 77.4 MM HG (ref 83–108)
POC PO2: 78.8 MM HG (ref 83–108)
POC PO2: 89.2 MM HG (ref 83–108)
POSITIVE BASE EXCESS, ART: 1 (ref 0–3)
POSITIVE BASE EXCESS, ART: 2 (ref 0–3)
POSITIVE BASE EXCESS, ART: 2 (ref 0–3)
POTASSIUM SERPL-SCNC: 4.7 MMOL/L (ref 3.7–5.3)
RBC # BLD: 2.35 M/UL (ref 3.95–5.11)
RBC # BLD: ABNORMAL 10*6/UL
SAMPLE SITE: ABNORMAL
SEG NEUTROPHILS: 67 % (ref 36–66)
SEGMENTED NEUTROPHILS ABSOLUTE COUNT: 5.83 K/UL (ref 1.8–7.7)
SODIUM BLD-SCNC: 138 MMOL/L (ref 135–144)
TCO2 (CALC), ART: 29 MMOL/L (ref 22–29)
TCO2 (CALC), ART: 30 MMOL/L (ref 22–29)
TCO2 (CALC), ART: 30 MMOL/L (ref 22–29)
WBC # BLD: 8.7 K/UL (ref 3.5–11.3)
WBC # BLD: ABNORMAL 10*3/UL

## 2020-12-25 PROCEDURE — 6370000000 HC RX 637 (ALT 250 FOR IP): Performed by: GENERAL PRACTICE

## 2020-12-25 PROCEDURE — 6360000002 HC RX W HCPCS: Performed by: STUDENT IN AN ORGANIZED HEALTH CARE EDUCATION/TRAINING PROGRAM

## 2020-12-25 PROCEDURE — 99232 SBSQ HOSP IP/OBS MODERATE 35: CPT | Performed by: INTERNAL MEDICINE

## 2020-12-25 PROCEDURE — 84100 ASSAY OF PHOSPHORUS: CPT

## 2020-12-25 PROCEDURE — 82803 BLOOD GASES ANY COMBINATION: CPT

## 2020-12-25 PROCEDURE — 82533 TOTAL CORTISOL: CPT

## 2020-12-25 PROCEDURE — 94761 N-INVAS EAR/PLS OXIMETRY MLT: CPT

## 2020-12-25 PROCEDURE — 6370000000 HC RX 637 (ALT 250 FOR IP): Performed by: NURSE PRACTITIONER

## 2020-12-25 PROCEDURE — 82330 ASSAY OF CALCIUM: CPT

## 2020-12-25 PROCEDURE — 83735 ASSAY OF MAGNESIUM: CPT

## 2020-12-25 PROCEDURE — 71045 X-RAY EXAM CHEST 1 VIEW: CPT

## 2020-12-25 PROCEDURE — 2000000000 HC ICU R&B

## 2020-12-25 PROCEDURE — 2580000003 HC RX 258: Performed by: SURGERY

## 2020-12-25 PROCEDURE — 6370000000 HC RX 637 (ALT 250 FOR IP): Performed by: STUDENT IN AN ORGANIZED HEALTH CARE EDUCATION/TRAINING PROGRAM

## 2020-12-25 PROCEDURE — 37799 UNLISTED PX VASCULAR SURGERY: CPT

## 2020-12-25 PROCEDURE — 85025 COMPLETE CBC W/AUTO DIFF WBC: CPT

## 2020-12-25 PROCEDURE — 85018 HEMOGLOBIN: CPT

## 2020-12-25 PROCEDURE — 83605 ASSAY OF LACTIC ACID: CPT

## 2020-12-25 PROCEDURE — 82947 ASSAY GLUCOSE BLOOD QUANT: CPT

## 2020-12-25 PROCEDURE — 6370000000 HC RX 637 (ALT 250 FOR IP): Performed by: SURGERY

## 2020-12-25 PROCEDURE — 6360000002 HC RX W HCPCS: Performed by: SURGERY

## 2020-12-25 PROCEDURE — 2500000003 HC RX 250 WO HCPCS: Performed by: STUDENT IN AN ORGANIZED HEALTH CARE EDUCATION/TRAINING PROGRAM

## 2020-12-25 PROCEDURE — 80048 BASIC METABOLIC PNL TOTAL CA: CPT

## 2020-12-25 PROCEDURE — 6370000000 HC RX 637 (ALT 250 FOR IP): Performed by: INTERNAL MEDICINE

## 2020-12-25 PROCEDURE — 2500000003 HC RX 250 WO HCPCS

## 2020-12-25 PROCEDURE — 94003 VENT MGMT INPAT SUBQ DAY: CPT

## 2020-12-25 PROCEDURE — 94770 HC ETCO2 MONITOR DAILY: CPT

## 2020-12-25 PROCEDURE — 2580000003 HC RX 258: Performed by: GENERAL PRACTICE

## 2020-12-25 PROCEDURE — 85014 HEMATOCRIT: CPT

## 2020-12-25 PROCEDURE — 2700000000 HC OXYGEN THERAPY PER DAY

## 2020-12-25 PROCEDURE — 36415 COLL VENOUS BLD VENIPUNCTURE: CPT

## 2020-12-25 PROCEDURE — 2580000003 HC RX 258: Performed by: STUDENT IN AN ORGANIZED HEALTH CARE EDUCATION/TRAINING PROGRAM

## 2020-12-25 PROCEDURE — 94640 AIRWAY INHALATION TREATMENT: CPT

## 2020-12-25 RX ORDER — QUETIAPINE FUMARATE 25 MG/1
50 TABLET, FILM COATED ORAL 2 TIMES DAILY
Status: DISCONTINUED | OUTPATIENT
Start: 2020-12-25 | End: 2021-01-06 | Stop reason: HOSPADM

## 2020-12-25 RX ORDER — OXYCODONE HYDROCHLORIDE 5 MG/1
5 TABLET ORAL EVERY 8 HOURS
Status: DISCONTINUED | OUTPATIENT
Start: 2020-12-25 | End: 2020-12-26

## 2020-12-25 RX ADMIN — Medication 15 MG: at 08:50

## 2020-12-25 RX ADMIN — CHLORHEXIDINE GLUCONATE 0.12% ORAL RINSE 15 ML: 1.2 LIQUID ORAL at 21:22

## 2020-12-25 RX ADMIN — SODIUM CHLORIDE, PRESERVATIVE FREE 10 ML: 5 INJECTION INTRAVENOUS at 21:21

## 2020-12-25 RX ADMIN — VASOPRESSIN 0.04 UNITS/MIN: 20 INJECTION INTRAVENOUS at 22:19

## 2020-12-25 RX ADMIN — Medication 600 MG: at 15:16

## 2020-12-25 RX ADMIN — CALCIUM ACETATE 2001 MG: 667 CAPSULE ORAL at 17:39

## 2020-12-25 RX ADMIN — POLYETHYLENE GLYCOL 3350 17 G: 17 POWDER, FOR SOLUTION ORAL at 08:56

## 2020-12-25 RX ADMIN — FENTANYL CITRATE 50 MCG: 50 INJECTION, SOLUTION INTRAMUSCULAR; INTRAVENOUS at 08:41

## 2020-12-25 RX ADMIN — VASOPRESSIN 0.04 UNITS/MIN: 20 INJECTION INTRAVENOUS at 03:54

## 2020-12-25 RX ADMIN — OXYCODONE HYDROCHLORIDE 5 MG: 5 TABLET ORAL at 05:57

## 2020-12-25 RX ADMIN — OXYCODONE HYDROCHLORIDE 5 MG: 5 TABLET ORAL at 17:38

## 2020-12-25 RX ADMIN — SODIUM CHLORIDE, PRESERVATIVE FREE 10 ML: 5 INJECTION INTRAVENOUS at 08:57

## 2020-12-25 RX ADMIN — CALCIUM ACETATE 2001 MG: 667 CAPSULE ORAL at 12:13

## 2020-12-25 RX ADMIN — Medication 5 MG: at 21:20

## 2020-12-25 RX ADMIN — BACITRACIN: 500 OINTMENT TOPICAL at 03:01

## 2020-12-25 RX ADMIN — HEPARIN SODIUM: 5000 INJECTION INTRAVENOUS; SUBCUTANEOUS at 07:39

## 2020-12-25 RX ADMIN — QUETIAPINE FUMARATE 100 MG: 100 TABLET ORAL at 08:50

## 2020-12-25 RX ADMIN — MIDODRINE HYDROCHLORIDE 10 MG: 5 TABLET ORAL at 08:50

## 2020-12-25 RX ADMIN — DOXEPIN HYDROCHLORIDE 10 MG: 10 CAPSULE ORAL at 21:20

## 2020-12-25 RX ADMIN — VASOPRESSIN 0.04 UNITS/MIN: 20 INJECTION INTRAVENOUS at 13:20

## 2020-12-25 RX ADMIN — DOCUSATE SODIUM 100 MG: 50 LIQUID ORAL at 08:50

## 2020-12-25 RX ADMIN — SENNOSIDES 8.6 MG: 8.6 TABLET, FILM COATED ORAL at 21:21

## 2020-12-25 RX ADMIN — GABAPENTIN 100 MG: 100 CAPSULE ORAL at 08:50

## 2020-12-25 RX ADMIN — MIDODRINE HYDROCHLORIDE 10 MG: 5 TABLET ORAL at 12:13

## 2020-12-25 RX ADMIN — MIDODRINE HYDROCHLORIDE 10 MG: 5 TABLET ORAL at 17:39

## 2020-12-25 RX ADMIN — CHLORHEXIDINE GLUCONATE 0.12% ORAL RINSE 15 ML: 1.2 LIQUID ORAL at 09:00

## 2020-12-25 RX ADMIN — ACETAMINOPHEN 1000 MG: 500 TABLET ORAL at 05:57

## 2020-12-25 RX ADMIN — AMIODARONE HYDROCHLORIDE 200 MG: 200 TABLET ORAL at 08:50

## 2020-12-25 RX ADMIN — APIXABAN 5 MG: 5 TABLET, FILM COATED ORAL at 08:50

## 2020-12-25 RX ADMIN — Medication 600 MG: at 07:43

## 2020-12-25 RX ADMIN — HEPARIN SODIUM: 5000 INJECTION INTRAVENOUS; SUBCUTANEOUS at 11:23

## 2020-12-25 RX ADMIN — Medication 600 MG: at 03:20

## 2020-12-25 RX ADMIN — IPRATROPIUM BROMIDE AND ALBUTEROL SULFATE 1 AMPULE: .5; 3 SOLUTION RESPIRATORY (INHALATION) at 07:32

## 2020-12-25 RX ADMIN — SILVER SULFADIAZINE: 10 CREAM TOPICAL at 03:01

## 2020-12-25 RX ADMIN — Medication 18 MCG/MIN: at 01:09

## 2020-12-25 RX ADMIN — IPRATROPIUM BROMIDE AND ALBUTEROL SULFATE 1 AMPULE: .5; 3 SOLUTION RESPIRATORY (INHALATION) at 20:04

## 2020-12-25 RX ADMIN — BACITRACIN: 500 OINTMENT TOPICAL at 21:21

## 2020-12-25 RX ADMIN — BUDESONIDE AND FORMOTEROL FUMARATE DIHYDRATE 2 PUFF: 80; 4.5 AEROSOL RESPIRATORY (INHALATION) at 07:33

## 2020-12-25 RX ADMIN — FENTANYL CITRATE 50 MCG: 50 INJECTION, SOLUTION INTRAMUSCULAR; INTRAVENOUS at 14:58

## 2020-12-25 RX ADMIN — APIXABAN 5 MG: 5 TABLET, FILM COATED ORAL at 21:20

## 2020-12-25 RX ADMIN — FENTANYL CITRATE 50 MCG: 50 INJECTION, SOLUTION INTRAMUSCULAR; INTRAVENOUS at 12:15

## 2020-12-25 RX ADMIN — QUETIAPINE FUMARATE 50 MG: 100 TABLET ORAL at 21:20

## 2020-12-25 RX ADMIN — Medication 600 MG: at 20:05

## 2020-12-25 RX ADMIN — ACETAMINOPHEN 1000 MG: 500 TABLET ORAL at 23:30

## 2020-12-25 RX ADMIN — FENTANYL CITRATE 50 MCG: 50 INJECTION, SOLUTION INTRAMUSCULAR; INTRAVENOUS at 02:55

## 2020-12-25 RX ADMIN — Medication 600 MG: at 11:08

## 2020-12-25 RX ADMIN — ESCITALOPRAM OXALATE 20 MG: 10 TABLET ORAL at 08:49

## 2020-12-25 RX ADMIN — BACITRACIN: 500 OINTMENT TOPICAL at 16:48

## 2020-12-25 RX ADMIN — IPRATROPIUM BROMIDE AND ALBUTEROL SULFATE 1 AMPULE: .5; 3 SOLUTION RESPIRATORY (INHALATION) at 11:07

## 2020-12-25 RX ADMIN — ALBUTEROL SULFATE 2.5 MG: 2.5 SOLUTION RESPIRATORY (INHALATION) at 03:19

## 2020-12-25 RX ADMIN — GABAPENTIN 100 MG: 100 CAPSULE ORAL at 21:20

## 2020-12-25 RX ADMIN — CALCIUM ACETATE 2001 MG: 667 CAPSULE ORAL at 08:49

## 2020-12-25 RX ADMIN — ACETAMINOPHEN 1000 MG: 500 TABLET ORAL at 14:57

## 2020-12-25 RX ADMIN — OXYCODONE HYDROCHLORIDE 5 MG: 5 TABLET ORAL at 09:00

## 2020-12-25 RX ADMIN — IPRATROPIUM BROMIDE AND ALBUTEROL SULFATE 1 AMPULE: .5; 3 SOLUTION RESPIRATORY (INHALATION) at 15:15

## 2020-12-25 RX ADMIN — OXYCODONE HYDROCHLORIDE 5 MG: 5 TABLET ORAL at 02:24

## 2020-12-25 RX ADMIN — SILVER SULFADIAZINE: 10 CREAM TOPICAL at 16:48

## 2020-12-25 RX ADMIN — HEPARIN SODIUM: 5000 INJECTION INTRAVENOUS; SUBCUTANEOUS at 20:24

## 2020-12-25 RX ADMIN — HEPARIN SODIUM: 5000 INJECTION INTRAVENOUS; SUBCUTANEOUS at 15:24

## 2020-12-25 RX ADMIN — HEPARIN SODIUM: 5000 INJECTION INTRAVENOUS; SUBCUTANEOUS at 03:20

## 2020-12-25 ASSESSMENT — PAIN SCALES - GENERAL
PAINLEVEL_OUTOF10: 6
PAINLEVEL_OUTOF10: 2
PAINLEVEL_OUTOF10: 6
PAINLEVEL_OUTOF10: 7
PAINLEVEL_OUTOF10: 6

## 2020-12-25 ASSESSMENT — PULMONARY FUNCTION TESTS
PIF_VALUE: 18
PIF_VALUE: 16
PIF_VALUE: 26
PIF_VALUE: 20
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
## 2020-12-25 NOTE — PLAN OF CARE
Problem: OXYGENATION/RESPIRATORY FUNCTION  Goal: Patient will maintain patent airway  12/25/2020 0748 by Deuce Heller, RCP  Outcome: Ongoing  12/25/2020 0737 by Deuce Heller, RCP  Outcome: Ongoing  12/24/2020 1944 by Justina Lopez [facility]  Outcome: Ongoing  Goal: Patient will achieve/maintain normal respiratory rate/effort  Description: Respiratory rate and effort will be within normal limits for the patient  12/25/2020 0748 by Deuce Heller, RCP  Outcome: Ongoing  12/25/2020 0737 by Deuce Heller, RCP  Outcome: Ongoing  12/24/2020 1944 by Justina Lopez RCP  Outcome: Ongoing     Problem: MECHANICAL VENTILATION  Goal: Patient will maintain patent airway  12/25/2020 0748 by Deuce Heller, RCP  Outcome: Ongoing  12/25/2020 0737 by Deuce Heller, RCP  Outcome: Ongoing  12/24/2020 1944 by Justina Lopez P  Outcome: Ongoing  Goal: Oral health is maintained or improved  12/25/2020 0748 by Deuce Heller, RCP  Outcome: Ongoing  12/25/2020 0737 by Deuce Heller, RCP  Outcome: Ongoing  12/24/2020 1944 by Justina Lopez P  Outcome: Ongoing  Goal: ET tube will be managed safely  12/25/2020 0748 by Deuce Heller, RCP  Outcome: Ongoing  12/25/2020 0737 by Deuce Heller, RCP  Outcome: Ongoing  12/24/2020 1944 by Justina Lopez P  Outcome: Ongoing  Goal: Ability to express needs and understand communication  12/25/2020 0748 by Deuce Heller, RCP  Outcome: Ongoing  12/25/2020 0737 by Deuce Heller, RCP  Outcome: Ongoing  12/24/2020 1944 by Jutsina Lopez P  Outcome: Ongoing  Goal: Mobility/activity is maintained at optimum level for patient  12/25/2020 0748 by Deuce Heller, RCP  Outcome: Ongoing  12/25/2020 0737 by Deuce Heller, RCP  Outcome: Ongoing  12/24/2020 1944 by Justina Lopez P  Outcome: Ongoing     Problem: Skin Integrity:  Goal: Will show no infection signs and symptoms  Description: Will show no infection signs and symptoms  Outcome: Ongoing Goal: Absence of new skin breakdown  Description: Absence of new skin breakdown  Outcome: Ongoing

## 2020-12-25 NOTE — PLAN OF CARE
Problem: OXYGENATION/RESPIRATORY FUNCTION  Goal: Patient will maintain patent airway  12/24/2020 1944 by Radha Jones RCP  Outcome: Ongoing     Problem: OXYGENATION/RESPIRATORY FUNCTION  Goal: Patient will achieve/maintain normal respiratory rate/effort  Description: Respiratory rate and effort will be within normal limits for the patient  12/24/2020 1944 by Radha Jones RCP  Outcome: Ongoing     Problem: MECHANICAL VENTILATION  Goal: Patient will maintain patent airway  12/24/2020 1944 by Radha Jones RCP  Outcome: Ongoing     Problem: MECHANICAL VENTILATION  Goal: Oral health is maintained or improved  12/24/2020 1944 by Radha Jones RCP  Outcome: Ongoing     Problem: MECHANICAL VENTILATION  Goal: ET tube will be managed safely  12/24/2020 1944 by Radha Jones RCP  Outcome: Ongoing     Problem: MECHANICAL VENTILATION  Goal: Ability to express needs and understand communication  12/24/2020 1944 by Radha Jones RCP  Outcome: Ongoing     Problem: MECHANICAL VENTILATION  Goal: Mobility/activity is maintained at optimum level for patient  12/24/2020 1944 by Radha Jones RCP  Outcome: Ongoing     Problem: SKIN INTEGRITY  Goal: Skin integrity is maintained or improved  12/24/2020 1944 by Radha Jones RCP  Outcome: Ongoing   BRONCHOSPASM/BRONCHOCONSTRICTION     [x]         IMPROVE AERATION/BREATH SOUNDS  [x]   ADMINISTER BRONCHODILATOR THERAPY AS APPROPRIATE  [x]   ASSESS BREATH SOUNDS  [x]   IMPLEMENT AEROSOL/MDI PROTOCOL  [x]   PATIENT EDUCATION AS NEEDED   PROVIDE ADEQUATE OXYGENATION WITH ACCEPTABLE SP02/ABG'S    [x]  IDENTIFY APPROPRIATE OXYGEN THERAPY  [x]   MONITOR SP02/ABG'S AS NEEDED   [x]   PATIENT EDUCATION AS NEEDED

## 2020-12-26 ENCOUNTER — APPOINTMENT (OUTPATIENT)
Dept: GENERAL RADIOLOGY | Age: 55
DRG: 004 | End: 2020-12-26
Payer: MEDICARE

## 2020-12-26 ENCOUNTER — ANESTHESIA EVENT (OUTPATIENT)
Dept: ICU | Age: 55
DRG: 004 | End: 2020-12-26
Payer: MEDICARE

## 2020-12-26 ENCOUNTER — ANESTHESIA (OUTPATIENT)
Dept: ICU | Age: 55
DRG: 004 | End: 2020-12-26
Payer: MEDICARE

## 2020-12-26 ENCOUNTER — APPOINTMENT (OUTPATIENT)
Dept: DIALYSIS | Age: 55
DRG: 004 | End: 2020-12-26
Payer: MEDICARE

## 2020-12-26 LAB
ABSOLUTE EOS #: 0.45 K/UL (ref 0–0.4)
ABSOLUTE IMMATURE GRANULOCYTE: 0.45 K/UL (ref 0–0.3)
ABSOLUTE LYMPH #: 0.9 K/UL (ref 1–4.8)
ABSOLUTE MONO #: 0.9 K/UL (ref 0.1–0.8)
ALLEN TEST: ABNORMAL
ANION GAP SERPL CALCULATED.3IONS-SCNC: 14 MMOL/L (ref 9–17)
BASOPHILS # BLD: 1 % (ref 0–2)
BASOPHILS ABSOLUTE: 0.11 K/UL (ref 0–0.2)
BUN BLDV-MCNC: 76 MG/DL (ref 6–20)
BUN/CREAT BLD: ABNORMAL (ref 9–20)
CALCIUM IONIZED: 1.22 MMOL/L (ref 1.13–1.33)
CALCIUM SERPL-MCNC: 9.2 MG/DL (ref 8.6–10.4)
CHLORIDE BLD-SCNC: 98 MMOL/L (ref 98–107)
CO2: 23 MMOL/L (ref 20–31)
CREAT SERPL-MCNC: 5.03 MG/DL (ref 0.5–0.9)
DIFFERENTIAL TYPE: ABNORMAL
EOSINOPHILS RELATIVE PERCENT: 4 % (ref 1–4)
FIO2: 30
FIO2: 40
FIO2: 45
GFR AFRICAN AMERICAN: 11 ML/MIN
GFR NON-AFRICAN AMERICAN: 9 ML/MIN
GFR SERPL CREATININE-BSD FRML MDRD: ABNORMAL ML/MIN/{1.73_M2}
GFR SERPL CREATININE-BSD FRML MDRD: ABNORMAL ML/MIN/{1.73_M2}
GLUCOSE BLD-MCNC: 125 MG/DL (ref 70–99)
HCT VFR BLD CALC: 24.8 % (ref 36.3–47.1)
HCT VFR BLD CALC: 28.1 % (ref 36.3–47.1)
HEMOGLOBIN: 7.1 G/DL (ref 11.9–15.1)
HEMOGLOBIN: 8.2 G/DL (ref 11.9–15.1)
IMMATURE GRANULOCYTES: 4 %
LYMPHOCYTES # BLD: 8 % (ref 24–44)
MAGNESIUM: 3 MG/DL (ref 1.6–2.6)
MCH RBC QN AUTO: 31.1 PG (ref 25.2–33.5)
MCHC RBC AUTO-ENTMCNC: 28.6 G/DL (ref 28.4–34.8)
MCV RBC AUTO: 108.8 FL (ref 82.6–102.9)
MODE: ABNORMAL
MONOCYTES # BLD: 8 % (ref 1–7)
MORPHOLOGY: ABNORMAL
MORPHOLOGY: ABNORMAL
NEGATIVE BASE EXCESS, ART: ABNORMAL (ref 0–2)
NRBC AUTOMATED: 0 PER 100 WBC
O2 DEVICE/FLOW/%: ABNORMAL
PATIENT TEMP: ABNORMAL
PDW BLD-RTO: 16.2 % (ref 11.8–14.4)
PHOSPHORUS: 1.7 MG/DL (ref 2.6–4.5)
PLATELET # BLD: 309 K/UL (ref 138–453)
PLATELET ESTIMATE: ABNORMAL
PMV BLD AUTO: 9.2 FL (ref 8.1–13.5)
POC HCO3: 27.1 MMOL/L (ref 21–28)
POC HCO3: 27.9 MMOL/L (ref 21–28)
POC HCO3: 29.8 MMOL/L (ref 21–28)
POC LACTIC ACID: 0.49 MMOL/L (ref 0.56–1.39)
POC LACTIC ACID: 0.8 MMOL/L (ref 0.56–1.39)
POC LACTIC ACID: 0.87 MMOL/L (ref 0.56–1.39)
POC O2 SATURATION: 89 % (ref 94–98)
POC O2 SATURATION: 93 % (ref 94–98)
POC O2 SATURATION: 94 % (ref 94–98)
POC PCO2 TEMP: ABNORMAL MM HG
POC PCO2: 50.5 MM HG (ref 35–48)
POC PCO2: 52.4 MM HG (ref 35–48)
POC PCO2: 59.3 MM HG (ref 35–48)
POC PH TEMP: ABNORMAL
POC PH: 7.31 (ref 7.35–7.45)
POC PH: 7.32 (ref 7.35–7.45)
POC PH: 7.35 (ref 7.35–7.45)
POC PO2 TEMP: ABNORMAL MM HG
POC PO2: 64.5 MM HG (ref 83–108)
POC PO2: 72.2 MM HG (ref 83–108)
POC PO2: 76.9 MM HG (ref 83–108)
POSITIVE BASE EXCESS, ART: 1 (ref 0–3)
POSITIVE BASE EXCESS, ART: 2 (ref 0–3)
POSITIVE BASE EXCESS, ART: 3 (ref 0–3)
POTASSIUM SERPL-SCNC: 5.9 MMOL/L (ref 3.7–5.3)
RBC # BLD: 2.28 M/UL (ref 3.95–5.11)
RBC # BLD: ABNORMAL 10*6/UL
SAMPLE SITE: ABNORMAL
SEG NEUTROPHILS: 75 % (ref 36–66)
SEGMENTED NEUTROPHILS ABSOLUTE COUNT: 8.39 K/UL (ref 1.8–7.7)
SODIUM BLD-SCNC: 135 MMOL/L (ref 135–144)
TCO2 (CALC), ART: 29 MMOL/L (ref 22–29)
TCO2 (CALC), ART: 29 MMOL/L (ref 22–29)
TCO2 (CALC), ART: 32 MMOL/L (ref 22–29)
WBC # BLD: 11.2 K/UL (ref 3.5–11.3)
WBC # BLD: ABNORMAL 10*3/UL

## 2020-12-26 PROCEDURE — 86850 RBC ANTIBODY SCREEN: CPT

## 2020-12-26 PROCEDURE — 82330 ASSAY OF CALCIUM: CPT

## 2020-12-26 PROCEDURE — 6360000002 HC RX W HCPCS: Performed by: GENERAL PRACTICE

## 2020-12-26 PROCEDURE — 6360000002 HC RX W HCPCS: Performed by: SURGERY

## 2020-12-26 PROCEDURE — 6360000002 HC RX W HCPCS: Performed by: INTERNAL MEDICINE

## 2020-12-26 PROCEDURE — 83605 ASSAY OF LACTIC ACID: CPT

## 2020-12-26 PROCEDURE — 6370000000 HC RX 637 (ALT 250 FOR IP): Performed by: STUDENT IN AN ORGANIZED HEALTH CARE EDUCATION/TRAINING PROGRAM

## 2020-12-26 PROCEDURE — P9047 ALBUMIN (HUMAN), 25%, 50ML: HCPCS | Performed by: INTERNAL MEDICINE

## 2020-12-26 PROCEDURE — 2500000003 HC RX 250 WO HCPCS: Performed by: NURSE PRACTITIONER

## 2020-12-26 PROCEDURE — 2580000003 HC RX 258: Performed by: SURGERY

## 2020-12-26 PROCEDURE — 31500 INSERT EMERGENCY AIRWAY: CPT

## 2020-12-26 PROCEDURE — 84100 ASSAY OF PHOSPHORUS: CPT

## 2020-12-26 PROCEDURE — 86901 BLOOD TYPING SEROLOGIC RH(D): CPT

## 2020-12-26 PROCEDURE — 5A1955Z RESPIRATORY VENTILATION, GREATER THAN 96 CONSECUTIVE HOURS: ICD-10-PCS | Performed by: SURGERY

## 2020-12-26 PROCEDURE — 6370000000 HC RX 637 (ALT 250 FOR IP): Performed by: NURSE PRACTITIONER

## 2020-12-26 PROCEDURE — 80048 BASIC METABOLIC PNL TOTAL CA: CPT

## 2020-12-26 PROCEDURE — 86900 BLOOD TYPING SEROLOGIC ABO: CPT

## 2020-12-26 PROCEDURE — 6370000000 HC RX 637 (ALT 250 FOR IP): Performed by: GENERAL PRACTICE

## 2020-12-26 PROCEDURE — 94770 HC ETCO2 MONITOR DAILY: CPT

## 2020-12-26 PROCEDURE — 86920 COMPATIBILITY TEST SPIN: CPT

## 2020-12-26 PROCEDURE — 71045 X-RAY EXAM CHEST 1 VIEW: CPT

## 2020-12-26 PROCEDURE — 90935 HEMODIALYSIS ONE EVALUATION: CPT

## 2020-12-26 PROCEDURE — 94003 VENT MGMT INPAT SUBQ DAY: CPT

## 2020-12-26 PROCEDURE — 2500000003 HC RX 250 WO HCPCS: Performed by: STUDENT IN AN ORGANIZED HEALTH CARE EDUCATION/TRAINING PROGRAM

## 2020-12-26 PROCEDURE — 2500000003 HC RX 250 WO HCPCS: Performed by: GENERAL PRACTICE

## 2020-12-26 PROCEDURE — 36415 COLL VENOUS BLD VENIPUNCTURE: CPT

## 2020-12-26 PROCEDURE — 0BH18EZ INSERTION OF ENDOTRACHEAL AIRWAY INTO TRACHEA, VIA NATURAL OR ARTIFICIAL OPENING ENDOSCOPIC: ICD-10-PCS | Performed by: SURGERY

## 2020-12-26 PROCEDURE — 85018 HEMOGLOBIN: CPT

## 2020-12-26 PROCEDURE — 83735 ASSAY OF MAGNESIUM: CPT

## 2020-12-26 PROCEDURE — 2700000000 HC OXYGEN THERAPY PER DAY

## 2020-12-26 PROCEDURE — 2580000003 HC RX 258: Performed by: STUDENT IN AN ORGANIZED HEALTH CARE EDUCATION/TRAINING PROGRAM

## 2020-12-26 PROCEDURE — 6370000000 HC RX 637 (ALT 250 FOR IP): Performed by: INTERNAL MEDICINE

## 2020-12-26 PROCEDURE — 99232 SBSQ HOSP IP/OBS MODERATE 35: CPT | Performed by: INTERNAL MEDICINE

## 2020-12-26 PROCEDURE — 85014 HEMATOCRIT: CPT

## 2020-12-26 PROCEDURE — 6370000000 HC RX 637 (ALT 250 FOR IP): Performed by: SURGERY

## 2020-12-26 PROCEDURE — 36430 TRANSFUSION BLD/BLD COMPNT: CPT

## 2020-12-26 PROCEDURE — 94761 N-INVAS EAR/PLS OXIMETRY MLT: CPT

## 2020-12-26 PROCEDURE — 94640 AIRWAY INHALATION TREATMENT: CPT

## 2020-12-26 PROCEDURE — 85025 COMPLETE CBC W/AUTO DIFF WBC: CPT

## 2020-12-26 PROCEDURE — P9016 RBC LEUKOCYTES REDUCED: HCPCS

## 2020-12-26 PROCEDURE — 2580000003 HC RX 258: Performed by: GENERAL PRACTICE

## 2020-12-26 PROCEDURE — 82803 BLOOD GASES ANY COMBINATION: CPT

## 2020-12-26 PROCEDURE — 37799 UNLISTED PX VASCULAR SURGERY: CPT

## 2020-12-26 PROCEDURE — 2000000000 HC ICU R&B

## 2020-12-26 PROCEDURE — 74018 RADEX ABDOMEN 1 VIEW: CPT

## 2020-12-26 RX ORDER — OXYCODONE HYDROCHLORIDE 5 MG/1
5 TABLET ORAL EVERY 8 HOURS PRN
Status: DISCONTINUED | OUTPATIENT
Start: 2020-12-26 | End: 2020-12-31

## 2020-12-26 RX ORDER — 0.9 % SODIUM CHLORIDE 0.9 %
20 INTRAVENOUS SOLUTION INTRAVENOUS ONCE
Status: DISCONTINUED | OUTPATIENT
Start: 2020-12-26 | End: 2020-12-30

## 2020-12-26 RX ORDER — MIDODRINE HYDROCHLORIDE 5 MG/1
5 TABLET ORAL PRN
Status: DISPENSED | OUTPATIENT
Start: 2020-12-26 | End: 2020-12-26

## 2020-12-26 RX ORDER — ALBUMIN (HUMAN) 12.5 G/50ML
25 SOLUTION INTRAVENOUS ONCE
Status: DISCONTINUED | OUTPATIENT
Start: 2020-12-26 | End: 2020-12-30

## 2020-12-26 RX ORDER — MIDODRINE HYDROCHLORIDE 5 MG/1
10 TABLET ORAL EVERY 8 HOURS SCHEDULED
Status: DISCONTINUED | OUTPATIENT
Start: 2020-12-26 | End: 2021-01-02

## 2020-12-26 RX ORDER — FENTANYL CITRATE 50 UG/ML
100 INJECTION, SOLUTION INTRAMUSCULAR; INTRAVENOUS
Status: COMPLETED | OUTPATIENT
Start: 2020-12-26 | End: 2020-12-27

## 2020-12-26 RX ORDER — DEXMEDETOMIDINE HYDROCHLORIDE 4 UG/ML
0.2 INJECTION, SOLUTION INTRAVENOUS CONTINUOUS
Status: DISCONTINUED | OUTPATIENT
Start: 2020-12-26 | End: 2021-01-01

## 2020-12-26 RX ORDER — DEXAMETHASONE SODIUM PHOSPHATE 4 MG/ML
10 INJECTION, SOLUTION INTRA-ARTICULAR; INTRALESIONAL; INTRAMUSCULAR; INTRAVENOUS; SOFT TISSUE ONCE
Status: DISCONTINUED | OUTPATIENT
Start: 2020-12-26 | End: 2020-12-26

## 2020-12-26 RX ADMIN — BUDESONIDE AND FORMOTEROL FUMARATE DIHYDRATE 2 PUFF: 80; 4.5 AEROSOL RESPIRATORY (INHALATION) at 08:27

## 2020-12-26 RX ADMIN — IPRATROPIUM BROMIDE AND ALBUTEROL SULFATE 1 AMPULE: .5; 3 SOLUTION RESPIRATORY (INHALATION) at 12:44

## 2020-12-26 RX ADMIN — ALBUTEROL SULFATE 2.5 MG: 2.5 SOLUTION RESPIRATORY (INHALATION) at 03:42

## 2020-12-26 RX ADMIN — VASOPRESSIN 0.04 UNITS/MIN: 20 INJECTION INTRAVENOUS at 07:34

## 2020-12-26 RX ADMIN — CALCIUM ACETATE 2001 MG: 667 CAPSULE ORAL at 10:45

## 2020-12-26 RX ADMIN — Medication 600 MG: at 12:44

## 2020-12-26 RX ADMIN — HEPARIN SODIUM: 5000 INJECTION INTRAVENOUS; SUBCUTANEOUS at 08:31

## 2020-12-26 RX ADMIN — Medication 600 MG: at 16:05

## 2020-12-26 RX ADMIN — HEPARIN SODIUM: 5000 INJECTION INTRAVENOUS; SUBCUTANEOUS at 03:54

## 2020-12-26 RX ADMIN — FENTANYL CITRATE 100 MCG: 50 INJECTION, SOLUTION INTRAMUSCULAR; INTRAVENOUS at 15:25

## 2020-12-26 RX ADMIN — GABAPENTIN 100 MG: 100 CAPSULE ORAL at 20:57

## 2020-12-26 RX ADMIN — DEXMEDETOMIDINE HYDROCHLORIDE 0.4 MCG/KG/HR: 400 INJECTION INTRAVENOUS at 22:30

## 2020-12-26 RX ADMIN — VASOPRESSIN 0.04 UNITS/MIN: 20 INJECTION INTRAVENOUS at 17:09

## 2020-12-26 RX ADMIN — Medication 12 MCG/MIN: at 01:54

## 2020-12-26 RX ADMIN — DOXEPIN HYDROCHLORIDE 10 MG: 10 CAPSULE ORAL at 20:57

## 2020-12-26 RX ADMIN — DEXMEDETOMIDINE HYDROCHLORIDE 0.4 MCG/KG/HR: 400 INJECTION INTRAVENOUS at 15:09

## 2020-12-26 RX ADMIN — DARBEPOETIN ALFA 40 MCG: 40 INJECTION, SOLUTION INTRAVENOUS; SUBCUTANEOUS at 11:05

## 2020-12-26 RX ADMIN — HEPARIN SODIUM: 5000 INJECTION INTRAVENOUS; SUBCUTANEOUS at 16:05

## 2020-12-26 RX ADMIN — ALBUTEROL SULFATE 2.5 MG: 2.5 SOLUTION RESPIRATORY (INHALATION) at 23:48

## 2020-12-26 RX ADMIN — SODIUM CHLORIDE, PRESERVATIVE FREE 10 ML: 5 INJECTION INTRAVENOUS at 10:39

## 2020-12-26 RX ADMIN — IPRATROPIUM BROMIDE AND ALBUTEROL SULFATE 1 AMPULE: .5; 3 SOLUTION RESPIRATORY (INHALATION) at 19:53

## 2020-12-26 RX ADMIN — SODIUM CHLORIDE, PRESERVATIVE FREE 10 ML: 5 INJECTION INTRAVENOUS at 20:56

## 2020-12-26 RX ADMIN — APIXABAN 5 MG: 5 TABLET, FILM COATED ORAL at 20:57

## 2020-12-26 RX ADMIN — HEPARIN SODIUM: 5000 INJECTION INTRAVENOUS; SUBCUTANEOUS at 00:18

## 2020-12-26 RX ADMIN — SILVER SULFADIAZINE: 10 CREAM TOPICAL at 12:15

## 2020-12-26 RX ADMIN — MIDODRINE HYDROCHLORIDE 10 MG: 5 TABLET ORAL at 10:46

## 2020-12-26 RX ADMIN — APIXABAN 5 MG: 5 TABLET, FILM COATED ORAL at 10:45

## 2020-12-26 RX ADMIN — SILVER SULFADIAZINE: 10 CREAM TOPICAL at 00:00

## 2020-12-26 RX ADMIN — IPRATROPIUM BROMIDE AND ALBUTEROL SULFATE 1 AMPULE: .5; 3 SOLUTION RESPIRATORY (INHALATION) at 08:29

## 2020-12-26 RX ADMIN — Medication 600 MG: at 19:52

## 2020-12-26 RX ADMIN — ACETAMINOPHEN 1000 MG: 500 TABLET ORAL at 20:57

## 2020-12-26 RX ADMIN — HEPARIN SODIUM: 5000 INJECTION INTRAVENOUS; SUBCUTANEOUS at 23:48

## 2020-12-26 RX ADMIN — HEPARIN SODIUM: 5000 INJECTION INTRAVENOUS; SUBCUTANEOUS at 19:52

## 2020-12-26 RX ADMIN — Medication 600 MG: at 03:42

## 2020-12-26 RX ADMIN — ESCITALOPRAM OXALATE 20 MG: 10 TABLET ORAL at 13:10

## 2020-12-26 RX ADMIN — IPRATROPIUM BROMIDE AND ALBUTEROL SULFATE 1 AMPULE: .5; 3 SOLUTION RESPIRATORY (INHALATION) at 16:04

## 2020-12-26 RX ADMIN — Medication 5 MG: at 20:57

## 2020-12-26 RX ADMIN — Medication 600 MG: at 08:29

## 2020-12-26 RX ADMIN — BACITRACIN: 500 OINTMENT TOPICAL at 12:15

## 2020-12-26 RX ADMIN — SENNOSIDES 8.6 MG: 8.6 TABLET, FILM COATED ORAL at 20:57

## 2020-12-26 RX ADMIN — CALCIUM ACETATE 2001 MG: 667 CAPSULE ORAL at 13:10

## 2020-12-26 RX ADMIN — CALCIUM ACETATE 2001 MG: 667 CAPSULE ORAL at 16:12

## 2020-12-26 RX ADMIN — ACETAMINOPHEN 1000 MG: 500 TABLET ORAL at 13:31

## 2020-12-26 RX ADMIN — ALBUMIN (HUMAN) 25 G: 0.25 INJECTION, SOLUTION INTRAVENOUS at 09:45

## 2020-12-26 RX ADMIN — Medication 600 MG: at 23:48

## 2020-12-26 RX ADMIN — ACETAMINOPHEN 1000 MG: 500 TABLET ORAL at 10:46

## 2020-12-26 RX ADMIN — QUETIAPINE FUMARATE 50 MG: 100 TABLET ORAL at 13:09

## 2020-12-26 RX ADMIN — OXYCODONE HYDROCHLORIDE 5 MG: 5 TABLET ORAL at 00:30

## 2020-12-26 RX ADMIN — BUDESONIDE AND FORMOTEROL FUMARATE DIHYDRATE 2 PUFF: 80; 4.5 AEROSOL RESPIRATORY (INHALATION) at 00:09

## 2020-12-26 RX ADMIN — ALBUTEROL SULFATE 2.5 MG: 2.5 SOLUTION RESPIRATORY (INHALATION) at 00:08

## 2020-12-26 RX ADMIN — PHENOL 1 SPRAY: 1.5 LIQUID ORAL at 10:43

## 2020-12-26 RX ADMIN — QUETIAPINE FUMARATE 50 MG: 100 TABLET ORAL at 20:57

## 2020-12-26 RX ADMIN — HEPARIN SODIUM: 5000 INJECTION INTRAVENOUS; SUBCUTANEOUS at 12:01

## 2020-12-26 RX ADMIN — Medication 600 MG: at 00:14

## 2020-12-26 RX ADMIN — GABAPENTIN 100 MG: 100 CAPSULE ORAL at 13:51

## 2020-12-26 RX ADMIN — AMIODARONE HYDROCHLORIDE 200 MG: 200 TABLET ORAL at 13:10

## 2020-12-26 ASSESSMENT — PAIN SCALES - GENERAL
PAINLEVEL_OUTOF10: 10
PAINLEVEL_OUTOF10: 10
PAINLEVEL_OUTOF10: 9
PAINLEVEL_OUTOF10: 9
PAINLEVEL_OUTOF10: 3

## 2020-12-26 ASSESSMENT — PAIN DESCRIPTION - LOCATION
LOCATION: FACE;LEG
LOCATION: GENERALIZED

## 2020-12-26 ASSESSMENT — PAIN DESCRIPTION - PROGRESSION
CLINICAL_PROGRESSION: GRADUALLY IMPROVING
CLINICAL_PROGRESSION: GRADUALLY WORSENING

## 2020-12-26 ASSESSMENT — PULMONARY FUNCTION TESTS
PIF_VALUE: 18
PIF_VALUE: 24
PIF_VALUE: 18
PIF_VALUE: 12

## 2020-12-26 ASSESSMENT — PAIN DESCRIPTION - DESCRIPTORS: DESCRIPTORS: THROBBING

## 2020-12-26 ASSESSMENT — PAIN DESCRIPTION - FREQUENCY: FREQUENCY: CONTINUOUS

## 2020-12-26 NOTE — ANESTHESIA PROCEDURE NOTES
Airway  Date/Time: 12/26/2020 3:05 PM  Urgency: elective    Difficult airway    General Information and Staff    Patient location during procedure: OR  Resident/CRNA: NARINDER Capone CRNA  Performed: resident/CRNA     Indications and Patient Condition  Indications for airway management: respiratory distress  Spontaneous Ventilation: absent  Sedation level: deep  Preoxygenated: yes  Patient position: sniffing  MILS maintained throughout  Mask difficulty assessment: not attempted    Final Airway Details  Final airway type: endotracheal airway      Successful airway: ETT  Cuffed: yes   Successful intubation technique: video laryngoscopy  Blade: Elvin (Glidescope #3)  Blade size: #3  Cormack-Lehane Classification: grade I - full view of glottis  Placement verified by: chest auscultation and capnometry   Inital cuff pressure (cm H2O): 5  Number of attempts at approach: 1  Number of other approaches attempted: 0    no
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
## 2020-12-26 NOTE — FLOWSHEET NOTE
Date Procedure started: 12/26/20     Time Procedure started: 2430    Location Completed:    X   Bedside     Tubbing Room  Medication Given: Tylenol, Roxicodone 5 mg via NGT          Photos Taken  No                                                   Please ethan dressing applied to OR debrided injuries/ skin to all areas that apply below:     S=Silvadene    B=Bacitracin    M= Mepilex    F= Furacin        LAWLER=Santyl                             SUL=Sulfamylon     D=Donor site/xeroform    E=Eucerin    O=Other (specify below)  DRESSING APPLICATION/ CHANGE DEBRIDEMENT      (Y/N) BODY LOCATION   HEAD, FACE AND NECK         SCALP   B N RT EAR   B N LT EAR   B-with adaptic N NECK   B N FACE        CHEST     ABDOMEN     BACK     BUTTOCK     GENITALIA     PERINEUM        RT UPPER ARM (Includes Shoulder)     LT UPPER ARM (Includes Shoulder)     RT LOWER ARM (Includes Elbow or Wrist)     LT LOWER ARM (Includes Elbow or Wrist)     RT HAND (Includes Fingers)   S Y-Small amt yellow tissue sloughed Lt anterior hand LT HAND (Includes Fingers)        RT UPPER LEG (Includes Hip)     LT UPPER LEG (Includes Hip)   S Y-Small amount yellow tissue thinning from edges. Below knee to foot RT LOWER LEG (Includes Knee)   S Y-Small amt yellow tissue peals off drom edges LT LOWER LEG (Includes Knee)     RT FOOT (Includes Ankles or Toes)   S Y-Small amt yellow from lt anterior foot. LT FOOT (Includes Ankles or Toes)     ADDITIONAL NOTES: Complete bed bath with linen change done @ the bedside, with Burn care. Hibiclens wash & antibacterial bar soap for face & perineum. Awake & participates with care as able, raising arms & legs & assisting with turning. Tolerated fairly well. Small areas of thinning of eschar to BLE. Small areas also bled when dressing removed even with moistening with saline prior to removal. Pressure held on small spots until bleeding stopped.  Sterile gauze  dressings impregnated with Agsd & applied to burns on BLE, &  Lt hand/thumb. Bacitracin to face & ears, with bacitracin & adaptic dressings to Lt neck burns.

## 2020-12-26 NOTE — FLOWSHEET NOTE
Patient continued to have desaturation issues despite being on 100% on high-maria esther nasal cannula. Dr. Tamiko Esteves was notified. The decision was made to intubated. Anesthesia intubated with Dr. Beatriz Rodriguez present at bedside. Writer, charge RN Kareem Pack RRT, and anesthesia x3 present for intubation. Medications given by anesthesia. 1504: 10mg Etomidate and 50mg of Rocurorium given  1505: ETT inserted  1506: positive color change saturation of 98%    Stat chest x-ray ordered.
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
## 2020-12-26 NOTE — PROCEDURES
Order obtained for extubation. SpO2 of 94 on 30% FiO2. Patient extubated and placed on 6 liters/min via nasal cannula. Post extubation SpO2 is 94% with HR  72 bpm and RR 20 breaths/min. Patient had moderate cough that was non-productive. Extubation Well tolerated by patient. .   Breath Sounds: clear diminished    Rafaela Hove   8:50 AM

## 2020-12-26 NOTE — PROGRESS NOTES
Speech Language Pathology  [facility]  Speech Language Pathology    Date: 12/26/2020  Patient Name: Harvinder Clark  YOB: 1965   AGE: 48 y.o. MRN: [id number]        Patient Not Available for Speech Therapy     Due to:  [] Testing  [] Hemodialysis  [] Cancelled by RN  [] Surgery   [x] Intubation/Sedation/Pain Medication  [] Medical instability  [] Other:    Next scheduled treatment: f/u as medically appropriate      Completed by: DEJA Cavazos Ed.  CCC-SLP
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
## 2020-12-26 NOTE — PROGRESS NOTES
Occupational Therapy Not Seen Note    DATE: 2020  Name: Julianne Gonzalez  : 1965  MRN: [id number]    Patient not available for Occupational Therapy due to:    Hemodialysis at bedside.  Pt extubated this AM. OT will check back tomorrow    Next Scheduled Treatment: 2020    Electronically signed by PEMA Gusman on 2020 at 12:58 PM

## 2020-12-26 NOTE — PROGRESS NOTES
Dialysis Post Treatment Note  Vitals:    12/26/20 1315   BP:    Pulse:    Resp:    Temp: 97.7 °F (36.5 °C)   SpO2:      Pre-Weight = 98.7 kf  Post-weight = Weight: 208 lb 12.4 oz (94.7 kg)  Total Liters Processed = Total Liters Processed (l/min): 73.8 l/min  Rinseback Volume (mL) = Rinseback Volume (ml): 300 ml  Net Removal (mL) = NET Removed (ml): 4000 ml  Patient's dry weight=3-4 kg off as tolerated  Type of access used= left arm fistula  Length of treatment=3.5 hrs  Pt tolerated tx well, 4 kg off, 1 unit prbc given, albumin given for hypotension, pt extubated right before dialysis started, aranesp given

## 2020-12-27 ENCOUNTER — APPOINTMENT (OUTPATIENT)
Dept: GENERAL RADIOLOGY | Age: 55
DRG: 004 | End: 2020-12-27
Payer: MEDICARE

## 2020-12-27 LAB
ABO/RH: NORMAL
ABSOLUTE EOS #: 0.31 K/UL (ref 0–0.44)
ABSOLUTE IMMATURE GRANULOCYTE: 0.48 K/UL (ref 0–0.3)
ABSOLUTE LYMPH #: 0.94 K/UL (ref 1.1–3.7)
ABSOLUTE MONO #: 1.16 K/UL (ref 0.1–1.2)
ALLEN TEST: ABNORMAL
ANION GAP SERPL CALCULATED.3IONS-SCNC: 18 MMOL/L (ref 9–17)
ANTIBODY SCREEN: NEGATIVE
ARM BAND NUMBER: NORMAL
BASOPHILS # BLD: 1 % (ref 0–2)
BASOPHILS ABSOLUTE: 0.1 K/UL (ref 0–0.2)
BLD PROD TYP BPU: NORMAL
BUN BLDV-MCNC: 50 MG/DL (ref 6–20)
BUN/CREAT BLD: ABNORMAL (ref 9–20)
CALCIUM IONIZED: 1.23 MMOL/L (ref 1.13–1.33)
CALCIUM SERPL-MCNC: 9.4 MG/DL (ref 8.6–10.4)
CHLORIDE BLD-SCNC: 97 MMOL/L (ref 98–107)
CO2: 22 MMOL/L (ref 20–31)
CREAT SERPL-MCNC: 3.86 MG/DL (ref 0.5–0.9)
CROSSMATCH RESULT: NORMAL
CULTURE: NORMAL
CULTURE: NORMAL
DIFFERENTIAL TYPE: ABNORMAL
DISPENSE STATUS BLOOD BANK: NORMAL
EOSINOPHILS RELATIVE PERCENT: 3 % (ref 1–4)
EXPIRATION DATE: NORMAL
FIO2: 45
GFR AFRICAN AMERICAN: 15 ML/MIN
GFR NON-AFRICAN AMERICAN: 12 ML/MIN
GFR SERPL CREATININE-BSD FRML MDRD: ABNORMAL ML/MIN/{1.73_M2}
GFR SERPL CREATININE-BSD FRML MDRD: ABNORMAL ML/MIN/{1.73_M2}
GLUCOSE BLD-MCNC: 127 MG/DL (ref 70–99)
HCT VFR BLD CALC: 26.6 % (ref 36.3–47.1)
HEMOGLOBIN: 7.8 G/DL (ref 11.9–15.1)
IMMATURE GRANULOCYTES: 4 %
LACTIC ACID, SEPSIS WHOLE BLOOD: 0.7 MMOL/L (ref 0.5–1.9)
LACTIC ACID, SEPSIS: NORMAL MMOL/L (ref 0.5–1.9)
LYMPHOCYTES # BLD: 8 % (ref 24–43)
Lab: NORMAL
Lab: NORMAL
MAGNESIUM: 2.5 MG/DL (ref 1.6–2.6)
MCH RBC QN AUTO: 31 PG (ref 25.2–33.5)
MCHC RBC AUTO-ENTMCNC: 29.3 G/DL (ref 28.4–34.8)
MCV RBC AUTO: 105.6 FL (ref 82.6–102.9)
MODE: ABNORMAL
MONOCYTES # BLD: 10 % (ref 3–12)
NEGATIVE BASE EXCESS, ART: ABNORMAL (ref 0–2)
NRBC AUTOMATED: 0 PER 100 WBC
O2 DEVICE/FLOW/%: ABNORMAL
PATIENT TEMP: ABNORMAL
PDW BLD-RTO: 17.2 % (ref 11.8–14.4)
PHOSPHORUS: 2.5 MG/DL (ref 2.6–4.5)
PLATELET # BLD: 257 K/UL (ref 138–453)
PLATELET ESTIMATE: ABNORMAL
PMV BLD AUTO: 9 FL (ref 8.1–13.5)
POC HCO3: 29 MMOL/L (ref 21–28)
POC LACTIC ACID: 0.69 MMOL/L (ref 0.56–1.39)
POC O2 SATURATION: 96 % (ref 94–98)
POC PCO2 TEMP: ABNORMAL MM HG
POC PCO2: 51.5 MM HG (ref 35–48)
POC PH TEMP: ABNORMAL
POC PH: 7.36 (ref 7.35–7.45)
POC PO2 TEMP: ABNORMAL MM HG
POC PO2: 89.7 MM HG (ref 83–108)
POSITIVE BASE EXCESS, ART: 3 (ref 0–3)
POTASSIUM SERPL-SCNC: 5 MMOL/L (ref 3.7–5.3)
RBC # BLD: 2.52 M/UL (ref 3.95–5.11)
RBC # BLD: ABNORMAL 10*6/UL
SAMPLE SITE: ABNORMAL
SEG NEUTROPHILS: 75 % (ref 36–65)
SEGMENTED NEUTROPHILS ABSOLUTE COUNT: 9.14 K/UL (ref 1.5–8.1)
SODIUM BLD-SCNC: 137 MMOL/L (ref 135–144)
SPECIMEN DESCRIPTION: NORMAL
SPECIMEN DESCRIPTION: NORMAL
TCO2 (CALC), ART: 31 MMOL/L (ref 22–29)
TRANSFUSION STATUS: NORMAL
UNIT DIVISION: 0
UNIT NUMBER: NORMAL
WBC # BLD: 12.1 K/UL (ref 3.5–11.3)
WBC # BLD: ABNORMAL 10*3/UL

## 2020-12-27 PROCEDURE — 6370000000 HC RX 637 (ALT 250 FOR IP): Performed by: STUDENT IN AN ORGANIZED HEALTH CARE EDUCATION/TRAINING PROGRAM

## 2020-12-27 PROCEDURE — 94770 HC ETCO2 MONITOR DAILY: CPT

## 2020-12-27 PROCEDURE — 6360000002 HC RX W HCPCS: Performed by: SURGERY

## 2020-12-27 PROCEDURE — 6370000000 HC RX 637 (ALT 250 FOR IP): Performed by: NURSE PRACTITIONER

## 2020-12-27 PROCEDURE — 94003 VENT MGMT INPAT SUBQ DAY: CPT

## 2020-12-27 PROCEDURE — 71045 X-RAY EXAM CHEST 1 VIEW: CPT

## 2020-12-27 PROCEDURE — 6360000002 HC RX W HCPCS: Performed by: GENERAL PRACTICE

## 2020-12-27 PROCEDURE — 2000000000 HC ICU R&B

## 2020-12-27 PROCEDURE — P9047 ALBUMIN (HUMAN), 25%, 50ML: HCPCS | Performed by: INTERNAL MEDICINE

## 2020-12-27 PROCEDURE — 2580000003 HC RX 258: Performed by: STUDENT IN AN ORGANIZED HEALTH CARE EDUCATION/TRAINING PROGRAM

## 2020-12-27 PROCEDURE — 84100 ASSAY OF PHOSPHORUS: CPT

## 2020-12-27 PROCEDURE — 82803 BLOOD GASES ANY COMBINATION: CPT

## 2020-12-27 PROCEDURE — 83735 ASSAY OF MAGNESIUM: CPT

## 2020-12-27 PROCEDURE — 2580000003 HC RX 258: Performed by: SURGERY

## 2020-12-27 PROCEDURE — 2700000000 HC OXYGEN THERAPY PER DAY

## 2020-12-27 PROCEDURE — 2500000003 HC RX 250 WO HCPCS: Performed by: STUDENT IN AN ORGANIZED HEALTH CARE EDUCATION/TRAINING PROGRAM

## 2020-12-27 PROCEDURE — 6370000000 HC RX 637 (ALT 250 FOR IP): Performed by: SURGERY

## 2020-12-27 PROCEDURE — 85025 COMPLETE CBC W/AUTO DIFF WBC: CPT

## 2020-12-27 PROCEDURE — 6370000000 HC RX 637 (ALT 250 FOR IP): Performed by: GENERAL PRACTICE

## 2020-12-27 PROCEDURE — 2580000003 HC RX 258: Performed by: GENERAL PRACTICE

## 2020-12-27 PROCEDURE — 94761 N-INVAS EAR/PLS OXIMETRY MLT: CPT

## 2020-12-27 PROCEDURE — 90935 HEMODIALYSIS ONE EVALUATION: CPT

## 2020-12-27 PROCEDURE — 83605 ASSAY OF LACTIC ACID: CPT

## 2020-12-27 PROCEDURE — 80048 BASIC METABOLIC PNL TOTAL CA: CPT

## 2020-12-27 PROCEDURE — 6360000002 HC RX W HCPCS: Performed by: INTERNAL MEDICINE

## 2020-12-27 PROCEDURE — 2500000003 HC RX 250 WO HCPCS: Performed by: NURSE PRACTITIONER

## 2020-12-27 PROCEDURE — 82330 ASSAY OF CALCIUM: CPT

## 2020-12-27 PROCEDURE — 2500000003 HC RX 250 WO HCPCS: Performed by: GENERAL PRACTICE

## 2020-12-27 PROCEDURE — 99232 SBSQ HOSP IP/OBS MODERATE 35: CPT | Performed by: INTERNAL MEDICINE

## 2020-12-27 PROCEDURE — 94640 AIRWAY INHALATION TREATMENT: CPT

## 2020-12-27 RX ORDER — FENTANYL CITRATE 50 UG/ML
50 INJECTION, SOLUTION INTRAMUSCULAR; INTRAVENOUS
Status: DISCONTINUED | OUTPATIENT
Start: 2020-12-27 | End: 2020-12-28

## 2020-12-27 RX ADMIN — FENTANYL CITRATE 100 MCG: 50 INJECTION, SOLUTION INTRAMUSCULAR; INTRAVENOUS at 03:46

## 2020-12-27 RX ADMIN — BACITRACIN: 500 OINTMENT TOPICAL at 04:00

## 2020-12-27 RX ADMIN — OXYCODONE HYDROCHLORIDE 5 MG: 5 TABLET ORAL at 05:21

## 2020-12-27 RX ADMIN — Medication 15 MG: at 08:42

## 2020-12-27 RX ADMIN — DEXMEDETOMIDINE HYDROCHLORIDE 0.2 MCG/KG/HR: 400 INJECTION INTRAVENOUS at 15:25

## 2020-12-27 RX ADMIN — ALBUTEROL SULFATE 2.5 MG: 2.5 SOLUTION RESPIRATORY (INHALATION) at 03:42

## 2020-12-27 RX ADMIN — ACETAMINOPHEN 1000 MG: 500 TABLET ORAL at 14:14

## 2020-12-27 RX ADMIN — IPRATROPIUM BROMIDE AND ALBUTEROL SULFATE 1 AMPULE: .5; 3 SOLUTION RESPIRATORY (INHALATION) at 16:16

## 2020-12-27 RX ADMIN — MIDODRINE HYDROCHLORIDE 10 MG: 5 TABLET ORAL at 05:21

## 2020-12-27 RX ADMIN — SENNOSIDES 8.6 MG: 8.6 TABLET, FILM COATED ORAL at 20:28

## 2020-12-27 RX ADMIN — IPRATROPIUM BROMIDE AND ALBUTEROL SULFATE 1 AMPULE: .5; 3 SOLUTION RESPIRATORY (INHALATION) at 19:30

## 2020-12-27 RX ADMIN — OXYCODONE HYDROCHLORIDE 5 MG: 5 TABLET ORAL at 17:39

## 2020-12-27 RX ADMIN — Medication 600 MG: at 03:42

## 2020-12-27 RX ADMIN — BACITRACIN: 500 OINTMENT TOPICAL at 14:28

## 2020-12-27 RX ADMIN — SODIUM CHLORIDE, PRESERVATIVE FREE 10 ML: 5 INJECTION INTRAVENOUS at 08:39

## 2020-12-27 RX ADMIN — Medication 600 MG: at 19:30

## 2020-12-27 RX ADMIN — VASOPRESSIN 0.04 UNITS/MIN: 20 INJECTION INTRAVENOUS at 18:27

## 2020-12-27 RX ADMIN — Medication 600 MG: at 11:12

## 2020-12-27 RX ADMIN — DOCUSATE SODIUM 100 MG: 50 LIQUID ORAL at 08:42

## 2020-12-27 RX ADMIN — Medication 600 MG: at 07:31

## 2020-12-27 RX ADMIN — Medication 600 MG: at 23:32

## 2020-12-27 RX ADMIN — Medication 5 MG: at 20:28

## 2020-12-27 RX ADMIN — HEPARIN SODIUM: 5000 INJECTION INTRAVENOUS; SUBCUTANEOUS at 11:12

## 2020-12-27 RX ADMIN — SILVER SULFADIAZINE: 10 CREAM TOPICAL at 03:45

## 2020-12-27 RX ADMIN — ESCITALOPRAM OXALATE 20 MG: 10 TABLET ORAL at 12:15

## 2020-12-27 RX ADMIN — APIXABAN 5 MG: 5 TABLET, FILM COATED ORAL at 08:42

## 2020-12-27 RX ADMIN — BUDESONIDE AND FORMOTEROL FUMARATE DIHYDRATE 2 PUFF: 80; 4.5 AEROSOL RESPIRATORY (INHALATION) at 07:35

## 2020-12-27 RX ADMIN — FENTANYL CITRATE 100 MCG: 50 INJECTION, SOLUTION INTRAMUSCULAR; INTRAVENOUS at 10:25

## 2020-12-27 RX ADMIN — DOXEPIN HYDROCHLORIDE 10 MG: 10 CAPSULE ORAL at 20:28

## 2020-12-27 RX ADMIN — CALCIUM ACETATE 2001 MG: 667 CAPSULE ORAL at 17:07

## 2020-12-27 RX ADMIN — GABAPENTIN 100 MG: 100 CAPSULE ORAL at 20:30

## 2020-12-27 RX ADMIN — IPRATROPIUM BROMIDE AND ALBUTEROL SULFATE 1 AMPULE: .5; 3 SOLUTION RESPIRATORY (INHALATION) at 11:10

## 2020-12-27 RX ADMIN — ACETAMINOPHEN 1000 MG: 500 TABLET ORAL at 05:22

## 2020-12-27 RX ADMIN — CALCIUM ACETATE 2001 MG: 667 CAPSULE ORAL at 12:16

## 2020-12-27 RX ADMIN — CALCIUM ACETATE 2001 MG: 667 CAPSULE ORAL at 08:42

## 2020-12-27 RX ADMIN — ACETAMINOPHEN 1000 MG: 500 TABLET ORAL at 22:04

## 2020-12-27 RX ADMIN — HEPARIN SODIUM: 5000 INJECTION INTRAVENOUS; SUBCUTANEOUS at 03:44

## 2020-12-27 RX ADMIN — FENTANYL CITRATE 50 MCG: 50 INJECTION, SOLUTION INTRAMUSCULAR; INTRAVENOUS at 14:24

## 2020-12-27 RX ADMIN — MIDODRINE HYDROCHLORIDE 10 MG: 5 TABLET ORAL at 00:12

## 2020-12-27 RX ADMIN — HEPARIN SODIUM: 5000 INJECTION INTRAVENOUS; SUBCUTANEOUS at 16:14

## 2020-12-27 RX ADMIN — IPRATROPIUM BROMIDE AND ALBUTEROL SULFATE 1 AMPULE: .5; 3 SOLUTION RESPIRATORY (INHALATION) at 07:30

## 2020-12-27 RX ADMIN — ALBUTEROL SULFATE 2.5 MG: 2.5 SOLUTION RESPIRATORY (INHALATION) at 23:32

## 2020-12-27 RX ADMIN — GABAPENTIN 100 MG: 100 CAPSULE ORAL at 08:42

## 2020-12-27 RX ADMIN — SILVER SULFADIAZINE: 10 CREAM TOPICAL at 14:28

## 2020-12-27 RX ADMIN — HEPARIN SODIUM: 5000 INJECTION INTRAVENOUS; SUBCUTANEOUS at 19:39

## 2020-12-27 RX ADMIN — VASOPRESSIN 0.04 UNITS/MIN: 20 INJECTION INTRAVENOUS at 02:20

## 2020-12-27 RX ADMIN — APIXABAN 5 MG: 5 TABLET, FILM COATED ORAL at 20:30

## 2020-12-27 RX ADMIN — QUETIAPINE FUMARATE 50 MG: 100 TABLET ORAL at 12:15

## 2020-12-27 RX ADMIN — Medication 600 MG: at 16:14

## 2020-12-27 RX ADMIN — VASOPRESSIN 0.04 UNITS/MIN: 20 INJECTION INTRAVENOUS at 10:52

## 2020-12-27 RX ADMIN — MIDODRINE HYDROCHLORIDE 10 MG: 5 TABLET ORAL at 14:14

## 2020-12-27 RX ADMIN — SODIUM CHLORIDE, PRESERVATIVE FREE 10 ML: 5 INJECTION INTRAVENOUS at 20:30

## 2020-12-27 RX ADMIN — POLYETHYLENE GLYCOL 3350 17 G: 17 POWDER, FOR SOLUTION ORAL at 08:46

## 2020-12-27 RX ADMIN — FENTANYL CITRATE 50 MCG: 50 INJECTION, SOLUTION INTRAMUSCULAR; INTRAVENOUS at 19:33

## 2020-12-27 RX ADMIN — VASOPRESSIN 0.04 UNITS/MIN: 20 INJECTION INTRAVENOUS at 02:32

## 2020-12-27 RX ADMIN — ALBUMIN (HUMAN) 25 G: 0.25 INJECTION, SOLUTION INTRAVENOUS at 09:53

## 2020-12-27 RX ADMIN — QUETIAPINE FUMARATE 50 MG: 100 TABLET ORAL at 20:28

## 2020-12-27 RX ADMIN — MIDODRINE HYDROCHLORIDE 10 MG: 5 TABLET ORAL at 22:04

## 2020-12-27 RX ADMIN — BUDESONIDE AND FORMOTEROL FUMARATE DIHYDRATE 2 PUFF: 80; 4.5 AEROSOL RESPIRATORY (INHALATION) at 19:30

## 2020-12-27 ASSESSMENT — PULMONARY FUNCTION TESTS: PIF_VALUE: 13

## 2020-12-27 NOTE — PLAN OF CARE
Problem: OXYGENATION/RESPIRATORY FUNCTION  Goal: Patient will maintain patent airway  Outcome: Ongoing  Goal: Patient will achieve/maintain normal respiratory rate/effort  Description: Respiratory rate and effort will be within normal limits for the patient  Outcome: Ongoing     Problem: MECHANICAL VENTILATION  Goal: Patient will maintain patent airway  Outcome: Ongoing  Goal: Oral health is maintained or improved  Outcome: Ongoing  Goal: ET tube will be managed safely  Outcome: Ongoing  Goal: Ability to express needs and understand communication  Outcome: Ongoing  Goal: Mobility/activity is maintained at optimum level for patient  Outcome: Ongoing     Problem: SKIN INTEGRITY  Goal: Skin integrity is maintained or improved  12/27/2020 0047 by Haylie Power RN  Outcome: Ongoing  12/26/2020 1841 by Jaqueline Vega RN  Outcome: Ongoing     Problem: Pain:  Goal: Pain level will decrease  Description: Pain level will decrease  12/27/2020 0047 by Haylie Power RN  Outcome: Ongoing  12/26/2020 1841 by Jaqueline Vega RN  Outcome: Ongoing  Goal: Control of acute pain  Description: Control of acute pain  Outcome: Ongoing  Goal: Control of chronic pain  Description: Control of chronic pain  Outcome: Ongoing     Problem: Nutrition  Goal: Optimal nutrition therapy  Description: Nutrition Problem #1: Inadequate oral intake  Intervention: Food and/or Nutrient Delivery: (Monitor TF tolerance; suggest goal rate of 50 mL/hr to provide 1800 kcal and 113 g pro/day.)  Nutritional Goals: meet % of estimated nutrient needs     12/27/2020 0047 by Haylie Power RN  Outcome: Ongoing  12/26/2020 1841 by Jaqueline Vega RN  Outcome: Ongoing     Problem: Musculor/Skeletal Functional Status  Goal: Highest potential functional level  Outcome: Ongoing     Problem: Skin Integrity:  Goal: Will show no infection signs and symptoms  Description: Will show no infection signs and symptoms  12/27/2020 0047 by Haylie Power RN Outcome: Ongoing  12/26/2020 1841 by Mahad Dobson RN  Outcome: Ongoing  Goal: Absence of new skin breakdown  Description: Absence of new skin breakdown  Outcome: Ongoing     Problem: Falls - Risk of:  Goal: Will remain free from falls  Description: Will remain free from falls  12/27/2020 0047 by Nilton Haddad RN  Outcome: Ongoing  12/26/2020 1841 by aMhad Dobson RN  Outcome: Ongoing  Goal: Absence of physical injury  Description: Absence of physical injury  Outcome: Ongoing     Problem: Restraint Use - Nonviolent/Non-Self-Destructive Behavior:  Goal: Absence of restraint indications  Description: Absence of restraint indications  12/27/2020 0047 by Nilton Haddad RN  Outcome: Ongoing  12/26/2020 1841 by Mahad Dobson RN  Outcome: Not Met This Shift  Note: Patient's restraints reapplied after intubation.   Goal: Absence of restraint-related injury  Description: Absence of restraint-related injury  12/27/2020 0047 by Nilton Haddad RN  Outcome: Met This Shift  12/26/2020 1841 by Mahad Dobson RN  Outcome: Ongoing

## 2020-12-27 NOTE — PROGRESS NOTES
Occupational Therapy    Occupational Therapy Not Seen Note    DATE: 2020  Name: Heather Warner  : 1965  MRN: [id number]    Patient not available for Occupational Therapy due to: Other: Pt has been re-intubated, currently restrained, holding OT eval, PT performing all PROM at this time. Next Scheduled Treatment: Attempt on  as appropriate.     Electronically signed by Barrett Elias OT on 2020 at 9:37 AM

## 2020-12-27 NOTE — PROGRESS NOTES
NEPHROLOGY PROGRESS NOTE      SUBJECTIVE     Seen and examined on hemodialysis. Got reintubated again. Chest x-ray is definitely improved. Continues to be on pressors but requirement reduced. Cultures so far negative. Had hemodialysis yesterday again. 4 KG taken off. OBJECTIVE     Vitals:    12/27/20 0815 12/27/20 0850 12/27/20 0900 12/27/20 0915   BP:  (!) 96/50     Pulse: 53  51 50   Resp: 17      Temp: 98.9 °F (37.2 °C) 99.7 °F (37.6 °C)     TempSrc: Oral      SpO2: 97%      Weight:  213 lb 13.5 oz (97 kg)     Height:         24HR INTAKE/OUTPUT:      Intake/Output Summary (Last 24 hours) at 12/27/2020 0951  Last data filed at 12/27/2020 0818  Gross per 24 hour   Intake 2161.21 ml   Output 0 ml   Net 2161.21 ml       General appearance: Mechanical ventilation  HEENT: PERRLA  Respiratory::vesicular breath sounds,no wheeze/crackles  Cardiovascular:S1 S2 normal,no gallop or organic murmur. Abdomen:Non tender/non distended. Bowel sounds present  Extremities: No Cyanosis or Clubbing, present lower extremity edema  Neurological: Mechanical ventilation      MEDICATIONS     Scheduled Meds:    sodium chloride  20 mL Intravenous Once    albumin human  25 g Intravenous Once    darbepoetin kolton-polysorbate  40 mcg Intravenous Weekly    midodrine  10 mg Oral 3 times per day    QUEtiapine  50 mg Oral BID    albuterol  2.5 mg Nebulization BID    inhalation builder   Inhalation Q4H    lansoprazole  15 mg Oral QAM    melatonin  5 mg Oral Nightly    sodium chloride flush  10 mL Intravenous 2 times per day    ipratropium-albuterol  1 ampule Inhalation Q4H WA    acetylcysteine  600 mg Inhalation Q4H    polyethylene glycol  17 g Oral Daily    docusate  100 mg Oral Daily    apixaban  5 mg Oral BID    amiodarone  200 mg Oral Daily    Calcium Acetate (Phos Binder)  2,001 mg Oral TID WC    doxepin  10 mg Oral Nightly    escitalopram  20 mg Oral Daily    budesonide-formoterol  2 puff Inhalation BID    gabapentin 100 mg Oral BID    acetaminophen  1,000 mg Oral 3 times per day    senna  1 tablet Oral Nightly    bacitracin   Topical BID    silver sulfADIAZINE   Topical BID     Continuous Infusions:    dexmedetomidine 0.2 mcg/kg/hr (12/27/20 0839)    norepinephrine Stopped (12/27/20 0245)    vasopressin (Septic Shock) infusion 0.04 Units/min (12/27/20 0232)     PRN Meds:  oxyCODONE, phenol, fentanNYL, sodium chloride, sodium chloride, sodium chloride flush, albumin human, albuterol, chlorhexidine  Home Meds:                Medications Prior to Admission: amiodarone (CORDARONE) 200 MG tablet, Take 1 tablet by mouth daily  hydrOXYzine (VISTARIL) 25 MG capsule, Take 1 capsule by mouth 4 times daily as needed for Anxiety  albuterol sulfate HFA (VENTOLIN HFA) 108 (90 Base) MCG/ACT inhaler, Inhale 1 puff into the lungs every 6 hours as needed for Wheezing  Fluticasone furoate-vilanterol (BREO ELLIPTA) 200-25 MCG/INH AEPB inhaler, Inhale 1 puff into the lungs daily  apixaban (ELIQUIS) 5 MG TABS tablet, Take 1 tablet by mouth 2 times daily  gabapentin (NEURONTIN) 100 MG capsule, Take 1 capsule by mouth 2 times daily for 30 days.   doxepin (SINEQUAN) 10 MG capsule, Take 1 capsule by mouth nightly  escitalopram (LEXAPRO) 20 MG tablet, Take 1 tablet by mouth daily  metoprolol tartrate (LOPRESSOR) 25 MG tablet, Take 1 tablet by mouth 2 times daily  nicotine (NICODERM CQ) 21 MG/24HR, Place 1 patch onto the skin daily  pantoprazole (PROTONIX) 40 MG tablet, Take 1 tablet by mouth 2 times daily  aluminum hydroxide (ALTERNGEL) 320 MG/5ML suspension, 5-10 ml 4 times daily as needed for stomach pain  calcium acetate (PHOSLO) 667 MG capsule, Take 2,001 mg by mouth 3 times daily (with meals)    INVESTIGATIONS     Last 3 CMP:    Recent Labs     12/25/20  0531 12/26/20  0623 12/27/20  0553    135 137   K 4.7 5.9* 5.0    98 97*   CO2 25 23 22   BUN 48* 76* 50*   CREATININE 3.75* 5.03* 3.86*   CALCIUM 9.0 9.2 9.4       Last 3 CBC: Recent Labs     12/25/20  0531 12/25/20  0531 12/26/20  0623 12/26/20  1308 12/27/20  0553   WBC 8.7  --  11.2  --  12.1*   RBC 2.35*  --  2.28*  --  2.52*   HGB 7.3*   < > 7.1* 8.2* 7.8*   HCT 25.4*   < > 24.8* 28.1* 26.6*   .1*  --  108.8*  --  105.6*   MCH 31.1  --  31.1  --  31.0   MCHC 28.7  --  28.6  --  29.3   RDW 16.6*  --  16.2*  --  17.2*     --  309  --  257   MPV 9.6  --  9.2  --  9.0    < > = values in this interval not displayed. ASSESSMENT     ]1. ESRD on intermittent maintenance hemodialysis Monday Wednesday Friday using AV fistula. [facility] unit Dr Edvin Doe. Dry weight 84.6 kg  2. Admitted with Jiménez - 10 - 15% TBSA  3. Circulatory shock on pressors. Cultures negative. Cortisol normal.  Echo preserved ejection fraction  4. VDRF  5. Anemia  6. COPD     PLAN     Continue pressors and midodrine   Hemodialysis today. Orders reviewed the nursing staff. Will lower dialysate temperature and administer midodrine along with salt poor albumin to achieve ultrafiltration. Plans 2 to 3 kg off.    She is running on dialysis Tuesday Thursday and Sunday because of holidays      Please do not hesitate to call with questions    This note is created with the assistance of a speech-recognition program. While intending to generate a document that actually reflects the content of the visit, no guarantees can be provided that every mistake has been identified and corrected by editing    Leroy Bernal MD, MRCP Bonnie Casey   12/27/2020 9:51 AM  [facility]

## 2020-12-27 NOTE — PLAN OF CARE
Problem: SKIN INTEGRITY  Goal: Skin integrity is maintained or improved  Outcome: Ongoing     Problem: Pain:  Goal: Pain level will decrease  Description: Pain level will decrease  Outcome: Ongoing     Problem: Nutrition  Goal: Optimal nutrition therapy  Description: Nutrition Problem #1: Inadequate oral intake  Intervention: Food and/or Nutrient Delivery: (Monitor TF tolerance; suggest goal rate of 50 mL/hr to provide 1800 kcal and 113 g pro/day.)  Nutritional Goals: meet % of estimated nutrient needs     Outcome: Ongoing     Problem: Skin Integrity:  Goal: Will show no infection signs and symptoms  Description: Will show no infection signs and symptoms  Outcome: Ongoing     Problem: Falls - Risk of:  Goal: Will remain free from falls  Description: Will remain free from falls  Outcome: Ongoing     Problem: Restraint Use - Nonviolent/Non-Self-Destructive Behavior:  Goal: Absence of restraint-related injury  Description: Absence of restraint-related injury  Outcome: Ongoing     Problem: Musculor/Skeletal Functional Status  Goal: Highest potential functional level  Outcome: Ongoing

## 2020-12-27 NOTE — PROGRESS NOTES
Physical Therapy  DATE: 2020    NAME: Woo Garcia  MRN: [id number]   : 1965    Patient not seen this date for Physical Therapy due to:  [] Blood transfusion in progress  [x] Hemodialysis  [] Patient Declined  [] Spine Precautions   [] Strict Bedrest  [] Surgery/ Procedure  [] Testing      [] Other        [] PT is being discontinued at this time. Patient independent. No further needs. [] PT is being discontinued at this time due to declining physical/ medical status. Therapy is not appropriate at this time.     Adarsh Jay, PT

## 2020-12-27 NOTE — PROGRESS NOTES
Dialysis Post Treatment Note  Vitals:    12/27/20 1246   BP: 135/64   Pulse: 61   Resp: 17   Temp: 98.8 °F (37.1 °C)   SpO2: 95%     Pre-Weight = 95.3  Post-weight = Weight: 204 lb 2.3 oz (92.6 kg)  Total Liters Processed = Total Liters Processed (l/min): 70 l/min  Rinseback Volume (mL) = Rinseback Volume (ml): 300 ml  Net Removal (mL) = NET Removed (ml): 3010 ml  Type of access used=LLF  Length of treatment=210    Pt tolerated tx well, no complications noted.
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
## 2020-12-28 ENCOUNTER — ANESTHESIA EVENT (OUTPATIENT)
Dept: OPERATING ROOM | Age: 55
DRG: 004 | End: 2020-12-28
Payer: MEDICARE

## 2020-12-28 ENCOUNTER — APPOINTMENT (OUTPATIENT)
Dept: GENERAL RADIOLOGY | Age: 55
DRG: 004 | End: 2020-12-28
Payer: MEDICARE

## 2020-12-28 LAB
ABSOLUTE EOS #: 0.58 K/UL (ref 0–0.4)
ABSOLUTE IMMATURE GRANULOCYTE: 0.43 K/UL (ref 0–0.3)
ABSOLUTE LYMPH #: 1.58 K/UL (ref 1–4.8)
ABSOLUTE MONO #: 0.43 K/UL (ref 0.1–0.8)
ALLEN TEST: ABNORMAL
ANION GAP SERPL CALCULATED.3IONS-SCNC: 14 MMOL/L (ref 9–17)
BASOPHILS # BLD: 0 % (ref 0–2)
BASOPHILS ABSOLUTE: 0 K/UL (ref 0–0.2)
BUN BLDV-MCNC: 42 MG/DL (ref 6–20)
BUN/CREAT BLD: ABNORMAL (ref 9–20)
CALCIUM IONIZED: 1.17 MMOL/L (ref 1.13–1.33)
CALCIUM SERPL-MCNC: 9.3 MG/DL (ref 8.6–10.4)
CHLORIDE BLD-SCNC: 99 MMOL/L (ref 98–107)
CO2: 26 MMOL/L (ref 20–31)
CREAT SERPL-MCNC: 3.48 MG/DL (ref 0.5–0.9)
DIFFERENTIAL TYPE: ABNORMAL
EOSINOPHILS RELATIVE PERCENT: 4 % (ref 1–4)
FIO2: 45
GFR AFRICAN AMERICAN: 17 ML/MIN
GFR NON-AFRICAN AMERICAN: 14 ML/MIN
GFR SERPL CREATININE-BSD FRML MDRD: ABNORMAL ML/MIN/{1.73_M2}
GFR SERPL CREATININE-BSD FRML MDRD: ABNORMAL ML/MIN/{1.73_M2}
GLUCOSE BLD-MCNC: 139 MG/DL (ref 70–99)
GLUCOSE BLD-MCNC: 139 MG/DL (ref 74–100)
HCT VFR BLD CALC: 26.4 % (ref 36.3–47.1)
HEMOGLOBIN: 7.7 G/DL (ref 11.9–15.1)
IMMATURE GRANULOCYTES: 3 %
LYMPHOCYTES # BLD: 11 % (ref 24–44)
MAGNESIUM: 2.3 MG/DL (ref 1.6–2.6)
MCH RBC QN AUTO: 30.8 PG (ref 25.2–33.5)
MCHC RBC AUTO-ENTMCNC: 29.2 G/DL (ref 28.4–34.8)
MCV RBC AUTO: 105.6 FL (ref 82.6–102.9)
MODE: ABNORMAL
MONOCYTES # BLD: 3 % (ref 1–7)
MORPHOLOGY: ABNORMAL
MORPHOLOGY: ABNORMAL
NEGATIVE BASE EXCESS, ART: ABNORMAL (ref 0–2)
NRBC AUTOMATED: 0 PER 100 WBC
O2 DEVICE/FLOW/%: ABNORMAL
PATIENT TEMP: ABNORMAL
PDW BLD-RTO: 16.8 % (ref 11.8–14.4)
PHOSPHORUS: 2.1 MG/DL (ref 2.6–4.5)
PLATELET # BLD: 256 K/UL (ref 138–453)
PLATELET ESTIMATE: ABNORMAL
PMV BLD AUTO: 9 FL (ref 8.1–13.5)
POC HCO3: 30.2 MMOL/L (ref 21–28)
POC HEMATOCRIT: 26 % (ref 36–46)
POC HEMOGLOBIN: 8.8 G/DL (ref 12–16)
POC LACTIC ACID: 0.72 MMOL/L (ref 0.56–1.39)
POC O2 SATURATION: 97 % (ref 94–98)
POC PCO2 TEMP: ABNORMAL MM HG
POC PCO2: 59.1 MM HG (ref 35–48)
POC PH TEMP: ABNORMAL
POC PH: 7.32 (ref 7.35–7.45)
POC PO2 TEMP: ABNORMAL MM HG
POC PO2: 101.6 MM HG (ref 83–108)
POSITIVE BASE EXCESS, ART: 3 (ref 0–3)
POTASSIUM SERPL-SCNC: 4.3 MMOL/L (ref 3.7–5.3)
RBC # BLD: 2.5 M/UL (ref 3.95–5.11)
RBC # BLD: ABNORMAL 10*6/UL
SAMPLE SITE: ABNORMAL
SEG NEUTROPHILS: 79 % (ref 36–66)
SEGMENTED NEUTROPHILS ABSOLUTE COUNT: 11.38 K/UL (ref 1.8–7.7)
SODIUM BLD-SCNC: 139 MMOL/L (ref 135–144)
TCO2 (CALC), ART: 32 MMOL/L (ref 22–29)
WBC # BLD: 14.4 K/UL (ref 3.5–11.3)
WBC # BLD: ABNORMAL 10*3/UL

## 2020-12-28 PROCEDURE — 2580000003 HC RX 258: Performed by: GENERAL PRACTICE

## 2020-12-28 PROCEDURE — 2580000003 HC RX 258: Performed by: SURGERY

## 2020-12-28 PROCEDURE — 6370000000 HC RX 637 (ALT 250 FOR IP): Performed by: NURSE PRACTITIONER

## 2020-12-28 PROCEDURE — 85014 HEMATOCRIT: CPT

## 2020-12-28 PROCEDURE — 2500000003 HC RX 250 WO HCPCS: Performed by: NURSE PRACTITIONER

## 2020-12-28 PROCEDURE — 6360000002 HC RX W HCPCS: Performed by: GENERAL PRACTICE

## 2020-12-28 PROCEDURE — 82330 ASSAY OF CALCIUM: CPT

## 2020-12-28 PROCEDURE — 97110 THERAPEUTIC EXERCISES: CPT

## 2020-12-28 PROCEDURE — 6370000000 HC RX 637 (ALT 250 FOR IP): Performed by: STUDENT IN AN ORGANIZED HEALTH CARE EDUCATION/TRAINING PROGRAM

## 2020-12-28 PROCEDURE — 2500000003 HC RX 250 WO HCPCS: Performed by: STUDENT IN AN ORGANIZED HEALTH CARE EDUCATION/TRAINING PROGRAM

## 2020-12-28 PROCEDURE — 85025 COMPLETE CBC W/AUTO DIFF WBC: CPT

## 2020-12-28 PROCEDURE — 37799 UNLISTED PX VASCULAR SURGERY: CPT

## 2020-12-28 PROCEDURE — 2580000003 HC RX 258: Performed by: STUDENT IN AN ORGANIZED HEALTH CARE EDUCATION/TRAINING PROGRAM

## 2020-12-28 PROCEDURE — 94761 N-INVAS EAR/PLS OXIMETRY MLT: CPT

## 2020-12-28 PROCEDURE — 94640 AIRWAY INHALATION TREATMENT: CPT

## 2020-12-28 PROCEDURE — 94003 VENT MGMT INPAT SUBQ DAY: CPT

## 2020-12-28 PROCEDURE — 84100 ASSAY OF PHOSPHORUS: CPT

## 2020-12-28 PROCEDURE — 6360000002 HC RX W HCPCS: Performed by: SURGERY

## 2020-12-28 PROCEDURE — 2000000000 HC ICU R&B

## 2020-12-28 PROCEDURE — 71045 X-RAY EXAM CHEST 1 VIEW: CPT

## 2020-12-28 PROCEDURE — 82947 ASSAY GLUCOSE BLOOD QUANT: CPT

## 2020-12-28 PROCEDURE — 2500000003 HC RX 250 WO HCPCS: Performed by: GENERAL PRACTICE

## 2020-12-28 PROCEDURE — 82803 BLOOD GASES ANY COMBINATION: CPT

## 2020-12-28 PROCEDURE — 99232 SBSQ HOSP IP/OBS MODERATE 35: CPT | Performed by: INTERNAL MEDICINE

## 2020-12-28 PROCEDURE — 83735 ASSAY OF MAGNESIUM: CPT

## 2020-12-28 PROCEDURE — 2700000000 HC OXYGEN THERAPY PER DAY

## 2020-12-28 PROCEDURE — 83605 ASSAY OF LACTIC ACID: CPT

## 2020-12-28 PROCEDURE — 6370000000 HC RX 637 (ALT 250 FOR IP): Performed by: GENERAL PRACTICE

## 2020-12-28 PROCEDURE — 94770 HC ETCO2 MONITOR DAILY: CPT

## 2020-12-28 PROCEDURE — 80048 BASIC METABOLIC PNL TOTAL CA: CPT

## 2020-12-28 PROCEDURE — 6370000000 HC RX 637 (ALT 250 FOR IP): Performed by: SURGERY

## 2020-12-28 RX ORDER — FENTANYL CITRATE 50 UG/ML
100 INJECTION, SOLUTION INTRAMUSCULAR; INTRAVENOUS
Status: COMPLETED | OUTPATIENT
Start: 2020-12-28 | End: 2020-12-28

## 2020-12-28 RX ADMIN — BUDESONIDE AND FORMOTEROL FUMARATE DIHYDRATE 2 PUFF: 80; 4.5 AEROSOL RESPIRATORY (INHALATION) at 20:01

## 2020-12-28 RX ADMIN — ALBUTEROL SULFATE 2.5 MG: 2.5 SOLUTION RESPIRATORY (INHALATION) at 03:18

## 2020-12-28 RX ADMIN — Medication 600 MG: at 03:18

## 2020-12-28 RX ADMIN — HEPARIN SODIUM: 5000 INJECTION INTRAVENOUS; SUBCUTANEOUS at 07:56

## 2020-12-28 RX ADMIN — GABAPENTIN 100 MG: 100 CAPSULE ORAL at 08:22

## 2020-12-28 RX ADMIN — VASOPRESSIN 0.04 UNITS/MIN: 20 INJECTION INTRAVENOUS at 09:08

## 2020-12-28 RX ADMIN — QUETIAPINE FUMARATE 50 MG: 100 TABLET ORAL at 08:23

## 2020-12-28 RX ADMIN — Medication 600 MG: at 11:56

## 2020-12-28 RX ADMIN — DOCUSATE SODIUM 100 MG: 50 LIQUID ORAL at 08:22

## 2020-12-28 RX ADMIN — ACETAMINOPHEN 1000 MG: 500 TABLET ORAL at 13:30

## 2020-12-28 RX ADMIN — IPRATROPIUM BROMIDE AND ALBUTEROL SULFATE 1 AMPULE: .5; 3 SOLUTION RESPIRATORY (INHALATION) at 20:01

## 2020-12-28 RX ADMIN — ACETAMINOPHEN 1000 MG: 500 TABLET ORAL at 21:23

## 2020-12-28 RX ADMIN — GABAPENTIN 100 MG: 100 CAPSULE ORAL at 21:23

## 2020-12-28 RX ADMIN — BACITRACIN: 500 OINTMENT TOPICAL at 08:24

## 2020-12-28 RX ADMIN — VASOPRESSIN 0.04 UNITS/MIN: 20 INJECTION INTRAVENOUS at 02:15

## 2020-12-28 RX ADMIN — OXYCODONE HYDROCHLORIDE 5 MG: 5 TABLET ORAL at 02:19

## 2020-12-28 RX ADMIN — BACITRACIN: 500 OINTMENT TOPICAL at 21:23

## 2020-12-28 RX ADMIN — OXYCODONE HYDROCHLORIDE 5 MG: 5 TABLET ORAL at 17:45

## 2020-12-28 RX ADMIN — MIDODRINE HYDROCHLORIDE 10 MG: 5 TABLET ORAL at 21:23

## 2020-12-28 RX ADMIN — IPRATROPIUM BROMIDE AND ALBUTEROL SULFATE 1 AMPULE: .5; 3 SOLUTION RESPIRATORY (INHALATION) at 15:12

## 2020-12-28 RX ADMIN — MIDODRINE HYDROCHLORIDE 10 MG: 5 TABLET ORAL at 14:25

## 2020-12-28 RX ADMIN — DEXMEDETOMIDINE HYDROCHLORIDE 0.4 MCG/KG/HR: 400 INJECTION INTRAVENOUS at 17:14

## 2020-12-28 RX ADMIN — IPRATROPIUM BROMIDE AND ALBUTEROL SULFATE 1 AMPULE: .5; 3 SOLUTION RESPIRATORY (INHALATION) at 07:26

## 2020-12-28 RX ADMIN — AMIODARONE HYDROCHLORIDE 200 MG: 200 TABLET ORAL at 08:23

## 2020-12-28 RX ADMIN — Medication 5 MG: at 21:23

## 2020-12-28 RX ADMIN — QUETIAPINE FUMARATE 50 MG: 100 TABLET ORAL at 21:23

## 2020-12-28 RX ADMIN — OXYCODONE HYDROCHLORIDE 5 MG: 5 TABLET ORAL at 09:42

## 2020-12-28 RX ADMIN — FENTANYL CITRATE 100 MCG: 50 INJECTION, SOLUTION INTRAMUSCULAR; INTRAVENOUS at 12:23

## 2020-12-28 RX ADMIN — ESCITALOPRAM OXALATE 20 MG: 10 TABLET ORAL at 08:22

## 2020-12-28 RX ADMIN — SILVER SULFADIAZINE: 10 CREAM TOPICAL at 08:21

## 2020-12-28 RX ADMIN — HEPARIN SODIUM: 5000 INJECTION INTRAVENOUS; SUBCUTANEOUS at 03:52

## 2020-12-28 RX ADMIN — MIDODRINE HYDROCHLORIDE 10 MG: 5 TABLET ORAL at 05:37

## 2020-12-28 RX ADMIN — Medication 15 MG: at 08:21

## 2020-12-28 RX ADMIN — FENTANYL CITRATE 100 MCG: 50 INJECTION, SOLUTION INTRAMUSCULAR; INTRAVENOUS at 16:55

## 2020-12-28 RX ADMIN — CALCIUM ACETATE 2001 MG: 667 CAPSULE ORAL at 12:19

## 2020-12-28 RX ADMIN — Medication 600 MG: at 07:26

## 2020-12-28 RX ADMIN — DOXEPIN HYDROCHLORIDE 10 MG: 10 CAPSULE ORAL at 21:23

## 2020-12-28 RX ADMIN — FENTANYL CITRATE 50 MCG: 50 INJECTION, SOLUTION INTRAMUSCULAR; INTRAVENOUS at 03:15

## 2020-12-28 RX ADMIN — HEPARIN SODIUM: 5000 INJECTION INTRAVENOUS; SUBCUTANEOUS at 00:11

## 2020-12-28 RX ADMIN — SODIUM CHLORIDE, PRESERVATIVE FREE 10 ML: 5 INJECTION INTRAVENOUS at 21:30

## 2020-12-28 RX ADMIN — BUDESONIDE AND FORMOTEROL FUMARATE DIHYDRATE 2 PUFF: 80; 4.5 AEROSOL RESPIRATORY (INHALATION) at 07:30

## 2020-12-28 RX ADMIN — FENTANYL CITRATE 50 MCG: 50 INJECTION, SOLUTION INTRAMUSCULAR; INTRAVENOUS at 05:36

## 2020-12-28 RX ADMIN — SILVER SULFADIAZINE: 10 CREAM TOPICAL at 21:24

## 2020-12-28 RX ADMIN — CALCIUM ACETATE 2001 MG: 667 CAPSULE ORAL at 08:23

## 2020-12-28 RX ADMIN — SODIUM CHLORIDE, PRESERVATIVE FREE 10 ML: 5 INJECTION INTRAVENOUS at 08:21

## 2020-12-28 RX ADMIN — ALBUTEROL SULFATE 2.5 MG: 2.5 SOLUTION RESPIRATORY (INHALATION) at 23:57

## 2020-12-28 RX ADMIN — SENNOSIDES 8.6 MG: 8.6 TABLET, FILM COATED ORAL at 21:23

## 2020-12-28 RX ADMIN — ACETAMINOPHEN 1000 MG: 500 TABLET ORAL at 05:36

## 2020-12-28 RX ADMIN — VASOPRESSIN 0.04 UNITS/MIN: 20 INJECTION INTRAVENOUS at 18:38

## 2020-12-28 RX ADMIN — CALCIUM ACETATE 2001 MG: 667 CAPSULE ORAL at 16:56

## 2020-12-28 RX ADMIN — IPRATROPIUM BROMIDE AND ALBUTEROL SULFATE 1 AMPULE: .5; 3 SOLUTION RESPIRATORY (INHALATION) at 11:54

## 2020-12-28 ASSESSMENT — PULMONARY FUNCTION TESTS
PIF_VALUE: 17
PIF_VALUE: 11
PIF_VALUE: 12
PIF_VALUE: 20
PIF_VALUE: 17

## 2020-12-28 NOTE — PLAN OF CARE
Problem: OXYGENATION/RESPIRATORY FUNCTION  Goal: Patient will maintain patent airway  12/28/2020 0853 by Tawny Spence RCP  Outcome: Ongoing  12/28/2020 0423 by Juanjose Johnston RN  Outcome: Ongoing     Problem: OXYGENATION/RESPIRATORY FUNCTION  Goal: Patient will achieve/maintain normal respiratory rate/effort  Description: Respiratory rate and effort will be within normal limits for the patient  12/28/2020 0853 by CHRISTY CamaraP  Outcome: Ongoing  12/28/2020 0423 by Juanjose Johnston RN  Outcome: Ongoing     Problem: MECHANICAL VENTILATION  Goal: Patient will maintain patent airway  12/28/2020 0853 by CHRISTY CamaraP  Outcome: Ongoing  12/28/2020 0423 by Juanjose Johnston RN  Outcome: Ongoing     Problem: MECHANICAL VENTILATION  Goal: Oral health is maintained or improved  12/28/2020 0853 by Tawny Spence RCP  Outcome: Ongoing  12/28/2020 0423 by Juanjose Johnston RN  Outcome: Ongoing     Problem: MECHANICAL VENTILATION  Goal: ET tube will be managed safely  12/28/2020 0853 by CHRISTY CamaraP  Outcome: Ongoing  12/28/2020 0423 by Juanjose Johnston RN  Outcome: Ongoing     Problem: MECHANICAL VENTILATION  Goal: Ability to express needs and understand communication  12/28/2020 0853 by Tawny Spence RCP  Outcome: Ongoing  12/28/2020 0423 by Juanjose Johnston RN  Outcome: Ongoing     Problem: MECHANICAL VENTILATION  Goal: Mobility/activity is maintained at optimum level for patient  12/28/2020 0853 by Tawny Spence RCP  Outcome: Ongoing  12/28/2020 0423 by Juanjose Johnston RN  Outcome: Ongoing
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
## 2020-12-28 NOTE — CARE COORDINATION
SBIRT deferred due to pt's medical condition.           Alcohol Screening and Brief Intervention          Deferred [x]    Completed on: 12/28/2020   ZION Pereyra

## 2020-12-28 NOTE — PROGRESS NOTES
AAROM in all planes x 10  B ankle stretching in all planes 3 x 20 sec       Goals  Short term goals  Time Frame for Short term goals: 14 visits  Short term goal 1: P-AAROM x4 extremities with emphasis on bilateral LEs and left hand to maintain ROM/prevent contractures  Short term goal 2: Pt to follow commands consistently without sedation  Short term goal 3: Progress mobility as medically able    Plan    Plan  Times per week: 7x per week  Times per day: Daily  Current Treatment Recommendations: Strengthening, ROM, Functional Mobility Training, Home Exercise Program, Safety Education & Training, Patient/Caregiver Education & Training  Safety Devices  Type of devices: Left in bed, Nurse notified  Restraints  Initially in place: Yes  Restraints: bilateral soft wrist restraints in place upon arrival and in place upon exit     Therapy Time   Individual Concurrent Group Co-treatment   Time In  820         Time Out  840         Minutes  [address]

## 2020-12-28 NOTE — CARE COORDINATION
Spoke with daughter, Travis Swanson, via telephone to discuss transitional planning. She is agreeable with LTACH. She requested one closer to [facility]. Referral sent to [facility]. Call to Gabino Just to notify him of referral. He stated he is able to view pt's chart in Epic. He is covering today for [address] She will be back in the office tomorrow.  They do not currently have beds available but are anticipating five discharges before Friday

## 2020-12-28 NOTE — PLAN OF CARE
Problem: OXYGENATION/RESPIRATORY FUNCTION  Goal: Patient will maintain patent airway  12/28/2020 0423 by Ludwin Up RN  Outcome: Ongoing  12/27/2020 1840 by Leandro Joy RCP  Outcome: Ongoing  Goal: Patient will achieve/maintain normal respiratory rate/effort  Description: Respiratory rate and effort will be within normal limits for the patient  12/28/2020 0423 by Ludwin Up RN  Outcome: Ongoing  12/27/2020 1840 by Leandro Joy RCP  Outcome: Ongoing     Problem: MECHANICAL VENTILATION  Goal: Patient will maintain patent airway  12/28/2020 0423 by Ludwin Up RN  Outcome: Ongoing  12/27/2020 1840 by eLandro Joy RCP  Outcome: Ongoing  Goal: Oral health is maintained or improved  12/28/2020 0423 by Ludwin Up RN  Outcome: Ongoing  12/27/2020 1840 by Leandro Joy RCP  Outcome: Ongoing  Goal: ET tube will be managed safely  12/28/2020 0423 by Ludwin Up RN  Outcome: Ongoing  12/27/2020 1840 by Leandro Joy RCP  Outcome: Ongoing  Goal: Ability to express needs and understand communication  12/28/2020 0423 by Ludwin Up RN  Outcome: Ongoing  12/27/2020 1840 by Leandro Joy RCP  Outcome: Ongoing  Goal: Mobility/activity is maintained at optimum level for patient  12/28/2020 0423 by Ludwin Up RN  Outcome: Ongoing  12/27/2020 1840 by Leandro Joy RCP  Outcome: Ongoing     Problem: SKIN INTEGRITY  Goal: Skin integrity is maintained or improved  12/28/2020 0423 by Ludwin Up RN  Outcome: Ongoing  12/27/2020 1840 by Leandro Joy RCP  Outcome: Ongoing  12/27/2020 1703 by Joel Zhang RN  Outcome: Ongoing     Problem: Pain:  Goal: Pain level will decrease  Description: Pain level will decrease  12/28/2020 0423 by Ludwin Up RN  Outcome: Ongoing  12/27/2020 1703 by Joel Zhang RN  Outcome: Ongoing  Goal: Control of acute pain  Description: Control of acute pain  Outcome: Ongoing  Goal: Control of chronic pain  Description: Control of chronic pain

## 2020-12-28 NOTE — PLAN OF CARE
12/28/2020 1459 by Loraine Morris RN  Outcome: Ongoing  12/28/2020 0423 by Pranay Tam RN  Outcome: Ongoing     Problem: Musculor/Skeletal Functional Status  Goal: Highest potential functional level  12/28/2020 1459 by Loraine Morris RN  Outcome: Ongoing  12/28/2020 0423 by Pranay Tam RN  Outcome: Ongoing

## 2020-12-28 NOTE — PROGRESS NOTES
Comprehensive Nutrition Assessment    Type and Reason for Visit:  Reassess    Nutrition Recommendations/Plan: Continue current tube feeding. Will continue to monitor TF tolerance/adequacy. Nutrition Assessment:  Pt remains on vent. Immune Enhancing TF at 50 mL/hr. Last BM noted: 12/27/20. Meds/labs reviewed. Malnutrition Assessment:  Malnutrition Status: At risk for malnutrition   Context:  Acute Illness     Findings of the 6 clinical characteristics of malnutrition:  Energy Intake:  Unable to assess intake PTA; currently meeting needs with tube feeding  Weight Loss:  No significant weight loss     Body Fat Loss:  No significant body fat loss     Muscle Mass Loss:  No significant muscle mass loss    Fluid Accumulation:  7 - Moderate to Severe Extremities, Generalized   Strength:  Not Performed    Estimated Daily Nutrient Needs:  Energy (kcal):  1800 kcal/day; Weight Used for Energy Requirements:  Ideal     Protein (g):  105 g pro/day; Weight Used for Protein Requirements:  Ideal(2)            Nutrition Related Findings:  ESRD, on hemodialysis      Wounds:  Jiménez ; 11.25% TBSA       Current Nutrition Therapies:    DIET TUBE FEED CONTINUOUS/CYCLIC NPO;  Immune Enhancing; Nasogastric; Continuous; 50  Current Tube Feeding (TF) Orders:  · Formula: Immune Enhancing  · Schedule: Continuous at 50 mL/hr   · Current TF & Flush Orders Provides: Pivot at 50 mL/ky=9750 kcal and 113 g pro/day    Anthropometric Measures:  · Height: 5' 3\" (160 cm)  · Current Body Weight: 205 lb 4 oz (93.1 kg)   · Admission Body Weight: 208 lb 5.4 oz (94.5 kg)    · Usual Body Weight: 168 lb (76.2 kg)(12/29/19)     · Ideal Body Weight: 115 lbs; % Ideal Body Weight  178%   · BMI: 36.4  · BMI Categories: Obese Class 2 (BMI 35.0 -39.9)       Nutrition Diagnosis:   · Inadequate oral intake related to impaired respiratory function as evidenced by NPO or clear liquid status due to medical condition, nutrition support - enteral nutrition Nutrition Interventions:   Food and/or Nutrient Delivery:  Continue Current Tube Feeding  Nutrition Education/Counseling:  No recommendation at this time   Coordination of Nutrition Care:  Continue to monitor while inpatient    Goals:  meet % of estimated nutrient needs -goal achieved      Nutrition Monitoring and Evaluation:   Food/Nutrient Intake Outcomes:  Enteral Nutrition Intake/Tolerance  Physical Signs/Symptoms Outcomes:  Biochemical Data, Nutrition Focused Physical Findings, Skin, Weight, Fluid Status or Edema     Discharge Planning:     Too soon to determine     Electronically signed by Suellen Lomax RD, LD on 12/28/20 at 1:46 PM EST    Contact: [phone]

## 2020-12-29 ENCOUNTER — APPOINTMENT (OUTPATIENT)
Dept: GENERAL RADIOLOGY | Age: 55
DRG: 004 | End: 2020-12-29
Payer: MEDICARE

## 2020-12-29 ENCOUNTER — ANESTHESIA (OUTPATIENT)
Dept: OPERATING ROOM | Age: 55
DRG: 004 | End: 2020-12-29
Payer: MEDICARE

## 2020-12-29 VITALS — RESPIRATION RATE: 16 BRPM | OXYGEN SATURATION: 98 % | TEMPERATURE: 95.1 F

## 2020-12-29 LAB
ABSOLUTE EOS #: 0.28 K/UL (ref 0–0.4)
ABSOLUTE IMMATURE GRANULOCYTE: 1.13 K/UL (ref 0–0.3)
ABSOLUTE LYMPH #: 0.85 K/UL (ref 1–4.8)
ABSOLUTE MONO #: 0.85 K/UL (ref 0.1–0.8)
ALLEN TEST: ABNORMAL
ALLEN TEST: ABNORMAL
ANION GAP SERPL CALCULATED.3IONS-SCNC: 12 MMOL/L (ref 9–17)
BASOPHILS # BLD: 1 % (ref 0–2)
BASOPHILS ABSOLUTE: 0.14 K/UL (ref 0–0.2)
BUN BLDV-MCNC: 64 MG/DL (ref 6–20)
BUN/CREAT BLD: ABNORMAL (ref 9–20)
CALCIUM IONIZED: 1.28 MMOL/L (ref 1.13–1.33)
CALCIUM SERPL-MCNC: 9.8 MG/DL (ref 8.6–10.4)
CHLORIDE BLD-SCNC: 95 MMOL/L (ref 98–107)
CO2: 26 MMOL/L (ref 20–31)
CREAT SERPL-MCNC: 5.06 MG/DL (ref 0.5–0.9)
DIFFERENTIAL TYPE: ABNORMAL
EOSINOPHILS RELATIVE PERCENT: 2 % (ref 1–4)
FIO2: 40
FIO2: 45
GFR AFRICAN AMERICAN: 11 ML/MIN
GFR NON-AFRICAN AMERICAN: 9 ML/MIN
GFR SERPL CREATININE-BSD FRML MDRD: ABNORMAL ML/MIN/{1.73_M2}
GFR SERPL CREATININE-BSD FRML MDRD: ABNORMAL ML/MIN/{1.73_M2}
GLUCOSE BLD-MCNC: 114 MG/DL (ref 74–100)
GLUCOSE BLD-MCNC: 115 MG/DL (ref 70–99)
HCT VFR BLD CALC: 26.9 % (ref 36.3–47.1)
HEMOGLOBIN: 7.7 G/DL (ref 11.9–15.1)
IMMATURE GRANULOCYTES: 8 %
LYMPHOCYTES # BLD: 6 % (ref 24–44)
MAGNESIUM: 2.7 MG/DL (ref 1.6–2.6)
MCH RBC QN AUTO: 30.7 PG (ref 25.2–33.5)
MCHC RBC AUTO-ENTMCNC: 28.6 G/DL (ref 28.4–34.8)
MCV RBC AUTO: 107.2 FL (ref 82.6–102.9)
MODE: ABNORMAL
MODE: ABNORMAL
MONOCYTES # BLD: 6 % (ref 1–7)
MORPHOLOGY: ABNORMAL
MORPHOLOGY: ABNORMAL
NEGATIVE BASE EXCESS, ART: ABNORMAL (ref 0–2)
NEGATIVE BASE EXCESS, ART: ABNORMAL (ref 0–2)
NRBC AUTOMATED: 0 PER 100 WBC
O2 DEVICE/FLOW/%: ABNORMAL
O2 DEVICE/FLOW/%: ABNORMAL
PATIENT TEMP: ABNORMAL
PATIENT TEMP: ABNORMAL
PDW BLD-RTO: 16.3 % (ref 11.8–14.4)
PHOSPHORUS: 2.9 MG/DL (ref 2.6–4.5)
PLATELET # BLD: 258 K/UL (ref 138–453)
PLATELET ESTIMATE: ABNORMAL
PMV BLD AUTO: 9.3 FL (ref 8.1–13.5)
POC HCO3: 28.9 MMOL/L (ref 21–28)
POC HCO3: 28.9 MMOL/L (ref 21–28)
POC LACTIC ACID: 0.54 MMOL/L (ref 0.56–1.39)
POC LACTIC ACID: 0.63 MMOL/L (ref 0.56–1.39)
POC O2 SATURATION: 90 % (ref 94–98)
POC O2 SATURATION: 97 % (ref 94–98)
POC PCO2 TEMP: ABNORMAL MM HG
POC PCO2 TEMP: ABNORMAL MM HG
POC PCO2: 54.2 MM HG (ref 35–48)
POC PCO2: 55.7 MM HG (ref 35–48)
POC PH TEMP: ABNORMAL
POC PH TEMP: ABNORMAL
POC PH: 7.32 (ref 7.35–7.45)
POC PH: 7.33 (ref 7.35–7.45)
POC PO2 TEMP: ABNORMAL MM HG
POC PO2 TEMP: ABNORMAL MM HG
POC PO2: 104 MM HG (ref 83–108)
POC PO2: 64.8 MM HG (ref 83–108)
POSITIVE BASE EXCESS, ART: 2 (ref 0–3)
POSITIVE BASE EXCESS, ART: 2 (ref 0–3)
POTASSIUM SERPL-SCNC: 5.1 MMOL/L (ref 3.7–5.3)
RBC # BLD: 2.51 M/UL (ref 3.95–5.11)
RBC # BLD: ABNORMAL 10*6/UL
SAMPLE SITE: ABNORMAL
SAMPLE SITE: ABNORMAL
SEG NEUTROPHILS: 77 % (ref 36–66)
SEGMENTED NEUTROPHILS ABSOLUTE COUNT: 10.85 K/UL (ref 1.8–7.7)
SODIUM BLD-SCNC: 133 MMOL/L (ref 135–144)
TCO2 (CALC), ART: 31 MMOL/L (ref 22–29)
TCO2 (CALC), ART: 31 MMOL/L (ref 22–29)
WBC # BLD: 14.1 K/UL (ref 3.5–11.3)
WBC # BLD: ABNORMAL 10*3/UL

## 2020-12-29 PROCEDURE — 2580000003 HC RX 258: Performed by: STUDENT IN AN ORGANIZED HEALTH CARE EDUCATION/TRAINING PROGRAM

## 2020-12-29 PROCEDURE — 2500000003 HC RX 250 WO HCPCS: Performed by: STUDENT IN AN ORGANIZED HEALTH CARE EDUCATION/TRAINING PROGRAM

## 2020-12-29 PROCEDURE — 85025 COMPLETE CBC W/AUTO DIFF WBC: CPT

## 2020-12-29 PROCEDURE — 6360000002 HC RX W HCPCS: Performed by: STUDENT IN AN ORGANIZED HEALTH CARE EDUCATION/TRAINING PROGRAM

## 2020-12-29 PROCEDURE — 94761 N-INVAS EAR/PLS OXIMETRY MLT: CPT

## 2020-12-29 PROCEDURE — 6370000000 HC RX 637 (ALT 250 FOR IP): Performed by: NURSE PRACTITIONER

## 2020-12-29 PROCEDURE — 84100 ASSAY OF PHOSPHORUS: CPT

## 2020-12-29 PROCEDURE — 37799 UNLISTED PX VASCULAR SURGERY: CPT

## 2020-12-29 PROCEDURE — 3700000001 HC ADD 15 MINUTES (ANESTHESIA): Performed by: SURGERY

## 2020-12-29 PROCEDURE — 71045 X-RAY EXAM CHEST 1 VIEW: CPT

## 2020-12-29 PROCEDURE — 2500000003 HC RX 250 WO HCPCS: Performed by: GENERAL PRACTICE

## 2020-12-29 PROCEDURE — 6360000002 HC RX W HCPCS: Performed by: GENERAL PRACTICE

## 2020-12-29 PROCEDURE — 6370000000 HC RX 637 (ALT 250 FOR IP): Performed by: STUDENT IN AN ORGANIZED HEALTH CARE EDUCATION/TRAINING PROGRAM

## 2020-12-29 PROCEDURE — 2580000003 HC RX 258: Performed by: NURSE ANESTHETIST, CERTIFIED REGISTERED

## 2020-12-29 PROCEDURE — 94640 AIRWAY INHALATION TREATMENT: CPT

## 2020-12-29 PROCEDURE — 6370000000 HC RX 637 (ALT 250 FOR IP): Performed by: SURGERY

## 2020-12-29 PROCEDURE — 6360000002 HC RX W HCPCS: Performed by: NURSE ANESTHETIST, CERTIFIED REGISTERED

## 2020-12-29 PROCEDURE — 2700000000 HC OXYGEN THERAPY PER DAY

## 2020-12-29 PROCEDURE — 0B110F4 BYPASS TRACHEA TO CUTANEOUS WITH TRACHEOSTOMY DEVICE, OPEN APPROACH: ICD-10-PCS | Performed by: SURGERY

## 2020-12-29 PROCEDURE — 3600000030 HC TRACHEOSTOMY: Performed by: SURGERY

## 2020-12-29 PROCEDURE — 82947 ASSAY GLUCOSE BLOOD QUANT: CPT

## 2020-12-29 PROCEDURE — 82330 ASSAY OF CALCIUM: CPT

## 2020-12-29 PROCEDURE — 3609013300 HC EGD TUBE PLACEMENT: Performed by: SURGERY

## 2020-12-29 PROCEDURE — 2500000003 HC RX 250 WO HCPCS: Performed by: NURSE ANESTHETIST, CERTIFIED REGISTERED

## 2020-12-29 PROCEDURE — 94770 HC ETCO2 MONITOR DAILY: CPT

## 2020-12-29 PROCEDURE — 83605 ASSAY OF LACTIC ACID: CPT

## 2020-12-29 PROCEDURE — 6360000002 HC RX W HCPCS

## 2020-12-29 PROCEDURE — 99232 SBSQ HOSP IP/OBS MODERATE 35: CPT | Performed by: INTERNAL MEDICINE

## 2020-12-29 PROCEDURE — 2709999900 HC NON-CHARGEABLE SUPPLY: Performed by: SURGERY

## 2020-12-29 PROCEDURE — 2580000003 HC RX 258: Performed by: SURGERY

## 2020-12-29 PROCEDURE — 6360000002 HC RX W HCPCS: Performed by: SURGERY

## 2020-12-29 PROCEDURE — 94003 VENT MGMT INPAT SUBQ DAY: CPT

## 2020-12-29 PROCEDURE — 2000000000 HC ICU R&B

## 2020-12-29 PROCEDURE — 83735 ASSAY OF MAGNESIUM: CPT

## 2020-12-29 PROCEDURE — 3700000000 HC ANESTHESIA ATTENDED CARE: Performed by: SURGERY

## 2020-12-29 PROCEDURE — 80048 BASIC METABOLIC PNL TOTAL CA: CPT

## 2020-12-29 PROCEDURE — 82803 BLOOD GASES ANY COMBINATION: CPT

## 2020-12-29 PROCEDURE — 2720000010 HC SURG SUPPLY STERILE: Performed by: SURGERY

## 2020-12-29 PROCEDURE — C1751 CATH, INF, PER/CENT/MIDLINE: HCPCS

## 2020-12-29 PROCEDURE — 2709999900 HC NON-CHARGEABLE SUPPLY

## 2020-12-29 PROCEDURE — 90935 HEMODIALYSIS ONE EVALUATION: CPT

## 2020-12-29 PROCEDURE — 0DH63UZ INSERTION OF FEEDING DEVICE INTO STOMACH, PERCUTANEOUS APPROACH: ICD-10-PCS | Performed by: SURGERY

## 2020-12-29 RX ORDER — SODIUM CHLORIDE, SODIUM LACTATE, POTASSIUM CHLORIDE, CALCIUM CHLORIDE 600; 310; 30; 20 MG/100ML; MG/100ML; MG/100ML; MG/100ML
INJECTION, SOLUTION INTRAVENOUS CONTINUOUS PRN
Status: DISCONTINUED | OUTPATIENT
Start: 2020-12-29 | End: 2020-12-29 | Stop reason: SDUPTHER

## 2020-12-29 RX ORDER — FENTANYL CITRATE 50 UG/ML
100 INJECTION, SOLUTION INTRAMUSCULAR; INTRAVENOUS
Status: DISCONTINUED | OUTPATIENT
Start: 2020-12-29 | End: 2020-12-30

## 2020-12-29 RX ORDER — DEXAMETHASONE SODIUM PHOSPHATE 4 MG/ML
INJECTION, SOLUTION INTRA-ARTICULAR; INTRALESIONAL; INTRAMUSCULAR; INTRAVENOUS; SOFT TISSUE PRN
Status: DISCONTINUED | OUTPATIENT
Start: 2020-12-29 | End: 2020-12-29 | Stop reason: SDUPTHER

## 2020-12-29 RX ORDER — MAGNESIUM HYDROXIDE 1200 MG/15ML
LIQUID ORAL CONTINUOUS PRN
Status: COMPLETED | OUTPATIENT
Start: 2020-12-29 | End: 2020-12-29

## 2020-12-29 RX ORDER — CEFAZOLIN SODIUM 1 G/3ML
INJECTION, POWDER, FOR SOLUTION INTRAMUSCULAR; INTRAVENOUS PRN
Status: DISCONTINUED | OUTPATIENT
Start: 2020-12-29 | End: 2020-12-29 | Stop reason: SDUPTHER

## 2020-12-29 RX ORDER — FENTANYL CITRATE 50 UG/ML
INJECTION, SOLUTION INTRAMUSCULAR; INTRAVENOUS
Status: COMPLETED
Start: 2020-12-29 | End: 2020-12-29

## 2020-12-29 RX ORDER — MIDAZOLAM HYDROCHLORIDE 1 MG/ML
INJECTION INTRAMUSCULAR; INTRAVENOUS PRN
Status: DISCONTINUED | OUTPATIENT
Start: 2020-12-29 | End: 2020-12-29 | Stop reason: SDUPTHER

## 2020-12-29 RX ORDER — FENTANYL CITRATE 50 UG/ML
INJECTION, SOLUTION INTRAMUSCULAR; INTRAVENOUS PRN
Status: DISCONTINUED | OUTPATIENT
Start: 2020-12-29 | End: 2020-12-29 | Stop reason: SDUPTHER

## 2020-12-29 RX ORDER — ROCURONIUM BROMIDE 10 MG/ML
INJECTION, SOLUTION INTRAVENOUS PRN
Status: DISCONTINUED | OUTPATIENT
Start: 2020-12-29 | End: 2020-12-29 | Stop reason: SDUPTHER

## 2020-12-29 RX ORDER — FENTANYL CITRATE 50 UG/ML
50 INJECTION, SOLUTION INTRAMUSCULAR; INTRAVENOUS ONCE
Status: COMPLETED | OUTPATIENT
Start: 2020-12-29 | End: 2020-12-29

## 2020-12-29 RX ADMIN — BUDESONIDE AND FORMOTEROL FUMARATE DIHYDRATE 2 PUFF: 80; 4.5 AEROSOL RESPIRATORY (INHALATION) at 20:25

## 2020-12-29 RX ADMIN — FENTANYL CITRATE 100 MCG: 50 INJECTION, SOLUTION INTRAMUSCULAR; INTRAVENOUS at 07:40

## 2020-12-29 RX ADMIN — IPRATROPIUM BROMIDE AND ALBUTEROL SULFATE 1 AMPULE: .5; 3 SOLUTION RESPIRATORY (INHALATION) at 16:03

## 2020-12-29 RX ADMIN — DOXEPIN HYDROCHLORIDE 10 MG: 10 CAPSULE ORAL at 21:48

## 2020-12-29 RX ADMIN — BACITRACIN: 500 OINTMENT TOPICAL at 07:32

## 2020-12-29 RX ADMIN — GABAPENTIN 100 MG: 100 CAPSULE ORAL at 21:47

## 2020-12-29 RX ADMIN — SODIUM CHLORIDE, POTASSIUM CHLORIDE, SODIUM LACTATE AND CALCIUM CHLORIDE: 600; 310; 30; 20 INJECTION, SOLUTION INTRAVENOUS at 13:53

## 2020-12-29 RX ADMIN — SENNOSIDES 8.6 MG: 8.6 TABLET, FILM COATED ORAL at 21:48

## 2020-12-29 RX ADMIN — MIDAZOLAM HYDROCHLORIDE 2 MG: 1 INJECTION, SOLUTION INTRAMUSCULAR; INTRAVENOUS at 13:42

## 2020-12-29 RX ADMIN — CEFAZOLIN 2000 MG: 1 INJECTION, POWDER, FOR SOLUTION INTRAMUSCULAR; INTRAVENOUS at 13:56

## 2020-12-29 RX ADMIN — ROCURONIUM BROMIDE 30 MG: 10 INJECTION, SOLUTION INTRAVENOUS at 13:49

## 2020-12-29 RX ADMIN — ROCURONIUM BROMIDE 20 MG: 10 INJECTION, SOLUTION INTRAVENOUS at 14:08

## 2020-12-29 RX ADMIN — FENTANYL CITRATE 100 MCG: 50 INJECTION, SOLUTION INTRAMUSCULAR; INTRAVENOUS at 23:53

## 2020-12-29 RX ADMIN — DEXMEDETOMIDINE HYDROCHLORIDE 0.3 MCG/KG/HR: 400 INJECTION INTRAVENOUS at 04:19

## 2020-12-29 RX ADMIN — VASOPRESSIN 0.04 UNITS/MIN: 20 INJECTION INTRAVENOUS at 04:19

## 2020-12-29 RX ADMIN — BUDESONIDE AND FORMOTEROL FUMARATE DIHYDRATE 2 PUFF: 80; 4.5 AEROSOL RESPIRATORY (INHALATION) at 07:39

## 2020-12-29 RX ADMIN — IPRATROPIUM BROMIDE AND ALBUTEROL SULFATE 1 AMPULE: .5; 3 SOLUTION RESPIRATORY (INHALATION) at 07:25

## 2020-12-29 RX ADMIN — Medication 2 MCG/MIN: at 06:29

## 2020-12-29 RX ADMIN — ACETAMINOPHEN 1000 MG: 500 TABLET ORAL at 21:47

## 2020-12-29 RX ADMIN — QUETIAPINE FUMARATE 50 MG: 100 TABLET ORAL at 21:48

## 2020-12-29 RX ADMIN — FENTANYL CITRATE 100 MCG: 50 INJECTION, SOLUTION INTRAMUSCULAR; INTRAVENOUS at 04:21

## 2020-12-29 RX ADMIN — FENTANYL CITRATE 50 MCG: 50 INJECTION, SOLUTION INTRAMUSCULAR; INTRAVENOUS at 14:08

## 2020-12-29 RX ADMIN — DEXAMETHASONE SODIUM PHOSPHATE 4 MG: 4 INJECTION, SOLUTION INTRAMUSCULAR; INTRAVENOUS at 14:08

## 2020-12-29 RX ADMIN — ALBUTEROL SULFATE 2.5 MG: 2.5 SOLUTION RESPIRATORY (INHALATION) at 23:29

## 2020-12-29 RX ADMIN — MIDODRINE HYDROCHLORIDE 10 MG: 5 TABLET ORAL at 21:48

## 2020-12-29 RX ADMIN — FENTANYL CITRATE 100 MCG: 50 INJECTION, SOLUTION INTRAMUSCULAR; INTRAVENOUS at 19:42

## 2020-12-29 RX ADMIN — OXYCODONE HYDROCHLORIDE 5 MG: 5 TABLET ORAL at 11:03

## 2020-12-29 RX ADMIN — FENTANYL CITRATE 50 MCG: 50 INJECTION, SOLUTION INTRAMUSCULAR; INTRAVENOUS at 13:49

## 2020-12-29 RX ADMIN — SODIUM CHLORIDE, PRESERVATIVE FREE 10 ML: 5 INJECTION INTRAVENOUS at 21:49

## 2020-12-29 RX ADMIN — BACITRACIN: 500 OINTMENT TOPICAL at 21:49

## 2020-12-29 RX ADMIN — FENTANYL CITRATE 100 MCG: 50 INJECTION, SOLUTION INTRAMUSCULAR; INTRAVENOUS at 17:37

## 2020-12-29 RX ADMIN — Medication 5 MG: at 21:48

## 2020-12-29 RX ADMIN — IPRATROPIUM BROMIDE AND ALBUTEROL SULFATE 1 AMPULE: .5; 3 SOLUTION RESPIRATORY (INHALATION) at 20:25

## 2020-12-29 RX ADMIN — SILVER SULFADIAZINE: 10 CREAM TOPICAL at 21:48

## 2020-12-29 RX ADMIN — OXYCODONE HYDROCHLORIDE 5 MG: 5 TABLET ORAL at 02:31

## 2020-12-29 RX ADMIN — SILVER SULFADIAZINE: 10 CREAM TOPICAL at 07:33

## 2020-12-29 RX ADMIN — IPRATROPIUM BROMIDE AND ALBUTEROL SULFATE 1 AMPULE: .5; 3 SOLUTION RESPIRATORY (INHALATION) at 11:35

## 2020-12-29 RX ADMIN — ALBUTEROL SULFATE 2.5 MG: 2.5 SOLUTION RESPIRATORY (INHALATION) at 03:28

## 2020-12-29 ASSESSMENT — PULMONARY FUNCTION TESTS
PIF_VALUE: 26
PIF_VALUE: 25
PIF_VALUE: 25
PIF_VALUE: 8
PIF_VALUE: 18
PIF_VALUE: 27
PIF_VALUE: 25
PIF_VALUE: 33
PIF_VALUE: 24
PIF_VALUE: 26
PIF_VALUE: 25
PIF_VALUE: 35
PIF_VALUE: 25
PIF_VALUE: 30
PIF_VALUE: 27
PIF_VALUE: 21
PIF_VALUE: 33
PIF_VALUE: 34
PIF_VALUE: 18
PIF_VALUE: 21
PIF_VALUE: 13
PIF_VALUE: 26
PIF_VALUE: 27
PIF_VALUE: 26
PIF_VALUE: 28
PIF_VALUE: 19
PIF_VALUE: 1
PIF_VALUE: 22
PIF_VALUE: 14
PIF_VALUE: 28
PIF_VALUE: 33
PIF_VALUE: 26
PIF_VALUE: 25
PIF_VALUE: 27
PIF_VALUE: 25
PIF_VALUE: 28
PIF_VALUE: 25
PIF_VALUE: 26
PIF_VALUE: 17
PIF_VALUE: 26
PIF_VALUE: 26
PIF_VALUE: 5
PIF_VALUE: 27
PIF_VALUE: 22
PIF_VALUE: 15
PIF_VALUE: 28
PIF_VALUE: 34
PIF_VALUE: 29
PIF_VALUE: 24
PIF_VALUE: 19
PIF_VALUE: 35
PIF_VALUE: 20
PIF_VALUE: 27
PIF_VALUE: 15
PIF_VALUE: 32
PIF_VALUE: 25
PIF_VALUE: 26
PIF_VALUE: 25
PIF_VALUE: 25
PIF_VALUE: 35
PIF_VALUE: 25
PIF_VALUE: 3
PIF_VALUE: 26
PIF_VALUE: 26
PIF_VALUE: 29
PIF_VALUE: 26
PIF_VALUE: 25
PIF_VALUE: 27
PIF_VALUE: 20
PIF_VALUE: 24
PIF_VALUE: 25
PIF_VALUE: 18
PIF_VALUE: 26
PIF_VALUE: 29
PIF_VALUE: 27
PIF_VALUE: 26
PIF_VALUE: 26
PIF_VALUE: 34
PIF_VALUE: 26
PIF_VALUE: 28
PIF_VALUE: 17
PIF_VALUE: 25
PIF_VALUE: 27
PIF_VALUE: 26
PIF_VALUE: 26
PIF_VALUE: 25
PIF_VALUE: 26

## 2020-12-29 ASSESSMENT — PAIN SCALES - GENERAL: PAINLEVEL_OUTOF10: 10

## 2020-12-29 ASSESSMENT — COPD QUESTIONNAIRES: CAT_SEVERITY: SEVERE

## 2020-12-29 NOTE — PROGRESS NOTES
Plastics - [id number]      Patient seen for dressing change. No cellulitis. Left hand healing. Near closed. Face coming along. Discussed with nurse cleaning a bit more vigorously. Left leg/foot thinning out. Peripheral re-epithelialization. Right leg/foot thinning. More pink showing thru. Possibly may heal without graft but not certain yet. Continue BID Silvadene dressings. Bacitracin to face.

## 2020-12-29 NOTE — PROGRESS NOTES
Dialysis Post Treatment Note  Vitals:    12/29/20 1854   BP: (!) 111/55   Pulse: 58   Resp: 14   Temp: 97.7 °F (36.5 °C)   SpO2: 97%     Pre-Weight = 95.4  Post-weight = Weight: 203 lb 14.8 oz (92.5 kg)  Total Liters Processed = Total Liters Processed (l/min): 79 l/min  Rinseback Volume (mL) = Rinseback Volume (ml): 300 ml  Net Removal (mL) = NET Removed (ml): 3000 ml  Patient's dry weight=84.6  Type of access used=LLF  Length of treatment=210    Pt tolerated tx well, no complications noted.

## 2020-12-29 NOTE — BRIEF OP NOTE
Brief Postoperative Note      Patient: Bony Bingham  YOB: 1965  MRN: [id number]    Date of Procedure: 12/29/2020    Pre-Op Diagnosis: ACUTE RESPIRATORY FAILURE, DYSPHAGIA    Post-Op Diagnosis: Same       Procedures:  1. Open tracheostomy tube insertion  2. EGD  3. Insertion of 20Fr PEG tube     Surgeon(s):  Mary Carlisle MD    Assistant:  DO Luca Townsend DO    Anesthesia: General    Estimated Blood Loss (mL): <74DZ    Complications: None    Specimens: none    Findings: #8Shiley DCT. 20 Fr PEG tube inserted 3cm at the skin.        Electronically signed by Anabell Ghosh DO on 12/29/2020 at 3:46 PM
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
## 2020-12-29 NOTE — PLAN OF CARE
Problem: OXYGENATION/RESPIRATORY FUNCTION  Goal: Patient will maintain patent airway  12/29/2020 1001 by Christie Mcwilliams RCP  Outcome: Ongoing     Problem: OXYGENATION/RESPIRATORY FUNCTION  Goal: Patient will achieve/maintain normal respiratory rate/effort  Description: Respiratory rate and effort will be within normal limits for the patient  12/29/2020 1001 by Christie Mcwilliams RCP  Outcome: Ongoing     Problem: MECHANICAL VENTILATION  Goal: Patient will maintain patent airway  12/29/2020 1001 by Christie Mcwilliams RCP  Outcome: Ongoing     Problem: MECHANICAL VENTILATION  Goal: Oral health is maintained or improved  12/29/2020 1001 by Christie Mcwilliams RCP  Outcome: Ongoing     Problem: MECHANICAL VENTILATION  Goal: ET tube will be managed safely  12/29/2020 1001 by Christie Mcwilliams RCP  Outcome: Ongoing     Problem: MECHANICAL VENTILATION  Goal: Ability to express needs and understand communication  12/29/2020 1001 by Christie Mcwilliams RCP  Outcome: Ongoing     Problem: SKIN INTEGRITY  Goal: Skin integrity is maintained or improved  12/29/2020 1001 by Christie Mcwilliams RCP  Outcome: Ongoing     Problem: RESPIRATORY  Intervention: Respiratory assessment  Note: BRONCHOSPASM/BRONCHOCONSTRICTION     [x]         IMPROVE AERATION/BREATH SOUNDS  [x]   ADMINISTER BRONCHODILATOR THERAPY AS APPROPRIATE  [x]   ASSESS BREATH SOUNDS  [x]   IMPLEMENT AEROSOL/MDI PROTOCOL  [x]   PATIENT EDUCATION AS NEEDED   Ventilator Bronchodilator assessment    Breath sounds: clear diminished  Inspiratory Pressure: 21  Plateau Pressure: 21    Patient assessed at level 2          [x]    Bronchodilator Assessment    BRONCHODILATOR ASSESSMENT SCORE  Score 0 (Home) 1 2 3 4   Breath Sounds   []  Chronic Ventilator: Patient at baseline []  Mild Wheezes/ Clear [x]  Intermittent wheezes with good air entry []  Bilateral/unilateral wheezing with diminished air entry []  Insp/Exp wheeze and/or poor aeration   Ventilator Pressures   []  Chronic

## 2020-12-29 NOTE — PLAN OF CARE
Problem: OXYGENATION/RESPIRATORY FUNCTION  Goal: Patient will maintain patent airway  12/28/2020 2008 by Sharon Hull RCP  Outcome: Ongoing     Problem: OXYGENATION/RESPIRATORY FUNCTION  Goal: Patient will achieve/maintain normal respiratory rate/effort  Description: Respiratory rate and effort will be within normal limits for the patient  12/28/2020 2008 by Sharon Hull RCP  Outcome: Ongoing     Problem: MECHANICAL VENTILATION  Goal: Patient will maintain patent airway  12/28/2020 2008 by Sharon Hull RCP  Outcome: Ongoing     Problem: MECHANICAL VENTILATION  Goal: Oral health is maintained or improved  12/28/2020 2008 by Sharon Hull RCP  Outcome: Ongoing     Problem: MECHANICAL VENTILATION  Goal: ET tube will be managed safely  12/28/2020 2008 by Sharon Hull RCP  Outcome: Ongoing     Problem: MECHANICAL VENTILATION  Goal: Ability to express needs and understand communication  12/28/2020 2008 by Sharon Hull RCP  Outcome: Ongoing     Problem: MECHANICAL VENTILATION  Goal: Mobility/activity is maintained at optimum level for patient  12/28/2020 2008 by Sharon Hull RCP  Outcome: Ongoing     Problem: SKIN INTEGRITY  Goal: Skin integrity is maintained or improved  12/28/2020 2008 by Sharon Hull RCP  Outcome: Ongoing   BRONCHOSPASM/BRONCHOCONSTRICTION     [x]         IMPROVE AERATION/BREATH SOUNDS  [x]   ADMINISTER BRONCHODILATOR THERAPY AS APPROPRIATE  [x]   ASSESS BREATH SOUNDS  [x]   IMPLEMENT AEROSOL/MDI PROTOCOL  [x]   PATIENT EDUCATION AS NEEDED   PROVIDE ADEQUATE OXYGENATION WITH ACCEPTABLE SP02/ABG'S    [x]  IDENTIFY APPROPRIATE OXYGEN THERAPY  [x]   MONITOR SP02/ABG'S AS NEEDED   [x]   PATIENT EDUCATION AS NEEDED

## 2020-12-29 NOTE — PLAN OF CARE
Problem: OXYGENATION/RESPIRATORY FUNCTION  Goal: Patient will maintain patent airway  12/28/2020 2331 by Heidi Steele RN  Outcome: Ongoing  12/28/2020 2008 by Rayne Chong RCP  Outcome: Ongoing  12/28/2020 1459 by Urban Mckeon RN  Outcome: Ongoing  Goal: Patient will achieve/maintain normal respiratory rate/effort  Description: Respiratory rate and effort will be within normal limits for the patient  12/28/2020 2331 by Heidi Steele RN  Outcome: Ongoing  12/28/2020 2008 by Rayne Chong RCP  Outcome: Ongoing  12/28/2020 1459 by Urban Mckeon RN  Outcome: Ongoing     Problem: MECHANICAL VENTILATION  Goal: Patient will maintain patent airway  12/28/2020 2331 by Heidi Steele RN  Outcome: Ongoing  12/28/2020 2008 by Rayne Chong RCP  Outcome: Ongoing  12/28/2020 1459 by Urban Mckeon RN  Outcome: Ongoing  Goal: Oral health is maintained or improved  12/28/2020 2331 by Heidi Steele RN  Outcome: Ongoing  12/28/2020 2008 by Rayne Chong RCP  Outcome: Ongoing  12/28/2020 1459 by Urban Mckeon RN  Outcome: Ongoing  Goal: ET tube will be managed safely  12/28/2020 2331 by Heidi Steele RN  Outcome: Ongoing  12/28/2020 2008 by Rayne Chong RCP  Outcome: Ongoing  12/28/2020 1459 by Urban Mckeon RN  Outcome: Ongoing  Goal: Ability to express needs and understand communication  12/28/2020 2331 by Heidi Steele RN  Outcome: Ongoing  12/28/2020 2008 by Rayne Chong RCP  Outcome: Ongoing  12/28/2020 1459 by Urban Mckeon RN  Outcome: Ongoing  Goal: Mobility/activity is maintained at optimum level for patient  12/28/2020 2331 by Heidi Steele RN  Outcome: Ongoing  12/28/2020 2008 by Rayne Chong RCP  Outcome: Ongoing  12/28/2020 1459 by Urban Mckeon RN  Outcome: Ongoing     Problem: SKIN INTEGRITY  Goal: Skin integrity is maintained or improved  12/28/2020 2331 by Heidi Steele RN  Outcome: Ongoing  12/28/2020 2008 by Rayne Chong RCP  Outcome: Ongoing  12/28/2020 1459 by Samantha Brown RN  Outcome: Ongoing     Problem: Pain:  Goal: Pain level will decrease  Description: Pain level will decrease  12/28/2020 2331 by Yuriy Park RN  Outcome: Ongoing  12/28/2020 1459 by Saamntha Brown RN  Outcome: Ongoing  Goal: Control of acute pain  Description: Control of acute pain  12/28/2020 2331 by Yuriy Park RN  Outcome: Ongoing  12/28/2020 1459 by Samantha Brown RN  Outcome: Ongoing  Goal: Control of chronic pain  Description: Control of chronic pain  12/28/2020 2331 by Yuriy Park RN  Outcome: Ongoing  12/28/2020 1459 by Samantha Brown RN  Outcome: Ongoing     Problem: Nutrition  Goal: Optimal nutrition therapy  Description: Nutrition Problem #1: Inadequate oral intake  Intervention: Food and/or Nutrient Delivery: (Monitor TF tolerance; suggest goal rate of 50 mL/hr to provide 1800 kcal and 113 g pro/day.)  Nutritional Goals: meet % of estimated nutrient needs     12/28/2020 2331 by Yuriy Park RN  Outcome: Ongoing  12/28/2020 1459 by Samantha Brown RN  Outcome: Ongoing     Problem: Musculor/Skeletal Functional Status  Goal: Highest potential functional level  12/28/2020 2331 by Yuriy Park RN  Outcome: Ongoing  12/28/2020 1459 by Samantha Brown RN  Outcome: Ongoing     Problem: Skin Integrity:  Goal: Will show no infection signs and symptoms  Description: Will show no infection signs and symptoms  12/28/2020 2331 by Yuriy Park RN  Outcome: Ongoing  12/28/2020 1459 by Samantha Brown RN  Outcome: Ongoing  Goal: Absence of new skin breakdown  Description: Absence of new skin breakdown  12/28/2020 2331 by Yuriy Park RN  Outcome: Ongoing  12/28/2020 1459 by Samantha Brown RN  Outcome: Ongoing     Problem: Falls - Risk of:  Goal: Will remain free from falls  Description: Will remain free from falls  12/28/2020 2331 by Yuriy Park RN  Outcome: Ongoing  12/28/2020 1459 by Samantha Brown RN  Outcome: Ongoing  Goal: Absence of physical injury  Description: Absence of physical injury  12/28/2020 2331 by Yuriy Park RN  Outcome: Ongoing  12/28/2020 1459 by Samantha Brown RN  Outcome: Ongoing     Problem: Restraint Use - Nonviolent/Non-Self-Destructive Behavior:  Goal: Absence of restraint indications  Description: Absence of restraint indications  12/28/2020 2331 by Yuriy Park RN  Outcome: Ongoing  12/28/2020 1459 by Samantha Brown RN  Outcome: Not Met This Shift  Goal: Absence of restraint-related injury  Description: Absence of restraint-related injury  12/28/2020 2331 by Yuriy Park RN  Outcome: Ongoing  12/28/2020 1459 by Samantha Brown RN  Outcome: Ongoing
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
## 2020-12-29 NOTE — PROGRESS NOTES
Due to multiple emergent cases IR unable to place PICC line today. Spoke with RN access line and they will send someone today to place PICC.   They will call ICU at  to inform of ETA

## 2020-12-29 NOTE — CARE COORDINATION
David left for Gabino Just at [facility]    1400 received call from [address] at [address]. She stated pt is clinically appropriate. They do not have a bed at this time but anticipate 5-8 discharges this week. They are following.  Estrella's direct number is [phone]

## 2020-12-29 NOTE — PLAN OF CARE
Problem: OXYGENATION/RESPIRATORY FUNCTION  Goal: Patient will maintain patent airway  12/29/2020 1303 by Sukumar Holland RN  Outcome: Ongoing  12/29/2020 1001 by Smita Castillo RCP  Outcome: Ongoing  12/28/2020 2331 by Gurpreet Arevalo RN  Outcome: Ongoing  Goal: Patient will achieve/maintain normal respiratory rate/effort  Description: Respiratory rate and effort will be within normal limits for the patient  12/29/2020 1303 by Sukumar Holland RN  Outcome: Ongoing  12/29/2020 1001 by CHRISTY ReneP  Outcome: Ongoing  12/28/2020 2331 by Gurpreet Arevalo RN  Outcome: Ongoing     Problem: MECHANICAL VENTILATION  Goal: Patient will maintain patent airway  12/29/2020 1303 by Sukumar Holland RN  Outcome: Ongoing  12/29/2020 1001 by Smita Castillo RCP  Outcome: Ongoing  12/28/2020 2331 by Gurpreet Arevalo RN  Outcome: Ongoing  Goal: Oral health is maintained or improved  12/29/2020 1303 by Sukumar Holland RN  Outcome: Ongoing  12/29/2020 1001 by Smita Castillo RCP  Outcome: Ongoing  12/28/2020 2331 by Gurpreet Arevalo RN  Outcome: Ongoing  Goal: ET tube will be managed safely  12/29/2020 1303 by Sukumar Holland RN  Outcome: Ongoing  12/29/2020 1001 by Smita Castillo RCP  Outcome: Ongoing  12/28/2020 2331 by Gurpreet Arevalo RN  Outcome: Ongoing  Goal: Ability to express needs and understand communication  12/29/2020 1303 by Sukumar Holland RN  Outcome: Ongoing  12/29/2020 1001 by Smita Castillo RCP  Outcome: Ongoing  12/28/2020 2331 by Gurpreet Arevalo RN  Outcome: Ongoing  Goal: Mobility/activity is maintained at optimum level for patient  12/29/2020 1303 by Sukumar Holland RN  Outcome: Ongoing  12/29/2020 1001 by Smita Castillo RCP  Outcome: Ongoing  12/28/2020 2331 by Gurpreet Arevalo RN  Outcome: Ongoing     Problem: SKIN INTEGRITY  Goal: Skin integrity is maintained or improved  12/29/2020 1303 by Sukumar Holland RN  Outcome: Ongoing  12/29/2020 1001 by Smita Castillo RCP  Outcome: Ongoing 12/28/2020 2331 by Geraldine Contreras RN  Outcome: Ongoing     Problem: Pain:  Goal: Pain level will decrease  Description: Pain level will decrease  12/29/2020 1303 by Alf Guerra RN  Outcome: Ongoing  12/28/2020 2331 by Geraldine Contreras RN  Outcome: Ongoing  Goal: Control of acute pain  Description: Control of acute pain  12/29/2020 1303 by Alf Guerra RN  Outcome: Ongoing  12/28/2020 2331 by Geraldine Contreras RN  Outcome: Ongoing  Goal: Control of chronic pain  Description: Control of chronic pain  12/29/2020 1303 by Alf Guerra RN  Outcome: Ongoing  12/28/2020 2331 by Geraldine Contreras RN  Outcome: Ongoing     Problem: Nutrition  Goal: Optimal nutrition therapy  Description: Nutrition Problem #1: Inadequate oral intake  Intervention: Food and/or Nutrient Delivery: (Monitor TF tolerance; suggest goal rate of 50 mL/hr to provide 1800 kcal and 113 g pro/day.)  Nutritional Goals: meet % of estimated nutrient needs     12/29/2020 1303 by Alf Guerra RN  Outcome: Ongoing  12/28/2020 2331 by Geraldine Contreras RN  Outcome: Ongoing     Problem: Skin Integrity:  Goal: Will show no infection signs and symptoms  Description: Will show no infection signs and symptoms  12/29/2020 1303 by Alf Guerra RN  Outcome: Ongoing  12/28/2020 2331 by Geraldine Contreras RN  Outcome: Ongoing  Goal: Absence of new skin breakdown  Description: Absence of new skin breakdown  12/29/2020 1303 by Alf Guerra RN  Outcome: Ongoing  12/28/2020 2331 by Geraldine Contreras RN  Outcome: Ongoing     Problem: Falls - Risk of:  Goal: Will remain free from falls  Description: Will remain free from falls  12/29/2020 1303 by Alf Guerra RN  Outcome: Ongoing  12/28/2020 2331 by Geraldine Contreras RN  Outcome: Ongoing  Goal: Absence of physical injury  Description: Absence of physical injury  12/29/2020 1303 by Alf Guerra RN  Outcome: Ongoing  12/28/2020 2331 by Geraldine Contreras RN  Outcome: Ongoing Problem: Musculor/Skeletal Functional Status  Goal: Highest potential functional level  12/29/2020 1303 by Joyce Haskins RN  Outcome: Ongoing  12/28/2020 2331 by Jimmy Morse RN  Outcome: Ongoing     Problem: Restraint Use - Nonviolent/Non-Self-Destructive Behavior:  Goal: Absence of restraint indications  Description: Absence of restraint indications  12/29/2020 1303 by Joyce Haskins RN  Outcome: Not Met This Shift  12/28/2020 2331 by Jimmy Morse RN  Outcome: Ongoing  Goal: Absence of restraint-related injury  Description: Absence of restraint-related injury  12/29/2020 1303 by Joyce Haskins RN  Outcome: Not Met This Shift  12/28/2020 2331 by Jimmy Morse RN  Outcome: Ongoing
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
## 2020-12-29 NOTE — PROGRESS NOTES
Physical Therapy  DATE: 2020    NAME: Malachi Jin  MRN: [id number]   : 1965    Patient not seen this date for Physical Therapy due to:  [] Blood transfusion in progress  [] Hemodialysis  [] Patient Declined  [] Spine Precautions   [] Strict Bedrest  [x] Surgery  -  Pt being Trached/Pegged today. Will check on pt tomorrow. [] Testing      [] Other        [] PT is being discontinued at this time. Patient independent. No further needs. [] PT is being discontinued at this time due to declining physical/ medical status. Therapy is not appropriate at this time.     Mikayla Soriano, PTA

## 2020-12-29 NOTE — ANESTHESIA POSTPROCEDURE EVALUATION
Department of Anesthesiology  Postprocedure Note    Patient: Jose De Jesus Puckett  MRN: [id number]  YOB: 1965  Date of evaluation: 12/29/2020  Time:  5:30 PM     Procedure Summary     Date: 12/29/20 Room / Location: [address]    Anesthesia Start: 3556 Anesthesia Stop: 3539    Procedures:       TRACHEOTOMY (N/A Neck)      EGD PEG TUBE PLACEMENT (N/A Abdomen) Diagnosis: (ACUTE RESPIRATORY FAILURE, DYSPHAGIA)    Surgeons: Teresita Jacobsen MD Responsible Provider: Kody Menjivar MD    Anesthesia Type: general ASA Status: 4          Anesthesia Type: general    Bashir Phase I:      Bashir Phase II:      Last vitals: Reviewed and per EMR flowsheets. Anesthesia Post Evaluation    Patient location during evaluation: ICU  Patient participation: complete - patient cannot participate  Level of consciousness: sedated and ventilated  Pain scale: Sedated.   Airway patency: patent  Nausea & Vomiting: no nausea and no vomiting  Complications: no  Cardiovascular status: hemodynamically stable and blood pressure returned to baseline  Respiratory status: acceptable, ventilator and intubated  Hydration status: euvolemic

## 2020-12-30 ENCOUNTER — APPOINTMENT (OUTPATIENT)
Dept: GENERAL RADIOLOGY | Age: 55
DRG: 004 | End: 2020-12-30
Payer: MEDICARE

## 2020-12-30 LAB
ABSOLUTE EOS #: 0 K/UL (ref 0–0.44)
ABSOLUTE IMMATURE GRANULOCYTE: 0.87 K/UL (ref 0–0.3)
ABSOLUTE LYMPH #: 0.7 K/UL (ref 1.1–3.7)
ABSOLUTE MONO #: 0.87 K/UL (ref 0.1–1.2)
ALLEN TEST: ABNORMAL
ANION GAP SERPL CALCULATED.3IONS-SCNC: 16 MMOL/L (ref 9–17)
BASOPHILS # BLD: 1 % (ref 0–2)
BASOPHILS ABSOLUTE: 0.17 K/UL (ref 0–0.2)
BUN BLDV-MCNC: 42 MG/DL (ref 6–20)
BUN/CREAT BLD: ABNORMAL (ref 9–20)
CALCIUM IONIZED: 1.17 MMOL/L (ref 1.13–1.33)
CALCIUM SERPL-MCNC: 9.1 MG/DL (ref 8.6–10.4)
CHLORIDE BLD-SCNC: 94 MMOL/L (ref 98–107)
CO2: 26 MMOL/L (ref 20–31)
CREAT SERPL-MCNC: 3.93 MG/DL (ref 0.5–0.9)
DIFFERENTIAL TYPE: ABNORMAL
EOSINOPHILS RELATIVE PERCENT: 0 % (ref 1–4)
FIO2: 40
GFR AFRICAN AMERICAN: 14 ML/MIN
GFR NON-AFRICAN AMERICAN: 12 ML/MIN
GFR SERPL CREATININE-BSD FRML MDRD: ABNORMAL ML/MIN/{1.73_M2}
GFR SERPL CREATININE-BSD FRML MDRD: ABNORMAL ML/MIN/{1.73_M2}
GLUCOSE BLD-MCNC: 106 MG/DL (ref 70–99)
HCT VFR BLD CALC: 27.3 % (ref 36.3–47.1)
HCT VFR BLD CALC: 28 % (ref 36.3–47.1)
HEMOGLOBIN: 7.9 G/DL (ref 11.9–15.1)
HEMOGLOBIN: 8.2 G/DL (ref 11.9–15.1)
IMMATURE GRANULOCYTES: 5 %
LYMPHOCYTES # BLD: 4 % (ref 24–43)
MAGNESIUM: 2.2 MG/DL (ref 1.6–2.6)
MCH RBC QN AUTO: 30.7 PG (ref 25.2–33.5)
MCHC RBC AUTO-ENTMCNC: 29.3 G/DL (ref 28.4–34.8)
MCV RBC AUTO: 104.9 FL (ref 82.6–102.9)
MODE: ABNORMAL
MONOCYTES # BLD: 5 % (ref 3–12)
MORPHOLOGY: ABNORMAL
MORPHOLOGY: ABNORMAL
NEGATIVE BASE EXCESS, ART: ABNORMAL (ref 0–2)
NRBC AUTOMATED: 0 PER 100 WBC
O2 DEVICE/FLOW/%: ABNORMAL
PATIENT TEMP: ABNORMAL
PDW BLD-RTO: 16.1 % (ref 11.8–14.4)
PHOSPHORUS: 3.1 MG/DL (ref 2.6–4.5)
PLATELET # BLD: 355 K/UL (ref 138–453)
PLATELET ESTIMATE: ABNORMAL
PMV BLD AUTO: 9.3 FL (ref 8.1–13.5)
POC HCO3: 29.3 MMOL/L (ref 21–28)
POC LACTIC ACID: 1.03 MMOL/L (ref 0.56–1.39)
POC O2 SATURATION: 96 % (ref 94–98)
POC PCO2 TEMP: ABNORMAL MM HG
POC PCO2: 49.4 MM HG (ref 35–48)
POC PH TEMP: ABNORMAL
POC PH: 7.38 (ref 7.35–7.45)
POC PO2 TEMP: ABNORMAL MM HG
POC PO2: 87.4 MM HG (ref 83–108)
POSITIVE BASE EXCESS, ART: 4 (ref 0–3)
POTASSIUM SERPL-SCNC: 4.3 MMOL/L (ref 3.7–5.3)
RBC # BLD: 2.67 M/UL (ref 3.95–5.11)
RBC # BLD: ABNORMAL 10*6/UL
SAMPLE SITE: ABNORMAL
SEG NEUTROPHILS: 85 % (ref 36–65)
SEGMENTED NEUTROPHILS ABSOLUTE COUNT: 14.79 K/UL (ref 1.5–8.1)
SODIUM BLD-SCNC: 136 MMOL/L (ref 135–144)
TCO2 (CALC), ART: 31 MMOL/L (ref 22–29)
WBC # BLD: 17.4 K/UL (ref 3.5–11.3)
WBC # BLD: ABNORMAL 10*3/UL

## 2020-12-30 PROCEDURE — 80048 BASIC METABOLIC PNL TOTAL CA: CPT

## 2020-12-30 PROCEDURE — 83735 ASSAY OF MAGNESIUM: CPT

## 2020-12-30 PROCEDURE — 97530 THERAPEUTIC ACTIVITIES: CPT

## 2020-12-30 PROCEDURE — 37799 UNLISTED PX VASCULAR SURGERY: CPT

## 2020-12-30 PROCEDURE — 6370000000 HC RX 637 (ALT 250 FOR IP): Performed by: STUDENT IN AN ORGANIZED HEALTH CARE EDUCATION/TRAINING PROGRAM

## 2020-12-30 PROCEDURE — 99231 SBSQ HOSP IP/OBS SF/LOW 25: CPT | Performed by: INTERNAL MEDICINE

## 2020-12-30 PROCEDURE — 97110 THERAPEUTIC EXERCISES: CPT

## 2020-12-30 PROCEDURE — 2700000000 HC OXYGEN THERAPY PER DAY

## 2020-12-30 PROCEDURE — 6360000002 HC RX W HCPCS: Performed by: STUDENT IN AN ORGANIZED HEALTH CARE EDUCATION/TRAINING PROGRAM

## 2020-12-30 PROCEDURE — 85014 HEMATOCRIT: CPT

## 2020-12-30 PROCEDURE — 2580000003 HC RX 258: Performed by: STUDENT IN AN ORGANIZED HEALTH CARE EDUCATION/TRAINING PROGRAM

## 2020-12-30 PROCEDURE — 82803 BLOOD GASES ANY COMBINATION: CPT

## 2020-12-30 PROCEDURE — 2000000000 HC ICU R&B

## 2020-12-30 PROCEDURE — 85018 HEMOGLOBIN: CPT

## 2020-12-30 PROCEDURE — 2500000003 HC RX 250 WO HCPCS: Performed by: STUDENT IN AN ORGANIZED HEALTH CARE EDUCATION/TRAINING PROGRAM

## 2020-12-30 PROCEDURE — 85025 COMPLETE CBC W/AUTO DIFF WBC: CPT

## 2020-12-30 PROCEDURE — 82330 ASSAY OF CALCIUM: CPT

## 2020-12-30 PROCEDURE — 84100 ASSAY OF PHOSPHORUS: CPT

## 2020-12-30 PROCEDURE — 94640 AIRWAY INHALATION TREATMENT: CPT

## 2020-12-30 PROCEDURE — 94761 N-INVAS EAR/PLS OXIMETRY MLT: CPT

## 2020-12-30 PROCEDURE — 36415 COLL VENOUS BLD VENIPUNCTURE: CPT

## 2020-12-30 PROCEDURE — 6370000000 HC RX 637 (ALT 250 FOR IP): Performed by: NURSE PRACTITIONER

## 2020-12-30 PROCEDURE — 83605 ASSAY OF LACTIC ACID: CPT

## 2020-12-30 PROCEDURE — 71045 X-RAY EXAM CHEST 1 VIEW: CPT

## 2020-12-30 PROCEDURE — 94003 VENT MGMT INPAT SUBQ DAY: CPT

## 2020-12-30 PROCEDURE — 99213 OFFICE O/P EST LOW 20 MIN: CPT

## 2020-12-30 PROCEDURE — 94770 HC ETCO2 MONITOR DAILY: CPT

## 2020-12-30 RX ORDER — FENTANYL CITRATE 50 UG/ML
50 INJECTION, SOLUTION INTRAMUSCULAR; INTRAVENOUS ONCE
Status: COMPLETED | OUTPATIENT
Start: 2020-12-30 | End: 2020-12-30

## 2020-12-30 RX ORDER — DEXTROSE AND SODIUM CHLORIDE 5; .45 G/100ML; G/100ML
INJECTION, SOLUTION INTRAVENOUS CONTINUOUS
Status: DISCONTINUED | OUTPATIENT
Start: 2020-12-30 | End: 2020-12-30

## 2020-12-30 RX ORDER — 0.9 % SODIUM CHLORIDE 0.9 %
500 INTRAVENOUS SOLUTION INTRAVENOUS ONCE
Status: COMPLETED | OUTPATIENT
Start: 2020-12-30 | End: 2020-12-30

## 2020-12-30 RX ADMIN — BUDESONIDE AND FORMOTEROL FUMARATE DIHYDRATE 2 PUFF: 80; 4.5 AEROSOL RESPIRATORY (INHALATION) at 20:22

## 2020-12-30 RX ADMIN — IPRATROPIUM BROMIDE AND ALBUTEROL SULFATE 1 AMPULE: .5; 3 SOLUTION RESPIRATORY (INHALATION) at 08:46

## 2020-12-30 RX ADMIN — FENTANYL CITRATE 100 MCG: 50 INJECTION, SOLUTION INTRAMUSCULAR; INTRAVENOUS at 04:03

## 2020-12-30 RX ADMIN — MIDODRINE HYDROCHLORIDE 10 MG: 5 TABLET ORAL at 06:16

## 2020-12-30 RX ADMIN — GABAPENTIN 100 MG: 100 CAPSULE ORAL at 09:13

## 2020-12-30 RX ADMIN — OXYCODONE HYDROCHLORIDE 5 MG: 5 TABLET ORAL at 18:30

## 2020-12-30 RX ADMIN — IPRATROPIUM BROMIDE AND ALBUTEROL SULFATE 1 AMPULE: .5; 3 SOLUTION RESPIRATORY (INHALATION) at 20:21

## 2020-12-30 RX ADMIN — SILVER SULFADIAZINE: 10 CREAM TOPICAL at 20:25

## 2020-12-30 RX ADMIN — OXYCODONE HYDROCHLORIDE 5 MG: 5 TABLET ORAL at 10:34

## 2020-12-30 RX ADMIN — ESCITALOPRAM OXALATE 20 MG: 10 TABLET ORAL at 09:13

## 2020-12-30 RX ADMIN — DOCUSATE SODIUM 100 MG: 50 LIQUID ORAL at 09:14

## 2020-12-30 RX ADMIN — SENNOSIDES 8.6 MG: 8.6 TABLET, FILM COATED ORAL at 20:23

## 2020-12-30 RX ADMIN — FENTANYL CITRATE 100 MCG: 50 INJECTION, SOLUTION INTRAMUSCULAR; INTRAVENOUS at 08:13

## 2020-12-30 RX ADMIN — BUDESONIDE AND FORMOTEROL FUMARATE DIHYDRATE 2 PUFF: 80; 4.5 AEROSOL RESPIRATORY (INHALATION) at 08:50

## 2020-12-30 RX ADMIN — QUETIAPINE FUMARATE 50 MG: 100 TABLET ORAL at 09:15

## 2020-12-30 RX ADMIN — GABAPENTIN 100 MG: 100 CAPSULE ORAL at 20:24

## 2020-12-30 RX ADMIN — BACITRACIN: 500 OINTMENT TOPICAL at 20:25

## 2020-12-30 RX ADMIN — ACETAMINOPHEN 1000 MG: 500 TABLET ORAL at 06:13

## 2020-12-30 RX ADMIN — APIXABAN 5 MG: 5 TABLET, FILM COATED ORAL at 12:05

## 2020-12-30 RX ADMIN — POLYETHYLENE GLYCOL 3350 17 G: 17 POWDER, FOR SOLUTION ORAL at 09:13

## 2020-12-30 RX ADMIN — CALCIUM ACETATE 2001 MG: 667 CAPSULE ORAL at 17:27

## 2020-12-30 RX ADMIN — ALBUTEROL SULFATE 2.5 MG: 2.5 SOLUTION RESPIRATORY (INHALATION) at 23:48

## 2020-12-30 RX ADMIN — AMIODARONE HYDROCHLORIDE 200 MG: 200 TABLET ORAL at 09:13

## 2020-12-30 RX ADMIN — Medication 5 MG: at 20:23

## 2020-12-30 RX ADMIN — SODIUM CHLORIDE, PRESERVATIVE FREE 10 ML: 5 INJECTION INTRAVENOUS at 20:24

## 2020-12-30 RX ADMIN — IPRATROPIUM BROMIDE AND ALBUTEROL SULFATE 1 AMPULE: .5; 3 SOLUTION RESPIRATORY (INHALATION) at 11:06

## 2020-12-30 RX ADMIN — MIDODRINE HYDROCHLORIDE 10 MG: 5 TABLET ORAL at 21:39

## 2020-12-30 RX ADMIN — IPRATROPIUM BROMIDE AND ALBUTEROL SULFATE 1 AMPULE: .5; 3 SOLUTION RESPIRATORY (INHALATION) at 15:55

## 2020-12-30 RX ADMIN — QUETIAPINE FUMARATE 50 MG: 100 TABLET ORAL at 20:23

## 2020-12-30 RX ADMIN — FENTANYL CITRATE 100 MCG: 50 INJECTION, SOLUTION INTRAMUSCULAR; INTRAVENOUS at 02:18

## 2020-12-30 RX ADMIN — Medication 15 MG: at 17:26

## 2020-12-30 RX ADMIN — CALCIUM ACETATE 2001 MG: 667 CAPSULE ORAL at 09:13

## 2020-12-30 RX ADMIN — CALCIUM ACETATE 2001 MG: 667 CAPSULE ORAL at 13:32

## 2020-12-30 RX ADMIN — DOXEPIN HYDROCHLORIDE 10 MG: 10 CAPSULE ORAL at 20:24

## 2020-12-30 RX ADMIN — BACITRACIN: 500 OINTMENT TOPICAL at 09:13

## 2020-12-30 RX ADMIN — SODIUM CHLORIDE 500 ML: 9 INJECTION, SOLUTION INTRAVENOUS at 21:38

## 2020-12-30 RX ADMIN — ACETAMINOPHEN 1000 MG: 500 TABLET ORAL at 13:19

## 2020-12-30 RX ADMIN — APIXABAN 5 MG: 5 TABLET, FILM COATED ORAL at 21:37

## 2020-12-30 RX ADMIN — FENTANYL CITRATE 50 MCG: 50 INJECTION, SOLUTION INTRAMUSCULAR; INTRAVENOUS at 12:04

## 2020-12-30 RX ADMIN — DEXMEDETOMIDINE HYDROCHLORIDE 0.2 MCG/KG/HR: 400 INJECTION INTRAVENOUS at 04:30

## 2020-12-30 RX ADMIN — ACETAMINOPHEN 1000 MG: 500 TABLET ORAL at 20:29

## 2020-12-30 RX ADMIN — ALBUTEROL SULFATE 2.5 MG: 2.5 SOLUTION RESPIRATORY (INHALATION) at 03:32

## 2020-12-30 RX ADMIN — OXYCODONE HYDROCHLORIDE 5 MG: 5 TABLET ORAL at 02:18

## 2020-12-30 RX ADMIN — SODIUM CHLORIDE, PRESERVATIVE FREE 10 ML: 5 INJECTION INTRAVENOUS at 09:14

## 2020-12-30 RX ADMIN — SILVER SULFADIAZINE: 10 CREAM TOPICAL at 13:31

## 2020-12-30 RX ADMIN — MIDODRINE HYDROCHLORIDE 10 MG: 5 TABLET ORAL at 13:19

## 2020-12-30 RX ADMIN — FENTANYL CITRATE 100 MCG: 50 INJECTION, SOLUTION INTRAMUSCULAR; INTRAVENOUS at 06:13

## 2020-12-30 ASSESSMENT — PAIN SCALES - GENERAL
PAINLEVEL_OUTOF10: 7
PAINLEVEL_OUTOF10: 8
PAINLEVEL_OUTOF10: 8
PAINLEVEL_OUTOF10: 5
PAINLEVEL_OUTOF10: 7
PAINLEVEL_OUTOF10: 8
PAINLEVEL_OUTOF10: 5
PAINLEVEL_OUTOF10: 7

## 2020-12-30 ASSESSMENT — PULMONARY FUNCTION TESTS
PIF_VALUE: 20
PIF_VALUE: 15

## 2020-12-30 ASSESSMENT — PAIN DESCRIPTION - DESCRIPTORS: DESCRIPTORS: CONSTANT;THROBBING

## 2020-12-30 NOTE — PLAN OF CARE
Problem: OXYGENATION/RESPIRATORY FUNCTION  Goal: Patient will maintain patent airway  12/30/2020 0857 by Sagar Johnson, RCP  Outcome: Ongoing  12/30/2020 0047 by Ken Cmap RN  Outcome: Ongoing  12/29/2020 2227 by Ian Muro, RCP  Outcome: Ongoing  12/29/2020 2227 by Ian Muro, CHRISTYP  Outcome: Ongoing  Goal: Patient will achieve/maintain normal respiratory rate/effort  Description: Respiratory rate and effort will be within normal limits for the patient  12/30/2020 0857 by Sagar Johnson, RCP  Outcome: Ongoing  12/30/2020 0047 by Ken Camp RN  Outcome: Ongoing  12/29/2020 2227 by Ian Muro, RCP  Outcome: Ongoing  12/29/2020 2227 by Ian Muro, RCP  Outcome: Ongoing     Problem: MECHANICAL VENTILATION  Goal: Patient will maintain patent airway  12/30/2020 0857 by Sagar Johnson, RCP  Outcome: Ongoing  12/30/2020 0047 by Ken Camp RN  Outcome: Ongoing  12/29/2020 2227 by Ian Muro, RCP  Outcome: Ongoing  12/29/2020 2227 by CHRISTY MontanoP  Outcome: Ongoing  Goal: Oral health is maintained or improved  12/30/2020 0857 by Sagar Johnson, RCP  Outcome: Ongoing  12/30/2020 0047 by Ken Camp RN  Outcome: Ongoing  12/29/2020 2227 by Ian Muro, RCP  Outcome: Ongoing  12/29/2020 2227 by Ian Muro, RCP  Outcome: Ongoing  Goal: ET tube will be managed safely  12/29/2020 2229 by Ian Muro, RCP  Outcome: Completed  12/29/2020 2227 by Ian Muro RCP  Outcome: Ongoing  12/29/2020 2227 by Ian Muro, RCP  Outcome: Ongoing  Goal: Ability to express needs and understand communication  12/30/2020 0857 by Sagar Johnson, RCP  Outcome: Ongoing  12/30/2020 0047 by Ken Camp RN  Outcome: Ongoing  12/29/2020 2227 by Ian Muro, RCP  Outcome: Ongoing  12/29/2020 2227 by Ian Muro RCP  Outcome: Ongoing  Goal: Mobility/activity is maintained at optimum level for patient  12/30/2020 0857 by Sagar Johnson RCP  Outcome: Ongoing  12/30/2020 0047 by Ken Camp RN Outcome: Ongoing  12/29/2020 2227 by Pedro Luis Diamond RCP  Outcome: Ongoing  12/29/2020 2227 by Pedro Luis Diamond RCP  Outcome: Ongoing  Goal: Tracheostomy will be managed safely  12/30/2020 0857 by Nicolas Gao RCP  Outcome: Ongoing  12/30/2020 0047 by Dave Mercado RN  Outcome: Ongoing  12/29/2020 2229 by Pedro Luis Diamond RCP  Outcome: Ongoing     Problem: MECHANICAL VENTILATION  Goal: Tracheostomy will be managed safely  12/30/2020 0857 by Nicolas Gao RCP  Outcome: Ongoing  12/30/2020 0047 by Dave Mercado RN  Outcome: Ongoing  12/29/2020 2229 by Pedro Luis Diamond RCP  Outcome: Ongoing     Problem: SKIN INTEGRITY  Goal: Skin integrity is maintained or improved  12/30/2020 0857 by Nicolas Gao RCP  Outcome: Ongoing  12/30/2020 0047 by Dave Mercado RN  Outcome: Ongoing  12/29/2020 2227 by Pedro Luis Diamond RCP  Outcome: Ongoing  12/29/2020 2227 by Pedro Luis Diamond RCP  Outcome: Ongoing

## 2020-12-30 NOTE — PLAN OF CARE
Problem: OXYGENATION/RESPIRATORY FUNCTION  Goal: Patient will maintain patent airway  12/29/2020 2227 by Karolyn Holley RCP  Outcome: Ongoing     Problem: OXYGENATION/RESPIRATORY FUNCTION  Goal: Patient will achieve/maintain normal respiratory rate/effort  Description: Respiratory rate and effort will be within normal limits for the patient  12/29/2020 2227 by Karolyn Holley RCP  Outcome: Ongoing     Problem: MECHANICAL VENTILATION  Goal: Patient will maintain patent airway  12/29/2020 2227 by Karolyn Holley RCP  Outcome: Ongoing     Problem: MECHANICAL VENTILATION  Goal: Oral health is maintained or improved  12/29/2020 2227 by Karolyn Holley RCP  Outcome: Ongoing     Problem: MECHANICAL VENTILATION  Goal: ET tube will be managed safely  12/29/2020 2229 by Karolyn Holley RCP  Outcome: Completed     Problem: MECHANICAL VENTILATION  Goal: Tracheostomy will be managed safely  Outcome: Ongoing     Problem: MECHANICAL VENTILATION  Goal: Ability to express needs and understand communication  12/29/2020 2227 by Karolyn Holley RCP  Outcome: Ongoing     Problem: MECHANICAL VENTILATION  Goal: Mobility/activity is maintained at optimum level for patient  12/29/2020 2227 by Karolyn Holley RCP  Outcome: Ongoing     Problem: SKIN INTEGRITY  Goal: Skin integrity is maintained or improved  12/29/2020 2227 by Karolyn Holley RCP  Outcome: Ongoing     Problem: RESPIRATORY  Intervention: Respiratory assessment  12/29/2020 2227 by Karolyn Holley RCP  Note: BRONCHOSPASM/BRONCHOCONSTRICTION    IMPROVE  AERATION/BREATHSOUNDS  ADMINISTER BRONCHODILATOR THERAPY AS APPROPRIATE  ASSESS BREATH SOUNDS  PATIENT EDUCATION AS NEEDED
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
## 2020-12-30 NOTE — PLAN OF CARE
Problem: OXYGENATION/RESPIRATORY FUNCTION  Goal: Patient will maintain patent airway  12/30/2020 0047 by Yonis Morelos RN  Outcome: Ongoing  12/29/2020 2227 by Yane Angeles, CHRISTYP  Outcome: Ongoing  12/29/2020 2227 by Yane Angeles, CHRISTYP  Outcome: Ongoing  12/29/2020 1303 by Noam Quevedo RN  Outcome: Ongoing  Goal: Patient will achieve/maintain normal respiratory rate/effort  Description: Respiratory rate and effort will be within normal limits for the patient  12/30/2020 0047 by Yonis Morelos RN  Outcome: Ongoing  12/29/2020 2227 by Yane Angeles, RCP  Outcome: Ongoing  12/29/2020 2227 by Yane Angeles, CHRISTYP  Outcome: Ongoing  12/29/2020 1303 by Noam Quevedo RN  Outcome: Ongoing     Problem: MECHANICAL VENTILATION  Goal: Patient will maintain patent airway  12/30/2020 0047 by Yonis Morelos RN  Outcome: Ongoing  12/29/2020 2227 by Yane Angeles RCP  Outcome: Ongoing  12/29/2020 2227 by Yane Angeles, RCP  Outcome: Ongoing  12/29/2020 1303 by Noam Quevedo RN  Outcome: Ongoing  Goal: Oral health is maintained or improved  12/30/2020 0047 by Yonis Morelos RN  Outcome: Ongoing  12/29/2020 2227 by Yane Angeles, RCP  Outcome: Ongoing  12/29/2020 2227 by Yane Angeles, RCP  Outcome: Ongoing  12/29/2020 1303 by Noam Quevedo RN  Outcome: Ongoing  Goal: ET tube will be managed safely  12/29/2020 2229 by Yane Angeles, RCP  Outcome: Completed  12/29/2020 2227 by Yane Angeles, RCP  Outcome: Ongoing  12/29/2020 2227 by Yane Angeles, RCP  Outcome: Ongoing  12/29/2020 1303 by Noam Quevedo RN  Outcome: Ongoing  Goal: Ability to express needs and understand communication  12/30/2020 0047 by Yonis Morelos RN  Outcome: Ongoing  12/29/2020 2227 by CHRISTY LaiP  Outcome: Ongoing  12/29/2020 2227 by Yane Angeles, CHRISTYP  Outcome: Ongoing  12/29/2020 1303 by Noam Quevedo RN  Outcome: Ongoing  Goal: Mobility/activity is maintained at optimum level for patient  12/30/2020 0047 by Yonis Morelos RN Outcome: Ongoing  12/29/2020 2227 by Jess Vargas, RCP  Outcome: Ongoing  12/29/2020 2227 by Jess Vargas, RCP  Outcome: Ongoing  12/29/2020 1303 by Suzan Taylor RN  Outcome: Ongoing  Goal: Tracheostomy will be managed safely  12/30/2020 0047 by Adriel Childs RN  Outcome: Ongoing  12/29/2020 2229 by Jess Vargas, RCP  Outcome: Ongoing     Problem: SKIN INTEGRITY  Goal: Skin integrity is maintained or improved  12/30/2020 0047 by Adriel Childs RN  Outcome: Ongoing  12/29/2020 2227 by Jess Vargas RCP  Outcome: Ongoing  12/29/2020 2227 by Jess Vargas RCP  Outcome: Ongoing  12/29/2020 1303 by Suzan Taylor RN  Outcome: Ongoing     Problem: Pain:  Goal: Pain level will decrease  Description: Pain level will decrease  12/30/2020 0047 by Adriel Childs RN  Outcome: Ongoing  12/29/2020 1303 by Suzan Taylor RN  Outcome: Ongoing  Goal: Control of acute pain  Description: Control of acute pain  12/30/2020 0047 by Adriel Childs RN  Outcome: Ongoing  12/29/2020 1303 by Suzan Taylor RN  Outcome: Ongoing  Goal: Control of chronic pain  Description: Control of chronic pain  12/30/2020 0047 by Adriel Childs RN  Outcome: Ongoing  12/29/2020 1303 by Suzan Taylor RN  Outcome: Ongoing     Problem: Nutrition  Goal: Optimal nutrition therapy  Description: Nutrition Problem #1: Inadequate oral intake  Intervention: Food and/or Nutrient Delivery: (Monitor TF tolerance; suggest goal rate of 50 mL/hr to provide 1800 kcal and 113 g pro/day.)  Nutritional Goals: meet % of estimated nutrient needs     12/30/2020 0047 by Adriel Childs RN  Outcome: Ongoing  12/29/2020 1303 by Suzan Taylor RN  Outcome: Ongoing     Problem: Musculor/Skeletal Functional Status  Goal: Highest potential functional level  12/30/2020 0047 by Adriel Childs RN  Outcome: Ongoing  12/29/2020 1303 by Suzan Taylor RN  Outcome: Ongoing     Problem: Skin Integrity:  Goal: Will show no infection signs and symptoms Description: Will show no infection signs and symptoms  12/30/2020 0047 by Karen Nguyễn RN  Outcome: Ongoing  12/29/2020 1303 by Daly Hardy RN  Outcome: Ongoing  Goal: Absence of new skin breakdown  Description: Absence of new skin breakdown  12/30/2020 0047 by Karen Nguyễn RN  Outcome: Ongoing  12/29/2020 1303 by Daly Hardy RN  Outcome: Ongoing     Problem: Falls - Risk of:  Goal: Will remain free from falls  Description: Will remain free from falls  12/30/2020 0047 by Karen Nguyễn RN  Outcome: Ongoing  12/29/2020 1303 by Daly Hardy RN  Outcome: Ongoing  Goal: Absence of physical injury  Description: Absence of physical injury  12/30/2020 0047 by Karen Nguyễn RN  Outcome: Ongoing  12/29/2020 1303 by Daly Hardy RN  Outcome: Ongoing     Problem: Restraint Use - Nonviolent/Non-Self-Destructive Behavior:  Goal: Absence of restraint indications  Description: Absence of restraint indications  12/30/2020 0047 by Karen Nguyễn RN  Outcome: Ongoing  12/29/2020 1303 by Daly Hardy RN  Outcome: Not Met This Shift  Goal: Absence of restraint-related injury  Description: Absence of restraint-related injury  12/30/2020 0047 by Karen Nguyễn RN  Outcome: Ongoing  12/29/2020 1303 by Daly Hardy RN  Outcome: Not Met This Shift     Problem: RESPIRATORY  Intervention: Respiratory assessment  12/29/2020 2227 by Bety Herzog RCP  Note: BRONCHOSPASM/BRONCHOCONSTRICTION    IMPROVE  AERATION/BREATHSOUNDS  ADMINISTER BRONCHODILATOR THERAPY AS APPROPRIATE  ASSESS BREATH SOUNDS  PATIENT EDUCATION AS NEEDED

## 2020-12-30 NOTE — PLAN OF CARE
Problem: OXYGENATION/RESPIRATORY FUNCTION  Goal: Patient will maintain patent airway  12/30/2020 1402 by Josh Allen RN  Outcome: Ongoing  12/30/2020 0857 by Abi Berkowitz RCP  Outcome: Ongoing  12/30/2020 0047 by Raman Huang RN  Outcome: Ongoing  Goal: Patient will achieve/maintain normal respiratory rate/effort  Description: Respiratory rate and effort will be within normal limits for the patient  12/30/2020 1402 by Josh Allen RN  Outcome: Ongoing  12/30/2020 0857 by Abi Berkowitz RCP  Outcome: Ongoing  12/30/2020 0047 by Raman Huang RN  Outcome: Ongoing     Problem: MECHANICAL VENTILATION  Goal: Patient will maintain patent airway  12/30/2020 1402 by Josh Allen RN  Outcome: Ongoing  12/30/2020 0857 by Abi Berkowitz RCP  Outcome: Ongoing  12/30/2020 0047 by Raman Huang RN  Outcome: Ongoing  Goal: Oral health is maintained or improved  12/30/2020 1402 by Josh Allen RN  Outcome: Ongoing  12/30/2020 0857 by Abi Berkowitz RCP  Outcome: Ongoing  12/30/2020 0047 by Raman Huang RN  Outcome: Ongoing  Goal: Ability to express needs and understand communication  12/30/2020 1402 by Josh Allen RN  Outcome: Ongoing  12/30/2020 0857 by Abi Berkowitz RCP  Outcome: Ongoing  12/30/2020 0047 by Raman Huang RN  Outcome: Ongoing  Goal: Mobility/activity is maintained at optimum level for patient  12/30/2020 1402 by Josh Allen RN  Outcome: Ongoing  12/30/2020 0857 by Abi Berkowitz RCP  Outcome: Ongoing  12/30/2020 0047 by Raman Huang RN  Outcome: Ongoing  Goal: Tracheostomy will be managed safely  12/30/2020 1402 by Josh Allen RN  Outcome: Ongoing  12/30/2020 0857 by Abi Berkowitz RCP  Outcome: Ongoing  12/30/2020 0047 by Raman Huang RN  Outcome: Ongoing     Problem: SKIN INTEGRITY  Goal: Skin integrity is maintained or improved  12/30/2020 1402 by Josh Allen RN  Outcome: Ongoing  12/30/2020 0857 by Abi Berkowitz RCP  Outcome: Ongoing 12/30/2020 0047 by Neeta Matamoros RN  Outcome: Ongoing     Problem: Pain:  Description: Pain management should include both nonpharmacologic and pharmacologic interventions.   Goal: Pain level will decrease  Description: Pain level will decrease  12/30/2020 1402 by Norah Avila RN  Outcome: Ongoing  12/30/2020 0047 by Neeta Matamoros RN  Outcome: Ongoing  Goal: Control of acute pain  Description: Control of acute pain  12/30/2020 1402 by Norah Avila RN  Outcome: Ongoing  12/30/2020 0047 by Neeta Matamoros RN  Outcome: Ongoing  Goal: Control of chronic pain  Description: Control of chronic pain  12/30/2020 1402 by Norah Avila RN  Outcome: Ongoing  12/30/2020 0047 by Neeta Matamoros RN  Outcome: Ongoing     Problem: Nutrition  Goal: Optimal nutrition therapy  Description: Nutrition Problem #1: Inadequate oral intake  Intervention: Food and/or Nutrient Delivery: (Monitor TF tolerance; suggest goal rate of 50 mL/hr to provide 1800 kcal and 113 g pro/day.)  Nutritional Goals: meet % of estimated nutrient needs     12/30/2020 1402 by Norah Avila RN  Outcome: Ongoing  12/30/2020 0047 by Neeta Matamoros RN  Outcome: Ongoing     Problem: Musculor/Skeletal Functional Status  Goal: Highest potential functional level  12/30/2020 1402 by Norah Avila RN  Outcome: Ongoing  12/30/2020 0047 by Neeta Matamoros RN  Outcome: Ongoing     Problem: Skin Integrity:  Goal: Will show no infection signs and symptoms  Description: Will show no infection signs and symptoms  12/30/2020 1402 by Norah Avila RN  Outcome: Ongoing  12/30/2020 0047 by Neeta Matamoros RN  Outcome: Ongoing  Goal: Absence of new skin breakdown  Description: Absence of new skin breakdown  12/30/2020 1402 by Norah Avila RN  Outcome: Ongoing  12/30/2020 0047 by Neeta Matamoros RN  Outcome: Ongoing     Problem: Falls - Risk of:  Goal: Will remain free from falls  Description: Will remain free from falls 12/30/2020 1402 by Cornell Fay RN  Outcome: Ongoing  12/30/2020 0047 by Lo Stevens RN  Outcome: Ongoing  Goal: Absence of physical injury  Description: Absence of physical injury  12/30/2020 1402 by Cornell Fay RN  Outcome: Ongoing  12/30/2020 0047 by Lo Stevens RN  Outcome: Ongoing     Problem: Restraint Use - Nonviolent/Non-Self-Destructive Behavior:  Goal: Absence of restraint indications  Description: Absence of restraint indications  12/30/2020 1402 by Cornell Fay RN  Outcome: Ongoing  12/30/2020 0047 by Lo Stevens RN  Outcome: Ongoing  Goal: Absence of restraint-related injury  Description: Absence of restraint-related injury  12/30/2020 1402 by Cornell Fay RN  Outcome: Ongoing  12/30/2020 0047 by Lo Stevens RN  Outcome: Ongoing

## 2020-12-31 ENCOUNTER — APPOINTMENT (OUTPATIENT)
Dept: GENERAL RADIOLOGY | Age: 55
DRG: 004 | End: 2020-12-31
Payer: MEDICARE

## 2020-12-31 ENCOUNTER — APPOINTMENT (OUTPATIENT)
Dept: DIALYSIS | Age: 55
DRG: 004 | End: 2020-12-31
Payer: MEDICARE

## 2020-12-31 LAB
ABSOLUTE EOS #: 0.21 K/UL (ref 0–0.4)
ABSOLUTE IMMATURE GRANULOCYTE: 0 K/UL (ref 0–0.3)
ABSOLUTE LYMPH #: 0.62 K/UL (ref 1–4.8)
ABSOLUTE MONO #: 1.23 K/UL (ref 0.1–0.8)
ALLEN TEST: ABNORMAL
ANION GAP SERPL CALCULATED.3IONS-SCNC: 13 MMOL/L (ref 9–17)
BASOPHILS # BLD: 1 % (ref 0–2)
BASOPHILS ABSOLUTE: 0.21 K/UL (ref 0–0.2)
BNP INTERPRETATION: ABNORMAL
BUN BLDV-MCNC: 64 MG/DL (ref 6–20)
BUN/CREAT BLD: ABNORMAL (ref 9–20)
CALCIUM SERPL-MCNC: 9 MG/DL (ref 8.6–10.4)
CHLORIDE BLD-SCNC: 99 MMOL/L (ref 98–107)
CO2: 25 MMOL/L (ref 20–31)
CREAT SERPL-MCNC: 5.14 MG/DL (ref 0.5–0.9)
DIFFERENTIAL TYPE: ABNORMAL
EOSINOPHILS RELATIVE PERCENT: 1 % (ref 1–4)
FIO2: 40
GFR AFRICAN AMERICAN: 11 ML/MIN
GFR NON-AFRICAN AMERICAN: 9 ML/MIN
GFR SERPL CREATININE-BSD FRML MDRD: ABNORMAL ML/MIN/{1.73_M2}
GFR SERPL CREATININE-BSD FRML MDRD: ABNORMAL ML/MIN/{1.73_M2}
GLUCOSE BLD-MCNC: 145 MG/DL (ref 70–99)
HCT VFR BLD CALC: 24.6 % (ref 36.3–47.1)
HEMOGLOBIN: 7.2 G/DL (ref 11.9–15.1)
IMMATURE GRANULOCYTES: 0 %
LV EF: 55 %
LVEF MODALITY: NORMAL
LYMPHOCYTES # BLD: 3 % (ref 24–44)
MCH RBC QN AUTO: 30.6 PG (ref 25.2–33.5)
MCHC RBC AUTO-ENTMCNC: 29.3 G/DL (ref 28.4–34.8)
MCV RBC AUTO: 104.7 FL (ref 82.6–102.9)
MODE: ABNORMAL
MONOCYTES # BLD: 6 % (ref 1–7)
MORPHOLOGY: ABNORMAL
MORPHOLOGY: ABNORMAL
NEGATIVE BASE EXCESS, ART: ABNORMAL (ref 0–2)
NRBC AUTOMATED: 0 PER 100 WBC
O2 DEVICE/FLOW/%: ABNORMAL
PATIENT TEMP: ABNORMAL
PDW BLD-RTO: 15.9 % (ref 11.8–14.4)
PHOSPHORUS: 2.7 MG/DL (ref 2.6–4.5)
PLATELET # BLD: 316 K/UL (ref 138–453)
PLATELET ESTIMATE: ABNORMAL
PMV BLD AUTO: 9 FL (ref 8.1–13.5)
POC HCO3: 31.3 MMOL/L (ref 21–28)
POC LACTIC ACID: 0.7 MMOL/L (ref 0.56–1.39)
POC O2 SATURATION: 95 % (ref 94–98)
POC PCO2 TEMP: ABNORMAL MM HG
POC PCO2: 54.5 MM HG (ref 35–48)
POC PH TEMP: ABNORMAL
POC PH: 7.37 (ref 7.35–7.45)
POC PO2 TEMP: ABNORMAL MM HG
POC PO2: 82.6 MM HG (ref 83–108)
POSITIVE BASE EXCESS, ART: 5 (ref 0–3)
POTASSIUM SERPL-SCNC: 4.8 MMOL/L (ref 3.7–5.3)
PRO-BNP: ABNORMAL PG/ML
PROCALCITONIN: 0.73 NG/ML
RBC # BLD: 2.35 M/UL (ref 3.95–5.11)
RBC # BLD: ABNORMAL 10*6/UL
SAMPLE SITE: ABNORMAL
SEG NEUTROPHILS: 89 % (ref 36–66)
SEGMENTED NEUTROPHILS ABSOLUTE COUNT: 18.23 K/UL (ref 1.8–7.7)
SODIUM BLD-SCNC: 137 MMOL/L (ref 135–144)
TCO2 (CALC), ART: 33 MMOL/L (ref 22–29)
WBC # BLD: 20.5 K/UL (ref 3.5–11.3)
WBC # BLD: ABNORMAL 10*3/UL

## 2020-12-31 PROCEDURE — 2500000003 HC RX 250 WO HCPCS: Performed by: STUDENT IN AN ORGANIZED HEALTH CARE EDUCATION/TRAINING PROGRAM

## 2020-12-31 PROCEDURE — 6360000002 HC RX W HCPCS: Performed by: STUDENT IN AN ORGANIZED HEALTH CARE EDUCATION/TRAINING PROGRAM

## 2020-12-31 PROCEDURE — 6370000000 HC RX 637 (ALT 250 FOR IP): Performed by: STUDENT IN AN ORGANIZED HEALTH CARE EDUCATION/TRAINING PROGRAM

## 2020-12-31 PROCEDURE — 94003 VENT MGMT INPAT SUBQ DAY: CPT

## 2020-12-31 PROCEDURE — 84100 ASSAY OF PHOSPHORUS: CPT

## 2020-12-31 PROCEDURE — 6370000000 HC RX 637 (ALT 250 FOR IP): Performed by: NURSE PRACTITIONER

## 2020-12-31 PROCEDURE — 82803 BLOOD GASES ANY COMBINATION: CPT

## 2020-12-31 PROCEDURE — 93308 TTE F-UP OR LMTD: CPT

## 2020-12-31 PROCEDURE — 84145 PROCALCITONIN (PCT): CPT

## 2020-12-31 PROCEDURE — 94770 HC ETCO2 MONITOR DAILY: CPT

## 2020-12-31 PROCEDURE — 2000000000 HC ICU R&B

## 2020-12-31 PROCEDURE — 2700000000 HC OXYGEN THERAPY PER DAY

## 2020-12-31 PROCEDURE — 94761 N-INVAS EAR/PLS OXIMETRY MLT: CPT

## 2020-12-31 PROCEDURE — 80048 BASIC METABOLIC PNL TOTAL CA: CPT

## 2020-12-31 PROCEDURE — 37799 UNLISTED PX VASCULAR SURGERY: CPT

## 2020-12-31 PROCEDURE — 2580000003 HC RX 258: Performed by: STUDENT IN AN ORGANIZED HEALTH CARE EDUCATION/TRAINING PROGRAM

## 2020-12-31 PROCEDURE — 93325 DOPPLER ECHO COLOR FLOW MAPG: CPT

## 2020-12-31 PROCEDURE — 83880 ASSAY OF NATRIURETIC PEPTIDE: CPT

## 2020-12-31 PROCEDURE — 94640 AIRWAY INHALATION TREATMENT: CPT

## 2020-12-31 PROCEDURE — 83605 ASSAY OF LACTIC ACID: CPT

## 2020-12-31 PROCEDURE — 90935 HEMODIALYSIS ONE EVALUATION: CPT | Performed by: INTERNAL MEDICINE

## 2020-12-31 PROCEDURE — 71045 X-RAY EXAM CHEST 1 VIEW: CPT

## 2020-12-31 PROCEDURE — P9047 ALBUMIN (HUMAN), 25%, 50ML: HCPCS | Performed by: STUDENT IN AN ORGANIZED HEALTH CARE EDUCATION/TRAINING PROGRAM

## 2020-12-31 PROCEDURE — 6370000000 HC RX 637 (ALT 250 FOR IP): Performed by: INTERNAL MEDICINE

## 2020-12-31 PROCEDURE — 93321 DOPPLER ECHO F-UP/LMTD STD: CPT

## 2020-12-31 PROCEDURE — 85025 COMPLETE CBC W/AUTO DIFF WBC: CPT

## 2020-12-31 PROCEDURE — 90935 HEMODIALYSIS ONE EVALUATION: CPT

## 2020-12-31 PROCEDURE — 93005 ELECTROCARDIOGRAM TRACING: CPT | Performed by: NURSE PRACTITIONER

## 2020-12-31 RX ORDER — FENTANYL CITRATE 50 UG/ML
25 INJECTION, SOLUTION INTRAMUSCULAR; INTRAVENOUS ONCE
Status: COMPLETED | OUTPATIENT
Start: 2020-12-31 | End: 2020-12-31

## 2020-12-31 RX ORDER — FENTANYL CITRATE 50 UG/ML
50 INJECTION, SOLUTION INTRAMUSCULAR; INTRAVENOUS EVERY 12 HOURS PRN
Status: DISCONTINUED | OUTPATIENT
Start: 2020-12-31 | End: 2021-01-04

## 2020-12-31 RX ORDER — BUDESONIDE AND FORMOTEROL FUMARATE DIHYDRATE 80; 4.5 UG/1; UG/1
2 AEROSOL RESPIRATORY (INHALATION) 2 TIMES DAILY
Status: CANCELLED | OUTPATIENT
Start: 2020-12-31

## 2020-12-31 RX ORDER — MIDODRINE HYDROCHLORIDE 5 MG/1
10 TABLET ORAL PRN
Status: DISCONTINUED | OUTPATIENT
Start: 2020-12-31 | End: 2021-01-06 | Stop reason: HOSPADM

## 2020-12-31 RX ORDER — GABAPENTIN 100 MG/1
100 CAPSULE ORAL EVERY 8 HOURS
Status: DISCONTINUED | OUTPATIENT
Start: 2020-12-31 | End: 2021-01-06 | Stop reason: HOSPADM

## 2020-12-31 RX ORDER — OXYCODONE HYDROCHLORIDE 5 MG/1
5 TABLET ORAL EVERY 6 HOURS
Status: DISCONTINUED | OUTPATIENT
Start: 2020-12-31 | End: 2020-12-31

## 2020-12-31 RX ORDER — OXYCODONE HYDROCHLORIDE 5 MG/1
5 TABLET ORAL EVERY 8 HOURS
Status: DISCONTINUED | OUTPATIENT
Start: 2020-12-31 | End: 2021-01-01

## 2020-12-31 RX ORDER — FENTANYL CITRATE 50 UG/ML
100 INJECTION, SOLUTION INTRAMUSCULAR; INTRAVENOUS EVERY 12 HOURS PRN
Status: DISCONTINUED | OUTPATIENT
Start: 2020-12-31 | End: 2021-01-04

## 2020-12-31 RX ORDER — GABAPENTIN 100 MG/1
200 CAPSULE ORAL EVERY 8 HOURS
Status: DISCONTINUED | OUTPATIENT
Start: 2020-12-31 | End: 2020-12-31

## 2020-12-31 RX ADMIN — Medication 6 MCG/MIN: at 12:05

## 2020-12-31 RX ADMIN — Medication 15 MG: at 08:19

## 2020-12-31 RX ADMIN — QUETIAPINE FUMARATE 50 MG: 100 TABLET ORAL at 08:17

## 2020-12-31 RX ADMIN — OXYCODONE HYDROCHLORIDE 5 MG: 5 TABLET ORAL at 08:19

## 2020-12-31 RX ADMIN — AMIODARONE HYDROCHLORIDE 200 MG: 200 TABLET ORAL at 08:17

## 2020-12-31 RX ADMIN — MIDODRINE HYDROCHLORIDE 10 MG: 5 TABLET ORAL at 10:48

## 2020-12-31 RX ADMIN — ALBUMIN (HUMAN) 25 G: 0.25 INJECTION, SOLUTION INTRAVENOUS at 10:25

## 2020-12-31 RX ADMIN — BACITRACIN: 500 OINTMENT TOPICAL at 21:40

## 2020-12-31 RX ADMIN — ACETAMINOPHEN 1000 MG: 500 TABLET ORAL at 21:37

## 2020-12-31 RX ADMIN — CALCIUM ACETATE 2001 MG: 667 CAPSULE ORAL at 16:40

## 2020-12-31 RX ADMIN — Medication 5 MG: at 21:38

## 2020-12-31 RX ADMIN — ALBUTEROL SULFATE 2.5 MG: 2.5 SOLUTION RESPIRATORY (INHALATION) at 23:31

## 2020-12-31 RX ADMIN — SILVER SULFADIAZINE: 10 CREAM TOPICAL at 21:40

## 2020-12-31 RX ADMIN — GABAPENTIN 100 MG: 100 CAPSULE ORAL at 15:08

## 2020-12-31 RX ADMIN — Medication 240 ML: at 16:00

## 2020-12-31 RX ADMIN — SILVER SULFADIAZINE: 10 CREAM TOPICAL at 08:19

## 2020-12-31 RX ADMIN — MIDODRINE HYDROCHLORIDE 10 MG: 5 TABLET ORAL at 06:19

## 2020-12-31 RX ADMIN — IPRATROPIUM BROMIDE AND ALBUTEROL SULFATE 1 AMPULE: .5; 3 SOLUTION RESPIRATORY (INHALATION) at 19:48

## 2020-12-31 RX ADMIN — VASOPRESSIN 0.04 UNITS/MIN: 20 INJECTION INTRAVENOUS at 16:39

## 2020-12-31 RX ADMIN — APIXABAN 5 MG: 5 TABLET, FILM COATED ORAL at 08:17

## 2020-12-31 RX ADMIN — OXYCODONE HYDROCHLORIDE 5 MG: 5 TABLET ORAL at 03:02

## 2020-12-31 RX ADMIN — DOXEPIN HYDROCHLORIDE 10 MG: 10 CAPSULE ORAL at 21:54

## 2020-12-31 RX ADMIN — CALCIUM ACETATE 2001 MG: 667 CAPSULE ORAL at 08:16

## 2020-12-31 RX ADMIN — VASOPRESSIN 0.04 UNITS/MIN: 20 INJECTION INTRAVENOUS at 01:22

## 2020-12-31 RX ADMIN — IPRATROPIUM BROMIDE AND ALBUTEROL SULFATE 1 AMPULE: .5; 3 SOLUTION RESPIRATORY (INHALATION) at 11:57

## 2020-12-31 RX ADMIN — QUETIAPINE FUMARATE 50 MG: 100 TABLET ORAL at 21:38

## 2020-12-31 RX ADMIN — Medication 10 MCG/MIN: at 11:23

## 2020-12-31 RX ADMIN — ESCITALOPRAM OXALATE 20 MG: 10 TABLET ORAL at 08:17

## 2020-12-31 RX ADMIN — ALBUMIN (HUMAN) 25 G: 0.25 INJECTION, SOLUTION INTRAVENOUS at 12:20

## 2020-12-31 RX ADMIN — IPRATROPIUM BROMIDE AND ALBUTEROL SULFATE 1 AMPULE: .5; 3 SOLUTION RESPIRATORY (INHALATION) at 15:59

## 2020-12-31 RX ADMIN — SODIUM CHLORIDE, PRESERVATIVE FREE 10 ML: 5 INJECTION INTRAVENOUS at 08:18

## 2020-12-31 RX ADMIN — GABAPENTIN 100 MG: 100 CAPSULE ORAL at 08:18

## 2020-12-31 RX ADMIN — Medication 8 MCG/MIN: at 11:46

## 2020-12-31 RX ADMIN — DOCUSATE SODIUM 100 MG: 50 LIQUID ORAL at 08:17

## 2020-12-31 RX ADMIN — ACETAMINOPHEN 1000 MG: 500 TABLET ORAL at 15:05

## 2020-12-31 RX ADMIN — MIDODRINE HYDROCHLORIDE 10 MG: 5 TABLET ORAL at 15:07

## 2020-12-31 RX ADMIN — Medication 4 MCG/MIN: at 14:10

## 2020-12-31 RX ADMIN — FENTANYL CITRATE 25 MCG: 50 INJECTION, SOLUTION INTRAMUSCULAR; INTRAVENOUS at 05:30

## 2020-12-31 RX ADMIN — VASOPRESSIN 0.04 UNITS/MIN: 20 INJECTION INTRAVENOUS at 10:00

## 2020-12-31 RX ADMIN — BACITRACIN: 500 OINTMENT TOPICAL at 08:20

## 2020-12-31 RX ADMIN — APIXABAN 5 MG: 5 TABLET, FILM COATED ORAL at 21:37

## 2020-12-31 RX ADMIN — ALBUTEROL SULFATE 2.5 MG: 2.5 SOLUTION RESPIRATORY (INHALATION) at 03:02

## 2020-12-31 RX ADMIN — POLYETHYLENE GLYCOL 3350 17 G: 17 POWDER, FOR SOLUTION ORAL at 08:14

## 2020-12-31 RX ADMIN — ACETAMINOPHEN 1000 MG: 500 TABLET ORAL at 05:52

## 2020-12-31 RX ADMIN — CALCIUM ACETATE 2001 MG: 667 CAPSULE ORAL at 10:46

## 2020-12-31 RX ADMIN — IPRATROPIUM BROMIDE AND ALBUTEROL SULFATE 1 AMPULE: .5; 3 SOLUTION RESPIRATORY (INHALATION) at 08:13

## 2020-12-31 RX ADMIN — SODIUM CHLORIDE, PRESERVATIVE FREE 10 ML: 5 INJECTION INTRAVENOUS at 21:20

## 2020-12-31 RX ADMIN — FENTANYL CITRATE 25 MCG: 50 INJECTION, SOLUTION INTRAMUSCULAR; INTRAVENOUS at 03:02

## 2020-12-31 RX ADMIN — OXYCODONE HYDROCHLORIDE 5 MG: 5 TABLET ORAL at 15:06

## 2020-12-31 RX ADMIN — FENTANYL CITRATE 100 MCG: 50 INJECTION, SOLUTION INTRAMUSCULAR; INTRAVENOUS at 15:06

## 2020-12-31 RX ADMIN — MIDODRINE HYDROCHLORIDE 10 MG: 5 TABLET ORAL at 21:37

## 2020-12-31 ASSESSMENT — PULMONARY FUNCTION TESTS
PIF_VALUE: 15
PIF_VALUE: 8
PIF_VALUE: 12
PIF_VALUE: 13
PIF_VALUE: 15
PIF_VALUE: 12
PIF_VALUE: 14
PIF_VALUE: 9
PIF_VALUE: 13
PIF_VALUE: 14
PIF_VALUE: 8
PIF_VALUE: 13
PIF_VALUE: 10
PIF_VALUE: 10
PIF_VALUE: 9
PIF_VALUE: 28
PIF_VALUE: 9
PIF_VALUE: 10
PIF_VALUE: 13

## 2020-12-31 ASSESSMENT — PAIN DESCRIPTION - DESCRIPTORS
DESCRIPTORS: CONSTANT;THROBBING
DESCRIPTORS: ACHING;CONSTANT;THROBBING

## 2020-12-31 ASSESSMENT — PAIN SCALES - GENERAL
PAINLEVEL_OUTOF10: 6
PAINLEVEL_OUTOF10: 7

## 2020-12-31 ASSESSMENT — PAIN DESCRIPTION - ORIENTATION: ORIENTATION: RIGHT;LEFT

## 2020-12-31 ASSESSMENT — PAIN DESCRIPTION - LOCATION: LOCATION: FACE;LEG

## 2020-12-31 ASSESSMENT — PAIN DESCRIPTION - FREQUENCY
FREQUENCY: CONTINUOUS
FREQUENCY: CONTINUOUS

## 2020-12-31 ASSESSMENT — PAIN DESCRIPTION - ONSET: ONSET: ON-GOING

## 2020-12-31 ASSESSMENT — PAIN - FUNCTIONAL ASSESSMENT: PAIN_FUNCTIONAL_ASSESSMENT: PREVENTS OR INTERFERES SOME ACTIVE ACTIVITIES AND ADLS

## 2020-12-31 ASSESSMENT — PAIN DESCRIPTION - PAIN TYPE: TYPE: ACUTE PAIN
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
## 2020-12-31 NOTE — FLOWSHEET NOTE
Dialysis Post Treatment Note  Vitals:    12/31/20 1400   BP: 121/77   Pulse: 71   Resp: 20   Temp: 99.9 °F (37.7 °C)   SpO2:      Pre-Weight = 91.2kg  Post-weight = Weight: 195 lb 15.8 oz (88.9 kg)  Total Liters Processed = Total Liters Processed (l/min): 79.3 l/min  Rinseback Volume (mL) = Rinseback Volume (ml): 360 ml  Net Removal (mL) = NET Removed (ml): 2460 ml  Patient's dry weight= 84.6 kg. As outpt. Type of access used= Left upper arm fistula,    Length of treatment= 210 min. Hypotensive and bradycardic throughout requiring Levophed titration, Midodrine 10 mg., SPA 25 grams x 2, cool dialysate temp and reduction in UFR. Also on Vasopressin drip. Blood flow rate also slowed from 450 ml./min to 350 ml./min due to heart rate dropping from 73-54/min.

## 2020-12-31 NOTE — FLOWSHEET NOTE
DATE: 2020    NAME: Heather Warner  MRN: [id number]   : 1965    Patient not seen this date for Physical Therapy due to:  [] Blood transfusion in progress  [x] Hemodialysis  [] Patient Declined  [] Spine Precautions   [] Strict Bedrest  [] Surgery/ Procedure  [] Testing      [] Other        [] PT is being discontinued at this time. Patient independent. No further needs. [] PT is being discontinued at this time due to declining physical/ medical status. Therapy is not appropriate at this time.     Violeta Nose, PTA

## 2020-12-31 NOTE — PROGRESS NOTES
[facility]  Occupational Therapy Not Seen Note    DATE: 2020  Name: Michelle Rizzo  : 1965  MRN: [id number]    Patient not available for Occupational Therapy due to:    [] Testing:    [x] Hemodialysis    [] Blood Transfusion in Progress    []Refusal by Patient:    [] Surgery/Procedure:    [] Strict Bedrest    [] Sedation    [] Spine Precautions     [] Pt being transferred to palliative care at this time. Spoke with pt/family and OT services to be defered. [] Pt independent with functional mobility and functional tasks.  Pt with no OT acute care needs at this time, will defer OT eval.    [] Other    Next Scheduled Treatment: Ck in pm as able or     Lattie Siemens, OTR/L
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
## 2020-12-31 NOTE — CARE COORDINATION
Spoke with Junior Monet RN trauma coordinator who states patient not appropriate for DC today. Left VM with Pepper Vides at [facility]. Spoke with Lalito Cox at [facility], put transportation request on will call    1231 spoke with Deanna Zacarias RN trauma coordinator who states patient may be ready for Dc if WBC count trends down.   I spoke with Pepper Vides at [facility] and updated her, they are awaiting a bed

## 2021-01-01 ENCOUNTER — TELEPHONE (OUTPATIENT)
Dept: NEPHROLOGY | Age: 56
End: 2021-01-01

## 2021-01-01 ENCOUNTER — APPOINTMENT (OUTPATIENT)
Dept: GENERAL RADIOLOGY | Age: 56
DRG: 177 | End: 2021-01-01
Payer: MEDICARE

## 2021-01-01 ENCOUNTER — ANESTHESIA EVENT (OUTPATIENT)
Dept: OPERATING ROOM | Age: 56
End: 2021-01-01
Payer: MEDICARE

## 2021-01-01 ENCOUNTER — ANESTHESIA (OUTPATIENT)
Dept: OPERATING ROOM | Age: 56
End: 2021-01-01
Payer: MEDICARE

## 2021-01-01 ENCOUNTER — APPOINTMENT (OUTPATIENT)
Dept: MRI IMAGING | Age: 56
DRG: 177 | End: 2021-01-01
Payer: MEDICARE

## 2021-01-01 ENCOUNTER — APPOINTMENT (OUTPATIENT)
Dept: CT IMAGING | Age: 56
DRG: 177 | End: 2021-01-01
Payer: MEDICARE

## 2021-01-01 ENCOUNTER — HOSPITAL ENCOUNTER (INPATIENT)
Age: 56
LOS: 2 days | Discharge: HOME | DRG: 011 | End: 2021-08-02
Attending: OTOLARYNGOLOGY | Admitting: OTOLARYNGOLOGY
Payer: MEDICARE

## 2021-01-01 ENCOUNTER — APPOINTMENT (OUTPATIENT)
Dept: GENERAL RADIOLOGY | Age: 56
DRG: 004 | End: 2021-01-01
Payer: MEDICARE

## 2021-01-01 ENCOUNTER — ANESTHESIA EVENT (OUTPATIENT)
Dept: OPERATING ROOM | Age: 56
DRG: 011 | End: 2021-01-01
Payer: MEDICARE

## 2021-01-01 ENCOUNTER — TELEPHONE (OUTPATIENT)
Dept: CARDIOLOGY CLINIC | Age: 56
End: 2021-01-01

## 2021-01-01 ENCOUNTER — OFFICE VISIT (OUTPATIENT)
Dept: ENT CLINIC | Age: 56
End: 2021-01-01
Payer: MEDICARE

## 2021-01-01 ENCOUNTER — APPOINTMENT (OUTPATIENT)
Dept: INTERVENTIONAL RADIOLOGY/VASCULAR | Age: 56
DRG: 252 | End: 2021-01-01
Payer: MEDICARE

## 2021-01-01 ENCOUNTER — HOSPITAL ENCOUNTER (OUTPATIENT)
Age: 56
Discharge: NURSING, REHAB OR LONG-TERM CARE | End: 2021-11-18
Attending: OTOLARYNGOLOGY | Admitting: OTOLARYNGOLOGY
Payer: MEDICARE

## 2021-01-01 ENCOUNTER — TELEPHONE (OUTPATIENT)
Dept: ENT CLINIC | Age: 56
End: 2021-01-01

## 2021-01-01 ENCOUNTER — APPOINTMENT (OUTPATIENT)
Dept: GENERAL RADIOLOGY | Age: 56
DRG: 252 | End: 2021-01-01
Payer: MEDICARE

## 2021-01-01 ENCOUNTER — HOSPITAL ENCOUNTER (OUTPATIENT)
Dept: CT IMAGING | Age: 56
Discharge: HOME | End: 2021-06-29
Payer: MEDICARE

## 2021-01-01 ENCOUNTER — HOSPITAL ENCOUNTER (INPATIENT)
Age: 56
LOS: 4 days | Discharge: NURSING, REHAB OR LONG-TERM CARE | DRG: 252 | End: 2021-12-29
Attending: STUDENT IN AN ORGANIZED HEALTH CARE EDUCATION/TRAINING PROGRAM | Admitting: INTERNAL MEDICINE
Payer: MEDICARE

## 2021-01-01 ENCOUNTER — TELEPHONE (OUTPATIENT)
Dept: FAMILY MEDICINE CLINIC | Age: 56
End: 2021-01-01

## 2021-01-01 ENCOUNTER — HOSPITAL ENCOUNTER (INPATIENT)
Age: 56
LOS: 3 days | Discharge: NURSING, REHAB OR LONG-TERM CARE | DRG: 871 | End: 2021-09-24
Attending: EMERGENCY MEDICINE | Admitting: FAMILY MEDICINE
Payer: MEDICARE

## 2021-01-01 ENCOUNTER — HOSPITAL ENCOUNTER (OUTPATIENT)
Dept: INTERVENTIONAL RADIOLOGY/VASCULAR | Age: 56
Discharge: HOME | End: 2021-07-14
Payer: MEDICARE

## 2021-01-01 ENCOUNTER — PROCEDURE VISIT (OUTPATIENT)
Dept: ENT CLINIC | Age: 56
End: 2021-01-01
Payer: MEDICARE

## 2021-01-01 ENCOUNTER — APPOINTMENT (OUTPATIENT)
Dept: INTERVENTIONAL RADIOLOGY/VASCULAR | Age: 56
DRG: 871 | End: 2021-01-01
Payer: MEDICARE

## 2021-01-01 ENCOUNTER — APPOINTMENT (OUTPATIENT)
Dept: GENERAL RADIOLOGY | Age: 56
DRG: 871 | End: 2021-01-01
Payer: MEDICARE

## 2021-01-01 ENCOUNTER — OFFICE VISIT (OUTPATIENT)
Dept: CARDIOLOGY CLINIC | Age: 56
End: 2021-01-01
Payer: MEDICARE

## 2021-01-01 ENCOUNTER — APPOINTMENT (OUTPATIENT)
Dept: GENERAL RADIOLOGY | Age: 56
DRG: 011 | End: 2021-01-01
Attending: OTOLARYNGOLOGY
Payer: MEDICARE

## 2021-01-01 ENCOUNTER — ANESTHESIA (OUTPATIENT)
Dept: OPERATING ROOM | Age: 56
DRG: 011 | End: 2021-01-01
Payer: MEDICARE

## 2021-01-01 ENCOUNTER — OFFICE VISIT (OUTPATIENT)
Dept: ENT CLINIC | Age: 56
End: 2021-01-01

## 2021-01-01 ENCOUNTER — APPOINTMENT (OUTPATIENT)
Dept: GENERAL RADIOLOGY | Age: 56
End: 2021-01-01
Payer: COMMERCIAL

## 2021-01-01 ENCOUNTER — APPOINTMENT (OUTPATIENT)
Dept: INTERVENTIONAL RADIOLOGY/VASCULAR | Age: 56
DRG: 177 | End: 2021-01-01
Payer: MEDICARE

## 2021-01-01 ENCOUNTER — HOSPITAL ENCOUNTER (OUTPATIENT)
Dept: INTERVENTIONAL RADIOLOGY/VASCULAR | Age: 56
Discharge: HOME | End: 2021-10-27
Payer: MEDICARE

## 2021-01-01 ENCOUNTER — HOSPITAL ENCOUNTER (INPATIENT)
Age: 56
LOS: 10 days | Discharge: NURSING, REHAB OR LONG-TERM CARE | DRG: 177 | End: 2021-03-24
Attending: FAMILY MEDICINE | Admitting: INTERNAL MEDICINE
Payer: MEDICARE

## 2021-01-01 ENCOUNTER — APPOINTMENT (OUTPATIENT)
Dept: CT IMAGING | Age: 56
DRG: 871 | End: 2021-01-01
Payer: MEDICARE

## 2021-01-01 VITALS
SYSTOLIC BLOOD PRESSURE: 116 MMHG | WEIGHT: 152 LBS | RESPIRATION RATE: 14 BRPM | OXYGEN SATURATION: 92 % | DIASTOLIC BLOOD PRESSURE: 80 MMHG | HEART RATE: 66 BPM | BODY MASS INDEX: 26.93 KG/M2 | TEMPERATURE: 98.6 F

## 2021-01-01 VITALS
SYSTOLIC BLOOD PRESSURE: 138 MMHG | DIASTOLIC BLOOD PRESSURE: 91 MMHG | BODY MASS INDEX: 28.7 KG/M2 | HEART RATE: 55 BPM | HEIGHT: 63 IN | WEIGHT: 162 LBS

## 2021-01-01 VITALS
SYSTOLIC BLOOD PRESSURE: 120 MMHG | HEIGHT: 63 IN | WEIGHT: 144.4 LBS | RESPIRATION RATE: 16 BRPM | BODY MASS INDEX: 25.59 KG/M2 | HEART RATE: 65 BPM | DIASTOLIC BLOOD PRESSURE: 65 MMHG | TEMPERATURE: 97.8 F | OXYGEN SATURATION: 94 %

## 2021-01-01 VITALS
DIASTOLIC BLOOD PRESSURE: 78 MMHG | WEIGHT: 150.57 LBS | SYSTOLIC BLOOD PRESSURE: 118 MMHG | BODY MASS INDEX: 26.67 KG/M2 | HEART RATE: 61 BPM | OXYGEN SATURATION: 92 % | TEMPERATURE: 97.3 F | RESPIRATION RATE: 20 BRPM

## 2021-01-01 VITALS
DIASTOLIC BLOOD PRESSURE: 70 MMHG | WEIGHT: 160 LBS | TEMPERATURE: 98.6 F | HEART RATE: 64 BPM | SYSTOLIC BLOOD PRESSURE: 111 MMHG | BODY MASS INDEX: 28.34 KG/M2 | OXYGEN SATURATION: 98 % | RESPIRATION RATE: 18 BRPM

## 2021-01-01 VITALS
HEART RATE: 60 BPM | TEMPERATURE: 97.7 F | OXYGEN SATURATION: 98 % | SYSTOLIC BLOOD PRESSURE: 141 MMHG | HEIGHT: 63 IN | WEIGHT: 141.54 LBS | BODY MASS INDEX: 25.08 KG/M2 | DIASTOLIC BLOOD PRESSURE: 98 MMHG | RESPIRATION RATE: 18 BRPM

## 2021-01-01 VITALS
TEMPERATURE: 97.4 F | SYSTOLIC BLOOD PRESSURE: 128 MMHG | DIASTOLIC BLOOD PRESSURE: 86 MMHG | RESPIRATION RATE: 16 BRPM | BODY MASS INDEX: 30.48 KG/M2 | OXYGEN SATURATION: 99 % | WEIGHT: 172 LBS | HEART RATE: 49 BPM | HEIGHT: 63 IN

## 2021-01-01 VITALS
RESPIRATION RATE: 13 BRPM | TEMPERATURE: 98 F | DIASTOLIC BLOOD PRESSURE: 88 MMHG | OXYGEN SATURATION: 99 % | SYSTOLIC BLOOD PRESSURE: 140 MMHG | HEART RATE: 66 BPM

## 2021-01-01 VITALS
HEART RATE: 92 BPM | DIASTOLIC BLOOD PRESSURE: 76 MMHG | RESPIRATION RATE: 16 BRPM | OXYGEN SATURATION: 96 % | SYSTOLIC BLOOD PRESSURE: 112 MMHG | WEIGHT: 135.36 LBS | TEMPERATURE: 98.7 F | BODY MASS INDEX: 23.98 KG/M2

## 2021-01-01 VITALS
HEART RATE: 70 BPM | TEMPERATURE: 97.5 F | SYSTOLIC BLOOD PRESSURE: 125 MMHG | DIASTOLIC BLOOD PRESSURE: 72 MMHG | WEIGHT: 164.24 LBS | RESPIRATION RATE: 18 BRPM | HEIGHT: 63 IN | BODY MASS INDEX: 29.1 KG/M2 | OXYGEN SATURATION: 97 %

## 2021-01-01 VITALS — OXYGEN SATURATION: 100 % | TEMPERATURE: 67.5 F | DIASTOLIC BLOOD PRESSURE: 64 MMHG | SYSTOLIC BLOOD PRESSURE: 149 MMHG

## 2021-01-01 VITALS — SYSTOLIC BLOOD PRESSURE: 110 MMHG | OXYGEN SATURATION: 100 % | DIASTOLIC BLOOD PRESSURE: 68 MMHG

## 2021-01-01 VITALS
OXYGEN SATURATION: 97 % | WEIGHT: 150.57 LBS | RESPIRATION RATE: 19 BRPM | DIASTOLIC BLOOD PRESSURE: 78 MMHG | HEART RATE: 74 BPM | BODY MASS INDEX: 26.67 KG/M2 | SYSTOLIC BLOOD PRESSURE: 151 MMHG | TEMPERATURE: 98.2 F

## 2021-01-01 DIAGNOSIS — J18.9 PNEUMONIA OF RIGHT LOWER LOBE DUE TO INFECTIOUS ORGANISM: Primary | ICD-10-CM

## 2021-01-01 DIAGNOSIS — Z99.81 OXYGEN DEPENDENT: ICD-10-CM

## 2021-01-01 DIAGNOSIS — J44.9 CHRONIC OBSTRUCTIVE PULMONARY DISEASE, UNSPECIFIED COPD TYPE (HCC): Primary | ICD-10-CM

## 2021-01-01 DIAGNOSIS — J39.8 INCREASED TRACHEAL SECRETIONS: ICD-10-CM

## 2021-01-01 DIAGNOSIS — J96.11 CHRONIC RESPIRATORY FAILURE WITH HYPOXIA (HCC): Primary | ICD-10-CM

## 2021-01-01 DIAGNOSIS — M54.9 MID BACK PAIN: ICD-10-CM

## 2021-01-01 DIAGNOSIS — R49.0 HOARSENESS: ICD-10-CM

## 2021-01-01 DIAGNOSIS — J41.0 SIMPLE CHRONIC BRONCHITIS (HCC): ICD-10-CM

## 2021-01-01 DIAGNOSIS — J34.89 NASAL VALVE STENOSIS: ICD-10-CM

## 2021-01-01 DIAGNOSIS — R09.89 TERMINAL RESPIRATORY SECRETIONS: ICD-10-CM

## 2021-01-01 DIAGNOSIS — J96.11 CHRONIC RESPIRATORY FAILURE WITH HYPOXIA (HCC): ICD-10-CM

## 2021-01-01 DIAGNOSIS — J34.89 NASAL OBSTRUCTION: ICD-10-CM

## 2021-01-01 DIAGNOSIS — T30.0 FULL THICKNESS BURN: ICD-10-CM

## 2021-01-01 DIAGNOSIS — T82.590A MALFUNCTION OF ARTERIOVENOUS DIALYSIS FISTULA, INITIAL ENCOUNTER (HCC): ICD-10-CM

## 2021-01-01 DIAGNOSIS — J95.04 TRACHEOCUTANEOUS FISTULA FOLLOWING TRACHEOSTOMY (HCC): ICD-10-CM

## 2021-01-01 DIAGNOSIS — J18.9 COMMUNITY ACQUIRED PNEUMONIA, UNSPECIFIED LATERALITY: ICD-10-CM

## 2021-01-01 DIAGNOSIS — R06.02 SHORTNESS OF BREATH: ICD-10-CM

## 2021-01-01 DIAGNOSIS — M79.605 PAIN IN BOTH LOWER EXTREMITIES: ICD-10-CM

## 2021-01-01 DIAGNOSIS — F40.11 GENERALIZED SOCIAL PHOBIA: Primary | ICD-10-CM

## 2021-01-01 DIAGNOSIS — Z93.0 TRACHEOSTOMY DEPENDENCE (HCC): Primary | ICD-10-CM

## 2021-01-01 DIAGNOSIS — J95.03 TRACHEAL STENOSIS DUE TO TRACHEOSTOMY (HCC): Primary | ICD-10-CM

## 2021-01-01 DIAGNOSIS — M46.44 DISCITIS OF THORACIC REGION: ICD-10-CM

## 2021-01-01 DIAGNOSIS — E87.5 HYPERKALEMIA: ICD-10-CM

## 2021-01-01 DIAGNOSIS — Q31.0 GLOTTIC WEB OF LARYNX: ICD-10-CM

## 2021-01-01 DIAGNOSIS — R41.0 DELIRIUM: Primary | ICD-10-CM

## 2021-01-01 DIAGNOSIS — T20.30XS FULL THICKNESS BURN OF FACE, SEQUELA: ICD-10-CM

## 2021-01-01 DIAGNOSIS — J34.89 LESION OF NOSTRIL: ICD-10-CM

## 2021-01-01 DIAGNOSIS — M79.604 PAIN IN BOTH LOWER EXTREMITIES: ICD-10-CM

## 2021-01-01 DIAGNOSIS — J95.04 TRACHEOCUTANEOUS FISTULA FOLLOWING TRACHEOSTOMY (HCC): Primary | ICD-10-CM

## 2021-01-01 DIAGNOSIS — R20.2 TINGLING: ICD-10-CM

## 2021-01-01 DIAGNOSIS — T30.0 FULL THICKNESS BURN: Primary | ICD-10-CM

## 2021-01-01 DIAGNOSIS — T14.8XXA BLISTERING OF SKIN: Primary | ICD-10-CM

## 2021-01-01 DIAGNOSIS — Z01.818 PRE-OP TESTING: Primary | ICD-10-CM

## 2021-01-01 DIAGNOSIS — J95.09 OTHER TRACHEOSTOMY COMPLICATION (HCC): ICD-10-CM

## 2021-01-01 DIAGNOSIS — N18.6 ESRD (END STAGE RENAL DISEASE) (HCC): ICD-10-CM

## 2021-01-01 LAB
ABSOLUTE EOS #: 0.64 K/UL (ref 0–0.4)
ABSOLUTE IMMATURE GRANULOCYTE: 0.43 K/UL (ref 0–0.3)
ABSOLUTE LYMPH #: 0.64 K/UL (ref 1–4.8)
ABSOLUTE MONO #: 0.86 K/UL (ref 0.1–0.8)
ABSOLUTE RETIC #: 93 THOU/MM3 (ref 20–115)
ALBUMIN SERPL-MCNC: 3.5 G/DL (ref 3.5–5.1)
ALBUMIN SERPL-MCNC: 3.6 G/DL (ref 3.5–5.1)
ALBUMIN SERPL-MCNC: 3.9 G/DL
ALLEN TEST: ABNORMAL
ALLEN TEST: POSITIVE
ALLEN TEST: POSITIVE
ALP BLD-CCNC: 160 U/L
ALP BLD-CCNC: 228 U/L (ref 38–126)
ALP BLD-CCNC: 253 U/L (ref 38–126)
ALT SERPL-CCNC: 10 U/L (ref 11–66)
ALT SERPL-CCNC: 4 U/L
ALT SERPL-CCNC: < 5 U/L (ref 11–66)
AMMONIA: 57 UMOL/L (ref 11–60)
AMMONIA: 78 UMOL/L (ref 11–60)
ANION GAP SERPL CALCULATED.3IONS-SCNC: 11 MEQ/L (ref 8–16)
ANION GAP SERPL CALCULATED.3IONS-SCNC: 11 MEQ/L (ref 8–16)
ANION GAP SERPL CALCULATED.3IONS-SCNC: 12 MEQ/L (ref 8–16)
ANION GAP SERPL CALCULATED.3IONS-SCNC: 13 MEQ/L (ref 8–16)
ANION GAP SERPL CALCULATED.3IONS-SCNC: 14 MEQ/L (ref 8–16)
ANION GAP SERPL CALCULATED.3IONS-SCNC: 15 MEQ/L (ref 8–16)
ANION GAP SERPL CALCULATED.3IONS-SCNC: 15 MMOL/L (ref 9–17)
ANION GAP SERPL CALCULATED.3IONS-SCNC: 16 MEQ/L (ref 8–16)
ANION GAP SERPL CALCULATED.3IONS-SCNC: 17 MEQ/L (ref 8–16)
ANION GAP SERPL CALCULATED.3IONS-SCNC: 18 MEQ/L (ref 8–16)
ANION GAP SERPL CALCULATED.3IONS-SCNC: 19 MEQ/L (ref 8–16)
ANION GAP SERPL CALCULATED.3IONS-SCNC: 19 MEQ/L (ref 8–16)
ANION GAP SERPL CALCULATED.3IONS-SCNC: 20 MEQ/L (ref 8–16)
ANION GAP SERPL CALCULATED.3IONS-SCNC: 20 MEQ/L (ref 8–16)
ANION GAP SERPL CALCULATED.3IONS-SCNC: 21 MEQ/L (ref 8–16)
ANION GAP SERPL CALCULATED.3IONS-SCNC: NORMAL MMOL/L
ANISOCYTOSIS: PRESENT
APTT: 36 SECONDS (ref 22–38)
AST SERPL-CCNC: 14 U/L (ref 5–40)
AST SERPL-CCNC: 15 U/L (ref 5–40)
AST SERPL-CCNC: 19 U/L
BASE EXCESS (CALCULATED): 5.8 MMOL/L (ref -2.5–2.5)
BASE EXCESS (CALCULATED): 8.6 MMOL/L (ref -2.5–2.5)
BASE EXCESS MIXED: 3.1 MMOL/L (ref -2–3)
BASE EXCESS MIXED: 4.7 MMOL/L (ref -2–3)
BASOPHILIA: ABNORMAL
BASOPHILS # BLD: 0.4 %
BASOPHILS # BLD: 0.5 %
BASOPHILS # BLD: 0.6 %
BASOPHILS # BLD: 0.7 %
BASOPHILS # BLD: 0.8 %
BASOPHILS # BLD: 0.9 %
BASOPHILS # BLD: 1 %
BASOPHILS # BLD: 1.3 %
BASOPHILS # BLD: 1.4 %
BASOPHILS # BLD: 2 % (ref 0–2)
BASOPHILS ABSOLUTE: 0 THOU/MM3 (ref 0–0.1)
BASOPHILS ABSOLUTE: 0.1 THOU/MM3 (ref 0–0.1)
BASOPHILS ABSOLUTE: 0.43 K/UL (ref 0–0.2)
BASOPHILS ABSOLUTE: ABNORMAL
BASOPHILS ABSOLUTE: ABNORMAL
BASOPHILS ABSOLUTE: NORMAL
BASOPHILS RELATIVE PERCENT: 0.4 %
BASOPHILS RELATIVE PERCENT: ABNORMAL
BASOPHILS RELATIVE PERCENT: NORMAL
BILIRUB SERPL-MCNC: 0.5 MG/DL (ref 0.3–1.2)
BILIRUB SERPL-MCNC: 0.5 MG/DL (ref 0.3–1.2)
BILIRUB SERPL-MCNC: 0.8 MG/DL (ref 0.3–1.2)
BILIRUB SERPL-MCNC: NORMAL MG/DL
BILIRUBIN DIRECT: 0.5 MG/DL (ref 0–0.3)
BLOOD CULTURE, ROUTINE: NORMAL
BUN BLDV-MCNC: 103 MG/DL (ref 7–22)
BUN BLDV-MCNC: 11 MG/DL (ref 7–22)
BUN BLDV-MCNC: 11 MG/DL (ref 7–22)
BUN BLDV-MCNC: 15 MG/DL (ref 7–22)
BUN BLDV-MCNC: 21 MG/DL (ref 7–22)
BUN BLDV-MCNC: 23 MG/DL (ref 7–22)
BUN BLDV-MCNC: 26 MG/DL (ref 7–22)
BUN BLDV-MCNC: 26 MG/DL (ref 7–22)
BUN BLDV-MCNC: 27 MG/DL (ref 7–22)
BUN BLDV-MCNC: 30 MG/DL (ref 7–22)
BUN BLDV-MCNC: 31 MG/DL (ref 7–22)
BUN BLDV-MCNC: 32 MG/DL (ref 7–22)
BUN BLDV-MCNC: 33 MG/DL (ref 7–22)
BUN BLDV-MCNC: 37 MG/DL (ref 7–22)
BUN BLDV-MCNC: 37 MG/DL (ref 7–22)
BUN BLDV-MCNC: 39 MG/DL (ref 7–22)
BUN BLDV-MCNC: 40 MG/DL (ref 7–22)
BUN BLDV-MCNC: 41 MG/DL (ref 7–22)
BUN BLDV-MCNC: 41 MG/DL (ref 7–22)
BUN BLDV-MCNC: 43 MG/DL (ref 6–20)
BUN BLDV-MCNC: 44 MG/DL (ref 7–22)
BUN BLDV-MCNC: 45 MG/DL (ref 7–22)
BUN BLDV-MCNC: 46 MG/DL (ref 7–22)
BUN BLDV-MCNC: 46 MG/DL (ref 7–22)
BUN BLDV-MCNC: 47 MG/DL (ref 7–22)
BUN BLDV-MCNC: 47 MG/DL (ref 7–22)
BUN BLDV-MCNC: 48 MG/DL (ref 7–22)
BUN BLDV-MCNC: 50 MG/DL (ref 7–22)
BUN BLDV-MCNC: 55 MG/DL (ref 7–22)
BUN BLDV-MCNC: 56 MG/DL (ref 7–22)
BUN BLDV-MCNC: 58 MG/DL
BUN BLDV-MCNC: 58 MG/DL (ref 7–22)
BUN BLDV-MCNC: 61 MG/DL (ref 7–22)
BUN BLDV-MCNC: 64 MG/DL (ref 7–22)
BUN BLDV-MCNC: 67 MG/DL (ref 7–22)
BUN BLDV-MCNC: 67 MG/DL (ref 7–22)
BUN BLDV-MCNC: 68 MG/DL
BUN BLDV-MCNC: 70 MG/DL (ref 7–22)
BUN BLDV-MCNC: 71 MG/DL
BUN BLDV-MCNC: 73 MG/DL (ref 7–22)
BUN BLDV-MCNC: 73 MG/DL (ref 7–22)
BUN BLDV-MCNC: 80 MG/DL (ref 7–22)
BUN BLDV-MCNC: 81 MG/DL (ref 7–22)
BUN BLDV-MCNC: 83 MG/DL (ref 7–22)
BUN BLDV-MCNC: 83 MG/DL (ref 7–22)
BUN BLDV-MCNC: 85 MG/DL (ref 7–22)
BUN BLDV-MCNC: 87 MG/DL (ref 7–22)
BUN BLDV-MCNC: 94 MG/DL (ref 7–22)
BUN/CREAT BLD: ABNORMAL (ref 9–20)
C-REACTIVE PROTEIN: 11.26 MG/DL (ref 0–1)
C-REACTIVE PROTEIN: 26.27 MG/DL (ref 0–1)
CALCIUM SERPL-MCNC: 10 MG/DL (ref 8.5–10.5)
CALCIUM SERPL-MCNC: 10.4 MG/DL (ref 8.5–10.5)
CALCIUM SERPL-MCNC: 8.9 MG/DL
CALCIUM SERPL-MCNC: 9 MG/DL
CALCIUM SERPL-MCNC: 9 MG/DL (ref 8.5–10.5)
CALCIUM SERPL-MCNC: 9.1 MG/DL (ref 8.5–10.5)
CALCIUM SERPL-MCNC: 9.1 MG/DL (ref 8.6–10.4)
CALCIUM SERPL-MCNC: 9.2 MG/DL (ref 8.5–10.5)
CALCIUM SERPL-MCNC: 9.3 MG/DL (ref 8.5–10.5)
CALCIUM SERPL-MCNC: 9.4 MG/DL (ref 8.5–10.5)
CALCIUM SERPL-MCNC: 9.5 MG/DL (ref 8.5–10.5)
CALCIUM SERPL-MCNC: 9.6 MG/DL (ref 8.5–10.5)
CALCIUM SERPL-MCNC: 9.7 MG/DL
CALCIUM SERPL-MCNC: 9.7 MG/DL (ref 8.5–10.5)
CALCIUM SERPL-MCNC: 9.8 MG/DL (ref 8.5–10.5)
CALCIUM SERPL-MCNC: 9.9 MG/DL (ref 8.5–10.5)
CALCIUM SERPL-MCNC: 9.9 MG/DL (ref 8.5–10.5)
CHLORIDE BLD-SCNC: 101 MEQ/L (ref 98–111)
CHLORIDE BLD-SCNC: 87 MEQ/L (ref 98–111)
CHLORIDE BLD-SCNC: 88 MEQ/L (ref 98–111)
CHLORIDE BLD-SCNC: 90 MEQ/L (ref 98–111)
CHLORIDE BLD-SCNC: 90 MMOL/L
CHLORIDE BLD-SCNC: 91 MEQ/L (ref 98–111)
CHLORIDE BLD-SCNC: 92 MEQ/L (ref 98–111)
CHLORIDE BLD-SCNC: 92 MMOL/L
CHLORIDE BLD-SCNC: 93 MEQ/L (ref 98–111)
CHLORIDE BLD-SCNC: 94 MEQ/L (ref 98–111)
CHLORIDE BLD-SCNC: 94 MEQ/L (ref 98–111)
CHLORIDE BLD-SCNC: 95 MEQ/L (ref 98–111)
CHLORIDE BLD-SCNC: 95 MMOL/L
CHLORIDE BLD-SCNC: 95 MMOL/L (ref 98–107)
CHLORIDE BLD-SCNC: 96 MEQ/L (ref 98–111)
CHLORIDE BLD-SCNC: 96 MEQ/L (ref 98–111)
CHLORIDE BLD-SCNC: 97 MEQ/L (ref 98–111)
CHLORIDE BLD-SCNC: 97 MEQ/L (ref 98–111)
CHLORIDE BLD-SCNC: 98 MEQ/L (ref 98–111)
CHLORIDE BLD-SCNC: 99 MEQ/L (ref 98–111)
CHLORIDE BLD-SCNC: 99 MEQ/L (ref 98–111)
CO2: 21 MEQ/L (ref 23–33)
CO2: 21 MMOL/L
CO2: 22 MEQ/L (ref 23–33)
CO2: 22 MEQ/L (ref 23–33)
CO2: 23 MEQ/L (ref 23–33)
CO2: 24 MEQ/L (ref 23–33)
CO2: 24 MMOL/L
CO2: 25 MEQ/L (ref 23–33)
CO2: 26 MEQ/L (ref 23–33)
CO2: 26 MMOL/L
CO2: 27 MEQ/L (ref 23–33)
CO2: 27 MMOL/L (ref 20–31)
CO2: 28 MEQ/L (ref 23–33)
CO2: 29 MEQ/L (ref 23–33)
CO2: 30 MEQ/L (ref 23–33)
CO2: 31 MEQ/L (ref 23–33)
CO2: 32 MEQ/L (ref 23–33)
CO2: 35 MEQ/L (ref 23–33)
COLLECTED BY:: ABNORMAL
CREAT SERPL-MCNC: 11.95 MG/DL
CREAT SERPL-MCNC: 12.7 MG/DL (ref 0.4–1.2)
CREAT SERPL-MCNC: 2.7 MG/DL (ref 0.4–1.2)
CREAT SERPL-MCNC: 2.9 MG/DL (ref 0.4–1.2)
CREAT SERPL-MCNC: 3.2 MG/DL (ref 0.4–1.2)
CREAT SERPL-MCNC: 3.3 MG/DL (ref 0.4–1.2)
CREAT SERPL-MCNC: 3.5 MG/DL (ref 0.4–1.2)
CREAT SERPL-MCNC: 3.7 MG/DL (ref 0.4–1.2)
CREAT SERPL-MCNC: 3.7 MG/DL (ref 0.4–1.2)
CREAT SERPL-MCNC: 3.8 MG/DL (ref 0.4–1.2)
CREAT SERPL-MCNC: 3.8 MG/DL (ref 0.4–1.2)
CREAT SERPL-MCNC: 3.9 MG/DL (ref 0.4–1.2)
CREAT SERPL-MCNC: 3.98 MG/DL (ref 0.5–0.9)
CREAT SERPL-MCNC: 4 MG/DL (ref 0.4–1.2)
CREAT SERPL-MCNC: 4.1 MG/DL (ref 0.4–1.2)
CREAT SERPL-MCNC: 4.4 MG/DL (ref 0.4–1.2)
CREAT SERPL-MCNC: 4.5 MG/DL (ref 0.4–1.2)
CREAT SERPL-MCNC: 4.6 MG/DL (ref 0.4–1.2)
CREAT SERPL-MCNC: 4.6 MG/DL (ref 0.4–1.2)
CREAT SERPL-MCNC: 4.9 MG/DL (ref 0.4–1.2)
CREAT SERPL-MCNC: 5.2 MG/DL (ref 0.4–1.2)
CREAT SERPL-MCNC: 5.2 MG/DL (ref 0.4–1.2)
CREAT SERPL-MCNC: 5.3 MG/DL (ref 0.4–1.2)
CREAT SERPL-MCNC: 5.3 MG/DL (ref 0.4–1.2)
CREAT SERPL-MCNC: 5.5 MG/DL (ref 0.4–1.2)
CREAT SERPL-MCNC: 5.8 MG/DL (ref 0.4–1.2)
CREAT SERPL-MCNC: 6.2 MG/DL (ref 0.4–1.2)
CREAT SERPL-MCNC: 6.6 MG/DL (ref 0.4–1.2)
CREAT SERPL-MCNC: 6.7 MG/DL (ref 0.4–1.2)
CREAT SERPL-MCNC: 7.1 MG/DL (ref 0.4–1.2)
CREAT SERPL-MCNC: 7.5 MG/DL
CREAT SERPL-MCNC: 7.5 MG/DL (ref 0.4–1.2)
CREAT SERPL-MCNC: 7.5 MG/DL (ref 0.4–1.2)
CREAT SERPL-MCNC: 7.9 MG/DL (ref 0.4–1.2)
CREAT SERPL-MCNC: 7.9 MG/DL (ref 0.4–1.2)
CREAT SERPL-MCNC: 8.1 MG/DL (ref 0.4–1.2)
CREAT SERPL-MCNC: 8.2 MG/DL (ref 0.4–1.2)
CREAT SERPL-MCNC: 8.4 MG/DL (ref 0.4–1.2)
CREAT SERPL-MCNC: 8.7 MG/DL (ref 0.4–1.2)
CREAT SERPL-MCNC: 9.2 MG/DL (ref 0.4–1.2)
CREAT SERPL-MCNC: 9.35 MG/DL
DEVICE: ABNORMAL
DIFFERENTIAL TYPE: ABNORMAL
EKG ATRIAL RATE: 58 BPM
EKG ATRIAL RATE: 59 BPM
EKG ATRIAL RATE: 60 BPM
EKG ATRIAL RATE: 72 BPM
EKG ATRIAL RATE: 73 BPM
EKG ATRIAL RATE: 74 BPM
EKG P AXIS: 45 DEGREES
EKG P AXIS: 55 DEGREES
EKG P AXIS: 64 DEGREES
EKG P AXIS: 67 DEGREES
EKG P AXIS: 76 DEGREES
EKG P AXIS: 80 DEGREES
EKG P-R INTERVAL: 156 MS
EKG P-R INTERVAL: 160 MS
EKG P-R INTERVAL: 170 MS
EKG P-R INTERVAL: 172 MS
EKG P-R INTERVAL: 172 MS
EKG P-R INTERVAL: 178 MS
EKG Q-T INTERVAL: 430 MS
EKG Q-T INTERVAL: 434 MS
EKG Q-T INTERVAL: 438 MS
EKG Q-T INTERVAL: 474 MS
EKG Q-T INTERVAL: 486 MS
EKG Q-T INTERVAL: 488 MS
EKG Q-T INTERVAL: 510 MS
EKG Q-T INTERVAL: 516 MS
EKG QRS DURATION: 100 MS
EKG QRS DURATION: 106 MS
EKG QRS DURATION: 110 MS
EKG QRS DURATION: 90 MS
EKG QRS DURATION: 90 MS
EKG QRS DURATION: 94 MS
EKG QRS DURATION: 94 MS
EKG QRS DURATION: 98 MS
EKG QTC CALCULATION (BAZETT): 460 MS
EKG QTC CALCULATION (BAZETT): 470 MS
EKG QTC CALCULATION (BAZETT): 479 MS
EKG QTC CALCULATION (BAZETT): 481 MS
EKG QTC CALCULATION (BAZETT): 481 MS
EKG QTC CALCULATION (BAZETT): 482 MS
EKG QTC CALCULATION (BAZETT): 516 MS
EKG QTC CALCULATION (BAZETT): 556 MS
EKG R AXIS: -18 DEGREES
EKG R AXIS: -65 DEGREES
EKG R AXIS: 22 DEGREES
EKG R AXIS: 23 DEGREES
EKG R AXIS: 52 DEGREES
EKG R AXIS: 58 DEGREES
EKG R AXIS: 8 DEGREES
EKG R AXIS: 85 DEGREES
EKG T AXIS: 64 DEGREES
EKG T AXIS: 71 DEGREES
EKG T AXIS: 74 DEGREES
EKG T AXIS: 74 DEGREES
EKG T AXIS: 79 DEGREES
EKG T AXIS: 79 DEGREES
EKG T AXIS: 84 DEGREES
EKG T AXIS: 90 DEGREES
EKG VENTRICULAR RATE: 49 BPM
EKG VENTRICULAR RATE: 58 BPM
EKG VENTRICULAR RATE: 59 BPM
EKG VENTRICULAR RATE: 60 BPM
EKG VENTRICULAR RATE: 72 BPM
EKG VENTRICULAR RATE: 73 BPM
EKG VENTRICULAR RATE: 74 BPM
EKG VENTRICULAR RATE: 83 BPM
EOSINOPHIL # BLD: 0.1 %
EOSINOPHIL # BLD: 0.5 %
EOSINOPHIL # BLD: 1.1 %
EOSINOPHIL # BLD: 2.7 %
EOSINOPHIL # BLD: 2.9 %
EOSINOPHIL # BLD: 3.1 %
EOSINOPHIL # BLD: 3.4 %
EOSINOPHIL # BLD: 4.9 %
EOSINOPHIL # BLD: 5 %
EOSINOPHIL # BLD: 5.2 %
EOSINOPHIL # BLD: 5.8 %
EOSINOPHIL # BLD: 6.2 %
EOSINOPHIL # BLD: 7 %
EOSINOPHIL # BLD: 7.5 %
EOSINOPHIL # BLD: 7.9 %
EOSINOPHIL # BLD: 8 %
EOSINOPHIL # BLD: 8.2 %
EOSINOPHIL # BLD: 8.7 %
EOSINOPHILS ABSOLUTE: 0 THOU/MM3 (ref 0–0.4)
EOSINOPHILS ABSOLUTE: 0.1 /ΜL
EOSINOPHILS ABSOLUTE: 0.1 THOU/MM3 (ref 0–0.4)
EOSINOPHILS ABSOLUTE: 0.2 THOU/MM3 (ref 0–0.4)
EOSINOPHILS ABSOLUTE: 0.3 THOU/MM3 (ref 0–0.4)
EOSINOPHILS ABSOLUTE: 0.3 THOU/MM3 (ref 0–0.4)
EOSINOPHILS ABSOLUTE: 0.4 THOU/MM3 (ref 0–0.4)
EOSINOPHILS ABSOLUTE: 0.5 THOU/MM3 (ref 0–0.4)
EOSINOPHILS ABSOLUTE: 0.6 THOU/MM3 (ref 0–0.4)
EOSINOPHILS ABSOLUTE: 0.6 THOU/MM3 (ref 0–0.4)
EOSINOPHILS ABSOLUTE: 0.7 THOU/MM3 (ref 0–0.4)
EOSINOPHILS ABSOLUTE: 0.7 THOU/MM3 (ref 0–0.4)
EOSINOPHILS ABSOLUTE: ABNORMAL
EOSINOPHILS ABSOLUTE: NORMAL
EOSINOPHILS RELATIVE PERCENT: 0.7 %
EOSINOPHILS RELATIVE PERCENT: 3 % (ref 1–4)
EOSINOPHILS RELATIVE PERCENT: ABNORMAL
EOSINOPHILS RELATIVE PERCENT: NORMAL
ERYTHROCYTE [DISTWIDTH] IN BLOOD BY AUTOMATED COUNT: 15.9 % (ref 11.5–14.5)
ERYTHROCYTE [DISTWIDTH] IN BLOOD BY AUTOMATED COUNT: 16.2 % (ref 11.5–14.5)
ERYTHROCYTE [DISTWIDTH] IN BLOOD BY AUTOMATED COUNT: 16.6 % (ref 11.5–14.5)
ERYTHROCYTE [DISTWIDTH] IN BLOOD BY AUTOMATED COUNT: 16.8 % (ref 11.5–14.5)
ERYTHROCYTE [DISTWIDTH] IN BLOOD BY AUTOMATED COUNT: 17.2 % (ref 11.5–14.5)
ERYTHROCYTE [DISTWIDTH] IN BLOOD BY AUTOMATED COUNT: 17.3 % (ref 11.5–14.5)
ERYTHROCYTE [DISTWIDTH] IN BLOOD BY AUTOMATED COUNT: 17.4 % (ref 11.5–14.5)
ERYTHROCYTE [DISTWIDTH] IN BLOOD BY AUTOMATED COUNT: 17.5 % (ref 11.5–14.5)
ERYTHROCYTE [DISTWIDTH] IN BLOOD BY AUTOMATED COUNT: 17.6 % (ref 11.5–14.5)
ERYTHROCYTE [DISTWIDTH] IN BLOOD BY AUTOMATED COUNT: 17.7 % (ref 11.5–14.5)
ERYTHROCYTE [DISTWIDTH] IN BLOOD BY AUTOMATED COUNT: 17.8 % (ref 11.5–14.5)
ERYTHROCYTE [DISTWIDTH] IN BLOOD BY AUTOMATED COUNT: 17.9 % (ref 11.5–14.5)
ERYTHROCYTE [DISTWIDTH] IN BLOOD BY AUTOMATED COUNT: 17.9 % (ref 11.5–14.5)
ERYTHROCYTE [DISTWIDTH] IN BLOOD BY AUTOMATED COUNT: 18.1 % (ref 11.5–14.5)
ERYTHROCYTE [DISTWIDTH] IN BLOOD BY AUTOMATED COUNT: 18.4 % (ref 11.5–14.5)
ERYTHROCYTE [DISTWIDTH] IN BLOOD BY AUTOMATED COUNT: 18.6 % (ref 11.5–14.5)
ERYTHROCYTE [DISTWIDTH] IN BLOOD BY AUTOMATED COUNT: 19 % (ref 11.5–14.5)
ERYTHROCYTE [DISTWIDTH] IN BLOOD BY AUTOMATED COUNT: 19.6 % (ref 11.5–14.5)
ERYTHROCYTE [DISTWIDTH] IN BLOOD BY AUTOMATED COUNT: 51.7 FL (ref 35–45)
ERYTHROCYTE [DISTWIDTH] IN BLOOD BY AUTOMATED COUNT: 53.1 FL (ref 35–45)
ERYTHROCYTE [DISTWIDTH] IN BLOOD BY AUTOMATED COUNT: 55.4 FL (ref 35–45)
ERYTHROCYTE [DISTWIDTH] IN BLOOD BY AUTOMATED COUNT: 55.6 FL (ref 35–45)
ERYTHROCYTE [DISTWIDTH] IN BLOOD BY AUTOMATED COUNT: 55.8 FL (ref 35–45)
ERYTHROCYTE [DISTWIDTH] IN BLOOD BY AUTOMATED COUNT: 55.9 FL (ref 35–45)
ERYTHROCYTE [DISTWIDTH] IN BLOOD BY AUTOMATED COUNT: 56.2 FL (ref 35–45)
ERYTHROCYTE [DISTWIDTH] IN BLOOD BY AUTOMATED COUNT: 57 FL (ref 35–45)
ERYTHROCYTE [DISTWIDTH] IN BLOOD BY AUTOMATED COUNT: 57.1 FL (ref 35–45)
ERYTHROCYTE [DISTWIDTH] IN BLOOD BY AUTOMATED COUNT: 58 FL (ref 35–45)
ERYTHROCYTE [DISTWIDTH] IN BLOOD BY AUTOMATED COUNT: 58.5 FL (ref 35–45)
ERYTHROCYTE [DISTWIDTH] IN BLOOD BY AUTOMATED COUNT: 58.8 FL (ref 35–45)
ERYTHROCYTE [DISTWIDTH] IN BLOOD BY AUTOMATED COUNT: 58.8 FL (ref 35–45)
ERYTHROCYTE [DISTWIDTH] IN BLOOD BY AUTOMATED COUNT: 59.4 FL (ref 35–45)
ERYTHROCYTE [DISTWIDTH] IN BLOOD BY AUTOMATED COUNT: 59.7 FL (ref 35–45)
ERYTHROCYTE [DISTWIDTH] IN BLOOD BY AUTOMATED COUNT: 59.9 FL (ref 35–45)
ERYTHROCYTE [DISTWIDTH] IN BLOOD BY AUTOMATED COUNT: 60.3 FL (ref 35–45)
ERYTHROCYTE [DISTWIDTH] IN BLOOD BY AUTOMATED COUNT: 60.6 FL (ref 35–45)
ERYTHROCYTE [DISTWIDTH] IN BLOOD BY AUTOMATED COUNT: 60.7 FL (ref 35–45)
ERYTHROCYTE [DISTWIDTH] IN BLOOD BY AUTOMATED COUNT: 60.9 FL (ref 35–45)
ERYTHROCYTE [DISTWIDTH] IN BLOOD BY AUTOMATED COUNT: 61.1 FL (ref 35–45)
ERYTHROCYTE [DISTWIDTH] IN BLOOD BY AUTOMATED COUNT: 62.5 FL (ref 35–45)
ERYTHROCYTE [DISTWIDTH] IN BLOOD BY AUTOMATED COUNT: 63 FL (ref 35–45)
ERYTHROCYTE [DISTWIDTH] IN BLOOD BY AUTOMATED COUNT: 67.4 FL (ref 35–45)
ERYTHROCYTE [DISTWIDTH] IN BLOOD BY AUTOMATED COUNT: 68 FL (ref 35–45)
ERYTHROCYTE [DISTWIDTH] IN BLOOD BY AUTOMATED COUNT: 71.3 FL (ref 35–45)
ETHYL ALCOHOL, SERUM: < 0.01 %
FERRITIN: 1156 NG/ML (ref 10–291)
FERRITIN: 1356 NG/ML (ref 9–150)
FERRITIN: 1465 NG/ML (ref 10–291)
FIO2, MIXED VENOUS: 4
FIO2, MIXED VENOUS: 7
FIO2: 40
FLU A ANTIGEN: NEGATIVE
FLU B ANTIGEN: NEGATIVE
FOLATE: 5.4 NG/ML (ref 4.8–24.2)
GFR AFRICAN AMERICAN: 14 ML/MIN
GFR CALCULATED: 6
GFR CALCULATED: 9
GFR CALCULATED: NORMAL
GFR NON-AFRICAN AMERICAN: 12 ML/MIN
GFR SERPL CREATININE-BSD FRML MDRD: 10 ML/MIN/1.73M2
GFR SERPL CREATININE-BSD FRML MDRD: 11 ML/MIN/1.73M2
GFR SERPL CREATININE-BSD FRML MDRD: 12 ML/MIN/1.73M2
GFR SERPL CREATININE-BSD FRML MDRD: 13 ML/MIN/1.73M2
GFR SERPL CREATININE-BSD FRML MDRD: 13 ML/MIN/1.73M2
GFR SERPL CREATININE-BSD FRML MDRD: 14 ML/MIN/1.73M2
GFR SERPL CREATININE-BSD FRML MDRD: 15 ML/MIN/1.73M2
GFR SERPL CREATININE-BSD FRML MDRD: 15 ML/MIN/1.73M2
GFR SERPL CREATININE-BSD FRML MDRD: 17 ML/MIN/1.73M2
GFR SERPL CREATININE-BSD FRML MDRD: 18 ML/MIN/1.73M2
GFR SERPL CREATININE-BSD FRML MDRD: 3 ML/MIN/1.73M2
GFR SERPL CREATININE-BSD FRML MDRD: 4 ML/MIN/1.73M2
GFR SERPL CREATININE-BSD FRML MDRD: 5 ML/MIN/1.73M2
GFR SERPL CREATININE-BSD FRML MDRD: 6 ML/MIN/1.73M2
GFR SERPL CREATININE-BSD FRML MDRD: 7 ML/MIN/1.73M2
GFR SERPL CREATININE-BSD FRML MDRD: 7 ML/MIN/1.73M2
GFR SERPL CREATININE-BSD FRML MDRD: 8 ML/MIN/1.73M2
GFR SERPL CREATININE-BSD FRML MDRD: 9 ML/MIN/1.73M2
GFR SERPL CREATININE-BSD FRML MDRD: ABNORMAL ML/MIN/{1.73_M2}
GFR SERPL CREATININE-BSD FRML MDRD: ABNORMAL ML/MIN/{1.73_M2}
GLUCOSE BLD-MCNC: 101 MG/DL (ref 70–108)
GLUCOSE BLD-MCNC: 102 MG/DL (ref 70–108)
GLUCOSE BLD-MCNC: 103 MG/DL (ref 70–108)
GLUCOSE BLD-MCNC: 103 MG/DL (ref 70–108)
GLUCOSE BLD-MCNC: 104 MG/DL (ref 70–108)
GLUCOSE BLD-MCNC: 105 MG/DL (ref 70–108)
GLUCOSE BLD-MCNC: 105 MG/DL (ref 70–108)
GLUCOSE BLD-MCNC: 106 MG/DL (ref 70–108)
GLUCOSE BLD-MCNC: 107 MG/DL (ref 70–108)
GLUCOSE BLD-MCNC: 108 MG/DL (ref 70–108)
GLUCOSE BLD-MCNC: 109 MG/DL (ref 70–108)
GLUCOSE BLD-MCNC: 110 MG/DL (ref 70–108)
GLUCOSE BLD-MCNC: 110 MG/DL (ref 70–108)
GLUCOSE BLD-MCNC: 111 MG/DL (ref 70–108)
GLUCOSE BLD-MCNC: 112 MG/DL (ref 70–108)
GLUCOSE BLD-MCNC: 113 MG/DL (ref 70–108)
GLUCOSE BLD-MCNC: 116 MG/DL (ref 70–108)
GLUCOSE BLD-MCNC: 118 MG/DL (ref 70–108)
GLUCOSE BLD-MCNC: 123 MG/DL (ref 70–108)
GLUCOSE BLD-MCNC: 124 MG/DL
GLUCOSE BLD-MCNC: 126 MG/DL (ref 70–108)
GLUCOSE BLD-MCNC: 129 MG/DL (ref 70–108)
GLUCOSE BLD-MCNC: 137 MG/DL (ref 70–108)
GLUCOSE BLD-MCNC: 166 MG/DL (ref 70–108)
GLUCOSE BLD-MCNC: 170 MG/DL (ref 70–99)
GLUCOSE BLD-MCNC: 59 MG/DL (ref 70–108)
GLUCOSE BLD-MCNC: 73 MG/DL (ref 70–108)
GLUCOSE BLD-MCNC: 74 MG/DL (ref 70–108)
GLUCOSE BLD-MCNC: 75 MG/DL (ref 70–108)
GLUCOSE BLD-MCNC: 76 MG/DL (ref 70–108)
GLUCOSE BLD-MCNC: 77 MG/DL (ref 70–108)
GLUCOSE BLD-MCNC: 77 MG/DL (ref 70–108)
GLUCOSE BLD-MCNC: 78 MG/DL (ref 70–108)
GLUCOSE BLD-MCNC: 78 MG/DL (ref 70–108)
GLUCOSE BLD-MCNC: 79 MG/DL (ref 70–108)
GLUCOSE BLD-MCNC: 80 MG/DL (ref 70–108)
GLUCOSE BLD-MCNC: 84 MG/DL (ref 70–108)
GLUCOSE BLD-MCNC: 86 MG/DL
GLUCOSE BLD-MCNC: 86 MG/DL (ref 70–108)
GLUCOSE BLD-MCNC: 87 MG/DL (ref 70–108)
GLUCOSE BLD-MCNC: 89 MG/DL (ref 70–108)
GLUCOSE BLD-MCNC: 91 MG/DL (ref 70–108)
GLUCOSE BLD-MCNC: 93 MG/DL
GLUCOSE BLD-MCNC: 93 MG/DL (ref 70–108)
GLUCOSE BLD-MCNC: 94 MG/DL (ref 70–108)
GLUCOSE BLD-MCNC: 94 MG/DL (ref 70–108)
GRAM STAIN RESULT: ABNORMAL
GRAM STAIN RESULT: ABNORMAL
HAPTOGLOBIN: 228 MG/DL (ref 30–200)
HBV SURFACE AB TITR SER: NEGATIVE {TITER}
HCO3, MIXED: 28 MMOL/L (ref 23–28)
HCO3, MIXED: 28 MMOL/L (ref 23–28)
HCO3: 32 MMOL/L (ref 23–28)
HCO3: 32 MMOL/L (ref 23–28)
HCT VFR BLD CALC: 23.7 % (ref 36.3–47.1)
HCT VFR BLD CALC: 24.3 % (ref 37–47)
HCT VFR BLD CALC: 25.2 % (ref 37–47)
HCT VFR BLD CALC: 25.2 % (ref 37–47)
HCT VFR BLD CALC: 25.5 % (ref 37–47)
HCT VFR BLD CALC: 25.7 % (ref 36–46)
HCT VFR BLD CALC: 25.7 % (ref 37–47)
HCT VFR BLD CALC: 25.8 % (ref 37–47)
HCT VFR BLD CALC: 25.9 % (ref 37–47)
HCT VFR BLD CALC: 26 % (ref 37–47)
HCT VFR BLD CALC: 26 % (ref 37–47)
HCT VFR BLD CALC: 26.1 % (ref 37–47)
HCT VFR BLD CALC: 26.1 % (ref 37–47)
HCT VFR BLD CALC: 26.2 % (ref 37–47)
HCT VFR BLD CALC: 26.2 % (ref 37–47)
HCT VFR BLD CALC: 26.5 % (ref 37–47)
HCT VFR BLD CALC: 26.6 % (ref 37–47)
HCT VFR BLD CALC: 26.7 % (ref 37–47)
HCT VFR BLD CALC: 26.9 % (ref 37–47)
HCT VFR BLD CALC: 26.9 % (ref 37–47)
HCT VFR BLD CALC: 27 % (ref 37–47)
HCT VFR BLD CALC: 27 % (ref 37–47)
HCT VFR BLD CALC: 27.2 % (ref 36–46)
HCT VFR BLD CALC: 27.4 % (ref 37–47)
HCT VFR BLD CALC: 27.5 % (ref 37–47)
HCT VFR BLD CALC: 27.6 % (ref 37–47)
HCT VFR BLD CALC: 27.7 % (ref 37–47)
HCT VFR BLD CALC: 27.7 % (ref 37–47)
HCT VFR BLD CALC: 28 % (ref 37–47)
HCT VFR BLD CALC: 28.6 % (ref 37–47)
HCT VFR BLD CALC: 29.2 % (ref 36–46)
HCT VFR BLD CALC: 29.5 % (ref 37–47)
HCT VFR BLD CALC: 29.7 % (ref 37–47)
HCT VFR BLD CALC: 31.2 % (ref 37–47)
HCT VFR BLD CALC: 32.3 % (ref 37–47)
HCT VFR BLD CALC: 33 % (ref 37–47)
HCT VFR BLD CALC: 37.4 % (ref 36–46)
HCT VFR BLD CALC: 37.8 % (ref 36–46)
HCT VFR BLD CALC: 41.2 % (ref 37–47)
HCT VFR BLD CALC: 44.3 % (ref 37–47)
HCT VFR BLD CALC: 49.1 % (ref 37–47)
HEMOGLOBIN: 11.9 G/DL (ref 12–16)
HEMOGLOBIN: 12.6 G/DL (ref 12–16)
HEMOGLOBIN: 12.7 GM/DL (ref 12–16)
HEMOGLOBIN: 13.1 GM/DL (ref 12–16)
HEMOGLOBIN: 14.5 GM/DL (ref 12–16)
HEMOGLOBIN: 6.9 G/DL (ref 11.9–15.1)
HEMOGLOBIN: 7.2 GM/DL (ref 12–16)
HEMOGLOBIN: 7.2 GM/DL (ref 12–16)
HEMOGLOBIN: 7.3 GM/DL (ref 12–16)
HEMOGLOBIN: 7.3 GM/DL (ref 12–16)
HEMOGLOBIN: 7.4 GM/DL (ref 12–16)
HEMOGLOBIN: 7.4 GM/DL (ref 12–16)
HEMOGLOBIN: 7.6 GM/DL (ref 12–16)
HEMOGLOBIN: 7.7 GM/DL (ref 12–16)
HEMOGLOBIN: 7.8 GM/DL (ref 12–16)
HEMOGLOBIN: 7.9 GM/DL (ref 12–16)
HEMOGLOBIN: 8 GM/DL (ref 12–16)
HEMOGLOBIN: 8 GM/DL (ref 12–16)
HEMOGLOBIN: 8.2 GM/DL (ref 12–16)
HEMOGLOBIN: 8.3 G/DL (ref 12–16)
HEMOGLOBIN: 8.3 GM/DL (ref 12–16)
HEMOGLOBIN: 8.3 GM/DL (ref 12–16)
HEMOGLOBIN: 8.4 GM/DL (ref 12–16)
HEMOGLOBIN: 8.5 G/DL (ref 12–16)
HEMOGLOBIN: 8.6 GM/DL (ref 12–16)
HEMOGLOBIN: 8.9 G/DL (ref 12–16)
HEMOGLOBIN: 9 GM/DL (ref 12–16)
HEMOGLOBIN: 9.5 GM/DL (ref 12–16)
HEMOGLOBIN: 9.6 GM/DL (ref 12–16)
HEPATITIS B CORE IGM ANTIBODY: NEGATIVE
HEPATITIS B CORE IGM ANTIBODY: NEGATIVE
HEPATITIS B SURFACE ANTIGEN: NEGATIVE
HYPOCHROMIA: PRESENT
IFIO2: 32
IFIO2: 40
IMMATURE GRANS (ABS): 0.02 THOU/MM3 (ref 0–0.07)
IMMATURE GRANS (ABS): 0.03 THOU/MM3 (ref 0–0.07)
IMMATURE GRANS (ABS): 0.04 THOU/MM3 (ref 0–0.07)
IMMATURE GRANS (ABS): 0.05 THOU/MM3 (ref 0–0.07)
IMMATURE GRANS (ABS): 0.08 THOU/MM3 (ref 0–0.07)
IMMATURE GRANS (ABS): 0.08 THOU/MM3 (ref 0–0.07)
IMMATURE GRANS (ABS): 0.09 THOU/MM3 (ref 0–0.07)
IMMATURE GRANS (ABS): 0.11 THOU/MM3 (ref 0–0.07)
IMMATURE GRANS (ABS): 0.15 THOU/MM3 (ref 0–0.07)
IMMATURE GRANS (ABS): 0.17 THOU/MM3 (ref 0–0.07)
IMMATURE GRANS (ABS): 0.19 THOU/MM3 (ref 0–0.07)
IMMATURE GRANULOCYTES: 0.3 %
IMMATURE GRANULOCYTES: 0.4 %
IMMATURE GRANULOCYTES: 0.5 %
IMMATURE GRANULOCYTES: 0.6 %
IMMATURE GRANULOCYTES: 0.7 %
IMMATURE GRANULOCYTES: 0.8 %
IMMATURE GRANULOCYTES: 0.8 %
IMMATURE GRANULOCYTES: 0.9 %
IMMATURE GRANULOCYTES: 1 %
IMMATURE GRANULOCYTES: 2 %
IMMATURE RETIC FRACT: 35.5 % (ref 3–15.9)
INR BLD: 1.29 (ref 0.85–1.13)
IRON SATURATION: 13 % (ref 20–50)
IRON SATURATION: 15 % (ref 20–50)
IRON SATURATION: 30 % (ref 20–50)
IRON SATURATION: 35
IRON: 24 UG/DL (ref 50–170)
IRON: 34 UG/DL (ref 50–170)
IRON: 46 UG/DL (ref 50–170)
IRON: 66
LACTIC ACID, SEPSIS: 0.8 MMOL/L (ref 0.5–1.9)
LACTIC ACID, SEPSIS: 1 MMOL/L (ref 0.5–1.9)
LACTIC ACID: 1.8 MMOL/L (ref 0.5–2)
LD: 142 U/L (ref 100–190)
LIPASE: 7.5 U/L (ref 5.6–51.3)
LYMPHOCYTES # BLD: 11 %
LYMPHOCYTES # BLD: 11.6 %
LYMPHOCYTES # BLD: 11.8 %
LYMPHOCYTES # BLD: 12.2 %
LYMPHOCYTES # BLD: 13.4 %
LYMPHOCYTES # BLD: 13.7 %
LYMPHOCYTES # BLD: 13.8 %
LYMPHOCYTES # BLD: 14.2 %
LYMPHOCYTES # BLD: 15 %
LYMPHOCYTES # BLD: 15.3 %
LYMPHOCYTES # BLD: 15.3 %
LYMPHOCYTES # BLD: 16.7 %
LYMPHOCYTES # BLD: 18.5 %
LYMPHOCYTES # BLD: 24.4 %
LYMPHOCYTES # BLD: 3 % (ref 24–44)
LYMPHOCYTES # BLD: 5.2 %
LYMPHOCYTES # BLD: 5.7 %
LYMPHOCYTES # BLD: 7.5 %
LYMPHOCYTES # BLD: 9.1 %
LYMPHOCYTES ABSOLUTE: 0.7 THOU/MM3 (ref 1–4.8)
LYMPHOCYTES ABSOLUTE: 0.8 THOU/MM3 (ref 1–4.8)
LYMPHOCYTES ABSOLUTE: 0.9 THOU/MM3 (ref 1–4.8)
LYMPHOCYTES ABSOLUTE: 1 THOU/MM3 (ref 1–4.8)
LYMPHOCYTES ABSOLUTE: 1.1 THOU/MM3 (ref 1–4.8)
LYMPHOCYTES ABSOLUTE: 1.2 THOU/MM3 (ref 1–4.8)
LYMPHOCYTES ABSOLUTE: 1.3 THOU/MM3 (ref 1–4.8)
LYMPHOCYTES ABSOLUTE: 1.4 /ΜL
LYMPHOCYTES ABSOLUTE: 1.5 THOU/MM3 (ref 1–4.8)
LYMPHOCYTES ABSOLUTE: 1.7 THOU/MM3 (ref 1–4.8)
LYMPHOCYTES ABSOLUTE: 1.8 THOU/MM3 (ref 1–4.8)
LYMPHOCYTES ABSOLUTE: 2.1 THOU/MM3 (ref 1–4.8)
LYMPHOCYTES ABSOLUTE: ABNORMAL
LYMPHOCYTES ABSOLUTE: NORMAL
LYMPHOCYTES RELATIVE PERCENT: 11.8 %
LYMPHOCYTES RELATIVE PERCENT: ABNORMAL
LYMPHOCYTES RELATIVE PERCENT: NORMAL
MAGNESIUM: 2.1 MG/DL (ref 1.6–2.4)
MAGNESIUM: 2.2 MG/DL (ref 1.6–2.4)
MAGNESIUM: 2.3 MG/DL (ref 1.6–2.4)
MAGNESIUM: 2.4 MG/DL (ref 1.6–2.4)
MAGNESIUM: 2.5 MG/DL (ref 1.6–2.4)
MAGNESIUM: 2.5 MG/DL (ref 1.6–2.4)
MAGNESIUM: 3 MG/DL
MCH RBC QN AUTO: 25.7 PG (ref 26–33)
MCH RBC QN AUTO: 25.8 PG (ref 26–33)
MCH RBC QN AUTO: 25.9 PG (ref 26–33)
MCH RBC QN AUTO: 25.9 PG (ref 26–33)
MCH RBC QN AUTO: 26.1 PG (ref 26–33)
MCH RBC QN AUTO: 26.1 PG (ref 26–33)
MCH RBC QN AUTO: 26.3 PG (ref 26–33)
MCH RBC QN AUTO: 26.4 PG (ref 26–33)
MCH RBC QN AUTO: 26.6 PG (ref 26–33)
MCH RBC QN AUTO: 26.8 PG (ref 26–33)
MCH RBC QN AUTO: 26.9 PG (ref 26–33)
MCH RBC QN AUTO: 27 PG (ref 26–33)
MCH RBC QN AUTO: 27 PG (ref 26–33)
MCH RBC QN AUTO: 27.7 PG (ref 26–33)
MCH RBC QN AUTO: 27.8 PG (ref 26–33)
MCH RBC QN AUTO: 27.8 PG (ref 26–33)
MCH RBC QN AUTO: 28.1 PG (ref 26–33)
MCH RBC QN AUTO: 28.2 PG (ref 26–33)
MCH RBC QN AUTO: 28.2 PG (ref 26–33)
MCH RBC QN AUTO: 28.4 PG (ref 26–33)
MCH RBC QN AUTO: 28.4 PG (ref 26–33)
MCH RBC QN AUTO: 28.5 PG (ref 26–33)
MCH RBC QN AUTO: 28.7 PG
MCH RBC QN AUTO: 28.8 PG (ref 26–33)
MCH RBC QN AUTO: 28.8 PG (ref 26–33)
MCH RBC QN AUTO: 29 PG (ref 26–33)
MCH RBC QN AUTO: 29.2 PG (ref 26–33)
MCH RBC QN AUTO: 30.5 PG (ref 25.2–33.5)
MCH RBC QN AUTO: ABNORMAL PG
MCH RBC QN AUTO: NORMAL PG
MCHC RBC AUTO-ENTMCNC: 28 GM/DL (ref 32.2–35.5)
MCHC RBC AUTO-ENTMCNC: 28.1 GM/DL (ref 32.2–35.5)
MCHC RBC AUTO-ENTMCNC: 28.5 GM/DL (ref 32.2–35.5)
MCHC RBC AUTO-ENTMCNC: 28.6 GM/DL (ref 32.2–35.5)
MCHC RBC AUTO-ENTMCNC: 28.6 GM/DL (ref 32.2–35.5)
MCHC RBC AUTO-ENTMCNC: 28.8 GM/DL (ref 32.2–35.5)
MCHC RBC AUTO-ENTMCNC: 28.8 GM/DL (ref 32.2–35.5)
MCHC RBC AUTO-ENTMCNC: 28.9 GM/DL (ref 32.2–35.5)
MCHC RBC AUTO-ENTMCNC: 29 GM/DL (ref 32.2–35.5)
MCHC RBC AUTO-ENTMCNC: 29.1 G/DL (ref 28.4–34.8)
MCHC RBC AUTO-ENTMCNC: 29.1 GM/DL (ref 32.2–35.5)
MCHC RBC AUTO-ENTMCNC: 29.2 GM/DL (ref 32.2–35.5)
MCHC RBC AUTO-ENTMCNC: 29.3 GM/DL (ref 32.2–35.5)
MCHC RBC AUTO-ENTMCNC: 29.4 GM/DL (ref 32.2–35.5)
MCHC RBC AUTO-ENTMCNC: 29.5 GM/DL (ref 32.2–35.5)
MCHC RBC AUTO-ENTMCNC: 29.6 GM/DL (ref 32.2–35.5)
MCHC RBC AUTO-ENTMCNC: 29.6 GM/DL (ref 32.2–35.5)
MCHC RBC AUTO-ENTMCNC: 29.9 GM/DL (ref 32.2–35.5)
MCHC RBC AUTO-ENTMCNC: 30.2 GM/DL (ref 32.2–35.5)
MCHC RBC AUTO-ENTMCNC: 30.4 GM/DL (ref 32.2–35.5)
MCHC RBC AUTO-ENTMCNC: 30.8 GM/DL (ref 32.2–35.5)
MCHC RBC AUTO-ENTMCNC: 31 GM/DL (ref 32.2–35.5)
MCHC RBC AUTO-ENTMCNC: 32.2 G/DL
MCHC RBC AUTO-ENTMCNC: ABNORMAL G/DL
MCHC RBC AUTO-ENTMCNC: NORMAL G/DL
MCV RBC AUTO: 100.4 FL (ref 81–99)
MCV RBC AUTO: 104.9 FL (ref 82.6–102.9)
MCV RBC AUTO: 87.7 FL (ref 81–99)
MCV RBC AUTO: 88.9 FL
MCV RBC AUTO: 88.9 FL (ref 81–99)
MCV RBC AUTO: 89.2 FL (ref 81–99)
MCV RBC AUTO: 89.3 FL (ref 81–99)
MCV RBC AUTO: 89.7 FL (ref 81–99)
MCV RBC AUTO: 90 FL (ref 81–99)
MCV RBC AUTO: 90.4 FL (ref 81–99)
MCV RBC AUTO: 91.3 FL (ref 81–99)
MCV RBC AUTO: 91.3 FL (ref 81–99)
MCV RBC AUTO: 91.4 FL (ref 81–99)
MCV RBC AUTO: 91.5 FL (ref 81–99)
MCV RBC AUTO: 91.5 FL (ref 81–99)
MCV RBC AUTO: 92.6 FL (ref 81–99)
MCV RBC AUTO: 93.3 FL (ref 81–99)
MCV RBC AUTO: 94.2 FL (ref 81–99)
MCV RBC AUTO: 94.4 FL (ref 81–99)
MCV RBC AUTO: 95.3 FL (ref 81–99)
MCV RBC AUTO: 95.6 FL (ref 81–99)
MCV RBC AUTO: 95.9 FL (ref 81–99)
MCV RBC AUTO: 96 FL (ref 81–99)
MCV RBC AUTO: 96.3 FL (ref 81–99)
MCV RBC AUTO: 97 FL (ref 81–99)
MCV RBC AUTO: 97.8 FL (ref 81–99)
MCV RBC AUTO: 98.1 FL (ref 81–99)
MCV RBC AUTO: 99.3 FL (ref 81–99)
MCV RBC AUTO: ABNORMAL FL
MCV RBC AUTO: NORMAL FL
MODE: ABNORMAL
MONOCYTES # BLD: 10.3 %
MONOCYTES # BLD: 10.6 %
MONOCYTES # BLD: 10.7 %
MONOCYTES # BLD: 12.3 %
MONOCYTES # BLD: 12.7 %
MONOCYTES # BLD: 4 % (ref 1–7)
MONOCYTES # BLD: 6.8 %
MONOCYTES # BLD: 6.9 %
MONOCYTES # BLD: 7.3 %
MONOCYTES # BLD: 7.5 %
MONOCYTES # BLD: 7.5 %
MONOCYTES # BLD: 7.7 %
MONOCYTES # BLD: 8.6 %
MONOCYTES # BLD: 8.8 %
MONOCYTES # BLD: 9.1 %
MONOCYTES # BLD: 9.6 %
MONOCYTES # BLD: 9.7 %
MONOCYTES ABSOLUTE: 0.6 THOU/MM3 (ref 0.4–1.3)
MONOCYTES ABSOLUTE: 0.7 /ΜL
MONOCYTES ABSOLUTE: 0.7 THOU/MM3 (ref 0.4–1.3)
MONOCYTES ABSOLUTE: 0.8 THOU/MM3 (ref 0.4–1.3)
MONOCYTES ABSOLUTE: 0.9 THOU/MM3 (ref 0.4–1.3)
MONOCYTES ABSOLUTE: 0.9 THOU/MM3 (ref 0.4–1.3)
MONOCYTES ABSOLUTE: 1.1 THOU/MM3 (ref 0.4–1.3)
MONOCYTES ABSOLUTE: 1.2 THOU/MM3 (ref 0.4–1.3)
MONOCYTES ABSOLUTE: 1.2 THOU/MM3 (ref 0.4–1.3)
MONOCYTES ABSOLUTE: 1.5 THOU/MM3 (ref 0.4–1.3)
MONOCYTES ABSOLUTE: 1.5 THOU/MM3 (ref 0.4–1.3)
MONOCYTES ABSOLUTE: 1.9 THOU/MM3 (ref 0.4–1.3)
MONOCYTES ABSOLUTE: ABNORMAL
MONOCYTES ABSOLUTE: NORMAL
MONOCYTES RELATIVE PERCENT: 6 %
MONOCYTES RELATIVE PERCENT: ABNORMAL
MONOCYTES RELATIVE PERCENT: NORMAL
MORPHOLOGY: ABNORMAL
MORPHOLOGY: ABNORMAL
MRSA SCREEN RT-PCR: POSITIVE
MRSA SCREEN: ABNORMAL
MRSA SCREEN: NORMAL
NEGATIVE BASE EXCESS, ART: ABNORMAL (ref 0–2)
NEUTROPHILS ABSOLUTE: 9.8 /ΜL
NEUTROPHILS ABSOLUTE: ABNORMAL
NEUTROPHILS ABSOLUTE: NORMAL
NEUTROPHILS RELATIVE PERCENT: 81.1 %
NEUTROPHILS RELATIVE PERCENT: ABNORMAL
NEUTROPHILS RELATIVE PERCENT: NORMAL
NRBC AUTOMATED: 0 PER 100 WBC
NUCLEATED RED BLOOD CELLS: 0 /100 WBC
O2 DEVICE/FLOW/%: ABNORMAL
O2 SAT, MIXED: 86 %
O2 SAT, MIXED: 98 %
O2 SATURATION: 87 %
O2 SATURATION: 97 %
ORGANISM: ABNORMAL
OSMOLALITY CALCULATION: 278 MOSMOL/KG (ref 275–300)
OSMOLALITY CALCULATION: 288.1 MOSMOL/KG (ref 275–300)
OSMOLALITY CALCULATION: 294.2 MOSMOL/KG (ref 275–300)
PATIENT TEMP: ABNORMAL
PCO2, MIXED VENOUS: 35 MMHG (ref 41–51)
PCO2, MIXED VENOUS: 47 MMHG (ref 41–51)
PCO2: 38 MMHG (ref 35–45)
PCO2: 54 MMHG (ref 35–45)
PDW BLD-RTO: 15.9 % (ref 11.8–14.4)
PH BLOOD GAS: 7.38 (ref 7.35–7.45)
PH BLOOD GAS: 7.53 (ref 7.35–7.45)
PH, MIXED: 7.39 (ref 7.31–7.41)
PH, MIXED: 7.51 (ref 7.31–7.41)
PHOSPHORUS: 2 MG/DL (ref 2.6–4.5)
PHOSPHORUS: 3.3 MG/DL (ref 2.4–4.7)
PHOSPHORUS: 3.7 MG/DL (ref 2.4–4.7)
PHOSPHORUS: 4.2 MG/DL (ref 2.4–4.7)
PHOSPHORUS: 4.3 MG/DL (ref 2.4–4.7)
PHOSPHORUS: 4.4 MG/DL (ref 2.4–4.7)
PHOSPHORUS: 6.8 MG/DL
PLATELET # BLD: 119 THOU/MM3 (ref 130–400)
PLATELET # BLD: 120 THOU/MM3 (ref 130–400)
PLATELET # BLD: 128 THOU/MM3 (ref 130–400)
PLATELET # BLD: 131 K/ΜL
PLATELET # BLD: 134 THOU/MM3 (ref 130–400)
PLATELET # BLD: 141 THOU/MM3 (ref 130–400)
PLATELET # BLD: 145 THOU/MM3 (ref 130–400)
PLATELET # BLD: 152 THOU/MM3 (ref 130–400)
PLATELET # BLD: 165 THOU/MM3 (ref 130–400)
PLATELET # BLD: 205 K/ΜL
PLATELET # BLD: 212 THOU/MM3 (ref 130–400)
PLATELET # BLD: 214 K/ΜL
PLATELET # BLD: 216 THOU/MM3 (ref 130–400)
PLATELET # BLD: 222 THOU/MM3 (ref 130–400)
PLATELET # BLD: 238 THOU/MM3 (ref 130–400)
PLATELET # BLD: 240 THOU/MM3 (ref 130–400)
PLATELET # BLD: 249 THOU/MM3 (ref 130–400)
PLATELET # BLD: 250 THOU/MM3 (ref 130–400)
PLATELET # BLD: 255 THOU/MM3 (ref 130–400)
PLATELET # BLD: 256 THOU/MM3 (ref 130–400)
PLATELET # BLD: 262 THOU/MM3 (ref 130–400)
PLATELET # BLD: 263 THOU/MM3 (ref 130–400)
PLATELET # BLD: 269 THOU/MM3 (ref 130–400)
PLATELET # BLD: 277 K/UL (ref 138–453)
PLATELET # BLD: 287 THOU/MM3 (ref 130–400)
PLATELET # BLD: 293 THOU/MM3 (ref 130–400)
PLATELET # BLD: 301 THOU/MM3 (ref 130–400)
PLATELET # BLD: 309 THOU/MM3 (ref 130–400)
PLATELET # BLD: 310 THOU/MM3 (ref 130–400)
PLATELET # BLD: 385 THOU/MM3 (ref 130–400)
PLATELET ESTIMATE: ABNORMAL
PLATELET ESTIMATE: ADEQUATE
PMV BLD AUTO: 10.2 FL (ref 9.4–12.4)
PMV BLD AUTO: 10.5 FL (ref 9.4–12.4)
PMV BLD AUTO: 7.5 FL
PMV BLD AUTO: 8.6 FL (ref 9.4–12.4)
PMV BLD AUTO: 8.7 FL (ref 9.4–12.4)
PMV BLD AUTO: 8.8 FL (ref 9.4–12.4)
PMV BLD AUTO: 8.9 FL (ref 9.4–12.4)
PMV BLD AUTO: 9 FL (ref 8.1–13.5)
PMV BLD AUTO: 9 FL (ref 9.4–12.4)
PMV BLD AUTO: 9 FL (ref 9.4–12.4)
PMV BLD AUTO: 9.1 FL (ref 9.4–12.4)
PMV BLD AUTO: 9.3 FL (ref 9.4–12.4)
PMV BLD AUTO: 9.4 FL (ref 9.4–12.4)
PMV BLD AUTO: 9.5 FL (ref 9.4–12.4)
PMV BLD AUTO: 9.6 FL (ref 9.4–12.4)
PMV BLD AUTO: 9.6 FL (ref 9.4–12.4)
PMV BLD AUTO: 9.7 FL (ref 9.4–12.4)
PMV BLD AUTO: 9.7 FL (ref 9.4–12.4)
PMV BLD AUTO: 9.9 FL (ref 9.4–12.4)
PMV BLD AUTO: ABNORMAL FL
PMV BLD AUTO: NORMAL FL
PO2 MIXED: 113 MMHG (ref 25–40)
PO2 MIXED: 46 MMHG (ref 25–40)
PO2: 56 MMHG (ref 71–104)
PO2: 80 MMHG (ref 71–104)
POC HCO3: 29.9 MMOL/L (ref 21–28)
POC LACTIC ACID: 0.55 MMOL/L (ref 0.56–1.39)
POC O2 SATURATION: 92 % (ref 94–98)
POC PCO2 TEMP: ABNORMAL MM HG
POC PCO2: 49.3 MM HG (ref 35–48)
POC PH TEMP: ABNORMAL
POC PH: 7.39 (ref 7.35–7.45)
POC PO2 TEMP: ABNORMAL MM HG
POC PO2: 66.2 MM HG (ref 83–108)
POSITIVE BASE EXCESS, ART: 4 (ref 0–3)
POTASSIUM (K+): 4.6
POTASSIUM (K+): 4.8
POTASSIUM (K+): 5.8
POTASSIUM (K+): 5.8
POTASSIUM (K+): 6.1
POTASSIUM REFLEX MAGNESIUM: 3.9 MEQ/L (ref 3.5–5.2)
POTASSIUM REFLEX MAGNESIUM: 4.3 MEQ/L (ref 3.5–5.2)
POTASSIUM REFLEX MAGNESIUM: 6.1 MEQ/L (ref 3.5–5.2)
POTASSIUM REFLEX MAGNESIUM: 7.8 MEQ/L (ref 3.5–5.2)
POTASSIUM SERPL-SCNC: 3.5 MEQ/L (ref 3.5–5.2)
POTASSIUM SERPL-SCNC: 3.7 MEQ/L (ref 3.5–5.2)
POTASSIUM SERPL-SCNC: 3.7 MEQ/L (ref 3.5–5.2)
POTASSIUM SERPL-SCNC: 3.8 MEQ/L (ref 3.5–5.2)
POTASSIUM SERPL-SCNC: 3.9 MEQ/L (ref 3.5–5.2)
POTASSIUM SERPL-SCNC: 4 MEQ/L (ref 3.5–5.2)
POTASSIUM SERPL-SCNC: 4.1 MEQ/L (ref 3.5–5.2)
POTASSIUM SERPL-SCNC: 4.1 MMOL/L (ref 3.7–5.3)
POTASSIUM SERPL-SCNC: 4.2 MEQ/L (ref 3.5–5.2)
POTASSIUM SERPL-SCNC: 4.2 MEQ/L (ref 3.5–5.2)
POTASSIUM SERPL-SCNC: 4.3 MEQ/L (ref 3.5–5.2)
POTASSIUM SERPL-SCNC: 4.4 MEQ/L (ref 3.5–5.2)
POTASSIUM SERPL-SCNC: 4.4 MMOL/L
POTASSIUM SERPL-SCNC: 4.5 MEQ/L (ref 3.5–5.2)
POTASSIUM SERPL-SCNC: 4.6 MEQ/L (ref 3.5–5.2)
POTASSIUM SERPL-SCNC: 4.6 MEQ/L (ref 3.5–5.2)
POTASSIUM SERPL-SCNC: 4.7 MEQ/L (ref 3.5–5.2)
POTASSIUM SERPL-SCNC: 4.8 MEQ/L (ref 3.5–5.2)
POTASSIUM SERPL-SCNC: 4.8 MEQ/L (ref 3.5–5.2)
POTASSIUM SERPL-SCNC: 5 MEQ/L (ref 3.5–5.2)
POTASSIUM SERPL-SCNC: 5.1 MEQ/L (ref 3.5–5.2)
POTASSIUM SERPL-SCNC: 5.2 MEQ/L (ref 3.5–5.2)
POTASSIUM SERPL-SCNC: 5.2 MEQ/L (ref 3.5–5.2)
POTASSIUM SERPL-SCNC: 5.3 MEQ/L (ref 3.5–5.2)
POTASSIUM SERPL-SCNC: 5.4 MEQ/L (ref 3.5–5.2)
POTASSIUM SERPL-SCNC: 5.5 MEQ/L (ref 3.5–5.2)
POTASSIUM SERPL-SCNC: 5.5 MEQ/L (ref 3.5–5.2)
POTASSIUM SERPL-SCNC: 5.7 MEQ/L (ref 3.5–5.2)
POTASSIUM SERPL-SCNC: 6.3 MMOL/L
POTASSIUM SERPL-SCNC: 6.4 MEQ/L (ref 3.5–5.2)
POTASSIUM SERPL-SCNC: 6.5 MEQ/L (ref 3.5–5.2)
POTASSIUM SERPL-SCNC: 6.7 MMOL/L
PREALBUMIN: 18.8 MG/DL (ref 20–40)
PRO-BNP: ABNORMAL PG/ML (ref 0–900)
PROCALCITONIN: 0.56 NG/ML (ref 0.01–0.09)
PROCALCITONIN: 0.74 NG/ML (ref 0.01–0.09)
PROCALCITONIN: 0.82 NG/ML (ref 0.01–0.09)
PROCALCITONIN: 0.84 NG/ML
PROCALCITONIN: 2.89 NG/ML (ref 0.01–0.09)
PTH INTACT: 224
PTH INTACT: 230
PTH INTACT: 76.7 PG/ML (ref 15–65)
PTH INTACT: 88 PG/ML (ref 15–65)
RBC # BLD: 2.26 M/UL (ref 3.95–5.11)
RBC # BLD: 2.57 MILL/MM3 (ref 4.2–5.4)
RBC # BLD: 2.71 MILL/MM3 (ref 4.2–5.4)
RBC # BLD: 2.71 MILL/MM3 (ref 4.2–5.4)
RBC # BLD: 2.72 MILL/MM3 (ref 4.2–5.4)
RBC # BLD: 2.74 MILL/MM3 (ref 4.2–5.4)
RBC # BLD: 2.76 MILL/MM3 (ref 4.2–5.4)
RBC # BLD: 2.76 MILL/MM3 (ref 4.2–5.4)
RBC # BLD: 2.78 MILL/MM3 (ref 4.2–5.4)
RBC # BLD: 2.81 MILL/MM3 (ref 4.2–5.4)
RBC # BLD: 2.82 MILL/MM3 (ref 4.2–5.4)
RBC # BLD: 2.83 MILL/MM3 (ref 4.2–5.4)
RBC # BLD: 2.84 MILL/MM3 (ref 4.2–5.4)
RBC # BLD: 2.86 MILL/MM3 (ref 4.2–5.4)
RBC # BLD: 2.89 10^6/ΜL
RBC # BLD: 2.91 MILL/MM3 (ref 4.2–5.4)
RBC # BLD: 2.92 MILL/MM3 (ref 4.2–5.4)
RBC # BLD: 2.99 10^6/ΜL
RBC # BLD: 2.99 MILL/MM3 (ref 4.2–5.4)
RBC # BLD: 3 MILL/MM3 (ref 4.2–5.4)
RBC # BLD: 3.02 MILL/MM3 (ref 4.2–5.4)
RBC # BLD: 3.24 MILL/MM3 (ref 4.2–5.4)
RBC # BLD: 3.32 MILL/MM3 (ref 4.2–5.4)
RBC # BLD: 3.33 MILL/MM3 (ref 4.2–5.4)
RBC # BLD: 3.33 MILL/MM3 (ref 4.2–5.4)
RBC # BLD: 3.68 MILL/MM3 (ref 4.2–5.4)
RBC # BLD: 4.7 MILL/MM3 (ref 4.2–5.4)
RBC # BLD: 4.74 10^6/ΜL
RBC # BLD: 4.85 MILL/MM3 (ref 4.2–5.4)
RBC # BLD: 5.5 MILL/MM3 (ref 4.2–5.4)
RBC # BLD: ABNORMAL 10*6/UL
REASON FOR REJECTION: NORMAL
REJECTED TEST: NORMAL
RESPIRATORY CULTURE: ABNORMAL
RESPIRATORY CULTURE: ABNORMAL
RETIC HEMOGLOBIN: 24.7 PG (ref 28.2–35.7)
RETICULOCYTE ABSOLUTE COUNT: 3.4 % (ref 0.5–2)
SAMPLE SITE: ABNORMAL
SARS-COV-2, NAAT: NOT  DETECTED
SCAN OF BLOOD SMEAR: NORMAL
SEDIMENTATION RATE, ERYTHROCYTE: 65 MM/HR (ref 0–20)
SEDIMENTATION RATE, ERYTHROCYTE: 86 MM/HR (ref 0–20)
SEG NEUTROPHILS: 53.5 %
SEG NEUTROPHILS: 61.4 %
SEG NEUTROPHILS: 62.2 %
SEG NEUTROPHILS: 66.6 %
SEG NEUTROPHILS: 66.9 %
SEG NEUTROPHILS: 67.4 %
SEG NEUTROPHILS: 68.7 %
SEG NEUTROPHILS: 69.8 %
SEG NEUTROPHILS: 70.5 %
SEG NEUTROPHILS: 71.1 %
SEG NEUTROPHILS: 73 %
SEG NEUTROPHILS: 74.4 %
SEG NEUTROPHILS: 74.7 %
SEG NEUTROPHILS: 75.1 %
SEG NEUTROPHILS: 80.9 %
SEG NEUTROPHILS: 81 %
SEG NEUTROPHILS: 82.7 %
SEG NEUTROPHILS: 86 % (ref 36–66)
SEG NEUTROPHILS: 86.6 %
SEGMENTED NEUTROPHILS ABSOLUTE COUNT: 10.6 THOU/MM3 (ref 1.8–7.7)
SEGMENTED NEUTROPHILS ABSOLUTE COUNT: 15.9 THOU/MM3 (ref 1.8–7.7)
SEGMENTED NEUTROPHILS ABSOLUTE COUNT: 16 THOU/MM3 (ref 1.8–7.7)
SEGMENTED NEUTROPHILS ABSOLUTE COUNT: 18.4 K/UL (ref 1.8–7.7)
SEGMENTED NEUTROPHILS ABSOLUTE COUNT: 19.1 THOU/MM3 (ref 1.8–7.7)
SEGMENTED NEUTROPHILS ABSOLUTE COUNT: 3.9 THOU/MM3 (ref 1.8–7.7)
SEGMENTED NEUTROPHILS ABSOLUTE COUNT: 4 THOU/MM3 (ref 1.8–7.7)
SEGMENTED NEUTROPHILS ABSOLUTE COUNT: 4.2 THOU/MM3 (ref 1.8–7.7)
SEGMENTED NEUTROPHILS ABSOLUTE COUNT: 4.7 THOU/MM3 (ref 1.8–7.7)
SEGMENTED NEUTROPHILS ABSOLUTE COUNT: 4.7 THOU/MM3 (ref 1.8–7.7)
SEGMENTED NEUTROPHILS ABSOLUTE COUNT: 5.2 THOU/MM3 (ref 1.8–7.7)
SEGMENTED NEUTROPHILS ABSOLUTE COUNT: 5.3 THOU/MM3 (ref 1.8–7.7)
SEGMENTED NEUTROPHILS ABSOLUTE COUNT: 5.5 THOU/MM3 (ref 1.8–7.7)
SEGMENTED NEUTROPHILS ABSOLUTE COUNT: 6 THOU/MM3 (ref 1.8–7.7)
SEGMENTED NEUTROPHILS ABSOLUTE COUNT: 6.3 THOU/MM3 (ref 1.8–7.7)
SEGMENTED NEUTROPHILS ABSOLUTE COUNT: 6.9 THOU/MM3 (ref 1.8–7.7)
SEGMENTED NEUTROPHILS ABSOLUTE COUNT: 8 THOU/MM3 (ref 1.8–7.7)
SEGMENTED NEUTROPHILS ABSOLUTE COUNT: 9.5 THOU/MM3 (ref 1.8–7.7)
SEGMENTED NEUTROPHILS ABSOLUTE COUNT: 9.7 THOU/MM3 (ref 1.8–7.7)
SITE: ABNORMAL
SITE: ABNORMAL
SODIUM BLD-SCNC: 127 MEQ/L (ref 135–145)
SODIUM BLD-SCNC: 128 MEQ/L (ref 135–145)
SODIUM BLD-SCNC: 129 MEQ/L (ref 135–145)
SODIUM BLD-SCNC: 130 MMOL/L
SODIUM BLD-SCNC: 131 MEQ/L (ref 135–145)
SODIUM BLD-SCNC: 132 MEQ/L (ref 135–145)
SODIUM BLD-SCNC: 132 MMOL/L
SODIUM BLD-SCNC: 133 MEQ/L (ref 135–145)
SODIUM BLD-SCNC: 134 MEQ/L (ref 135–145)
SODIUM BLD-SCNC: 135 MEQ/L (ref 135–145)
SODIUM BLD-SCNC: 136 MEQ/L (ref 135–145)
SODIUM BLD-SCNC: 137 MEQ/L (ref 135–145)
SODIUM BLD-SCNC: 137 MMOL/L
SODIUM BLD-SCNC: 137 MMOL/L (ref 135–144)
SODIUM BLD-SCNC: 138 MEQ/L (ref 135–145)
SODIUM BLD-SCNC: 138 MEQ/L (ref 135–145)
SODIUM BLD-SCNC: 139 MEQ/L (ref 135–145)
SODIUM BLD-SCNC: 140 MEQ/L (ref 135–145)
SODIUM BLD-SCNC: 140 MEQ/L (ref 135–145)
SOURCE, BLOOD GAS: ABNORMAL
SOURCE, BLOOD GAS: ABNORMAL
TCO2 (CALC), ART: 31 MMOL/L (ref 22–29)
TOTAL CK: 37 U/L (ref 30–135)
TOTAL IRON BINDING CAPACITY: 152 UG/DL (ref 171–450)
TOTAL IRON BINDING CAPACITY: 188 UG/DL (ref 171–450)
TOTAL IRON BINDING CAPACITY: 190
TOTAL IRON BINDING CAPACITY: 222 UG/DL (ref 171–450)
TOTAL PROTEIN: 6.8
TOTAL PROTEIN: 7 G/DL (ref 6.1–8)
TOTAL PROTEIN: 7.7 G/DL (ref 6.1–8)
TRANSFERRIN: 124 MG/DL (ref 200–360)
TROPONIN T: 0.06 NG/ML
TROPONIN T: 0.06 NG/ML
TROPONIN T: 0.07 NG/ML
TROPONIN T: 0.1 NG/ML
TROPONIN T: 0.11 NG/ML
TROPONIN T: 0.13 NG/ML
TROPONIN T: 0.15 NG/ML
TROPONIN T: 0.18 NG/ML
TSH SERPL DL<=0.05 MIU/L-ACNC: 3.99 UIU/ML (ref 0.4–4.2)
VANCOMYCIN RANDOM: 16.8 UG/ML (ref 0.1–39.9)
VANCOMYCIN RANDOM: 18.9 UG/ML (ref 0.1–39.9)
VANCOMYCIN RANDOM: 19.3 UG/ML (ref 0.1–39.9)
VANCOMYCIN RANDOM: 21.6 UG/ML (ref 0.1–39.9)
VITAMIN B-12: 1501 PG/ML (ref 211–911)
WBC # BLD: 10.3 THOU/MM3 (ref 4.8–10.8)
WBC # BLD: 10.6 THOU/MM3 (ref 4.8–10.8)
WBC # BLD: 11.7 10^3/ML
WBC # BLD: 11.7 THOU/MM3 (ref 4.8–10.8)
WBC # BLD: 11.9 THOU/MM3 (ref 4.8–10.8)
WBC # BLD: 12.1 10^3/ML
WBC # BLD: 12.8 THOU/MM3 (ref 4.8–10.8)
WBC # BLD: 13 THOU/MM3 (ref 4.8–10.8)
WBC # BLD: 14.2 THOU/MM3 (ref 4.8–10.8)
WBC # BLD: 19.6 THOU/MM3 (ref 4.8–10.8)
WBC # BLD: 19.7 THOU/MM3 (ref 4.8–10.8)
WBC # BLD: 21.4 K/UL (ref 3.5–11.3)
WBC # BLD: 22.1 THOU/MM3 (ref 4.8–10.8)
WBC # BLD: 6 THOU/MM3 (ref 4.8–10.8)
WBC # BLD: 6.3 THOU/MM3 (ref 4.8–10.8)
WBC # BLD: 6.4 THOU/MM3 (ref 4.8–10.8)
WBC # BLD: 6.7 THOU/MM3 (ref 4.8–10.8)
WBC # BLD: 6.7 THOU/MM3 (ref 4.8–10.8)
WBC # BLD: 7.5 10^3/ML
WBC # BLD: 7.5 THOU/MM3 (ref 4.8–10.8)
WBC # BLD: 8 THOU/MM3 (ref 4.8–10.8)
WBC # BLD: 8 THOU/MM3 (ref 4.8–10.8)
WBC # BLD: 8.2 THOU/MM3 (ref 4.8–10.8)
WBC # BLD: 8.4 THOU/MM3 (ref 4.8–10.8)
WBC # BLD: 8.8 THOU/MM3 (ref 4.8–10.8)
WBC # BLD: 9.1 THOU/MM3 (ref 4.8–10.8)
WBC # BLD: 9.3 THOU/MM3 (ref 4.8–10.8)
WBC # BLD: 9.5 THOU/MM3 (ref 4.8–10.8)
WBC # BLD: 9.5 THOU/MM3 (ref 4.8–10.8)
WBC # BLD: 9.9 THOU/MM3 (ref 4.8–10.8)
WBC # BLD: ABNORMAL 10*3/UL

## 2021-01-01 PROCEDURE — 80048 BASIC METABOLIC PNL TOTAL CA: CPT

## 2021-01-01 PROCEDURE — 84132 ASSAY OF SERUM POTASSIUM: CPT

## 2021-01-01 PROCEDURE — 6360000004 HC RX CONTRAST MEDICATION: Performed by: GENERAL PRACTICE

## 2021-01-01 PROCEDURE — 36415 COLL VENOUS BLD VENIPUNCTURE: CPT

## 2021-01-01 PROCEDURE — 6370000000 HC RX 637 (ALT 250 FOR IP): Performed by: PHYSICIAN ASSISTANT

## 2021-01-01 PROCEDURE — 6360000002 HC RX W HCPCS: Performed by: INTERNAL MEDICINE

## 2021-01-01 PROCEDURE — 72100 X-RAY EXAM L-S SPINE 2/3 VWS: CPT

## 2021-01-01 PROCEDURE — 72146 MRI CHEST SPINE W/O DYE: CPT

## 2021-01-01 PROCEDURE — 2700000000 HC OXYGEN THERAPY PER DAY

## 2021-01-01 PROCEDURE — 2580000003 HC RX 258

## 2021-01-01 PROCEDURE — 36902 INTRO CATH DIALYSIS CIRCUIT: CPT

## 2021-01-01 PROCEDURE — 6370000000 HC RX 637 (ALT 250 FOR IP): Performed by: INTERNAL MEDICINE

## 2021-01-01 PROCEDURE — 2580000003 HC RX 258: Performed by: PHYSICIAN ASSISTANT

## 2021-01-01 PROCEDURE — 94761 N-INVAS EAR/PLS OXIMETRY MLT: CPT

## 2021-01-01 PROCEDURE — 94640 AIRWAY INHALATION TREATMENT: CPT

## 2021-01-01 PROCEDURE — 90935 HEMODIALYSIS ONE EVALUATION: CPT

## 2021-01-01 PROCEDURE — 2580000003 HC RX 258: Performed by: FAMILY MEDICINE

## 2021-01-01 PROCEDURE — 83735 ASSAY OF MAGNESIUM: CPT

## 2021-01-01 PROCEDURE — 1111F DSCHRG MED/CURRENT MED MERGE: CPT | Performed by: NUCLEAR MEDICINE

## 2021-01-01 PROCEDURE — 97166 OT EVAL MOD COMPLEX 45 MIN: CPT

## 2021-01-01 PROCEDURE — 6360000002 HC RX W HCPCS: Performed by: PHYSICIAN ASSISTANT

## 2021-01-01 PROCEDURE — 99024 POSTOP FOLLOW-UP VISIT: CPT | Performed by: PHYSICIAN ASSISTANT

## 2021-01-01 PROCEDURE — 70486 CT MAXILLOFACIAL W/O DYE: CPT

## 2021-01-01 PROCEDURE — 6370000000 HC RX 637 (ALT 250 FOR IP): Performed by: STUDENT IN AN ORGANIZED HEALTH CARE EDUCATION/TRAINING PROGRAM

## 2021-01-01 PROCEDURE — 97530 THERAPEUTIC ACTIVITIES: CPT

## 2021-01-01 PROCEDURE — 69300 REVISE EXTERNAL EAR: CPT | Performed by: OTOLARYNGOLOGY

## 2021-01-01 PROCEDURE — 93005 ELECTROCARDIOGRAM TRACING: CPT | Performed by: STUDENT IN AN ORGANIZED HEALTH CARE EDUCATION/TRAINING PROGRAM

## 2021-01-01 PROCEDURE — 2500000003 HC RX 250 WO HCPCS: Performed by: NURSE ANESTHETIST, CERTIFIED REGISTERED

## 2021-01-01 PROCEDURE — 87635 SARS-COV-2 COVID-19 AMP PRB: CPT

## 2021-01-01 PROCEDURE — 74230 X-RAY XM SWLNG FUNCJ C+: CPT

## 2021-01-01 PROCEDURE — 2580000003 HC RX 258: Performed by: STUDENT IN AN ORGANIZED HEALTH CARE EDUCATION/TRAINING PROGRAM

## 2021-01-01 PROCEDURE — 96365 THER/PROPH/DIAG IV INF INIT: CPT

## 2021-01-01 PROCEDURE — 85025 COMPLETE CBC W/AUTO DIFF WBC: CPT

## 2021-01-01 PROCEDURE — 85018 HEMOGLOBIN: CPT

## 2021-01-01 PROCEDURE — G8417 CALC BMI ABV UP PARAM F/U: HCPCS | Performed by: OTOLARYNGOLOGY

## 2021-01-01 PROCEDURE — 92610 EVALUATE SWALLOWING FUNCTION: CPT

## 2021-01-01 PROCEDURE — 99232 SBSQ HOSP IP/OBS MODERATE 35: CPT | Performed by: INTERNAL MEDICINE

## 2021-01-01 PROCEDURE — 80202 ASSAY OF VANCOMYCIN: CPT

## 2021-01-01 PROCEDURE — 83605 ASSAY OF LACTIC ACID: CPT

## 2021-01-01 PROCEDURE — 84145 PROCALCITONIN (PCT): CPT

## 2021-01-01 PROCEDURE — 99239 HOSP IP/OBS DSCHRG MGMT >30: CPT | Performed by: INTERNAL MEDICINE

## 2021-01-01 PROCEDURE — 2000000000 HC ICU R&B

## 2021-01-01 PROCEDURE — G8484 FLU IMMUNIZE NO ADMIN: HCPCS | Performed by: OTOLARYNGOLOGY

## 2021-01-01 PROCEDURE — 83690 ASSAY OF LIPASE: CPT

## 2021-01-01 PROCEDURE — 2580000003 HC RX 258: Performed by: INTERNAL MEDICINE

## 2021-01-01 PROCEDURE — 6360000002 HC RX W HCPCS: Performed by: STUDENT IN AN ORGANIZED HEALTH CARE EDUCATION/TRAINING PROGRAM

## 2021-01-01 PROCEDURE — 95819 EEG AWAKE AND ASLEEP: CPT | Performed by: PSYCHIATRY & NEUROLOGY

## 2021-01-01 PROCEDURE — 84443 ASSAY THYROID STIM HORMONE: CPT

## 2021-01-01 PROCEDURE — 99284 EMERGENCY DEPT VISIT MOD MDM: CPT

## 2021-01-01 PROCEDURE — 5A1D70Z PERFORMANCE OF URINARY FILTRATION, INTERMITTENT, LESS THAN 6 HOURS PER DAY: ICD-10-PCS | Performed by: INTERNAL MEDICINE

## 2021-01-01 PROCEDURE — 3017F COLORECTAL CA SCREEN DOC REV: CPT | Performed by: OTOLARYNGOLOGY

## 2021-01-01 PROCEDURE — 6360000002 HC RX W HCPCS

## 2021-01-01 PROCEDURE — 6370000000 HC RX 637 (ALT 250 FOR IP)

## 2021-01-01 PROCEDURE — 36556 INSERT NON-TUNNEL CV CATH: CPT

## 2021-01-01 PROCEDURE — 93010 ELECTROCARDIOGRAM REPORT: CPT | Performed by: INTERNAL MEDICINE

## 2021-01-01 PROCEDURE — 99214 OFFICE O/P EST MOD 30 MIN: CPT | Performed by: NUCLEAR MEDICINE

## 2021-01-01 PROCEDURE — 6370000000 HC RX 637 (ALT 250 FOR IP): Performed by: NURSE PRACTITIONER

## 2021-01-01 PROCEDURE — 36600 WITHDRAWAL OF ARTERIAL BLOOD: CPT

## 2021-01-01 PROCEDURE — 99231 SBSQ HOSP IP/OBS SF/LOW 25: CPT | Performed by: INTERNAL MEDICINE

## 2021-01-01 PROCEDURE — 90935 HEMODIALYSIS ONE EVALUATION: CPT | Performed by: INTERNAL MEDICINE

## 2021-01-01 PROCEDURE — 2500000003 HC RX 250 WO HCPCS: Performed by: STUDENT IN AN ORGANIZED HEALTH CARE EDUCATION/TRAINING PROGRAM

## 2021-01-01 PROCEDURE — 6360000002 HC RX W HCPCS: Performed by: NURSE ANESTHETIST, CERTIFIED REGISTERED

## 2021-01-01 PROCEDURE — 99223 1ST HOSP IP/OBS HIGH 75: CPT | Performed by: INTERNAL MEDICINE

## 2021-01-01 PROCEDURE — 80053 COMPREHEN METABOLIC PANEL: CPT

## 2021-01-01 PROCEDURE — 85651 RBC SED RATE NONAUTOMATED: CPT

## 2021-01-01 PROCEDURE — 99232 SBSQ HOSP IP/OBS MODERATE 35: CPT | Performed by: NURSE PRACTITIONER

## 2021-01-01 PROCEDURE — 2060000000 HC ICU INTERMEDIATE R&B

## 2021-01-01 PROCEDURE — 6370000000 HC RX 637 (ALT 250 FOR IP): Performed by: OTOLARYNGOLOGY

## 2021-01-01 PROCEDURE — 93005 ELECTROCARDIOGRAM TRACING: CPT | Performed by: ANESTHESIOLOGY

## 2021-01-01 PROCEDURE — 83550 IRON BINDING TEST: CPT

## 2021-01-01 PROCEDURE — 6360000002 HC RX W HCPCS: Performed by: EMERGENCY MEDICINE

## 2021-01-01 PROCEDURE — 99232 SBSQ HOSP IP/OBS MODERATE 35: CPT | Performed by: FAMILY MEDICINE

## 2021-01-01 PROCEDURE — 99223 1ST HOSP IP/OBS HIGH 75: CPT | Performed by: PSYCHIATRY & NEUROLOGY

## 2021-01-01 PROCEDURE — 2580000003 HC RX 258: Performed by: OTOLARYNGOLOGY

## 2021-01-01 PROCEDURE — 6360000002 HC RX W HCPCS: Performed by: OTOLARYNGOLOGY

## 2021-01-01 PROCEDURE — 3600000004 HC SURGERY LEVEL 4 BASE: Performed by: OTOLARYNGOLOGY

## 2021-01-01 PROCEDURE — 3023F SPIROM DOC REV: CPT | Performed by: NUCLEAR MEDICINE

## 2021-01-01 PROCEDURE — 82728 ASSAY OF FERRITIN: CPT

## 2021-01-01 PROCEDURE — 6370000000 HC RX 637 (ALT 250 FOR IP): Performed by: RADIOLOGY

## 2021-01-01 PROCEDURE — C1726 CATH, BAL DIL, NON-VASCULAR: HCPCS | Performed by: OTOLARYNGOLOGY

## 2021-01-01 PROCEDURE — 82140 ASSAY OF AMMONIA: CPT

## 2021-01-01 PROCEDURE — 86706 HEP B SURFACE ANTIBODY: CPT

## 2021-01-01 PROCEDURE — 7100000000 HC PACU RECOVERY - FIRST 15 MIN: Performed by: OTOLARYNGOLOGY

## 2021-01-01 PROCEDURE — 6370000000 HC RX 637 (ALT 250 FOR IP): Performed by: FAMILY MEDICINE

## 2021-01-01 PROCEDURE — G8926 SPIRO NO PERF OR DOC: HCPCS | Performed by: NUCLEAR MEDICINE

## 2021-01-01 PROCEDURE — 71045 X-RAY EXAM CHEST 1 VIEW: CPT

## 2021-01-01 PROCEDURE — 84484 ASSAY OF TROPONIN QUANT: CPT

## 2021-01-01 PROCEDURE — 87077 CULTURE AEROBIC IDENTIFY: CPT

## 2021-01-01 PROCEDURE — 6360000002 HC RX W HCPCS: Performed by: FAMILY MEDICINE

## 2021-01-01 PROCEDURE — 31830 REVISE WINDPIPE SCAR: CPT | Performed by: OTOLARYNGOLOGY

## 2021-01-01 PROCEDURE — 72125 CT NECK SPINE W/O DYE: CPT

## 2021-01-01 PROCEDURE — G8484 FLU IMMUNIZE NO ADMIN: HCPCS | Performed by: NUCLEAR MEDICINE

## 2021-01-01 PROCEDURE — 94760 N-INVAS EAR/PLS OXIMETRY 1: CPT

## 2021-01-01 PROCEDURE — 85014 HEMATOCRIT: CPT

## 2021-01-01 PROCEDURE — 87340 HEPATITIS B SURFACE AG IA: CPT

## 2021-01-01 PROCEDURE — 30465 REPAIR NASAL STENOSIS: CPT | Performed by: OTOLARYNGOLOGY

## 2021-01-01 PROCEDURE — 87070 CULTURE OTHR SPECIMN AEROBIC: CPT

## 2021-01-01 PROCEDURE — G0257 UNSCHED DIALYSIS ESRD PT HOS: HCPCS

## 2021-01-01 PROCEDURE — 2709999900 IR FISTULAGRAM

## 2021-01-01 PROCEDURE — 31641 BRONCHOSCOPY TREAT BLOCKAGE: CPT | Performed by: OTOLARYNGOLOGY

## 2021-01-01 PROCEDURE — 1200000003 HC TELEMETRY R&B

## 2021-01-01 PROCEDURE — 0CJS8ZZ INSPECTION OF LARYNX, VIA NATURAL OR ARTIFICIAL OPENING ENDOSCOPIC: ICD-10-PCS | Performed by: OTOLARYNGOLOGY

## 2021-01-01 PROCEDURE — 83010 ASSAY OF HAPTOGLOBIN QUANT: CPT

## 2021-01-01 PROCEDURE — 85730 THROMBOPLASTIN TIME PARTIAL: CPT

## 2021-01-01 PROCEDURE — 85027 COMPLETE CBC AUTOMATED: CPT

## 2021-01-01 PROCEDURE — 31575 DIAGNOSTIC LARYNGOSCOPY: CPT | Performed by: OTOLARYNGOLOGY

## 2021-01-01 PROCEDURE — 84100 ASSAY OF PHOSPHORUS: CPT

## 2021-01-01 PROCEDURE — 2500000003 HC RX 250 WO HCPCS: Performed by: FAMILY MEDICINE

## 2021-01-01 PROCEDURE — 99232 SBSQ HOSP IP/OBS MODERATE 35: CPT | Performed by: PHYSICIAN ASSISTANT

## 2021-01-01 PROCEDURE — 70551 MRI BRAIN STEM W/O DYE: CPT

## 2021-01-01 PROCEDURE — 95819 EEG AWAKE AND ASLEEP: CPT

## 2021-01-01 PROCEDURE — 86140 C-REACTIVE PROTEIN: CPT

## 2021-01-01 PROCEDURE — 82607 VITAMIN B-12: CPT

## 2021-01-01 PROCEDURE — 82746 ASSAY OF FOLIC ACID SERUM: CPT

## 2021-01-01 PROCEDURE — 83880 ASSAY OF NATRIURETIC PEPTIDE: CPT

## 2021-01-01 PROCEDURE — 99222 1ST HOSP IP/OBS MODERATE 55: CPT | Performed by: FAMILY MEDICINE

## 2021-01-01 PROCEDURE — 6370000000 HC RX 637 (ALT 250 FOR IP): Performed by: EMERGENCY MEDICINE

## 2021-01-01 PROCEDURE — 92611 MOTION FLUOROSCOPY/SWALLOW: CPT

## 2021-01-01 PROCEDURE — 6370000000 HC RX 637 (ALT 250 FOR IP): Performed by: GENERAL PRACTICE

## 2021-01-01 PROCEDURE — 77001 FLUOROGUIDE FOR VEIN DEVICE: CPT

## 2021-01-01 PROCEDURE — 77003 FLUOROGUIDE FOR SPINE INJECT: CPT

## 2021-01-01 PROCEDURE — 99024 POSTOP FOLLOW-UP VISIT: CPT | Performed by: NURSE PRACTITIONER

## 2021-01-01 PROCEDURE — 2500000003 HC RX 250 WO HCPCS

## 2021-01-01 PROCEDURE — XW033N5 INTRODUCTION OF MEROPENEM-VABORBACTAM ANTI-INFECTIVE INTO PERIPHERAL VEIN, PERCUTANEOUS APPROACH, NEW TECHNOLOGY GROUP 5: ICD-10-PCS | Performed by: INTERNAL MEDICINE

## 2021-01-01 PROCEDURE — 87040 BLOOD CULTURE FOR BACTERIA: CPT

## 2021-01-01 PROCEDURE — 6360000002 HC RX W HCPCS: Performed by: RADIOLOGY

## 2021-01-01 PROCEDURE — 99285 EMERGENCY DEPT VISIT HI MDM: CPT

## 2021-01-01 PROCEDURE — 93005 ELECTROCARDIOGRAM TRACING: CPT | Performed by: EMERGENCY MEDICINE

## 2021-01-01 PROCEDURE — 31825 REPAIR OF WINDPIPE DEFECT: CPT | Performed by: OTOLARYNGOLOGY

## 2021-01-01 PROCEDURE — 75710 ARTERY X-RAYS ARM/LEG: CPT

## 2021-01-01 PROCEDURE — 11046 DBRDMT MUSC&/FSCA EA ADDL: CPT | Performed by: OTOLARYNGOLOGY

## 2021-01-01 PROCEDURE — 2500000003 HC RX 250 WO HCPCS: Performed by: RADIOLOGY

## 2021-01-01 PROCEDURE — 31630 BRONCHOSCOPY DILATE/FX REPR: CPT | Performed by: OTOLARYNGOLOGY

## 2021-01-01 PROCEDURE — 99239 HOSP IP/OBS DSCHRG MGMT >30: CPT | Performed by: HOSPITALIST

## 2021-01-01 PROCEDURE — 0RB93ZX EXCISION OF THORACIC VERTEBRAL DISC, PERCUTANEOUS APPROACH, DIAGNOSTIC: ICD-10-PCS | Performed by: RADIOLOGY

## 2021-01-01 PROCEDURE — 05HM33Z INSERTION OF INFUSION DEVICE INTO RIGHT INTERNAL JUGULAR VEIN, PERCUTANEOUS APPROACH: ICD-10-PCS | Performed by: RADIOLOGY

## 2021-01-01 PROCEDURE — 6360000002 HC RX W HCPCS: Performed by: ANESTHESIOLOGY

## 2021-01-01 PROCEDURE — 74018 RADEX ABDOMEN 1 VIEW: CPT

## 2021-01-01 PROCEDURE — 6360000004 HC RX CONTRAST MEDICATION: Performed by: RADIOLOGY

## 2021-01-01 PROCEDURE — 97162 PT EVAL MOD COMPLEX 30 MIN: CPT

## 2021-01-01 PROCEDURE — 37799 UNLISTED PX VASCULAR SURGERY: CPT

## 2021-01-01 PROCEDURE — 76937 US GUIDE VASCULAR ACCESS: CPT

## 2021-01-01 PROCEDURE — 82550 ASSAY OF CK (CPK): CPT

## 2021-01-01 PROCEDURE — 99024 POSTOP FOLLOW-UP VISIT: CPT | Performed by: OTOLARYNGOLOGY

## 2021-01-01 PROCEDURE — 86705 HEP B CORE ANTIBODY IGM: CPT

## 2021-01-01 PROCEDURE — 82248 BILIRUBIN DIRECT: CPT

## 2021-01-01 PROCEDURE — 83540 ASSAY OF IRON: CPT

## 2021-01-01 PROCEDURE — G8427 DOCREV CUR MEDS BY ELIG CLIN: HCPCS | Performed by: OTOLARYNGOLOGY

## 2021-01-01 PROCEDURE — 2709999900 IR FLUORO GUIDED CVA DEVICE PLMT/REPLACE/REMOVAL

## 2021-01-01 PROCEDURE — 85610 PROTHROMBIN TIME: CPT

## 2021-01-01 PROCEDURE — 88307 TISSUE EXAM BY PATHOLOGIST: CPT

## 2021-01-01 PROCEDURE — 99221 1ST HOSP IP/OBS SF/LOW 40: CPT | Performed by: NURSE PRACTITIONER

## 2021-01-01 PROCEDURE — 90792 PSYCH DIAG EVAL W/MED SRVCS: CPT | Performed by: PSYCHIATRY & NEUROLOGY

## 2021-01-01 PROCEDURE — 97110 THERAPEUTIC EXERCISES: CPT

## 2021-01-01 PROCEDURE — 99222 1ST HOSP IP/OBS MODERATE 55: CPT | Performed by: INTERNAL MEDICINE

## 2021-01-01 PROCEDURE — 2580000003 HC RX 258: Performed by: EMERGENCY MEDICINE

## 2021-01-01 PROCEDURE — 70450 CT HEAD/BRAIN W/O DYE: CPT

## 2021-01-01 PROCEDURE — 82948 REAGENT STRIP/BLOOD GLUCOSE: CPT

## 2021-01-01 PROCEDURE — G8427 DOCREV CUR MEDS BY ELIG CLIN: HCPCS | Performed by: NUCLEAR MEDICINE

## 2021-01-01 PROCEDURE — 2709999900 HC NON-CHARGEABLE SUPPLY

## 2021-01-01 PROCEDURE — 36215 PLACE CATHETER IN ARTERY: CPT

## 2021-01-01 PROCEDURE — 72131 CT LUMBAR SPINE W/O DYE: CPT

## 2021-01-01 PROCEDURE — 3600000014 HC SURGERY LEVEL 4 ADDTL 15MIN: Performed by: OTOLARYNGOLOGY

## 2021-01-01 PROCEDURE — 92526 ORAL FUNCTION THERAPY: CPT

## 2021-01-01 PROCEDURE — 87205 SMEAR GRAM STAIN: CPT

## 2021-01-01 PROCEDURE — 72128 CT CHEST SPINE W/O DYE: CPT

## 2021-01-01 PROCEDURE — 96374 THER/PROPH/DIAG INJ IV PUSH: CPT

## 2021-01-01 PROCEDURE — G8417 CALC BMI ABV UP PARAM F/U: HCPCS | Performed by: NUCLEAR MEDICINE

## 2021-01-01 PROCEDURE — 93005 ELECTROCARDIOGRAM TRACING: CPT | Performed by: FAMILY MEDICINE

## 2021-01-01 PROCEDURE — 96375 TX/PRO/DX INJ NEW DRUG ADDON: CPT

## 2021-01-01 PROCEDURE — 99214 OFFICE O/P EST MOD 30 MIN: CPT | Performed by: OTOLARYNGOLOGY

## 2021-01-01 PROCEDURE — 11043 DBRDMT MUSC&/FSCA 1ST 20/<: CPT | Performed by: OTOLARYNGOLOGY

## 2021-01-01 PROCEDURE — 037Y3ZZ DILATION OF UPPER ARTERY, PERCUTANEOUS APPROACH: ICD-10-PCS | Performed by: RADIOLOGY

## 2021-01-01 PROCEDURE — 82077 ASSAY SPEC XCP UR&BREATH IA: CPT

## 2021-01-01 PROCEDURE — 88305 TISSUE EXAM BY PATHOLOGIST: CPT

## 2021-01-01 PROCEDURE — 1111F DSCHRG MED/CURRENT MED MERGE: CPT | Performed by: OTOLARYNGOLOGY

## 2021-01-01 PROCEDURE — 87804 INFLUENZA ASSAY W/OPTIC: CPT

## 2021-01-01 PROCEDURE — 0C7M8ZZ DILATION OF PHARYNX, VIA NATURAL OR ARTIFICIAL OPENING ENDOSCOPIC: ICD-10-PCS | Performed by: OTOLARYNGOLOGY

## 2021-01-01 PROCEDURE — 94003 VENT MGMT INPAT SUBQ DAY: CPT

## 2021-01-01 PROCEDURE — 31528 LARYNGOSCOPY AND DILATION: CPT | Performed by: OTOLARYNGOLOGY

## 2021-01-01 PROCEDURE — 2709999900 HC NON-CHARGEABLE SUPPLY: Performed by: OTOLARYNGOLOGY

## 2021-01-01 PROCEDURE — 93270 REMOTE 30 DAY ECG REV/REPORT: CPT

## 2021-01-01 PROCEDURE — 87641 MR-STAPH DNA AMP PROBE: CPT

## 2021-01-01 PROCEDURE — 3700000001 HC ADD 15 MINUTES (ANESTHESIA): Performed by: OTOLARYNGOLOGY

## 2021-01-01 PROCEDURE — P9047 ALBUMIN (HUMAN), 25%, 50ML: HCPCS | Performed by: INTERNAL MEDICINE

## 2021-01-01 PROCEDURE — 7100000001 HC PACU RECOVERY - ADDTL 15 MIN: Performed by: OTOLARYNGOLOGY

## 2021-01-01 PROCEDURE — 3700000000 HC ANESTHESIA ATTENDED CARE: Performed by: OTOLARYNGOLOGY

## 2021-01-01 PROCEDURE — 87186 SC STD MICRODIL/AGAR DIL: CPT

## 2021-01-01 PROCEDURE — 6360000002 HC RX W HCPCS: Performed by: GENERAL PRACTICE

## 2021-01-01 PROCEDURE — 97129 THER IVNTJ 1ST 15 MIN: CPT

## 2021-01-01 PROCEDURE — 2500000003 HC RX 250 WO HCPCS: Performed by: ANESTHESIOLOGY

## 2021-01-01 PROCEDURE — 87081 CULTURE SCREEN ONLY: CPT

## 2021-01-01 PROCEDURE — 93970 EXTREMITY STUDY: CPT

## 2021-01-01 PROCEDURE — 82803 BLOOD GASES ANY COMBINATION: CPT

## 2021-01-01 PROCEDURE — 97163 PT EVAL HIGH COMPLEX 45 MIN: CPT

## 2021-01-01 PROCEDURE — 2720000010 HC SURG SUPPLY STERILE: Performed by: OTOLARYNGOLOGY

## 2021-01-01 PROCEDURE — 4004F PT TOBACCO SCREEN RCVD TLK: CPT | Performed by: OTOLARYNGOLOGY

## 2021-01-01 PROCEDURE — 2500000003 HC RX 250 WO HCPCS: Performed by: INTERNAL MEDICINE

## 2021-01-01 PROCEDURE — 82247 BILIRUBIN TOTAL: CPT

## 2021-01-01 PROCEDURE — B31H1ZZ FLUOROSCOPY OF RIGHT UPPER EXTREMITY ARTERIES USING LOW OSMOLAR CONTRAST: ICD-10-PCS | Performed by: RADIOLOGY

## 2021-01-01 PROCEDURE — 97165 OT EVAL LOW COMPLEX 30 MIN: CPT

## 2021-01-01 PROCEDURE — 72072 X-RAY EXAM THORAC SPINE 3VWS: CPT

## 2021-01-01 PROCEDURE — 3023F SPIROM DOC REV: CPT | Performed by: OTOLARYNGOLOGY

## 2021-01-01 PROCEDURE — 71046 X-RAY EXAM CHEST 2 VIEWS: CPT

## 2021-01-01 PROCEDURE — 1036F TOBACCO NON-USER: CPT | Performed by: OTOLARYNGOLOGY

## 2021-01-01 PROCEDURE — 93922 UPR/L XTREMITY ART 2 LEVELS: CPT

## 2021-01-01 PROCEDURE — 92523 SPEECH SOUND LANG COMPREHEN: CPT

## 2021-01-01 PROCEDURE — 1036F TOBACCO NON-USER: CPT | Performed by: NUCLEAR MEDICINE

## 2021-01-01 PROCEDURE — 99213 OFFICE O/P EST LOW 20 MIN: CPT | Performed by: NUCLEAR MEDICINE

## 2021-01-01 PROCEDURE — 2580000003 HC RX 258: Performed by: RADIOLOGY

## 2021-01-01 PROCEDURE — 62267 INTERDISCAL PERQ ASPIR DX: CPT

## 2021-01-01 PROCEDURE — 02HV33Z INSERTION OF INFUSION DEVICE INTO SUPERIOR VENA CAVA, PERCUTANEOUS APPROACH: ICD-10-PCS | Performed by: STUDENT IN AN ORGANIZED HEALTH CARE EDUCATION/TRAINING PROGRAM

## 2021-01-01 PROCEDURE — 99233 SBSQ HOSP IP/OBS HIGH 50: CPT | Performed by: HOSPITALIST

## 2021-01-01 PROCEDURE — 3017F COLORECTAL CA SCREEN DOC REV: CPT | Performed by: NUCLEAR MEDICINE

## 2021-01-01 PROCEDURE — 99222 1ST HOSP IP/OBS MODERATE 55: CPT | Performed by: NURSE PRACTITIONER

## 2021-01-01 PROCEDURE — 83615 LACTATE (LD) (LDH) ENZYME: CPT

## 2021-01-01 PROCEDURE — 84466 ASSAY OF TRANSFERRIN: CPT

## 2021-01-01 PROCEDURE — G8926 SPIRO NO PERF OR DOC: HCPCS | Performed by: OTOLARYNGOLOGY

## 2021-01-01 PROCEDURE — 99221 1ST HOSP IP/OBS SF/LOW 40: CPT | Performed by: INTERNAL MEDICINE

## 2021-01-01 PROCEDURE — 0B110F4 BYPASS TRACHEA TO CUTANEOUS WITH TRACHEOSTOMY DEVICE, OPEN APPROACH: ICD-10-PCS | Performed by: OTOLARYNGOLOGY

## 2021-01-01 PROCEDURE — 31505 DIAGNOSTIC LARYNGOSCOPY: CPT | Performed by: OTOLARYNGOLOGY

## 2021-01-01 PROCEDURE — 93010 ELECTROCARDIOGRAM REPORT: CPT | Performed by: NUCLEAR MEDICINE

## 2021-01-01 PROCEDURE — 87147 CULTURE TYPE IMMUNOLOGIC: CPT

## 2021-01-01 PROCEDURE — 85046 RETICYTE/HGB CONCENTRATE: CPT

## 2021-01-01 PROCEDURE — 0BJ08ZZ INSPECTION OF TRACHEOBRONCHIAL TREE, VIA NATURAL OR ARTIFICIAL OPENING ENDOSCOPIC: ICD-10-PCS | Performed by: OTOLARYNGOLOGY

## 2021-01-01 RX ORDER — GABAPENTIN 100 MG/1
100 CAPSULE ORAL 2 TIMES DAILY
Status: DISCONTINUED | OUTPATIENT
Start: 2021-01-01 | End: 2021-01-01

## 2021-01-01 RX ORDER — DIPHENHYDRAMINE HCL 25 MG
25 TABLET ORAL EVERY 8 HOURS PRN
Status: DISCONTINUED | OUTPATIENT
Start: 2021-01-01 | End: 2021-01-01

## 2021-01-01 RX ORDER — ACETAMINOPHEN 650 MG/1
650 SUPPOSITORY RECTAL EVERY 6 HOURS PRN
Status: DISCONTINUED | OUTPATIENT
Start: 2021-01-01 | End: 2021-01-01

## 2021-01-01 RX ORDER — DOXYCYCLINE HYCLATE 100 MG
100 TABLET ORAL EVERY 12 HOURS SCHEDULED
Status: DISCONTINUED | OUTPATIENT
Start: 2021-01-01 | End: 2021-01-01 | Stop reason: HOSPADM

## 2021-01-01 RX ORDER — DIPHENHYDRAMINE HYDROCHLORIDE 50 MG/ML
12.5 INJECTION INTRAMUSCULAR; INTRAVENOUS EVERY 6 HOURS PRN
Status: DISCONTINUED | OUTPATIENT
Start: 2021-01-01 | End: 2021-01-01

## 2021-01-01 RX ORDER — ALPRAZOLAM 0.5 MG/1
0.5 TABLET ORAL 3 TIMES DAILY
Status: ON HOLD | COMMUNITY
End: 2021-01-01 | Stop reason: SDUPTHER

## 2021-01-01 RX ORDER — M-VIT,TX,IRON,MINS/CALC/FOLIC 27MG-0.4MG
1 TABLET ORAL DAILY
COMMUNITY

## 2021-01-01 RX ORDER — CITALOPRAM 20 MG/1
20 TABLET ORAL DAILY
COMMUNITY

## 2021-01-01 RX ORDER — SENNA PLUS 8.6 MG/1
1 TABLET ORAL NIGHTLY
Status: DISCONTINUED | OUTPATIENT
Start: 2021-01-01 | End: 2021-01-01 | Stop reason: HOSPADM

## 2021-01-01 RX ORDER — BUDESONIDE AND FORMOTEROL FUMARATE DIHYDRATE 80; 4.5 UG/1; UG/1
2 AEROSOL RESPIRATORY (INHALATION) 2 TIMES DAILY
Status: DISCONTINUED | OUTPATIENT
Start: 2021-01-01 | End: 2021-01-01 | Stop reason: HOSPADM

## 2021-01-01 RX ORDER — IPRATROPIUM BROMIDE AND ALBUTEROL SULFATE 2.5; .5 MG/3ML; MG/3ML
3 SOLUTION RESPIRATORY (INHALATION) 2 TIMES DAILY
Status: DISCONTINUED | OUTPATIENT
Start: 2021-01-01 | End: 2021-01-01 | Stop reason: HOSPADM

## 2021-01-01 RX ORDER — HYDROCODONE BITARTRATE AND ACETAMINOPHEN 7.5; 325 MG/1; MG/1
1 TABLET ORAL EVERY 6 HOURS PRN
Qty: 12 TABLET | Refills: 0 | Status: SHIPPED | OUTPATIENT
Start: 2021-01-01 | End: 2021-01-01

## 2021-01-01 RX ORDER — LORAZEPAM 2 MG/ML
1 INJECTION INTRAMUSCULAR ONCE
Status: COMPLETED | OUTPATIENT
Start: 2021-01-01 | End: 2021-01-01

## 2021-01-01 RX ORDER — MIRTAZAPINE 7.5 MG/1
7.5 TABLET, FILM COATED ORAL NIGHTLY
Status: DISCONTINUED | OUTPATIENT
Start: 2021-01-01 | End: 2021-01-01 | Stop reason: HOSPADM

## 2021-01-01 RX ORDER — OXYCODONE HYDROCHLORIDE 5 MG/1
2.5 TABLET ORAL EVERY 6 HOURS PRN
Status: DISCONTINUED | OUTPATIENT
Start: 2021-01-01 | End: 2021-01-01

## 2021-01-01 RX ORDER — SEVELAMER CARBONATE 800 MG/1
1600 TABLET, FILM COATED ORAL
Status: DISCONTINUED | OUTPATIENT
Start: 2021-01-01 | End: 2021-01-01 | Stop reason: HOSPADM

## 2021-01-01 RX ORDER — ACETAMINOPHEN 325 MG/1
650 TABLET ORAL EVERY 4 HOURS PRN
Status: DISCONTINUED | OUTPATIENT
Start: 2021-01-01 | End: 2021-01-01 | Stop reason: HOSPADM

## 2021-01-01 RX ORDER — MIRTAZAPINE 15 MG/1
7.5 TABLET, FILM COATED ORAL NIGHTLY
COMMUNITY

## 2021-01-01 RX ORDER — HYDRALAZINE HYDROCHLORIDE 20 MG/ML
5 INJECTION INTRAMUSCULAR; INTRAVENOUS EVERY 10 MIN PRN
Status: COMPLETED | OUTPATIENT
Start: 2021-01-01 | End: 2021-01-01

## 2021-01-01 RX ORDER — ROCURONIUM BROMIDE 10 MG/ML
INJECTION, SOLUTION INTRAVENOUS PRN
Status: DISCONTINUED | OUTPATIENT
Start: 2021-01-01 | End: 2021-01-01 | Stop reason: SDUPTHER

## 2021-01-01 RX ORDER — POLYETHYLENE GLYCOL 3350 17 G/17G
17 POWDER, FOR SOLUTION ORAL DAILY PRN
Status: DISCONTINUED | OUTPATIENT
Start: 2021-01-01 | End: 2021-01-01 | Stop reason: HOSPADM

## 2021-01-01 RX ORDER — SODIUM CHLORIDE 450 MG/100ML
INJECTION, SOLUTION INTRAVENOUS CONTINUOUS
Status: DISCONTINUED | OUTPATIENT
Start: 2021-01-01 | End: 2021-01-01 | Stop reason: HOSPADM

## 2021-01-01 RX ORDER — DOCUSATE SODIUM 100 MG/1
100 CAPSULE, LIQUID FILLED ORAL DAILY
Status: DISCONTINUED | OUTPATIENT
Start: 2021-01-01 | End: 2021-01-01 | Stop reason: HOSPADM

## 2021-01-01 RX ORDER — CALCIUM ACETATE 667 MG/1
2001 TABLET ORAL
Status: DISCONTINUED | OUTPATIENT
Start: 2021-01-01 | End: 2021-01-01

## 2021-01-01 RX ORDER — ACETAMINOPHEN 325 MG/1
650 TABLET ORAL EVERY 6 HOURS PRN
Status: DISCONTINUED | OUTPATIENT
Start: 2021-01-01 | End: 2021-01-01 | Stop reason: HOSPADM

## 2021-01-01 RX ORDER — LORAZEPAM 0.5 MG/1
0.5 TABLET ORAL DAILY PRN
Status: DISCONTINUED | OUTPATIENT
Start: 2021-01-01 | End: 2021-01-01

## 2021-01-01 RX ORDER — FENTANYL CITRATE 50 UG/ML
25 INJECTION, SOLUTION INTRAMUSCULAR; INTRAVENOUS ONCE
Status: COMPLETED | OUTPATIENT
Start: 2021-01-01 | End: 2021-01-01

## 2021-01-01 RX ORDER — LIDOCAINE 4 G/G
3 PATCH TOPICAL DAILY
Qty: 20 PATCH | Refills: 0 | Status: SHIPPED | OUTPATIENT
Start: 2021-01-01 | End: 2021-01-01 | Stop reason: ALTCHOICE

## 2021-01-01 RX ORDER — CHOLECALCIFEROL (VITAMIN D3) 125 MCG
2.5 CAPSULE ORAL NIGHTLY
Status: DISCONTINUED | OUTPATIENT
Start: 2021-01-01 | End: 2021-01-01

## 2021-01-01 RX ORDER — FAMOTIDINE 20 MG/1
20 TABLET, FILM COATED ORAL
Status: DISCONTINUED | OUTPATIENT
Start: 2021-01-01 | End: 2021-01-01 | Stop reason: HOSPADM

## 2021-01-01 RX ORDER — LANOLIN ALCOHOL/MO/W.PET/CERES
6 CREAM (GRAM) TOPICAL NIGHTLY
Status: DISCONTINUED | OUTPATIENT
Start: 2021-01-01 | End: 2021-01-01 | Stop reason: HOSPADM

## 2021-01-01 RX ORDER — HYDROXYZINE HYDROCHLORIDE 10 MG/1
10 TABLET, FILM COATED ORAL 3 TIMES DAILY PRN
Status: DISCONTINUED | OUTPATIENT
Start: 2021-01-01 | End: 2021-01-01 | Stop reason: HOSPADM

## 2021-01-01 RX ORDER — AZITHROMYCIN 250 MG/1
250 TABLET, FILM COATED ORAL DAILY
Status: DISCONTINUED | OUTPATIENT
Start: 2021-01-01 | End: 2021-01-01 | Stop reason: HOSPADM

## 2021-01-01 RX ORDER — ALPRAZOLAM 0.5 MG/1
0.5 TABLET ORAL 3 TIMES DAILY
Status: DISCONTINUED | OUTPATIENT
Start: 2021-01-01 | End: 2021-01-01 | Stop reason: HOSPADM

## 2021-01-01 RX ORDER — QUETIAPINE FUMARATE 25 MG/1
25 TABLET, FILM COATED ORAL 2 TIMES DAILY
Status: DISCONTINUED | OUTPATIENT
Start: 2021-01-01 | End: 2021-01-01 | Stop reason: HOSPADM

## 2021-01-01 RX ORDER — AMIODARONE HYDROCHLORIDE 200 MG/1
200 TABLET ORAL DAILY
Status: DISCONTINUED | OUTPATIENT
Start: 2021-01-01 | End: 2021-01-01

## 2021-01-01 RX ORDER — IPRATROPIUM BROMIDE AND ALBUTEROL SULFATE 2.5; .5 MG/3ML; MG/3ML
3 SOLUTION RESPIRATORY (INHALATION) 4 TIMES DAILY
Status: DISCONTINUED | OUTPATIENT
Start: 2021-01-01 | End: 2021-01-01 | Stop reason: HOSPADM

## 2021-01-01 RX ORDER — ONDANSETRON 4 MG/1
4 TABLET, ORALLY DISINTEGRATING ORAL EVERY 8 HOURS PRN
Status: DISCONTINUED | OUTPATIENT
Start: 2021-01-01 | End: 2021-01-01 | Stop reason: HOSPADM

## 2021-01-01 RX ORDER — CALCIUM GLUCONATE 94 MG/ML
1000 INJECTION, SOLUTION INTRAVENOUS ONCE
Status: COMPLETED | OUTPATIENT
Start: 2021-01-01 | End: 2021-01-01

## 2021-01-01 RX ORDER — SODIUM CHLORIDE FOR INHALATION 0.9 %
3 VIAL, NEBULIZER (ML) INHALATION 3 TIMES DAILY PRN
Status: ON HOLD | COMMUNITY
End: 2021-01-01 | Stop reason: HOSPADM

## 2021-01-01 RX ORDER — DIPHENHYDRAMINE HCL 25 MG
25 TABLET ORAL EVERY 8 HOURS PRN
COMMUNITY
End: 2021-01-01 | Stop reason: ALTCHOICE

## 2021-01-01 RX ORDER — LIDOCAINE 40 MG/G
CREAM TOPICAL ONCE
Status: COMPLETED | OUTPATIENT
Start: 2021-01-01 | End: 2021-01-01

## 2021-01-01 RX ORDER — SODIUM CHLORIDE 9 MG/ML
INJECTION, SOLUTION INTRAVENOUS CONTINUOUS
Status: DISCONTINUED | OUTPATIENT
Start: 2021-01-01 | End: 2021-01-01

## 2021-01-01 RX ORDER — NICOTINE 21 MG/24HR
1 PATCH, TRANSDERMAL 24 HOURS TRANSDERMAL EVERY 24 HOURS
Status: DISCONTINUED | OUTPATIENT
Start: 2021-01-01 | End: 2021-01-01 | Stop reason: HOSPADM

## 2021-01-01 RX ORDER — SODIUM CHLORIDE FOR INHALATION 0.9 %
3 VIAL, NEBULIZER (ML) INHALATION 3 TIMES DAILY
Status: DISCONTINUED | OUTPATIENT
Start: 2021-01-01 | End: 2021-01-01 | Stop reason: HOSPADM

## 2021-01-01 RX ORDER — BENZTROPINE MESYLATE 1 MG/1
1 TABLET ORAL NIGHTLY
Status: DISCONTINUED | OUTPATIENT
Start: 2021-01-01 | End: 2021-01-01 | Stop reason: HOSPADM

## 2021-01-01 RX ORDER — PROPOFOL 10 MG/ML
INJECTION, EMULSION INTRAVENOUS PRN
Status: DISCONTINUED | OUTPATIENT
Start: 2021-01-01 | End: 2021-01-01 | Stop reason: SDUPTHER

## 2021-01-01 RX ORDER — BUDESONIDE AND FORMOTEROL FUMARATE DIHYDRATE 80; 4.5 UG/1; UG/1
2 AEROSOL RESPIRATORY (INHALATION) 2 TIMES DAILY
Status: DISCONTINUED | OUTPATIENT
Start: 2021-01-01 | End: 2021-01-01 | Stop reason: SDUPTHER

## 2021-01-01 RX ORDER — LEVOFLOXACIN 250 MG/1
250 TABLET ORAL DAILY
Qty: 4 TABLET | Refills: 0 | DISCHARGE
Start: 2021-01-01 | End: 2021-01-01

## 2021-01-01 RX ORDER — BUDESONIDE AND FORMOTEROL FUMARATE DIHYDRATE 160; 4.5 UG/1; UG/1
2 AEROSOL RESPIRATORY (INHALATION) 2 TIMES DAILY
Status: DISCONTINUED | OUTPATIENT
Start: 2021-01-01 | End: 2021-01-01 | Stop reason: HOSPADM

## 2021-01-01 RX ORDER — SODIUM CHLORIDE 9 MG/ML
25 INJECTION, SOLUTION INTRAVENOUS PRN
Status: DISCONTINUED | OUTPATIENT
Start: 2021-01-01 | End: 2021-01-01 | Stop reason: HOSPADM

## 2021-01-01 RX ORDER — NICOTINE 21 MG/24HR
1 PATCH, TRANSDERMAL 24 HOURS TRANSDERMAL EVERY 24 HOURS
COMMUNITY

## 2021-01-01 RX ORDER — LANOLIN ALCOHOL/MO/W.PET/CERES
6 CREAM (GRAM) TOPICAL DAILY
Status: ON HOLD | COMMUNITY
End: 2021-01-01 | Stop reason: HOSPADM

## 2021-01-01 RX ORDER — POLYETHYLENE GLYCOL 3350 17 G/17G
17 POWDER, FOR SOLUTION ORAL DAILY
Status: DISCONTINUED | OUTPATIENT
Start: 2021-01-01 | End: 2021-01-01 | Stop reason: HOSPADM

## 2021-01-01 RX ORDER — DIPHENHYDRAMINE HCL 25 MG
25 TABLET ORAL EVERY 6 HOURS PRN
Status: DISCONTINUED | OUTPATIENT
Start: 2021-01-01 | End: 2021-01-01 | Stop reason: HOSPADM

## 2021-01-01 RX ORDER — DIAZEPAM 5 MG/1
5 TABLET ORAL ONCE
Status: COMPLETED | OUTPATIENT
Start: 2021-01-01 | End: 2021-01-01

## 2021-01-01 RX ORDER — MORPHINE SULFATE 2 MG/ML
2 INJECTION, SOLUTION INTRAMUSCULAR; INTRAVENOUS ONCE
Status: DISCONTINUED | OUTPATIENT
Start: 2021-01-01 | End: 2021-01-01 | Stop reason: HOSPADM

## 2021-01-01 RX ORDER — ONDANSETRON 4 MG/1
8 TABLET, ORALLY DISINTEGRATING ORAL EVERY 8 HOURS PRN
Status: DISCONTINUED | OUTPATIENT
Start: 2021-01-01 | End: 2021-01-01 | Stop reason: HOSPADM

## 2021-01-01 RX ORDER — FENTANYL CITRATE 50 UG/ML
25 INJECTION, SOLUTION INTRAMUSCULAR; INTRAVENOUS EVERY 5 MIN PRN
Status: DISCONTINUED | OUTPATIENT
Start: 2021-01-01 | End: 2021-01-01 | Stop reason: HOSPADM

## 2021-01-01 RX ORDER — HEPARIN SODIUM 1000 [USP'U]/ML
4000 INJECTION, SOLUTION INTRAVENOUS; SUBCUTANEOUS ONCE
Status: COMPLETED | OUTPATIENT
Start: 2021-01-01 | End: 2021-01-01

## 2021-01-01 RX ORDER — SODIUM CHLORIDE 0.9 % (FLUSH) 0.9 %
5-40 SYRINGE (ML) INJECTION EVERY 12 HOURS SCHEDULED
Status: DISCONTINUED | OUTPATIENT
Start: 2021-01-01 | End: 2021-01-01 | Stop reason: HOSPADM

## 2021-01-01 RX ORDER — ONDANSETRON 2 MG/ML
4 INJECTION INTRAMUSCULAR; INTRAVENOUS EVERY 6 HOURS PRN
Status: DISCONTINUED | OUTPATIENT
Start: 2021-01-01 | End: 2021-01-01 | Stop reason: HOSPADM

## 2021-01-01 RX ORDER — LIDOCAINE HCL/PF 100 MG/5ML
SYRINGE (ML) INJECTION PRN
Status: DISCONTINUED | OUTPATIENT
Start: 2021-01-01 | End: 2021-01-01 | Stop reason: SDUPTHER

## 2021-01-01 RX ORDER — HYDROCODONE BITARTRATE AND ACETAMINOPHEN 5; 325 MG/1; MG/1
1 TABLET ORAL EVERY 6 HOURS PRN
Status: ON HOLD | COMMUNITY
End: 2021-01-01 | Stop reason: HOSPADM

## 2021-01-01 RX ORDER — ASCORBIC ACID 500 MG
TABLET ORAL 2 TIMES DAILY
COMMUNITY

## 2021-01-01 RX ORDER — AMIODARONE HYDROCHLORIDE 200 MG/1
200 TABLET ORAL DAILY
Status: DISCONTINUED | OUTPATIENT
Start: 2021-01-01 | End: 2021-01-01 | Stop reason: HOSPADM

## 2021-01-01 RX ORDER — MIDAZOLAM HYDROCHLORIDE 1 MG/ML
0.5 INJECTION INTRAMUSCULAR; INTRAVENOUS ONCE
Status: COMPLETED | OUTPATIENT
Start: 2021-01-01 | End: 2021-01-01

## 2021-01-01 RX ORDER — PREGABALIN 75 MG/1
75 CAPSULE ORAL 3 TIMES DAILY
Status: DISCONTINUED | OUTPATIENT
Start: 2021-01-01 | End: 2021-01-01 | Stop reason: HOSPADM

## 2021-01-01 RX ORDER — SODIUM CHLORIDE FOR INHALATION 3 %
4 VIAL, NEBULIZER (ML) INHALATION 2 TIMES DAILY
Status: DISCONTINUED | OUTPATIENT
Start: 2021-01-01 | End: 2021-01-01

## 2021-01-01 RX ORDER — FENTANYL CITRATE 50 UG/ML
50 INJECTION, SOLUTION INTRAMUSCULAR; INTRAVENOUS EVERY 5 MIN PRN
Status: DISCONTINUED | OUTPATIENT
Start: 2021-01-01 | End: 2021-01-01 | Stop reason: HOSPADM

## 2021-01-01 RX ORDER — SENNA PLUS 8.6 MG/1
1 TABLET ORAL NIGHTLY
COMMUNITY

## 2021-01-01 RX ORDER — BACITRACIN, NEOMYCIN, POLYMYXIN B 400; 3.5; 5 [USP'U]/G; MG/G; [USP'U]/G
OINTMENT TOPICAL ONCE
Status: COMPLETED | OUTPATIENT
Start: 2021-01-01 | End: 2021-01-01

## 2021-01-01 RX ORDER — MIDODRINE HYDROCHLORIDE 5 MG/1
TABLET ORAL 3 TIMES DAILY
Status: ON HOLD | COMMUNITY
End: 2021-01-01 | Stop reason: HOSPADM

## 2021-01-01 RX ORDER — NICOTINE 21 MG/24HR
1 PATCH, TRANSDERMAL 24 HOURS TRANSDERMAL DAILY
Status: DISCONTINUED | OUTPATIENT
Start: 2021-01-01 | End: 2021-01-01 | Stop reason: HOSPADM

## 2021-01-01 RX ORDER — ACETAMINOPHEN 325 MG/1
650 TABLET ORAL EVERY 6 HOURS PRN
Status: DISCONTINUED | OUTPATIENT
Start: 2021-01-01 | End: 2021-01-01

## 2021-01-01 RX ORDER — FENTANYL CITRATE 50 UG/ML
INJECTION, SOLUTION INTRAMUSCULAR; INTRAVENOUS PRN
Status: DISCONTINUED | OUTPATIENT
Start: 2021-01-01 | End: 2021-01-01 | Stop reason: SDUPTHER

## 2021-01-01 RX ORDER — PETROLATUM,WHITE
15 OINTMENT IN PACKET (GRAM) TOPICAL PRN
Qty: 500 G | Refills: 0 | Status: SHIPPED | OUTPATIENT
Start: 2021-01-01 | End: 2021-01-01 | Stop reason: ALTCHOICE

## 2021-01-01 RX ORDER — DOXYCYCLINE HYCLATE 100 MG
100 TABLET ORAL EVERY 12 HOURS SCHEDULED
Qty: 56 TABLET | Refills: 0 | Status: SHIPPED | OUTPATIENT
Start: 2021-01-01 | End: 2021-01-01

## 2021-01-01 RX ORDER — SODIUM CHLORIDE FOR INHALATION 3 %
4 VIAL, NEBULIZER (ML) INHALATION 2 TIMES DAILY
Qty: 240 ML | Refills: 5 | Status: SHIPPED | OUTPATIENT
Start: 2021-01-01 | End: 2021-01-01

## 2021-01-01 RX ORDER — SODIUM CHLORIDE 450 MG/100ML
INJECTION, SOLUTION INTRAVENOUS CONTINUOUS
Status: DISCONTINUED | OUTPATIENT
Start: 2021-01-01 | End: 2021-01-01

## 2021-01-01 RX ORDER — LACTOBACILLUS RHAMNOSUS GG 10B CELL
1 CAPSULE ORAL 2 TIMES DAILY WITH MEALS
Status: DISCONTINUED | OUTPATIENT
Start: 2021-01-01 | End: 2021-01-01 | Stop reason: HOSPADM

## 2021-01-01 RX ORDER — HYDROXYZINE HYDROCHLORIDE 25 MG/1
50 TABLET, FILM COATED ORAL EVERY 6 HOURS PRN
Status: DISCONTINUED | OUTPATIENT
Start: 2021-01-01 | End: 2021-01-01 | Stop reason: HOSPADM

## 2021-01-01 RX ORDER — CYCLOBENZAPRINE HCL 10 MG
10 TABLET ORAL 3 TIMES DAILY PRN
Status: DISCONTINUED | OUTPATIENT
Start: 2021-01-01 | End: 2021-01-01 | Stop reason: HOSPADM

## 2021-01-01 RX ORDER — SODIUM CHLORIDE 0.9 % (FLUSH) 0.9 %
10 SYRINGE (ML) INJECTION PRN
Status: DISCONTINUED | OUTPATIENT
Start: 2021-01-01 | End: 2021-01-01 | Stop reason: HOSPADM

## 2021-01-01 RX ORDER — DIPHENHYDRAMINE HYDROCHLORIDE 50 MG/ML
INJECTION INTRAMUSCULAR; INTRAVENOUS PRN
Status: DISCONTINUED | OUTPATIENT
Start: 2021-01-01 | End: 2021-01-01 | Stop reason: SDUPTHER

## 2021-01-01 RX ORDER — MIDODRINE HYDROCHLORIDE 5 MG/1
10 TABLET ORAL PRN
Status: ON HOLD | COMMUNITY
End: 2021-01-01 | Stop reason: HOSPADM

## 2021-01-01 RX ORDER — AZITHROMYCIN 250 MG/1
250 TABLET, FILM COATED ORAL DAILY
Qty: 4 TABLET | Refills: 0 | DISCHARGE
Start: 2021-01-01 | End: 2021-01-01

## 2021-01-01 RX ORDER — SODIUM CHLORIDE FOR INHALATION 3 %
4 VIAL, NEBULIZER (ML) INHALATION PRN
Status: DISCONTINUED | OUTPATIENT
Start: 2021-01-01 | End: 2021-01-01 | Stop reason: HOSPADM

## 2021-01-01 RX ORDER — SODIUM CHLORIDE FOR INHALATION 3 %
4 VIAL, NEBULIZER (ML) INHALATION PRN
Status: ON HOLD | COMMUNITY
End: 2021-01-01 | Stop reason: HOSPADM

## 2021-01-01 RX ORDER — LIDOCAINE 4 G/G
3 PATCH TOPICAL DAILY
Status: DISCONTINUED | OUTPATIENT
Start: 2021-01-01 | End: 2021-01-01 | Stop reason: HOSPADM

## 2021-01-01 RX ORDER — NICOTINE 14MG/24HR
PATCH, TRANSDERMAL 24 HOURS TRANSDERMAL 2 TIMES DAILY
Status: ON HOLD | COMMUNITY
End: 2021-01-01 | Stop reason: HOSPADM

## 2021-01-01 RX ORDER — MIDODRINE HYDROCHLORIDE 5 MG/1
5 TABLET ORAL 3 TIMES DAILY
Status: DISCONTINUED | OUTPATIENT
Start: 2021-01-01 | End: 2021-01-01

## 2021-01-01 RX ORDER — SODIUM CHLORIDE 450 MG/100ML
INJECTION, SOLUTION INTRAVENOUS CONTINUOUS
Status: CANCELLED | OUTPATIENT
Start: 2021-01-01

## 2021-01-01 RX ORDER — OXYCODONE HYDROCHLORIDE 5 MG/1
5 TABLET ORAL EVERY 6 HOURS PRN
Status: DISCONTINUED | OUTPATIENT
Start: 2021-01-01 | End: 2021-01-01

## 2021-01-01 RX ORDER — SODIUM CHLORIDE FOR INHALATION 0.9 %
3 VIAL, NEBULIZER (ML) INHALATION 3 TIMES DAILY
Qty: 270 ML | Refills: 2
Start: 2021-01-01 | End: 2021-01-01

## 2021-01-01 RX ORDER — OXYCODONE HYDROCHLORIDE 5 MG/1
5 TABLET ORAL EVERY 6 HOURS PRN
Qty: 20 TABLET | Refills: 0 | Status: SHIPPED | OUTPATIENT
Start: 2021-01-01 | End: 2021-01-01

## 2021-01-01 RX ORDER — SODIUM CHLORIDE FOR INHALATION 0.9 %
3 VIAL, NEBULIZER (ML) INHALATION PRN
Status: DISCONTINUED | OUTPATIENT
Start: 2021-01-01 | End: 2021-01-01 | Stop reason: HOSPADM

## 2021-01-01 RX ORDER — PIPERACILLIN SODIUM, TAZOBACTAM SODIUM 3; .375 G/15ML; G/15ML
INJECTION, POWDER, LYOPHILIZED, FOR SOLUTION INTRAVENOUS PRN
Status: DISCONTINUED | OUTPATIENT
Start: 2021-01-01 | End: 2021-01-01

## 2021-01-01 RX ORDER — FENTANYL CITRATE 50 UG/ML
50 INJECTION, SOLUTION INTRAMUSCULAR; INTRAVENOUS ONCE
Status: COMPLETED | OUTPATIENT
Start: 2021-01-01 | End: 2021-01-01

## 2021-01-01 RX ORDER — DOCUSATE SODIUM 100 MG/1
100 CAPSULE, LIQUID FILLED ORAL DAILY
COMMUNITY

## 2021-01-01 RX ORDER — ACETYLCYSTEINE 200 MG/ML
3 SOLUTION ORAL; RESPIRATORY (INHALATION) 2 TIMES DAILY
COMMUNITY
End: 2021-01-01 | Stop reason: ALTCHOICE

## 2021-01-01 RX ORDER — 0.9 % SODIUM CHLORIDE 0.9 %
10 INTRAVENOUS SOLUTION INTRAVENOUS ONCE
Status: DISCONTINUED | OUTPATIENT
Start: 2021-01-01 | End: 2021-01-01

## 2021-01-01 RX ORDER — AMIODARONE HYDROCHLORIDE 200 MG/1
200 TABLET ORAL DAILY
Status: ON HOLD | COMMUNITY
End: 2021-01-01 | Stop reason: HOSPADM

## 2021-01-01 RX ORDER — CITALOPRAM 20 MG/1
20 TABLET ORAL DAILY
Status: DISCONTINUED | OUTPATIENT
Start: 2021-01-01 | End: 2021-01-01 | Stop reason: HOSPADM

## 2021-01-01 RX ORDER — CHOLECALCIFEROL (VITAMIN D3) 125 MCG
5 CAPSULE ORAL NIGHTLY
Status: DISCONTINUED | OUTPATIENT
Start: 2021-01-01 | End: 2021-01-01 | Stop reason: HOSPADM

## 2021-01-01 RX ORDER — SULFAMETHOXAZOLE AND TRIMETHOPRIM 800; 160 MG/1; MG/1
1 TABLET ORAL 2 TIMES DAILY
Status: ON HOLD | COMMUNITY
End: 2021-01-01 | Stop reason: HOSPADM

## 2021-01-01 RX ORDER — GABAPENTIN 100 MG/1
100 CAPSULE ORAL 2 TIMES DAILY
Status: DISCONTINUED | OUTPATIENT
Start: 2021-01-01 | End: 2021-01-01 | Stop reason: HOSPADM

## 2021-01-01 RX ORDER — PETROLATUM,WHITE
OINTMENT IN PACKET (GRAM) TOPICAL PRN
Status: DISCONTINUED | OUTPATIENT
Start: 2021-01-01 | End: 2021-01-01 | Stop reason: HOSPADM

## 2021-01-01 RX ORDER — ACETAMINOPHEN 325 MG/1
650 TABLET ORAL EVERY 6 HOURS PRN
Status: DISCONTINUED | OUTPATIENT
Start: 2021-01-01 | End: 2021-01-01 | Stop reason: SDUPTHER

## 2021-01-01 RX ORDER — EPHEDRINE SULFATE/0.9% NACL/PF 50 MG/5 ML
SYRINGE (ML) INTRAVENOUS PRN
Status: DISCONTINUED | OUTPATIENT
Start: 2021-01-01 | End: 2021-01-01 | Stop reason: SDUPTHER

## 2021-01-01 RX ORDER — DOXYCYCLINE HYCLATE 100 MG
100 TABLET ORAL 2 TIMES DAILY
Qty: 14 TABLET | Refills: 0 | Status: SHIPPED | OUTPATIENT
Start: 2021-01-01 | End: 2021-01-01

## 2021-01-01 RX ORDER — IPRATROPIUM BROMIDE AND ALBUTEROL SULFATE 2.5; .5 MG/3ML; MG/3ML
3 SOLUTION RESPIRATORY (INHALATION) 4 TIMES DAILY
COMMUNITY

## 2021-01-01 RX ORDER — DOXEPIN HYDROCHLORIDE 10 MG/1
10 CAPSULE ORAL NIGHTLY
Status: DISCONTINUED | OUTPATIENT
Start: 2021-01-01 | End: 2021-01-01 | Stop reason: HOSPADM

## 2021-01-01 RX ORDER — SODIUM CHLORIDE 0.9 % (FLUSH) 0.9 %
10 SYRINGE (ML) INJECTION EVERY 12 HOURS SCHEDULED
Status: DISCONTINUED | OUTPATIENT
Start: 2021-01-01 | End: 2021-01-01 | Stop reason: HOSPADM

## 2021-01-01 RX ORDER — QUETIAPINE FUMARATE 25 MG/1
50 TABLET, FILM COATED ORAL 2 TIMES DAILY
Status: DISCONTINUED | OUTPATIENT
Start: 2021-01-01 | End: 2021-01-01 | Stop reason: HOSPADM

## 2021-01-01 RX ORDER — HYDROCODONE BITARTRATE AND ACETAMINOPHEN 5; 325 MG/1; MG/1
1 TABLET ORAL EVERY 6 HOURS PRN
Status: DISCONTINUED | OUTPATIENT
Start: 2021-01-01 | End: 2021-01-01 | Stop reason: HOSPADM

## 2021-01-01 RX ORDER — FENTANYL CITRATE 50 UG/ML
100 INJECTION, SOLUTION INTRAMUSCULAR; INTRAVENOUS ONCE
Status: COMPLETED | OUTPATIENT
Start: 2021-01-01 | End: 2021-01-01

## 2021-01-01 RX ORDER — LORAZEPAM 0.5 MG/1
0.5 TABLET ORAL EVERY 6 HOURS PRN
Status: DISCONTINUED | OUTPATIENT
Start: 2021-01-01 | End: 2021-01-01 | Stop reason: HOSPADM

## 2021-01-01 RX ORDER — HEPARIN SODIUM 5000 [USP'U]/ML
5000 INJECTION, SOLUTION INTRAVENOUS; SUBCUTANEOUS EVERY 8 HOURS
Status: DISCONTINUED | OUTPATIENT
Start: 2021-01-01 | End: 2021-01-01 | Stop reason: HOSPADM

## 2021-01-01 RX ORDER — CLINDAMYCIN PHOSPHATE 600 MG/50ML
600 INJECTION INTRAVENOUS
Status: COMPLETED | OUTPATIENT
Start: 2021-01-01 | End: 2021-01-01

## 2021-01-01 RX ORDER — DEXAMETHASONE SODIUM PHOSPHATE 10 MG/ML
INJECTION, EMULSION INTRAMUSCULAR; INTRAVENOUS PRN
Status: DISCONTINUED | OUTPATIENT
Start: 2021-01-01 | End: 2021-01-01 | Stop reason: SDUPTHER

## 2021-01-01 RX ORDER — MIDAZOLAM HYDROCHLORIDE 1 MG/ML
1 INJECTION INTRAMUSCULAR; INTRAVENOUS ONCE
Status: CANCELLED | OUTPATIENT
Start: 2021-01-01 | End: 2021-01-01

## 2021-01-01 RX ORDER — SENNA PLUS 8.6 MG/1
1 TABLET ORAL NIGHTLY
Status: DISCONTINUED | OUTPATIENT
Start: 2021-01-01 | End: 2021-01-01

## 2021-01-01 RX ORDER — LIDOCAINE 4 G/G
1 PATCH TOPICAL DAILY
Status: DISCONTINUED | OUTPATIENT
Start: 2021-01-01 | End: 2021-01-01

## 2021-01-01 RX ORDER — EPINEPHRINE 1 MG/ML
INJECTION, SOLUTION, CONCENTRATE INTRAVENOUS PRN
Status: DISCONTINUED | OUTPATIENT
Start: 2021-01-01 | End: 2021-01-01 | Stop reason: ALTCHOICE

## 2021-01-01 RX ORDER — FAMOTIDINE 20 MG/1
20 TABLET, FILM COATED ORAL
COMMUNITY
End: 2021-01-01 | Stop reason: ALTCHOICE

## 2021-01-01 RX ORDER — MIDODRINE HYDROCHLORIDE 2.5 MG/1
2.5 TABLET ORAL
Status: DISCONTINUED | OUTPATIENT
Start: 2021-01-01 | End: 2021-01-01 | Stop reason: HOSPADM

## 2021-01-01 RX ORDER — OXYCODONE HYDROCHLORIDE 5 MG/1
5 TABLET ORAL EVERY 4 HOURS PRN
Status: DISCONTINUED | OUTPATIENT
Start: 2021-01-01 | End: 2021-01-01 | Stop reason: HOSPADM

## 2021-01-01 RX ORDER — PIPERACILLIN SODIUM, TAZOBACTAM SODIUM 3; .375 G/15ML; G/15ML
INJECTION, POWDER, LYOPHILIZED, FOR SOLUTION INTRAVENOUS PRN
Status: DISCONTINUED | OUTPATIENT
Start: 2021-01-01 | End: 2021-01-01 | Stop reason: SDUPTHER

## 2021-01-01 RX ORDER — PROPOFOL 10 MG/ML
INJECTION, EMULSION INTRAVENOUS CONTINUOUS PRN
Status: DISCONTINUED | OUTPATIENT
Start: 2021-01-01 | End: 2021-01-01 | Stop reason: SDUPTHER

## 2021-01-01 RX ORDER — IBUPROFEN 400 MG/1
400 TABLET ORAL EVERY 6 HOURS PRN
Status: DISCONTINUED | OUTPATIENT
Start: 2021-01-01 | End: 2021-01-01 | Stop reason: HOSPADM

## 2021-01-01 RX ORDER — ESCITALOPRAM OXALATE 20 MG/1
20 TABLET ORAL DAILY
Status: DISCONTINUED | OUTPATIENT
Start: 2021-01-01 | End: 2021-01-01 | Stop reason: HOSPADM

## 2021-01-01 RX ORDER — EPINEPHRINE 0.1 MG/ML
SYRINGE (ML) INJECTION PRN
Status: DISCONTINUED | OUTPATIENT
Start: 2021-01-01 | End: 2021-01-01 | Stop reason: ALTCHOICE

## 2021-01-01 RX ORDER — LANOLIN ALCOHOL/MO/W.PET/CERES
6 CREAM (GRAM) TOPICAL DAILY
Status: DISCONTINUED | OUTPATIENT
Start: 2021-01-01 | End: 2021-01-01 | Stop reason: SDUPTHER

## 2021-01-01 RX ORDER — SODIUM CHLORIDE FOR INHALATION 0.9 %
3 VIAL, NEBULIZER (ML) INHALATION PRN
Status: DISCONTINUED | OUTPATIENT
Start: 2021-01-01 | End: 2021-01-01

## 2021-01-01 RX ORDER — SODIUM CHLORIDE 0.9 % (FLUSH) 0.9 %
5-40 SYRINGE (ML) INJECTION PRN
Status: DISCONTINUED | OUTPATIENT
Start: 2021-01-01 | End: 2021-01-01 | Stop reason: HOSPADM

## 2021-01-01 RX ORDER — BENZTROPINE MESYLATE 1 MG/1
1 TABLET ORAL NIGHTLY
COMMUNITY

## 2021-01-01 RX ORDER — QUETIAPINE FUMARATE 25 MG/1
25 TABLET, FILM COATED ORAL 2 TIMES DAILY
COMMUNITY

## 2021-01-01 RX ORDER — CHOLECALCIFEROL (VITAMIN D3) 125 MCG
5 CAPSULE ORAL NIGHTLY
COMMUNITY
End: 2021-01-01 | Stop reason: DRUGHIGH

## 2021-01-01 RX ORDER — M-VIT,TX,IRON,MINS/CALC/FOLIC 27MG-0.4MG
1 TABLET ORAL DAILY
Status: DISCONTINUED | OUTPATIENT
Start: 2021-01-01 | End: 2021-01-01 | Stop reason: HOSPADM

## 2021-01-01 RX ORDER — LABETALOL 20 MG/4 ML (5 MG/ML) INTRAVENOUS SYRINGE
10 EVERY 10 MIN PRN
Status: DISCONTINUED | OUTPATIENT
Start: 2021-01-01 | End: 2021-01-01 | Stop reason: HOSPADM

## 2021-01-01 RX ORDER — HYDROCODONE BITARTRATE AND ACETAMINOPHEN 7.5; 325 MG/1; MG/1
1 TABLET ORAL EVERY 6 HOURS PRN
Qty: 12 TABLET | Refills: 0 | Status: SHIPPED | OUTPATIENT
Start: 2021-01-01 | End: 2021-01-01 | Stop reason: SDUPTHER

## 2021-01-01 RX ORDER — IPRATROPIUM BROMIDE AND ALBUTEROL SULFATE 2.5; .5 MG/3ML; MG/3ML
3 SOLUTION RESPIRATORY (INHALATION) 4 TIMES DAILY
Status: DISCONTINUED | OUTPATIENT
Start: 2021-01-01 | End: 2021-01-01

## 2021-01-01 RX ORDER — NICOTINE 21 MG/24HR
1 PATCH, TRANSDERMAL 24 HOURS TRANSDERMAL DAILY
Qty: 30 PATCH | Refills: 3 | Status: SHIPPED | OUTPATIENT
Start: 2021-01-01 | End: 2021-01-01 | Stop reason: ALTCHOICE

## 2021-01-01 RX ORDER — LIDOCAINE 4 G/G
PATCH TOPICAL
Status: COMPLETED
Start: 2021-01-01 | End: 2021-01-01

## 2021-01-01 RX ORDER — DEXTROSE MONOHYDRATE 50 MG/ML
100 INJECTION, SOLUTION INTRAVENOUS PRN
Status: DISCONTINUED | OUTPATIENT
Start: 2021-01-01 | End: 2021-01-01 | Stop reason: HOSPADM

## 2021-01-01 RX ORDER — MIRTAZAPINE 15 MG/1
7.5 TABLET, FILM COATED ORAL NIGHTLY
Status: DISCONTINUED | OUTPATIENT
Start: 2021-01-01 | End: 2021-01-01 | Stop reason: HOSPADM

## 2021-01-01 RX ORDER — MIDAZOLAM HYDROCHLORIDE 1 MG/ML
INJECTION INTRAMUSCULAR; INTRAVENOUS PRN
Status: DISCONTINUED | OUTPATIENT
Start: 2021-01-01 | End: 2021-01-01 | Stop reason: SDUPTHER

## 2021-01-01 RX ORDER — FENTANYL CITRATE 50 UG/ML
25 INJECTION, SOLUTION INTRAMUSCULAR; INTRAVENOUS ONCE
Status: DISCONTINUED | OUTPATIENT
Start: 2021-01-01 | End: 2021-01-01 | Stop reason: HOSPADM

## 2021-01-01 RX ORDER — LIDOCAINE 40 MG/G
CREAM TOPICAL ONCE
Status: CANCELLED | OUTPATIENT
Start: 2021-01-01 | End: 2021-01-01

## 2021-01-01 RX ORDER — ACETAMINOPHEN 325 MG/1
650 TABLET ORAL ONCE
Status: COMPLETED | OUTPATIENT
Start: 2021-01-01 | End: 2021-01-01

## 2021-01-01 RX ORDER — ALBUMIN (HUMAN) 12.5 G/50ML
25 SOLUTION INTRAVENOUS ONCE
Status: COMPLETED | OUTPATIENT
Start: 2021-01-01 | End: 2021-01-01

## 2021-01-01 RX ORDER — NICOTINE 21 MG/24HR
1 PATCH, TRANSDERMAL 24 HOURS TRANSDERMAL EVERY 24 HOURS
Status: ON HOLD | COMMUNITY
End: 2021-01-01

## 2021-01-01 RX ORDER — HYDRALAZINE HYDROCHLORIDE 20 MG/ML
5 INJECTION INTRAMUSCULAR; INTRAVENOUS EVERY 6 HOURS PRN
Status: DISCONTINUED | OUTPATIENT
Start: 2021-01-01 | End: 2021-01-01

## 2021-01-01 RX ORDER — FAMOTIDINE 20 MG/1
20 TABLET, FILM COATED ORAL 2 TIMES DAILY
Status: DISCONTINUED | OUTPATIENT
Start: 2021-01-01 | End: 2021-01-01

## 2021-01-01 RX ORDER — ACETAMINOPHEN 650 MG/1
650 SUPPOSITORY RECTAL EVERY 6 HOURS PRN
Status: DISCONTINUED | OUTPATIENT
Start: 2021-01-01 | End: 2021-01-01 | Stop reason: HOSPADM

## 2021-01-01 RX ORDER — FLUCONAZOLE 150 MG/1
150 TABLET ORAL ONCE
Status: ON HOLD | COMMUNITY
End: 2021-01-01 | Stop reason: HOSPADM

## 2021-01-01 RX ORDER — OXYCODONE HYDROCHLORIDE 5 MG/1
10 TABLET ORAL EVERY 4 HOURS PRN
Status: DISCONTINUED | OUTPATIENT
Start: 2021-01-01 | End: 2021-01-01 | Stop reason: HOSPADM

## 2021-01-01 RX ORDER — POLYETHYLENE GLYCOL 3350 17 G/17G
17 POWDER, FOR SOLUTION ORAL DAILY
COMMUNITY

## 2021-01-01 RX ORDER — HYDROMORPHONE HCL 110MG/55ML
0.5 PATIENT CONTROLLED ANALGESIA SYRINGE INTRAVENOUS EVERY 4 HOURS PRN
Status: DISCONTINUED | OUTPATIENT
Start: 2021-01-01 | End: 2021-01-01

## 2021-01-01 RX ORDER — DIPHENHYDRAMINE HCL 25 MG
25 TABLET ORAL EVERY 6 HOURS PRN
COMMUNITY

## 2021-01-01 RX ORDER — DIPHENHYDRAMINE HYDROCHLORIDE 50 MG/ML
25 INJECTION INTRAMUSCULAR; INTRAVENOUS ONCE
Status: COMPLETED | OUTPATIENT
Start: 2021-01-01 | End: 2021-01-01

## 2021-01-01 RX ORDER — POLYETHYLENE GLYCOL 3350 17 G/17G
17 POWDER, FOR SOLUTION ORAL DAILY
Status: DISCONTINUED | OUTPATIENT
Start: 2021-01-01 | End: 2021-01-01

## 2021-01-01 RX ORDER — FAMOTIDINE 20 MG/1
20 TABLET, FILM COATED ORAL
COMMUNITY

## 2021-01-01 RX ORDER — MIDAZOLAM HYDROCHLORIDE 1 MG/ML
1 INJECTION INTRAMUSCULAR; INTRAVENOUS ONCE
Status: COMPLETED | OUTPATIENT
Start: 2021-01-01 | End: 2021-01-01

## 2021-01-01 RX ORDER — HYDROXYZINE HYDROCHLORIDE 10 MG/1
10 TABLET, FILM COATED ORAL EVERY 6 HOURS PRN
Status: DISCONTINUED | OUTPATIENT
Start: 2021-01-01 | End: 2021-01-01 | Stop reason: HOSPADM

## 2021-01-01 RX ORDER — IPRATROPIUM BROMIDE AND ALBUTEROL SULFATE 2.5; .5 MG/3ML; MG/3ML
1 SOLUTION RESPIRATORY (INHALATION) EVERY 4 HOURS PRN
Status: DISCONTINUED | OUTPATIENT
Start: 2021-01-01 | End: 2021-01-01 | Stop reason: HOSPADM

## 2021-01-01 RX ORDER — CEFAZOLIN SODIUM 1 G/3ML
INJECTION, POWDER, FOR SOLUTION INTRAMUSCULAR; INTRAVENOUS PRN
Status: DISCONTINUED | OUTPATIENT
Start: 2021-01-01 | End: 2021-01-01 | Stop reason: SDUPTHER

## 2021-01-01 RX ORDER — SODIUM CHLORIDE FOR INHALATION 0.9 %
3 VIAL, NEBULIZER (ML) INHALATION 3 TIMES DAILY
Status: DISCONTINUED | OUTPATIENT
Start: 2021-01-01 | End: 2021-01-01

## 2021-01-01 RX ORDER — DEXTROSE MONOHYDRATE 25 G/50ML
12.5 INJECTION, SOLUTION INTRAVENOUS PRN
Status: DISCONTINUED | OUTPATIENT
Start: 2021-01-01 | End: 2021-01-01 | Stop reason: HOSPADM

## 2021-01-01 RX ORDER — OXYCODONE HYDROCHLORIDE 5 MG/1
5 TABLET ORAL EVERY 4 HOURS
Status: DISCONTINUED | OUTPATIENT
Start: 2021-01-01 | End: 2021-01-06 | Stop reason: HOSPADM

## 2021-01-01 RX ORDER — DEXTROSE MONOHYDRATE 25 G/50ML
25 INJECTION, SOLUTION INTRAVENOUS ONCE
Status: DISCONTINUED | OUTPATIENT
Start: 2021-01-01 | End: 2021-01-01 | Stop reason: HOSPADM

## 2021-01-01 RX ORDER — MIDAZOLAM HYDROCHLORIDE 2 MG/2ML
0.5 INJECTION, SOLUTION INTRAMUSCULAR; INTRAVENOUS ONCE
Status: COMPLETED | OUTPATIENT
Start: 2021-01-01 | End: 2021-01-01

## 2021-01-01 RX ORDER — FLUCONAZOLE 100 MG/1
150 TABLET ORAL ONCE
Status: COMPLETED | OUTPATIENT
Start: 2021-01-01 | End: 2021-01-01

## 2021-01-01 RX ORDER — NICOTINE POLACRILEX 4 MG
15 LOZENGE BUCCAL PRN
Status: DISCONTINUED | OUTPATIENT
Start: 2021-01-01 | End: 2021-01-01 | Stop reason: HOSPADM

## 2021-01-01 RX ORDER — HEPARIN SODIUM 5000 [USP'U]/ML
5000 INJECTION, SOLUTION INTRAVENOUS; SUBCUTANEOUS 2 TIMES DAILY
Status: DISCONTINUED | OUTPATIENT
Start: 2021-01-01 | End: 2021-01-01 | Stop reason: HOSPADM

## 2021-01-01 RX ORDER — SENNA PLUS 8.6 MG/1
2 TABLET ORAL NIGHTLY
Status: DISCONTINUED | OUTPATIENT
Start: 2021-01-01 | End: 2021-01-01 | Stop reason: HOSPADM

## 2021-01-01 RX ORDER — IPRATROPIUM BROMIDE AND ALBUTEROL SULFATE 2.5; .5 MG/3ML; MG/3ML
1 SOLUTION RESPIRATORY (INHALATION)
Status: DISCONTINUED | OUTPATIENT
Start: 2021-01-01 | End: 2021-01-01 | Stop reason: HOSPADM

## 2021-01-01 RX ORDER — ONDANSETRON 2 MG/ML
INJECTION INTRAMUSCULAR; INTRAVENOUS PRN
Status: DISCONTINUED | OUTPATIENT
Start: 2021-01-01 | End: 2021-01-01 | Stop reason: SDUPTHER

## 2021-01-01 RX ORDER — 0.9 % SODIUM CHLORIDE 0.9 %
250 INTRAVENOUS SOLUTION INTRAVENOUS ONCE
Status: COMPLETED | OUTPATIENT
Start: 2021-01-01 | End: 2021-01-01

## 2021-01-01 RX ORDER — ALPRAZOLAM 0.5 MG/1
0.5 TABLET ORAL 3 TIMES DAILY
Qty: 12 TABLET | Refills: 0 | Status: SHIPPED | OUTPATIENT
Start: 2021-01-01 | End: 2022-01-01

## 2021-01-01 RX ORDER — CLINDAMYCIN PHOSPHATE 600 MG/50ML
600 INJECTION INTRAVENOUS
Status: CANCELLED | OUTPATIENT
Start: 2021-01-01

## 2021-01-01 RX ORDER — MIDODRINE HYDROCHLORIDE 5 MG/1
5 TABLET ORAL ONCE
Status: COMPLETED | OUTPATIENT
Start: 2021-01-01 | End: 2021-01-01

## 2021-01-01 RX ORDER — BISACODYL 10 MG
10 SUPPOSITORY, RECTAL RECTAL DAILY PRN
Status: ON HOLD | COMMUNITY
End: 2021-01-01 | Stop reason: HOSPADM

## 2021-01-01 RX ORDER — DIPHENHYDRAMINE HYDROCHLORIDE 50 MG/ML
INJECTION INTRAMUSCULAR; INTRAVENOUS
Status: DISCONTINUED
Start: 2021-01-01 | End: 2021-01-01

## 2021-01-01 RX ORDER — PROMETHAZINE HYDROCHLORIDE 25 MG/ML
12.5 INJECTION, SOLUTION INTRAMUSCULAR; INTRAVENOUS
Status: DISCONTINUED | OUTPATIENT
Start: 2021-01-01 | End: 2021-01-01 | Stop reason: HOSPADM

## 2021-01-01 RX ORDER — PREGABALIN 25 MG/1
25 CAPSULE ORAL 2 TIMES DAILY
COMMUNITY
Start: 2021-01-01 | End: 2021-01-01

## 2021-01-01 RX ORDER — FENTANYL CITRATE 50 UG/ML
INJECTION, SOLUTION INTRAMUSCULAR; INTRAVENOUS
Status: COMPLETED
Start: 2021-01-01 | End: 2021-01-01

## 2021-01-01 RX ORDER — OXYCODONE HYDROCHLORIDE 5 MG/1
5 TABLET ORAL EVERY 6 HOURS
Status: ON HOLD | COMMUNITY
End: 2021-01-01 | Stop reason: HOSPADM

## 2021-01-01 RX ORDER — HYDROCODONE BITARTRATE AND ACETAMINOPHEN 5; 325 MG/1; MG/1
1 TABLET ORAL EVERY 6 HOURS PRN
Status: DISCONTINUED | OUTPATIENT
Start: 2021-01-01 | End: 2021-01-01 | Stop reason: SDUPTHER

## 2021-01-01 RX ORDER — MIDODRINE HYDROCHLORIDE 2.5 MG/1
2.5 TABLET ORAL
Qty: 90 TABLET | Refills: 3 | DISCHARGE
Start: 2021-01-01

## 2021-01-01 RX ORDER — PROMETHAZINE HYDROCHLORIDE 25 MG/1
12.5 TABLET ORAL EVERY 6 HOURS PRN
Status: DISCONTINUED | OUTPATIENT
Start: 2021-01-01 | End: 2021-01-01 | Stop reason: HOSPADM

## 2021-01-01 RX ADMIN — HYDROMORPHONE HYDROCHLORIDE 0.25 MG: 1 INJECTION, SOLUTION INTRAMUSCULAR; INTRAVENOUS; SUBCUTANEOUS at 16:18

## 2021-01-01 RX ADMIN — DOCUSATE SODIUM 100 MG: 100 CAPSULE ORAL at 07:50

## 2021-01-01 RX ADMIN — DOCUSATE SODIUM 100 MG: 100 CAPSULE ORAL at 14:36

## 2021-01-01 RX ADMIN — SEVELAMER CARBONATE 1600 MG: 800 TABLET, FILM COATED ORAL at 09:42

## 2021-01-01 RX ADMIN — HEPARIN SODIUM 4000 UNITS: 1000 INJECTION INTRAVENOUS; SUBCUTANEOUS at 16:04

## 2021-01-01 RX ADMIN — OXYCODONE HYDROCHLORIDE 5 MG: 5 TABLET ORAL at 08:24

## 2021-01-01 RX ADMIN — POLYETHYLENE GLYCOL 3350 17 G: 17 POWDER, FOR SOLUTION ORAL at 11:00

## 2021-01-01 RX ADMIN — DIPHENHYDRAMINE HYDROCHLORIDE 25 MG: 25 TABLET ORAL at 10:38

## 2021-01-01 RX ADMIN — DOCUSATE SODIUM 100 MG: 50 LIQUID ORAL at 10:59

## 2021-01-01 RX ADMIN — MIDODRINE HYDROCHLORIDE 15 MG: 10 TABLET ORAL at 08:50

## 2021-01-01 RX ADMIN — HEPARIN SODIUM 5000 UNITS: 5000 INJECTION INTRAVENOUS; SUBCUTANEOUS at 12:57

## 2021-01-01 RX ADMIN — IPRATROPIUM BROMIDE AND ALBUTEROL SULFATE 1 AMPULE: .5; 3 SOLUTION RESPIRATORY (INHALATION) at 15:33

## 2021-01-01 RX ADMIN — BENZTROPINE MESYLATE 1 MG: 1 TABLET ORAL at 22:11

## 2021-01-01 RX ADMIN — ACETAMINOPHEN 650 MG: 325 TABLET ORAL at 15:20

## 2021-01-01 RX ADMIN — BUDESONIDE AND FORMOTEROL FUMARATE DIHYDRATE 2 PUFF: 160; 4.5 AEROSOL RESPIRATORY (INHALATION) at 20:16

## 2021-01-01 RX ADMIN — SODIUM CHLORIDE, PRESERVATIVE FREE 10 ML: 5 INJECTION INTRAVENOUS at 21:36

## 2021-01-01 RX ADMIN — ACETAMINOPHEN 650 MG: 325 TABLET ORAL at 09:57

## 2021-01-01 RX ADMIN — CYCLOBENZAPRINE HYDROCHLORIDE 10 MG: 10 TABLET, FILM COATED ORAL at 17:32

## 2021-01-01 RX ADMIN — DIPHENHYDRAMINE HYDROCHLORIDE 25 MG: 25 TABLET ORAL at 13:41

## 2021-01-01 RX ADMIN — PIPERACILLIN AND TAZOBACTAM 2250 MG: 2; .25 INJECTION, POWDER, LYOPHILIZED, FOR SOLUTION INTRAVENOUS at 16:30

## 2021-01-01 RX ADMIN — AMIODARONE HYDROCHLORIDE 200 MG: 200 TABLET ORAL at 08:12

## 2021-01-01 RX ADMIN — QUETIAPINE FUMARATE 50 MG: 25 TABLET ORAL at 19:34

## 2021-01-01 RX ADMIN — SODIUM CHLORIDE, PRESERVATIVE FREE 10 ML: 5 INJECTION INTRAVENOUS at 19:36

## 2021-01-01 RX ADMIN — ALPRAZOLAM 0.5 MG: 0.5 TABLET ORAL at 09:41

## 2021-01-01 RX ADMIN — EPOETIN ALFA-EPBX 3000 UNITS: 3000 INJECTION, SOLUTION INTRAVENOUS; SUBCUTANEOUS at 16:23

## 2021-01-01 RX ADMIN — ALPRAZOLAM 0.5 MG: 0.5 TABLET ORAL at 07:57

## 2021-01-01 RX ADMIN — ALBUMIN (HUMAN) 25 G: 0.25 INJECTION, SOLUTION INTRAVENOUS at 11:30

## 2021-01-01 RX ADMIN — ACETAMINOPHEN 650 MG: 325 TABLET ORAL at 15:21

## 2021-01-01 RX ADMIN — BENZTROPINE MESYLATE 1 MG: 1 TABLET ORAL at 21:37

## 2021-01-01 RX ADMIN — Medication 1 CAPSULE: at 17:25

## 2021-01-01 RX ADMIN — DIAZEPAM 5 MG: 5 TABLET ORAL at 12:53

## 2021-01-01 RX ADMIN — MIDODRINE HYDROCHLORIDE 15 MG: 10 TABLET ORAL at 12:59

## 2021-01-01 RX ADMIN — PREGABALIN 75 MG: 75 CAPSULE ORAL at 21:57

## 2021-01-01 RX ADMIN — ALPRAZOLAM 0.5 MG: 0.5 TABLET ORAL at 15:20

## 2021-01-01 RX ADMIN — IPRATROPIUM BROMIDE AND ALBUTEROL SULFATE 1 AMPULE: .5; 3 SOLUTION RESPIRATORY (INHALATION) at 09:15

## 2021-01-01 RX ADMIN — LORAZEPAM 0.5 MG: 0.5 TABLET ORAL at 14:34

## 2021-01-01 RX ADMIN — SODIUM CHLORIDE: 9 INJECTION, SOLUTION INTRAVENOUS at 22:38

## 2021-01-01 RX ADMIN — Medication 5 MG: at 21:35

## 2021-01-01 RX ADMIN — Medication 1 CAPSULE: at 13:05

## 2021-01-01 RX ADMIN — AMIODARONE HYDROCHLORIDE 200 MG: 200 TABLET ORAL at 09:42

## 2021-01-01 RX ADMIN — SODIUM CHLORIDE, PRESERVATIVE FREE 10 ML: 5 INJECTION INTRAVENOUS at 21:51

## 2021-01-01 RX ADMIN — OXYCODONE HYDROCHLORIDE 10 MG: 5 TABLET ORAL at 16:19

## 2021-01-01 RX ADMIN — SODIUM BICARBONATE 50 MEQ: 84 INJECTION, SOLUTION INTRAVENOUS at 13:43

## 2021-01-01 RX ADMIN — HYDROXYZINE HYDROCHLORIDE 10 MG: 10 TABLET ORAL at 20:09

## 2021-01-01 RX ADMIN — IPRATROPIUM BROMIDE AND ALBUTEROL SULFATE 1 AMPULE: .5; 3 SOLUTION RESPIRATORY (INHALATION) at 21:53

## 2021-01-01 RX ADMIN — ESCITALOPRAM 20 MG: 20 TABLET, FILM COATED ORAL at 10:31

## 2021-01-01 RX ADMIN — LORAZEPAM 0.5 MG: 0.5 TABLET ORAL at 09:35

## 2021-01-01 RX ADMIN — AMIODARONE HYDROCHLORIDE 200 MG: 200 TABLET ORAL at 10:31

## 2021-01-01 RX ADMIN — HYDROXYZINE HYDROCHLORIDE 10 MG: 10 TABLET ORAL at 21:51

## 2021-01-01 RX ADMIN — DIPHENHYDRAMINE HYDROCHLORIDE 25 MG: 50 INJECTION, SOLUTION INTRAMUSCULAR; INTRAVENOUS at 11:29

## 2021-01-01 RX ADMIN — DIPHENHYDRAMINE HCL 25 MG: 25 TABLET ORAL at 07:38

## 2021-01-01 RX ADMIN — Medication 6 MG: at 21:10

## 2021-01-01 RX ADMIN — CARBIDOPA AND LEVODOPA 1 TABLET: 25; 100 TABLET ORAL at 21:25

## 2021-01-01 RX ADMIN — ACETAMINOPHEN 650 MG: 325 TABLET ORAL at 08:06

## 2021-01-01 RX ADMIN — CEFEPIME HYDROCHLORIDE 1000 MG: 1 INJECTION, POWDER, FOR SOLUTION INTRAMUSCULAR; INTRAVENOUS at 21:48

## 2021-01-01 RX ADMIN — SEVELAMER CARBONATE 1600 MG: 800 TABLET, FILM COATED ORAL at 16:58

## 2021-01-01 RX ADMIN — MIDODRINE HYDROCHLORIDE 2.5 MG: 2.5 TABLET ORAL at 18:37

## 2021-01-01 RX ADMIN — HYDROCODONE BITARTRATE AND ACETAMINOPHEN 1 TABLET: 5; 325 TABLET ORAL at 10:38

## 2021-01-01 RX ADMIN — ALBUTEROL SULFATE 2.5 MG: 2.5 SOLUTION RESPIRATORY (INHALATION) at 23:18

## 2021-01-01 RX ADMIN — CARBIDOPA AND LEVODOPA 1 TABLET: 25; 100 TABLET ORAL at 21:51

## 2021-01-01 RX ADMIN — DOXEPIN HYDROCHLORIDE 10 MG: 10 CAPSULE ORAL at 21:35

## 2021-01-01 RX ADMIN — AZITHROMYCIN DIHYDRATE 250 MG: 500 INJECTION, POWDER, LYOPHILIZED, FOR SOLUTION INTRAVENOUS at 04:55

## 2021-01-01 RX ADMIN — ACETAMINOPHEN 650 MG: 325 TABLET ORAL at 21:53

## 2021-01-01 RX ADMIN — ESCITALOPRAM 20 MG: 20 TABLET, FILM COATED ORAL at 08:23

## 2021-01-01 RX ADMIN — HYDROXYZINE HYDROCHLORIDE 50 MG: 25 TABLET, FILM COATED ORAL at 22:15

## 2021-01-01 RX ADMIN — ALPRAZOLAM 0.5 MG: 0.5 TABLET ORAL at 20:08

## 2021-01-01 RX ADMIN — SENNOSIDES 8.6 MG: 8.6 TABLET, FILM COATED ORAL at 21:51

## 2021-01-01 RX ADMIN — QUETIAPINE FUMARATE 50 MG: 25 TABLET ORAL at 19:48

## 2021-01-01 RX ADMIN — IPRATROPIUM BROMIDE AND ALBUTEROL SULFATE 1 AMPULE: .5; 3 SOLUTION RESPIRATORY (INHALATION) at 16:22

## 2021-01-01 RX ADMIN — APIXABAN 2.5 MG: 2.5 TABLET, FILM COATED ORAL at 13:02

## 2021-01-01 RX ADMIN — IPRATROPIUM BROMIDE AND ALBUTEROL SULFATE 3 ML: .5; 3 SOLUTION RESPIRATORY (INHALATION) at 15:09

## 2021-01-01 RX ADMIN — SODIUM CHLORIDE, PRESERVATIVE FREE 10 ML: 5 INJECTION INTRAVENOUS at 21:24

## 2021-01-01 RX ADMIN — IPRATROPIUM BROMIDE AND ALBUTEROL SULFATE 1 AMPULE: .5; 3 SOLUTION RESPIRATORY (INHALATION) at 07:42

## 2021-01-01 RX ADMIN — QUETIAPINE FUMARATE 50 MG: 25 TABLET ORAL at 19:51

## 2021-01-01 RX ADMIN — IPRATROPIUM BROMIDE AND ALBUTEROL SULFATE 3 ML: .5; 3 SOLUTION RESPIRATORY (INHALATION) at 13:16

## 2021-01-01 RX ADMIN — Medication 1 CAPSULE: at 16:13

## 2021-01-01 RX ADMIN — ACETAMINOPHEN 650 MG: 325 TABLET ORAL at 03:04

## 2021-01-01 RX ADMIN — PHENYLEPHRINE HYDROCHLORIDE 100 MCG: 10 INJECTION INTRAVENOUS at 11:36

## 2021-01-01 RX ADMIN — DIATRIZOATE MEGLUMINE AND DIATRIZOATE SODIUM 30 ML: 660; 100 LIQUID ORAL; RECTAL at 10:04

## 2021-01-01 RX ADMIN — IPRATROPIUM BROMIDE AND ALBUTEROL SULFATE 3 ML: .5; 3 SOLUTION RESPIRATORY (INHALATION) at 08:57

## 2021-01-01 RX ADMIN — MIDODRINE HYDROCHLORIDE 2.5 MG: 2.5 TABLET ORAL at 07:55

## 2021-01-01 RX ADMIN — MIDODRINE HYDROCHLORIDE 15 MG: 10 TABLET ORAL at 11:58

## 2021-01-01 RX ADMIN — IPRATROPIUM BROMIDE AND ALBUTEROL SULFATE 1 AMPULE: .5; 3 SOLUTION RESPIRATORY (INHALATION) at 12:25

## 2021-01-01 RX ADMIN — DIPHENHYDRAMINE HYDROCHLORIDE 25 MG: 50 INJECTION INTRAMUSCULAR; INTRAVENOUS at 11:29

## 2021-01-01 RX ADMIN — HEPARIN SODIUM 5000 UNITS: 5000 INJECTION INTRAVENOUS; SUBCUTANEOUS at 15:06

## 2021-01-01 RX ADMIN — QUETIAPINE FUMARATE 25 MG: 25 TABLET ORAL at 20:08

## 2021-01-01 RX ADMIN — OXYCODONE HYDROCHLORIDE 5 MG: 5 TABLET ORAL at 21:36

## 2021-01-01 RX ADMIN — IPRATROPIUM BROMIDE AND ALBUTEROL SULFATE 1 AMPULE: .5; 3 SOLUTION RESPIRATORY (INHALATION) at 21:13

## 2021-01-01 RX ADMIN — SEVELAMER CARBONATE 1600 MG: 800 TABLET, FILM COATED ORAL at 11:58

## 2021-01-01 RX ADMIN — DOXEPIN HYDROCHLORIDE 10 MG: 10 CAPSULE ORAL at 20:25

## 2021-01-01 RX ADMIN — ALPRAZOLAM 0.5 MG: 0.5 TABLET ORAL at 21:21

## 2021-01-01 RX ADMIN — FAMOTIDINE 20 MG: 20 TABLET ORAL at 07:54

## 2021-01-01 RX ADMIN — SODIUM CHLORIDE, PRESERVATIVE FREE 10 ML: 5 INJECTION INTRAVENOUS at 20:49

## 2021-01-01 RX ADMIN — MIDODRINE HYDROCHLORIDE 15 MG: 10 TABLET ORAL at 16:20

## 2021-01-01 RX ADMIN — Medication 1 TABLET: at 13:00

## 2021-01-01 RX ADMIN — Medication 1 CAPSULE: at 16:36

## 2021-01-01 RX ADMIN — APIXABAN 2.5 MG: 2.5 TABLET, FILM COATED ORAL at 21:35

## 2021-01-01 RX ADMIN — CARBIDOPA AND LEVODOPA 1 TABLET: 25; 100 TABLET ORAL at 20:49

## 2021-01-01 RX ADMIN — OXYCODONE HYDROCHLORIDE 10 MG: 5 TABLET ORAL at 12:57

## 2021-01-01 RX ADMIN — ACETAMINOPHEN 650 MG: 325 TABLET ORAL at 17:39

## 2021-01-01 RX ADMIN — FENTANYL CITRATE 50 MCG: 50 INJECTION, SOLUTION INTRAMUSCULAR; INTRAVENOUS at 10:30

## 2021-01-01 RX ADMIN — ROCURONIUM BROMIDE 20 MG: 10 INJECTION INTRAVENOUS at 10:32

## 2021-01-01 RX ADMIN — BUDESONIDE AND FORMOTEROL FUMARATE DIHYDRATE 2 PUFF: 160; 4.5 AEROSOL RESPIRATORY (INHALATION) at 20:09

## 2021-01-01 RX ADMIN — SEVELAMER CARBONATE 1600 MG: 800 TABLET, FILM COATED ORAL at 18:37

## 2021-01-01 RX ADMIN — IPRATROPIUM BROMIDE AND ALBUTEROL SULFATE 1 AMPULE: .5; 3 SOLUTION RESPIRATORY (INHALATION) at 21:41

## 2021-01-01 RX ADMIN — CARBIDOPA AND LEVODOPA 1 TABLET: 25; 100 TABLET ORAL at 19:55

## 2021-01-01 RX ADMIN — APIXABAN 2.5 MG: 2.5 TABLET, FILM COATED ORAL at 22:52

## 2021-01-01 RX ADMIN — SODIUM CHLORIDE, PRESERVATIVE FREE 10 ML: 5 INJECTION INTRAVENOUS at 12:59

## 2021-01-01 RX ADMIN — HYDROCODONE BITARTRATE AND ACETAMINOPHEN 1 TABLET: 5; 325 TABLET ORAL at 18:57

## 2021-01-01 RX ADMIN — MIDODRINE HYDROCHLORIDE 7.5 MG: 5 TABLET ORAL at 22:11

## 2021-01-01 RX ADMIN — BUDESONIDE AND FORMOTEROL FUMARATE DIHYDRATE 2 PUFF: 160; 4.5 AEROSOL RESPIRATORY (INHALATION) at 16:38

## 2021-01-01 RX ADMIN — FENTANYL CITRATE 50 MCG: 0.05 INJECTION, SOLUTION INTRAMUSCULAR; INTRAVENOUS at 15:44

## 2021-01-01 RX ADMIN — LORAZEPAM 0.5 MG: 0.5 TABLET ORAL at 06:57

## 2021-01-01 RX ADMIN — AMIODARONE HYDROCHLORIDE 200 MG: 200 TABLET ORAL at 13:00

## 2021-01-01 RX ADMIN — Medication 1 TABLET: at 09:08

## 2021-01-01 RX ADMIN — APIXABAN 2.5 MG: 2.5 TABLET, FILM COATED ORAL at 21:14

## 2021-01-01 RX ADMIN — POLYETHYLENE GLYCOL 3350 17 G: 17 POWDER, FOR SOLUTION ORAL at 13:50

## 2021-01-01 RX ADMIN — BUDESONIDE AND FORMOTEROL FUMARATE DIHYDRATE 2 PUFF: 160; 4.5 AEROSOL RESPIRATORY (INHALATION) at 07:15

## 2021-01-01 RX ADMIN — ISODIUM CHLORIDE 3 ML: 0.03 SOLUTION RESPIRATORY (INHALATION) at 13:16

## 2021-01-01 RX ADMIN — DOCUSATE SODIUM 100 MG: 100 CAPSULE ORAL at 09:19

## 2021-01-01 RX ADMIN — AMIODARONE HYDROCHLORIDE 200 MG: 200 TABLET ORAL at 10:33

## 2021-01-01 RX ADMIN — APIXABAN 2.5 MG: 2.5 TABLET, FILM COATED ORAL at 13:05

## 2021-01-01 RX ADMIN — SEVELAMER CARBONATE 1600 MG: 800 TABLET, FILM COATED ORAL at 18:10

## 2021-01-01 RX ADMIN — SODIUM CHLORIDE, PRESERVATIVE FREE 10 ML: 5 INJECTION INTRAVENOUS at 10:38

## 2021-01-01 RX ADMIN — VASOPRESSIN 0.04 UNITS/MIN: 20 INJECTION INTRAVENOUS at 04:47

## 2021-01-01 RX ADMIN — CITALOPRAM 20 MG: 20 TABLET, FILM COATED ORAL at 11:58

## 2021-01-01 RX ADMIN — FENTANYL CITRATE 50 MCG: 50 INJECTION, SOLUTION INTRAMUSCULAR; INTRAVENOUS at 12:02

## 2021-01-01 RX ADMIN — Medication 6 MG: at 19:55

## 2021-01-01 RX ADMIN — MIDODRINE HYDROCHLORIDE 15 MG: 10 TABLET ORAL at 17:32

## 2021-01-01 RX ADMIN — FENTANYL CITRATE 50 MCG: 50 INJECTION, SOLUTION INTRAMUSCULAR; INTRAVENOUS at 14:41

## 2021-01-01 RX ADMIN — HEPARIN SODIUM 5000 UNITS: 5000 INJECTION INTRAVENOUS; SUBCUTANEOUS at 23:03

## 2021-01-01 RX ADMIN — MIDAZOLAM 0.5 MG: 1 INJECTION INTRAMUSCULAR; INTRAVENOUS at 15:20

## 2021-01-01 RX ADMIN — FAMOTIDINE 20 MG: 20 TABLET, FILM COATED ORAL at 19:50

## 2021-01-01 RX ADMIN — SODIUM CHLORIDE 680 ML: 9 INJECTION, SOLUTION INTRAVENOUS at 07:12

## 2021-01-01 RX ADMIN — Medication: at 16:35

## 2021-01-01 RX ADMIN — IPRATROPIUM BROMIDE AND ALBUTEROL SULFATE 1 AMPULE: .5; 3 SOLUTION RESPIRATORY (INHALATION) at 08:29

## 2021-01-01 RX ADMIN — QUETIAPINE FUMARATE 50 MG: 25 TABLET ORAL at 11:00

## 2021-01-01 RX ADMIN — PIPERACILLIN AND TAZOBACTAM 2250 MG: 2; .25 INJECTION, POWDER, LYOPHILIZED, FOR SOLUTION INTRAVENOUS at 15:00

## 2021-01-01 RX ADMIN — Medication 1 CAPSULE: at 17:02

## 2021-01-01 RX ADMIN — ALBUMIN (HUMAN) 25 G: 0.25 INJECTION, SOLUTION INTRAVENOUS at 08:36

## 2021-01-01 RX ADMIN — MIDAZOLAM 0.5 MG: 1 INJECTION INTRAMUSCULAR; INTRAVENOUS at 15:37

## 2021-01-01 RX ADMIN — PIPERACILLIN AND TAZOBACTAM 2250 MG: 2; .25 INJECTION, POWDER, LYOPHILIZED, FOR SOLUTION INTRAVENOUS at 05:57

## 2021-01-01 RX ADMIN — SEVELAMER CARBONATE 1600 MG: 800 TABLET, FILM COATED ORAL at 11:39

## 2021-01-01 RX ADMIN — HYDRALAZINE HYDROCHLORIDE 5 MG: 20 INJECTION INTRAMUSCULAR; INTRAVENOUS at 16:10

## 2021-01-01 RX ADMIN — Medication 10 MG: at 11:35

## 2021-01-01 RX ADMIN — QUETIAPINE FUMARATE 50 MG: 100 TABLET ORAL at 08:12

## 2021-01-01 RX ADMIN — SEVELAMER CARBONATE 1600 MG: 800 TABLET, FILM COATED ORAL at 17:12

## 2021-01-01 RX ADMIN — IPRATROPIUM BROMIDE AND ALBUTEROL SULFATE 3 ML: .5; 3 SOLUTION RESPIRATORY (INHALATION) at 08:15

## 2021-01-01 RX ADMIN — Medication 5 MG: at 20:25

## 2021-01-01 RX ADMIN — ALPRAZOLAM 0.5 MG: 0.5 TABLET ORAL at 12:58

## 2021-01-01 RX ADMIN — CLINDAMYCIN PHOSPHATE 600 MG: 600 INJECTION, SOLUTION INTRAVENOUS at 07:06

## 2021-01-01 RX ADMIN — BUDESONIDE AND FORMOTEROL FUMARATE DIHYDRATE 2 PUFF: 160; 4.5 AEROSOL RESPIRATORY (INHALATION) at 17:11

## 2021-01-01 RX ADMIN — ROCURONIUM BROMIDE 50 MG: 10 INJECTION INTRAVENOUS at 11:57

## 2021-01-01 RX ADMIN — ROCURONIUM BROMIDE 50 MG: 10 INJECTION INTRAVENOUS at 09:47

## 2021-01-01 RX ADMIN — MIDODRINE HYDROCHLORIDE 2.5 MG: 2.5 TABLET ORAL at 15:29

## 2021-01-01 RX ADMIN — HYDROXYZINE HYDROCHLORIDE 50 MG: 25 TABLET, FILM COATED ORAL at 13:37

## 2021-01-01 RX ADMIN — FAMOTIDINE 20 MG: 20 TABLET, FILM COATED ORAL at 14:36

## 2021-01-01 RX ADMIN — IPRATROPIUM BROMIDE AND ALBUTEROL SULFATE 1 AMPULE: .5; 3 SOLUTION RESPIRATORY (INHALATION) at 12:00

## 2021-01-01 RX ADMIN — GABAPENTIN 100 MG: 100 CAPSULE ORAL at 09:09

## 2021-01-01 RX ADMIN — SENNOSIDES 8.6 MG: 8.6 TABLET, FILM COATED ORAL at 21:35

## 2021-01-01 RX ADMIN — HEPARIN SODIUM 4000 UNITS: 1000 INJECTION, SOLUTION INTRAVENOUS; SUBCUTANEOUS at 09:07

## 2021-01-01 RX ADMIN — ACETAMINOPHEN 650 MG: 325 TABLET ORAL at 12:17

## 2021-01-01 RX ADMIN — SODIUM CHLORIDE, PRESERVATIVE FREE 10 ML: 5 INJECTION INTRAVENOUS at 21:10

## 2021-01-01 RX ADMIN — POLYETHYLENE GLYCOL 3350 17 G: 17 POWDER, FOR SOLUTION ORAL at 09:20

## 2021-01-01 RX ADMIN — PIPERACILLIN AND TAZOBACTAM 2250 MG: 2; .25 INJECTION, POWDER, LYOPHILIZED, FOR SOLUTION INTRAVENOUS at 15:07

## 2021-01-01 RX ADMIN — APIXABAN 5 MG: 5 TABLET, FILM COATED ORAL at 08:12

## 2021-01-01 RX ADMIN — ALPRAZOLAM 0.5 MG: 0.5 TABLET ORAL at 23:02

## 2021-01-01 RX ADMIN — APIXABAN 5 MG: 5 TABLET, FILM COATED ORAL at 20:03

## 2021-01-01 RX ADMIN — SODIUM CHLORIDE, PRESERVATIVE FREE 10 ML: 5 INJECTION INTRAVENOUS at 10:33

## 2021-01-01 RX ADMIN — LABETALOL 20 MG/4 ML (5 MG/ML) INTRAVENOUS SYRINGE 5 MG: at 11:52

## 2021-01-01 RX ADMIN — Medication 1 CAPSULE: at 12:59

## 2021-01-01 RX ADMIN — VANCOMYCIN HYDROCHLORIDE 500 MG: 500 INJECTION, POWDER, LYOPHILIZED, FOR SOLUTION INTRAVENOUS at 17:26

## 2021-01-01 RX ADMIN — SODIUM CHLORIDE, PRESERVATIVE FREE 10 ML: 5 INJECTION INTRAVENOUS at 21:22

## 2021-01-01 RX ADMIN — MEROPENEM 500 MG: 500 INJECTION, POWDER, FOR SOLUTION INTRAVENOUS at 13:45

## 2021-01-01 RX ADMIN — DIPHENHYDRAMINE HCL 25 MG: 25 TABLET ORAL at 15:32

## 2021-01-01 RX ADMIN — CARBIDOPA AND LEVODOPA 1 TABLET: 25; 100 TABLET ORAL at 22:11

## 2021-01-01 RX ADMIN — MIDODRINE HYDROCHLORIDE 2.5 MG: 2.5 TABLET ORAL at 09:08

## 2021-01-01 RX ADMIN — BUDESONIDE AND FORMOTEROL FUMARATE DIHYDRATE 2 PUFF: 160; 4.5 AEROSOL RESPIRATORY (INHALATION) at 21:15

## 2021-01-01 RX ADMIN — HYDRALAZINE HYDROCHLORIDE 5 MG: 20 INJECTION INTRAMUSCULAR; INTRAVENOUS at 15:50

## 2021-01-01 RX ADMIN — MIDODRINE HYDROCHLORIDE 2.5 MG: 2.5 TABLET ORAL at 12:47

## 2021-01-01 RX ADMIN — QUETIAPINE FUMARATE 50 MG: 25 TABLET ORAL at 13:05

## 2021-01-01 RX ADMIN — APIXABAN 2.5 MG: 2.5 TABLET, FILM COATED ORAL at 13:17

## 2021-01-01 RX ADMIN — CITALOPRAM HYDROBROMIDE 20 MG: 20 TABLET ORAL at 10:28

## 2021-01-01 RX ADMIN — Medication 1 TABLET: at 08:38

## 2021-01-01 RX ADMIN — BARIUM SULFATE 40 ML: 0.81 POWDER, FOR SUSPENSION ORAL at 10:47

## 2021-01-01 RX ADMIN — OXYCODONE 2.5 MG: 5 TABLET ORAL at 22:05

## 2021-01-01 RX ADMIN — OXYCODONE HYDROCHLORIDE 5 MG: 5 TABLET ORAL at 08:11

## 2021-01-01 RX ADMIN — BUDESONIDE AND FORMOTEROL FUMARATE DIHYDRATE 2 PUFF: 80; 4.5 AEROSOL RESPIRATORY (INHALATION) at 06:40

## 2021-01-01 RX ADMIN — BUDESONIDE AND FORMOTEROL FUMARATE DIHYDRATE 2 PUFF: 160; 4.5 AEROSOL RESPIRATORY (INHALATION) at 08:40

## 2021-01-01 RX ADMIN — CALCIUM ACETATE 2001 MG: 667 CAPSULE ORAL at 08:12

## 2021-01-01 RX ADMIN — IPRATROPIUM BROMIDE AND ALBUTEROL SULFATE 3 ML: .5; 3 SOLUTION RESPIRATORY (INHALATION) at 12:34

## 2021-01-01 RX ADMIN — BACITRACIN: 500 OINTMENT TOPICAL at 08:13

## 2021-01-01 RX ADMIN — DOCUSATE SODIUM 100 MG: 100 CAPSULE ORAL at 07:57

## 2021-01-01 RX ADMIN — Medication 1 CAPSULE: at 17:35

## 2021-01-01 RX ADMIN — ISODIUM CHLORIDE 3 ML: 0.03 SOLUTION RESPIRATORY (INHALATION) at 18:50

## 2021-01-01 RX ADMIN — BUDESONIDE AND FORMOTEROL FUMARATE DIHYDRATE 2 PUFF: 160; 4.5 AEROSOL RESPIRATORY (INHALATION) at 22:03

## 2021-01-01 RX ADMIN — QUETIAPINE FUMARATE 50 MG: 25 TABLET ORAL at 20:48

## 2021-01-01 RX ADMIN — SODIUM ZIRCONIUM CYCLOSILICATE 10 G: 5 POWDER, FOR SUSPENSION ORAL at 13:38

## 2021-01-01 RX ADMIN — Medication 6 MG: at 20:09

## 2021-01-01 RX ADMIN — ALPRAZOLAM 0.5 MG: 0.5 TABLET ORAL at 15:21

## 2021-01-01 RX ADMIN — HYDROXYZINE HYDROCHLORIDE 50 MG: 25 TABLET, FILM COATED ORAL at 18:13

## 2021-01-01 RX ADMIN — MIDODRINE HYDROCHLORIDE 15 MG: 10 TABLET ORAL at 21:09

## 2021-01-01 RX ADMIN — Medication 1 TABLET: at 13:05

## 2021-01-01 RX ADMIN — ESCITALOPRAM 20 MG: 20 TABLET, FILM COATED ORAL at 10:33

## 2021-01-01 RX ADMIN — QUETIAPINE FUMARATE 25 MG: 25 TABLET ORAL at 22:12

## 2021-01-01 RX ADMIN — DOXEPIN HYDROCHLORIDE 10 MG: 10 CAPSULE ORAL at 20:03

## 2021-01-01 RX ADMIN — HYDROMORPHONE HYDROCHLORIDE 0.5 MG: 1 INJECTION, SOLUTION INTRAMUSCULAR; INTRAVENOUS; SUBCUTANEOUS at 15:47

## 2021-01-01 RX ADMIN — AMIODARONE HYDROCHLORIDE 200 MG: 200 TABLET ORAL at 09:09

## 2021-01-01 RX ADMIN — IPRATROPIUM BROMIDE AND ALBUTEROL SULFATE 1 AMPULE: .5; 3 SOLUTION RESPIRATORY (INHALATION) at 15:55

## 2021-01-01 RX ADMIN — SODIUM CHLORIDE, PRESERVATIVE FREE 10 ML: 5 INJECTION INTRAVENOUS at 21:53

## 2021-01-01 RX ADMIN — QUETIAPINE FUMARATE 50 MG: 25 TABLET ORAL at 20:32

## 2021-01-01 RX ADMIN — CITALOPRAM 20 MG: 20 TABLET, FILM COATED ORAL at 14:13

## 2021-01-01 RX ADMIN — MIDAZOLAM 1 MG: 1 INJECTION INTRAMUSCULAR; INTRAVENOUS at 11:55

## 2021-01-01 RX ADMIN — BENZTROPINE MESYLATE 1 MG: 1 TABLET ORAL at 21:10

## 2021-01-01 RX ADMIN — CEFEPIME HYDROCHLORIDE 1000 MG: 1 INJECTION, POWDER, FOR SOLUTION INTRAMUSCULAR; INTRAVENOUS at 18:50

## 2021-01-01 RX ADMIN — Medication 5 MG: at 20:31

## 2021-01-01 RX ADMIN — GABAPENTIN 100 MG: 100 CAPSULE ORAL at 13:16

## 2021-01-01 RX ADMIN — MIRTAZAPINE 7.5 MG: 15 TABLET, FILM COATED ORAL at 20:08

## 2021-01-01 RX ADMIN — IPRATROPIUM BROMIDE AND ALBUTEROL SULFATE 1 AMPULE: .5; 3 SOLUTION RESPIRATORY (INHALATION) at 12:43

## 2021-01-01 RX ADMIN — IPRATROPIUM BROMIDE AND ALBUTEROL SULFATE 3 ML: .5; 3 SOLUTION RESPIRATORY (INHALATION) at 17:13

## 2021-01-01 RX ADMIN — ROCURONIUM BROMIDE 25 MG: 10 INJECTION INTRAVENOUS at 13:57

## 2021-01-01 RX ADMIN — DOXEPIN HYDROCHLORIDE 10 MG: 10 CAPSULE ORAL at 21:51

## 2021-01-01 RX ADMIN — IPRATROPIUM BROMIDE AND ALBUTEROL SULFATE 1 AMPULE: .5; 3 SOLUTION RESPIRATORY (INHALATION) at 11:46

## 2021-01-01 RX ADMIN — AMIODARONE HYDROCHLORIDE 200 MG: 200 TABLET ORAL at 09:26

## 2021-01-01 RX ADMIN — SENNOSIDES 8.6 MG: 8.6 TABLET, COATED ORAL at 20:09

## 2021-01-01 RX ADMIN — Medication 6 MG: at 21:22

## 2021-01-01 RX ADMIN — PROPOFOL 160 MG: 10 INJECTION, EMULSION INTRAVENOUS at 11:55

## 2021-01-01 RX ADMIN — OXYCODONE HYDROCHLORIDE 5 MG: 5 TABLET ORAL at 00:09

## 2021-01-01 RX ADMIN — ISODIUM CHLORIDE 3 ML: 0.03 SOLUTION RESPIRATORY (INHALATION) at 17:06

## 2021-01-01 RX ADMIN — DOXEPIN HYDROCHLORIDE 10 MG: 10 CAPSULE ORAL at 20:28

## 2021-01-01 RX ADMIN — MIRTAZAPINE 7.5 MG: 15 TABLET, FILM COATED ORAL at 20:49

## 2021-01-01 RX ADMIN — MIDODRINE HYDROCHLORIDE 15 MG: 10 TABLET ORAL at 18:13

## 2021-01-01 RX ADMIN — IPRATROPIUM BROMIDE AND ALBUTEROL SULFATE 3 ML: .5; 3 SOLUTION RESPIRATORY (INHALATION) at 09:22

## 2021-01-01 RX ADMIN — MIDAZOLAM 1 MG: 1 INJECTION INTRAMUSCULAR; INTRAVENOUS at 08:37

## 2021-01-01 RX ADMIN — CITALOPRAM 20 MG: 20 TABLET, FILM COATED ORAL at 08:12

## 2021-01-01 RX ADMIN — POLYETHYLENE GLYCOL 3350 17 G: 17 POWDER, FOR SOLUTION ORAL at 08:18

## 2021-01-01 RX ADMIN — SENNOSIDES 8.6 MG: 8.6 TABLET, FILM COATED ORAL at 20:03

## 2021-01-01 RX ADMIN — ACETAMINOPHEN 650 MG: 325 TABLET ORAL at 17:44

## 2021-01-01 RX ADMIN — MEROPENEM 500 MG: 500 INJECTION, POWDER, FOR SOLUTION INTRAVENOUS at 12:35

## 2021-01-01 RX ADMIN — Medication 6 MG: at 21:50

## 2021-01-01 RX ADMIN — GABAPENTIN 100 MG: 100 CAPSULE ORAL at 10:33

## 2021-01-01 RX ADMIN — ACETAMINOPHEN 650 MG: 325 TABLET ORAL at 21:33

## 2021-01-01 RX ADMIN — SODIUM CHLORIDE, PRESERVATIVE FREE 10 ML: 5 INJECTION INTRAVENOUS at 20:29

## 2021-01-01 RX ADMIN — PREGABALIN 75 MG: 75 CAPSULE ORAL at 14:16

## 2021-01-01 RX ADMIN — LORAZEPAM 0.5 MG: 0.5 TABLET ORAL at 20:47

## 2021-01-01 RX ADMIN — SEVELAMER CARBONATE 1600 MG: 800 TABLET, FILM COATED ORAL at 09:19

## 2021-01-01 RX ADMIN — DOXEPIN HYDROCHLORIDE 10 MG: 10 CAPSULE ORAL at 22:01

## 2021-01-01 RX ADMIN — Medication 8 MCG/MIN: at 12:30

## 2021-01-01 RX ADMIN — ROCURONIUM BROMIDE 25 MG: 10 INJECTION INTRAVENOUS at 12:30

## 2021-01-01 RX ADMIN — ESCITALOPRAM 20 MG: 20 TABLET, FILM COATED ORAL at 13:03

## 2021-01-01 RX ADMIN — ACETAMINOPHEN 650 MG: 325 TABLET ORAL at 23:03

## 2021-01-01 RX ADMIN — MIDODRINE HYDROCHLORIDE 2.5 MG: 2.5 TABLET ORAL at 16:23

## 2021-01-01 RX ADMIN — ALPRAZOLAM 0.5 MG: 0.5 TABLET ORAL at 20:49

## 2021-01-01 RX ADMIN — PIPERACILLIN AND TAZOBACTAM 3375 MG: 3; .375 INJECTION, POWDER, LYOPHILIZED, FOR SOLUTION INTRAVENOUS at 21:12

## 2021-01-01 RX ADMIN — FENTANYL CITRATE 100 MCG: 50 INJECTION INTRAMUSCULAR; INTRAVENOUS at 14:31

## 2021-01-01 RX ADMIN — SODIUM CHLORIDE, PRESERVATIVE FREE 10 ML: 5 INJECTION INTRAVENOUS at 22:11

## 2021-01-01 RX ADMIN — APIXABAN 2.5 MG: 2.5 TABLET, FILM COATED ORAL at 19:48

## 2021-01-01 RX ADMIN — CEFEPIME HYDROCHLORIDE 2000 MG: 2 INJECTION, POWDER, FOR SOLUTION INTRAVENOUS at 00:50

## 2021-01-01 RX ADMIN — BUDESONIDE AND FORMOTEROL FUMARATE DIHYDRATE 2 PUFF: 160; 4.5 AEROSOL RESPIRATORY (INHALATION) at 07:51

## 2021-01-01 RX ADMIN — IPRATROPIUM BROMIDE AND ALBUTEROL SULFATE 1 AMPULE: .5; 3 SOLUTION RESPIRATORY (INHALATION) at 08:45

## 2021-01-01 RX ADMIN — HYDROXYZINE HYDROCHLORIDE 10 MG: 10 TABLET ORAL at 16:20

## 2021-01-01 RX ADMIN — OXYCODONE HYDROCHLORIDE 5 MG: 5 TABLET ORAL at 16:13

## 2021-01-01 RX ADMIN — QUETIAPINE FUMARATE 25 MG: 25 TABLET ORAL at 21:22

## 2021-01-01 RX ADMIN — QUETIAPINE FUMARATE 50 MG: 25 TABLET ORAL at 09:26

## 2021-01-01 RX ADMIN — SODIUM CHLORIDE, PRESERVATIVE FREE 10 ML: 5 INJECTION INTRAVENOUS at 19:56

## 2021-01-01 RX ADMIN — MIDODRINE HYDROCHLORIDE 2.5 MG: 2.5 TABLET ORAL at 16:58

## 2021-01-01 RX ADMIN — ACETAMINOPHEN 650 MG: 325 TABLET ORAL at 13:50

## 2021-01-01 RX ADMIN — QUETIAPINE FUMARATE 50 MG: 25 TABLET ORAL at 10:32

## 2021-01-01 RX ADMIN — OXYCODONE HYDROCHLORIDE 5 MG: 5 TABLET ORAL at 21:29

## 2021-01-01 RX ADMIN — OXYCODONE HYDROCHLORIDE 5 MG: 5 TABLET ORAL at 09:34

## 2021-01-01 RX ADMIN — ISODIUM CHLORIDE 3 ML: 0.03 SOLUTION RESPIRATORY (INHALATION) at 12:45

## 2021-01-01 RX ADMIN — Medication 1 CAPSULE: at 09:09

## 2021-01-01 RX ADMIN — FAMOTIDINE 20 MG: 20 TABLET, FILM COATED ORAL at 09:41

## 2021-01-01 RX ADMIN — IPRATROPIUM BROMIDE AND ALBUTEROL SULFATE 1 AMPULE: .5; 3 SOLUTION RESPIRATORY (INHALATION) at 15:40

## 2021-01-01 RX ADMIN — QUETIAPINE FUMARATE 25 MG: 25 TABLET ORAL at 10:28

## 2021-01-01 RX ADMIN — ESCITALOPRAM OXALATE 20 MG: 10 TABLET ORAL at 08:12

## 2021-01-01 RX ADMIN — SODIUM CHLORIDE, PRESERVATIVE FREE 10 ML: 5 INJECTION INTRAVENOUS at 23:04

## 2021-01-01 RX ADMIN — DIPHENHYDRAMINE HCL 25 MG: 25 TABLET ORAL at 21:41

## 2021-01-01 RX ADMIN — SODIUM CHLORIDE, PRESERVATIVE FREE 10 ML: 5 INJECTION INTRAVENOUS at 09:47

## 2021-01-01 RX ADMIN — BACITRACIN: 500 OINTMENT TOPICAL at 20:04

## 2021-01-01 RX ADMIN — ACETAMINOPHEN 650 MG: 325 TABLET ORAL at 21:20

## 2021-01-01 RX ADMIN — QUETIAPINE FUMARATE 50 MG: 25 TABLET ORAL at 09:08

## 2021-01-01 RX ADMIN — MIDODRINE HYDROCHLORIDE 2.5 MG: 2.5 TABLET ORAL at 05:50

## 2021-01-01 RX ADMIN — SODIUM CHLORIDE, PRESERVATIVE FREE 10 ML: 5 INJECTION INTRAVENOUS at 08:23

## 2021-01-01 RX ADMIN — QUETIAPINE FUMARATE 25 MG: 25 TABLET ORAL at 08:12

## 2021-01-01 RX ADMIN — ESCITALOPRAM 20 MG: 20 TABLET, FILM COATED ORAL at 13:16

## 2021-01-01 RX ADMIN — CALCIUM ACETATE 2001 MG: 667 CAPSULE ORAL at 16:17

## 2021-01-01 RX ADMIN — BUDESONIDE AND FORMOTEROL FUMARATE DIHYDRATE 2 PUFF: 160; 4.5 AEROSOL RESPIRATORY (INHALATION) at 09:22

## 2021-01-01 RX ADMIN — SODIUM CHLORIDE, PRESERVATIVE FREE 10 ML: 5 INJECTION INTRAVENOUS at 20:09

## 2021-01-01 RX ADMIN — Medication 80 MG: at 09:45

## 2021-01-01 RX ADMIN — DOCUSATE SODIUM 100 MG: 100 CAPSULE ORAL at 14:18

## 2021-01-01 RX ADMIN — ALPRAZOLAM 0.5 MG: 0.5 TABLET ORAL at 21:55

## 2021-01-01 RX ADMIN — HYDROXYZINE HYDROCHLORIDE 10 MG: 10 TABLET ORAL at 12:58

## 2021-01-01 RX ADMIN — HYDROCODONE BITARTRATE AND ACETAMINOPHEN 1 TABLET: 5; 325 TABLET ORAL at 03:57

## 2021-01-01 RX ADMIN — LORAZEPAM 0.5 MG: 0.5 TABLET ORAL at 21:29

## 2021-01-01 RX ADMIN — Medication 1 TABLET: at 08:12

## 2021-01-01 RX ADMIN — ALPRAZOLAM 0.5 MG: 0.5 TABLET ORAL at 11:58

## 2021-01-01 RX ADMIN — DIPHENHYDRAMINE HYDROCHLORIDE 25 MG: 50 INJECTION, SOLUTION INTRAMUSCULAR; INTRAVENOUS at 12:00

## 2021-01-01 RX ADMIN — BUDESONIDE AND FORMOTEROL FUMARATE DIHYDRATE 2 PUFF: 160; 4.5 AEROSOL RESPIRATORY (INHALATION) at 10:34

## 2021-01-01 RX ADMIN — OXYCODONE 2.5 MG: 5 TABLET ORAL at 21:14

## 2021-01-01 RX ADMIN — MIDODRINE HYDROCHLORIDE 2.5 MG: 2.5 TABLET ORAL at 18:10

## 2021-01-01 RX ADMIN — BUDESONIDE AND FORMOTEROL FUMARATE DIHYDRATE 2 PUFF: 160; 4.5 AEROSOL RESPIRATORY (INHALATION) at 19:33

## 2021-01-01 RX ADMIN — DOCUSATE SODIUM 100 MG: 100 CAPSULE ORAL at 12:58

## 2021-01-01 RX ADMIN — Medication 6 MG: at 21:20

## 2021-01-01 RX ADMIN — OXYCODONE 5 MG: 5 TABLET ORAL at 15:38

## 2021-01-01 RX ADMIN — BENZTROPINE MESYLATE 1 MG: 1 TABLET ORAL at 20:49

## 2021-01-01 RX ADMIN — MIRTAZAPINE 7.5 MG: 7.5 TABLET ORAL at 22:11

## 2021-01-01 RX ADMIN — APIXABAN 2.5 MG: 2.5 TABLET, FILM COATED ORAL at 08:23

## 2021-01-01 RX ADMIN — DOCUSATE SODIUM 100 MG: 50 LIQUID ORAL at 21:14

## 2021-01-01 RX ADMIN — Medication 20 MG: at 11:42

## 2021-01-01 RX ADMIN — IPRATROPIUM BROMIDE AND ALBUTEROL SULFATE 1 AMPULE: .5; 3 SOLUTION RESPIRATORY (INHALATION) at 20:07

## 2021-01-01 RX ADMIN — OXYCODONE HYDROCHLORIDE 5 MG: 5 TABLET ORAL at 01:50

## 2021-01-01 RX ADMIN — ALPRAZOLAM 0.5 MG: 0.5 TABLET ORAL at 13:11

## 2021-01-01 RX ADMIN — OXYCODONE HYDROCHLORIDE 5 MG: 5 TABLET ORAL at 10:32

## 2021-01-01 RX ADMIN — Medication 1 TABLET: at 11:58

## 2021-01-01 RX ADMIN — GABAPENTIN 100 MG: 100 CAPSULE ORAL at 12:59

## 2021-01-01 RX ADMIN — PIPERACILLIN AND TAZOBACTAM 2250 MG: 2; .25 INJECTION, POWDER, LYOPHILIZED, FOR SOLUTION INTRAVENOUS at 23:11

## 2021-01-01 RX ADMIN — ALBUTEROL SULFATE 10 MG: 2.5 SOLUTION RESPIRATORY (INHALATION) at 13:23

## 2021-01-01 RX ADMIN — PHENYLEPHRINE HYDROCHLORIDE 100 MCG: 10 INJECTION INTRAVENOUS at 11:33

## 2021-01-01 RX ADMIN — ALPRAZOLAM 0.5 MG: 0.5 TABLET ORAL at 19:55

## 2021-01-01 RX ADMIN — QUETIAPINE FUMARATE 50 MG: 100 TABLET ORAL at 20:04

## 2021-01-01 RX ADMIN — VANCOMYCIN HYDROCHLORIDE 750 MG: 5 INJECTION, POWDER, LYOPHILIZED, FOR SOLUTION INTRAVENOUS at 16:30

## 2021-01-01 RX ADMIN — PIPERACILLIN SODIUM, TAZOBACTAM SODIUM 2250 G: 3; .375 INJECTION, POWDER, LYOPHILIZED, FOR SOLUTION INTRAVENOUS at 13:50

## 2021-01-01 RX ADMIN — PIPERACILLIN AND TAZOBACTAM 2250 MG: 2; .25 INJECTION, POWDER, LYOPHILIZED, FOR SOLUTION INTRAVENOUS at 23:47

## 2021-01-01 RX ADMIN — ACETAMINOPHEN 650 MG: 325 TABLET ORAL at 22:27

## 2021-01-01 RX ADMIN — OXYCODONE HYDROCHLORIDE 5 MG: 5 TABLET ORAL at 16:17

## 2021-01-01 RX ADMIN — OXYCODONE 5 MG: 5 TABLET ORAL at 11:16

## 2021-01-01 RX ADMIN — QUETIAPINE FUMARATE 25 MG: 25 TABLET ORAL at 23:02

## 2021-01-01 RX ADMIN — Medication 1 CAPSULE: at 16:30

## 2021-01-01 RX ADMIN — MIDODRINE HYDROCHLORIDE 2.5 MG: 2.5 TABLET ORAL at 11:39

## 2021-01-01 RX ADMIN — BENZTROPINE MESYLATE 1 MG: 1 TABLET ORAL at 21:25

## 2021-01-01 RX ADMIN — Medication 5 MG: at 19:48

## 2021-01-01 RX ADMIN — MIDODRINE HYDROCHLORIDE 2.5 MG: 2.5 TABLET ORAL at 11:38

## 2021-01-01 RX ADMIN — BARIUM SULFATE 10 ML: 400 PASTE ORAL at 11:47

## 2021-01-01 RX ADMIN — ALPRAZOLAM 0.5 MG: 0.5 TABLET ORAL at 08:14

## 2021-01-01 RX ADMIN — IPRATROPIUM BROMIDE AND ALBUTEROL SULFATE 1 AMPULE: .5; 3 SOLUTION RESPIRATORY (INHALATION) at 20:16

## 2021-01-01 RX ADMIN — SENNOSIDES 17.2 MG: 8.6 TABLET, FILM COATED ORAL at 19:51

## 2021-01-01 RX ADMIN — CARBIDOPA AND LEVODOPA 1 TABLET: 25; 100 TABLET ORAL at 23:02

## 2021-01-01 RX ADMIN — IPRATROPIUM BROMIDE AND ALBUTEROL SULFATE 1 AMPULE: .5; 3 SOLUTION RESPIRATORY (INHALATION) at 12:59

## 2021-01-01 RX ADMIN — Medication 5 MG: at 20:03

## 2021-01-01 RX ADMIN — DIPHENHYDRAMINE HYDROCHLORIDE 25 MG: 25 TABLET ORAL at 18:28

## 2021-01-01 RX ADMIN — QUETIAPINE FUMARATE 25 MG: 25 TABLET ORAL at 19:55

## 2021-01-01 RX ADMIN — SODIUM CHLORIDE, PRESERVATIVE FREE 10 ML: 5 INJECTION INTRAVENOUS at 08:24

## 2021-01-01 RX ADMIN — SODIUM CHLORIDE: 9 INJECTION, SOLUTION INTRAVENOUS at 09:18

## 2021-01-01 RX ADMIN — LIDOCAINE 4%: 4 CREAM TOPICAL at 07:08

## 2021-01-01 RX ADMIN — MIRTAZAPINE 7.5 MG: 15 TABLET, FILM COATED ORAL at 21:49

## 2021-01-01 RX ADMIN — GABAPENTIN 100 MG: 100 CAPSULE ORAL at 09:26

## 2021-01-01 RX ADMIN — SEVELAMER CARBONATE 1600 MG: 800 TABLET, FILM COATED ORAL at 12:47

## 2021-01-01 RX ADMIN — ALPRAZOLAM 0.5 MG: 0.5 TABLET ORAL at 21:28

## 2021-01-01 RX ADMIN — BUDESONIDE AND FORMOTEROL FUMARATE DIHYDRATE 2 PUFF: 160; 4.5 AEROSOL RESPIRATORY (INHALATION) at 18:18

## 2021-01-01 RX ADMIN — CITALOPRAM HYDROBROMIDE 20 MG: 20 TABLET ORAL at 21:10

## 2021-01-01 RX ADMIN — ACETAMINOPHEN 650 MG: 325 TABLET ORAL at 20:28

## 2021-01-01 RX ADMIN — BUDESONIDE AND FORMOTEROL FUMARATE DIHYDRATE 2 PUFF: 80; 4.5 AEROSOL RESPIRATORY (INHALATION) at 07:35

## 2021-01-01 RX ADMIN — SODIUM CHLORIDE, PRESERVATIVE FREE 10 ML: 5 INJECTION INTRAVENOUS at 20:39

## 2021-01-01 RX ADMIN — BARIUM SULFATE 40 ML: 0.81 POWDER, FOR SUSPENSION ORAL at 11:48

## 2021-01-01 RX ADMIN — OXYCODONE HYDROCHLORIDE 5 MG: 5 TABLET ORAL at 09:14

## 2021-01-01 RX ADMIN — BUDESONIDE AND FORMOTEROL FUMARATE DIHYDRATE 2 PUFF: 160; 4.5 AEROSOL RESPIRATORY (INHALATION) at 09:41

## 2021-01-01 RX ADMIN — MIDODRINE HYDROCHLORIDE 10 MG: 5 TABLET ORAL at 14:00

## 2021-01-01 RX ADMIN — IPRATROPIUM BROMIDE AND ALBUTEROL SULFATE 3 ML: .5; 3 SOLUTION RESPIRATORY (INHALATION) at 11:38

## 2021-01-01 RX ADMIN — IPRATROPIUM BROMIDE AND ALBUTEROL SULFATE 3 ML: .5; 3 SOLUTION RESPIRATORY (INHALATION) at 09:44

## 2021-01-01 RX ADMIN — SEVELAMER CARBONATE 1600 MG: 800 TABLET, FILM COATED ORAL at 13:38

## 2021-01-01 RX ADMIN — Medication 1 CAPSULE: at 13:03

## 2021-01-01 RX ADMIN — ACETAMINOPHEN 650 MG: 325 TABLET ORAL at 13:06

## 2021-01-01 RX ADMIN — CALCIUM GLUCONATE 1000 MG: 98 INJECTION, SOLUTION INTRAVENOUS at 13:44

## 2021-01-01 RX ADMIN — GABAPENTIN 100 MG: 100 CAPSULE ORAL at 20:25

## 2021-01-01 RX ADMIN — HYDROCODONE BITARTRATE AND ACETAMINOPHEN 1 TABLET: 5; 325 TABLET ORAL at 13:42

## 2021-01-01 RX ADMIN — GABAPENTIN 100 MG: 100 CAPSULE ORAL at 19:50

## 2021-01-01 RX ADMIN — BUDESONIDE AND FORMOTEROL FUMARATE DIHYDRATE 2 PUFF: 80; 4.5 AEROSOL RESPIRATORY (INHALATION) at 09:28

## 2021-01-01 RX ADMIN — IPRATROPIUM BROMIDE AND ALBUTEROL SULFATE 3 ML: .5; 3 SOLUTION RESPIRATORY (INHALATION) at 18:44

## 2021-01-01 RX ADMIN — POLYETHYLENE GLYCOL 3350 17 G: 17 POWDER, FOR SOLUTION ORAL at 13:04

## 2021-01-01 RX ADMIN — Medication 1 CAPSULE: at 10:33

## 2021-01-01 RX ADMIN — HYDRALAZINE HYDROCHLORIDE 5 MG: 20 INJECTION INTRAMUSCULAR; INTRAVENOUS at 15:40

## 2021-01-01 RX ADMIN — DIPHENHYDRAMINE HYDROCHLORIDE 25 MG: 25 TABLET ORAL at 03:57

## 2021-01-01 RX ADMIN — PROPOFOL 100 MCG/KG/MIN: 10 INJECTION, EMULSION INTRAVENOUS at 09:53

## 2021-01-01 RX ADMIN — FENTANYL CITRATE 50 MCG: 50 INJECTION, SOLUTION INTRAMUSCULAR; INTRAVENOUS at 12:53

## 2021-01-01 RX ADMIN — QUETIAPINE FUMARATE 25 MG: 25 TABLET ORAL at 11:58

## 2021-01-01 RX ADMIN — MIDODRINE HYDROCHLORIDE 2.5 MG: 2.5 TABLET ORAL at 05:43

## 2021-01-01 RX ADMIN — PREGABALIN 75 MG: 75 CAPSULE ORAL at 14:36

## 2021-01-01 RX ADMIN — Medication 6 MG: at 20:49

## 2021-01-01 RX ADMIN — FENTANYL CITRATE 25 MCG: 50 INJECTION, SOLUTION INTRAMUSCULAR; INTRAVENOUS at 13:54

## 2021-01-01 RX ADMIN — Medication 6 MG: at 21:37

## 2021-01-01 RX ADMIN — DOCUSATE SODIUM 100 MG: 100 CAPSULE ORAL at 15:44

## 2021-01-01 RX ADMIN — SEVELAMER CARBONATE 1600 MG: 800 TABLET, FILM COATED ORAL at 16:23

## 2021-01-01 RX ADMIN — OXYCODONE 5 MG: 5 TABLET ORAL at 06:10

## 2021-01-01 RX ADMIN — ESCITALOPRAM 20 MG: 20 TABLET, FILM COATED ORAL at 13:07

## 2021-01-01 RX ADMIN — MIDODRINE HYDROCHLORIDE 15 MG: 10 TABLET ORAL at 05:38

## 2021-01-01 RX ADMIN — OXYCODONE HYDROCHLORIDE 5 MG: 5 TABLET ORAL at 09:08

## 2021-01-01 RX ADMIN — CITALOPRAM 20 MG: 20 TABLET, FILM COATED ORAL at 14:36

## 2021-01-01 RX ADMIN — DIPHENHYDRAMINE HYDROCHLORIDE 25 MG: 25 TABLET ORAL at 18:11

## 2021-01-01 RX ADMIN — QUETIAPINE FUMARATE 50 MG: 25 TABLET ORAL at 21:51

## 2021-01-01 RX ADMIN — CYCLOBENZAPRINE HYDROCHLORIDE 10 MG: 10 TABLET, FILM COATED ORAL at 08:27

## 2021-01-01 RX ADMIN — QUETIAPINE FUMARATE 50 MG: 25 TABLET ORAL at 12:58

## 2021-01-01 RX ADMIN — HYDROXYZINE HYDROCHLORIDE 10 MG: 10 TABLET ORAL at 15:29

## 2021-01-01 RX ADMIN — FENTANYL CITRATE 50 MCG: 50 INJECTION, SOLUTION INTRAMUSCULAR; INTRAVENOUS at 09:17

## 2021-01-01 RX ADMIN — HYDROMORPHONE HYDROCHLORIDE 0.5 MG: 1 INJECTION, SOLUTION INTRAMUSCULAR; INTRAVENOUS; SUBCUTANEOUS at 15:38

## 2021-01-01 RX ADMIN — SODIUM CHLORIDE, PRESERVATIVE FREE 10 ML: 5 INJECTION INTRAVENOUS at 08:00

## 2021-01-01 RX ADMIN — MIDODRINE HYDROCHLORIDE 15 MG: 10 TABLET ORAL at 13:04

## 2021-01-01 RX ADMIN — OXYCODONE HYDROCHLORIDE 10 MG: 5 TABLET ORAL at 07:00

## 2021-01-01 RX ADMIN — OXYCODONE 5 MG: 5 TABLET ORAL at 20:34

## 2021-01-01 RX ADMIN — SODIUM ZIRCONIUM CYCLOSILICATE 10 G: 5 POWDER, FOR SUSPENSION ORAL at 21:10

## 2021-01-01 RX ADMIN — MIDODRINE HYDROCHLORIDE 2.5 MG: 2.5 TABLET ORAL at 06:01

## 2021-01-01 RX ADMIN — IPRATROPIUM BROMIDE AND ALBUTEROL SULFATE 1 AMPULE: .5; 3 SOLUTION RESPIRATORY (INHALATION) at 15:38

## 2021-01-01 RX ADMIN — IPRATROPIUM BROMIDE AND ALBUTEROL SULFATE 1 AMPULE: .5; 3 SOLUTION RESPIRATORY (INHALATION) at 16:00

## 2021-01-01 RX ADMIN — Medication: at 21:36

## 2021-01-01 RX ADMIN — Medication 5 MG: at 21:14

## 2021-01-01 RX ADMIN — IPRATROPIUM BROMIDE AND ALBUTEROL SULFATE 1 AMPULE: .5; 3 SOLUTION RESPIRATORY (INHALATION) at 12:12

## 2021-01-01 RX ADMIN — QUETIAPINE FUMARATE 25 MG: 25 TABLET ORAL at 20:49

## 2021-01-01 RX ADMIN — BENZTROPINE MESYLATE 1 MG: 1 TABLET ORAL at 21:52

## 2021-01-01 RX ADMIN — PIPERACILLIN AND TAZOBACTAM 2250 MG: 2; .25 INJECTION, POWDER, LYOPHILIZED, FOR SOLUTION INTRAVENOUS at 22:38

## 2021-01-01 RX ADMIN — CITALOPRAM HYDROBROMIDE 20 MG: 20 TABLET ORAL at 09:19

## 2021-01-01 RX ADMIN — MIDODRINE HYDROCHLORIDE 15 MG: 10 TABLET ORAL at 17:25

## 2021-01-01 RX ADMIN — Medication: at 12:39

## 2021-01-01 RX ADMIN — VASOPRESSIN 0.04 UNITS/MIN: 20 INJECTION INTRAVENOUS at 15:06

## 2021-01-01 RX ADMIN — SEVELAMER CARBONATE 1600 MG: 800 TABLET, FILM COATED ORAL at 18:15

## 2021-01-01 RX ADMIN — OXYCODONE 10 MG: 5 TABLET ORAL at 05:42

## 2021-01-01 RX ADMIN — DIPHENHYDRAMINE HCL 25 MG: 25 TABLET ORAL at 23:30

## 2021-01-01 RX ADMIN — SODIUM CHLORIDE, PRESERVATIVE FREE 10 ML: 5 INJECTION INTRAVENOUS at 08:12

## 2021-01-01 RX ADMIN — AMIODARONE HYDROCHLORIDE 200 MG: 200 TABLET ORAL at 10:58

## 2021-01-01 RX ADMIN — OXYCODONE 5 MG: 5 TABLET ORAL at 13:22

## 2021-01-01 RX ADMIN — HYDROCODONE BITARTRATE AND ACETAMINOPHEN 1 TABLET: 5; 325 TABLET ORAL at 07:44

## 2021-01-01 RX ADMIN — MIDODRINE HYDROCHLORIDE 2.5 MG: 2.5 TABLET ORAL at 10:28

## 2021-01-01 RX ADMIN — CARBIDOPA AND LEVODOPA 1 TABLET: 25; 100 TABLET ORAL at 21:37

## 2021-01-01 RX ADMIN — SENNOSIDES 8.6 MG: 8.6 TABLET, COATED ORAL at 20:49

## 2021-01-01 RX ADMIN — Medication 1 TABLET: at 13:19

## 2021-01-01 RX ADMIN — SODIUM CHLORIDE, PRESERVATIVE FREE 10 ML: 5 INJECTION INTRAVENOUS at 20:26

## 2021-01-01 RX ADMIN — QUETIAPINE FUMARATE 25 MG: 25 TABLET ORAL at 08:40

## 2021-01-01 RX ADMIN — Medication 1 TABLET: at 10:32

## 2021-01-01 RX ADMIN — HEPARIN SODIUM 5000 UNITS: 5000 INJECTION INTRAVENOUS; SUBCUTANEOUS at 19:57

## 2021-01-01 RX ADMIN — BENZTROPINE MESYLATE 1 MG: 1 TABLET ORAL at 21:23

## 2021-01-01 RX ADMIN — OXYCODONE HYDROCHLORIDE 10 MG: 5 TABLET ORAL at 13:41

## 2021-01-01 RX ADMIN — BUDESONIDE AND FORMOTEROL FUMARATE DIHYDRATE 2 PUFF: 160; 4.5 AEROSOL RESPIRATORY (INHALATION) at 08:57

## 2021-01-01 RX ADMIN — MIDODRINE HYDROCHLORIDE 2.5 MG: 2.5 TABLET ORAL at 10:38

## 2021-01-01 RX ADMIN — Medication 1 TABLET: at 08:24

## 2021-01-01 RX ADMIN — ALPRAZOLAM 0.5 MG: 0.5 TABLET ORAL at 07:50

## 2021-01-01 RX ADMIN — SEVELAMER CARBONATE 1600 MG: 800 TABLET, FILM COATED ORAL at 16:43

## 2021-01-01 RX ADMIN — ISODIUM CHLORIDE 3 ML: 0.03 SOLUTION RESPIRATORY (INHALATION) at 17:12

## 2021-01-01 RX ADMIN — OXYCODONE HYDROCHLORIDE 10 MG: 5 TABLET ORAL at 18:11

## 2021-01-01 RX ADMIN — ALPRAZOLAM 0.5 MG: 0.5 TABLET ORAL at 22:04

## 2021-01-01 RX ADMIN — Medication 15 MG: at 08:12

## 2021-01-01 RX ADMIN — FENTANYL CITRATE 25 MCG: 50 INJECTION, SOLUTION INTRAMUSCULAR; INTRAVENOUS at 20:16

## 2021-01-01 RX ADMIN — APIXABAN 2.5 MG: 2.5 TABLET, FILM COATED ORAL at 10:58

## 2021-01-01 RX ADMIN — DIPHENHYDRAMINE HCL 25 MG: 25 TABLET ORAL at 06:16

## 2021-01-01 RX ADMIN — OXYCODONE HYDROCHLORIDE 5 MG: 5 TABLET ORAL at 12:30

## 2021-01-01 RX ADMIN — HYDROMORPHONE HYDROCHLORIDE 0.5 MG: 1 INJECTION, SOLUTION INTRAMUSCULAR; INTRAVENOUS; SUBCUTANEOUS at 08:49

## 2021-01-01 RX ADMIN — SENNOSIDES 8.6 MG: 8.6 TABLET, FILM COATED ORAL at 19:34

## 2021-01-01 RX ADMIN — MIDODRINE HYDROCHLORIDE 2.5 MG: 2.5 TABLET ORAL at 06:40

## 2021-01-01 RX ADMIN — HYDROXYZINE HYDROCHLORIDE 10 MG: 10 TABLET ORAL at 09:09

## 2021-01-01 RX ADMIN — IPRATROPIUM BROMIDE AND ALBUTEROL SULFATE 1 AMPULE: .5; 3 SOLUTION RESPIRATORY (INHALATION) at 12:29

## 2021-01-01 RX ADMIN — ALPRAZOLAM 0.5 MG: 0.5 TABLET ORAL at 07:54

## 2021-01-01 RX ADMIN — SENNOSIDES 8.6 MG: 8.6 TABLET, COATED ORAL at 19:55

## 2021-01-01 RX ADMIN — QUETIAPINE FUMARATE 25 MG: 25 TABLET ORAL at 21:52

## 2021-01-01 RX ADMIN — Medication 1 TABLET: at 13:03

## 2021-01-01 RX ADMIN — HEPARIN SODIUM 5000 UNITS: 5000 INJECTION INTRAVENOUS; SUBCUTANEOUS at 00:16

## 2021-01-01 RX ADMIN — MIRTAZAPINE 7.5 MG: 15 TABLET, FILM COATED ORAL at 21:52

## 2021-01-01 RX ADMIN — MIDODRINE HYDROCHLORIDE 15 MG: 10 TABLET ORAL at 17:02

## 2021-01-01 RX ADMIN — ACETAMINOPHEN 1000 MG: 500 TABLET ORAL at 20:03

## 2021-01-01 RX ADMIN — ALPRAZOLAM 0.5 MG: 0.5 TABLET ORAL at 09:27

## 2021-01-01 RX ADMIN — MIRTAZAPINE 7.5 MG: 15 TABLET, FILM COATED ORAL at 21:37

## 2021-01-01 RX ADMIN — LORAZEPAM 0.5 MG: 0.5 TABLET ORAL at 06:50

## 2021-01-01 RX ADMIN — DOCUSATE SODIUM 100 MG: 100 CAPSULE ORAL at 08:18

## 2021-01-01 RX ADMIN — OXYCODONE HYDROCHLORIDE 5 MG: 5 TABLET ORAL at 20:03

## 2021-01-01 RX ADMIN — APIXABAN 2.5 MG: 2.5 TABLET, FILM COATED ORAL at 19:33

## 2021-01-01 RX ADMIN — MIDODRINE HYDROCHLORIDE 5 MG: 5 TABLET ORAL at 00:32

## 2021-01-01 RX ADMIN — MIDODRINE HYDROCHLORIDE 2.5 MG: 2.5 TABLET ORAL at 12:42

## 2021-01-01 RX ADMIN — SODIUM CHLORIDE, PRESERVATIVE FREE 10 ML: 5 INJECTION INTRAVENOUS at 13:41

## 2021-01-01 RX ADMIN — IPRATROPIUM BROMIDE AND ALBUTEROL SULFATE 1 AMPULE: .5; 3 SOLUTION RESPIRATORY (INHALATION) at 19:38

## 2021-01-01 RX ADMIN — OXYCODONE 2.5 MG: 5 TABLET ORAL at 10:09

## 2021-01-01 RX ADMIN — MIDODRINE HYDROCHLORIDE 15 MG: 10 TABLET ORAL at 13:16

## 2021-01-01 RX ADMIN — MIDAZOLAM 0.5 MG: 1 INJECTION INTRAMUSCULAR; INTRAVENOUS at 15:46

## 2021-01-01 RX ADMIN — IPRATROPIUM BROMIDE AND ALBUTEROL SULFATE 1 AMPULE: .5; 3 SOLUTION RESPIRATORY (INHALATION) at 20:09

## 2021-01-01 RX ADMIN — SEVELAMER CARBONATE 1600 MG: 800 TABLET, FILM COATED ORAL at 17:37

## 2021-01-01 RX ADMIN — IPRATROPIUM BROMIDE AND ALBUTEROL SULFATE 1 AMPULE: .5; 3 SOLUTION RESPIRATORY (INHALATION) at 15:45

## 2021-01-01 RX ADMIN — OXYCODONE HYDROCHLORIDE 5 MG: 5 TABLET ORAL at 17:32

## 2021-01-01 RX ADMIN — IPRATROPIUM BROMIDE AND ALBUTEROL SULFATE 1 AMPULE: .5; 3 SOLUTION RESPIRATORY (INHALATION) at 09:05

## 2021-01-01 RX ADMIN — AMIODARONE HYDROCHLORIDE 200 MG: 200 TABLET ORAL at 13:02

## 2021-01-01 RX ADMIN — MIDODRINE HYDROCHLORIDE 15 MG: 10 TABLET ORAL at 21:13

## 2021-01-01 RX ADMIN — FAMOTIDINE 20 MG: 20 TABLET, FILM COATED ORAL at 11:57

## 2021-01-01 RX ADMIN — SODIUM CHLORIDE 250 ML: 9 INJECTION, SOLUTION INTRAVENOUS at 21:11

## 2021-01-01 RX ADMIN — SODIUM CHLORIDE, PRESERVATIVE FREE 10 ML: 5 INJECTION INTRAVENOUS at 09:24

## 2021-01-01 RX ADMIN — SEVELAMER CARBONATE 1600 MG: 800 TABLET, FILM COATED ORAL at 22:11

## 2021-01-01 RX ADMIN — CYCLOBENZAPRINE HYDROCHLORIDE 10 MG: 10 TABLET, FILM COATED ORAL at 19:56

## 2021-01-01 RX ADMIN — ALPRAZOLAM 0.5 MG: 0.5 TABLET ORAL at 09:04

## 2021-01-01 RX ADMIN — MIRTAZAPINE 7.5 MG: 7.5 TABLET ORAL at 19:55

## 2021-01-01 RX ADMIN — SEVELAMER CARBONATE 1600 MG: 800 TABLET, FILM COATED ORAL at 12:51

## 2021-01-01 RX ADMIN — HYDROCODONE BITARTRATE AND ACETAMINOPHEN 1 TABLET: 5; 325 TABLET ORAL at 12:58

## 2021-01-01 RX ADMIN — QUETIAPINE FUMARATE 50 MG: 25 TABLET ORAL at 13:16

## 2021-01-01 RX ADMIN — SENNOSIDES 8.6 MG: 8.6 TABLET, FILM COATED ORAL at 21:13

## 2021-01-01 RX ADMIN — ALPRAZOLAM 0.5 MG: 0.5 TABLET ORAL at 15:29

## 2021-01-01 RX ADMIN — QUETIAPINE FUMARATE 25 MG: 25 TABLET ORAL at 13:37

## 2021-01-01 RX ADMIN — SEVELAMER CARBONATE 1600 MG: 800 TABLET, FILM COATED ORAL at 12:57

## 2021-01-01 RX ADMIN — ESCITALOPRAM 20 MG: 20 TABLET, FILM COATED ORAL at 10:59

## 2021-01-01 RX ADMIN — OXYCODONE HYDROCHLORIDE 5 MG: 5 TABLET ORAL at 03:41

## 2021-01-01 RX ADMIN — PIPERACILLIN AND TAZOBACTAM 2250 MG: 2; .25 INJECTION, POWDER, LYOPHILIZED, FOR SOLUTION INTRAVENOUS at 06:45

## 2021-01-01 RX ADMIN — IPRATROPIUM BROMIDE AND ALBUTEROL SULFATE 3 ML: .5; 3 SOLUTION RESPIRATORY (INHALATION) at 18:16

## 2021-01-01 RX ADMIN — BUDESONIDE AND FORMOTEROL FUMARATE DIHYDRATE 2 PUFF: 160; 4.5 AEROSOL RESPIRATORY (INHALATION) at 08:31

## 2021-01-01 RX ADMIN — SENNOSIDES 8.6 MG: 8.6 TABLET, FILM COATED ORAL at 20:28

## 2021-01-01 RX ADMIN — ISODIUM CHLORIDE 3 ML: 0.03 SOLUTION RESPIRATORY (INHALATION) at 09:41

## 2021-01-01 RX ADMIN — MIDAZOLAM 0.5 MG: 1 INJECTION INTRAMUSCULAR; INTRAVENOUS at 08:49

## 2021-01-01 RX ADMIN — SEVELAMER CARBONATE 1600 MG: 800 TABLET, FILM COATED ORAL at 18:14

## 2021-01-01 RX ADMIN — SODIUM CHLORIDE, PRESERVATIVE FREE 10 ML: 5 INJECTION INTRAVENOUS at 09:26

## 2021-01-01 RX ADMIN — MEROPENEM 500 MG: 500 INJECTION, POWDER, FOR SOLUTION INTRAVENOUS at 12:36

## 2021-01-01 RX ADMIN — HYDROXYZINE HYDROCHLORIDE 10 MG: 10 TABLET ORAL at 10:32

## 2021-01-01 RX ADMIN — MIDODRINE HYDROCHLORIDE 2.5 MG: 2.5 TABLET ORAL at 06:09

## 2021-01-01 RX ADMIN — CITALOPRAM 20 MG: 20 TABLET, FILM COATED ORAL at 09:41

## 2021-01-01 RX ADMIN — CEFAZOLIN 2000 MG: 1 INJECTION, POWDER, FOR SOLUTION INTRAMUSCULAR; INTRAVENOUS at 10:05

## 2021-01-01 RX ADMIN — MIRTAZAPINE 7.5 MG: 15 TABLET, FILM COATED ORAL at 21:21

## 2021-01-01 RX ADMIN — IPRATROPIUM BROMIDE AND ALBUTEROL SULFATE 3 ML: .5; 3 SOLUTION RESPIRATORY (INHALATION) at 15:30

## 2021-01-01 RX ADMIN — ACETAMINOPHEN 650 MG: 325 TABLET ORAL at 01:02

## 2021-01-01 RX ADMIN — OXYCODONE HYDROCHLORIDE 5 MG: 5 TABLET ORAL at 03:29

## 2021-01-01 RX ADMIN — ESCITALOPRAM 20 MG: 20 TABLET, FILM COATED ORAL at 21:14

## 2021-01-01 RX ADMIN — IPRATROPIUM BROMIDE AND ALBUTEROL SULFATE 3 ML: .5; 3 SOLUTION RESPIRATORY (INHALATION) at 16:05

## 2021-01-01 RX ADMIN — Medication 1 CAPSULE: at 08:23

## 2021-01-01 RX ADMIN — CITALOPRAM 20 MG: 20 TABLET, FILM COATED ORAL at 08:38

## 2021-01-01 RX ADMIN — MIDODRINE HYDROCHLORIDE 15 MG: 10 TABLET ORAL at 09:25

## 2021-01-01 RX ADMIN — Medication 1 CAPSULE: at 16:11

## 2021-01-01 RX ADMIN — ISODIUM CHLORIDE 3 ML: 0.03 SOLUTION RESPIRATORY (INHALATION) at 16:11

## 2021-01-01 RX ADMIN — MIDODRINE HYDROCHLORIDE 15 MG: 10 TABLET ORAL at 06:13

## 2021-01-01 RX ADMIN — HYDROMORPHONE HYDROCHLORIDE 0.5 MG: 1 INJECTION, SOLUTION INTRAMUSCULAR; INTRAVENOUS; SUBCUTANEOUS at 17:51

## 2021-01-01 RX ADMIN — BARIUM SULFATE 20 ML: 400 PASTE ORAL at 10:47

## 2021-01-01 RX ADMIN — VANCOMYCIN HYDROCHLORIDE 500 MG: 500 INJECTION, POWDER, LYOPHILIZED, FOR SOLUTION INTRAVENOUS at 17:36

## 2021-01-01 RX ADMIN — DOXEPIN HYDROCHLORIDE 10 MG: 10 CAPSULE ORAL at 00:00

## 2021-01-01 RX ADMIN — CYCLOBENZAPRINE HYDROCHLORIDE 10 MG: 10 TABLET, FILM COATED ORAL at 20:28

## 2021-01-01 RX ADMIN — Medication 1 TABLET: at 11:01

## 2021-01-01 RX ADMIN — ONDANSETRON 4 MG: 4 TABLET, ORALLY DISINTEGRATING ORAL at 07:21

## 2021-01-01 RX ADMIN — HEPARIN SODIUM 5000 UNITS: 5000 INJECTION INTRAVENOUS; SUBCUTANEOUS at 23:46

## 2021-01-01 RX ADMIN — ALPRAZOLAM 0.5 MG: 0.5 TABLET ORAL at 13:41

## 2021-01-01 RX ADMIN — IPRATROPIUM BROMIDE AND ALBUTEROL SULFATE 1 AMPULE: .5; 3 SOLUTION RESPIRATORY (INHALATION) at 16:03

## 2021-01-01 RX ADMIN — BENZTROPINE MESYLATE 1 MG: 1 TABLET ORAL at 19:55

## 2021-01-01 RX ADMIN — IPRATROPIUM BROMIDE AND ALBUTEROL SULFATE 1 AMPULE: .5; 3 SOLUTION RESPIRATORY (INHALATION) at 21:56

## 2021-01-01 RX ADMIN — QUETIAPINE FUMARATE 25 MG: 25 TABLET ORAL at 07:51

## 2021-01-01 RX ADMIN — IPRATROPIUM BROMIDE AND ALBUTEROL SULFATE 1 AMPULE: .5; 3 SOLUTION RESPIRATORY (INHALATION) at 19:24

## 2021-01-01 RX ADMIN — SODIUM CHLORIDE, PRESERVATIVE FREE 10 ML: 5 INJECTION INTRAVENOUS at 13:19

## 2021-01-01 RX ADMIN — CITALOPRAM 20 MG: 20 TABLET, FILM COATED ORAL at 07:57

## 2021-01-01 RX ADMIN — VANCOMYCIN HYDROCHLORIDE 1500 MG: 5 INJECTION, POWDER, LYOPHILIZED, FOR SOLUTION INTRAVENOUS at 01:31

## 2021-01-01 RX ADMIN — AZITHROMYCIN DIHYDRATE 250 MG: 500 INJECTION, POWDER, LYOPHILIZED, FOR SOLUTION INTRAVENOUS at 05:49

## 2021-01-01 RX ADMIN — QUETIAPINE FUMARATE 50 MG: 25 TABLET ORAL at 08:23

## 2021-01-01 RX ADMIN — ROCURONIUM BROMIDE 20 MG: 10 INJECTION INTRAVENOUS at 10:22

## 2021-01-01 RX ADMIN — IPRATROPIUM BROMIDE AND ALBUTEROL SULFATE 1 AMPULE: .5; 3 SOLUTION RESPIRATORY (INHALATION) at 08:24

## 2021-01-01 RX ADMIN — MIDODRINE HYDROCHLORIDE 15 MG: 10 TABLET ORAL at 11:16

## 2021-01-01 RX ADMIN — BUDESONIDE AND FORMOTEROL FUMARATE DIHYDRATE 2 PUFF: 160; 4.5 AEROSOL RESPIRATORY (INHALATION) at 17:55

## 2021-01-01 RX ADMIN — PROPOFOL 150 MG: 10 INJECTION, EMULSION INTRAVENOUS at 09:45

## 2021-01-01 RX ADMIN — QUETIAPINE FUMARATE 25 MG: 25 TABLET ORAL at 21:10

## 2021-01-01 RX ADMIN — IPRATROPIUM BROMIDE AND ALBUTEROL SULFATE 1 AMPULE: .5; 3 SOLUTION RESPIRATORY (INHALATION) at 13:48

## 2021-01-01 RX ADMIN — MEROPENEM 500 MG: 500 INJECTION, POWDER, FOR SOLUTION INTRAVENOUS at 11:57

## 2021-01-01 RX ADMIN — MIDODRINE HYDROCHLORIDE 15 MG: 10 TABLET ORAL at 09:08

## 2021-01-01 RX ADMIN — QUETIAPINE FUMARATE 25 MG: 25 TABLET ORAL at 07:55

## 2021-01-01 RX ADMIN — LORAZEPAM 1 MG: 2 INJECTION, SOLUTION INTRAMUSCULAR; INTRAVENOUS at 17:08

## 2021-01-01 RX ADMIN — MIRTAZAPINE 7.5 MG: 7.5 TABLET ORAL at 21:10

## 2021-01-01 RX ADMIN — QUETIAPINE FUMARATE 50 MG: 25 TABLET ORAL at 21:13

## 2021-01-01 RX ADMIN — OXYCODONE 10 MG: 5 TABLET ORAL at 19:50

## 2021-01-01 RX ADMIN — QUETIAPINE FUMARATE 25 MG: 25 TABLET ORAL at 07:57

## 2021-01-01 RX ADMIN — GABAPENTIN 100 MG: 100 CAPSULE ORAL at 16:17

## 2021-01-01 RX ADMIN — SEVELAMER CARBONATE 1600 MG: 800 TABLET, FILM COATED ORAL at 12:42

## 2021-01-01 RX ADMIN — SENNOSIDES 8.6 MG: 8.6 TABLET, COATED ORAL at 20:35

## 2021-01-01 RX ADMIN — SODIUM CHLORIDE, PRESERVATIVE FREE 10 ML: 5 INJECTION INTRAVENOUS at 07:52

## 2021-01-01 RX ADMIN — ACETAMINOPHEN 650 MG: 325 TABLET ORAL at 16:34

## 2021-01-01 RX ADMIN — SEVELAMER CARBONATE 1600 MG: 800 TABLET, FILM COATED ORAL at 18:11

## 2021-01-01 RX ADMIN — MIDODRINE HYDROCHLORIDE 2.5 MG: 2.5 TABLET ORAL at 10:26

## 2021-01-01 RX ADMIN — HYDROMORPHONE HYDROCHLORIDE 0.5 MG: 1 INJECTION, SOLUTION INTRAMUSCULAR; INTRAVENOUS; SUBCUTANEOUS at 15:20

## 2021-01-01 RX ADMIN — IBUPROFEN 400 MG: 400 TABLET, FILM COATED ORAL at 12:42

## 2021-01-01 RX ADMIN — LORAZEPAM 0.5 MG: 0.5 TABLET ORAL at 22:20

## 2021-01-01 RX ADMIN — ONDANSETRON HYDROCHLORIDE 4 MG: 4 INJECTION, SOLUTION INTRAMUSCULAR; INTRAVENOUS at 12:38

## 2021-01-01 RX ADMIN — IPRATROPIUM BROMIDE AND ALBUTEROL SULFATE 1 AMPULE: .5; 3 SOLUTION RESPIRATORY (INHALATION) at 07:15

## 2021-01-01 RX ADMIN — SENNOSIDES 8.6 MG: 8.6 TABLET, FILM COATED ORAL at 20:48

## 2021-01-01 RX ADMIN — FAMOTIDINE 20 MG: 20 TABLET ORAL at 09:19

## 2021-01-01 RX ADMIN — FENTANYL CITRATE 50 MCG: 0.05 INJECTION, SOLUTION INTRAMUSCULAR; INTRAVENOUS at 16:35

## 2021-01-01 RX ADMIN — HYDRALAZINE HYDROCHLORIDE 5 MG: 20 INJECTION INTRAMUSCULAR; INTRAVENOUS at 16:00

## 2021-01-01 RX ADMIN — SODIUM CHLORIDE 680 ML: 9 INJECTION, SOLUTION INTRAVENOUS at 05:19

## 2021-01-01 RX ADMIN — AMIODARONE HYDROCHLORIDE 200 MG: 200 TABLET ORAL at 13:06

## 2021-01-01 RX ADMIN — MEROPENEM 500 MG: 500 INJECTION, POWDER, FOR SOLUTION INTRAVENOUS at 14:27

## 2021-01-01 RX ADMIN — MIDODRINE HYDROCHLORIDE 15 MG: 10 TABLET ORAL at 16:13

## 2021-01-01 RX ADMIN — OXYCODONE 10 MG: 5 TABLET ORAL at 09:41

## 2021-01-01 RX ADMIN — DIPHENHYDRAMINE HYDROCHLORIDE 25 MG: 25 TABLET ORAL at 07:36

## 2021-01-01 RX ADMIN — SEVELAMER CARBONATE 1600 MG: 800 TABLET, FILM COATED ORAL at 07:56

## 2021-01-01 RX ADMIN — IPRATROPIUM BROMIDE AND ALBUTEROL SULFATE 1 AMPULE: .5; 3 SOLUTION RESPIRATORY (INHALATION) at 08:23

## 2021-01-01 RX ADMIN — HEPARIN SODIUM 5000 UNITS: 5000 INJECTION INTRAVENOUS; SUBCUTANEOUS at 18:37

## 2021-01-01 RX ADMIN — Medication 1250 MG: at 21:10

## 2021-01-01 RX ADMIN — ESCITALOPRAM 20 MG: 20 TABLET, FILM COATED ORAL at 12:58

## 2021-01-01 RX ADMIN — Medication 1 CAPSULE: at 16:20

## 2021-01-01 RX ADMIN — IPRATROPIUM BROMIDE AND ALBUTEROL SULFATE 1 AMPULE: .5; 3 SOLUTION RESPIRATORY (INHALATION) at 07:46

## 2021-01-01 RX ADMIN — MIDODRINE HYDROCHLORIDE 10 MG: 5 TABLET ORAL at 20:03

## 2021-01-01 RX ADMIN — HYDROMORPHONE HYDROCHLORIDE 0.5 MG: 1 INJECTION, SOLUTION INTRAMUSCULAR; INTRAVENOUS; SUBCUTANEOUS at 09:04

## 2021-01-01 RX ADMIN — OXYCODONE 2.5 MG: 5 TABLET ORAL at 16:13

## 2021-01-01 RX ADMIN — ACETAMINOPHEN 650 MG: 325 TABLET ORAL at 01:23

## 2021-01-01 RX ADMIN — SEVELAMER CARBONATE 1600 MG: 800 TABLET, FILM COATED ORAL at 14:36

## 2021-01-01 RX ADMIN — HYDROMORPHONE HYDROCHLORIDE 0.5 MG: 1 INJECTION, SOLUTION INTRAMUSCULAR; INTRAVENOUS; SUBCUTANEOUS at 05:37

## 2021-01-01 RX ADMIN — PROMETHAZINE HYDROCHLORIDE 12.5 MG: 25 TABLET ORAL at 20:47

## 2021-01-01 RX ADMIN — ALPRAZOLAM 0.5 MG: 0.5 TABLET ORAL at 08:18

## 2021-01-01 RX ADMIN — Medication 1 CAPSULE: at 13:17

## 2021-01-01 RX ADMIN — OXYCODONE HYDROCHLORIDE 5 MG: 5 TABLET ORAL at 23:43

## 2021-01-01 RX ADMIN — FENTANYL CITRATE 50 MCG: 50 INJECTION, SOLUTION INTRAMUSCULAR; INTRAVENOUS at 11:54

## 2021-01-01 RX ADMIN — MIRTAZAPINE 7.5 MG: 15 TABLET, FILM COATED ORAL at 21:23

## 2021-01-01 RX ADMIN — BUDESONIDE AND FORMOTEROL FUMARATE DIHYDRATE 2 PUFF: 160; 4.5 AEROSOL RESPIRATORY (INHALATION) at 09:05

## 2021-01-01 RX ADMIN — MIDODRINE HYDROCHLORIDE 15 MG: 10 TABLET ORAL at 09:35

## 2021-01-01 RX ADMIN — ACETAMINOPHEN 1000 MG: 500 TABLET ORAL at 14:00

## 2021-01-01 RX ADMIN — ALPRAZOLAM 0.5 MG: 0.5 TABLET ORAL at 20:35

## 2021-01-01 RX ADMIN — AZITHROMYCIN DIHYDRATE 250 MG: 500 INJECTION, POWDER, LYOPHILIZED, FOR SOLUTION INTRAVENOUS at 13:34

## 2021-01-01 RX ADMIN — BUDESONIDE AND FORMOTEROL FUMARATE DIHYDRATE 2 PUFF: 80; 4.5 AEROSOL RESPIRATORY (INHALATION) at 17:45

## 2021-01-01 RX ADMIN — CYCLOBENZAPRINE HYDROCHLORIDE 10 MG: 10 TABLET, FILM COATED ORAL at 13:09

## 2021-01-01 RX ADMIN — CARBIDOPA AND LEVODOPA 1 TABLET: 25; 100 TABLET ORAL at 21:10

## 2021-01-01 RX ADMIN — ISODIUM CHLORIDE 3 ML: 0.03 SOLUTION RESPIRATORY (INHALATION) at 09:48

## 2021-01-01 RX ADMIN — CEFTRIAXONE SODIUM 1000 MG: 1 INJECTION, POWDER, FOR SOLUTION INTRAMUSCULAR; INTRAVENOUS at 09:27

## 2021-01-01 RX ADMIN — SODIUM CHLORIDE: 9 INJECTION, SOLUTION INTRAVENOUS at 09:35

## 2021-01-01 RX ADMIN — MIDODRINE HYDROCHLORIDE 15 MG: 10 TABLET ORAL at 08:23

## 2021-01-01 RX ADMIN — AMIODARONE HYDROCHLORIDE 200 MG: 200 TABLET ORAL at 13:17

## 2021-01-01 RX ADMIN — DOXEPIN HYDROCHLORIDE 10 MG: 10 CAPSULE ORAL at 19:33

## 2021-01-01 RX ADMIN — GABAPENTIN 100 MG: 100 CAPSULE ORAL at 21:51

## 2021-01-01 RX ADMIN — MEROPENEM 500 MG: 500 INJECTION, POWDER, FOR SOLUTION INTRAVENOUS at 12:57

## 2021-01-01 RX ADMIN — PREGABALIN 75 MG: 75 CAPSULE ORAL at 07:57

## 2021-01-01 RX ADMIN — HYDROMORPHONE HYDROCHLORIDE 0.25 MG: 1 INJECTION, SOLUTION INTRAMUSCULAR; INTRAVENOUS; SUBCUTANEOUS at 04:23

## 2021-01-01 RX ADMIN — FAMOTIDINE 20 MG: 20 TABLET, FILM COATED ORAL at 21:51

## 2021-01-01 RX ADMIN — SODIUM CHLORIDE: 4.5 INJECTION, SOLUTION INTRAVENOUS at 14:12

## 2021-01-01 RX ADMIN — SUGAMMADEX 200 MG: 100 INJECTION, SOLUTION INTRAVENOUS at 15:02

## 2021-01-01 RX ADMIN — HEPARIN SODIUM 5000 UNITS: 5000 INJECTION INTRAVENOUS; SUBCUTANEOUS at 09:20

## 2021-01-01 RX ADMIN — MIDODRINE HYDROCHLORIDE 15 MG: 10 TABLET ORAL at 16:36

## 2021-01-01 RX ADMIN — VASOPRESSIN 0.04 UNITS/MIN: 20 INJECTION INTRAVENOUS at 23:45

## 2021-01-01 RX ADMIN — MIDODRINE HYDROCHLORIDE 15 MG: 10 TABLET ORAL at 10:32

## 2021-01-01 RX ADMIN — IPRATROPIUM BROMIDE AND ALBUTEROL SULFATE 3 ML: .5; 3 SOLUTION RESPIRATORY (INHALATION) at 17:56

## 2021-01-01 RX ADMIN — PROMETHAZINE HYDROCHLORIDE 12.5 MG: 25 TABLET ORAL at 03:29

## 2021-01-01 RX ADMIN — ACETAMINOPHEN 650 MG: 325 TABLET ORAL at 07:11

## 2021-01-01 RX ADMIN — BUDESONIDE AND FORMOTEROL FUMARATE DIHYDRATE 2 PUFF: 160; 4.5 AEROSOL RESPIRATORY (INHALATION) at 21:53

## 2021-01-01 RX ADMIN — OXYCODONE HYDROCHLORIDE 10 MG: 5 TABLET ORAL at 20:25

## 2021-01-01 RX ADMIN — BUDESONIDE AND FORMOTEROL FUMARATE DIHYDRATE 2 PUFF: 160; 4.5 AEROSOL RESPIRATORY (INHALATION) at 21:13

## 2021-01-01 RX ADMIN — Medication 5 MG: at 21:51

## 2021-01-01 RX ADMIN — IPRATROPIUM BROMIDE AND ALBUTEROL SULFATE 3 ML: .5; 3 SOLUTION RESPIRATORY (INHALATION) at 07:31

## 2021-01-01 RX ADMIN — IPRATROPIUM BROMIDE AND ALBUTEROL SULFATE 1 AMPULE: .5; 3 SOLUTION RESPIRATORY (INHALATION) at 12:15

## 2021-01-01 RX ADMIN — BACITRACIN, NEOMYCIN, POLYMYXIN B 1 G: 400; 3.5; 5 OINTMENT TOPICAL at 15:36

## 2021-01-01 RX ADMIN — ALPRAZOLAM 0.5 MG: 0.5 TABLET ORAL at 17:34

## 2021-01-01 RX ADMIN — VANCOMYCIN HYDROCHLORIDE 1000 MG: 1 INJECTION, POWDER, LYOPHILIZED, FOR SOLUTION INTRAVENOUS at 18:38

## 2021-01-01 RX ADMIN — HYDROMORPHONE HYDROCHLORIDE 0.25 MG: 1 INJECTION, SOLUTION INTRAMUSCULAR; INTRAVENOUS; SUBCUTANEOUS at 21:55

## 2021-01-01 RX ADMIN — CALCIUM ACETATE 2001 MG: 667 CAPSULE ORAL at 12:30

## 2021-01-01 RX ADMIN — Medication 1 TABLET: at 09:26

## 2021-01-01 RX ADMIN — FENTANYL CITRATE 50 MCG: 50 INJECTION, SOLUTION INTRAMUSCULAR; INTRAVENOUS at 09:45

## 2021-01-01 RX ADMIN — CARBIDOPA AND LEVODOPA 1 TABLET: 25; 100 TABLET ORAL at 20:09

## 2021-01-01 RX ADMIN — OXYCODONE HYDROCHLORIDE 5 MG: 5 TABLET ORAL at 15:29

## 2021-01-01 RX ADMIN — POLYETHYLENE GLYCOL 3350 17 G: 17 POWDER, FOR SOLUTION ORAL at 08:35

## 2021-01-01 RX ADMIN — HYDROMORPHONE HYDROCHLORIDE 1 MG: 1 INJECTION, SOLUTION INTRAMUSCULAR; INTRAVENOUS; SUBCUTANEOUS at 08:37

## 2021-01-01 RX ADMIN — MIDAZOLAM 1 MG: 1 INJECTION INTRAMUSCULAR; INTRAVENOUS at 11:51

## 2021-01-01 RX ADMIN — Medication 1 CAPSULE: at 17:32

## 2021-01-01 RX ADMIN — ACETAMINOPHEN 650 MG: 325 TABLET ORAL at 23:53

## 2021-01-01 RX ADMIN — SILVER SULFADIAZINE: 10 CREAM TOPICAL at 08:13

## 2021-01-01 RX ADMIN — OXYCODONE HYDROCHLORIDE 10 MG: 5 TABLET ORAL at 22:11

## 2021-01-01 RX ADMIN — ACETAMINOPHEN 1000 MG: 500 TABLET ORAL at 06:52

## 2021-01-01 RX ADMIN — ALPRAZOLAM 0.5 MG: 0.5 TABLET ORAL at 09:19

## 2021-01-01 RX ADMIN — CYCLOBENZAPRINE HYDROCHLORIDE 10 MG: 10 TABLET, FILM COATED ORAL at 06:57

## 2021-01-01 RX ADMIN — MEROPENEM 500 MG: 500 INJECTION, POWDER, FOR SOLUTION INTRAVENOUS at 14:12

## 2021-01-01 RX ADMIN — GABAPENTIN 100 MG: 100 CAPSULE ORAL at 00:09

## 2021-01-01 RX ADMIN — IPRATROPIUM BROMIDE AND ALBUTEROL SULFATE 1 AMPULE: .5; 3 SOLUTION RESPIRATORY (INHALATION) at 18:05

## 2021-01-01 RX ADMIN — OXYCODONE 10 MG: 5 TABLET ORAL at 15:29

## 2021-01-01 RX ADMIN — IPRATROPIUM BROMIDE AND ALBUTEROL SULFATE 3 ML: .5; 3 SOLUTION RESPIRATORY (INHALATION) at 13:23

## 2021-01-01 RX ADMIN — IPRATROPIUM BROMIDE AND ALBUTEROL SULFATE 3 ML: .5; 3 SOLUTION RESPIRATORY (INHALATION) at 17:05

## 2021-01-01 RX ADMIN — MIDODRINE HYDROCHLORIDE 10 MG: 5 TABLET ORAL at 07:26

## 2021-01-01 RX ADMIN — Medication 1 CAPSULE: at 10:59

## 2021-01-01 RX ADMIN — ESCITALOPRAM 20 MG: 20 TABLET, FILM COATED ORAL at 09:26

## 2021-01-01 RX ADMIN — BUDESONIDE AND FORMOTEROL FUMARATE DIHYDRATE 2 PUFF: 160; 4.5 AEROSOL RESPIRATORY (INHALATION) at 21:41

## 2021-01-01 RX ADMIN — QUETIAPINE FUMARATE 50 MG: 25 TABLET ORAL at 20:25

## 2021-01-01 RX ADMIN — ESCITALOPRAM 20 MG: 20 TABLET, FILM COATED ORAL at 09:09

## 2021-01-01 RX ADMIN — BENZTROPINE MESYLATE 1 MG: 1 TABLET ORAL at 23:04

## 2021-01-01 RX ADMIN — MIDODRINE HYDROCHLORIDE 15 MG: 10 TABLET ORAL at 12:35

## 2021-01-01 RX ADMIN — Medication 1 CAPSULE: at 10:31

## 2021-01-01 RX ADMIN — CYCLOBENZAPRINE HYDROCHLORIDE 10 MG: 10 TABLET, FILM COATED ORAL at 09:35

## 2021-01-01 RX ADMIN — HEPARIN SODIUM 5000 UNITS: 5000 INJECTION INTRAVENOUS; SUBCUTANEOUS at 07:51

## 2021-01-01 RX ADMIN — QUETIAPINE FUMARATE 50 MG: 25 TABLET ORAL at 20:28

## 2021-01-01 RX ADMIN — QUETIAPINE FUMARATE 25 MG: 25 TABLET ORAL at 21:23

## 2021-01-01 RX ADMIN — SODIUM CHLORIDE: 4.5 INJECTION, SOLUTION INTRAVENOUS at 07:05

## 2021-01-01 RX ADMIN — SEVELAMER CARBONATE 1600 MG: 800 TABLET, FILM COATED ORAL at 18:08

## 2021-01-01 RX ADMIN — OXYCODONE HYDROCHLORIDE 5 MG: 5 TABLET ORAL at 14:27

## 2021-01-01 RX ADMIN — DIPHENHYDRAMINE HYDROCHLORIDE 25 MG: 50 INJECTION, SOLUTION INTRAMUSCULAR; INTRAVENOUS at 11:47

## 2021-01-01 RX ADMIN — QUETIAPINE FUMARATE 25 MG: 25 TABLET ORAL at 10:06

## 2021-01-01 RX ADMIN — ALBUTEROL SULFATE 2.5 MG: 2.5 SOLUTION RESPIRATORY (INHALATION) at 03:18

## 2021-01-01 RX ADMIN — CITALOPRAM HYDROBROMIDE 20 MG: 20 TABLET ORAL at 07:54

## 2021-01-01 RX ADMIN — QUETIAPINE FUMARATE 50 MG: 25 TABLET ORAL at 21:35

## 2021-01-01 RX ADMIN — PREGABALIN 75 MG: 75 CAPSULE ORAL at 21:28

## 2021-01-01 RX ADMIN — Medication 5 MG: at 20:48

## 2021-01-01 RX ADMIN — CARBIDOPA AND LEVODOPA 1 TABLET: 25; 100 TABLET ORAL at 21:22

## 2021-01-01 RX ADMIN — VANCOMYCIN HYDROCHLORIDE 750 MG: 1 INJECTION, POWDER, LYOPHILIZED, FOR SOLUTION INTRAVENOUS at 21:04

## 2021-01-01 RX ADMIN — SEVELAMER CARBONATE 1600 MG: 800 TABLET, FILM COATED ORAL at 08:12

## 2021-01-01 RX ADMIN — FENTANYL CITRATE 100 MCG: 50 INJECTION, SOLUTION INTRAMUSCULAR; INTRAVENOUS at 10:35

## 2021-01-01 RX ADMIN — ACETAMINOPHEN 650 MG: 325 TABLET ORAL at 20:32

## 2021-01-01 RX ADMIN — ISODIUM CHLORIDE 3 ML: 0.03 SOLUTION RESPIRATORY (INHALATION) at 08:26

## 2021-01-01 RX ADMIN — IPRATROPIUM BROMIDE AND ALBUTEROL SULFATE 1 AMPULE: .5; 3 SOLUTION RESPIRATORY (INHALATION) at 07:32

## 2021-01-01 RX ADMIN — MIDODRINE HYDROCHLORIDE 2.5 MG: 2.5 TABLET ORAL at 16:43

## 2021-01-01 RX ADMIN — ALPRAZOLAM 0.5 MG: 0.5 TABLET ORAL at 14:36

## 2021-01-01 RX ADMIN — BARIUM SULFATE 10 ML: 400 SUSPENSION ORAL at 10:47

## 2021-01-01 RX ADMIN — IOPAMIDOL 60 ML: 510 INJECTION, SOLUTION INTRAVASCULAR at 16:13

## 2021-01-01 RX ADMIN — Medication 6 MG: at 23:02

## 2021-01-01 RX ADMIN — Medication 10 MG: at 11:37

## 2021-01-01 RX ADMIN — SILVER SULFADIAZINE: 10 CREAM TOPICAL at 20:05

## 2021-01-01 RX ADMIN — SEVELAMER CARBONATE 1600 MG: 800 TABLET, FILM COATED ORAL at 14:14

## 2021-01-01 RX ADMIN — OXYCODONE HYDROCHLORIDE 5 MG: 5 TABLET ORAL at 16:30

## 2021-01-01 RX ADMIN — CITALOPRAM HYDROBROMIDE 20 MG: 20 TABLET ORAL at 07:55

## 2021-01-01 RX ADMIN — BUDESONIDE AND FORMOTEROL FUMARATE DIHYDRATE 2 PUFF: 160; 4.5 AEROSOL RESPIRATORY (INHALATION) at 10:00

## 2021-01-01 RX ADMIN — DIPHENHYDRAMINE HYDROCHLORIDE 25 MG: 25 TABLET ORAL at 08:14

## 2021-01-01 RX ADMIN — ACETAMINOPHEN 650 MG: 325 TABLET ORAL at 20:09

## 2021-01-01 RX ADMIN — ALPRAZOLAM 0.5 MG: 0.5 TABLET ORAL at 14:16

## 2021-01-01 RX ADMIN — HYDROMORPHONE HYDROCHLORIDE 0.5 MG: 1 INJECTION, SOLUTION INTRAMUSCULAR; INTRAVENOUS; SUBCUTANEOUS at 09:10

## 2021-01-01 RX ADMIN — OXYCODONE HYDROCHLORIDE 5 MG: 5 TABLET ORAL at 03:04

## 2021-01-01 RX ADMIN — OXYCODONE HYDROCHLORIDE 10 MG: 5 TABLET ORAL at 08:01

## 2021-01-01 RX ADMIN — ALPRAZOLAM 0.5 MG: 0.5 TABLET ORAL at 08:35

## 2021-01-01 RX ADMIN — QUETIAPINE FUMARATE 25 MG: 25 TABLET ORAL at 21:38

## 2021-01-01 RX ADMIN — AMIODARONE HYDROCHLORIDE 200 MG: 200 TABLET ORAL at 08:23

## 2021-01-01 RX ADMIN — DARBEPOETIN ALFA 40 MCG: 40 INJECTION, SOLUTION INTRAVENOUS; SUBCUTANEOUS at 13:06

## 2021-01-01 RX ADMIN — HYDROXYZINE HYDROCHLORIDE 50 MG: 25 TABLET, FILM COATED ORAL at 08:02

## 2021-01-01 RX ADMIN — CARBIDOPA AND LEVODOPA 1 TABLET: 25; 100 TABLET ORAL at 21:52

## 2021-01-01 RX ADMIN — FAMOTIDINE 20 MG: 20 TABLET, FILM COATED ORAL at 14:18

## 2021-01-01 RX ADMIN — Medication 1 TABLET: at 10:30

## 2021-01-01 RX ADMIN — CITALOPRAM HYDROBROMIDE 20 MG: 20 TABLET ORAL at 13:37

## 2021-01-01 RX ADMIN — IOPAMIDOL 80 ML: 510 INJECTION, SOLUTION INTRAVASCULAR at 09:35

## 2021-01-01 RX ADMIN — Medication 100 MG: at 11:55

## 2021-01-01 RX ADMIN — IPRATROPIUM BROMIDE AND ALBUTEROL SULFATE 3 ML: .5; 3 SOLUTION RESPIRATORY (INHALATION) at 12:45

## 2021-01-01 RX ADMIN — SENNOSIDES 8.6 MG: 8.6 TABLET, COATED ORAL at 21:10

## 2021-01-01 RX ADMIN — SODIUM CHLORIDE, PRESERVATIVE FREE 10 ML: 5 INJECTION INTRAVENOUS at 19:52

## 2021-01-01 RX ADMIN — GABAPENTIN 100 MG: 100 CAPSULE ORAL at 20:48

## 2021-01-01 RX ADMIN — QUETIAPINE FUMARATE 50 MG: 25 TABLET ORAL at 10:31

## 2021-01-01 RX ADMIN — IPRATROPIUM BROMIDE AND ALBUTEROL SULFATE 1 AMPULE: .5; 3 SOLUTION RESPIRATORY (INHALATION) at 11:56

## 2021-01-01 RX ADMIN — MIRTAZAPINE 7.5 MG: 7.5 TABLET ORAL at 23:02

## 2021-01-01 RX ADMIN — HEPARIN SODIUM 5000 UNITS: 5000 INJECTION INTRAVENOUS; SUBCUTANEOUS at 09:27

## 2021-01-01 RX ADMIN — DOCUSATE SODIUM 100 MG: 100 CAPSULE ORAL at 08:35

## 2021-01-01 RX ADMIN — SEVELAMER CARBONATE 1600 MG: 800 TABLET, FILM COATED ORAL at 07:55

## 2021-01-01 RX ADMIN — SENNOSIDES 8.6 MG: 8.6 TABLET, COATED ORAL at 22:12

## 2021-01-01 RX ADMIN — FAMOTIDINE 20 MG: 20 TABLET, FILM COATED ORAL at 21:14

## 2021-01-01 RX ADMIN — MIDODRINE HYDROCHLORIDE 15 MG: 10 TABLET ORAL at 13:38

## 2021-01-01 RX ADMIN — QUETIAPINE FUMARATE 25 MG: 25 TABLET ORAL at 09:19

## 2021-01-01 RX ADMIN — Medication 5 MG: at 20:28

## 2021-01-01 RX ADMIN — IPRATROPIUM BROMIDE AND ALBUTEROL SULFATE 3 ML: .5; 3 SOLUTION RESPIRATORY (INHALATION) at 09:28

## 2021-01-01 RX ADMIN — FAMOTIDINE 20 MG: 20 TABLET, FILM COATED ORAL at 21:36

## 2021-01-01 RX ADMIN — HYDROMORPHONE HYDROCHLORIDE 0.5 MG: 1 INJECTION, SOLUTION INTRAMUSCULAR; INTRAVENOUS; SUBCUTANEOUS at 02:21

## 2021-01-01 RX ADMIN — GABAPENTIN 100 MG: 100 CAPSULE ORAL at 08:12

## 2021-01-01 RX ADMIN — FAMOTIDINE 20 MG: 20 TABLET, FILM COATED ORAL at 20:32

## 2021-01-01 RX ADMIN — IPRATROPIUM BROMIDE AND ALBUTEROL SULFATE 3 ML: .5; 3 SOLUTION RESPIRATORY (INHALATION) at 19:57

## 2021-01-01 RX ADMIN — GABAPENTIN 100 MG: 100 CAPSULE ORAL at 23:43

## 2021-01-01 RX ADMIN — BUDESONIDE AND FORMOTEROL FUMARATE DIHYDRATE 2 PUFF: 160; 4.5 AEROSOL RESPIRATORY (INHALATION) at 18:48

## 2021-01-01 RX ADMIN — FENTANYL CITRATE 100 MCG: 50 INJECTION, SOLUTION INTRAMUSCULAR; INTRAVENOUS at 22:58

## 2021-01-01 RX ADMIN — SENNOSIDES 8.6 MG: 8.6 TABLET, FILM COATED ORAL at 20:31

## 2021-01-01 RX ADMIN — ACETAMINOPHEN 650 MG: 325 TABLET ORAL at 11:01

## 2021-01-01 RX ADMIN — Medication 5 MG: at 19:52

## 2021-01-01 RX ADMIN — IPRATROPIUM BROMIDE AND ALBUTEROL SULFATE 1 AMPULE: .5; 3 SOLUTION RESPIRATORY (INHALATION) at 12:39

## 2021-01-01 RX ADMIN — BUDESONIDE AND FORMOTEROL FUMARATE DIHYDRATE 2 PUFF: 160; 4.5 AEROSOL RESPIRATORY (INHALATION) at 08:24

## 2021-01-01 RX ADMIN — SEVELAMER CARBONATE 1600 MG: 800 TABLET, FILM COATED ORAL at 10:28

## 2021-01-01 RX ADMIN — FENTANYL CITRATE 25 MCG: 50 INJECTION, SOLUTION INTRAMUSCULAR; INTRAVENOUS at 01:25

## 2021-01-01 RX ADMIN — HEPARIN SODIUM 5000 UNITS: 5000 INJECTION INTRAVENOUS; SUBCUTANEOUS at 07:57

## 2021-01-01 RX ADMIN — MIDODRINE HYDROCHLORIDE 15 MG: 10 TABLET ORAL at 16:11

## 2021-01-01 RX ADMIN — SODIUM CHLORIDE, PRESERVATIVE FREE 10 ML: 5 INJECTION INTRAVENOUS at 08:36

## 2021-01-01 RX ADMIN — MIDODRINE HYDROCHLORIDE 2.5 MG: 2.5 TABLET ORAL at 06:42

## 2021-01-01 RX ADMIN — ONDANSETRON 4 MG: 2 INJECTION INTRAMUSCULAR; INTRAVENOUS at 15:20

## 2021-01-01 RX ADMIN — SODIUM CHLORIDE, PRESERVATIVE FREE 10 ML: 5 INJECTION INTRAVENOUS at 20:32

## 2021-01-01 RX ADMIN — ACETAMINOPHEN 650 MG: 325 TABLET ORAL at 07:24

## 2021-01-01 RX ADMIN — DEXAMETHASONE SODIUM PHOSPHATE 5 MG: 10 INJECTION, EMULSION INTRAMUSCULAR; INTRAVENOUS at 11:27

## 2021-01-01 RX ADMIN — IPRATROPIUM BROMIDE AND ALBUTEROL SULFATE 3 ML: .5; 3 SOLUTION RESPIRATORY (INHALATION) at 16:38

## 2021-01-01 RX ADMIN — IPRATROPIUM BROMIDE AND ALBUTEROL SULFATE 3 ML: .5; 3 SOLUTION RESPIRATORY (INHALATION) at 09:39

## 2021-01-01 RX ADMIN — ISODIUM CHLORIDE 3 ML: 0.03 SOLUTION RESPIRATORY (INHALATION) at 09:52

## 2021-01-01 RX ADMIN — FLUCONAZOLE 150 MG: 100 TABLET ORAL at 22:12

## 2021-01-01 RX ADMIN — MIDODRINE HYDROCHLORIDE 15 MG: 10 TABLET ORAL at 06:43

## 2021-01-01 RX ADMIN — OXYCODONE 2.5 MG: 5 TABLET ORAL at 03:31

## 2021-01-01 RX ADMIN — SEVELAMER CARBONATE 1600 MG: 800 TABLET, FILM COATED ORAL at 08:39

## 2021-01-01 RX ADMIN — SODIUM CHLORIDE, PRESERVATIVE FREE 10 ML: 5 INJECTION INTRAVENOUS at 21:49

## 2021-01-01 RX ADMIN — APIXABAN 2.5 MG: 2.5 TABLET, FILM COATED ORAL at 20:32

## 2021-01-01 RX ADMIN — IPRATROPIUM BROMIDE AND ALBUTEROL SULFATE 1 AMPULE: .5; 3 SOLUTION RESPIRATORY (INHALATION) at 15:26

## 2021-01-01 RX ADMIN — SODIUM CHLORIDE, PRESERVATIVE FREE 10 ML: 5 INJECTION INTRAVENOUS at 20:15

## 2021-01-01 RX ADMIN — MIDODRINE HYDROCHLORIDE 15 MG: 10 TABLET ORAL at 14:27

## 2021-01-01 RX ADMIN — MIDODRINE HYDROCHLORIDE 2.5 MG: 2.5 TABLET ORAL at 14:53

## 2021-01-01 RX ADMIN — SEVELAMER CARBONATE 1600 MG: 800 TABLET, FILM COATED ORAL at 07:54

## 2021-01-01 RX ADMIN — BENZTROPINE MESYLATE 1 MG: 1 TABLET ORAL at 20:09

## 2021-01-01 RX ADMIN — Medication 5 MG: at 19:34

## 2021-01-01 RX ADMIN — DEXTROSE MONOHYDRATE 25 G: 25 INJECTION, SOLUTION INTRAVENOUS at 13:43

## 2021-01-01 RX ADMIN — ALPRAZOLAM 0.5 MG: 0.5 TABLET ORAL at 13:35

## 2021-01-01 RX ADMIN — DOCUSATE SODIUM 100 MG: 100 CAPSULE ORAL at 13:11

## 2021-01-01 RX ADMIN — CITALOPRAM 20 MG: 20 TABLET, FILM COATED ORAL at 09:27

## 2021-01-01 RX ADMIN — IPRATROPIUM BROMIDE AND ALBUTEROL SULFATE 3 ML: .5; 3 SOLUTION RESPIRATORY (INHALATION) at 19:54

## 2021-01-01 RX ADMIN — SODIUM CHLORIDE, PRESERVATIVE FREE 10 ML: 5 INJECTION INTRAVENOUS at 10:28

## 2021-01-01 RX ADMIN — DOXEPIN HYDROCHLORIDE 10 MG: 10 CAPSULE ORAL at 20:50

## 2021-01-01 RX ADMIN — ACETAMINOPHEN 650 MG: 325 TABLET ORAL at 04:07

## 2021-01-01 RX ADMIN — Medication 1 CAPSULE: at 09:25

## 2021-01-01 RX ADMIN — AZITHROMYCIN DIHYDRATE 250 MG: 500 INJECTION, POWDER, LYOPHILIZED, FOR SOLUTION INTRAVENOUS at 05:14

## 2021-01-01 RX ADMIN — MIDODRINE HYDROCHLORIDE 15 MG: 10 TABLET ORAL at 10:31

## 2021-01-01 RX ADMIN — BUDESONIDE AND FORMOTEROL FUMARATE DIHYDRATE 2 PUFF: 160; 4.5 AEROSOL RESPIRATORY (INHALATION) at 17:09

## 2021-01-01 RX ADMIN — SODIUM CHLORIDE, PRESERVATIVE FREE 10 ML: 5 INJECTION INTRAVENOUS at 09:09

## 2021-01-01 RX ADMIN — BUDESONIDE AND FORMOTEROL FUMARATE DIHYDRATE 2 PUFF: 160; 4.5 AEROSOL RESPIRATORY (INHALATION) at 09:15

## 2021-01-01 RX ADMIN — SODIUM CHLORIDE, PRESERVATIVE FREE 10 ML: 5 INJECTION INTRAVENOUS at 11:59

## 2021-01-01 RX ADMIN — DOCUSATE SODIUM 100 MG: 50 LIQUID ORAL at 13:03

## 2021-01-01 ASSESSMENT — PAIN DESCRIPTION - PAIN TYPE
TYPE: CHRONIC PAIN
TYPE: CHRONIC PAIN
TYPE: ACUTE PAIN
TYPE: CHRONIC PAIN
TYPE: SURGICAL PAIN
TYPE: ACUTE PAIN
TYPE: ACUTE PAIN
TYPE: CHRONIC PAIN
TYPE: CHRONIC PAIN
TYPE: ACUTE PAIN
TYPE: CHRONIC PAIN
TYPE: ACUTE PAIN
TYPE: SURGICAL PAIN
TYPE: SURGICAL PAIN
TYPE: CHRONIC PAIN
TYPE: SURGICAL PAIN
TYPE: SURGICAL PAIN
TYPE: CHRONIC PAIN
TYPE: CHRONIC PAIN
TYPE: SURGICAL PAIN
TYPE: CHRONIC PAIN
TYPE: CHRONIC PAIN
TYPE: SURGICAL PAIN
TYPE: CHRONIC PAIN
TYPE: CHRONIC PAIN
TYPE: ACUTE PAIN
TYPE: ACUTE PAIN
TYPE: SURGICAL PAIN
TYPE: CHRONIC PAIN
TYPE: SURGICAL PAIN
TYPE: CHRONIC PAIN

## 2021-01-01 ASSESSMENT — PAIN DESCRIPTION - ONSET
ONSET: ON-GOING
ONSET: GRADUAL
ONSET: ON-GOING

## 2021-01-01 ASSESSMENT — PULMONARY FUNCTION TESTS
PIF_VALUE: 0
PIF_VALUE: 22
PIF_VALUE: 18
PIF_VALUE: 19
PIF_VALUE: 22
PIF_VALUE: 0
PIF_VALUE: 23
PIF_VALUE: 1
PIF_VALUE: 2
PIF_VALUE: 2
PIF_VALUE: 22
PIF_VALUE: 25
PIF_VALUE: 18
PIF_VALUE: 20
PIF_VALUE: 1
PIF_VALUE: 3
PIF_VALUE: 22
PIF_VALUE: 22
PIF_VALUE: 19
PIF_VALUE: 1
PIF_VALUE: 0
PIF_VALUE: 25
PIF_VALUE: 1
PIF_VALUE: 3
PIF_VALUE: 20
PIF_VALUE: 14
PIF_VALUE: 0
PIF_VALUE: 0
PIF_VALUE: 14
PIF_VALUE: 23
PIF_VALUE: 1
PIF_VALUE: 22
PIF_VALUE: 18
PIF_VALUE: 0
PIF_VALUE: 0
PIF_VALUE: 23
PIF_VALUE: 1
PIF_VALUE: 21
PIF_VALUE: 22
PIF_VALUE: 19
PIF_VALUE: 18
PIF_VALUE: 0
PIF_VALUE: 2
PIF_VALUE: 25
PIF_VALUE: 12
PIF_VALUE: 16
PIF_VALUE: 21
PIF_VALUE: 18
PIF_VALUE: 19
PIF_VALUE: 0
PIF_VALUE: 1
PIF_VALUE: 18
PIF_VALUE: 21
PIF_VALUE: 20
PIF_VALUE: 1
PIF_VALUE: 16
PIF_VALUE: 15
PIF_VALUE: 1
PIF_VALUE: 6
PIF_VALUE: 15
PIF_VALUE: 20
PIF_VALUE: 21
PIF_VALUE: 17
PIF_VALUE: 18
PIF_VALUE: 0
PIF_VALUE: 20
PIF_VALUE: 18
PIF_VALUE: 21
PIF_VALUE: 16
PIF_VALUE: 21
PIF_VALUE: 15
PIF_VALUE: 21
PIF_VALUE: 2
PIF_VALUE: 21
PIF_VALUE: 10
PIF_VALUE: 11
PIF_VALUE: 0
PIF_VALUE: 23
PIF_VALUE: 16
PIF_VALUE: 21
PIF_VALUE: 4
PIF_VALUE: 20
PIF_VALUE: 18
PIF_VALUE: 0
PIF_VALUE: 0
PIF_VALUE: 1
PIF_VALUE: 21
PIF_VALUE: 1
PIF_VALUE: 0
PIF_VALUE: 19
PIF_VALUE: 16
PIF_VALUE: 18
PIF_VALUE: 22
PIF_VALUE: 19
PIF_VALUE: 1
PIF_VALUE: 22
PIF_VALUE: 1
PIF_VALUE: 22
PIF_VALUE: 18
PIF_VALUE: 18
PIF_VALUE: 22
PIF_VALUE: 20
PIF_VALUE: 18
PIF_VALUE: 19
PIF_VALUE: 0
PIF_VALUE: 22
PIF_VALUE: 14
PIF_VALUE: 0
PIF_VALUE: 20
PIF_VALUE: 0
PIF_VALUE: 11
PIF_VALUE: 0
PIF_VALUE: 19
PIF_VALUE: 0
PIF_VALUE: 16
PIF_VALUE: 21
PIF_VALUE: 21
PIF_VALUE: 16
PIF_VALUE: 16
PIF_VALUE: 0
PIF_VALUE: 22
PIF_VALUE: 21
PIF_VALUE: 9
PIF_VALUE: 25
PIF_VALUE: 16
PIF_VALUE: 20
PIF_VALUE: 21
PIF_VALUE: 16
PIF_VALUE: 21
PIF_VALUE: 16
PIF_VALUE: 1
PIF_VALUE: 19
PIF_VALUE: 19
PIF_VALUE: 22
PIF_VALUE: 22
PIF_VALUE: 15
PIF_VALUE: 18
PIF_VALUE: 17
PIF_VALUE: 1
PIF_VALUE: 19
PIF_VALUE: 3
PIF_VALUE: 20
PIF_VALUE: 4
PIF_VALUE: 22
PIF_VALUE: 17
PIF_VALUE: 22
PIF_VALUE: 18
PIF_VALUE: 24
PIF_VALUE: 21
PIF_VALUE: 19
PIF_VALUE: 21
PIF_VALUE: 20
PIF_VALUE: 19
PIF_VALUE: 15
PIF_VALUE: 20
PIF_VALUE: 24
PIF_VALUE: 17
PIF_VALUE: 21
PIF_VALUE: 19
PIF_VALUE: 1
PIF_VALUE: 0
PIF_VALUE: 12
PIF_VALUE: 20
PIF_VALUE: 2
PIF_VALUE: 0
PIF_VALUE: 19
PIF_VALUE: 19
PIF_VALUE: 21
PIF_VALUE: 16
PIF_VALUE: 19
PIF_VALUE: 18
PIF_VALUE: 19
PIF_VALUE: 16
PIF_VALUE: 20
PIF_VALUE: 15
PIF_VALUE: 22
PIF_VALUE: 21
PIF_VALUE: 17
PIF_VALUE: 1
PIF_VALUE: 22
PIF_VALUE: 16
PIF_VALUE: 1
PIF_VALUE: 0
PIF_VALUE: 2
PIF_VALUE: 22
PIF_VALUE: 18
PIF_VALUE: 19
PIF_VALUE: 22
PIF_VALUE: 19
PIF_VALUE: 19
PIF_VALUE: 21
PIF_VALUE: 8
PIF_VALUE: 16
PIF_VALUE: 18
PIF_VALUE: 15
PIF_VALUE: 16
PIF_VALUE: 21
PIF_VALUE: 22
PIF_VALUE: 18
PIF_VALUE: 0
PIF_VALUE: 22
PIF_VALUE: 18
PIF_VALUE: 22
PIF_VALUE: 19
PIF_VALUE: 19
PIF_VALUE: 22
PIF_VALUE: 24
PIF_VALUE: 0
PIF_VALUE: 1
PIF_VALUE: 15
PIF_VALUE: 24
PIF_VALUE: 19
PIF_VALUE: 13
PIF_VALUE: 20
PIF_VALUE: 18
PIF_VALUE: 3
PIF_VALUE: 1
PIF_VALUE: 15
PIF_VALUE: 16
PIF_VALUE: 1
PIF_VALUE: 19
PIF_VALUE: 1
PIF_VALUE: 19
PIF_VALUE: 0
PIF_VALUE: 1
PIF_VALUE: 1
PIF_VALUE: 18
PIF_VALUE: 18
PIF_VALUE: 0
PIF_VALUE: 1
PIF_VALUE: 21
PIF_VALUE: 18
PIF_VALUE: 2
PIF_VALUE: 18
PIF_VALUE: 22
PIF_VALUE: 18
PIF_VALUE: 22
PIF_VALUE: 1
PIF_VALUE: 0
PIF_VALUE: 16
PIF_VALUE: 22
PIF_VALUE: 19
PIF_VALUE: 16
PIF_VALUE: 16
PIF_VALUE: 22
PIF_VALUE: 1
PIF_VALUE: 18
PIF_VALUE: 18
PIF_VALUE: 16
PIF_VALUE: 22
PIF_VALUE: 0
PIF_VALUE: 18
PIF_VALUE: 23
PIF_VALUE: 1
PIF_VALUE: 21
PIF_VALUE: 22
PIF_VALUE: 18
PIF_VALUE: 3
PIF_VALUE: 24
PIF_VALUE: 13
PIF_VALUE: 23
PIF_VALUE: 0
PIF_VALUE: 18
PIF_VALUE: 20
PIF_VALUE: 24
PIF_VALUE: 20
PIF_VALUE: 1
PIF_VALUE: 17
PIF_VALUE: 17
PIF_VALUE: 1
PIF_VALUE: 19
PIF_VALUE: 3
PIF_VALUE: 21
PIF_VALUE: 1
PIF_VALUE: 18
PIF_VALUE: 15
PIF_VALUE: 14
PIF_VALUE: 17
PIF_VALUE: 16
PIF_VALUE: 22
PIF_VALUE: 18
PIF_VALUE: 17
PIF_VALUE: 22
PIF_VALUE: 22
PIF_VALUE: 2
PIF_VALUE: 16
PIF_VALUE: 21
PIF_VALUE: 22
PIF_VALUE: 19
PIF_VALUE: 20
PIF_VALUE: 0
PIF_VALUE: 22
PIF_VALUE: 0
PIF_VALUE: 24
PIF_VALUE: 21
PIF_VALUE: 1
PIF_VALUE: 22
PIF_VALUE: 21
PIF_VALUE: 23
PIF_VALUE: 24
PIF_VALUE: 18
PIF_VALUE: 22
PIF_VALUE: 16
PIF_VALUE: 21
PIF_VALUE: 1
PIF_VALUE: 18
PIF_VALUE: 22
PIF_VALUE: 17
PIF_VALUE: 19
PIF_VALUE: 22
PIF_VALUE: 19
PIF_VALUE: 17
PIF_VALUE: 3
PIF_VALUE: 3
PIF_VALUE: 0
PIF_VALUE: 22
PIF_VALUE: 16
PIF_VALUE: 17
PIF_VALUE: 4
PIF_VALUE: 16
PIF_VALUE: 24
PIF_VALUE: 19
PIF_VALUE: 22
PIF_VALUE: 1
PIF_VALUE: 18
PIF_VALUE: 21
PIF_VALUE: 22
PIF_VALUE: 1
PIF_VALUE: 21
PIF_VALUE: 1
PIF_VALUE: 22
PIF_VALUE: 19
PIF_VALUE: 0
PIF_VALUE: 19
PIF_VALUE: 23
PIF_VALUE: 3
PIF_VALUE: 16
PIF_VALUE: 0
PIF_VALUE: 18
PIF_VALUE: 19
PIF_VALUE: 21
PIF_VALUE: 1
PIF_VALUE: 0
PIF_VALUE: 18
PIF_VALUE: 0
PIF_VALUE: 10
PIF_VALUE: 16
PIF_VALUE: 23
PIF_VALUE: 1
PIF_VALUE: 5
PIF_VALUE: 24
PIF_VALUE: 0
PIF_VALUE: 11
PIF_VALUE: 2
PIF_VALUE: 16
PIF_VALUE: 16
PIF_VALUE: 21
PIF_VALUE: 18
PIF_VALUE: 4
PIF_VALUE: 13
PIF_VALUE: 3
PIF_VALUE: 20
PIF_VALUE: 1
PIF_VALUE: 18
PIF_VALUE: 21
PIF_VALUE: 18
PIF_VALUE: 22
PIF_VALUE: 22
PIF_VALUE: 2
PIF_VALUE: 16
PIF_VALUE: 20
PIF_VALUE: 22
PIF_VALUE: 19
PIF_VALUE: 18
PIF_VALUE: 21
PIF_VALUE: 21
PIF_VALUE: 1
PIF_VALUE: 21
PIF_VALUE: 0
PIF_VALUE: 23
PIF_VALUE: 0
PIF_VALUE: 19
PIF_VALUE: 21
PIF_VALUE: 0
PIF_VALUE: 16
PIF_VALUE: 21
PIF_VALUE: 21
PIF_VALUE: 24
PIF_VALUE: 1
PIF_VALUE: 21
PIF_VALUE: 0
PIF_VALUE: 16
PIF_VALUE: 21
PIF_VALUE: 17
PIF_VALUE: 16
PIF_VALUE: 2
PIF_VALUE: 21
PIF_VALUE: 16
PIF_VALUE: 12
PIF_VALUE: 21
PIF_VALUE: 0
PIF_VALUE: 1
PIF_VALUE: 10
PIF_VALUE: 19
PIF_VALUE: 15
PIF_VALUE: 1
PIF_VALUE: 0
PIF_VALUE: 16
PIF_VALUE: 11
PIF_VALUE: 0
PIF_VALUE: 15
PIF_VALUE: 19
PIF_VALUE: 1
PIF_VALUE: 21
PIF_VALUE: 17
PIF_VALUE: 15
PIF_VALUE: 22
PIF_VALUE: 0
PIF_VALUE: 18
PIF_VALUE: 22
PIF_VALUE: 0
PIF_VALUE: 15
PIF_VALUE: 12
PIF_VALUE: 18
PIF_VALUE: 0
PIF_VALUE: 1
PIF_VALUE: 19
PIF_VALUE: 20
PIF_VALUE: 19
PIF_VALUE: 1
PIF_VALUE: 14
PIF_VALUE: 0
PIF_VALUE: 18
PIF_VALUE: 21
PIF_VALUE: 3
PIF_VALUE: 20
PIF_VALUE: 16
PIF_VALUE: 0
PIF_VALUE: 23
PIF_VALUE: 18
PIF_VALUE: 1
PIF_VALUE: 15
PIF_VALUE: 22
PIF_VALUE: 21
PIF_VALUE: 19
PIF_VALUE: 22
PIF_VALUE: 22
PIF_VALUE: 15
PIF_VALUE: 21
PIF_VALUE: 18
PIF_VALUE: 0
PIF_VALUE: 21
PIF_VALUE: 21
PIF_VALUE: 19
PIF_VALUE: 19
PIF_VALUE: 1
PIF_VALUE: 19
PIF_VALUE: 21
PIF_VALUE: 15
PIF_VALUE: 22
PIF_VALUE: 0
PIF_VALUE: 1
PIF_VALUE: 20
PIF_VALUE: 19
PIF_VALUE: 21
PIF_VALUE: 0
PIF_VALUE: 19
PIF_VALUE: 21
PIF_VALUE: 16
PIF_VALUE: 19
PIF_VALUE: 4
PIF_VALUE: 1
PIF_VALUE: 20

## 2021-01-01 ASSESSMENT — PAIN DESCRIPTION - LOCATION
LOCATION: BACK
LOCATION: LEG
LOCATION: FACE;NOSE
LOCATION: FACE
LOCATION: LEG
LOCATION: LEG
LOCATION: BACK;FACE;LEG
LOCATION: BACK
LOCATION: BACK
LOCATION: LEG
LOCATION: CHEST;LEG
LOCATION: BACK
LOCATION: FACE
LOCATION: NECK
LOCATION: FACE;NOSE
LOCATION: BACK
LOCATION: ARM
LOCATION: BACK
LOCATION: FACE;NOSE
LOCATION: NOSE
LOCATION: NOSE;FACE
LOCATION: BACK
LOCATION: NOSE
LOCATION: NOSE
LOCATION: LEG
LOCATION: LEG
LOCATION: BACK
LOCATION: LEG
LOCATION: BACK
LOCATION: LEG
LOCATION: NOSE
LOCATION: LEG
LOCATION: LEG
LOCATION: FACE
LOCATION: BACK;LEG
LOCATION: BACK
LOCATION: BACK

## 2021-01-01 ASSESSMENT — PAIN DESCRIPTION - FREQUENCY
FREQUENCY: CONTINUOUS
FREQUENCY: INTERMITTENT
FREQUENCY: CONTINUOUS
FREQUENCY: INTERMITTENT
FREQUENCY: CONTINUOUS
FREQUENCY: INTERMITTENT
FREQUENCY: CONTINUOUS
FREQUENCY: CONTINUOUS
FREQUENCY: INTERMITTENT
FREQUENCY: CONTINUOUS
FREQUENCY: INTERMITTENT
FREQUENCY: CONTINUOUS
FREQUENCY: INTERMITTENT
FREQUENCY: INTERMITTENT
FREQUENCY: CONTINUOUS
FREQUENCY: CONTINUOUS
FREQUENCY: INTERMITTENT
FREQUENCY: INTERMITTENT
FREQUENCY: CONTINUOUS
FREQUENCY: INTERMITTENT
FREQUENCY: CONTINUOUS
FREQUENCY: CONTINUOUS
FREQUENCY: INTERMITTENT
FREQUENCY: INTERMITTENT
FREQUENCY: CONTINUOUS

## 2021-01-01 ASSESSMENT — PAIN DESCRIPTION - ORIENTATION
ORIENTATION: MID
ORIENTATION: MID
ORIENTATION: OUTER
ORIENTATION: MID
ORIENTATION: MID;LOWER
ORIENTATION: MID;LOWER
ORIENTATION: RIGHT
ORIENTATION: RIGHT
ORIENTATION: MID;LOWER
ORIENTATION: RIGHT;LEFT
ORIENTATION: MID
ORIENTATION: RIGHT
ORIENTATION: RIGHT
ORIENTATION: MID
ORIENTATION: LEFT
ORIENTATION: MID
ORIENTATION: LOWER
ORIENTATION: LOWER;MID;RIGHT;LEFT
ORIENTATION: RIGHT
ORIENTATION: RIGHT
ORIENTATION: LEFT
ORIENTATION: MID
ORIENTATION: LEFT;RIGHT
ORIENTATION: MID;LOWER
ORIENTATION: MID;LOWER
ORIENTATION: LOWER
ORIENTATION: RIGHT
ORIENTATION: OUTER
ORIENTATION: MID
ORIENTATION: LOWER
ORIENTATION: LOWER;MID
ORIENTATION: MID
ORIENTATION: MID;LOWER

## 2021-01-01 ASSESSMENT — PAIN SCALES - GENERAL
PAINLEVEL_OUTOF10: 8
PAINLEVEL_OUTOF10: 9
PAINLEVEL_OUTOF10: 6
PAINLEVEL_OUTOF10: 0
PAINLEVEL_OUTOF10: 9
PAINLEVEL_OUTOF10: 8
PAINLEVEL_OUTOF10: 9
PAINLEVEL_OUTOF10: 5
PAINLEVEL_OUTOF10: 9
PAINLEVEL_OUTOF10: 0
PAINLEVEL_OUTOF10: 10
PAINLEVEL_OUTOF10: 8
PAINLEVEL_OUTOF10: 0
PAINLEVEL_OUTOF10: 0
PAINLEVEL_OUTOF10: 8
PAINLEVEL_OUTOF10: 8
PAINLEVEL_OUTOF10: 7
PAINLEVEL_OUTOF10: 5
PAINLEVEL_OUTOF10: 10
PAINLEVEL_OUTOF10: 8
PAINLEVEL_OUTOF10: 8
PAINLEVEL_OUTOF10: 6
PAINLEVEL_OUTOF10: 3
PAINLEVEL_OUTOF10: 8
PAINLEVEL_OUTOF10: 9
PAINLEVEL_OUTOF10: 0
PAINLEVEL_OUTOF10: 7
PAINLEVEL_OUTOF10: 6
PAINLEVEL_OUTOF10: 0
PAINLEVEL_OUTOF10: 3
PAINLEVEL_OUTOF10: 7
PAINLEVEL_OUTOF10: 7
PAINLEVEL_OUTOF10: 4
PAINLEVEL_OUTOF10: 7
PAINLEVEL_OUTOF10: 9
PAINLEVEL_OUTOF10: 8
PAINLEVEL_OUTOF10: 9
PAINLEVEL_OUTOF10: 10
PAINLEVEL_OUTOF10: 3
PAINLEVEL_OUTOF10: 7
PAINLEVEL_OUTOF10: 5
PAINLEVEL_OUTOF10: 9
PAINLEVEL_OUTOF10: 9
PAINLEVEL_OUTOF10: 8
PAINLEVEL_OUTOF10: 6
PAINLEVEL_OUTOF10: 8
PAINLEVEL_OUTOF10: 9
PAINLEVEL_OUTOF10: 10
PAINLEVEL_OUTOF10: 10
PAINLEVEL_OUTOF10: 8
PAINLEVEL_OUTOF10: 9
PAINLEVEL_OUTOF10: 6
PAINLEVEL_OUTOF10: 9
PAINLEVEL_OUTOF10: 10
PAINLEVEL_OUTOF10: 0
PAINLEVEL_OUTOF10: 10
PAINLEVEL_OUTOF10: 9
PAINLEVEL_OUTOF10: 0
PAINLEVEL_OUTOF10: 5
PAINLEVEL_OUTOF10: 0
PAINLEVEL_OUTOF10: 10
PAINLEVEL_OUTOF10: 9
PAINLEVEL_OUTOF10: 0
PAINLEVEL_OUTOF10: 6
PAINLEVEL_OUTOF10: 0
PAINLEVEL_OUTOF10: 7
PAINLEVEL_OUTOF10: 9
PAINLEVEL_OUTOF10: 0
PAINLEVEL_OUTOF10: 8
PAINLEVEL_OUTOF10: 0
PAINLEVEL_OUTOF10: 7
PAINLEVEL_OUTOF10: 8
PAINLEVEL_OUTOF10: 4
PAINLEVEL_OUTOF10: 0
PAINLEVEL_OUTOF10: 0
PAINLEVEL_OUTOF10: 9
PAINLEVEL_OUTOF10: 7
PAINLEVEL_OUTOF10: 8
PAINLEVEL_OUTOF10: 0
PAINLEVEL_OUTOF10: 10
PAINLEVEL_OUTOF10: 6
PAINLEVEL_OUTOF10: 0
PAINLEVEL_OUTOF10: 10
PAINLEVEL_OUTOF10: 8
PAINLEVEL_OUTOF10: 8
PAINLEVEL_OUTOF10: 10
PAINLEVEL_OUTOF10: 6
PAINLEVEL_OUTOF10: 0
PAINLEVEL_OUTOF10: 6
PAINLEVEL_OUTOF10: 8
PAINLEVEL_OUTOF10: 10
PAINLEVEL_OUTOF10: 9
PAINLEVEL_OUTOF10: 7
PAINLEVEL_OUTOF10: 10
PAINLEVEL_OUTOF10: 0
PAINLEVEL_OUTOF10: 9
PAINLEVEL_OUTOF10: 7
PAINLEVEL_OUTOF10: 8
PAINLEVEL_OUTOF10: 9
PAINLEVEL_OUTOF10: 4
PAINLEVEL_OUTOF10: 7
PAINLEVEL_OUTOF10: 0
PAINLEVEL_OUTOF10: 7
PAINLEVEL_OUTOF10: 7
PAINLEVEL_OUTOF10: 10
PAINLEVEL_OUTOF10: 10
PAINLEVEL_OUTOF10: 0
PAINLEVEL_OUTOF10: 8
PAINLEVEL_OUTOF10: 9
PAINLEVEL_OUTOF10: 8
PAINLEVEL_OUTOF10: 9
PAINLEVEL_OUTOF10: 6
PAINLEVEL_OUTOF10: 10
PAINLEVEL_OUTOF10: 0
PAINLEVEL_OUTOF10: 8
PAINLEVEL_OUTOF10: 9
PAINLEVEL_OUTOF10: 8
PAINLEVEL_OUTOF10: 8
PAINLEVEL_OUTOF10: 7
PAINLEVEL_OUTOF10: 8
PAINLEVEL_OUTOF10: 8
PAINLEVEL_OUTOF10: 6
PAINLEVEL_OUTOF10: 8
PAINLEVEL_OUTOF10: 7
PAINLEVEL_OUTOF10: 7
PAINLEVEL_OUTOF10: 6
PAINLEVEL_OUTOF10: 4
PAINLEVEL_OUTOF10: 5
PAINLEVEL_OUTOF10: 8
PAINLEVEL_OUTOF10: 0
PAINLEVEL_OUTOF10: 7
PAINLEVEL_OUTOF10: 10
PAINLEVEL_OUTOF10: 0
PAINLEVEL_OUTOF10: 6
PAINLEVEL_OUTOF10: 5
PAINLEVEL_OUTOF10: 10
PAINLEVEL_OUTOF10: 8
PAINLEVEL_OUTOF10: 0
PAINLEVEL_OUTOF10: 7
PAINLEVEL_OUTOF10: 9
PAINLEVEL_OUTOF10: 0
PAINLEVEL_OUTOF10: 6

## 2021-01-01 ASSESSMENT — PAIN - FUNCTIONAL ASSESSMENT
PAIN_FUNCTIONAL_ASSESSMENT: ACTIVITIES ARE NOT PREVENTED
PAIN_FUNCTIONAL_ASSESSMENT: PREVENTS OR INTERFERES SOME ACTIVE ACTIVITIES AND ADLS
PAIN_FUNCTIONAL_ASSESSMENT: ACTIVITIES ARE NOT PREVENTED
PAIN_FUNCTIONAL_ASSESSMENT: PREVENTS OR INTERFERES SOME ACTIVE ACTIVITIES AND ADLS
PAIN_FUNCTIONAL_ASSESSMENT: ACTIVITIES ARE NOT PREVENTED
PAIN_FUNCTIONAL_ASSESSMENT: ACTIVITIES ARE NOT PREVENTED
PAIN_FUNCTIONAL_ASSESSMENT: PREVENTS OR INTERFERES SOME ACTIVE ACTIVITIES AND ADLS
PAIN_FUNCTIONAL_ASSESSMENT: PREVENTS OR INTERFERES SOME ACTIVE ACTIVITIES AND ADLS
PAIN_FUNCTIONAL_ASSESSMENT: PREVENTS OR INTERFERES WITH MANY ACTIVE NOT PASSIVE ACTIVITIES
PAIN_FUNCTIONAL_ASSESSMENT: 0-10
PAIN_FUNCTIONAL_ASSESSMENT: PREVENTS OR INTERFERES SOME ACTIVE ACTIVITIES AND ADLS
PAIN_FUNCTIONAL_ASSESSMENT: ACTIVITIES ARE NOT PREVENTED
PAIN_FUNCTIONAL_ASSESSMENT: ACTIVITIES ARE NOT PREVENTED
PAIN_FUNCTIONAL_ASSESSMENT: 0-10
PAIN_FUNCTIONAL_ASSESSMENT: PREVENTS OR INTERFERES SOME ACTIVE ACTIVITIES AND ADLS
PAIN_FUNCTIONAL_ASSESSMENT: ACTIVITIES ARE NOT PREVENTED
PAIN_FUNCTIONAL_ASSESSMENT: ACTIVITIES ARE NOT PREVENTED
PAIN_FUNCTIONAL_ASSESSMENT: PREVENTS OR INTERFERES SOME ACTIVE ACTIVITIES AND ADLS
PAIN_FUNCTIONAL_ASSESSMENT: ACTIVITIES ARE NOT PREVENTED

## 2021-01-01 ASSESSMENT — ENCOUNTER SYMPTOMS
EYE REDNESS: 0
ABDOMINAL DISTENTION: 0
BACK PAIN: 0
VOICE CHANGE: 0
PHOTOPHOBIA: 0
RHINORRHEA: 0
RHINORRHEA: 0
NAUSEA: 0
COUGH: 1
EYE ITCHING: 0
SINUS PRESSURE: 0
SHORTNESS OF BREATH: 1
CHEST TIGHTNESS: 0
DIARRHEA: 0
CONSTIPATION: 0
RHINORRHEA: 0
COUGH: 0
CHOKING: 0
SINUS PRESSURE: 0
BLOOD IN STOOL: 0
ABDOMINAL PAIN: 0
VOMITING: 0
EYE DISCHARGE: 0
EYE ITCHING: 0
CONSTIPATION: 0
DIARRHEA: 0
SHORTNESS OF BREATH: 0
SINUS PAIN: 0
EYE PAIN: 0
ABDOMINAL PAIN: 0
ABDOMINAL DISTENTION: 0
BACK PAIN: 0
EYE REDNESS: 0
GASTROINTESTINAL NEGATIVE: 1
EYE PAIN: 0
ALLERGIC/IMMUNOLOGIC NEGATIVE: 1
NAUSEA: 0
DIARRHEA: 0
CHEST TIGHTNESS: 0
EYES NEGATIVE: 1
EYE REDNESS: 0
EYE DISCHARGE: 0
NAUSEA: 0
TROUBLE SWALLOWING: 0
SORE THROAT: 0
SHORTNESS OF BREATH: 1
VOMITING: 0
WHEEZING: 0
VOMITING: 0
CHEST TIGHTNESS: 1
SORE THROAT: 0
ABDOMINAL PAIN: 0
COLOR CHANGE: 0

## 2021-01-01 ASSESSMENT — PAIN DESCRIPTION - DIRECTION: RADIATING_TOWARDS: NOSE

## 2021-01-01 ASSESSMENT — PAIN DESCRIPTION - PROGRESSION
CLINICAL_PROGRESSION: GRADUALLY IMPROVING
CLINICAL_PROGRESSION: NOT CHANGED
CLINICAL_PROGRESSION: NOT CHANGED
CLINICAL_PROGRESSION: GRADUALLY WORSENING
CLINICAL_PROGRESSION: GRADUALLY IMPROVING
CLINICAL_PROGRESSION: GRADUALLY IMPROVING
CLINICAL_PROGRESSION: GRADUALLY WORSENING
CLINICAL_PROGRESSION: NOT CHANGED
CLINICAL_PROGRESSION: GRADUALLY IMPROVING
CLINICAL_PROGRESSION: GRADUALLY WORSENING
CLINICAL_PROGRESSION: GRADUALLY IMPROVING
CLINICAL_PROGRESSION: NOT CHANGED
CLINICAL_PROGRESSION: GRADUALLY WORSENING
CLINICAL_PROGRESSION: NOT CHANGED
CLINICAL_PROGRESSION: RESOLVED
CLINICAL_PROGRESSION: GRADUALLY IMPROVING
CLINICAL_PROGRESSION: GRADUALLY WORSENING
CLINICAL_PROGRESSION: GRADUALLY WORSENING
CLINICAL_PROGRESSION: GRADUALLY IMPROVING
CLINICAL_PROGRESSION: GRADUALLY WORSENING
CLINICAL_PROGRESSION: NOT CHANGED
CLINICAL_PROGRESSION: GRADUALLY IMPROVING

## 2021-01-01 ASSESSMENT — PAIN DESCRIPTION - DESCRIPTORS
DESCRIPTORS: BURNING;THROBBING
DESCRIPTORS: THROBBING
DESCRIPTORS: THROBBING
DESCRIPTORS: SORE
DESCRIPTORS: THROBBING
DESCRIPTORS: ACHING
DESCRIPTORS: SORE;ACHING
DESCRIPTORS: THROBBING
DESCRIPTORS: ACHING
DESCRIPTORS: THROBBING
DESCRIPTORS: ACHING
DESCRIPTORS: THROBBING
DESCRIPTORS: BURNING
DESCRIPTORS: ACHING
DESCRIPTORS: SORE
DESCRIPTORS: ACHING
DESCRIPTORS: ACHING;DISCOMFORT;TENDER
DESCRIPTORS: ACHING
DESCRIPTORS: SHARP;SHOOTING
DESCRIPTORS: ACHING;DISCOMFORT
DESCRIPTORS: SORE
DESCRIPTORS: ACHING
DESCRIPTORS: THROBBING
DESCRIPTORS: ACHING
DESCRIPTORS: THROBBING
DESCRIPTORS: ACHING
DESCRIPTORS: THROBBING
DESCRIPTORS: SORE
DESCRIPTORS: ACHING;DISCOMFORT

## 2021-01-01 ASSESSMENT — COPD QUESTIONNAIRES
CAT_SEVERITY: SEVERE
CAT_SEVERITY: SEVERE

## 2021-01-01 ASSESSMENT — PAIN SCALES - WONG BAKER: WONGBAKER_NUMERICALRESPONSE: 0
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
## 2021-01-02 ENCOUNTER — APPOINTMENT (OUTPATIENT)
Dept: GENERAL RADIOLOGY | Age: 56
DRG: 004 | End: 2021-01-02
Payer: MEDICARE

## 2021-01-02 LAB
ABSOLUTE EOS #: 0.43 K/UL (ref 0–0.44)
ABSOLUTE IMMATURE GRANULOCYTE: 0.4 K/UL (ref 0–0.3)
ABSOLUTE LYMPH #: 0.99 K/UL (ref 1.1–3.7)
ABSOLUTE MONO #: 0.99 K/UL (ref 0.1–1.2)
ANION GAP SERPL CALCULATED.3IONS-SCNC: 10 MMOL/L (ref 9–17)
BASOPHILS # BLD: 1 % (ref 0–2)
BASOPHILS ABSOLUTE: 0.09 K/UL (ref 0–0.2)
BUN BLDV-MCNC: 64 MG/DL (ref 6–20)
BUN/CREAT BLD: ABNORMAL (ref 9–20)
CALCIUM SERPL-MCNC: 9.3 MG/DL (ref 8.6–10.4)
CHLORIDE BLD-SCNC: 96 MMOL/L (ref 98–107)
CO2: 28 MMOL/L (ref 20–31)
CREAT SERPL-MCNC: 5.43 MG/DL (ref 0.5–0.9)
DIFFERENTIAL TYPE: ABNORMAL
EOSINOPHILS RELATIVE PERCENT: 3 % (ref 1–4)
GFR AFRICAN AMERICAN: 10 ML/MIN
GFR NON-AFRICAN AMERICAN: 8 ML/MIN
GFR SERPL CREATININE-BSD FRML MDRD: ABNORMAL ML/MIN/{1.73_M2}
GFR SERPL CREATININE-BSD FRML MDRD: ABNORMAL ML/MIN/{1.73_M2}
GLUCOSE BLD-MCNC: 151 MG/DL (ref 70–99)
HCT VFR BLD CALC: 22 % (ref 36.3–47.1)
HCT VFR BLD CALC: 26.8 % (ref 36.3–47.1)
HEMOGLOBIN: 6.3 G/DL (ref 11.9–15.1)
HEMOGLOBIN: 7.8 G/DL (ref 11.9–15.1)
IMMATURE GRANULOCYTES: 2 %
LYMPHOCYTES # BLD: 6 % (ref 24–43)
MCH RBC QN AUTO: 30.7 PG (ref 25.2–33.5)
MCHC RBC AUTO-ENTMCNC: 28.6 G/DL (ref 28.4–34.8)
MCV RBC AUTO: 107.3 FL (ref 82.6–102.9)
MONOCYTES # BLD: 6 % (ref 3–12)
NRBC AUTOMATED: 0 PER 100 WBC
PDW BLD-RTO: 15.7 % (ref 11.8–14.4)
PHOSPHORUS: 2.9 MG/DL (ref 2.6–4.5)
PLATELET # BLD: 259 K/UL (ref 138–453)
PLATELET ESTIMATE: ABNORMAL
PMV BLD AUTO: 9.2 FL (ref 8.1–13.5)
POTASSIUM SERPL-SCNC: 4.7 MMOL/L (ref 3.7–5.3)
RBC # BLD: 2.05 M/UL (ref 3.95–5.11)
RBC # BLD: ABNORMAL 10*6/UL
SEG NEUTROPHILS: 83 % (ref 36–65)
SEGMENTED NEUTROPHILS ABSOLUTE COUNT: 14.27 K/UL (ref 1.5–8.1)
SODIUM BLD-SCNC: 134 MMOL/L (ref 135–144)
WBC # BLD: 17.2 K/UL (ref 3.5–11.3)
WBC # BLD: ABNORMAL 10*3/UL

## 2021-01-02 PROCEDURE — 94640 AIRWAY INHALATION TREATMENT: CPT

## 2021-01-02 PROCEDURE — P9016 RBC LEUKOCYTES REDUCED: HCPCS

## 2021-01-02 PROCEDURE — 6370000000 HC RX 637 (ALT 250 FOR IP): Performed by: STUDENT IN AN ORGANIZED HEALTH CARE EDUCATION/TRAINING PROGRAM

## 2021-01-02 PROCEDURE — 90935 HEMODIALYSIS ONE EVALUATION: CPT

## 2021-01-02 PROCEDURE — 2500000003 HC RX 250 WO HCPCS: Performed by: STUDENT IN AN ORGANIZED HEALTH CARE EDUCATION/TRAINING PROGRAM

## 2021-01-02 PROCEDURE — 6370000000 HC RX 637 (ALT 250 FOR IP): Performed by: NURSE PRACTITIONER

## 2021-01-02 PROCEDURE — 6360000002 HC RX W HCPCS: Performed by: STUDENT IN AN ORGANIZED HEALTH CARE EDUCATION/TRAINING PROGRAM

## 2021-01-02 PROCEDURE — 86901 BLOOD TYPING SEROLOGIC RH(D): CPT

## 2021-01-02 PROCEDURE — 2000000000 HC ICU R&B

## 2021-01-02 PROCEDURE — 90935 HEMODIALYSIS ONE EVALUATION: CPT | Performed by: INTERNAL MEDICINE

## 2021-01-02 PROCEDURE — 94761 N-INVAS EAR/PLS OXIMETRY MLT: CPT

## 2021-01-02 PROCEDURE — 6370000000 HC RX 637 (ALT 250 FOR IP): Performed by: GENERAL PRACTICE

## 2021-01-02 PROCEDURE — 2700000000 HC OXYGEN THERAPY PER DAY

## 2021-01-02 PROCEDURE — 86900 BLOOD TYPING SEROLOGIC ABO: CPT

## 2021-01-02 PROCEDURE — 85014 HEMATOCRIT: CPT

## 2021-01-02 PROCEDURE — 2580000003 HC RX 258: Performed by: STUDENT IN AN ORGANIZED HEALTH CARE EDUCATION/TRAINING PROGRAM

## 2021-01-02 PROCEDURE — 85025 COMPLETE CBC W/AUTO DIFF WBC: CPT

## 2021-01-02 PROCEDURE — 36415 COLL VENOUS BLD VENIPUNCTURE: CPT

## 2021-01-02 PROCEDURE — 71045 X-RAY EXAM CHEST 1 VIEW: CPT

## 2021-01-02 PROCEDURE — 94003 VENT MGMT INPAT SUBQ DAY: CPT

## 2021-01-02 PROCEDURE — 85018 HEMOGLOBIN: CPT

## 2021-01-02 PROCEDURE — 86920 COMPATIBILITY TEST SPIN: CPT

## 2021-01-02 PROCEDURE — 84100 ASSAY OF PHOSPHORUS: CPT

## 2021-01-02 PROCEDURE — 80048 BASIC METABOLIC PNL TOTAL CA: CPT

## 2021-01-02 PROCEDURE — 36430 TRANSFUSION BLD/BLD COMPNT: CPT

## 2021-01-02 PROCEDURE — 86850 RBC ANTIBODY SCREEN: CPT

## 2021-01-02 RX ORDER — ACETYLCYSTEINE 200 MG/ML
600 SOLUTION ORAL; RESPIRATORY (INHALATION) 2 TIMES DAILY
Status: DISCONTINUED | OUTPATIENT
Start: 2021-01-02 | End: 2021-01-04

## 2021-01-02 RX ORDER — MIDODRINE HYDROCHLORIDE 5 MG/1
10 TABLET ORAL EVERY 6 HOURS
Status: DISCONTINUED | OUTPATIENT
Start: 2021-01-02 | End: 2021-01-05

## 2021-01-02 RX ORDER — ACETYLCYSTEINE 200 MG/ML
600 SOLUTION ORAL; RESPIRATORY (INHALATION) ONCE
Status: DISCONTINUED | OUTPATIENT
Start: 2021-01-02 | End: 2021-01-02

## 2021-01-02 RX ORDER — 0.9 % SODIUM CHLORIDE 0.9 %
250 INTRAVENOUS SOLUTION INTRAVENOUS ONCE
Status: DISCONTINUED | OUTPATIENT
Start: 2021-01-02 | End: 2021-01-06 | Stop reason: HOSPADM

## 2021-01-02 RX ADMIN — MIDODRINE HYDROCHLORIDE 10 MG: 5 TABLET ORAL at 20:37

## 2021-01-02 RX ADMIN — APIXABAN 5 MG: 5 TABLET, FILM COATED ORAL at 08:39

## 2021-01-02 RX ADMIN — APIXABAN 5 MG: 5 TABLET, FILM COATED ORAL at 20:36

## 2021-01-02 RX ADMIN — Medication 15 MG: at 08:39

## 2021-01-02 RX ADMIN — IPRATROPIUM BROMIDE AND ALBUTEROL SULFATE 1 AMPULE: .5; 3 SOLUTION RESPIRATORY (INHALATION) at 08:07

## 2021-01-02 RX ADMIN — VASOPRESSIN 0.04 UNITS/MIN: 20 INJECTION INTRAVENOUS at 08:54

## 2021-01-02 RX ADMIN — ALBUTEROL SULFATE 2.5 MG: 2.5 SOLUTION RESPIRATORY (INHALATION) at 03:50

## 2021-01-02 RX ADMIN — BACITRACIN: 500 OINTMENT TOPICAL at 20:36

## 2021-01-02 RX ADMIN — CALCIUM ACETATE 2001 MG: 667 CAPSULE ORAL at 16:00

## 2021-01-02 RX ADMIN — MIDODRINE HYDROCHLORIDE 10 MG: 5 TABLET ORAL at 13:40

## 2021-01-02 RX ADMIN — GABAPENTIN 100 MG: 100 CAPSULE ORAL at 16:00

## 2021-01-02 RX ADMIN — OXYCODONE HYDROCHLORIDE 5 MG: 5 TABLET ORAL at 16:00

## 2021-01-02 RX ADMIN — IPRATROPIUM BROMIDE AND ALBUTEROL SULFATE 1 AMPULE: .5; 3 SOLUTION RESPIRATORY (INHALATION) at 16:23

## 2021-01-02 RX ADMIN — GABAPENTIN 100 MG: 100 CAPSULE ORAL at 08:39

## 2021-01-02 RX ADMIN — SENNOSIDES 8.6 MG: 8.6 TABLET, FILM COATED ORAL at 20:37

## 2021-01-02 RX ADMIN — ACETAMINOPHEN 1000 MG: 500 TABLET ORAL at 05:05

## 2021-01-02 RX ADMIN — ACETAMINOPHEN 1000 MG: 500 TABLET ORAL at 13:40

## 2021-01-02 RX ADMIN — IPRATROPIUM BROMIDE AND ALBUTEROL SULFATE 1 AMPULE: .5; 3 SOLUTION RESPIRATORY (INHALATION) at 11:58

## 2021-01-02 RX ADMIN — DOXEPIN HYDROCHLORIDE 10 MG: 10 CAPSULE ORAL at 20:37

## 2021-01-02 RX ADMIN — ACETYLCYSTEINE 600 MG: 200 INHALANT RESPIRATORY (INHALATION) at 23:23

## 2021-01-02 RX ADMIN — MIDODRINE HYDROCHLORIDE 10 MG: 5 TABLET ORAL at 06:05

## 2021-01-02 RX ADMIN — AMIODARONE HYDROCHLORIDE 200 MG: 200 TABLET ORAL at 08:39

## 2021-01-02 RX ADMIN — IPRATROPIUM BROMIDE AND ALBUTEROL SULFATE 1 AMPULE: .5; 3 SOLUTION RESPIRATORY (INHALATION) at 20:02

## 2021-01-02 RX ADMIN — OXYCODONE HYDROCHLORIDE 5 MG: 5 TABLET ORAL at 11:42

## 2021-01-02 RX ADMIN — CALCIUM ACETATE 2001 MG: 667 CAPSULE ORAL at 11:42

## 2021-01-02 RX ADMIN — Medication 5 MG: at 20:36

## 2021-01-02 RX ADMIN — BACITRACIN: 500 OINTMENT TOPICAL at 08:55

## 2021-01-02 RX ADMIN — SILVER SULFADIAZINE: 10 CREAM TOPICAL at 08:55

## 2021-01-02 RX ADMIN — ESCITALOPRAM OXALATE 20 MG: 10 TABLET ORAL at 08:39

## 2021-01-02 RX ADMIN — QUETIAPINE FUMARATE 50 MG: 100 TABLET ORAL at 20:36

## 2021-01-02 RX ADMIN — OXYCODONE HYDROCHLORIDE 5 MG: 5 TABLET ORAL at 20:37

## 2021-01-02 RX ADMIN — DARBEPOETIN ALFA 40 MCG: 40 INJECTION, SOLUTION INTRAVENOUS; SUBCUTANEOUS at 11:58

## 2021-01-02 RX ADMIN — QUETIAPINE FUMARATE 50 MG: 100 TABLET ORAL at 08:39

## 2021-01-02 RX ADMIN — SILVER SULFADIAZINE: 10 CREAM TOPICAL at 20:36

## 2021-01-02 RX ADMIN — SODIUM CHLORIDE, PRESERVATIVE FREE 10 ML: 5 INJECTION INTRAVENOUS at 08:44

## 2021-01-02 RX ADMIN — OXYCODONE HYDROCHLORIDE 5 MG: 5 TABLET ORAL at 08:39

## 2021-01-02 RX ADMIN — FENTANYL CITRATE 100 MCG: 50 INJECTION, SOLUTION INTRAMUSCULAR; INTRAVENOUS at 15:40

## 2021-01-02 RX ADMIN — ALBUTEROL SULFATE 2.5 MG: 2.5 SOLUTION RESPIRATORY (INHALATION) at 23:24

## 2021-01-02 RX ADMIN — CALCIUM ACETATE 2001 MG: 667 CAPSULE ORAL at 08:39

## 2021-01-02 RX ADMIN — OXYCODONE HYDROCHLORIDE 5 MG: 5 TABLET ORAL at 05:05

## 2021-01-02 RX ADMIN — ACETAMINOPHEN 1000 MG: 500 TABLET ORAL at 21:01

## 2021-01-02 RX ADMIN — SODIUM CHLORIDE, PRESERVATIVE FREE 10 ML: 5 INJECTION INTRAVENOUS at 20:44

## 2021-01-02 ASSESSMENT — PULMONARY FUNCTION TESTS
PIF_VALUE: 11
PIF_VALUE: 14
PIF_VALUE: 14

## 2021-01-02 ASSESSMENT — PAIN DESCRIPTION - PAIN TYPE: TYPE: ACUTE PAIN

## 2021-01-02 ASSESSMENT — PAIN DESCRIPTION - ONSET: ONSET: ON-GOING

## 2021-01-02 ASSESSMENT — PAIN SCALES - GENERAL
PAINLEVEL_OUTOF10: 9
PAINLEVEL_OUTOF10: 10
PAINLEVEL_OUTOF10: 8
PAINLEVEL_OUTOF10: 6
PAINLEVEL_OUTOF10: 8
PAINLEVEL_OUTOF10: 8
PAINLEVEL_OUTOF10: 10

## 2021-01-02 ASSESSMENT — PAIN DESCRIPTION - PROGRESSION
CLINICAL_PROGRESSION: NOT CHANGED
CLINICAL_PROGRESSION: GRADUALLY IMPROVING
CLINICAL_PROGRESSION: GRADUALLY IMPROVING

## 2021-01-02 ASSESSMENT — PAIN DESCRIPTION - DESCRIPTORS: DESCRIPTORS: ACHING;CONSTANT;DISCOMFORT

## 2021-01-02 ASSESSMENT — PAIN DESCRIPTION - FREQUENCY: FREQUENCY: CONTINUOUS

## 2021-01-02 ASSESSMENT — PAIN DESCRIPTION - ORIENTATION: ORIENTATION: RIGHT;LEFT

## 2021-01-02 NOTE — PROGRESS NOTES
Dialysis Post Treatment Note  Vitals:    01/02/21 1249   BP: 127/65   Pulse: 65   Resp: 18   Temp: 98.6 °F (37 °C)   SpO2: 96%     Pre-Weight = 88.9kg  Post-weight = Weight: 193 lb 5.5 oz (87.7 kg)  Total Liters Processed = Total Liters Processed (l/min): 80 l/min  Rinseback Volume (mL) = Rinseback Volume (ml): 250 ml  Net Removal (mL) = NET Removed (ml): 1760 ml  Patient's dry weight= 84.6kg  Type of access used= Left forearm AVF  Length of treatment=210 mins    Pt tolerated tx well; 1 unit packed RBCs admin w/o any s/s of a reaction; no acute distress noted pre, during or post HD tx; floor nurse Rutland Breeding aware of pt's status.

## 2021-01-02 NOTE — PLAN OF CARE
Problem: OXYGENATION/RESPIRATORY FUNCTION  Goal: Patient will maintain patent airway  Outcome: Ongoing  Goal: Patient will achieve/maintain normal respiratory rate/effort  Description: Respiratory rate and effort will be within normal limits for the patient  Outcome: Ongoing     Problem: MECHANICAL VENTILATION  Goal: Patient will maintain patent airway  Outcome: Ongoing  Goal: Oral health is maintained or improved  Outcome: Ongoing  Goal: Ability to express needs and understand communication  Outcome: Ongoing  Goal: Mobility/activity is maintained at optimum level for patient  Outcome: Ongoing  Goal: Tracheostomy will be managed safely  Outcome: Ongoing     Problem: SKIN INTEGRITY  Goal: Skin integrity is maintained or improved  Outcome: Ongoing

## 2021-01-02 NOTE — PROGRESS NOTES
Occupational Therapy    Occupational Therapy Not Seen Note    DATE: 2021  Name: Lois Rios  : 1965  MRN: [id number]    Patient not available for Occupational Therapy due to:    Blood Transfusion Hgb 6.3 this AM. Await transfusion an redraw.  OT to check back PM or tomorrow    Next Scheduled Treatment: 2020    Electronically signed by PEMA Steen on 2021 at 7:17 AM
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
## 2021-01-02 NOTE — PLAN OF CARE
Problem: Restraint Use - Nonviolent/Non-Self-Destructive Behavior:  Goal: Absence of restraint indications  Description: Absence of restraint indications  1/2/2021 0432 by Samuel Stevens RN  Outcome: Completed  1/2/2021 0404 by Samuel Stevens RN  Outcome: Ongoing  1/1/2021 1924 by Mason Donald RN  Outcome: Ongoing  Goal: Absence of restraint-related injury  Description: Absence of restraint-related injury  1/2/2021 0432 by Samuel Stevens RN  Outcome: Completed  1/2/2021 0404 by Samuel Stevens RN  Outcome: Ongoing  1/1/2021 1924 by Mason Donald RN  Outcome: Ongoing

## 2021-01-02 NOTE — PROGRESS NOTES
DATE: 2021    NAME: Lucila Hartmann  MRN: [id number]   : 1965    Patient not seen this date for Physical Therapy due to:  [] Blood transfusion in progress  [] Hemodialysis  [] Patient Declined  [] Spine Precautions   [] Strict Bedrest  [] Surgery/ Procedure  [] Testing      [x] Other  Dialysis and blood transfusion       [] PT is being discontinued at this time. Patient independent. No further needs. [] PT is being discontinued at this time due to declining physical/ medical status. Therapy is not appropriate at this time.     Arden Dee, PTA

## 2021-01-02 NOTE — PLAN OF CARE
PROVIDE ADEQUATE OXYGENATION WITH ACCEPTABLE SP02/ABG'S    [x]  IDENTIFY APPROPRIATE OXYGEN THERAPY  [x]   MONITOR SP02/ABG'S AS NEEDED   [x]   PATIENT EDUCATION AS NEEDED         BRONCHOSPASM/BRONCHOCONSTRICTION     [x]         IMPROVE AERATION/BREATH SOUNDS  [x]   ADMINISTER BRONCHODILATOR THERAPY AS APPROPRIATE  [x]   ASSESS BREATH SOUNDS  []   IMPLEMENT AEROSOL/MDI PROTOCOL  [x]   PATIENT EDUCATION AS NEEDED

## 2021-01-02 NOTE — PLAN OF CARE
Goal: Absence of restraint indications  Description: Absence of restraint indications  Outcome: Ongoing  Goal: Absence of restraint-related injury  Description: Absence of restraint-related injury  Outcome: Ongoing     Problem: Musculor/Skeletal Functional Status  Goal: Highest potential functional level  Outcome: Ongoing

## 2021-01-02 NOTE — PLAN OF CARE
Problem: OXYGENATION/RESPIRATORY FUNCTION  Goal: Patient will maintain patent airway  1/2/2021 0404 by Berta Dye RN  Outcome: Ongoing  1/1/2021 1924 by Franny Chappell RN  Outcome: Ongoing  Goal: Patient will achieve/maintain normal respiratory rate/effort  Description: Respiratory rate and effort will be within normal limits for the patient  1/2/2021 0404 by Berta Dey RN  Outcome: Ongoing  1/1/2021 1924 by Franny Chappell RN  Outcome: Ongoing     Problem: MECHANICAL VENTILATION  Goal: Patient will maintain patent airway  1/2/2021 0404 by Berta Dey RN  Outcome: Ongoing  1/1/2021 1924 by Franny Chappell RN  Outcome: Ongoing  Goal: Oral health is maintained or improved  1/2/2021 0404 by Berta Dey RN  Outcome: Ongoing  1/1/2021 1924 by Franny Chappell RN  Outcome: Ongoing  Goal: Ability to express needs and understand communication  1/2/2021 0404 by Berta Dey RN  Outcome: Ongoing  1/1/2021 1924 by Franny Chappell RN  Outcome: Ongoing  Goal: Mobility/activity is maintained at optimum level for patient  1/2/2021 0404 by Berta Dey RN  Outcome: Ongoing  1/1/2021 1924 by Franny Chappell RN  Outcome: Ongoing  Goal: Tracheostomy will be managed safely  1/2/2021 0404 by Berta Dey RN  Outcome: Ongoing  1/1/2021 1924 by Franny Chappell RN  Outcome: Ongoing     Problem: SKIN INTEGRITY  Goal: Skin integrity is maintained or improved  1/2/2021 0404 by Berta Dey RN  Outcome: Ongoing  1/1/2021 1924 by Franny Chappell RN  Outcome: Ongoing     Problem: Pain:  Goal: Pain level will decrease  Description: Pain level will decrease  1/2/2021 0404 by Berta Dey RN  Outcome: Ongoing  1/1/2021 1924 by Franny Chappell RN  Outcome: Ongoing  Goal: Control of acute pain  Description: Control of acute pain  1/2/2021 0404 by Berta Dey RN  Outcome: Ongoing  1/1/2021 1924 by Franny Chappell RN Outcome: Ongoing  Goal: Control of chronic pain  Description: Control of chronic pain  1/2/2021 0404 by Susan Cordon RN  Outcome: Ongoing  1/1/2021 1924 by Javy Damon RN  Outcome: Ongoing     Problem: Nutrition  Goal: Optimal nutrition therapy  Description: Nutrition Problem #1: Inadequate oral intake  Intervention: Food and/or Nutrient Delivery: (Monitor TF tolerance; suggest goal rate of 50 mL/hr to provide 1800 kcal and 113 g pro/day.)  Nutritional Goals: meet % of estimated nutrient needs     1/2/2021 0404 by Susan Cordon RN  Outcome: Ongoing  1/1/2021 1924 by Javy Damon RN  Outcome: Ongoing     Problem: Skin Integrity:  Goal: Will show no infection signs and symptoms  Description: Will show no infection signs and symptoms  1/2/2021 0404 by Susan Cordon RN  Outcome: Ongoing  1/1/2021 1924 by Javy Damon RN  Outcome: Ongoing  Goal: Absence of new skin breakdown  Description: Absence of new skin breakdown  1/2/2021 0404 by Susan Cordon RN  Outcome: Ongoing  1/1/2021 1924 by Javy Damon RN  Outcome: Ongoing     Problem: Falls - Risk of:  Goal: Will remain free from falls  Description: Will remain free from falls  1/2/2021 0404 by Susan Cordon RN  Outcome: Ongoing  1/1/2021 1924 by Javy Damon RN  Outcome: Ongoing  Goal: Absence of physical injury  Description: Absence of physical injury  1/2/2021 0404 by Susan Cordon RN  Outcome: Ongoing  1/1/2021 1924 by Javy Damon RN  Outcome: Ongoing     Problem: Restraint Use - Nonviolent/Non-Self-Destructive Behavior:  Goal: Absence of restraint indications  Description: Absence of restraint indications  1/2/2021 0404 by Susan Cordon RN  Outcome: Ongoing  1/1/2021 1924 by Javy Damon RN  Outcome: Ongoing  Goal: Absence of restraint-related injury  Description: Absence of restraint-related injury  1/2/2021 0404 by Susan Cordon RN  Outcome: Ongoing 1/1/2021 1924 by Lisa Cantor RN  Outcome: Ongoing     Problem: Musculor/Skeletal Functional Status  Goal: Highest potential functional level  1/2/2021 0404 by Raúl Schilling RN  Outcome: Ongoing  1/1/2021 1924 by Lisa Cantor RN  Outcome: Ongoing
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
## 2021-01-03 ENCOUNTER — APPOINTMENT (OUTPATIENT)
Dept: GENERAL RADIOLOGY | Age: 56
DRG: 004 | End: 2021-01-03
Payer: MEDICARE

## 2021-01-03 LAB
ABO/RH: NORMAL
ABSOLUTE EOS #: 0.53 K/UL (ref 0–0.44)
ABSOLUTE IMMATURE GRANULOCYTE: 0.71 K/UL (ref 0–0.3)
ABSOLUTE LYMPH #: 1.42 K/UL (ref 1.1–3.7)
ABSOLUTE MONO #: 1.42 K/UL (ref 0.1–1.2)
ANION GAP SERPL CALCULATED.3IONS-SCNC: 11 MMOL/L (ref 9–17)
ANTIBODY SCREEN: NEGATIVE
ARM BAND NUMBER: NORMAL
BASOPHILS # BLD: 1 % (ref 0–2)
BASOPHILS ABSOLUTE: 0.18 K/UL (ref 0–0.2)
BLD PROD TYP BPU: NORMAL
BUN BLDV-MCNC: 44 MG/DL (ref 6–20)
BUN/CREAT BLD: ABNORMAL (ref 9–20)
CALCIUM SERPL-MCNC: 9.1 MG/DL (ref 8.6–10.4)
CHLORIDE BLD-SCNC: 95 MMOL/L (ref 98–107)
CO2: 27 MMOL/L (ref 20–31)
CREAT SERPL-MCNC: 3.69 MG/DL (ref 0.5–0.9)
CROSSMATCH RESULT: NORMAL
DIFFERENTIAL TYPE: ABNORMAL
DISPENSE STATUS BLOOD BANK: NORMAL
EOSINOPHILS RELATIVE PERCENT: 3 % (ref 1–4)
EXPIRATION DATE: NORMAL
GFR AFRICAN AMERICAN: 16 ML/MIN
GFR NON-AFRICAN AMERICAN: 13 ML/MIN
GFR SERPL CREATININE-BSD FRML MDRD: ABNORMAL ML/MIN/{1.73_M2}
GFR SERPL CREATININE-BSD FRML MDRD: ABNORMAL ML/MIN/{1.73_M2}
GLUCOSE BLD-MCNC: 102 MG/DL (ref 70–99)
HCT VFR BLD CALC: 27.3 % (ref 36.3–47.1)
HEMOGLOBIN: 7.6 G/DL (ref 11.9–15.1)
IMMATURE GRANULOCYTES: 4 %
LYMPHOCYTES # BLD: 8 % (ref 24–43)
MCH RBC QN AUTO: 29.5 PG (ref 25.2–33.5)
MCHC RBC AUTO-ENTMCNC: 27.8 G/DL (ref 28.4–34.8)
MCV RBC AUTO: 105.8 FL (ref 82.6–102.9)
MONOCYTES # BLD: 8 % (ref 3–12)
MORPHOLOGY: ABNORMAL
MORPHOLOGY: ABNORMAL
NRBC AUTOMATED: 0 PER 100 WBC
PDW BLD-RTO: 18.3 % (ref 11.8–14.4)
PLATELET # BLD: 294 K/UL (ref 138–453)
PLATELET ESTIMATE: ABNORMAL
PMV BLD AUTO: 8.9 FL (ref 8.1–13.5)
POTASSIUM SERPL-SCNC: 4.1 MMOL/L (ref 3.7–5.3)
RBC # BLD: 2.58 M/UL (ref 3.95–5.11)
RBC # BLD: ABNORMAL 10*6/UL
SEG NEUTROPHILS: 76 % (ref 36–65)
SEGMENTED NEUTROPHILS ABSOLUTE COUNT: 13.44 K/UL (ref 1.5–8.1)
SODIUM BLD-SCNC: 133 MMOL/L (ref 135–144)
TRANSFUSION STATUS: NORMAL
UNIT DIVISION: 0
UNIT NUMBER: NORMAL
WBC # BLD: 17.7 K/UL (ref 3.5–11.3)
WBC # BLD: ABNORMAL 10*3/UL

## 2021-01-03 PROCEDURE — 6360000002 HC RX W HCPCS: Performed by: STUDENT IN AN ORGANIZED HEALTH CARE EDUCATION/TRAINING PROGRAM

## 2021-01-03 PROCEDURE — 2000000000 HC ICU R&B

## 2021-01-03 PROCEDURE — 6370000000 HC RX 637 (ALT 250 FOR IP): Performed by: STUDENT IN AN ORGANIZED HEALTH CARE EDUCATION/TRAINING PROGRAM

## 2021-01-03 PROCEDURE — 2500000003 HC RX 250 WO HCPCS: Performed by: STUDENT IN AN ORGANIZED HEALTH CARE EDUCATION/TRAINING PROGRAM

## 2021-01-03 PROCEDURE — 2700000000 HC OXYGEN THERAPY PER DAY

## 2021-01-03 PROCEDURE — 36415 COLL VENOUS BLD VENIPUNCTURE: CPT

## 2021-01-03 PROCEDURE — 94640 AIRWAY INHALATION TREATMENT: CPT

## 2021-01-03 PROCEDURE — 6370000000 HC RX 637 (ALT 250 FOR IP): Performed by: GENERAL PRACTICE

## 2021-01-03 PROCEDURE — 6370000000 HC RX 637 (ALT 250 FOR IP): Performed by: NURSE PRACTITIONER

## 2021-01-03 PROCEDURE — 71045 X-RAY EXAM CHEST 1 VIEW: CPT

## 2021-01-03 PROCEDURE — 80048 BASIC METABOLIC PNL TOTAL CA: CPT

## 2021-01-03 PROCEDURE — 99231 SBSQ HOSP IP/OBS SF/LOW 25: CPT | Performed by: INTERNAL MEDICINE

## 2021-01-03 PROCEDURE — 2580000003 HC RX 258: Performed by: STUDENT IN AN ORGANIZED HEALTH CARE EDUCATION/TRAINING PROGRAM

## 2021-01-03 PROCEDURE — 94003 VENT MGMT INPAT SUBQ DAY: CPT

## 2021-01-03 PROCEDURE — 94761 N-INVAS EAR/PLS OXIMETRY MLT: CPT

## 2021-01-03 PROCEDURE — 85025 COMPLETE CBC W/AUTO DIFF WBC: CPT

## 2021-01-03 RX ADMIN — OXYCODONE HYDROCHLORIDE 5 MG: 5 TABLET ORAL at 08:09

## 2021-01-03 RX ADMIN — FENTANYL CITRATE 100 MCG: 50 INJECTION, SOLUTION INTRAMUSCULAR; INTRAVENOUS at 03:35

## 2021-01-03 RX ADMIN — Medication 5 MG: at 20:30

## 2021-01-03 RX ADMIN — BACITRACIN: 500 OINTMENT TOPICAL at 08:26

## 2021-01-03 RX ADMIN — OXYCODONE HYDROCHLORIDE 5 MG: 5 TABLET ORAL at 15:57

## 2021-01-03 RX ADMIN — ACETAMINOPHEN 1000 MG: 500 TABLET ORAL at 05:13

## 2021-01-03 RX ADMIN — CALCIUM ACETATE 2001 MG: 667 CAPSULE ORAL at 16:00

## 2021-01-03 RX ADMIN — SODIUM CHLORIDE, PRESERVATIVE FREE 10 ML: 5 INJECTION INTRAVENOUS at 08:23

## 2021-01-03 RX ADMIN — ESCITALOPRAM OXALATE 20 MG: 10 TABLET ORAL at 08:11

## 2021-01-03 RX ADMIN — POLYETHYLENE GLYCOL 3350 17 G: 17 POWDER, FOR SOLUTION ORAL at 08:12

## 2021-01-03 RX ADMIN — QUETIAPINE FUMARATE 50 MG: 100 TABLET ORAL at 20:30

## 2021-01-03 RX ADMIN — OXYCODONE HYDROCHLORIDE 5 MG: 5 TABLET ORAL at 12:43

## 2021-01-03 RX ADMIN — APIXABAN 5 MG: 5 TABLET, FILM COATED ORAL at 08:11

## 2021-01-03 RX ADMIN — ACETAMINOPHEN 1000 MG: 500 TABLET ORAL at 12:43

## 2021-01-03 RX ADMIN — DOXEPIN HYDROCHLORIDE 10 MG: 10 CAPSULE ORAL at 20:30

## 2021-01-03 RX ADMIN — BACITRACIN: 500 OINTMENT TOPICAL at 21:02

## 2021-01-03 RX ADMIN — QUETIAPINE FUMARATE 50 MG: 100 TABLET ORAL at 08:11

## 2021-01-03 RX ADMIN — GABAPENTIN 100 MG: 100 CAPSULE ORAL at 00:07

## 2021-01-03 RX ADMIN — FENTANYL CITRATE 100 MCG: 50 INJECTION, SOLUTION INTRAMUSCULAR; INTRAVENOUS at 17:20

## 2021-01-03 RX ADMIN — ACETYLCYSTEINE 600 MG: 200 INHALANT RESPIRATORY (INHALATION) at 20:07

## 2021-01-03 RX ADMIN — IPRATROPIUM BROMIDE AND ALBUTEROL SULFATE 1 AMPULE: .5; 3 SOLUTION RESPIRATORY (INHALATION) at 12:25

## 2021-01-03 RX ADMIN — MIDODRINE HYDROCHLORIDE 10 MG: 5 TABLET ORAL at 02:59

## 2021-01-03 RX ADMIN — IPRATROPIUM BROMIDE AND ALBUTEROL SULFATE 1 AMPULE: .5; 3 SOLUTION RESPIRATORY (INHALATION) at 09:20

## 2021-01-03 RX ADMIN — SODIUM CHLORIDE, PRESERVATIVE FREE 10 ML: 5 INJECTION INTRAVENOUS at 21:02

## 2021-01-03 RX ADMIN — OXYCODONE HYDROCHLORIDE 5 MG: 5 TABLET ORAL at 04:45

## 2021-01-03 RX ADMIN — CALCIUM ACETATE 2001 MG: 667 CAPSULE ORAL at 08:11

## 2021-01-03 RX ADMIN — SILVER SULFADIAZINE: 10 CREAM TOPICAL at 09:00

## 2021-01-03 RX ADMIN — OXYCODONE HYDROCHLORIDE 5 MG: 5 TABLET ORAL at 20:31

## 2021-01-03 RX ADMIN — APIXABAN 5 MG: 5 TABLET, FILM COATED ORAL at 20:30

## 2021-01-03 RX ADMIN — ACETAMINOPHEN 1000 MG: 500 TABLET ORAL at 21:02

## 2021-01-03 RX ADMIN — GABAPENTIN 100 MG: 100 CAPSULE ORAL at 08:11

## 2021-01-03 RX ADMIN — Medication 15 MG: at 08:11

## 2021-01-03 RX ADMIN — DOCUSATE SODIUM 100 MG: 50 LIQUID ORAL at 08:12

## 2021-01-03 RX ADMIN — SILVER SULFADIAZINE: 10 CREAM TOPICAL at 21:02

## 2021-01-03 RX ADMIN — IPRATROPIUM BROMIDE AND ALBUTEROL SULFATE 1 AMPULE: .5; 3 SOLUTION RESPIRATORY (INHALATION) at 20:07

## 2021-01-03 RX ADMIN — GABAPENTIN 100 MG: 100 CAPSULE ORAL at 15:57

## 2021-01-03 RX ADMIN — OXYCODONE HYDROCHLORIDE 5 MG: 5 TABLET ORAL at 00:07

## 2021-01-03 RX ADMIN — SENNOSIDES 8.6 MG: 8.6 TABLET, FILM COATED ORAL at 20:30

## 2021-01-03 RX ADMIN — ALBUTEROL SULFATE 2.5 MG: 2.5 SOLUTION RESPIRATORY (INHALATION) at 23:22

## 2021-01-03 RX ADMIN — AMIODARONE HYDROCHLORIDE 200 MG: 200 TABLET ORAL at 08:11

## 2021-01-03 RX ADMIN — ACETYLCYSTEINE 600 MG: 200 INHALANT RESPIRATORY (INHALATION) at 09:20

## 2021-01-03 RX ADMIN — CALCIUM ACETATE 2001 MG: 667 CAPSULE ORAL at 12:46

## 2021-01-03 RX ADMIN — IPRATROPIUM BROMIDE AND ALBUTEROL SULFATE 1 AMPULE: .5; 3 SOLUTION RESPIRATORY (INHALATION) at 15:41

## 2021-01-03 ASSESSMENT — PAIN SCALES - GENERAL
PAINLEVEL_OUTOF10: 10
PAINLEVEL_OUTOF10: 10
PAINLEVEL_OUTOF10: 8
PAINLEVEL_OUTOF10: 10

## 2021-01-03 ASSESSMENT — PULMONARY FUNCTION TESTS
PIF_VALUE: 18
PIF_VALUE: 10
PIF_VALUE: 13
PIF_VALUE: 14

## 2021-01-03 NOTE — PLAN OF CARE
Problem: OXYGENATION/RESPIRATORY FUNCTION  Goal: Patient will maintain patent airway  1/3/2021 0541 by Alise Srivastava RN  Outcome: Ongoing     Problem: OXYGENATION/RESPIRATORY FUNCTION  Goal: Patient will achieve/maintain normal respiratory rate/effort  Description: Respiratory rate and effort will be within normal limits for the patient  1/3/2021 0541 by Roberto Carlos Monteiro RN  Outcome: Ongoing     Problem: MECHANICAL VENTILATION  Goal: Patient will maintain patent airway  1/3/2021 0541 by Alise Srivastava RN  Outcome: Ongoing     Problem: MECHANICAL VENTILATION  Goal: Oral health is maintained or improved  1/3/2021 0541 by Alise Srivastava RN  Outcome: Ongoing     Problem: MECHANICAL VENTILATION  Goal: Ability to express needs and understand communication  1/3/2021 0541 by Alise Srivastava RN  Outcome: Ongoing     Problem: MECHANICAL VENTILATION  Goal: Mobility/activity is maintained at optimum level for patient  1/3/2021 0541 by Alise Srivastava RN  Outcome: Ongoing     Problem: MECHANICAL VENTILATION  Goal: Tracheostomy will be managed safely  1/3/2021 0541 by Savita Monteiro RN  Outcome: Ongoing     Problem: SKIN INTEGRITY  Goal: Skin integrity is maintained or improved  1/3/2021 0541 by Savita Monteiro RN  Outcome: Ongoing     Problem: Pain:  Goal: Pain level will decrease  Description: Pain level will decrease  1/3/2021 0541 by Savita Monteiro RN  Outcome: Ongoing     Problem: Pain:  Goal: Control of acute pain  Description: Control of acute pain  1/3/2021 0541 by Savita Monteiro RN  Outcome: Ongoing     Problem: Pain:  Goal: Control of chronic pain  Description: Control of chronic pain  1/3/2021 0541 by Savita Monteiro RN  Outcome: Ongoing     Problem: Pain:  Goal: Control of chronic pain  Description: Control of chronic pain  1/3/2021 0541 by Savita Monteiro RN  Outcome: Ongoing     Problem: Nutrition  Goal: Optimal nutrition therapy  Description: Nutrition Problem #1: Inadequate oral intake Intervention: Food and/or Nutrient Delivery: (Monitor TF tolerance; suggest goal rate of 50 mL/hr to provide 1800 kcal and 113 g pro/day.)  Nutritional Goals: meet % of estimated nutrient needs     1/3/2021 0541 by Padmini Monteiro RN  Outcome: Ongoing     Problem: Skin Integrity:  Goal: Will show no infection signs and symptoms  Description: Will show no infection signs and symptoms  1/3/2021 0541 by Alison Bravo RN  Outcome: Ongoing     Problem: Skin Integrity:  Goal: Absence of new skin breakdown  Description: Absence of new skin breakdown  1/3/2021 0541 by Alison Bravo RN  Outcome: Ongoing     Problem: Falls - Risk of:  Goal: Will remain free from falls  Description: Will remain free from falls  1/3/2021 0541 by Padmini Monteiro RN  Outcome: Ongoing     Problem: Falls - Risk of:  Goal: Absence of physical injury  Description: Absence of physical injury  1/3/2021 0541 by Alison Bravo RN  Outcome: Ongoing     Problem: Musculor/Skeletal Functional Status  Goal: Highest potential functional level  1/3/2021 0541 by Alison Bravo RN  Outcome: Ongoing
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
## 2021-01-03 NOTE — PLAN OF CARE
Problem: OXYGENATION/RESPIRATORY FUNCTION  Goal: Patient will maintain patent airway  1/3/2021 0932 by Diamond Barth RCP  Outcome: Ongoing  1/3/2021 0541 by Michelle Cooley RN  Outcome: Ongoing  1/2/2021 1951 by Jazmin Haley RN  Outcome: Ongoing  Goal: Patient will achieve/maintain normal respiratory rate/effort  Description: Respiratory rate and effort will be within normal limits for the patient  1/3/2021 0932 by Diamond Barth RCP  Outcome: Ongoing  1/3/2021 0541 by Michelle Cooley RN  Outcome: Ongoing  1/2/2021 1951 by Jazmin Haley RN  Outcome: Ongoing     Problem: MECHANICAL VENTILATION  Goal: Patient will maintain patent airway  1/3/2021 0932 by Diamond Barth RCP  Outcome: Ongoing  1/3/2021 0541 by Michelle Cooley RN  Outcome: Ongoing  1/2/2021 1951 by Jazmin Haley RN  Outcome: Ongoing  Goal: Oral health is maintained or improved  1/3/2021 0932 by Diamond Barth RCP  Outcome: Ongoing  1/3/2021 0541 by Michelle Cooley RN  Outcome: Ongoing  1/2/2021 1951 by Jazmin Haley RN  Outcome: Ongoing  Goal: Ability to express needs and understand communication  1/3/2021 0932 by Diamond Barth RCP  Outcome: Ongoing  1/3/2021 0541 by Michelle Cooley RN  Outcome: Ongoing  1/2/2021 1951 by Jazmin Haley RN  Outcome: Ongoing  Goal: Mobility/activity is maintained at optimum level for patient  1/3/2021 0932 by Diamond Barth RCP  Outcome: Ongoing  1/3/2021 0541 by Michelle Cooley RN  Outcome: Ongoing  1/2/2021 1951 by Jazmin Haley RN  Outcome: Ongoing  Goal: Tracheostomy will be managed safely  1/3/2021 0932 by Diamond Barth RCP  Outcome: Ongoing  1/3/2021 0541 by Michelle Cooley RN  Outcome: Ongoing  1/2/2021 1951 by Jazmin Haley RN  Outcome: Ongoing     Problem: SKIN INTEGRITY  Goal: Skin integrity is maintained or improved  1/3/2021 0932 by Diamond Barth RCP  Outcome: Ongoing  1/3/2021 0541 by Michelle Cooley RN  Outcome: Ongoing 1/2/2021 1951 by Nikko Soliz, RN  Outcome: Ongoing

## 2021-01-03 NOTE — PLAN OF CARE
Problem: OXYGENATION/RESPIRATORY FUNCTION  Goal: Patient will maintain patent airway  1/2/2021 1951 by Laly Shi RN  Outcome: Ongoing  1/2/2021 1813 by Zach Richards RCP  Outcome: Ongoing  Goal: Patient will achieve/maintain normal respiratory rate/effort  Description: Respiratory rate and effort will be within normal limits for the patient  1/2/2021 1951 by Laly Shi RN  Outcome: Ongoing  1/2/2021 1813 by Zach Richards RCP  Outcome: Ongoing     Problem: MECHANICAL VENTILATION  Goal: Patient will maintain patent airway  1/2/2021 1951 by Laly Shi RN  Outcome: Ongoing  1/2/2021 1813 by Zach Richards RCP  Outcome: Ongoing  Goal: Oral health is maintained or improved  1/2/2021 1951 by Laly Shi RN  Outcome: Ongoing  1/2/2021 1813 by Zach Richards RCP  Outcome: Ongoing  Goal: Ability to express needs and understand communication  1/2/2021 1951 by Laly Shi RN  Outcome: Ongoing  1/2/2021 1813 by Zach Richards RCP  Outcome: Ongoing  Goal: Mobility/activity is maintained at optimum level for patient  1/2/2021 1951 by Laly Shi RN  Outcome: Ongoing  1/2/2021 1813 by Zach Richards RCP  Outcome: Ongoing  Goal: Tracheostomy will be managed safely  1/2/2021 1951 by Laly Shi RN  Outcome: Ongoing  1/2/2021 1813 by Zach Richards RCP  Outcome: Ongoing     Problem: SKIN INTEGRITY  Goal: Skin integrity is maintained or improved  1/2/2021 1951 by Laly Shi RN  Outcome: Ongoing  1/2/2021 1813 by Zach Richards RCP  Outcome: Ongoing     Problem: Pain:  Goal: Pain level will decrease  Description: Pain level will decrease  1/2/2021 1951 by Laly Shi RN  Outcome: Ongoing  1/2/2021 1813 by Zach Richards RCP  Outcome: Ongoing  Goal: Control of acute pain  Description: Control of acute pain  1/2/2021 1951 by Laly Shi RN  Outcome: Ongoing  1/2/2021 1813 by Zach Richards RCP Outcome: Ongoing  Goal: Control of chronic pain  Description: Control of chronic pain  1/2/2021 1951 by Michelle Bauer RN  Outcome: Ongoing  1/2/2021 1813 by Mike Elizabeth RCP  Outcome: Ongoing     Problem: Nutrition  Goal: Optimal nutrition therapy  Description: Nutrition Problem #1: Inadequate oral intake  Intervention: Food and/or Nutrient Delivery: (Monitor TF tolerance; suggest goal rate of 50 mL/hr to provide 1800 kcal and 113 g pro/day.)  Nutritional Goals: meet % of estimated nutrient needs     1/2/2021 1951 by Michelle Bauer RN  Outcome: Ongoing  1/2/2021 1813 by Mike Elizabeth RCP  Outcome: Ongoing     Problem: Skin Integrity:  Goal: Will show no infection signs and symptoms  Description: Will show no infection signs and symptoms  1/2/2021 1951 by Michelle Bauer RN  Outcome: Ongoing  1/2/2021 1813 by Mike Elizabeth RCP  Outcome: Ongoing  Goal: Absence of new skin breakdown  Description: Absence of new skin breakdown  1/2/2021 1951 by Michelle Bauer RN  Outcome: Ongoing  1/2/2021 1813 by Mike Elizabeth RCP  Outcome: Ongoing     Problem: Falls - Risk of:  Goal: Will remain free from falls  Description: Will remain free from falls  1/2/2021 1951 by Michelle Bauer RN  Outcome: Ongoing  1/2/2021 1813 by Mike Elizabeth RCP  Outcome: Ongoing  Goal: Absence of physical injury  Description: Absence of physical injury  1/2/2021 1951 by Michelle Bauer RN  Outcome: Ongoing  1/2/2021 1813 by Mike Elizabeth RCP  Outcome: Ongoing     Problem: Musculor/Skeletal Functional Status  Goal: Highest potential functional level  1/2/2021 1951 by Michelle Bauer RN  Outcome: Ongoing  1/2/2021 1813 by Mike Elizabeth RCP  Outcome: Ongoing

## 2021-01-03 NOTE — PLAN OF CARE
Problem: SKIN INTEGRITY  Goal: Skin integrity is maintained or improved  1/3/2021 0957 by James Pozo RN  Outcome: Ongoing     Problem: Pain:  Goal: Pain level will decrease  Description: Pain level will decrease  1/3/2021 0957 by James Pozo RN  Outcome: Ongoing     Problem: Pain:  Goal: Control of acute pain  Description: Control of acute pain  1/3/2021 0957 by James Pozo RN  Outcome: Ongoing     Problem: Pain:  Goal: Control of chronic pain  Description: Control of chronic pain  1/3/2021 0957 by James Pozo RN  Outcome: Ongoing     Problem: Nutrition  Goal: Optimal nutrition therapy  Description: Nutrition Problem #1: Inadequate oral intake  Intervention: Food and/or Nutrient Delivery: (Monitor TF tolerance; suggest goal rate of 50 mL/hr to provide 1800 kcal and 113 g pro/day.)  Nutritional Goals: meet % of estimated nutrient needs     1/3/2021 0957 by James Pozo RN  Outcome: Ongoing     Problem: Skin Integrity:  Goal: Will show no infection signs and symptoms  Description: Will show no infection signs and symptoms  1/3/2021 0957 by James Pozo RN  Outcome: Ongoing     Problem: Skin Integrity:  Goal: Absence of new skin breakdown  Description: Absence of new skin breakdown  1/3/2021 0957 by James Pozo RN  Outcome: Ongoing     Problem: Falls - Risk of:  Goal: Will remain free from falls  Description: Will remain free from falls  1/3/2021 0957 by James Pozo RN  Outcome: Ongoing     Problem: Falls - Risk of:  Goal: Absence of physical injury  Description: Absence of physical injury  1/3/2021 0957 by James Pozo RN  Outcome: Ongoing     Problem: Musculor/Skeletal Functional Status  Goal: Highest potential functional level  1/3/2021 0957 by James Pozo RN  Outcome: Ongoing

## 2021-01-03 NOTE — CARE COORDINATION
Transitional planning. Per nursing and notes: pt has trach and peg, tolerating TF, On Mechanical Vent 40% FIO2 during night then Trach collar during day. [address] following pt.  Monitoring WBC

## 2021-01-04 LAB
ABSOLUTE EOS #: 0.39 K/UL (ref 0–0.44)
ABSOLUTE IMMATURE GRANULOCYTE: 0.49 K/UL (ref 0–0.3)
ABSOLUTE LYMPH #: 1.09 K/UL (ref 1.1–3.7)
ABSOLUTE MONO #: 1.04 K/UL (ref 0.1–1.2)
ANION GAP SERPL CALCULATED.3IONS-SCNC: 12 MMOL/L (ref 9–17)
BASOPHILS # BLD: 1 % (ref 0–2)
BASOPHILS ABSOLUTE: 0.06 K/UL (ref 0–0.2)
BUN BLDV-MCNC: 65 MG/DL (ref 6–20)
BUN/CREAT BLD: ABNORMAL (ref 9–20)
CALCIUM SERPL-MCNC: 9.2 MG/DL (ref 8.6–10.4)
CHLORIDE BLD-SCNC: 93 MMOL/L (ref 98–107)
CO2: 26 MMOL/L (ref 20–31)
CREAT SERPL-MCNC: 5.11 MG/DL (ref 0.5–0.9)
DIFFERENTIAL TYPE: ABNORMAL
EOSINOPHILS RELATIVE PERCENT: 3 % (ref 1–4)
GFR AFRICAN AMERICAN: 11 ML/MIN
GFR NON-AFRICAN AMERICAN: 9 ML/MIN
GFR SERPL CREATININE-BSD FRML MDRD: ABNORMAL ML/MIN/{1.73_M2}
GFR SERPL CREATININE-BSD FRML MDRD: ABNORMAL ML/MIN/{1.73_M2}
GLUCOSE BLD-MCNC: 76 MG/DL (ref 70–99)
HCT VFR BLD CALC: 25.6 % (ref 36.3–47.1)
HEMOGLOBIN: 7.3 G/DL (ref 11.9–15.1)
IMMATURE GRANULOCYTES: 4 %
LYMPHOCYTES # BLD: 9 % (ref 24–43)
MCH RBC QN AUTO: 29.2 PG (ref 25.2–33.5)
MCHC RBC AUTO-ENTMCNC: 28.5 G/DL (ref 28.4–34.8)
MCV RBC AUTO: 102.4 FL (ref 82.6–102.9)
MONOCYTES # BLD: 8 % (ref 3–12)
NRBC AUTOMATED: 0 PER 100 WBC
PDW BLD-RTO: 17.4 % (ref 11.8–14.4)
PLATELET # BLD: 257 K/UL (ref 138–453)
PLATELET ESTIMATE: ABNORMAL
PMV BLD AUTO: 8.8 FL (ref 8.1–13.5)
POTASSIUM SERPL-SCNC: 4.8 MMOL/L (ref 3.7–5.3)
RBC # BLD: 2.5 M/UL (ref 3.95–5.11)
RBC # BLD: ABNORMAL 10*6/UL
SEG NEUTROPHILS: 76 % (ref 36–65)
SEGMENTED NEUTROPHILS ABSOLUTE COUNT: 9.55 K/UL (ref 1.5–8.1)
SODIUM BLD-SCNC: 131 MMOL/L (ref 135–144)
WBC # BLD: 12.6 K/UL (ref 3.5–11.3)
WBC # BLD: ABNORMAL 10*3/UL

## 2021-01-04 PROCEDURE — 6370000000 HC RX 637 (ALT 250 FOR IP): Performed by: STUDENT IN AN ORGANIZED HEALTH CARE EDUCATION/TRAINING PROGRAM

## 2021-01-04 PROCEDURE — 2700000000 HC OXYGEN THERAPY PER DAY

## 2021-01-04 PROCEDURE — 2500000003 HC RX 250 WO HCPCS: Performed by: STUDENT IN AN ORGANIZED HEALTH CARE EDUCATION/TRAINING PROGRAM

## 2021-01-04 PROCEDURE — 85025 COMPLETE CBC W/AUTO DIFF WBC: CPT

## 2021-01-04 PROCEDURE — 80048 BASIC METABOLIC PNL TOTAL CA: CPT

## 2021-01-04 PROCEDURE — 94003 VENT MGMT INPAT SUBQ DAY: CPT

## 2021-01-04 PROCEDURE — 6360000002 HC RX W HCPCS: Performed by: STUDENT IN AN ORGANIZED HEALTH CARE EDUCATION/TRAINING PROGRAM

## 2021-01-04 PROCEDURE — 94761 N-INVAS EAR/PLS OXIMETRY MLT: CPT

## 2021-01-04 PROCEDURE — 6370000000 HC RX 637 (ALT 250 FOR IP): Performed by: GENERAL PRACTICE

## 2021-01-04 PROCEDURE — 6370000000 HC RX 637 (ALT 250 FOR IP): Performed by: NURSE PRACTITIONER

## 2021-01-04 PROCEDURE — 90935 HEMODIALYSIS ONE EVALUATION: CPT

## 2021-01-04 PROCEDURE — 90935 HEMODIALYSIS ONE EVALUATION: CPT | Performed by: INTERNAL MEDICINE

## 2021-01-04 PROCEDURE — 97110 THERAPEUTIC EXERCISES: CPT

## 2021-01-04 PROCEDURE — 6360000002 HC RX W HCPCS: Performed by: NURSE PRACTITIONER

## 2021-01-04 PROCEDURE — 94640 AIRWAY INHALATION TREATMENT: CPT

## 2021-01-04 PROCEDURE — 36415 COLL VENOUS BLD VENIPUNCTURE: CPT

## 2021-01-04 PROCEDURE — 2580000003 HC RX 258: Performed by: STUDENT IN AN ORGANIZED HEALTH CARE EDUCATION/TRAINING PROGRAM

## 2021-01-04 PROCEDURE — 2000000000 HC ICU R&B

## 2021-01-04 RX ORDER — GINSENG 100 MG
CAPSULE ORAL
Qty: 1 TUBE | Refills: 3 | DISCHARGE
Start: 2021-01-04 | End: 2021-01-04

## 2021-01-04 RX ORDER — FENTANYL CITRATE 50 UG/ML
100 INJECTION, SOLUTION INTRAMUSCULAR; INTRAVENOUS EVERY 12 HOURS PRN
Status: DISCONTINUED | OUTPATIENT
Start: 2021-01-04 | End: 2021-01-06 | Stop reason: HOSPADM

## 2021-01-04 RX ORDER — SENNA PLUS 8.6 MG/1
1 TABLET ORAL NIGHTLY
Qty: 7 TABLET | Refills: 0 | DISCHARGE
Start: 2021-01-04 | End: 2021-01-11

## 2021-01-04 RX ORDER — GINSENG 100 MG
CAPSULE ORAL
Qty: 1 TUBE | Refills: 3 | DISCHARGE
Start: 2021-01-04 | End: 2021-01-14

## 2021-01-04 RX ORDER — QUETIAPINE FUMARATE 50 MG/1
50 TABLET, FILM COATED ORAL 2 TIMES DAILY
Qty: 14 TABLET | Refills: 0 | DISCHARGE
Start: 2021-01-04 | End: 2021-01-01 | Stop reason: DRUGHIGH

## 2021-01-04 RX ORDER — SODIUM POLYSTYRENE SULFONATE 15 G/60ML
SUSPENSION ORAL; RECTAL
Status: DISCONTINUED
Start: 2021-01-04 | End: 2021-01-04 | Stop reason: WASHOUT

## 2021-01-04 RX ADMIN — DOXEPIN HYDROCHLORIDE 10 MG: 10 CAPSULE ORAL at 20:10

## 2021-01-04 RX ADMIN — CALCIUM ACETATE 2001 MG: 667 CAPSULE ORAL at 13:37

## 2021-01-04 RX ADMIN — CALCIUM ACETATE 2001 MG: 667 CAPSULE ORAL at 16:55

## 2021-01-04 RX ADMIN — Medication 15 MG: at 09:46

## 2021-01-04 RX ADMIN — IPRATROPIUM BROMIDE AND ALBUTEROL SULFATE 1 AMPULE: .5; 3 SOLUTION RESPIRATORY (INHALATION) at 11:38

## 2021-01-04 RX ADMIN — ACETYLCYSTEINE 600 MG: 200 INHALANT RESPIRATORY (INHALATION) at 08:38

## 2021-01-04 RX ADMIN — ACETAMINOPHEN 1000 MG: 500 TABLET ORAL at 13:37

## 2021-01-04 RX ADMIN — QUETIAPINE FUMARATE 50 MG: 100 TABLET ORAL at 20:10

## 2021-01-04 RX ADMIN — CALCIUM ACETATE 2001 MG: 667 CAPSULE ORAL at 09:54

## 2021-01-04 RX ADMIN — OXYCODONE HYDROCHLORIDE 5 MG: 5 TABLET ORAL at 00:12

## 2021-01-04 RX ADMIN — QUETIAPINE FUMARATE 50 MG: 100 TABLET ORAL at 09:46

## 2021-01-04 RX ADMIN — ACETAMINOPHEN 1000 MG: 500 TABLET ORAL at 21:16

## 2021-01-04 RX ADMIN — IPRATROPIUM BROMIDE AND ALBUTEROL SULFATE 1 AMPULE: .5; 3 SOLUTION RESPIRATORY (INHALATION) at 19:55

## 2021-01-04 RX ADMIN — SODIUM CHLORIDE, PRESERVATIVE FREE 10 ML: 5 INJECTION INTRAVENOUS at 09:54

## 2021-01-04 RX ADMIN — GABAPENTIN 100 MG: 100 CAPSULE ORAL at 09:46

## 2021-01-04 RX ADMIN — FENTANYL CITRATE 100 MCG: 50 INJECTION, SOLUTION INTRAMUSCULAR; INTRAVENOUS at 05:21

## 2021-01-04 RX ADMIN — SENNOSIDES 8.6 MG: 8.6 TABLET, FILM COATED ORAL at 20:10

## 2021-01-04 RX ADMIN — IPRATROPIUM BROMIDE AND ALBUTEROL SULFATE 1 AMPULE: .5; 3 SOLUTION RESPIRATORY (INHALATION) at 16:49

## 2021-01-04 RX ADMIN — APIXABAN 5 MG: 5 TABLET, FILM COATED ORAL at 09:53

## 2021-01-04 RX ADMIN — BACITRACIN: 500 OINTMENT TOPICAL at 20:10

## 2021-01-04 RX ADMIN — MIDODRINE HYDROCHLORIDE 10 MG: 5 TABLET ORAL at 13:38

## 2021-01-04 RX ADMIN — ALBUTEROL SULFATE 2.5 MG: 2.5 SOLUTION RESPIRATORY (INHALATION) at 23:23

## 2021-01-04 RX ADMIN — AMIODARONE HYDROCHLORIDE 200 MG: 200 TABLET ORAL at 09:46

## 2021-01-04 RX ADMIN — ALBUTEROL SULFATE 2.5 MG: 2.5 SOLUTION RESPIRATORY (INHALATION) at 04:03

## 2021-01-04 RX ADMIN — Medication 5 MG: at 20:10

## 2021-01-04 RX ADMIN — SILVER SULFADIAZINE: 10 CREAM TOPICAL at 20:10

## 2021-01-04 RX ADMIN — OXYCODONE HYDROCHLORIDE 5 MG: 5 TABLET ORAL at 09:46

## 2021-01-04 RX ADMIN — OXYCODONE HYDROCHLORIDE 5 MG: 5 TABLET ORAL at 16:56

## 2021-01-04 RX ADMIN — ESCITALOPRAM OXALATE 20 MG: 10 TABLET ORAL at 09:46

## 2021-01-04 RX ADMIN — DOCUSATE SODIUM 100 MG: 50 LIQUID ORAL at 09:46

## 2021-01-04 RX ADMIN — ACETAMINOPHEN 1000 MG: 500 TABLET ORAL at 05:11

## 2021-01-04 RX ADMIN — GABAPENTIN 100 MG: 100 CAPSULE ORAL at 16:56

## 2021-01-04 RX ADMIN — OXYCODONE HYDROCHLORIDE 5 MG: 5 TABLET ORAL at 13:37

## 2021-01-04 RX ADMIN — SILVER SULFADIAZINE: 10 CREAM TOPICAL at 09:55

## 2021-01-04 RX ADMIN — OXYCODONE HYDROCHLORIDE 5 MG: 5 TABLET ORAL at 04:44

## 2021-01-04 RX ADMIN — OXYCODONE HYDROCHLORIDE 5 MG: 5 TABLET ORAL at 20:10

## 2021-01-04 RX ADMIN — APIXABAN 5 MG: 5 TABLET, FILM COATED ORAL at 20:10

## 2021-01-04 RX ADMIN — FENTANYL CITRATE 100 MCG: 50 INJECTION, SOLUTION INTRAMUSCULAR; INTRAVENOUS at 10:20

## 2021-01-04 RX ADMIN — GABAPENTIN 100 MG: 100 CAPSULE ORAL at 00:12

## 2021-01-04 RX ADMIN — IPRATROPIUM BROMIDE AND ALBUTEROL SULFATE 1 AMPULE: .5; 3 SOLUTION RESPIRATORY (INHALATION) at 08:38

## 2021-01-04 RX ADMIN — FENTANYL CITRATE 100 MCG: 50 INJECTION, SOLUTION INTRAMUSCULAR; INTRAVENOUS at 22:26

## 2021-01-04 RX ADMIN — SODIUM CHLORIDE, PRESERVATIVE FREE 10 ML: 5 INJECTION INTRAVENOUS at 20:11

## 2021-01-04 RX ADMIN — BACITRACIN: 500 OINTMENT TOPICAL at 09:54

## 2021-01-04 RX ADMIN — MIDODRINE HYDROCHLORIDE 10 MG: 5 TABLET ORAL at 09:47

## 2021-01-04 ASSESSMENT — PAIN SCALES - GENERAL
PAINLEVEL_OUTOF10: 10
PAINLEVEL_OUTOF10: 9
PAINLEVEL_OUTOF10: 10
PAINLEVEL_OUTOF10: 10

## 2021-01-04 ASSESSMENT — PAIN DESCRIPTION - PROGRESSION
CLINICAL_PROGRESSION: NOT CHANGED

## 2021-01-04 ASSESSMENT — PAIN DESCRIPTION - ONSET
ONSET: ON-GOING

## 2021-01-04 ASSESSMENT — PULMONARY FUNCTION TESTS
PIF_VALUE: 14
PIF_VALUE: 7
PIF_VALUE: 14
PIF_VALUE: 27
PIF_VALUE: 14

## 2021-01-04 ASSESSMENT — PAIN DESCRIPTION - PAIN TYPE
TYPE: ACUTE PAIN
TYPE: ACUTE PAIN

## 2021-01-04 ASSESSMENT — PAIN DESCRIPTION - ORIENTATION: ORIENTATION: RIGHT;LEFT

## 2021-01-04 NOTE — PLAN OF CARE
Problem: OXYGENATION/RESPIRATORY FUNCTION  Goal: Patient will maintain patent airway  Outcome: Ongoing     Problem: OXYGENATION/RESPIRATORY FUNCTION  Goal: Patient will achieve/maintain normal respiratory rate/effort  Description: Respiratory rate and effort will be within normal limits for the patient  Outcome: Ongoing     Problem: MECHANICAL VENTILATION  Goal: Patient will maintain patent airway  Outcome: Ongoing     Problem: SKIN INTEGRITY  Goal: Skin integrity is maintained or improved  Outcome: Ongoing     Problem: Pain:  Goal: Pain level will decrease  Description: Pain level will decrease  Outcome: Ongoing     Problem: Pain:  Goal: Control of acute pain  Description: Control of acute pain  Outcome: Ongoing     Problem: Pain:  Goal: Control of chronic pain  Description: Control of chronic pain  Outcome: Ongoing     Problem: Nutrition  Goal: Optimal nutrition therapy  Description: Nutrition Problem #1: Inadequate oral intake  Intervention: Food and/or Nutrient Delivery: (Monitor TF tolerance; suggest goal rate of 50 mL/hr to provide 1800 kcal and 113 g pro/day.)  Nutritional Goals: meet % of estimated nutrient needs     Outcome: Ongoing     Problem: Skin Integrity:  Goal: Will show no infection signs and symptoms  Description: Will show no infection signs and symptoms  Outcome: Ongoing     Problem: Skin Integrity:  Goal: Absence of new skin breakdown  Description: Absence of new skin breakdown  Outcome: Ongoing     Problem: Falls - Risk of:  Goal: Will remain free from falls  Description: Will remain free from falls  Outcome: Ongoing     Problem: Falls - Risk of:  Goal: Absence of physical injury  Description: Absence of physical injury  Outcome: Ongoing     Problem: Falls - Risk of:  Goal: Will remain free from falls  Description: Will remain free from falls  Outcome: Ongoing     Problem: Falls - Risk of:  Goal: Absence of physical injury  Description: Absence of physical injury  Outcome: Ongoing Problem: Musculor/Skeletal Functional Status  Goal: Highest potential functional level  Outcome: Ongoing

## 2021-01-04 NOTE — PLAN OF CARE
Problem: OXYGENATION/RESPIRATORY FUNCTION  Goal: Patient will maintain patent airway  1/4/2021 0852 by Bernard Antunez RCP  Outcome: Ongoing  1/4/2021 0630 by Melvina Abernathy RN  Outcome: Ongoing  Goal: Patient will achieve/maintain normal respiratory rate/effort  Description: Respiratory rate and effort will be within normal limits for the patient  1/4/2021 0852 by Bernard Antunez RCP  Outcome: Ongoing  1/4/2021 0630 by Avonne Favre Ahmed, RN  Outcome: Ongoing     Problem: MECHANICAL VENTILATION  Goal: Patient will maintain patent airway  1/4/2021 0852 by Bernard Antunez RCP  Outcome: Ongoing  1/4/2021 0630 by Melvina Abernathy RN  Outcome: Ongoing  Goal: Oral health is maintained or improved  Outcome: Ongoing  Goal: Ability to express needs and understand communication  Outcome: Ongoing  Goal: Mobility/activity is maintained at optimum level for patient  Outcome: Ongoing  Goal: Tracheostomy will be managed safely  Outcome: Ongoing     Problem: SKIN INTEGRITY  Goal: Skin integrity is maintained or improved  1/4/2021 0852 by Bernard Antunez RCP  Outcome: Ongoing  1/4/2021 0630 by Melvina Abernathy RN  Outcome: Ongoing
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
## 2021-01-04 NOTE — PROGRESS NOTES
Hyperglycemia · Inadequate oral intake related to impaired respiratory function as evidenced by NPO or clear liquid status due to medical condition, nutrition support - enteral nutrition    Nutrition Interventions:   Food and/or Nutrient Delivery:  Continue Current Tube Feeding  Nutrition Education/Counseling:  Education not indicated   Coordination of Nutrition Care:  Continue to monitor while inpatient    Goals:  meet % of estimated nutrient needs -goal achieved     Nutrition Monitoring and Evaluation:   Food/Nutrient Intake Outcomes:  Enteral Nutrition Intake/Tolerance  Physical Signs/Symptoms Outcomes:  Biochemical Data, Nutrition Focused Physical Findings, Skin, Weight, Fluid Status or Edema     Discharge Planning:     Too soon to determine     Electronically signed by Edd Pool RD, LD on 1/4/21 at 12:07 PM EST    Contact: [phone]
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
## 2021-01-04 NOTE — PROGRESS NOTES
docusate  100 mg Oral Daily    amiodarone  200 mg Oral Daily    Calcium Acetate (Phos Binder)  2,001 mg Oral TID WC    doxepin  10 mg Oral Nightly    escitalopram  20 mg Oral Daily    budesonide-formoterol  2 puff Inhalation BID    acetaminophen  1,000 mg Oral 3 times per day    senna  1 tablet Oral Nightly    bacitracin   Topical BID    silver sulfADIAZINE   Topical BID     Continuous Infusions:    vasopressin (Septic Shock) infusion Stopped (01/02/21 1402)    norepinephrine Stopped (01/02/21 1731)     PRN Meds:  diatrizoate meglumine-sodium, fentanNYL **OR** fentanNYL, midodrine, phenol, sodium chloride, sodium chloride, sodium chloride flush, albumin human, albuterol, chlorhexidine  Home Meds:                Medications Prior to Admission: amiodarone (CORDARONE) 200 MG tablet, Take 1 tablet by mouth daily  hydrOXYzine (VISTARIL) 25 MG capsule, Take 1 capsule by mouth 4 times daily as needed for Anxiety  albuterol sulfate HFA (VENTOLIN HFA) 108 (90 Base) MCG/ACT inhaler, Inhale 1 puff into the lungs every 6 hours as needed for Wheezing  Fluticasone furoate-vilanterol (BREO ELLIPTA) 200-25 MCG/INH AEPB inhaler, Inhale 1 puff into the lungs daily  apixaban (ELIQUIS) 5 MG TABS tablet, Take 1 tablet by mouth 2 times daily  gabapentin (NEURONTIN) 100 MG capsule, Take 1 capsule by mouth 2 times daily for 30 days.   doxepin (SINEQUAN) 10 MG capsule, Take 1 capsule by mouth nightly  escitalopram (LEXAPRO) 20 MG tablet, Take 1 tablet by mouth daily  metoprolol tartrate (LOPRESSOR) 25 MG tablet, Take 1 tablet by mouth 2 times daily  nicotine (NICODERM CQ) 21 MG/24HR, Place 1 patch onto the skin daily  pantoprazole (PROTONIX) 40 MG tablet, Take 1 tablet by mouth 2 times daily  aluminum hydroxide (ALTERNGEL) 320 MG/5ML suspension, 5-10 ml 4 times daily as needed for stomach pain  calcium acetate (PHOSLO) 667 MG capsule, Take 2,001 mg by mouth 3 times daily (with meals)    EXAMINATION     Vitals BP (!) 151/52 Pulse 71   Temp 98.9 °F (37.2 °C) (Oral)   Resp 16   Ht 5' 3\" (1.6 m)   Wt 199 lb 8.3 oz (90.5 kg)   SpO2 96%   BMI 35.34 kg/m²   LE edema  Absent, no icterus,clubbing,JVP not elevated  CVS :S1 S2 normal,no gallop or organic murmur  RS:Vesicular breath sounds,no crackles/wheeze  CNS:awake,alert  PA: non tender,non distended,Bowel sounds present    ASSESSMENT AND PLAN     1. ESRD on intermittent maintenance hemodialysis Monday Wednesday Friday using AV fistula. [facility] unit Dr Jena Boland. Dry weight 84.6 kg  2. Admitted with Jiménez - 10 - 15% TBSA  3. S/p trach and PEG  5. Anemia  6. COPD     The patient was seen and examined while on dialysis. Professional oversight of the patients dialysis care,access care and dialysis related co-morbidities were addressed as necessary with the patient and /or staff.   3 kg off    This note is created with the assistance of a speech-recognition program. While intending to generate a document that actually reflects the content of the visit, no guarantees can be provided that every mistake has been identified and corrected by editing    Gamal Bear MD, MRCP Chantel Brown   1/4/2021 10:11 AM    [facility]

## 2021-01-04 NOTE — PLAN OF CARE
Intervention: Food and/or Nutrient Delivery: (Monitor TF tolerance; suggest goal rate of 50 mL/hr to provide 1800 kcal and 113 g pro/day.)  Nutritional Goals: meet % of estimated nutrient needs     1/4/2021 1528 by Ana Odonnell RN  Outcome: Ongoing     Problem: Musculor/Skeletal Functional Status  Goal: Highest potential functional level  1/4/2021 1528 by Ana Odonnell RN  Outcome: Not Met This Shift     Problem: Skin Integrity:  Goal: Will show no infection signs and symptoms  Description: Will show no infection signs and symptoms  1/4/2021 1528 by Ana Odonnell RN  Outcome: Ongoing     Problem: Skin Integrity:  Goal: Absence of new skin breakdown  Description: Absence of new skin breakdown  1/4/2021 1528 by Ana Odonnell RN  Outcome: Ongoing     Problem: Falls - Risk of:  Goal: Will remain free from falls  Description: Will remain free from falls  1/4/2021 1528 by Ana Odonnell RN  Outcome: Ongoing     Problem: Falls - Risk of:  Goal: Absence of physical injury  Description: Absence of physical injury  1/4/2021 1528 by Ana Odonnell RN  Outcome: Ongoing

## 2021-01-04 NOTE — CARE COORDINATION
Spoke with Juan Jose Pedraza at [address], patient has been accepted and is awaiting bed avail, she will call back in a while to let us know what is available    [address] Call into [place] to check status of bed availability, Attempted to call Echo Guzman but her mailbox was full    [phone] no answer at the main number for [facility], will attempt Echo Guzman again, no answer    1219 Attempt to call Juan Jose Pedraza at [address], voice mail full    [phone] with Juan Jose Pedraza at [address], no bed avail today, hopeful for admission tomorrow

## 2021-01-04 NOTE — PROGRESS NOTES
Dialysis Post Treatment Note  Vitals:    01/04/21 1238   BP: (!) 137/41   Pulse: 71   Resp: 17   Temp: 99.2 °F (37.3 °C)   SpO2: 100%     Pre-Weight = 90.5kg  Post-weight = Weight: 193 lb 5.5 oz (87.7 kg)  Total Liters Processed = Total Liters Processed (l/min): 92.4 l/min  Rinseback Volume (mL) = Rinseback Volume (ml): 290 ml  Net Removal (mL) = NET Removed (ml): 3030 ml  Patient's dry weight= 84.6kg  Type of access used= Left forearm AVF  Length of treatment= 210 mins    Pt tolerated tx well; no acute distress noted pre, during or post tx; floor RN Shannan aware of pt's status.

## 2021-01-05 LAB
ABSOLUTE EOS #: 0.34 K/UL (ref 0–0.44)
ABSOLUTE IMMATURE GRANULOCYTE: 0.56 K/UL (ref 0–0.3)
ABSOLUTE LYMPH #: 1.12 K/UL (ref 1.1–3.7)
ABSOLUTE MONO #: 1.12 K/UL (ref 0.1–1.2)
ANION GAP SERPL CALCULATED.3IONS-SCNC: 12 MMOL/L (ref 9–17)
BASOPHILS # BLD: 1 % (ref 0–2)
BASOPHILS ABSOLUTE: 0.11 K/UL (ref 0–0.2)
BUN BLDV-MCNC: 47 MG/DL (ref 6–20)
BUN/CREAT BLD: ABNORMAL (ref 9–20)
CALCIUM SERPL-MCNC: 9.2 MG/DL (ref 8.6–10.4)
CHLORIDE BLD-SCNC: 95 MMOL/L (ref 98–107)
CO2: 26 MMOL/L (ref 20–31)
CREAT SERPL-MCNC: 4.05 MG/DL (ref 0.5–0.9)
DIFFERENTIAL TYPE: ABNORMAL
EOSINOPHILS RELATIVE PERCENT: 3 % (ref 1–4)
GFR AFRICAN AMERICAN: 14 ML/MIN
GFR NON-AFRICAN AMERICAN: 12 ML/MIN
GFR SERPL CREATININE-BSD FRML MDRD: ABNORMAL ML/MIN/{1.73_M2}
GFR SERPL CREATININE-BSD FRML MDRD: ABNORMAL ML/MIN/{1.73_M2}
GLUCOSE BLD-MCNC: 127 MG/DL (ref 70–99)
HCT VFR BLD CALC: 24.9 % (ref 36.3–47.1)
HEMOGLOBIN: 7.4 G/DL (ref 11.9–15.1)
IMMATURE GRANULOCYTES: 5 %
LYMPHOCYTES # BLD: 10 % (ref 24–43)
MCH RBC QN AUTO: 30 PG (ref 25.2–33.5)
MCHC RBC AUTO-ENTMCNC: 29.7 G/DL (ref 28.4–34.8)
MCV RBC AUTO: 100.8 FL (ref 82.6–102.9)
MONOCYTES # BLD: 10 % (ref 3–12)
MORPHOLOGY: ABNORMAL
MORPHOLOGY: ABNORMAL
NRBC AUTOMATED: 0 PER 100 WBC
PDW BLD-RTO: 17 % (ref 11.8–14.4)
PLATELET # BLD: 254 K/UL (ref 138–453)
PLATELET ESTIMATE: ABNORMAL
PMV BLD AUTO: 9.1 FL (ref 8.1–13.5)
POTASSIUM SERPL-SCNC: 4.5 MMOL/L (ref 3.7–5.3)
RBC # BLD: 2.47 M/UL (ref 3.95–5.11)
RBC # BLD: ABNORMAL 10*6/UL
SEG NEUTROPHILS: 71 % (ref 36–65)
SEGMENTED NEUTROPHILS ABSOLUTE COUNT: 7.95 K/UL (ref 1.5–8.1)
SODIUM BLD-SCNC: 133 MMOL/L (ref 135–144)
WBC # BLD: 11.2 K/UL (ref 3.5–11.3)
WBC # BLD: ABNORMAL 10*3/UL

## 2021-01-05 PROCEDURE — 2700000000 HC OXYGEN THERAPY PER DAY

## 2021-01-05 PROCEDURE — 6370000000 HC RX 637 (ALT 250 FOR IP): Performed by: NURSE PRACTITIONER

## 2021-01-05 PROCEDURE — 2000000000 HC ICU R&B

## 2021-01-05 PROCEDURE — 2500000003 HC RX 250 WO HCPCS: Performed by: STUDENT IN AN ORGANIZED HEALTH CARE EDUCATION/TRAINING PROGRAM

## 2021-01-05 PROCEDURE — 94640 AIRWAY INHALATION TREATMENT: CPT

## 2021-01-05 PROCEDURE — 6370000000 HC RX 637 (ALT 250 FOR IP): Performed by: STUDENT IN AN ORGANIZED HEALTH CARE EDUCATION/TRAINING PROGRAM

## 2021-01-05 PROCEDURE — 6360000002 HC RX W HCPCS: Performed by: NURSE PRACTITIONER

## 2021-01-05 PROCEDURE — 94003 VENT MGMT INPAT SUBQ DAY: CPT

## 2021-01-05 PROCEDURE — 99232 SBSQ HOSP IP/OBS MODERATE 35: CPT | Performed by: INTERNAL MEDICINE

## 2021-01-05 PROCEDURE — 85025 COMPLETE CBC W/AUTO DIFF WBC: CPT

## 2021-01-05 PROCEDURE — 97530 THERAPEUTIC ACTIVITIES: CPT

## 2021-01-05 PROCEDURE — 6370000000 HC RX 637 (ALT 250 FOR IP): Performed by: GENERAL PRACTICE

## 2021-01-05 PROCEDURE — 97110 THERAPEUTIC EXERCISES: CPT

## 2021-01-05 PROCEDURE — 2580000003 HC RX 258: Performed by: STUDENT IN AN ORGANIZED HEALTH CARE EDUCATION/TRAINING PROGRAM

## 2021-01-05 PROCEDURE — 36415 COLL VENOUS BLD VENIPUNCTURE: CPT

## 2021-01-05 PROCEDURE — 94761 N-INVAS EAR/PLS OXIMETRY MLT: CPT

## 2021-01-05 PROCEDURE — 6360000002 HC RX W HCPCS: Performed by: STUDENT IN AN ORGANIZED HEALTH CARE EDUCATION/TRAINING PROGRAM

## 2021-01-05 PROCEDURE — 80048 BASIC METABOLIC PNL TOTAL CA: CPT

## 2021-01-05 RX ORDER — MIDODRINE HYDROCHLORIDE 5 MG/1
5 TABLET ORAL EVERY 8 HOURS
Status: DISCONTINUED | OUTPATIENT
Start: 2021-01-05 | End: 2021-01-06 | Stop reason: HOSPADM

## 2021-01-05 RX ADMIN — BUDESONIDE AND FORMOTEROL FUMARATE DIHYDRATE 2 PUFF: 80; 4.5 AEROSOL RESPIRATORY (INHALATION) at 07:50

## 2021-01-05 RX ADMIN — ACETAMINOPHEN 1000 MG: 500 TABLET ORAL at 05:06

## 2021-01-05 RX ADMIN — OXYCODONE HYDROCHLORIDE 5 MG: 5 TABLET ORAL at 09:19

## 2021-01-05 RX ADMIN — OXYCODONE HYDROCHLORIDE 5 MG: 5 TABLET ORAL at 00:05

## 2021-01-05 RX ADMIN — QUETIAPINE FUMARATE 50 MG: 100 TABLET ORAL at 20:08

## 2021-01-05 RX ADMIN — SODIUM CHLORIDE, PRESERVATIVE FREE 10 ML: 5 INJECTION INTRAVENOUS at 09:25

## 2021-01-05 RX ADMIN — IPRATROPIUM BROMIDE AND ALBUTEROL SULFATE 1 AMPULE: .5; 3 SOLUTION RESPIRATORY (INHALATION) at 15:35

## 2021-01-05 RX ADMIN — OXYCODONE HYDROCHLORIDE 5 MG: 5 TABLET ORAL at 20:08

## 2021-01-05 RX ADMIN — CALCIUM ACETATE 2001 MG: 667 CAPSULE ORAL at 12:29

## 2021-01-05 RX ADMIN — SODIUM CHLORIDE, PRESERVATIVE FREE 10 ML: 5 INJECTION INTRAVENOUS at 20:10

## 2021-01-05 RX ADMIN — ACETAMINOPHEN 1000 MG: 500 TABLET ORAL at 17:31

## 2021-01-05 RX ADMIN — APIXABAN 5 MG: 5 TABLET, FILM COATED ORAL at 09:14

## 2021-01-05 RX ADMIN — APIXABAN 5 MG: 5 TABLET, FILM COATED ORAL at 20:08

## 2021-01-05 RX ADMIN — ACETAMINOPHEN 1000 MG: 500 TABLET ORAL at 21:13

## 2021-01-05 RX ADMIN — BACITRACIN: 500 OINTMENT TOPICAL at 09:25

## 2021-01-05 RX ADMIN — GABAPENTIN 100 MG: 100 CAPSULE ORAL at 09:14

## 2021-01-05 RX ADMIN — Medication 15 MG: at 09:21

## 2021-01-05 RX ADMIN — CALCIUM ACETATE 2001 MG: 667 CAPSULE ORAL at 09:13

## 2021-01-05 RX ADMIN — BACITRACIN: 500 OINTMENT TOPICAL at 22:17

## 2021-01-05 RX ADMIN — GABAPENTIN 100 MG: 100 CAPSULE ORAL at 00:05

## 2021-01-05 RX ADMIN — AMIODARONE HYDROCHLORIDE 200 MG: 200 TABLET ORAL at 09:14

## 2021-01-05 RX ADMIN — IPRATROPIUM BROMIDE AND ALBUTEROL SULFATE 1 AMPULE: .5; 3 SOLUTION RESPIRATORY (INHALATION) at 19:40

## 2021-01-05 RX ADMIN — SILVER SULFADIAZINE: 10 CREAM TOPICAL at 09:25

## 2021-01-05 RX ADMIN — ALBUTEROL SULFATE 2.5 MG: 2.5 SOLUTION RESPIRATORY (INHALATION) at 03:13

## 2021-01-05 RX ADMIN — DOXEPIN HYDROCHLORIDE 10 MG: 10 CAPSULE ORAL at 20:09

## 2021-01-05 RX ADMIN — GABAPENTIN 100 MG: 100 CAPSULE ORAL at 17:31

## 2021-01-05 RX ADMIN — OXYCODONE HYDROCHLORIDE 5 MG: 5 TABLET ORAL at 12:27

## 2021-01-05 RX ADMIN — QUETIAPINE FUMARATE 50 MG: 100 TABLET ORAL at 09:13

## 2021-01-05 RX ADMIN — SENNOSIDES 8.6 MG: 8.6 TABLET, FILM COATED ORAL at 20:09

## 2021-01-05 RX ADMIN — CALCIUM ACETATE 2001 MG: 667 CAPSULE ORAL at 17:31

## 2021-01-05 RX ADMIN — OXYCODONE HYDROCHLORIDE 5 MG: 5 TABLET ORAL at 04:14

## 2021-01-05 RX ADMIN — DOCUSATE SODIUM 100 MG: 50 LIQUID ORAL at 09:14

## 2021-01-05 RX ADMIN — IPRATROPIUM BROMIDE AND ALBUTEROL SULFATE 1 AMPULE: .5; 3 SOLUTION RESPIRATORY (INHALATION) at 07:50

## 2021-01-05 RX ADMIN — Medication 5 MG: at 20:09

## 2021-01-05 RX ADMIN — IPRATROPIUM BROMIDE AND ALBUTEROL SULFATE 1 AMPULE: .5; 3 SOLUTION RESPIRATORY (INHALATION) at 11:25

## 2021-01-05 RX ADMIN — ESCITALOPRAM OXALATE 20 MG: 10 TABLET ORAL at 09:14

## 2021-01-05 RX ADMIN — OXYCODONE HYDROCHLORIDE 5 MG: 5 TABLET ORAL at 17:31

## 2021-01-05 RX ADMIN — FENTANYL CITRATE 50 MCG: 50 INJECTION, SOLUTION INTRAMUSCULAR; INTRAVENOUS at 18:49

## 2021-01-05 RX ADMIN — FENTANYL CITRATE 50 MCG: 50 INJECTION, SOLUTION INTRAMUSCULAR; INTRAVENOUS at 13:30

## 2021-01-05 ASSESSMENT — PAIN SCALES - GENERAL
PAINLEVEL_OUTOF10: 10
PAINLEVEL_OUTOF10: 9
PAINLEVEL_OUTOF10: 10
PAINLEVEL_OUTOF10: 8

## 2021-01-05 ASSESSMENT — PULMONARY FUNCTION TESTS: PIF_VALUE: 11

## 2021-01-05 ASSESSMENT — PAIN DESCRIPTION - PAIN TYPE: TYPE: ACUTE PAIN
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
## 2021-01-05 NOTE — PLAN OF CARE
Problem: OXYGENATION/RESPIRATORY FUNCTION  Goal: Patient will maintain patent airway  1/5/2021 0805 by Jennie Loera RCP  Outcome: Ongoing     Problem: OXYGENATION/RESPIRATORY FUNCTION  Goal: Patient will achieve/maintain normal respiratory rate/effort  Description: Respiratory rate and effort will be within normal limits for the patient  1/5/2021 0805 by Jennie Loera RCP  Outcome: Ongoing     Problem: MECHANICAL VENTILATION  Goal: Patient will maintain patent airway  1/5/2021 0805 by Jennie Loera RCP  Outcome: Ongoing     Problem: MECHANICAL VENTILATION  Goal: Oral health is maintained or improved  Outcome: Ongoing     Problem: MECHANICAL VENTILATION  Goal: Ability to express needs and understand communication  Outcome: Ongoing     Problem: MECHANICAL VENTILATION  Goal: Mobility/activity is maintained at optimum level for patient  Outcome: Ongoing     Problem: MECHANICAL VENTILATION  Goal: Tracheostomy will be managed safely  Outcome: Ongoing

## 2021-01-05 NOTE — PROGRESS NOTES
NEPHROLOGY PROGRESS NOTE      SUBJECTIVE     Underwent hemodialysis yesterday. 3 kg taken off. Tolerated procedure well. Blood pressure stable. On midodrine 4 times a day. Will reduce it to 3 times a day. Tolerating tube feedings well. OBJECTIVE     Vitals:    01/05/21 0750 01/05/21 0753 01/05/21 0800 01/05/21 0900   BP:   (!) 115/54 (!) 157/55   Pulse: 66  66 69   Resp: 15 13 13 15   Temp:   98.8 °F (37.1 °C)    TempSrc:       SpO2: 97% 96% 96% 96%   Weight:       Height:         24HR INTAKE/OUTPUT:      Intake/Output Summary (Last 24 hours) at 1/5/2021 1041  Last data filed at 1/5/2021 0400  Gross per 24 hour   Intake 1038 ml   Output 3440 ml   Net -2402 ml       General appearance: Awake and alert  HEENT: PERRLA  Respiratory::vesicular breath sounds,no wheeze/crackles  Cardiovascular:S1 S2 normal,no gallop or organic murmur. Abdomen:Non tender/non distended. Bowel sounds present  Extremities: No Cyanosis or Clubbing, present lower extremity edema  Neurological: Status post trach and PEG awake and alert.       MEDICATIONS     Scheduled Meds:    midodrine  5 mg Oral Q8H    sodium chloride  250 mL Intravenous Once    oxyCODONE  5 mg Oral Q4H    gabapentin  100 mg Oral Q8H    apixaban  5 mg Oral BID    darbepoetin kolton-polysorbate  40 mcg Intravenous Weekly    QUEtiapine  50 mg Oral BID    albuterol  2.5 mg Nebulization BID    lansoprazole  15 mg Oral QAM    melatonin  5 mg Oral Nightly    sodium chloride flush  10 mL Intravenous 2 times per day    ipratropium-albuterol  1 ampule Inhalation Q4H WA    polyethylene glycol  17 g Oral Daily    docusate  100 mg Oral Daily    amiodarone  200 mg Oral Daily    Calcium Acetate (Phos Binder)  2,001 mg Oral TID WC    doxepin  10 mg Oral Nightly    escitalopram  20 mg Oral Daily    budesonide-formoterol  2 puff Inhalation BID    acetaminophen  1,000 mg Oral 3 times per day    senna  1 tablet Oral Nightly    bacitracin   Topical BID    silver ESRD on intermittent maintenance hemodialysis Monday Wednesday Friday using AV fistula. [facility] unit Dr Elizabeth Malave. Dry weight 84.6 kg  2. Admitted with Jiménez - 10 - 15% TBSA  3.  Status post trach and PEG  4. Anemia  5.  COPD     PLAN     Reduce midodrine to 3 times a day  Hemodialysis scheduled  Okay to discharge if outpatient arrangements for hemodialysis secured    Please do not hesitate to call with questions    This note is created with the assistance of a speech-recognition program. While intending to generate a document that actually reflects the content of the visit, no guarantees can be provided that every mistake has been identified and corrected by editing    Mara Stubbs MD, MRCP Sharad Cox, Marianna Kendrick   1/5/2021 10:41 AM  [facility]

## 2021-01-05 NOTE — CARE COORDINATION
Call into [facility] to check status of bed availability for patient, CB number left on AM    [phone] went to voicemail, will attempt again    [phone] Spoke with patients tristan Quiles, [facility], message left with [facility] again at [address]. Choice [facility], referral sent and Ronda notified    [phone] [facility], pt will be going to room 27, Dr Collins Client spoke with Dr Valarie Huitron at [facility], patients daughter notified of transfer, [facility] Gerber can transfer per ACLS unit at 5:30pm, Verified with patients RN, OK for transport. SHIELA complete, MAR included in paperwork, along with H&P and face sheet. Report number provided to patients RN.     [phone] AVS faxed to [facility], Patients tristan Quiles notified of transport time, Ottoniel Fairbanks from [facility] notified patient will not be transporting to their facility    [phone] Call from [facility] in [place] they cannot transport until 1030, ok to transport that late per [facility] and patients [facility] [place] notified ok to transport, they will be here at 10:30    Discharge [facility] Case Management Department  Written by: Donna Chamberlain RN    Patient Name: Amol Zhou  Attending Provider: Anirudh Mcdaniels MD  Admit Date: 2020  5:27 AM  MRN: [id number]  Account: [de-identified]                     : 1965  Discharge Date: 2021      Disposition:  To [facility], [address]

## 2021-01-05 NOTE — PLAN OF CARE
Problem: OXYGENATION/RESPIRATORY FUNCTION  Goal: Patient will maintain patent airway  1/5/2021 0515 by Abdi Martinez RN  Outcome: Ongoing     Problem: OXYGENATION/RESPIRATORY FUNCTION  Goal: Patient will achieve/maintain normal respiratory rate/effort  Description: Respiratory rate and effort will be within normal limits for the patient  1/5/2021 0515 by Roberto Carlos Monteiro RN  Outcome: Ongoing     Problem: MECHANICAL VENTILATION  Goal: Patient will maintain patent airway  1/5/2021 0515 by Abdi Martinez RN  Outcome: Ongoing     Problem: SKIN INTEGRITY  Goal: Skin integrity is maintained or improved  1/5/2021 0515 by Moi Monteiro RN  Outcome: Ongoing     Problem: Pain:  Goal: Pain level will decrease  Description: Pain level will decrease  1/5/2021 0515 by Moi Monteiro RN  Outcome: Ongoing     Problem: Pain:  Goal: Control of acute pain  Description: Control of acute pain  1/5/2021 0515 by Moi Monteiro RN  Outcome: Ongoing     Problem: Pain:  Goal: Control of chronic pain  Description: Control of chronic pain  1/5/2021 0515 by Moi Monteiro RN  Outcome: Ongoing     Problem: Nutrition  Goal: Optimal nutrition therapy  Description: Nutrition Problem #1: Inadequate oral intake  Intervention: Food and/or Nutrient Delivery: (Monitor TF tolerance; suggest goal rate of 50 mL/hr to provide 1800 kcal and 113 g pro/day.)  Nutritional Goals: meet % of estimated nutrient needs     1/5/2021 0515 by Moi Monteiro RN  Outcome: Ongoing     Problem: Skin Integrity:  Goal: Will show no infection signs and symptoms  Description: Will show no infection signs and symptoms  1/5/2021 0515 by Abdi Martinez RN  Outcome: Ongoing     Problem: Skin Integrity:  Goal: Absence of new skin breakdown  Description: Absence of new skin breakdown  1/5/2021 0515 by Moi Monteiro RN  Outcome: Ongoing     Problem: Falls - Risk of:  Goal: Will remain free from falls  Description: Will remain free from falls 1/5/2021 0515 by Cheyenne Monteiro RN  Outcome: Ongoing     Problem: Falls - Risk of:  Goal: Absence of physical injury  Description: Absence of physical injury  1/5/2021 0515 by Cheyenne Monteiro RN  Outcome: Ongoing     Problem: Musculor/Skeletal Functional Status  Goal: Highest potential functional level  1/5/2021 0515 by Igor Mead RN  Outcome: Ongoing

## 2021-01-06 ENCOUNTER — HOSPITAL ENCOUNTER (OUTPATIENT)
Dept: INTERVENTIONAL RADIOLOGY/VASCULAR | Age: 56
Discharge: HOME | End: 2021-01-06
Attending: INTERNAL MEDICINE
Payer: MEDICARE

## 2021-01-06 ENCOUNTER — HOSPITAL ENCOUNTER (OUTPATIENT)
Age: 56
Discharge: HOME | End: 2021-02-17
Attending: INTERNAL MEDICINE | Admitting: INTERNAL MEDICINE
Payer: COMMERCIAL

## 2021-01-06 VITALS
HEART RATE: 57 BPM | BODY MASS INDEX: 34.57 KG/M2 | DIASTOLIC BLOOD PRESSURE: 51 MMHG | HEIGHT: 63 IN | WEIGHT: 195.11 LBS | TEMPERATURE: 98.3 F | OXYGEN SATURATION: 98 % | RESPIRATION RATE: 12 BRPM | SYSTOLIC BLOOD PRESSURE: 156 MMHG

## 2021-01-06 LAB
ALBUMIN SERPL-MCNC: 3.5 G/DL (ref 3.5–5.1)
ALLEN TEST: POSITIVE
ALP BLD-CCNC: 176 U/L (ref 38–126)
ALT SERPL-CCNC: < 5 U/L (ref 11–66)
ANION GAP SERPL CALCULATED.3IONS-SCNC: 16 MEQ/L (ref 8–16)
ANISOCYTOSIS: PRESENT
AST SERPL-CCNC: 18 U/L (ref 5–40)
BASE EXCESS (CALCULATED): 0.8 MMOL/L (ref -2.5–2.5)
BASOPHILS # BLD: 1 %
BASOPHILS ABSOLUTE: 0.1 THOU/MM3 (ref 0–0.1)
BILIRUB SERPL-MCNC: 0.2 MG/DL (ref 0.3–1.2)
BUN BLDV-MCNC: 67 MG/DL (ref 7–22)
CALCIUM SERPL-MCNC: 10.3 MG/DL (ref 8.5–10.5)
CHLORIDE BLD-SCNC: 95 MEQ/L (ref 98–111)
CO2: 22 MEQ/L (ref 23–33)
COLLECTED BY:: ABNORMAL
CREAT SERPL-MCNC: 5.4 MG/DL (ref 0.4–1.2)
DEVICE: ABNORMAL
EOSINOPHIL # BLD: 3 %
EOSINOPHILS ABSOLUTE: 0.3 THOU/MM3 (ref 0–0.4)
ERYTHROCYTE [DISTWIDTH] IN BLOOD BY AUTOMATED COUNT: 17 % (ref 11.5–14.5)
ERYTHROCYTE [DISTWIDTH] IN BLOOD BY AUTOMATED COUNT: 69.4 FL (ref 35–45)
GFR SERPL CREATININE-BSD FRML MDRD: 8 ML/MIN/1.73M2
GLUCOSE BLD-MCNC: 78 MG/DL (ref 70–108)
HCO3: 27 MMOL/L (ref 23–28)
HCT VFR BLD CALC: 29.8 % (ref 37–47)
HEMOGLOBIN: 8.1 GM/DL (ref 12–16)
HYPOCHROMIA: PRESENT
IFIO2: 40
IMMATURE GRANS (ABS): 0.5 THOU/MM3 (ref 0–0.07)
IMMATURE GRANULOCYTES: 4.9 %
LYMPHOCYTES # BLD: 12.4 %
LYMPHOCYTES ABSOLUTE: 1.3 THOU/MM3 (ref 1–4.8)
MACROCYTES: PRESENT
MCH RBC QN AUTO: 30.1 PG (ref 26–33)
MCHC RBC AUTO-ENTMCNC: 27.2 GM/DL (ref 32.2–35.5)
MCV RBC AUTO: 110.8 FL (ref 81–99)
MODE: AC
MONOCYTES # BLD: 10.5 %
MONOCYTES ABSOLUTE: 1.1 THOU/MM3 (ref 0.4–1.3)
NUCLEATED RED BLOOD CELLS: 0 /100 WBC
O2 SATURATION: 97 %
PCO2: 53 MMHG (ref 35–45)
PH BLOOD GAS: 7.32 (ref 7.35–7.45)
PLATELET # BLD: 245 THOU/MM3 (ref 130–400)
PLATELET ESTIMATE: ADEQUATE
PMV BLD AUTO: 8.9 FL (ref 9.4–12.4)
PO2: 99 MMHG (ref 71–104)
POTASSIUM SERPL-SCNC: 4.7 MEQ/L (ref 3.5–5.2)
POTASSIUM SERPL-SCNC: 6.1 MEQ/L (ref 3.5–5.2)
PREALBUMIN: 15.8 MG/DL (ref 20–40)
RBC # BLD: 2.69 MILL/MM3 (ref 4.2–5.4)
SCAN OF BLOOD SMEAR: NORMAL
SEG NEUTROPHILS: 68.2 %
SEGMENTED NEUTROPHILS ABSOLUTE COUNT: 7 THOU/MM3 (ref 1.8–7.7)
SET PEEP: 5 MMHG
SET RESPIRATORY RATE: 14 BPM
SET TIDAL VOLUME: 420 ML
SODIUM BLD-SCNC: 133 MEQ/L (ref 135–145)
SOURCE, BLOOD GAS: ABNORMAL
TOTAL PROTEIN: 6.3 G/DL (ref 6.1–8)
WBC # BLD: 10.2 THOU/MM3 (ref 4.8–10.8)

## 2021-01-06 PROCEDURE — 36556 INSERT NON-TUNNEL CV CATH: CPT

## 2021-01-06 PROCEDURE — 77001 FLUOROGUIDE FOR VEIN DEVICE: CPT

## 2021-01-06 PROCEDURE — 6360000002 HC RX W HCPCS

## 2021-01-06 PROCEDURE — 2500000003 HC RX 250 WO HCPCS

## 2021-01-06 NOTE — PROGRESS NOTES
Writer RN called [facility] and give report to [employer], all questions and concerned answered. Patient left the unit on tele monitor and 6L of oxygen, also the patient left with her belongings. Electronically signed by Saurav Sr RN on 1/6/2021 at 1:34 AM
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
## 2021-01-06 NOTE — H&P
Formulation and discussion of sedation / procedure plans, risks, benefits, side effects and alternatives with patient and/or responsible adult completed.     Electronically signed by Sapphire Hirsch MD on 1/6/2021 at 6:07 PM

## 2021-01-06 NOTE — PROGRESS NOTES
65 Pt in specials radiology for IJ PICC insertion. Explained procedure to pt and pt verbalizes understanding. Consent signed per floor RN prior to arrival. Dr Hardy  to speak to pt.  6675 Pt moved to table and attached to monitor. 1742 Right neck prepped and draped. 1751 5 fr dual lumen PICC placed in right IJ per Dr Hardy  and sutured to right neck. [address] placed at catheter site with op-site dressing. Site without redness, swelling or hematoma. 1855 Pt positioned in bed for comfort. Report to RN. 1807 Transferred to [place] per bed.
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
## 2021-01-07 LAB
ANION GAP SERPL CALCULATED.3IONS-SCNC: 11 MEQ/L (ref 8–16)
BASOPHILS # BLD: 1 %
BASOPHILS ABSOLUTE: 0.1 THOU/MM3 (ref 0–0.1)
BUN BLDV-MCNC: 39 MG/DL (ref 7–22)
CALCIUM SERPL-MCNC: 9.7 MG/DL (ref 8.5–10.5)
CHLORIDE BLD-SCNC: 97 MEQ/L (ref 98–111)
CO2: 26 MEQ/L (ref 23–33)
CREAT SERPL-MCNC: 3.6 MG/DL (ref 0.4–1.2)
EOSINOPHIL # BLD: 2.9 %
EOSINOPHILS ABSOLUTE: 0.3 THOU/MM3 (ref 0–0.4)
ERYTHROCYTE [DISTWIDTH] IN BLOOD BY AUTOMATED COUNT: 16.7 % (ref 11.5–14.5)
ERYTHROCYTE [DISTWIDTH] IN BLOOD BY AUTOMATED COUNT: 63.2 FL (ref 35–45)
GFR SERPL CREATININE-BSD FRML MDRD: 13 ML/MIN/1.73M2
GLUCOSE BLD-MCNC: 96 MG/DL (ref 70–108)
HCT VFR BLD CALC: 27 % (ref 37–47)
HEMOGLOBIN: 7.7 GM/DL (ref 12–16)
HYPOCHROMIA: PRESENT
IMMATURE GRANS (ABS): 0.43 THOU/MM3 (ref 0–0.07)
IMMATURE GRANULOCYTES: 4.5 %
LYMPHOCYTES # BLD: 10.9 %
LYMPHOCYTES ABSOLUTE: 1 THOU/MM3 (ref 1–4.8)
MAGNESIUM: 2.4 MG/DL (ref 1.6–2.4)
MCH RBC QN AUTO: 29.8 PG (ref 26–33)
MCHC RBC AUTO-ENTMCNC: 28.5 GM/DL (ref 32.2–35.5)
MCV RBC AUTO: 104.7 FL (ref 81–99)
MONOCYTES # BLD: 8.6 %
MONOCYTES ABSOLUTE: 0.8 THOU/MM3 (ref 0.4–1.3)
NUCLEATED RED BLOOD CELLS: 0 /100 WBC
PHOSPHORUS: 2 MG/DL (ref 2.4–4.7)
PLATELET # BLD: 275 THOU/MM3 (ref 130–400)
PMV BLD AUTO: 9 FL (ref 9.4–12.4)
POTASSIUM SERPL-SCNC: 4.8 MEQ/L (ref 3.5–5.2)
RBC # BLD: 2.58 MILL/MM3 (ref 4.2–5.4)
SEG NEUTROPHILS: 72.1 %
SEGMENTED NEUTROPHILS ABSOLUTE COUNT: 6.9 THOU/MM3 (ref 1.8–7.7)
SODIUM BLD-SCNC: 134 MEQ/L (ref 135–145)
TSH SERPL DL<=0.05 MIU/L-ACNC: 3.55 UIU/ML (ref 0.4–4.2)
WBC # BLD: 9.6 THOU/MM3 (ref 4.8–10.8)

## 2021-01-08 LAB
ANION GAP SERPL CALCULATED.3IONS-SCNC: 11 MEQ/L (ref 8–16)
BUN BLDV-MCNC: 61 MG/DL (ref 7–22)
CALCIUM SERPL-MCNC: 10 MG/DL (ref 8.5–10.5)
CHLORIDE BLD-SCNC: 93 MEQ/L (ref 98–111)
CO2: 26 MEQ/L (ref 23–33)
CREAT SERPL-MCNC: 4.8 MG/DL (ref 0.4–1.2)
GFR SERPL CREATININE-BSD FRML MDRD: 9 ML/MIN/1.73M2
GLUCOSE BLD-MCNC: 114 MG/DL (ref 70–108)
POTASSIUM SERPL-SCNC: 5.4 MEQ/L (ref 3.5–5.2)
SODIUM BLD-SCNC: 130 MEQ/L (ref 135–145)

## 2021-01-09 LAB
ANION GAP SERPL CALCULATED.3IONS-SCNC: 11 MEQ/L (ref 8–16)
BUN BLDV-MCNC: 50 MG/DL (ref 7–22)
CALCIUM SERPL-MCNC: 9.9 MG/DL (ref 8.5–10.5)
CHLORIDE BLD-SCNC: 99 MEQ/L (ref 98–111)
CO2: 25 MEQ/L (ref 23–33)
CREAT SERPL-MCNC: 3.4 MG/DL (ref 0.4–1.2)
FERRITIN: 1048 NG/ML (ref 10–291)
GFR SERPL CREATININE-BSD FRML MDRD: 14 ML/MIN/1.73M2
GLUCOSE BLD-MCNC: 94 MG/DL (ref 70–108)
HCT VFR BLD CALC: 25.3 % (ref 37–47)
HEMOGLOBIN: 7.4 GM/DL (ref 12–16)
IRON: 27 UG/DL (ref 50–170)
PHOSPHORUS: 1.3 MG/DL (ref 2.4–4.7)
POTASSIUM SERPL-SCNC: 5.3 MEQ/L (ref 3.5–5.2)
SODIUM BLD-SCNC: 135 MEQ/L (ref 135–145)
TOTAL IRON BINDING CAPACITY: 165 UG/DL (ref 171–450)

## 2021-01-10 LAB
ANION GAP SERPL CALCULATED.3IONS-SCNC: 12 MEQ/L (ref 8–16)
BUN BLDV-MCNC: 73 MG/DL (ref 7–22)
CALCIUM SERPL-MCNC: 9.8 MG/DL (ref 8.5–10.5)
CHLORIDE BLD-SCNC: 97 MEQ/L (ref 98–111)
CO2: 27 MEQ/L (ref 23–33)
CREAT SERPL-MCNC: 5.3 MG/DL (ref 0.4–1.2)
GFR SERPL CREATININE-BSD FRML MDRD: 8 ML/MIN/1.73M2
GLUCOSE BLD-MCNC: 90 MG/DL (ref 70–108)
MAGNESIUM: 2.8 MG/DL (ref 1.6–2.4)
PHOSPHORUS: 2.8 MG/DL (ref 2.4–4.7)
POTASSIUM SERPL-SCNC: 5.4 MEQ/L (ref 3.5–5.2)
SODIUM BLD-SCNC: 136 MEQ/L (ref 135–145)

## 2021-01-11 LAB
ANION GAP SERPL CALCULATED.3IONS-SCNC: 13 MEQ/L (ref 8–16)
BASOPHILS # BLD: 0.8 %
BASOPHILS ABSOLUTE: 0.1 THOU/MM3 (ref 0–0.1)
BUN BLDV-MCNC: 96 MG/DL (ref 7–22)
CALCIUM SERPL-MCNC: 9.6 MG/DL (ref 8.5–10.5)
CHLORIDE BLD-SCNC: 96 MEQ/L (ref 98–111)
CO2: 26 MEQ/L (ref 23–33)
CREAT SERPL-MCNC: 5.8 MG/DL (ref 0.4–1.2)
EOSINOPHIL # BLD: 0.7 %
EOSINOPHILS ABSOLUTE: 0.1 THOU/MM3 (ref 0–0.4)
ERYTHROCYTE [DISTWIDTH] IN BLOOD BY AUTOMATED COUNT: 17.2 % (ref 11.5–14.5)
ERYTHROCYTE [DISTWIDTH] IN BLOOD BY AUTOMATED COUNT: 63.7 FL (ref 35–45)
GFR SERPL CREATININE-BSD FRML MDRD: 8 ML/MIN/1.73M2
GLUCOSE BLD-MCNC: 115 MG/DL (ref 70–108)
HCT VFR BLD CALC: 25.3 % (ref 37–47)
HCT VFR BLD CALC: 25.7 % (ref 37–47)
HEMOGLOBIN: 7.4 GM/DL (ref 12–16)
HEMOGLOBIN: 7.5 GM/DL (ref 12–16)
IMMATURE GRANS (ABS): 0.21 THOU/MM3 (ref 0–0.07)
IMMATURE GRANULOCYTES: 2.3 %
LYMPHOCYTES # BLD: 9.4 %
LYMPHOCYTES ABSOLUTE: 0.9 THOU/MM3 (ref 1–4.8)
MAGNESIUM: 2.9 MG/DL (ref 1.6–2.4)
MCH RBC QN AUTO: 30.5 PG (ref 26–33)
MCHC RBC AUTO-ENTMCNC: 29.6 GM/DL (ref 32.2–35.5)
MCV RBC AUTO: 102.8 FL (ref 81–99)
MONOCYTES # BLD: 8.9 %
MONOCYTES ABSOLUTE: 0.8 THOU/MM3 (ref 0.4–1.3)
NUCLEATED RED BLOOD CELLS: 0 /100 WBC
PHOSPHORUS: 2.5 MG/DL (ref 2.4–4.7)
PLATELET # BLD: 274 THOU/MM3 (ref 130–400)
PMV BLD AUTO: 9.3 FL (ref 9.4–12.4)
POTASSIUM SERPL-SCNC: 5.2 MEQ/L (ref 3.5–5.2)
RBC # BLD: 2.46 MILL/MM3 (ref 4.2–5.4)
SEG NEUTROPHILS: 77.9 %
SEGMENTED NEUTROPHILS ABSOLUTE COUNT: 7.1 THOU/MM3 (ref 1.8–7.7)
SODIUM BLD-SCNC: 135 MEQ/L (ref 135–145)
WBC # BLD: 9.1 THOU/MM3 (ref 4.8–10.8)

## 2021-01-12 LAB
ANION GAP SERPL CALCULATED.3IONS-SCNC: 14 MEQ/L (ref 8–16)
BUN BLDV-MCNC: 64 MG/DL (ref 7–22)
CALCIUM SERPL-MCNC: 9.7 MG/DL (ref 8.5–10.5)
CHLORIDE BLD-SCNC: 92 MEQ/L (ref 98–111)
CO2: 26 MEQ/L (ref 23–33)
CREAT SERPL-MCNC: 4 MG/DL (ref 0.4–1.2)
GFR SERPL CREATININE-BSD FRML MDRD: 12 ML/MIN/1.73M2
GLUCOSE BLD-MCNC: 124 MG/DL (ref 70–108)
PHOSPHORUS: 1.9 MG/DL (ref 2.4–4.7)
POTASSIUM SERPL-SCNC: 4.6 MEQ/L (ref 3.5–5.2)
SODIUM BLD-SCNC: 132 MEQ/L (ref 135–145)

## 2021-01-13 LAB
ANION GAP SERPL CALCULATED.3IONS-SCNC: 17 MEQ/L (ref 8–16)
ANISOCYTOSIS: PRESENT
BASOPHILIA: ABNORMAL
BASOPHILS # BLD: 0.3 %
BASOPHILS ABSOLUTE: 0.1 THOU/MM3 (ref 0–0.1)
BLOOD CULTURE, ROUTINE: ABNORMAL
BUN BLDV-MCNC: 90 MG/DL (ref 7–22)
CALCIUM SERPL-MCNC: 9.8 MG/DL (ref 8.5–10.5)
CHLORIDE BLD-SCNC: 94 MEQ/L (ref 98–111)
CO2: 23 MEQ/L (ref 23–33)
CREAT SERPL-MCNC: 5.2 MG/DL (ref 0.4–1.2)
EOSINOPHIL # BLD: 0.4 %
EOSINOPHILS ABSOLUTE: 0.1 THOU/MM3 (ref 0–0.4)
ERYTHROCYTE [DISTWIDTH] IN BLOOD BY AUTOMATED COUNT: 17.4 % (ref 11.5–14.5)
ERYTHROCYTE [DISTWIDTH] IN BLOOD BY AUTOMATED COUNT: 65.3 FL (ref 35–45)
GFR SERPL CREATININE-BSD FRML MDRD: 9 ML/MIN/1.73M2
GLUCOSE BLD-MCNC: 117 MG/DL (ref 70–108)
HCT VFR BLD CALC: 24.5 % (ref 37–47)
HEMOGLOBIN: 7.1 GM/DL (ref 12–16)
IMMATURE GRANS (ABS): 0.36 THOU/MM3 (ref 0–0.07)
IMMATURE GRANULOCYTES: 1.5 %
LYMPHOCYTES # BLD: 3.9 %
LYMPHOCYTES ABSOLUTE: 1 THOU/MM3 (ref 1–4.8)
MAGNESIUM: 2.3 MG/DL (ref 1.6–2.4)
MCH RBC QN AUTO: 29.6 PG (ref 26–33)
MCHC RBC AUTO-ENTMCNC: 29 GM/DL (ref 32.2–35.5)
MCV RBC AUTO: 102.1 FL (ref 81–99)
MONOCYTES # BLD: 5.5 %
MONOCYTES ABSOLUTE: 1.3 THOU/MM3 (ref 0.4–1.3)
NUCLEATED RED BLOOD CELLS: 0 /100 WBC
ORGANISM: ABNORMAL
ORGANISM: ABNORMAL
PHOSPHORUS: 2.5 MG/DL (ref 2.4–4.7)
PLATELET # BLD: 235 THOU/MM3 (ref 130–400)
PLATELET ESTIMATE: ADEQUATE
PMV BLD AUTO: 9.5 FL (ref 9.4–12.4)
POTASSIUM SERPL-SCNC: 5.3 MEQ/L (ref 3.5–5.2)
RBC # BLD: 2.4 MILL/MM3 (ref 4.2–5.4)
SCAN OF BLOOD SMEAR: NORMAL
SEG NEUTROPHILS: 88.4 %
SEGMENTED NEUTROPHILS ABSOLUTE COUNT: 21.7 THOU/MM3 (ref 1.8–7.7)
SODIUM BLD-SCNC: 134 MEQ/L (ref 135–145)
WBC # BLD: 24.5 THOU/MM3 (ref 4.8–10.8)

## 2021-01-14 LAB
ABO: NORMAL
ANION GAP SERPL CALCULATED.3IONS-SCNC: 14 MEQ/L (ref 8–16)
ANISOCYTOSIS: PRESENT
ANTIBODY SCREEN: NORMAL
BASOPHILIA: ABNORMAL
BASOPHILS # BLD: 0.3 %
BASOPHILS ABSOLUTE: 0.1 THOU/MM3 (ref 0–0.1)
BUN BLDV-MCNC: 70 MG/DL (ref 7–22)
CALCIUM SERPL-MCNC: 9.7 MG/DL (ref 8.5–10.5)
CHLORIDE BLD-SCNC: 99 MEQ/L (ref 98–111)
CO2: 24 MEQ/L (ref 23–33)
CREAT SERPL-MCNC: 3.9 MG/DL (ref 0.4–1.2)
DIFFERENTIAL TYPE: ABNORMAL
EOSINOPHIL # BLD: 2.7 %
EOSINOPHILS ABSOLUTE: 0.5 THOU/MM3 (ref 0–0.4)
ERYTHROCYTE [DISTWIDTH] IN BLOOD BY AUTOMATED COUNT: 17.5 % (ref 11.5–14.5)
ERYTHROCYTE [DISTWIDTH] IN BLOOD BY AUTOMATED COUNT: 65.8 FL (ref 35–45)
GFR SERPL CREATININE-BSD FRML MDRD: 12 ML/MIN/1.73M2
GLUCOSE BLD-MCNC: 114 MG/DL (ref 70–108)
HCT VFR BLD CALC: 23.2 % (ref 37–47)
HEMOGLOBIN: 6.9 GM/DL (ref 12–16)
IMMATURE GRANS (ABS): 0.4 THOU/MM3 (ref 0–0.07)
IMMATURE GRANULOCYTES: 2.1 %
LYMPHOCYTES # BLD: 4.9 %
LYMPHOCYTES ABSOLUTE: 0.9 THOU/MM3 (ref 1–4.8)
MCH RBC QN AUTO: 30.4 PG (ref 26–33)
MCHC RBC AUTO-ENTMCNC: 29.7 GM/DL (ref 32.2–35.5)
MCV RBC AUTO: 102.2 FL (ref 81–99)
MONOCYTES # BLD: 7.7 %
MONOCYTES ABSOLUTE: 1.5 THOU/MM3 (ref 0.4–1.3)
NUCLEATED RED BLOOD CELLS: 0 /100 WBC
PATHOLOGIST REVIEW: ABNORMAL
PLATELET # BLD: 227 THOU/MM3 (ref 130–400)
PLATELET ESTIMATE: ADEQUATE
PMV BLD AUTO: 9.6 FL (ref 9.4–12.4)
POTASSIUM SERPL-SCNC: 4.4 MEQ/L (ref 3.5–5.2)
RBC # BLD: 2.27 MILL/MM3 (ref 4.2–5.4)
RH FACTOR: NORMAL
SCAN OF BLOOD SMEAR: NORMAL
SEG NEUTROPHILS: 82.3 %
SEGMENTED NEUTROPHILS ABSOLUTE COUNT: 15.8 THOU/MM3 (ref 1.8–7.7)
SODIUM BLD-SCNC: 137 MEQ/L (ref 135–145)
WBC # BLD: 19.2 THOU/MM3 (ref 4.8–10.8)

## 2021-01-14 PROCEDURE — 93307 TTE W/O DOPPLER COMPLETE: CPT

## 2021-01-15 LAB
ANION GAP SERPL CALCULATED.3IONS-SCNC: 16 MEQ/L (ref 8–16)
ANISOCYTOSIS: PRESENT
BASOPHILIA: ABNORMAL
BASOPHILS # BLD: 0.3 %
BASOPHILS ABSOLUTE: 0.1 THOU/MM3 (ref 0–0.1)
BUN BLDV-MCNC: 95 MG/DL (ref 7–22)
CALCIUM SERPL-MCNC: 10.1 MG/DL (ref 8.5–10.5)
CHLORIDE BLD-SCNC: 97 MEQ/L (ref 98–111)
CO2: 22 MEQ/L (ref 23–33)
CREAT SERPL-MCNC: 5 MG/DL (ref 0.4–1.2)
EOSINOPHIL # BLD: 3.3 %
EOSINOPHILS ABSOLUTE: 0.6 THOU/MM3 (ref 0–0.4)
ERYTHROCYTE [DISTWIDTH] IN BLOOD BY AUTOMATED COUNT: 19.8 % (ref 11.5–14.5)
ERYTHROCYTE [DISTWIDTH] IN BLOOD BY AUTOMATED COUNT: 71.5 FL (ref 35–45)
GFR SERPL CREATININE-BSD FRML MDRD: 9 ML/MIN/1.73M2
GLUCOSE BLD-MCNC: 102 MG/DL (ref 70–108)
HCT VFR BLD CALC: 25.2 % (ref 37–47)
HEMOGLOBIN: 7.4 GM/DL (ref 12–16)
IMMATURE GRANS (ABS): 0.49 THOU/MM3 (ref 0–0.07)
IMMATURE GRANULOCYTES: 2.8 %
LYMPHOCYTES # BLD: 8 %
LYMPHOCYTES ABSOLUTE: 1.4 THOU/MM3 (ref 1–4.8)
MCH RBC QN AUTO: 29.1 PG (ref 26–33)
MCHC RBC AUTO-ENTMCNC: 29.4 GM/DL (ref 32.2–35.5)
MCV RBC AUTO: 99.2 FL (ref 81–99)
MONOCYTES # BLD: 8.4 %
MONOCYTES ABSOLUTE: 1.5 THOU/MM3 (ref 0.4–1.3)
NUCLEATED RED BLOOD CELLS: 0 /100 WBC
PLATELET # BLD: 237 THOU/MM3 (ref 130–400)
PLATELET ESTIMATE: ADEQUATE
PMV BLD AUTO: 9.6 FL (ref 9.4–12.4)
POTASSIUM SERPL-SCNC: 5 MEQ/L (ref 3.5–5.2)
RBC # BLD: 2.54 MILL/MM3 (ref 4.2–5.4)
SCAN OF BLOOD SMEAR: NORMAL
SEG NEUTROPHILS: 77.2 %
SEGMENTED NEUTROPHILS ABSOLUTE COUNT: 13.4 THOU/MM3 (ref 1.8–7.7)
SODIUM BLD-SCNC: 135 MEQ/L (ref 135–145)
VANCOMYCIN RANDOM: 13.6 UG/ML (ref 0.1–39.9)
WBC # BLD: 17.3 THOU/MM3 (ref 4.8–10.8)

## 2021-01-16 LAB
ANION GAP SERPL CALCULATED.3IONS-SCNC: 13 MEQ/L (ref 8–16)
ANISOCYTOSIS: PRESENT
BASOPHILS # BLD: 0.5 %
BASOPHILS ABSOLUTE: 0.1 THOU/MM3 (ref 0–0.1)
BUN BLDV-MCNC: 65 MG/DL (ref 7–22)
CALCIUM SERPL-MCNC: 9.7 MG/DL (ref 8.5–10.5)
CHLORIDE BLD-SCNC: 98 MEQ/L (ref 98–111)
CO2: 24 MEQ/L (ref 23–33)
CREAT SERPL-MCNC: 3.7 MG/DL (ref 0.4–1.2)
EOSINOPHIL # BLD: 4.2 %
EOSINOPHILS ABSOLUTE: 0.6 THOU/MM3 (ref 0–0.4)
ERYTHROCYTE [DISTWIDTH] IN BLOOD BY AUTOMATED COUNT: 19.9 % (ref 11.5–14.5)
ERYTHROCYTE [DISTWIDTH] IN BLOOD BY AUTOMATED COUNT: 70.7 FL (ref 35–45)
GFR SERPL CREATININE-BSD FRML MDRD: 13 ML/MIN/1.73M2
GLUCOSE BLD-MCNC: 104 MG/DL (ref 70–108)
GRAM STAIN RESULT: ABNORMAL
HCT VFR BLD CALC: 25.2 % (ref 37–47)
HEMOGLOBIN: 7.4 GM/DL (ref 12–16)
IMMATURE GRANS (ABS): 0.59 THOU/MM3 (ref 0–0.07)
IMMATURE GRANULOCYTES: 4 %
LYMPHOCYTES # BLD: 10.1 %
LYMPHOCYTES ABSOLUTE: 1.5 THOU/MM3 (ref 1–4.8)
MCH RBC QN AUTO: 29.1 PG (ref 26–33)
MCHC RBC AUTO-ENTMCNC: 29.4 GM/DL (ref 32.2–35.5)
MCV RBC AUTO: 99.2 FL (ref 81–99)
MONOCYTES # BLD: 9.8 %
MONOCYTES ABSOLUTE: 1.5 THOU/MM3 (ref 0.4–1.3)
NUCLEATED RED BLOOD CELLS: 0 /100 WBC
ORGANISM: ABNORMAL
ORGANISM: ABNORMAL
PLATELET # BLD: 221 THOU/MM3 (ref 130–400)
PMV BLD AUTO: 9.6 FL (ref 9.4–12.4)
POTASSIUM SERPL-SCNC: 4.4 MEQ/L (ref 3.5–5.2)
PREALBUMIN: 10.3 MG/DL (ref 20–40)
RBC # BLD: 2.54 MILL/MM3 (ref 4.2–5.4)
RESPIRATORY CULTURE: ABNORMAL
RESPIRATORY CULTURE: ABNORMAL
SEG NEUTROPHILS: 71.4 %
SEGMENTED NEUTROPHILS ABSOLUTE COUNT: 10.6 THOU/MM3 (ref 1.8–7.7)
SODIUM BLD-SCNC: 135 MEQ/L (ref 135–145)
WBC # BLD: 14.9 THOU/MM3 (ref 4.8–10.8)

## 2021-01-17 ENCOUNTER — APPOINTMENT (OUTPATIENT)
Dept: GENERAL RADIOLOGY | Age: 56
End: 2021-01-17
Attending: INTERNAL MEDICINE
Payer: COMMERCIAL

## 2021-01-17 LAB
ANION GAP SERPL CALCULATED.3IONS-SCNC: 16 MEQ/L (ref 8–16)
ANISOCYTOSIS: PRESENT
BASOPHILS # BLD: 0.7 %
BASOPHILS ABSOLUTE: 0.1 THOU/MM3 (ref 0–0.1)
BUN BLDV-MCNC: 86 MG/DL (ref 7–22)
CALCIUM SERPL-MCNC: 9.7 MG/DL (ref 8.5–10.5)
CHLORIDE BLD-SCNC: 96 MEQ/L (ref 98–111)
CO2: 22 MEQ/L (ref 23–33)
CREAT SERPL-MCNC: 4.2 MG/DL (ref 0.4–1.2)
EOSINOPHIL # BLD: 5 %
EOSINOPHILS ABSOLUTE: 0.8 THOU/MM3 (ref 0–0.4)
ERYTHROCYTE [DISTWIDTH] IN BLOOD BY AUTOMATED COUNT: 19.9 % (ref 11.5–14.5)
ERYTHROCYTE [DISTWIDTH] IN BLOOD BY AUTOMATED COUNT: 72.9 FL (ref 35–45)
GFR SERPL CREATININE-BSD FRML MDRD: 11 ML/MIN/1.73M2
GLUCOSE BLD-MCNC: 99 MG/DL (ref 70–108)
HCT VFR BLD CALC: 26.1 % (ref 37–47)
HEMOGLOBIN: 7.5 GM/DL (ref 12–16)
HYPOCHROMIA: PRESENT
IMMATURE GRANS (ABS): 0.74 THOU/MM3 (ref 0–0.07)
IMMATURE GRANULOCYTES: 4.9 %
LYMPHOCYTES # BLD: 10 %
LYMPHOCYTES ABSOLUTE: 1.5 THOU/MM3 (ref 1–4.8)
MCH RBC QN AUTO: 29 PG (ref 26–33)
MCHC RBC AUTO-ENTMCNC: 28.7 GM/DL (ref 32.2–35.5)
MCV RBC AUTO: 100.8 FL (ref 81–99)
MONOCYTES # BLD: 7.6 %
MONOCYTES ABSOLUTE: 1.1 THOU/MM3 (ref 0.4–1.3)
NUCLEATED RED BLOOD CELLS: 0 /100 WBC
PLATELET # BLD: 237 THOU/MM3 (ref 130–400)
PMV BLD AUTO: 9.5 FL (ref 9.4–12.4)
POTASSIUM SERPL-SCNC: 4.9 MEQ/L (ref 3.5–5.2)
RBC # BLD: 2.59 MILL/MM3 (ref 4.2–5.4)
SEG NEUTROPHILS: 71.8 %
SEGMENTED NEUTROPHILS ABSOLUTE COUNT: 10.8 THOU/MM3 (ref 1.8–7.7)
SODIUM BLD-SCNC: 134 MEQ/L (ref 135–145)
WBC # BLD: 15.1 THOU/MM3 (ref 4.8–10.8)

## 2021-01-17 PROCEDURE — 71045 X-RAY EXAM CHEST 1 VIEW: CPT

## 2021-01-18 LAB
ANION GAP SERPL CALCULATED.3IONS-SCNC: 19 MEQ/L (ref 8–16)
ANISOCYTOSIS: PRESENT
BASOPHILS # BLD: 0.6 %
BASOPHILS ABSOLUTE: 0.1 THOU/MM3 (ref 0–0.1)
BLOOD CULTURE, ROUTINE: NORMAL
BUN BLDV-MCNC: 108 MG/DL (ref 7–22)
CALCIUM SERPL-MCNC: 10.2 MG/DL (ref 8.5–10.5)
CHLORIDE BLD-SCNC: 97 MEQ/L (ref 98–111)
CO2: 20 MEQ/L (ref 23–33)
CREAT SERPL-MCNC: 5.4 MG/DL (ref 0.4–1.2)
EOSINOPHIL # BLD: 4.9 %
EOSINOPHILS ABSOLUTE: 0.7 THOU/MM3 (ref 0–0.4)
ERYTHROCYTE [DISTWIDTH] IN BLOOD BY AUTOMATED COUNT: 19.9 % (ref 11.5–14.5)
ERYTHROCYTE [DISTWIDTH] IN BLOOD BY AUTOMATED COUNT: 72.5 FL (ref 35–45)
GFR SERPL CREATININE-BSD FRML MDRD: 8 ML/MIN/1.73M2
GLUCOSE BLD-MCNC: 100 MG/DL (ref 70–108)
HBV SURFACE AB TITR SER: NEGATIVE {TITER}
HCT VFR BLD CALC: 26.5 % (ref 37–47)
HEMOGLOBIN: 7.6 GM/DL (ref 12–16)
HEPATITIS B CORE IGM ANTIBODY: NEGATIVE
HEPATITIS B SURFACE ANTIGEN: NEGATIVE
HYPOCHROMIA: PRESENT
IMMATURE GRANS (ABS): 0.98 THOU/MM3 (ref 0–0.07)
IMMATURE GRANULOCYTES: 7 %
LYMPHOCYTES # BLD: 10.3 %
LYMPHOCYTES ABSOLUTE: 1.4 THOU/MM3 (ref 1–4.8)
MCH RBC QN AUTO: 29.3 PG (ref 26–33)
MCHC RBC AUTO-ENTMCNC: 28.7 GM/DL (ref 32.2–35.5)
MCV RBC AUTO: 102.3 FL (ref 81–99)
MONOCYTES # BLD: 7 %
MONOCYTES ABSOLUTE: 1 THOU/MM3 (ref 0.4–1.3)
NUCLEATED RED BLOOD CELLS: 0 /100 WBC
PLATELET # BLD: 251 THOU/MM3 (ref 130–400)
PMV BLD AUTO: 9.9 FL (ref 9.4–12.4)
POTASSIUM SERPL-SCNC: 5.6 MEQ/L (ref 3.5–5.2)
PREALBUMIN: 13.6 MG/DL (ref 20–40)
RBC # BLD: 2.59 MILL/MM3 (ref 4.2–5.4)
SEG NEUTROPHILS: 70.2 %
SEGMENTED NEUTROPHILS ABSOLUTE COUNT: 9.8 THOU/MM3 (ref 1.8–7.7)
SODIUM BLD-SCNC: 136 MEQ/L (ref 135–145)
VANCOMYCIN RANDOM: 20 UG/ML (ref 0.1–39.9)
WBC # BLD: 13.9 THOU/MM3 (ref 4.8–10.8)

## 2021-01-19 LAB
ANION GAP SERPL CALCULATED.3IONS-SCNC: 15 MEQ/L (ref 8–16)
BUN BLDV-MCNC: 68 MG/DL (ref 7–22)
CALCIUM SERPL-MCNC: 9.8 MG/DL (ref 8.5–10.5)
CHLORIDE BLD-SCNC: 98 MEQ/L (ref 98–111)
CO2: 22 MEQ/L (ref 23–33)
CREAT SERPL-MCNC: 3.8 MG/DL (ref 0.4–1.2)
GFR SERPL CREATININE-BSD FRML MDRD: 12 ML/MIN/1.73M2
GLUCOSE BLD-MCNC: 112 MG/DL (ref 70–108)
POTASSIUM SERPL-SCNC: 4.9 MEQ/L (ref 3.5–5.2)
SODIUM BLD-SCNC: 135 MEQ/L (ref 135–145)

## 2021-01-20 LAB
ALLEN TEST: POSITIVE
ANION GAP SERPL CALCULATED.3IONS-SCNC: 18 MEQ/L (ref 8–16)
ANISOCYTOSIS: PRESENT
BASE EXCESS (CALCULATED): -1.8 MMOL/L (ref -2.5–2.5)
BASOPHILS # BLD: 0.8 %
BASOPHILS ABSOLUTE: 0.1 THOU/MM3 (ref 0–0.1)
BUN BLDV-MCNC: 88 MG/DL (ref 7–22)
CALCIUM SERPL-MCNC: 10.1 MG/DL (ref 8.5–10.5)
CHLORIDE BLD-SCNC: 98 MEQ/L (ref 98–111)
CO2: 21 MEQ/L (ref 23–33)
COLLECTED BY:: ABNORMAL
CREAT SERPL-MCNC: 4.7 MG/DL (ref 0.4–1.2)
DEVICE: ABNORMAL
EOSINOPHIL # BLD: 4.8 %
EOSINOPHILS ABSOLUTE: 0.9 THOU/MM3 (ref 0–0.4)
ERYTHROCYTE [DISTWIDTH] IN BLOOD BY AUTOMATED COUNT: 20.7 % (ref 11.5–14.5)
ERYTHROCYTE [DISTWIDTH] IN BLOOD BY AUTOMATED COUNT: 74.9 FL (ref 35–45)
GFR SERPL CREATININE-BSD FRML MDRD: 10 ML/MIN/1.73M2
GLUCOSE BLD-MCNC: 102 MG/DL (ref 70–108)
HCO3: 24 MMOL/L (ref 23–28)
HCT VFR BLD CALC: 27.8 % (ref 37–47)
HEMOGLOBIN: 8 GM/DL (ref 12–16)
HYPOCHROMIA: PRESENT
IFIO2: 30
IMMATURE GRANS (ABS): 1.33 THOU/MM3 (ref 0–0.07)
IMMATURE GRANULOCYTES: 7.3 %
LYMPHOCYTES # BLD: 9.2 %
LYMPHOCYTES ABSOLUTE: 1.7 THOU/MM3 (ref 1–4.8)
MCH RBC QN AUTO: 29.4 PG (ref 26–33)
MCHC RBC AUTO-ENTMCNC: 28.8 GM/DL (ref 32.2–35.5)
MCV RBC AUTO: 102.2 FL (ref 81–99)
MONOCYTES # BLD: 6.4 %
MONOCYTES ABSOLUTE: 1.2 THOU/MM3 (ref 0.4–1.3)
NUCLEATED RED BLOOD CELLS: 0 /100 WBC
O2 SATURATION: 85 %
PCO2: 44 MMHG (ref 35–45)
PH BLOOD GAS: 7.35 (ref 7.35–7.45)
PLATELET # BLD: 272 THOU/MM3 (ref 130–400)
PMV BLD AUTO: 9.5 FL (ref 9.4–12.4)
PO2: 53 MMHG (ref 71–104)
POTASSIUM SERPL-SCNC: 5.3 MEQ/L (ref 3.5–5.2)
RBC # BLD: 2.72 MILL/MM3 (ref 4.2–5.4)
SEG NEUTROPHILS: 71.5 %
SEGMENTED NEUTROPHILS ABSOLUTE COUNT: 13 THOU/MM3 (ref 1.8–7.7)
SODIUM BLD-SCNC: 137 MEQ/L (ref 135–145)
SOURCE, BLOOD GAS: ABNORMAL
WBC # BLD: 18.2 THOU/MM3 (ref 4.8–10.8)

## 2021-01-21 LAB
ANION GAP SERPL CALCULATED.3IONS-SCNC: 12 MEQ/L (ref 8–16)
ANISOCYTOSIS: PRESENT
BASOPHILS # BLD: 0.4 %
BASOPHILS ABSOLUTE: 0.1 THOU/MM3 (ref 0–0.1)
BUN BLDV-MCNC: 53 MG/DL (ref 7–22)
CALCIUM SERPL-MCNC: 9.5 MG/DL (ref 8.5–10.5)
CHLORIDE BLD-SCNC: 99 MEQ/L (ref 98–111)
CO2: 23 MEQ/L (ref 23–33)
CREAT SERPL-MCNC: 3.4 MG/DL (ref 0.4–1.2)
EOSINOPHIL # BLD: 4.1 %
EOSINOPHILS ABSOLUTE: 0.6 THOU/MM3 (ref 0–0.4)
ERYTHROCYTE [DISTWIDTH] IN BLOOD BY AUTOMATED COUNT: 20.8 % (ref 11.5–14.5)
ERYTHROCYTE [DISTWIDTH] IN BLOOD BY AUTOMATED COUNT: 73.6 FL (ref 35–45)
GFR SERPL CREATININE-BSD FRML MDRD: 14 ML/MIN/1.73M2
GLUCOSE BLD-MCNC: 113 MG/DL (ref 70–108)
HCT VFR BLD CALC: 24.9 % (ref 37–47)
HEMOGLOBIN: 7.2 GM/DL (ref 12–16)
HYPOCHROMIA: PRESENT
IMMATURE GRANS (ABS): 0.92 THOU/MM3 (ref 0–0.07)
IMMATURE GRANULOCYTES: 6.7 %
LYMPHOCYTES # BLD: 9.6 %
LYMPHOCYTES ABSOLUTE: 1.3 THOU/MM3 (ref 1–4.8)
MAGNESIUM: 2.1 MG/DL (ref 1.6–2.4)
MCH RBC QN AUTO: 29.4 PG (ref 26–33)
MCHC RBC AUTO-ENTMCNC: 28.9 GM/DL (ref 32.2–35.5)
MCV RBC AUTO: 101.6 FL (ref 81–99)
MONOCYTES # BLD: 7.2 %
MONOCYTES ABSOLUTE: 1 THOU/MM3 (ref 0.4–1.3)
NUCLEATED RED BLOOD CELLS: 0 /100 WBC
PHOSPHORUS: 3 MG/DL (ref 2.4–4.7)
PLATELET # BLD: 247 THOU/MM3 (ref 130–400)
PMV BLD AUTO: 9.3 FL (ref 9.4–12.4)
POTASSIUM SERPL-SCNC: 4.4 MEQ/L (ref 3.5–5.2)
RBC # BLD: 2.45 MILL/MM3 (ref 4.2–5.4)
SEG NEUTROPHILS: 72 %
SEGMENTED NEUTROPHILS ABSOLUTE COUNT: 9.9 THOU/MM3 (ref 1.8–7.7)
SODIUM BLD-SCNC: 134 MEQ/L (ref 135–145)
WBC # BLD: 13.8 THOU/MM3 (ref 4.8–10.8)

## 2021-01-22 LAB
ANION GAP SERPL CALCULATED.3IONS-SCNC: 12 MEQ/L (ref 8–16)
ANISOCYTOSIS: PRESENT
BASOPHILIA: ABNORMAL
BASOPHILIC STIPPLING: ABNORMAL
BASOPHILS # BLD: 0.5 %
BASOPHILS ABSOLUTE: 0.1 THOU/MM3 (ref 0–0.1)
BUN BLDV-MCNC: 79 MG/DL (ref 7–22)
CALCIUM SERPL-MCNC: 9.9 MG/DL (ref 8.5–10.5)
CHLORIDE BLD-SCNC: 96 MEQ/L (ref 98–111)
CO2: 24 MEQ/L (ref 23–33)
CREAT SERPL-MCNC: 4.5 MG/DL (ref 0.4–1.2)
EOSINOPHIL # BLD: 3.5 %
EOSINOPHILS ABSOLUTE: 0.5 THOU/MM3 (ref 0–0.4)
ERYTHROCYTE [DISTWIDTH] IN BLOOD BY AUTOMATED COUNT: 20.6 % (ref 11.5–14.5)
ERYTHROCYTE [DISTWIDTH] IN BLOOD BY AUTOMATED COUNT: 74 FL (ref 35–45)
GFR SERPL CREATININE-BSD FRML MDRD: 10 ML/MIN/1.73M2
GLUCOSE BLD-MCNC: 111 MG/DL (ref 70–108)
HCT VFR BLD CALC: 25.5 % (ref 37–47)
HEMOGLOBIN: 7.4 GM/DL (ref 12–16)
IMMATURE GRANS (ABS): 0.6 THOU/MM3 (ref 0–0.07)
IMMATURE GRANULOCYTES: 4.3 %
LYMPHOCYTES # BLD: 10.9 %
LYMPHOCYTES ABSOLUTE: 1.5 THOU/MM3 (ref 1–4.8)
MCH RBC QN AUTO: 28.8 PG (ref 26–33)
MCHC RBC AUTO-ENTMCNC: 29 GM/DL (ref 32.2–35.5)
MCV RBC AUTO: 99.2 FL (ref 81–99)
MONOCYTES # BLD: 7 %
MONOCYTES ABSOLUTE: 1 THOU/MM3 (ref 0.4–1.3)
NUCLEATED RED BLOOD CELLS: 0 /100 WBC
PLATELET # BLD: 251 THOU/MM3 (ref 130–400)
PMV BLD AUTO: 9.7 FL (ref 9.4–12.4)
POTASSIUM SERPL-SCNC: 5.2 MEQ/L (ref 3.5–5.2)
RBC # BLD: 2.57 MILL/MM3 (ref 4.2–5.4)
SCAN OF BLOOD SMEAR: NORMAL
SEG NEUTROPHILS: 73.8 %
SEGMENTED NEUTROPHILS ABSOLUTE COUNT: 10.3 THOU/MM3 (ref 1.8–7.7)
SODIUM BLD-SCNC: 132 MEQ/L (ref 135–145)
WBC # BLD: 13.9 THOU/MM3 (ref 4.8–10.8)

## 2021-01-23 LAB
ANION GAP SERPL CALCULATED.3IONS-SCNC: 12 MEQ/L (ref 8–16)
ANISOCYTOSIS: PRESENT
BASOPHILS # BLD: 0.4 %
BASOPHILS ABSOLUTE: 0 THOU/MM3 (ref 0–0.1)
BUN BLDV-MCNC: 58 MG/DL (ref 7–22)
CALCIUM SERPL-MCNC: 9.6 MG/DL (ref 8.5–10.5)
CHLORIDE BLD-SCNC: 93 MEQ/L (ref 98–111)
CO2: 28 MEQ/L (ref 23–33)
CREAT SERPL-MCNC: 3.6 MG/DL (ref 0.4–1.2)
EOSINOPHIL # BLD: 4.3 %
EOSINOPHILS ABSOLUTE: 0.5 THOU/MM3 (ref 0–0.4)
ERYTHROCYTE [DISTWIDTH] IN BLOOD BY AUTOMATED COUNT: 20.2 % (ref 11.5–14.5)
ERYTHROCYTE [DISTWIDTH] IN BLOOD BY AUTOMATED COUNT: 72.6 FL (ref 35–45)
GFR SERPL CREATININE-BSD FRML MDRD: 13 ML/MIN/1.73M2
GLUCOSE BLD-MCNC: 93 MG/DL (ref 70–108)
HCT VFR BLD CALC: 24.8 % (ref 37–47)
HEMOGLOBIN: 7.4 GM/DL (ref 12–16)
IMMATURE GRANS (ABS): 0.3 THOU/MM3 (ref 0–0.07)
IMMATURE GRANULOCYTES: 2.7 %
LYMPHOCYTES # BLD: 10.8 %
LYMPHOCYTES ABSOLUTE: 1.2 THOU/MM3 (ref 1–4.8)
MCH RBC QN AUTO: 29.5 PG (ref 26–33)
MCHC RBC AUTO-ENTMCNC: 29.8 GM/DL (ref 32.2–35.5)
MCV RBC AUTO: 98.8 FL (ref 81–99)
MONOCYTES # BLD: 9 %
MONOCYTES ABSOLUTE: 1 THOU/MM3 (ref 0.4–1.3)
NUCLEATED RED BLOOD CELLS: 0 /100 WBC
PLATELET # BLD: 247 THOU/MM3 (ref 130–400)
PMV BLD AUTO: 9.3 FL (ref 9.4–12.4)
POTASSIUM SERPL-SCNC: 4.4 MEQ/L (ref 3.5–5.2)
RBC # BLD: 2.51 MILL/MM3 (ref 4.2–5.4)
SEG NEUTROPHILS: 72.8 %
SEGMENTED NEUTROPHILS ABSOLUTE COUNT: 8.2 THOU/MM3 (ref 1.8–7.7)
SODIUM BLD-SCNC: 133 MEQ/L (ref 135–145)
WBC # BLD: 11.3 THOU/MM3 (ref 4.8–10.8)

## 2021-01-25 ENCOUNTER — APPOINTMENT (OUTPATIENT)
Dept: GENERAL RADIOLOGY | Age: 56
End: 2021-01-25
Attending: INTERNAL MEDICINE
Payer: COMMERCIAL

## 2021-01-25 LAB
ANION GAP SERPL CALCULATED.3IONS-SCNC: 16 MEQ/L (ref 8–16)
ANISOCYTOSIS: PRESENT
BASOPHILS # BLD: 0.4 %
BASOPHILS ABSOLUTE: 0 THOU/MM3 (ref 0–0.1)
BUN BLDV-MCNC: 99 MG/DL (ref 7–22)
CALCIUM SERPL-MCNC: 9.5 MG/DL (ref 8.5–10.5)
CHLORIDE BLD-SCNC: 93 MEQ/L (ref 98–111)
CO2: 27 MEQ/L (ref 23–33)
CREAT SERPL-MCNC: 5.3 MG/DL (ref 0.4–1.2)
EOSINOPHIL # BLD: 4.6 %
EOSINOPHILS ABSOLUTE: 0.5 THOU/MM3 (ref 0–0.4)
ERYTHROCYTE [DISTWIDTH] IN BLOOD BY AUTOMATED COUNT: 19.7 % (ref 11.5–14.5)
ERYTHROCYTE [DISTWIDTH] IN BLOOD BY AUTOMATED COUNT: 71.4 FL (ref 35–45)
GFR SERPL CREATININE-BSD FRML MDRD: 8 ML/MIN/1.73M2
GLUCOSE BLD-MCNC: 103 MG/DL (ref 70–108)
HCT VFR BLD CALC: 27.5 % (ref 37–47)
HEMOGLOBIN: 8 GM/DL (ref 12–16)
IMMATURE GRANS (ABS): 0.13 THOU/MM3 (ref 0–0.07)
IMMATURE GRANULOCYTES: 1.2 %
LYMPHOCYTES # BLD: 10.9 %
LYMPHOCYTES ABSOLUTE: 1.2 THOU/MM3 (ref 1–4.8)
MCH RBC QN AUTO: 28.9 PG (ref 26–33)
MCHC RBC AUTO-ENTMCNC: 29.1 GM/DL (ref 32.2–35.5)
MCV RBC AUTO: 99.3 FL (ref 81–99)
MONOCYTES # BLD: 6.2 %
MONOCYTES ABSOLUTE: 0.7 THOU/MM3 (ref 0.4–1.3)
NUCLEATED RED BLOOD CELLS: 0 /100 WBC
PLATELET # BLD: 267 THOU/MM3 (ref 130–400)
PMV BLD AUTO: 9.5 FL (ref 9.4–12.4)
POTASSIUM SERPL-SCNC: 5.9 MEQ/L (ref 3.5–5.2)
RBC # BLD: 2.77 MILL/MM3 (ref 4.2–5.4)
SEG NEUTROPHILS: 76.7 %
SEGMENTED NEUTROPHILS ABSOLUTE COUNT: 8.6 THOU/MM3 (ref 1.8–7.7)
SODIUM BLD-SCNC: 136 MEQ/L (ref 135–145)
VANCOMYCIN TROUGH: 19.2 UG/ML (ref 10–20)
WBC # BLD: 11.2 THOU/MM3 (ref 4.8–10.8)

## 2021-01-25 PROCEDURE — 71045 X-RAY EXAM CHEST 1 VIEW: CPT
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
## 2021-03-14 PROBLEM — G93.41 ACUTE METABOLIC ENCEPHALOPATHY: Status: ACTIVE | Noted: 2021-01-01

## 2021-03-14 NOTE — ED NOTES
Bed: 023A  Expected date: 3/14/21  Expected time: 1:54 PM  Means of arrival: [facility]  Comments:     Jose Bennett RN  03/14/21 1441
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
## 2021-03-14 NOTE — PROGRESS NOTES
Patient admitted to 4A Room 10 from ED and via cart/stretcher  No complaints upon arrival to the room. IV site free of s/s of infection or infiltration. Vital signs obtained. Assessment and data collection initiated. Oriented to room. Policies and procedures for 4a explained All questions answered with no further questions at this time. Fall prevention and safety brochure discussed with patient. 2 person skin check completed with [place]. Patient declines PCP notification. Patient declines family notification.
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
## 2021-03-15 NOTE — PLAN OF CARE
Problem: Falls - Risk of:  Goal: Will remain free from falls  Description: Will remain free from falls  Outcome: Ongoing  Note: Call light in reach, bed in lowest position, and bed alarm activated. Education given on use of call light before ambulation and when in need of assistance. Patient expressed understanding. Hourly visual checks performed and charted. Toileting offered to patient. No falls this shift, at any time. Arm band and falling star in place. Will continue to monitor. Goal: Absence of physical injury  Description: Absence of physical injury  Outcome: Ongoing  Note: Call light in reach, bed in lowest position, and bed alarm activated. Education given on use of call light before ambulation and when in need of assistance. Patient expressed understanding. Hourly visual checks performed and charted. Toileting offered to patient. No falls this shift, at any time. Arm band and falling star in place. Will continue to monitor. Problem: Skin Integrity:  Goal: Absence of new skin breakdown  Description: Absence of new skin breakdown  Outcome: Ongoing  Note: No new signs of skin breakdown. Skin warm, dry, and intact. Mucous membranes pink and moist.  Assistance with turns/ambulation provided every 2 hours and PRN. Will continue to monitor. Problem: Airway Clearance - Ineffective:  Goal: Clear lung sounds  Description: Clear lung sounds  Outcome: Ongoing  Note: Patient's has crackles in her lower bases. Zosyn and Vanc for treatment of PNA. Goal: Ability to maintain a clear airway will improve  Description: Ability to maintain a clear airway will improve  Outcome: Ongoing  Note: Patient remains trached. Francisco Javier Heimlich is capped and patient maintains a patent airway. Problem: Gas Exchange - Impaired:  Goal: Levels of oxygenation will improve  Description: Levels of oxygenation will improve  Outcome: Ongoing  Note: Patient is on chronic oxygen.  Patient requiring 4 L/min to maintain adequate oxygen saturations. Problem: Fluid Volume:  Goal: Will show no signs or symptoms of fluid imbalance  Description: Will show no signs or symptoms of fluid imbalance  Outcome: Ongoing  Note: Patient's BNP is greater than 70,000. Crackles in the bases. Plan is dialyze in the morning. Problem: PAIN  Goal: Patient's pain/discomfort is manageable  Outcome: Ongoing  Note: Patient able to use 0-10 pain scale. Patient rates pain at a 6 out of 10 in her back. Patient has a pain goal of \"no pain. \" Agreeable to take PRN pain medications. PRN OxyIR given. Problem: DISCHARGE BARRIERS  Goal: Patient's continuum of care needs are met  Outcome: Ongoing  Note: Discharge planning in process and discussed with patient/family. Social work consulted for any additional needs. Care manager aware of discharge needs. Patient is from [facility] and plans to return there upon discharge. Problem: Nutrition Deficit:  Goal: Ability to achieve adequate nutritional intake will improve  Description: Ability to achieve adequate nutritional intake will improve  Outcome: Ongoing  Note: Patient has PEG tube from recent tracheostomy. Patient on oral nutrition at the [facility] ([place], [place]). Dietician and SLP to reevaluate patient. Problem: Infection - Methicillin-Resistant Staphylococcus Aureus Infection:  Goal: Absence of methicillin-resistant Staphylococcus aureus infection  Description: Absence of methicillin-resistant Staphylococcus aureus infection  Outcome: Ongoing  Note: Contact precautions remain in place. Care plan reviewed with patient and family. Patient and family verbalize understanding of the plan of care and contribute to goal setting.

## 2021-03-15 NOTE — PROGRESS NOTES
Attempted to evaluate patient, she is off the floor for Dialysis, will re attempt later.    Rome Branch MD

## 2021-03-15 NOTE — FLOWSHEET NOTE
03/15/21 0815 03/15/21 1115   Vital Signs   BP (!) 102/55 (!) 113/55   Temp 96.8 °F (36 °C) 97 °F (36.1 °C)   Pulse 54 66   Resp 18 20   Weight 180 lb 12.4 oz (82 kg) 177 lb 4 oz (80.4 kg)   Post-Hemodialysis Assessment   Post-Treatment Procedures  --  Blood returned; Access bleeding time < 10 minutes   Machine Disinfection Process  --  Acid/Vinegar Clean;Heat Disinfect; Exterior Machine Disinfection   Total Liters Processed (l/min)  --  59.2 l/min   Dialyzer Clearance  --  Lightly streaked   Duration of Treatment (minutes)  --  150 minutes   Hemodialysis Intake (ml)  --  400 ml   Hemodialysis Output (ml)  --  2060 ml   NET Removed (ml)  --  1660 ml   Tolerated Treatment  --  Poor   patient completed 2.5hours of prescribed 4 hour treatment with 1.6L fluid removal tolerated fair. Pt c/o back pain during treatment. Primary RN updated and delivered prn pain medication to patient during dialysis. Pt remained in pain. Patient encouraged to complete dialysis treatment and give the PO pain medication time to relieve the pain. Non medical comfort measures attempted with no relief. Pt demanded off of dialysis machine. Dr. Stephanie Thrasher updated and new orders given to stop treatment for today and try tomorrow. Pressure held to each site x10 min. Dressing clean dry and intact upon leaving the unit. Report called to primary nurse. tx to be scanned into EMR.

## 2021-03-15 NOTE — PROGRESS NOTES
[facility] THERAPY MISSED TREATMENT NOTE  [facility]      Date: 3/15/2021  Patient Name: Savage Rater        MRN: [id number]    : 1965  (48 y.o.)    REASON FOR MISSED TREATMENT:  Attempted to see pt at 6818 for completion of clinical swallow evaluation+cognitive linguistic assessment. At time of arrival, pt unavailable d/t off the unit for dialysis. Per chart review, pt current with PEG tube, most recent MBS completed at [facility] 2021 recommending minced and moist diet with thin liquids. Will f/u on subsequent tx date to complete evaluations as indicated.        Yadi Sandhu M.A., [address] [place] [address]

## 2021-03-15 NOTE — PLAN OF CARE
Problem: Nutrition  Goal: Optimal nutrition therapy  Outcome: Ongoing   Nutrition Problem #1: Inadequate oral intake  Intervention: Food and/or Nutrient Delivery: Start Tube Feeding(diet as per speech therapy/ doctor)  Nutritional Goals: Tube feeding to meet 75% or more of nutritional needs until able to transition to oral diet.
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
## 2021-03-15 NOTE — PROGRESS NOTES
Attempted MRI on 3/15. Patient began thrashing around inside scanner, banging on the scanner with her hands, and kicking her feet. Patient states she cant do this due to her anxiety. No images were obtained. RN notified.
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
## 2021-03-15 NOTE — PROGRESS NOTES
Pharmacy Renal Adjustment    Indio Davis is a 48 y.o. female. Pharmacy renally adjusted the following medications per P&T approved policy: Zosyn    Recent Labs     03/14/21  1445   BUN 83*   CREATININE 8.1*     Estimated Creatinine Clearance: 8 mL/min (A) (based on SCr of 8.1 mg/dL [facility])). Calculated CrCl: HD pt    Height:   Ht Readings from Last 1 Encounters:   03/14/21 5' 3\" (1.6 m)     Weight:  Wt Readings from Last 1 Encounters:   03/14/21 172 lb 12.8 oz (78.4 kg)     Plan: Adjustments based on renal function:          Decrease Zosyn to 2.25 grams iv every 8 hours.     Sher Medina PharmD  3/14/2021   9:59 PM
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
## 2021-03-15 NOTE — PROGRESS NOTES
[facility] [address]  PHYSICAL THERAPY MISSED TREATMENT NOTE  [facility]    Date: 3/15/2021  Patient Name: Indio Davis        MRN: [id number]   : 1965  (48 y.o.)  Gender: female      REASON FOR MISSED TREATMENT:  Pt currently at dialysis, will check back as able.
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
## 2021-03-16 NOTE — PROCEDURES
[address] [place] [address]                          ELECTROENCEPHALOGRAM REPORT    PATIENT NAME: Sarita Hampton                        :        1965  MED REC NO:   [id number]                           ROOM:       0010  ACCOUNT NO:   [de-identified]                           ADMIT DATE: 2021  PROVIDER:     Shin Vinson MD    DATE OF EE/15/2021    REFERRING PROVIDER:  En Dc DO    CLINICAL HISTORY:  A 71-year-old female presenting with altered mental  status. The patient was found without oxygen with a low saturation. Past medical history is significant for anxiety; asthma; renal failure,  on hemodialysis; COPD; chronic respiratory failure with hypoxia, on  oxygen. Medications listed are vancomycin, famotidine, fentanyl,  apixaban, amiodarone, escitalopram, midodrine, quetiapine,  lactobacillus, melatonin, piperacillin/tazobactam.    CLINICAL INTERPRETATION:  This is a 17-channel EEG performed without  sleep deprivation. Hyperventilation was not performed. Photic  stimulation was not performed. The patient is described as lethargic. Background rhythm activity is noted to be slowed, poorly organized. Excessive slowing was seen in theta, and occasional delta range activity  suggestive of cortical dysfunction such as seen with encephalopathy. Triphasic waves were noted suggestive of a metabolic picture. Hyperventilation was not performed. Intermittent delta slowing is  observed that may suggest a paradoxical cortical dysfunction, or  indicate seizure tendency. There was no satisfactory background rhythm  reached throughout this recording. The patient was noted to be asleep  throughout most of the recording. IMPRESSION:  This is an abnormal EEG due to the excessive slowing seen  in the delta range suggestive of cortical dysfunction such as seen with  encephalopathy. Clinical correlation is recommended.

## 2021-03-16 NOTE — PROGRESS NOTES
[facility]  PHYSICAL THERAPY MISSED TREATMENT NOTE  [facility] 4A    Date: 3/16/2021  Patient Name: Scotty Adams        MRN: [id number]   : 1965  (48 y.o.)  Gender: female                REASON FOR MISSED TREATMENT:  Patient refused treatment. Pt refuses PT, just finished with OT. Will check back tomorrow.

## 2021-03-16 NOTE — PROGRESS NOTES
[facility]  PHYSICAL THERAPY MISSED TREATMENT NOTE  [facility] 4A    Date: 3/16/2021  Patient Name: Kina Lam        MRN: [id number]   : 1965  (48 y.o.)  Gender: female                REASON FOR MISSED TREATMENT:  Missed Treat. Pt off the unit for dialysis.

## 2021-03-16 NOTE — FLOWSHEET NOTE
03/16/21 0727 03/16/21 1200   Vital Signs   /71 (!) 161/84   Temp 98 °F (36.7 °C) 97.9 °F (36.6 °C)   Pulse 64 71   Resp 15 19   SpO2 98 %  --    Weight 162 lb 14.7 oz (73.9 kg) 162 lb 14.7 oz (73.9 kg)   Weight Method Bed scale Bed scale   Post-Hemodialysis Assessment   Post-Treatment Procedures  --  Blood returned; Access bleeding time < 10 minutes   Machine Disinfection Process  --  Acid/Vinegar Clean;Heat Disinfect; Exterior Machine Disinfection   Total Liters Processed (l/min)  --  89.8 l/min   Dialyzer Clearance  --  Lightly streaked   Duration of Treatment (minutes)  --  240 minutes   Heparin amount administered during treatment (units)  --  0 units   Hemodialysis Intake (ml)  --  400 ml   Hemodialysis Output (ml)  --  2400 ml   NET Removed (ml)  --  2000 ml   Tolerated Treatment  --  Good   Stable 4.0 hr tx. Removed 2.0 L of fluid. pt tolerated fluid removal well. Pressure held to each needle site x 10 min. Drsg dry and intact upon leaving unit. TX record printed for scanning into EMR. Report  called to primary RN.

## 2021-03-16 NOTE — PLAN OF CARE
Problem: Falls - Risk of:  Goal: Will remain free from falls  Description: Will remain free from falls  Outcome: Ongoing  Goal: Absence of physical injury  Description: Absence of physical injury  Outcome: Ongoing     Problem: Skin Integrity:  Goal: Will show no infection signs and symptoms  Description: Will show no infection signs and symptoms  Outcome: Ongoing  Goal: Absence of new skin breakdown  Description: Absence of new skin breakdown  Outcome: Ongoing     Problem: Airway Clearance - Ineffective:  Goal: Clear lung sounds  Description: Clear lung sounds  Outcome: Ongoing  Goal: Ability to maintain a clear airway will improve  Description: Ability to maintain a clear airway will improve  Outcome: Ongoing     Problem: Gas Exchange - Impaired:  Goal: Levels of oxygenation will improve  Description: Levels of oxygenation will improve  Outcome: Ongoing     Problem: Fluid Volume:  Goal: Will show no signs or symptoms of fluid imbalance  Description: Will show no signs or symptoms of fluid imbalance  Outcome: Ongoing     Problem: PAIN  Goal: Patient's pain/discomfort is manageable  Outcome: Ongoing     Problem: DISCHARGE BARRIERS  Goal: Patient's continuum of care needs are met  3/15/2021 2129 by Batsheva Phillips RN  Outcome: Ongoing  3/15/2021 1023 by ZION Maurer  Outcome: Ongoing     Problem: Nutrition Deficit:  Goal: Ability to achieve adequate nutritional intake will improve  Description: Ability to achieve adequate nutritional intake will improve  Outcome: Ongoing     Problem: Infection - Methicillin-Resistant Staphylococcus Aureus Infection:  Goal: Absence of methicillin-resistant Staphylococcus aureus infection  Description: Absence of methicillin-resistant Staphylococcus aureus infection  Outcome: Ongoing     Problem: Pain:  Goal: Pain level will decrease  Description: Pain level will decrease  Outcome: Ongoing  Goal: Control of acute pain  Description: Control of acute pain  Outcome: Ongoing Goal: Control of chronic pain  Description: Control of chronic pain  Outcome: Ongoing     Problem: Nutrition  Goal: Optimal nutrition therapy  3/15/2021 2129 by Tiara Olea RN  Outcome: Ongoing  3/15/2021 1400 by Lisa Ga RD, LD  Outcome: Ongoing
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
## 2021-03-16 NOTE — CARE COORDINATION
3/16/21, 2:10 PM EDT    DISCHARGE ON GOING [address] [facility] day: 2  Location: -10/010-A Reason for admit: Acute metabolic encephalopathy [Z14.59]   Procedure: none  Barriers to Discharge: Hgb 7.8, creatinine 5.8, WBC 13.0, Eliquis, pain control, Duonebs, pain control, IV Zosyn, Nephrology following for HD, Neurology following, PT/OT. PCP: No primary care provider on file. Readmission Risk Score: 27%  Patient Goals/Plan/Treatment Preferences: Plan is to return to [facility]. SW following.
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
## 2021-03-17 NOTE — FLOWSHEET NOTE
Pt was alone and in bed at the time of the visit. She was dealing with acute metabolic encephalopathy. She looked weak but was not giving up and wanted prayer to cope and heal. Prayer was appreciated. 03/17/21 1727   Encounter Summary   Services provided to: Patient   Referral/Consult From: [address] [facility] Family members   Continue Visiting Yes  (3/17 )   Complexity of Encounter Low   Length of Encounter 15 minutes   Routine   Type Initial   Assessment Approachable;Calm   Intervention Prayer;[place];Nurtured hope; Active listening;Empowerment;Sustaining presence/ Ministry of presence   Outcome Acceptance;Expressed gratitude;Encouraged; Hopeful

## 2021-03-17 NOTE — PROGRESS NOTES
[facility] [address]  PHYSICAL THERAPY MISSED TREATMENT NOTE  [facility]A    Date: 3/17/2021  Patient Name: Sho Desai        MRN: [id number]   : 1965  (48 y.o.)  Gender: female                REASON FOR MISSED TREATMENT:  Missed Treat. Pt off unit for dialysis.

## 2021-03-17 NOTE — PLAN OF CARE
Problem: Falls - Risk of:  Goal: Will remain free from falls  Description: Will remain free from falls  Outcome: Ongoing  Note: Bed alarm on and non slip socks provided to patient. Call light is in reach and hourly rounding is utilized to prevent risk of falls. Problem: Skin Integrity:  Goal: Will show no infection signs and symptoms  Description: Will show no infection signs and symptoms  Outcome: Ongoing  Note: Patient continues to be on IV antibiotics. Patient is afebrile and respirations are unlabored. Productive cough continues. Tracheostomy suctioned, with face plate cleaned and dressing changed. Problem: Skin Integrity:  Goal: Absence of new skin breakdown  Description: Absence of new skin breakdown  Outcome: Ongoing  Note: Patient is reminded to reposition with hourly rounding. Heels elevated while in bed. Tracheostomy care provided with face plate cleaned and dried and dressing changed. Problem: Airway Clearance - Ineffective:  Goal: Clear lung sounds  Description: Clear lung sounds  Outcome: Ongoing  Note: Tracheostomy suctioned and cleaned. Patient encouraged to cough and deep breath. Respiratory provided breathing treatments as ordered. Problem: Airway Clearance - Ineffective:  Goal: Ability to maintain a clear airway will improve  Description: Ability to maintain a clear airway will improve  Outcome: Ongoing  Note: Tracheostomy suctioned and cleaned. Patient encouraged to cough and deep breath. Problem: Gas Exchange - Impaired:  Goal: Levels of oxygenation will improve  Description: Levels of oxygenation will improve  Outcome: Ongoing  Note: O2 saturation remains at 95% on 3L nasal canula. Problem: Fluid Volume:  Goal: Will show no signs or symptoms of fluid imbalance  Description: Will show no signs or symptoms of fluid imbalance  Outcome: Ongoing  Note: Patient went to dialysis today but was unable to complete treatment.  Psych was consulted to aid patient in being compliant with treatment. Problem: PAIN  Goal: Patient's pain/discomfort is manageable  Outcome: Ongoing  Note: Patient provided with tylenol, oxycodone, and lidocaine patch as prescribed. Nonpharmacological methods utilized include a heating pad and decreased stimulation. Patient states satisfaction with pain relief. Problem: DISCHARGE BARRIERS  Goal: Patient's continuum of care needs are met  Outcome: Ongoing  Note: Psych consulted today to aid patient in being more compliant with treatment. Barium swallow completed with thin liquid, minced and moist diet recommended. Case management and social work following to ensure discharge needs are met. Problem: Nutrition Deficit:  Goal: Ability to achieve adequate nutritional intake will improve  Description: Ability to achieve adequate nutritional intake will improve  Outcome: Ongoing  Note: Barium swallow completed with speech therapy today. Recommended diet is thin liquid, minced and moist. Will resume prescribed diet, with Nepro tube feeding provided if oral intake is <50%. Problem: Infection - Methicillin-Resistant Staphylococcus Aureus Infection:  Goal: Absence of methicillin-resistant Staphylococcus aureus infection  Description: Absence of methicillin-resistant Staphylococcus aureus infection  Outcome: Ongoing  Note: Contact isolation continued. Problem: Pain:  Goal: Pain level will decrease  Description: Pain level will decrease  Outcome: Ongoing  Note: Pain medication provided as prescribed. Nonpharmacological methods utilized include heating pad and decreased stimulation. Problem: Nutrition  Goal: Optimal nutrition therapy  Outcome: Ongoing  Note: Barium swallow completed with speech therapy. Diet of thin liquids, minced and moist recommended. Will resume ordered diet with Nepro tube feeds if oral intake is <50%. Care plan reviewed with patient. Patient voiced understanding.

## 2021-03-17 NOTE — PROGRESS NOTES
[facility] [address]  PHYSICAL THERAPY MISSED TREATMENT NOTE  [facility]    Date: 3/17/2021  Patient Name: Pastor Novoa        MRN: [id number]   : 1965  (48 y.o.)  Gender: female                REASON FOR MISSED TREATMENT:  Patient refused treatment. Pt refuses PT due to back pain.

## 2021-03-17 NOTE — CARE COORDINATION
3/17/21, 1:47 PM EDT    DISCHARGE ON GOING [address] [facility] day: 3  Location: -10/010-A Reason for admit: Acute metabolic encephalopathy [T84.85]   Procedure: MRI Brain   Impression:            1. Possible postoperative changes in the suboccipital region. 2. Probable ischemic changes in the white matter with no evidence of acute   infarct. 3. Probable mineralization in the basal ganglia and in the substantia nigra bilaterally. 4. Mild inflammatory changes in the left maxillary sinus. 5. Otherwise negative MRI scan of the brain. 3/17 MBS   Impression:        1. Laryngeal penetration and aspiration of thin barium. 2. Additional recommendations from the speech therapist will follow. 3/17 CXR   Impression:        Worsening patchy opacities as evidence for worsening pneumonia. Barriers to Discharge: Speech recommending Soft, bite size, thin liquids s/p MBS. Psychiatry consult, TF stopped due to ability to take diet. Nephrology following for HD. PT/OT, Duonebs, IV Merrem. WBC 11.7, Hgb 7.4, procalcitonin 0.74. PCP: No primary care provider on file. Readmission Risk Score: 29%  Patient Goals/Plan/Treatment Preferences: Plan is to return to [facility]. SW following.
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
## 2021-03-17 NOTE — PROGRESS NOTES
Moderate amount of thick dark brown sputum suctioned from trach. Had to use gauze to finishing pulling out sputum that was coming from trach site. Cleansed trach site with peroxide and sterile water.

## 2021-03-17 NOTE — PROGRESS NOTES
Pharmacy Medication History Note      List of current medications patient is taking is complete    Source of information: Nursing home medication list    Changes made to medication list:  Medications removed (include reason, ex. therapy complete or physician discontinued): · Albuterol sulfate HFA (discontinued, uses Duo-nebs instead)  · Fluticasone furoate-vilanterol (discontinued)    Medications added/doses adjusted:  · Ipratropium/albuterol 0.5-2.5 mg/3ml solution - inhale 3ml into lungs every 6 hours scheduled + every 4 hours prn for chronic respiratory failure  · Midodrine 10mg by mouth as needed (hypotension during hemodialysis for SBP less than 100 pre and mid treatment, in addition to 15 mg three times daily)  · Magnesium hydroxide 400mg/5ml - take 30ml by mouth as needed for constipation  · Amino acids-protein hydrolys - take 30ml by mouth 2 times daily.  May add water to med for ease of administration  · Bisacodyl suppository 10 mg daily as needed for constipation  · Acetylcysteine 20% inhalation 3 mL via trach every 12 hours  · Changed apixaban 5 mg to apixaban 2.5mg  · Changed docusate to 100 mg daily  · Changed oxycodone frequency to every 6 hours scheduled    Electronically signed by Gillian Costa on 3/17/2021 at 9:03 AM

## 2021-03-17 NOTE — PLAN OF CARE
Problem: Falls - Risk of:  Goal: Will remain free from falls  Description: Will remain free from falls  Outcome: Ongoing  Note: Call light in reach, bed in lowest position, and bed alarm activated. Education given on use of call light before ambulation and when in need of assistance. Patient expressed understanding. Hourly visual checks performed and charted. Toileting offered to patient. No falls this shift, at any time. Arm band and falling star in place. Will continue to monitor. Goal: Absence of physical injury  Description: Absence of physical injury  Outcome: Ongoing  Note: Call light in reach, bed in lowest position, and bed alarm activated. Education given on use of call light before ambulation and when in need of assistance. Patient expressed understanding. Hourly visual checks performed and charted. Toileting offered to patient. No falls this shift, at any time. Arm band and falling star in place. Will continue to monitor. Problem: Skin Integrity:  Goal: Absence of new skin breakdown  Description: Absence of new skin breakdown  Outcome: Ongoing  Note: No signs of skin breakdown. Skin warm, dry, and intact. Mucous membranes pink and moist.  Assistance with turns/ambulation provided PRN. Will continue to monitor. Problem: Airway Clearance - Ineffective:  Goal: Ability to maintain a clear airway will improve  Description: Ability to maintain a clear airway will improve  Outcome: Ongoing  Note: History of trach. Eve Rowland remains capped with a speaking valve. Patient able to maintain a patent airway. Problem: Gas Exchange - Impaired:  Goal: Levels of oxygenation will improve  Description: Levels of oxygenation will improve  Outcome: Ongoing  Note: Patient requiring 3L nasal cannula to maintain oxygen saturations.      Problem: Fluid Volume:  Goal: Will show no signs or symptoms of fluid imbalance  Description: Will show no signs or symptoms of fluid imbalance  Outcome: Ongoing Note: ESRD on HD. MWF dialysis patient. Problem: PAIN  Goal: Patient's pain/discomfort is manageable  Outcome: Ongoing  Note: Patient able to use 0-10 pain scale. Patient rates pain at a 3 out of 10 in her back. Patient has a pain goal of \"no pain. \" Agreeable to take PRN pain medications. PRN acetaminophen given. Problem: DISCHARGE BARRIERS  Goal: Patient's continuum of care needs are met  Outcome: Ongoing  Note: Discharge planning in process and discussed with patient/family. Social work consulted for any additional needs. Care manager aware of discharge needs. Patient is from [facility] and plans to return there upon discharge. Problem: Nutrition Deficit:  Goal: Ability to achieve adequate nutritional intake will improve  Description: Ability to achieve adequate nutritional intake will improve  Outcome: Ongoing  Note: Patient remains on TF. Patient tolerating well with no residuals noted. Problem: Infection - Methicillin-Resistant Staphylococcus Aureus Infection:  Goal: Absence of methicillin-resistant Staphylococcus aureus infection  Description: Absence of methicillin-resistant Staphylococcus aureus infection  Outcome: Ongoing  Note: Contact isolation precautions remain in place. Care plan reviewed with patient and family. Patient and family verbalize understanding of the plan of care and contribute to goal setting.

## 2021-03-17 NOTE — PROGRESS NOTES
arrived at patient's room at 2000 GMT during rounding to provide spiritual care and emotional support. At the time of entry, patient is receiving special patient's care therefore no encounter is made.  SC will follow up with continue support as needed

## 2021-03-18 NOTE — CARE COORDINATION
3/18/21, 2:12 PM EDT    DISCHARGE ON GOING [facility] day: 4  Location: -10/010-A Reason for admit: Acute metabolic encephalopathy [Y90.03]   Procedure: none  Barriers to Discharge: Hgb 7.7, creatinine 4.0, Dietitian following for resumption of TF as supplemental. Duonebs, Ativan IV prn, IV Merrem, IV Vancomycin, Seroquel. Nephrology following for HD, PT/OT/ST. PCP: No primary care provider on file. Readmission Risk Score: 30%  Patient Goals/Plan/Treatment Preferences: Plan is to return to [facility]. SW following.
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
## 2021-03-18 NOTE — PLAN OF CARE
Problem: Falls - Risk of:  Goal: Will remain free from falls  Description: Will remain free from falls  Outcome: Ongoing  Note: Call light in reach, bed in lowest position, and bed alarm activated. Education given on use of call light before ambulation and when in need of assistance. Patient expressed understanding. Hourly visual checks performed and charted. Toileting offered to patient. No falls this shift, at any time. Arm band and falling star in place. Will continue to monitor. Goal: Absence of physical injury  Description: Absence of physical injury  Outcome: Ongoing  Note: Call light in reach, bed in lowest position, and bed alarm activated. Education given on use of call light before ambulation and when in need of assistance. Patient expressed understanding. Hourly visual checks performed and charted. Toileting offered to patient. No falls this shift, at any time. Arm band and falling star in place. Will continue to monitor. Problem: Skin Integrity:  Goal: Absence of new skin breakdown  Description: Absence of new skin breakdown  Outcome: Ongoing  Note: No signs of skin breakdown. Skin warm, dry, and intact. Mucous membranes pink and moist.  Assistance with turns/ambulation provided PRN. Will continue to monitor. Problem: Airway Clearance - Ineffective:  Goal: Ability to maintain a clear airway will improve  Description: Ability to maintain a clear airway will improve  Outcome: Ongoing  Note: History of trach. Wali Trejo remains capped with a speaking valve. Patient able to maintain a patent airway. Problem: Gas Exchange - Impaired:  Goal: Levels of oxygenation will improve  Description: Levels of oxygenation will improve  Outcome: Ongoing  Note: Patient requiring 3L nasal cannula to maintain oxygen saturations.      Problem: Fluid Volume:  Goal: Will show no signs or symptoms of fluid imbalance  Description: Will show no signs or symptoms of fluid imbalance  Outcome: Ongoing Note: ESRD on HD. MWF dialysis patient. Problem: PAIN  Goal: Patient's pain/discomfort is manageable  Outcome: Ongoing  Note: Patient able to use 0-10 pain scale. Patient rates pain at a 3 out of 10 in her back. Patient has a pain goal of \"no pain. \" Agreeable to take PRN pain medications. PRN acetaminophen given. Problem: DISCHARGE BARRIERS  Goal: Patient's continuum of care needs are met  Outcome: Ongoing  Note: Discharge planning in process and discussed with patient/family. Social work consulted for any additional needs. Care manager aware of discharge needs. Patient is from [facility] and plans to return there upon discharge. Problem: Nutrition Deficit:  Goal: Ability to achieve adequate nutritional intake will improve  Description: Ability to achieve adequate nutritional intake will improve  Outcome: Ongoing  Note: MBS completed today. Patient passed and diet advanced. Patient tolerating well. Problem: Infection - Methicillin-Resistant Staphylococcus Aureus Infection:  Goal: Absence of methicillin-resistant Staphylococcus aureus infection  Description: Absence of methicillin-resistant Staphylococcus aureus infection  Outcome: Ongoing  Note: Contact isolation precautions remain in place. Care plan reviewed with patient and family. Patient and family verbalize understanding of the plan of care and contribute to goal setting.

## 2021-03-18 NOTE — PROGRESS NOTES
Physician Progress Note      Norma Larsen  CSN #:                  [id number]  :                       1965  ADMIT DATE:       3/14/2021 1:57 PM  [address] [place] DATE:  RESPONDING  PROVIDER #:        Estefania ETIENNE          QUERY TEXT:    Pt admitted with Altered Mental status, Pneumonia . Pt noted also to have   Procal of 0.56 and WBC of 14.2 on arrival  If possible, please make selection   below ,document in the progress notes and discharge summary if you are   evaluating and /or treating any of the following: The medical record reflects the following:  Risk Factors: history of COPD, trache, CHF, chronic resp failure,   deconditioned  Clinical Indicators: Altered Mental status, Pneumonia, Procal of 0.56 and WBC   of 14.2, hypotension 80/69, 92/59,  91/63 , CXR -Tiny effusion left side. Moderate bibasilar atelectasis/pneumonia  Treatment: admission, imaging ,Blood cultures,  labs, IV antibiotics  Zosyn   and Vancomycin, treatment of underlying infection    Thank you ,  Jaime Yuan  RN, BSN, [address] [place] [address]   P: [phone]  F: [phone]  Options provided:  -- Sepsis, present on admission  -- Severe Sepsis WITHOUT shock , present on admission  -- No Sepsis, Pneumonia  only  -- Sepsis was ruled out  -- Other - I will add my own diagnosis  -- Disagree - Not applicable / Not valid  -- Disagree - Clinically unable to determine / Unknown  -- Refer to Clinical Documentation Reviewer    PROVIDER RESPONSE TEXT:    This patient has pneumonia only, patient is not septic. Query created by: Ever Huang on 3/15/2021 10:45 AM      QUERY TEXT:    Pt admitted with Pneumonia and has CHF documented.  If possible, please make   selection below ,document in progress notes and discharge summary further   specificity regarding the type and acuity of CHF:    The medical record reflects the following:  Risk Factors: known htn, esrd, copd, chronic resp failure , PAF  Clinical Indicators: On admission pro BNP >43805.0, CXR ,  Chronic diastolic   dysfunction per Nephrology , last ECHO 1/14/21  Ejection fraction is visually estimated at 55%.   Overall left ventricular function is normal.  Mildly to Moderately dilated right ventricle  Treatment: monitor fluid balance, home heart meds, hypotension and for signs   of exacerbation    Thank you,  Mikel Melchor  RN, BSN, CRCR  Clinical   P: [phone]  F: [phone]  Options provided:  -- Chronic Diastolic CHF/HFpEF NOT in Exacerbation  -- Chronic Systolic and Diastolic CHF  -- Acute Diastolic CHF/HFpEF  -- Acute on Chronic Diastolic CHF/HFpEF  -- Other - I will add my own diagnosis  -- Disagree - Not applicable / Not valid  -- Disagree - Clinically unable to determine / Unknown  -- Refer to Clinical Documentation Reviewer    PROVIDER RESPONSE TEXT:    This patient has chronic diastolic CHF/HFpEF Not in Exacerbation    Query created by: Meredith Serrano on 3/15/2021 10:56 AM      Electronically signed by:  Kiran ETIENNE 3/18/2021 2:57 PM
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
## 2021-03-18 NOTE — PLAN OF CARE
Problem: Falls - Risk of:  Goal: Will remain free from falls  Description: Will remain free from falls  Outcome: Met This Shift  Note: Free from falls this shift, at this time. Call light and bedside table within reach. Bed alarm on. Bed wheels locked and bed in lowest position. Pt oriented to own abilities and is encouraged to use call light for needs. Problem: Falls - Risk of:  Goal: Absence of physical injury  Description: Absence of physical injury  Outcome: Met This Shift  Note: Free from physical injury this shift. Problem: Skin Integrity:  Goal: Will show no infection signs and symptoms  Description: Will show no infection signs and symptoms  Outcome: Ongoing  Note: Continues to be on IV antibiotics. Problem: Skin Integrity:  Goal: Absence of new skin breakdown  Description: Absence of new skin breakdown  Outcome: Met This Shift  Note: No new skin breakdown with each assessment. Able to turn and reposition self. Problem: Airway Clearance - Ineffective:  Goal: Clear lung sounds  Description: Clear lung sounds  Outcome: Ongoing  Note: Lung sounds diminished throughout. Problem: Airway Clearance - Ineffective:  Goal: Ability to maintain a clear airway will improve  Description: Ability to maintain a clear airway will improve  Outcome: Ongoing  Note: Able to maintain clear airway. Tracheostomy present. Suctioning PRN. Problem: Gas Exchange - Impaired:  Goal: Levels of oxygenation will improve  Description: Levels of oxygenation will improve  Outcome: Ongoing  Note: Oxygen level above 90 percent on 3 liters nasal cannula. Problem: Fluid Volume:  Goal: Will show no signs or symptoms of fluid imbalance  Description: Will show no signs or symptoms of fluid imbalance  Outcome: Ongoing  Note: Dialysis MWF. Edema to bilat lower ext noted.       Problem: PAIN  Goal: Patient's pain/discomfort is manageable  Outcome: Ongoing  Note: Complains of 10/10 pain to mid/lower back on 0-10 pain rating scale. PRN pain medication administered. Problem: DISCHARGE BARRIERS  Goal: Patient's continuum of care needs are met  Outcome: Ongoing  Note: To be discharged back to ECF. No orders for d/c at this time. Problem: Nutrition Deficit:  Goal: Ability to achieve adequate nutritional intake will improve  Description: Ability to achieve adequate nutritional intake will improve  Outcome: Ongoing  Note: Pt eating  percent of meals at this time. Has PRN nepro to bolus through PEG tube if eating less then 50 percent of meals. Problem: Infection - Methicillin-Resistant Staphylococcus Aureus Infection:  Goal: Absence of methicillin-resistant Staphylococcus aureus infection  Description: Absence of methicillin-resistant Staphylococcus aureus infection  Outcome: Ongoing  Note: Contact precautions in place. Problem: Pain:  Goal: Pain level will decrease  Description: Pain level will decrease  Outcome: Ongoing  Note: Continues to have pain to back area. Rates pain 10/10. Able to use 0-10 pain rating scale. PRN medications administered. Problem: Pain:  Goal: Control of acute pain  Description: Control of acute pain  Outcome: Ongoing     Problem: Pain:  Goal: Control of chronic pain  Description: Control of chronic pain  Outcome: Ongoing  Note: Continues to have pain to back area. Rates pain 10/10. Able to use 0-10 pain rating scale. PRN medications administered. Problem: Nutrition  Goal: Optimal nutrition therapy  Outcome: Ongoing     Problem: Musculor/Skeletal Functional Status  Goal: Highest potential functional level  Outcome: Ongoing     Care plan reviewed with patient. Patient verbalizes understanding of the plan of care and contributed to goal setting.

## 2021-03-19 NOTE — CONSULTS
dextrose 5 % 100 mL IVPB, 500 mg, Intravenous, Once  nicotine (NICODERM CQ) 21 MG/24HR 1 patch, 1 patch, Transdermal, Daily  diazePAM (VALIUM) tablet 5 mg, 5 mg, Oral, Once  diphenhydrAMINE (BENADRYL) tablet 25 mg, 25 mg, Oral, Q8H PRN  meropenem (MERREM) 500 mg in sodium chloride 0.9 % 100 mL IVPB, 500 mg, Intravenous, Q24H  white petrolatum gel, , Topical, PRN  LORazepam (ATIVAN) tablet 0.5 mg, 0.5 mg, Oral, Q6H PRN  oxyCODONE (ROXICODONE) immediate release tablet 5 mg, 5 mg, Oral, Q6H PRN  vancomycin (VANCOCIN) intermittent dosing (placeholder), , Other, RX Placeholder  famotidine (PEPCID) tablet 20 mg, 20 mg, Oral, Q48H  darbepoetin kolton-polysorbate (ARANESP) injection 40 mcg, 40 mcg, Subcutaneous, Weekly  lidocaine 4 % external patch 1 patch, 1 patch, Transdermal, Daily  fentaNYL (SUBLIMAZE) injection 25 mcg, 25 mcg, Intravenous, Once  ipratropium-albuterol (DUONEB) nebulizer solution 1 ampule, 1 ampule, Inhalation, Q4H WA  apixaban (ELIQUIS) tablet 2.5 mg, 2.5 mg, Oral, BID  amiodarone (CORDARONE) tablet 200 mg, 200 mg, Oral, Daily  doxepin (SINEQUAN) capsule 10 mg, 10 mg, Oral, Nightly  escitalopram (LEXAPRO) tablet 20 mg, 20 mg, Oral, Daily  budesonide-formoterol (SYMBICORT) 160-4.5 MCG/ACT inhaler 2 puff, 2 puff, Inhalation, BID  midodrine (PROAMATINE) tablet 15 mg, 15 mg, Oral, TID WC  QUEtiapine (SEROQUEL) tablet 50 mg, 50 mg, Oral, BID  senna (SENOKOT) tablet 8.6 mg, 1 tablet, Oral, Nightly  lactobacillus (CULTURELLE) capsule 1 capsule, 1 capsule, Oral, BID WC  polyethylene glycol (GLYCOLAX) packet 17 g, 17 g, Oral, Daily  sodium chloride flush 0.9 % injection 10 mL, 10 mL, Intravenous, 2 times per day  sodium chloride flush 0.9 % injection 10 mL, 10 mL, Intravenous, PRN  promethazine (PHENERGAN) tablet 12.5 mg, 12.5 mg, Oral, Q6H PRN **OR** ondansetron (ZOFRAN) injection 4 mg, 4 mg, Intravenous, Q6H PRN  acetaminophen (TYLENOL) tablet 650 mg, 650 mg, Oral, Q6H PRN **OR** acetaminophen (TYLENOL) suppository 650 mg, 650 mg, Rectal, Q6H PRN  bisacodyl (DULCOLAX) EC tablet 5 mg, 5 mg, Oral, Daily PRN  melatonin tablet 5 mg, 5 mg, Oral, Nightly  docusate (COLACE) 50 MG/5ML liquid 100 mg, 100 mg, Per G Tube, Daily  therapeutic multivitamin-minerals 1 tablet, 1 tablet, Per G Tube, Daily  Allergies:  Codeine and Morphine    Social History:   TOBACCO:   reports that she has been smoking cigarettes. She has a 25.00 pack-year smoking history. She has quit using smokeless tobacco.  ETOH:   reports previous alcohol use. DRUGS:   reports previous drug use. Family History:       Problem Relation Age of Onset    Asthma Sister        REVIEW OF SYSTEMS:    CONSTITUTIONAL:  negative for  fevers, chills  RESPIRATORY:  negative for cough and shortness of breath  CARDIOVASCULAR:  negative for chest pain and palpitations  GASTROINTESTINAL:  negative for nausea, vomiting, bowel incontinence  GENITOURINARY:  negative for frequency and urinary incontinence  MUSCULOSKELETAL:  positive for back pain, negative for neck pain  NEUROLOGICAL:  negative for headaches, syncope  BEHAVIOR/PSYCH:  negative for depressed mood, anxiety    PHYSICAL EXAM:    CONSTITUTIONAL:  awake, alert, cooperative, ill-appearing, no acute distress, sitting up in bed  HEAD: atraumatic, normocephalic  LUNGS:  No respiratory distress. Decreased breath sounds per H&P  CARDIOVASCULAR:  RRR, no murmur per H&P  ABDOMEN:  Soft, non-distended, non-tender  MUSCULOSKELETAL:  5/5 muscle strength bilateral upper and lower extremities. Tenderness to palpation along the upper-mid thoracic spine. NEUROLOGIC:  Awake, alert, oriented to name, place and time.  Sensory intact bilateral upper and lower extremities    DATA:    CBC:   Lab Results   Component Value Date    WBC 10.3 03/19/2021    RBC 2.82 03/19/2021    HGB 7.3 03/19/2021    HCT 25.5 03/19/2021    MCV 90.4 03/19/2021    MCH 25.9 03/19/2021    MCHC 28.6 03/19/2021    RDW 17.0 01/05/2021     03/19/2021    MPV 8.7 03/19/2021     WBC:    Lab Results   Component Value Date    WBC 10.3 03/19/2021     Hemoglobin/Hematocrit:    Lab Results   Component Value Date    HGB 7.3 03/19/2021    HCT 25.5 03/19/2021     CMP:    Lab Results   Component Value Date     03/19/2021    K 3.9 03/19/2021    K 4.3 03/14/2021    CL 95 03/19/2021    CO2 27 03/19/2021    BUN 41 03/19/2021    CREATININE 5.8 03/19/2021    GFRAA 14 01/05/2021    LABGLOM 8 03/19/2021    GLUCOSE 75 03/19/2021    PROT 7.7 03/14/2021    LABALBU 3.5 03/14/2021    CALCIUM 9.8 03/19/2021    BILITOT 0.5 03/14/2021    ALKPHOS 228 03/14/2021    AST 14 03/14/2021    ALT 10 03/14/2021         Radiology:   CT Cervical Spine  Impression       1. Possible postoperative changes in the suboccipital region. 2. No significant disc herniation, canal stenosis or cord compression at any level. 3. Tracheostomy tube in place. 4. Bilateral carotid artery calcification. 5. Enlarged left lobe of the thyroid gland. .         CT Thoracic Spine  Impression       1. Abnormal density have be centered at approximately the T8-9 disc space anteriorly with associated paraspinal soft tissue swelling suspicious for disc space infection and possible osteomyelitis. Please correlate with white blood cell count and    sedimentation rate. MRI scan of the thoracic spine, if feasible would be helpful for more complete evaluation. 2. Disc space narrowing at T5-6, T6-T7, T7-8, T8-9, T9-T10, T10-11 and T11-12. 3. Left pleural effusion. 4. No evidence of a pathologic fracture. -       CT Lumbar Spine     Impression       1. Degenerative changes resulting in mild to moderate canal and bilateral foraminal stenosis at L4-5.   2. There is mild canal and mild-to-moderate bilateral foraminal stenosis at L3-4 and L5-S1.   3. Degenerative change involving the sacroiliac joints bilaterally. 4. Small kidneys which could be secondary to chronic renal failure.    5. Atherosclerotic calcification in the

## 2021-03-19 NOTE — PROGRESS NOTES
Respiratory Care Artificial Airway Evaluation    The patients airway is older than seven days. The patients airway does not have panic sutures. Patients cuff pressure was- Cuffless. Patient experiencing no problems.     Type of airway:  [x] Tracheostomy  [] Larngectomy    Size     Type  []Shiley disposable cannula cuffed tracheostomy tube (DCT)   []Shiley diposable cannula fenestrated cuffed tracheostomy tube (DFEN)  []Shiley disposable cannula cuffed percutaneous tracheostomy tube (PERC)  [x]Shiley disposable cannula cuffless tracheostomy tube (DCFS)  [] Shiley disposable cannula cuffless fenestrated tracheostomy tube [facility])  []Shiley extended length cuffless tracheostomy tube  []Shiley extended length cuffed tracheostomy tube  []Shiley laryngectomy tube (LGT)  []Shiley single cannula cuffed tracheostomy tube (SCT)  []Other:    Length  [x]standard  []99 mm  []120 mm  []Other:

## 2021-03-19 NOTE — FLOWSHEET NOTE
03/19/21 0737 03/19/21 0945   Vital Signs   /68 (!) 155/91   Temp 97.8 °F (36.6 °C) 97.5 °F (36.4 °C)   Pulse 64 68   Resp 19 18   SpO2 100 % 100 %   Weight 163 lb 9.3 oz (74.2 kg) 160 lb 15 oz (73 kg)   Post-Hemodialysis Assessment   Post-Treatment Procedures  --  Blood returned; Access bleeding time < 10 minutes   Machine Disinfection Process  --  Acid/Vinegar Clean;Heat Disinfect; Exterior Machine Disinfection   Total Liters Processed (l/min)  --  46.4 l/min   Dialyzer Clearance  --  Lightly streaked   Duration of Treatment (minutes)  --  120 minutes   Hemodialysis Intake (ml)  --  400 ml   Hemodialysis Output (ml)  --  1674 ml   NET Removed (ml)  --  1274 ml   Tolerated Treatment  --  Fair   4hr tx stopped early dt pt demand dt back pain. 2hr of treatment completed with 1.6L fluid removal. Pressure held to each site x10 min. Dressing clean dry and intact upon leaving the unit. Report called to primary nurse. tx to be scanned into EMR.

## 2021-03-19 NOTE — PLAN OF CARE
Problem: Falls - Risk of:  Goal: Will remain free from falls  Description: Will remain free from falls  3/18/2021 2259 by Licha Márquez RN  Outcome: Met This Shift  Note: Patient remained free from falls this shift. Bed is in low position with alarm on and siderails up x2. Education given on use of call light and patient voiced understanding. Call light and beside table within reach. Arm band and falling star in place. Hourly rounds completed. Will continue to monitor. 3/18/2021 1029 by Tosah Da Silva RN  Outcome: Met This Shift  Note: Free from falls this shift, at this time. Call light and bedside table within reach. Bed alarm on. Bed wheels locked and bed in lowest position. Pt oriented to own abilities and is encouraged to use call light for needs. Goal: Absence of physical injury  Description: Absence of physical injury  3/18/2021 2259 by Licha Márquez RN  Outcome: Met This Shift  3/18/2021 1029 by Tosha Da Silva RN  Outcome: Met This Shift  Note: Free from physical injury this shift. Problem: Skin Integrity:  Goal: Will show no infection signs and symptoms  Description: Will show no infection signs and symptoms  3/18/2021 2259 by Licha Márquez RN  Outcome: Met This Shift  3/18/2021 1029 by Tosha Da Silva RN  Outcome: Ongoing  Note: Continues to be on IV antibiotics. Goal: Absence of new skin breakdown  Description: Absence of new skin breakdown  3/18/2021 2259 by Licha Márquez RN  Outcome: Met This Shift  Note: No signs of new skin breakdown noted with each assessment this shift. Skin warm, dry, and intact except where otherwise noted in head-to-toe assessment. Mucous membranes pink and moist. Assistance with turns/ambulation given as needed. 3/18/2021 1029 by Tosha Da Silva RN  Outcome: Met This Shift  Note: No new skin breakdown with each assessment. Able to turn and reposition self.       Problem: Nutrition Deficit:  Goal: Ability to achieve adequate nutritional intake will improve  Description: Ability to achieve adequate nutritional intake will improve  3/18/2021 2259 by Debora Whitesied RN  Outcome: Met This Shift  Note: Patient is on a soft/bite-sized diet. Restrictions have been reviewed. Patient understands restrictions. 3/18/2021 1029 by Neehmias Roca RN  Outcome: Ongoing  Note: Pt eating  percent of meals at this time. Has PRN nepro to bolus through PEG tube if eating less then 50 percent of meals. Problem: Airway Clearance - Ineffective:  Goal: Clear lung sounds  Description: Clear lung sounds  3/18/2021 2259 by Debora Whiteside RN  Outcome: Ongoing  Note: Patient's respiratory rate remains 18 and unlabored. Patient's respiratory depth is normal. Lungs sounds are clear and diminished throughout. 3/18/2021 2009 by David Ramos RCP  Outcome: Ongoing  3/18/2021 1029 by Nehemias Roca RN  Outcome: Ongoing  Note: Lung sounds diminished throughout. Goal: Ability to maintain a clear airway will improve  Description: Ability to maintain a clear airway will improve  3/18/2021 2259 by Debora Whiteside RN  Outcome: Ongoing  3/18/2021 1029 by Nehemias Roca RN  Outcome: Ongoing  Note: Able to maintain clear airway. Tracheostomy present. Suctioning PRN. Problem: Gas Exchange - Impaired:  Goal: Levels of oxygenation will improve  Description: Levels of oxygenation will improve  3/18/2021 2259 by Debora Whiteside RN  Outcome: Ongoing  Note: Pulse oximetry on 3L NC is 95%. 3/18/2021 1029 by Nehemias Roca RN  Outcome: Ongoing  Note: Oxygen level above 90 percent on 3 liters nasal cannula. Problem: Fluid Volume:  Goal: Will show no signs or symptoms of fluid imbalance  Description: Will show no signs or symptoms of fluid imbalance  3/18/2021 2259 by Debora Whiteside RN  Outcome: Ongoing  Note: Maintain hydration by drinking small amounts of clear fluids frequently and on soft diet.  Patient has Yazmin Rodríguez RN  Outcome: Ongoing  3/18/2021 1029 by Keyla Paredes RN  Outcome: Ongoing  Goal: Control of chronic pain  Description: Control of chronic pain  3/18/2021 2259 by Yazmin Rodríguez RN  Outcome: Ongoing  Note: Patient able to communicate pain effectively. Patient complains of pain in the back. Has oxycodone, lidocaine patches, and heating pad for pain control. Pain management has been consulted. 3/18/2021 1029 by Keyla Paredes RN  Outcome: Ongoing  Note: Continues to have pain to back area. Rates pain 10/10. Able to use 0-10 pain rating scale. PRN medications administered. Problem: Impaired respiratory status  Goal: Clear lung sounds  Description: Clear lung sounds  3/18/2021 2009 by Haley Carrillo RCP  Outcome: Ongoing     Problem: Nutrition  Goal: Optimal nutrition therapy  3/18/2021 2259 by Yazmin Rodríguez RN  Outcome: Ongoing  3/18/2021 1200 by Stephanie Bass RD, LD  Outcome: Ongoing  3/18/2021 1029 by Keyla Paredes RN  Outcome: Ongoing     Problem: Musculor/Skeletal Functional Status  Goal: Highest potential functional level  3/18/2021 2259 by Yazmin Rodríguez RN  Outcome: Ongoing  3/18/2021 1029 by Keyla Paredes RN  Outcome: Ongoing     Care plan reviewed with patient. Patient verbalizes understanding of the plan of care and contribute to goal setting.

## 2021-03-19 NOTE — PLAN OF CARE
Problem: Impaired respiratory status  Goal: Clear lung sounds  Description: Clear lung sounds  3/18/2021 2009 by Jaye Payne RCP  Outcome: Ongoing     Continue with Duoneb and Symbicort to achieve clear lung sounds.

## 2021-03-19 NOTE — CARE COORDINATION
3/19/21, 1:32 PM EDT    DISCHARGE ON GOING [facility] day: 5  Location: -10/010-A Reason for admit: Acute metabolic encephalopathy [J03.32]   Procedure: 3/18 CT Thoracic Spine WO Contrast   Impression:            1. Abnormal density have be centered at approximately the T8-9 disc space anteriorly with associated paraspinal soft tissue swelling suspicious for disc space infection and possible osteomyelitis. Please correlate with white blood cell count and   sedimentation rate. MRI scan of the thoracic spine, if feasible would be helpful for more complete evaluation. 2. Disc space narrowing at T5-6, T6-T7, T7-8, T8-9, T9-T10, T10-11 and T11-12. 3. Left pleural effusion. 4. No evidence of a pathologic fracture. -      MRI Thoracic spine pending    Barriers to Discharge: Hgb 7.3, creatinine 5.8, procalcitonin 0.82, sed rate 86, Nephrology following for dialysis, ID consult, Orthopedic Surgery consult, Dietitian following for supplemental TF, pain control, duonebs, IV Merrem, IV Vancomycin. PCP: No primary care provider on file. Readmission Risk Score: 31%  Patient Goals/Plan/Treatment Preferences: Plan is to return to [facility]. SW following.

## 2021-03-19 NOTE — FLOWSHEET NOTE
03/19/21 1820   Provider Notification   Reason for Communication Review case   Provider Name toya phillip   Provider Notification Advance Practice Clinician (CNS, NP, CNM, CRNA, PA)   Method of Communication Secure Message   Notification Time 03) [phone] pt forearm has become swollen and red, he is complaining of increased pain as well. radial pulse present and cap refill less than 3 sec.  PLease advise
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
## 2021-03-19 NOTE — PROGRESS NOTES
Pharmacy Vancomycin Consult     Vancomycin Day: 6  Current Dosing: intermittent dosing  Date Time Vancomycin dose Vancomycin Random   3/14/21 21:10 1250 mg     3/15/21   Hemodialysis (only competed 2.5 hours of 4 hour session)     3/15/21 21:04 750 mg     3/16/21   Hemodialysis      3/17/21 07:35     16.8    3/17/21    Hemodialysis (stopped early)      3/17/21  16:30  750 mg     3/19/21 0335                       21.6   3/19/21 16:30 500 mg               Recent Labs     03/18/21  0338 03/19/21  0335   BUN 26* 41*   CREATININE 4.0* 5.8*   WBC 9.5 10.3       Intake/Output Summary (Last 24 hours) at 3/19/2021 0917  Last data filed at 3/19/2021 0328  Gross per 24 hour   Intake 0 ml   Output    Net 0 ml     Culture Date Source Results   3/14/21 Blood x 2 NGTD    3/14/21 Sputum Never collected   3/15/21 MRSA PCR Positive       Ht Readings from Last 1 Encounters:   03/14/21 5' 3\" (1.6 m)        Wt Readings from Last 1 Encounters:   03/19/21 163 lb 9.3 oz (74.2 kg)       Body mass index is 28.98 kg/m². Estimated Creatinine Clearance: 11 mL/min (A) (based on SCr of 5.8 mg/dL [facility] Random: 21.6    Assessment/Plan:  Will initiate vancomycin 500 mg x 1 after dialysis today.      Josef Kahn, PharmD, BCPS  3/19/2021  9:17 AM
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
## 2021-03-19 NOTE — PROGRESS NOTES
[address]  PHYSICAL THERAPY MISSED TREATMENT NOTE  [facility] NEUROSCIENCES 4A    Date: 3/19/2021  Patient Name: Indio Davis        MRN: [id number]   : 1965  (48 y.o.)  Gender: female   Referring Practitioner: Dr. Lele Mari  Diagnosis: acute metabolic encephalopathy         REASON FOR MISSED TREATMENT:    Attempted patient in the AM but out of the room for dialysis. Attempted patient again in the afternoon but refused due to leaving for MRI soon. Will try again on next scheduled visit.

## 2021-03-19 NOTE — PLAN OF CARE
Problem: Falls - Risk of:  Goal: Will remain free from falls  Description: Will remain free from falls  Note: Patient continues to use bed alarms and call lights for fall prevention. No falls this shift      Problem: Skin Integrity:  Goal: Will show no infection signs and symptoms  Description: Will show no infection signs and symptoms  Note: Pt on merrem, MRI and biopsy ordered on spine     Problem: Airway Clearance - Ineffective:  Goal: Clear lung sounds  Description: Clear lung sounds  Note: LS clear pt remains on 3L O2      Problem: Gas Exchange - Impaired:  Goal: Levels of oxygenation will improve  Description: Levels of oxygenation will improve  Note: Pt 95% on 3L O2      Problem: Fluid Volume:  Goal: Will show no signs or symptoms of fluid imbalance  Description: Will show no signs or symptoms of fluid imbalance  Note: Pt on dialysis M-W-F      Problem: PAIN  Goal: Patient's pain/discomfort is manageable  Note: Patient able to verbalize pain on a scale of 0-10. PRN meds available and appropriate pain level established. Problem: DISCHARGE BARRIERS  Goal: Patient's continuum of care needs are met  Note: Patient will be discharged to [facility] when ready.  Will continue to monitor and asses needs      Problem: Nutrition Deficit:  Goal: Ability to achieve adequate nutritional intake will improve  Description: Ability to achieve adequate nutritional intake will improve  Note: Pt eats at least 75% of meals

## 2021-03-20 NOTE — PROGRESS NOTES
Renal Progress Note  [facility]    Patient :  Archana Summers; 48 y.o. MRN# [id number]  Location:  4A-10/010-A  Attending:  LOWELL Mccullough  Admit Date:  3/14/2021   Hospital Day: 6      Subjective:     Nephrology is following the patient for ESRD. Patient seen and examined. On 3 L NC chronically. She denies shortness of breath.  +back pain. Outpatient Medications:     Medications Prior to Admission: ipratropium-albuterol (DUONEB) 0.5-2.5 (3) MG/3ML SOLN nebulizer solution, Inhale 3 mLs into the lungs every 6 hours For chronic respiratory failure. Every 6 hours scheduled plus every 4 hours as needed.   midodrine (PROAMATINE) 5 MG tablet, Take 10 mg by mouth as needed (hypotension during hemodialysis for SBP less [id number] pre and mid treatment)  magnesium hydroxide (MILK OF MAGNESIA) 400 MG/5ML suspension, Take 30 mLs by mouth daily as needed for Constipation  AMINO ACIDS-PROTEIN HYDROLYS PO, Take 30 mLs by mouth 2 times daily May add water to med for ease of administration  apixaban (ELIQUIS) 2.5 MG TABS tablet, Take 2.5 mg by mouth 2 times daily  docusate (COLACE) 50 MG/5ML liquid, Take 100 mg by mouth daily  bisacodyl (DULCOLAX) 10 MG suppository, Place 10 mg rectally daily as needed for Constipation  acetylcysteine (MUCOMYST) 20 % nebulizer solution, Inhale 3 mLs into the lungs 2 times daily  melatonin 5 MG TABS tablet, 5 mg by Per G Tube route nightly   polyethylene glycol (GLYCOLAX) 17 g packet, 17 g by Per G Tube route daily  Multiple Vitamins-Minerals (THERAPEUTIC MULTIVITAMIN-MINERALS) tablet, 1 tablet by Per G Tube route daily  famotidine (PEPCID) 20 MG tablet, Take 20 mg by mouth three times a week MWF  senna (SENOKOT) 8.6 MG tablet, 1 tablet by Per G Tube route nightly  Saccharomyces boulardii (PROBIOTIC) 250 MG CAPS, Take by mouth 2 times daily  midodrine (PROAMATINE) 5 MG tablet, Take 15 mg by mouth 3 times daily  oxyCODONE (ROXICODONE) 5 MG immediate release tablet, Take 5 mg by mouth every 6 hours. diphenhydrAMINE (BENADRYL) 25 MG tablet, 25 mg by Per G Tube route every 8 hours as needed for Itching  ALPRAZolam (XANAX) 0.5 MG tablet, Take 0.5 mg by mouth every 6 hours as needed for Sleep or Anxiety. QUEtiapine (SEROQUEL) 50 MG tablet, Take 1 tablet by mouth 2 times daily for 7 days  amiodarone (CORDARONE) 200 MG tablet, Take 1 tablet by mouth daily  doxepin (SINEQUAN) 10 MG capsule, Take 1 capsule by mouth nightly  escitalopram (LEXAPRO) 20 MG tablet, Take 1 tablet by mouth daily (Patient taking differently: Take 20 mg by mouth nightly )  [DISCONTINUED] hydrOXYzine (VISTARIL) 25 MG capsule, Take 1 capsule by mouth 4 times daily as needed for Anxiety  [DISCONTINUED] albuterol sulfate HFA (VENTOLIN HFA) 108 (90 Base) MCG/ACT inhaler, Inhale 1 puff into the lungs every 6 hours as needed for Wheezing  [DISCONTINUED] Fluticasone furoate-vilanterol (BREO ELLIPTA) 200-25 MCG/INH AEPB inhaler, Inhale 1 puff into the lungs daily  [DISCONTINUED] apixaban (ELIQUIS) 5 MG TABS tablet, Take 1 tablet by mouth 2 times daily (Patient taking differently: Take 2.5 mg by mouth 2 times daily )  [DISCONTINUED] gabapentin (NEURONTIN) 100 MG capsule, Take 1 capsule by mouth 2 times daily for 30 days.   [DISCONTINUED] metoprolol tartrate (LOPRESSOR) 25 MG tablet, Take 1 tablet by mouth 2 times daily  [DISCONTINUED] nicotine (NICODERM CQ) 21 MG/24HR, Place 1 patch onto the skin daily  [DISCONTINUED] pantoprazole (PROTONIX) 40 MG tablet, Take 1 tablet by mouth 2 times daily  [DISCONTINUED] aluminum hydroxide (ALTERNGEL) 320 MG/5ML suspension, 5-10 ml 4 times daily as needed for stomach pain  [DISCONTINUED] calcium acetate (PHOSLO) 667 MG capsule, Take 2,001 mg by mouth 3 times daily (with meals)    Current Medications:     Scheduled Meds:    gabapentin  100 mg Oral BID    nicotine  1 patch Transdermal Daily    meropenem  500 mg Intravenous Q24H    vancomycin (VANCOCIN) intermittent dosing (placejuan) Other RX Placeholder    famotidine  20 mg Oral Q48H    darbepoetin kolton-polysorbate  40 mcg Subcutaneous Weekly    lidocaine  1 patch Transdermal Daily    fentanNYL  25 mcg Intravenous Once    ipratropium-albuterol  1 ampule Inhalation Q4H WA    [Held by provider] apixaban  2.5 mg Oral BID    amiodarone  200 mg Oral Daily    doxepin  10 mg Oral Nightly    escitalopram  20 mg Oral Daily    budesonide-formoterol  2 puff Inhalation BID    midodrine  15 mg Oral TID WC    QUEtiapine  50 mg Oral BID    senna  1 tablet Oral Nightly    lactobacillus  1 capsule Oral BID WC    polyethylene glycol  17 g Oral Daily    sodium chloride flush  10 mL Intravenous 2 times per day    melatonin  5 mg Oral Nightly    docusate  100 mg Per G Tube Daily    therapeutic multivitamin-minerals  1 tablet Per G Tube Daily     Continuous Infusions:   PRN Meds:  hydrOXYzine, cyclobenzaprine, white petrolatum, LORazepam, oxyCODONE, sodium chloride flush, promethazine **OR** ondansetron, acetaminophen **OR** acetaminophen, bisacodyl    Input/Output:       I/O last 3 completed shifts: In: 1728.9 [P.O.:1135; I.V.:193.9]  Out: 1674 . Patient Vitals for the past 96 hrs (Last 3 readings):   Weight   21 0340 169 lb 12.1 oz (77 kg)   21 0945 160 lb 15 oz (73 kg)   21 0737 163 lb 9.3 oz (74.2 kg)       Vital Signs:   Temperature:  Temp: 98.5 °F (36.9 °C)  TMax:   Temp (24hrs), Av.3 °F (36.8 °C), Min:97.5 °F (36.4 °C), Max:99 °F (37.2 °C)    Respirations:  Resp: 20  Pulse:   Pulse: 71  BP:    BP: 130/70  BP Range: Systolic (70QAI), WML:191 , Min:130 , BGK:291       Diastolic (56MKH), GTX:30, Min:62, Max:90      Physical Examination:     General:  Awake, alert, no acute distress  HEENT: NC/AT/ MMM  Chest:               Diminished, no rales noted  Cardiac:  S1 S2   Abdomen: Soft, non-tender,  Neuro:  No facial droop, No Asterixis  SKIN:  No rashes, good skin turgor.   Extremities:  No significant LE edema noted, + left arm AV fistula    Labs:       Recent Labs     03/18/21  0338 03/18/21  0812 03/19/21  0335 03/20/21  0347   WBC 9.5  --  10.3 9.3   RBC 2.76*  --  2.82* 3.00*   HGB 7.2* 7.7* 7.3* 7.7*   HCT 25.2* 26.9* 25.5* 27.0*   MCV 91.3  --  90.4 90.0   MCH 26.1  --  25.9* 25.7*   MCHC 28.6*  --  28.6* 28.5*     --  293 301   MPV 8.9*  --  8.7* 8.8*      BMP:   Recent Labs     03/18/21 0338 03/19/21 0335 03/20/21 0347   * 139 136   K 3.5 3.9 3.8   CL 95* 95* 94*   CO2 26 27 24   BUN 26* 41* 32*   CREATININE 4.0* 5.8* 5.2*   GLUCOSE 84 75 74   CALCIUM 9.5 9.8 9.9      Phosphorus:   No results for input(s): PHOS in the last 72 hours. Magnesium:  No results for input(s): MG in the last 72 hours. Albumin:  No results for input(s): LABALBU in the last 72 hours. BNP:    No results found for: BNP  IRINEO:    No results found for: IRINEO  SPEP:  Lab Results   Component Value Date    PROT 7.7 03/14/2021     UPEP:   No results found for: LABPE  C3:   No results found for: C3  C4:   No results found for: C4  MPO ANCA:   No results found for: MPO  PR3 ANCA:   No results found for: PR3  Anti-GBM:   No results found for: GBMABIGG  Hep BsAg:         Lab Results   Component Value Date    HEPBSAG Negative 03/15/2021     Hep C AB:        No results found for: HEPCAB    Urinalysis/Chemistries:    No results found for: Chitra Mcwilliams, PHUR, LABCAST, WBCUA, RBCUA, MUCUS, TRICHOMONAS, YEAST, BACTERIA, CLARITYU, SPECGRAV, LEUKOCYTESUR, UROBILINOGEN, BILIRUBINUR, BLOODU, GLUCOSEU, KETUA, AMORPHOUS  Urine Sodium:   No results found for: TEE  Urine Potassium:  No results found for: KUR  Urine Chloride:  No results found for: CLUR  Urine Osmolarity: No results found for: OSMOU  Urine Protein:   No components found for: TOTALPROTEIN, URINE   Urine Creatinine:   No results found for: LABCREA  Urine Eosinophils:  No components found for: UEOS        Impression and Plan:  1. ESRD on hemodialysis: had HD yesterday.   Volume status and lytes

## 2021-03-20 NOTE — PROGRESS NOTES
Department of Orthopedic Surgery  Spine Service  Attending Progress Note        Subjective:  Patient reports her back pain is better controlled today. No leg pain or N/T. MRI showed T8-9 discitis/osteomyelitis    Vitals  VITALS:  /70   Pulse 71   Temp 98.5 °F (36.9 °C) (Oral)   Resp 20   Ht 5' 3\" (1.6 m)   Wt 169 lb 12.1 oz (77 kg)   SpO2 96% Comment: home oxygen  BMI 30.07 kg/m²   24HR INTAKE/OUTPUT:      Intake/Output Summary (Last 24 hours) at 3/20/2021 1102  Last data filed at 3/20/2021 0340  Gross per 24 hour   Intake 1328.93 ml   Output    Net 1328.93 ml     URINARY CATHETER OUTPUT (Mat[place]):     DRAIN/TUBE OUTPUT:         PHYSICAL EXAM:    Orientation:  alert and oriented to person, place and time  Patient resting in bed    Lower Extremity Motor :  quadriceps, extensor hallucis longus, dorsiflexion, plantarflexion 5/5 bilaterally  Lower Extremity Sensory:  Intact L1-S1        LABS:    HgB:    Lab Results   Component Value Date    HGB 7.7 03/20/2021         MRI Thoracic Spine  Impression    1. Abnormal signal intensity in approximately the T8 and T9 vertebral bodies and in the intervening disc space anteriorly, paraspinal soft tissue swelling and left pleural effusion. 2. Mild degenerative changes. No significant disc herniation or cord compression at any level.             ASSESSMENT AND PLAN:    T8-9 osteomyelitis/discitis    1:  Plan for biopsy 3/22. Antibiotic management per ID. No surgical plans at this time. Discussed with  Patient. TLSO brace when out of bed and ambulating. Follow up with Dr. Abner Kinney 4 weeks at discharge for reevaluation.      Tal Mahoney
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
## 2021-03-20 NOTE — PLAN OF CARE
Problem: Impaired respiratory status  Goal: Clear lung sounds  Description: Clear lung sounds  Outcome: Ongoing   Continue therapy to improve aeration.

## 2021-03-21 NOTE — PROGRESS NOTES
Department of Orthopedic Surgery  Spine Service  Attending Progress Note        Subjective:  Patient sleeping in recliner, no new issues. MRI showed T8-9 discitis/osteomyelitis w/ plans for biopsy tomorrow    Vitals  VITALS:  /82   Pulse 78   Temp 98.4 °F (36.9 °C) (Oral)   Resp 17   Ht 5' 3\" (1.6 m)   Wt 169 lb 12.1 oz (77 kg)   SpO2 94%   BMI 30.07 kg/m²   24HR INTAKE/OUTPUT:    No intake or output data in the 24 hours ending 03/21/21 0748  URINARY CATHETER OUTPUT (Matute):     DRAIN/TUBE OUTPUT:         PHYSICAL EXAM:          LABS:    HgB:    Lab Results   Component Value Date    HGB 7.7 03/20/2021         MRI Thoracic Spine  Impression    1. Abnormal signal intensity in approximately the T8 and T9 vertebral bodies and in the intervening disc space anteriorly, paraspinal soft tissue swelling and left pleural effusion. 2. Mild degenerative changes. No significant disc herniation or cord compression at any level.             ASSESSMENT AND PLAN:    T8-9 osteomyelitis/discitis    1:  Plan for biopsy 3/22. Antibiotic management per ID. No surgical plans at this time. Discussed with patient. TLSO brace when out of bed and ambulating. Follow up with Dr. Leonardo Clark 4 weeks at discharge for reevaluation.      Tal Sosa

## 2021-03-21 NOTE — PLAN OF CARE
Problem: Falls - Risk of:  Goal: Will remain free from falls  Description: Will remain free from falls  Outcome: Met This Shift  Note: No falls this shift. Stand by assist. Oriented to own abilities. Bed alarm on, call light within reach. Problem: Skin Integrity:  Goal: Absence of new skin breakdown  Description: Absence of new skin breakdown  Outcome: Ongoing  Note: No new skin breakdown this shift. Turns self/ambulates frequently. Problem: Gas Exchange - Impaired:  Goal: Levels of oxygenation will improve  Description: Levels of oxygenation will improve  Outcome: Ongoing  Note: Oxygenation >90% on 3L NC, chronic. Problem: PAIN  Goal: Patient's pain/discomfort is manageable  Outcome: Ongoing  Note: Pain of 5 in lower extremities, not well controlled with current dose of Chelsi, Hospitalist adjusted dosage. Problem: DISCHARGE BARRIERS  Goal: Patient's continuum of care needs are met  Outcome: Ongoing  Note: Possible discharge to nursing home,  to address on Monday. Problem: Nutrition Deficit:  Goal: Ability to achieve adequate nutritional intake will improve  Description: Ability to achieve adequate nutritional intake will improve  Outcome: Ongoing  Note: A little decreased appetite, nutritional supplements as needed. Problem: Infection - Methicillin-Resistant Staphylococcus Aureus Infection:  Goal: Absence of methicillin-resistant Staphylococcus aureus infection  Description: Absence of methicillin-resistant Staphylococcus aureus infection  Outcome: Ongoing  Note: Remains in contact isolation for MRSA. Care plan reviewed with patient and family. Patient and family verbalize understanding of the plan of care and contribute to goal setting.
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
## 2021-03-21 NOTE — PLAN OF CARE
Impaired respiratory status  Goal: Clear lung sounds  Description: Clear lung sounds  3/20/2021 0850 by Hali Wesley, P  Outcome: Ongoing     Problem: Nutrition  Goal: Optimal nutrition therapy  Outcome: Ongoing     Problem: Musculor/Skeletal Functional Status  Goal: Highest potential functional level  Outcome: Ongoing

## 2021-03-22 NOTE — PROGRESS NOTES
patch Transdermal Daily    fentanNYL  25 mcg Intravenous Once    ipratropium-albuterol  1 ampule Inhalation Q4H WA    [Held by provider] apixaban  2.5 mg Oral BID    amiodarone  200 mg Oral Daily    doxepin  10 mg Oral Nightly    escitalopram  20 mg Oral Daily    budesonide-formoterol  2 puff Inhalation BID    midodrine  15 mg Oral TID WC    QUEtiapine  50 mg Oral BID    senna  1 tablet Oral Nightly    lactobacillus  1 capsule Oral BID     polyethylene glycol  17 g Oral Daily    sodium chloride flush  10 mL Intravenous 2 times per day    melatonin  5 mg Oral Nightly    docusate  100 mg Per G Tube Daily    therapeutic multivitamin-minerals  1 tablet Per G Tube Daily     Continuous Infusions:    CBC:   Recent Labs     03/20/21  0347   WBC 9.3   HGB 7.7*        CMP:    Recent Labs     03/20/21  0347 03/21/21  0632 03/22/21  0457    135 138   K 3.8 4.2 4.1   CL 94* 93* 95*   CO2 24 23 22*   BUN 32* 46* 61*   CREATININE 5.2* 7.1* 8.7*   GLUCOSE 74 78 75   CALCIUM 9.9 9.5 9.2   LABGLOM 9* 6* 5*     Troponin: No results for input(s): TROPONINI in the last 72 hours. BNP: No results for input(s): BNP in the last 72 hours. INR: No results for input(s): INR in the last 72 hours. Lipids: No results for input(s): CHOL, LDLDIRECT, TRIG, HDL, AMYLASE, LIPASE in the last 72 hours. Liver: No results for input(s): AST, ALT, ALKPHOS, PROT, LABALBU, BILITOT in the last 72 hours. Invalid input(s): BILDIR  Iron:  No results for input(s): IRONS, FERRITIN in the last 72 hours. Invalid input(s): LABIRONS  MRI THORACIC SPINE WO CONTRAST   Final Result    1. Abnormal signal intensity in approximately the T8 and T9 vertebral bodies and in the intervening disc space anteriorly, paraspinal soft tissue swelling and left pleural effusion. 2. Mild degenerative changes. No significant disc herniation or cord compression at any level.                **This report has been created using voice recognition software. It may contain minor errors which are inherent in voice recognition technology. **      Final report electronically signed by DR Keyur Craven on 3/19/2021 3:38 PM      XR CHEST PORTABLE   Final Result   1. Mild interval improvement in pulmonary opacities along the lung bases. This document has been electronically signed by: Melissa Lowery MD on    03/19/2021 04:09 AM      CT LUMBAR SPINE WO CONTRAST   Final Result      1. Degenerative changes resulting in mild to moderate canal and bilateral foraminal stenosis at L4-5.   2. There is mild canal and mild-to-moderate bilateral foraminal stenosis at L3-4 and L5-S1.   3. Degenerative change involving the sacroiliac joints bilaterally. 4. Small kidneys which could be secondary to chronic renal failure. 5. Atherosclerotic calcification in the abdominal aorta and iliac arteries. **This report has been created using voice recognition software. It may contain minor errors which are inherent in voice recognition technology. **      Final report electronically signed by DR Keyur Craven on 3/18/2021 4:28 PM      CT THORACIC SPINE WO CONTRAST   Final Result       1. Abnormal density have be centered at approximately the T8-9 disc space anteriorly with associated paraspinal soft tissue swelling suspicious for disc space infection and possible osteomyelitis. Please correlate with white blood cell count and    sedimentation rate. MRI scan of the thoracic spine, if feasible would be helpful for more complete evaluation. 2. Disc space narrowing at T5-6, T6-T7, T7-8, T8-9, T9-T10, T10-11 and T11-12. 3. Left pleural effusion. 4. No evidence of a pathologic fracture. -            **This report has been created using voice recognition software. It may contain minor errors which are inherent in voice recognition technology. **      Final report electronically signed by DR Keyur Craven on 3/18/2021 4:21 PM      CT CERVICAL SPINE WO CONTRAST   Final Result 1. Possible postoperative changes in the suboccipital region. 2. No significant disc herniation, canal stenosis or cord compression at any level. 3. Tracheostomy tube in place. 4. Bilateral carotid artery calcification. 5. Enlarged left lobe of the thyroid gland. .               **This report has been created using voice recognition software. It may contain minor errors which are inherent in voice recognition technology. **      Final report electronically signed by DR Kenny Hudson on 3/18/2021 4:40 PM      FL MODIFIED BARIUM SWALLOW W VIDEO   Final Result   1. Laryngeal penetration and aspiration of thin barium. 2. Additional recommendations from the speech therapist will follow. **This report has been created using voice recognition software. It may contain minor errors which are inherent in voice recognition technology. **      Final report electronically signed by Dr. Rosa Orozco on 3/17/2021 11:03 AM      XR CHEST PORTABLE   Final Result   Worsening patchy opacities as evidence for worsening pneumonia. **This report has been created using voice recognition software. It may contain minor errors which are inherent in voice recognition technology. **      Final report electronically signed by Dr. Jessica Curtis MD on 3/17/2021 11:25 AM      MRI BRAIN WO CONTRAST   Final Result       1. Possible postoperative changes in the suboccipital region. 2. Probable ischemic changes in the white matter with no evidence of acute   infarct. 3. Probable mineralization in the basal ganglia and in the substantia nigra bilaterally. 4. Mild inflammatory changes in the left maxillary sinus. 5. Otherwise negative MRI scan of the brain. **This report has been created using voice recognition software. It may contain minor errors which are inherent in voice recognition technology. **      Final report electronically signed by DR Kenny Hudson on 3/17/2021 8:35 AM      XR LUMBAR

## 2021-03-22 NOTE — FLOWSHEET NOTE
03/22/21 0724 03/22/21 1143   Vital Signs   /63 (!) 155/88   Temp 97.8 °F (36.6 °C) 97.2 °F (36.2 °C)   Pulse 73 63   Weight 173 lb 1 oz (78.5 kg) 165 lb 5.5 oz (75 kg)   Weight Method Bed scale Bed scale   Percent Weight Change 1.95 -4.46   Post-Hemodialysis Assessment   Post-Treatment Procedures  --  Blood returned; Access bleeding time < 10 minutes   Machine Disinfection Process  --  Exterior Machine Disinfection   Rinseback Volume (ml)  --  400 ml   Total Liters Processed (l/min)  --  91.1 l/min   Dialyzer Clearance  --  Lightly streaked   Duration of Treatment (minutes)  --  240 minutes   Heparin amount administered during treatment (units)  --  0 units   Hemodialysis Intake (ml)  --  400 ml   Hemodialysis Output (ml)  --  3900 ml   NET Removed (ml)  --  3500 ml   Tolerated Treatment  --  Good   stable 4 hour treatment. 3.5 liters net uf. pressure held both cannulation sites x 10 minutes. dressing dry and intact.

## 2021-03-22 NOTE — PLAN OF CARE
Problem: Falls - Risk of:  Goal: Will remain free from falls  Description: Will remain free from falls  3/21/2021 2318 by Kelli Boeck, RN  Outcome: Ongoing  Note: Patient free of falls, non-skid footwear in use, 2/4 bedrails raised, hourly rounding continued, and bed alarm active. Problem: Falls - Risk of:  Goal: Absence of physical injury  Description: Absence of physical injury  Outcome: Ongoing  Note: Patient free of new injury will continue to assess. Problem: Skin Integrity:  Goal: Will show no infection signs and symptoms  Description: Will show no infection signs and symptoms  Outcome: Ongoing  Note: Ongoing assessment & interventions provided throughout shift. Skin assessments provided. Encouraging/assisting patient to turn as needed. Problem: Pain:  Goal: Pain level will decrease  Description: Pain level will decrease  Outcome: Ongoing  Note: Ongoing assessment & interventions provided throughout shift. Reminded patient to report any pain, pressure, or shortness of breath to the nurse. Pain medications provided per physician's orders. Problem: Pain:  Goal: Pain level will decrease  Description: Pain level will decrease  Outcome: Ongoing  Note: Ongoing assessment & interventions provided throughout shift. Reminded patient to report any pain, pressure, or shortness of breath to the nurse. Pain medications provided per physician's orders.

## 2021-03-22 NOTE — PROGRESS NOTES
to go to IR for drainage of a spinal abscess    PAIN: did not state /10: back     Vitals: Vitals not assessed per clinical judgement, see nursing flowsheet    COGNITION: Decreased Safety Awareness and Impulsive    ADL:   No ADL's completed this session. d/t arrival of transport. BALANCE:  Sitting Balance:  Stand By Assistance. at EOB   Standing Balance: [address] to 2 occasions of min A     BED MOBILITY:  Sit to Supine: Stand By Assistance      TRANSFERS:  Sit to Stand:  Contact Guard Assistance. from bedside chair with cues for safety throughout and awareness of lines and blankets  Stand to Sit: [address] onto EOB with education on safety and awareness of O2 tubing    FUNCTIONAL MOBILITY:  Assistive Device: None  Assist Level:  Minimal Assistance. Distance: x 4 feet from chair to bed  Pt with increased impulsiveness and decreased safety awareness requiring cues for awareness of O2 tubing throoughtout       ASSESSMENT:     Activity Tolerance:  Patient tolerance of  treatment: fair. Discharge Recommendations: Continue to assess pending progress    Equipment Recommendations: Equipment Needed: No  Plan: Times per week: 2-3x  Current Treatment Recommendations: Strengthening, Endurance Training, Balance Training, Functional Mobility Training, Safety Education & Training, Self-Care / ADL, Patient/Caregiver Education & Training    Patient Education  Patient Education: safety with mobility     Goals  Short term goals  Time Frame for Short term goals: By Discharge  Short term goal 1: Pt will complete functional mobility to/from BR with AD if needed and S to increase indep with accessing environment for ADLs. Short term goal 2: Pt will tolerate dynamic standing x 4 min with B hand release and S to increase balance required for grooming.   Short term goal 3: Pt will complete LB ADL with S to increase indep with self care    Following session, patient left in safe position with all fall risk precautions in place.

## 2021-03-22 NOTE — H&P
[address] [address]  Sedation/Analgesia History & Physical    Pt Name: Jaime Christiansen  MRN: [id number]  YOB: 1965  Provider Performing Procedure: Vertell Lombard, MD  Primary Care Physician: No primary care provider on file. PRE-PROCEDURE   DNR-CCA/DNR-CC []Yes [x]No  Brief History/Pre-Procedure Diagnosis: Discitis          MEDICAL HISTORY  []CAD/Valve  []Liver Disease  [x]Lung Disease []Diabetes  [x]Hypertension [x]Renal Disease  []Additional information:       has a past medical history of Anxiety disorder, Asthma, Chronic kidney disease, Chronic respiratory failure with hypoxia ([facility] [address] COPD (chronic obstructive pulmonary disease) ([facility] [address] Elevated troponin, Hemodialysis patient ([facility] [address] Hypertension, and Smoker. SURGICAL HISTORY   has a past surgical history that includes  section; other surgical history; Lithotripsy; cystourethroscopy (2003,2003); Upper gastrointestinal endoscopy (Left, 2019); tracheostomy (N/A, 2020); and Gastrostomy tube placement (N/A, 2020).   Additional information:       ALLERGIES   Allergies as of 2021 - Review Complete 2021   Allergen Reaction Noted    Codeine  2019    Morphine  2019     Additional information:       MEDICATIONS   Coumadin Use Last 5 Days [x]No []Yes  Antiplatelet drug therapy use last 5 days  [x]No []Yes  Other anticoagulant use last 5 days  [x]No []Yes    Current Facility-Administered Medications:     vancomycin (VANCOCIN) 500 mg in dextrose 5 % 100 mL IVPB, 500 mg, Intravenous, Q [facility], Ma Kmaari Escamilla MD    0.45 % sodium chloride infusion, , Intravenous, Continuous, Vertell Lombard, MD, Last Rate: 20 mL/hr at 21 1412, New Bag at 21 1412    oxyCODONE (ROXICODONE) immediate release tablet 5 mg, 5 mg, Oral, Q4H PRN **OR** oxyCODONE (ROXICODONE) immediate release tablet 10 mg, 10 mg, Oral, Q4H PRN, NARINDER Butler - CNP, 10 mg at 21 1257    hydrOXYzine (ATARAX) tablet 10 mg, 10 mg, Oral, TID PRN, LOWELL Coombs, 10 mg at 03/22/21 1258    gabapentin (NEURONTIN) capsule 100 mg, 100 mg, Oral, BID, LOWELL Coombs, 100 mg at 03/22/21 1259    nicotine (NICODERM CQ) 21 MG/24HR 1 patch, 1 patch, Transdermal, Daily, Cindy Damico [place], 1 patch at 03/22/21 1301    cyclobenzaprine (FLEXERIL) tablet 10 mg, 10 mg, Oral, TID PRN, LOWELL Coombs, 10 mg at 03/22/21 0935    meropenem (MERREM) 500 mg in sodium chloride 0.9 % 100 mL IVPB, 500 mg, Intravenous, Q24H, LOWELL Coombs, Last Rate: 200 mL/hr at 03/22/21 1412, 500 mg at 03/22/21 1412    white petrolatum gel, , Topical, PRN, LOWELL Oleary, Given at 03/19/21 1239    LORazepam (ATIVAN) tablet 0.5 mg, 0.5 mg, Oral, Q6H PRN, Noreen West MD, 0.5 mg at 03/22/21 0935    vancomycin (VANCOCIN) intermittent dosing (placeholder), , Other, RX Placeholder, Ma Baltazar Bullock MD    famotidine (PEPCID) tablet 20 mg, 20 mg, Oral, Q48H, Liang Bautista DO, 20 mg at 03/20/21 2151    darbepoetin kolton-polysorbate (ARANESP) injection 40 mcg, 40 mcg, Subcutaneous, Weekly, Aaron Oliveira MD, 40 mcg at 03/22/21 1306    lidocaine 4 % external patch 1 patch, 1 patch, Transdermal, Daily, LOWELL Morales, 1 patch at 03/22/21 1257    fentaNYL (SUBLIMAZE) injection 25 mcg, 25 mcg, Intravenous, Once, Liang Bautista DO    ipratropium-albuterol (DUONEB) nebulizer solution 1 ampule, 1 ampule, Inhalation, Q4H WA, Liang Bautista DO, 1 ampule at 03/22/21 1259    [Held by provider] apixaban (ELIQUIS) tablet 2.5 mg, 2.5 mg, Oral, BID, Liang Bautista DO, 2.5 mg at 03/18/21 2135    amiodarone (CORDARONE) tablet 200 mg, 200 mg, Oral, Daily, Liang Bautista DO, 200 mg at 03/22/21 1300    doxepin (SINEQUAN) capsule 10 mg, 10 mg, Oral, Nightly, Liang Bautista DO, 10 mg at 03/21/21 2050    escitalopram (LEXAPRO) tablet 20 mg, 20 mg, Oral, Daily, Liang Bautista DO, 20 mg at 03/22/21 1258    budesonide-formoterol (SYMBICORT) 160-4.5 MCG/ACT inhaler 2 puff, 2 puff, Inhalation, BID, Cheryl Reagan DO, 2 puff at 03/21/21 1933    midodrine (PROAMATINE) tablet 15 mg, 15 mg, Oral, TID , Ru Galeas, DO, 15 mg at 03/22/21 1259    QUEtiapine (SEROQUEL) tablet 50 mg, 50 mg, Oral, BID, Cheryl Reagan DO, 50 mg at 03/22/21 1258    senna (SENOKOT) tablet 8.6 mg, 1 tablet, Oral, Nightly, Cheryl Reagan DO, 8.6 mg at 03/21/21 2048    lactobacillus (CULTURELLE) capsule 1 capsule, 1 capsule, Oral, BID , Cheryl Reagan DO, 1 capsule at 03/22/21 1259    polyethylene glycol (GLYCOLAX) packet 17 g, 17 g, Oral, Daily, Cheryl Reagan DO, 17 g at 03/17/21 1100    sodium chloride flush 0.9 % injection 10 mL, 10 mL, Intravenous, 2 times per day, Cheryl Reagan DO, 10 mL at 03/21/21 0909    sodium chloride flush 0.9 % injection 10 mL, 10 mL, Intravenous, PRN, Cheryl Reagan DO    promethazine (PHENERGAN) tablet 12.5 mg, 12.5 mg, Oral, Q6H PRN, 12.5 mg at 03/21/21 2047 **OR** ondansetron (ZOFRAN) injection 4 mg, 4 mg, Intravenous, Q6H PRN, Cheryl Reagan DO    acetaminophen (TYLENOL) tablet 650 mg, 650 mg, Oral, Q6H PRN, 650 mg at 03/20/21 1217 **OR** acetaminophen (TYLENOL) suppository 650 mg, 650 mg, Rectal, Q6H PRN, Cheryl Reagan DO    bisacodyl (DULCOLAX) EC tablet 5 mg, 5 mg, Oral, Daily PRN, Cheryl Reagan DO    melatonin tablet 5 mg, 5 mg, Oral, Nightly, Meghan R Brown, PA-C, 5 mg at 03/21/21 2048    docusate (COLACE) 50 MG/5ML liquid 100 mg, 100 mg, Per G Tube, Daily, [employer], PA-C, 100 mg at 03/17/21 1059    therapeutic multivitamin-minerals 1 tablet, 1 tablet, Per G Tube, Daily, [employer], PA-C, 1 tablet at 03/22/21 1300  Prior to Admission medications    Medication Sig Start Date End Date Taking? Authorizing Provider   ipratropium-albuterol (DUONEB) 0.5-2.5 (3) MG/3ML SOLN nebulizer solution Inhale 3 mLs into the lungs every 6 hours For chronic respiratory failure. Every 6 hours scheduled plus every 4 hours as needed.    Yes Historical Provider, MD midodrine (PROAMATINE) 5 MG tablet Take 10 mg by mouth as needed (hypotension during hemodialysis for SBP less [id number] pre and mid treatment)   Yes Historical Provider, MD   magnesium hydroxide (MILK OF MAGNESIA) 400 MG/5ML suspension Take 30 mLs by mouth daily as needed for Constipation   Yes Historical Provider, MD   AMINO ACIDS-PROTEIN HYDROLYS PO Take 30 mLs by mouth 2 times daily May add water to med for ease of administration   Yes Historical Provider, MD   apixaban (ELIQUIS) 2.5 MG TABS tablet Take 2.5 mg by mouth 2 times daily   Yes Historical Provider, MD   docusate (COLACE) 50 MG/5ML liquid Take 100 mg by mouth daily   Yes Historical Provider, MD   bisacodyl (DULCOLAX) 10 MG suppository Place 10 mg rectally daily as needed for Constipation   Yes Historical Provider, MD   acetylcysteine (MUCOMYST) 20 % nebulizer solution Inhale 3 mLs into the lungs 2 times daily   Yes Historical Provider, MD   melatonin 5 MG TABS tablet 5 mg by Per G Tube route nightly    Yes Historical Provider, MD   polyethylene glycol (GLYCOLAX) 17 g packet 17 g by Per G Tube route daily   Yes Historical Provider, MD   Multiple Vitamins-Minerals (THERAPEUTIC MULTIVITAMIN-MINERALS) tablet 1 tablet by Per G Tube route daily   Yes Historical Provider, MD   famotidine (PEPCID) 20 MG tablet Take 20 mg by mouth three times a week MWF   Yes Historical Provider, MD   senna (SENOKOT) 8.6 MG tablet 1 tablet by Per G Tube route nightly   Yes Historical Provider, MD   Saccharomyces boulardii (PROBIOTIC) 250 MG CAPS Take by mouth 2 times daily   Yes Historical Provider, MD   midodrine (PROAMATINE) 5 MG tablet Take 15 mg by mouth 3 times daily   Yes Historical Provider, MD   oxyCODONE (ROXICODONE) 5 MG immediate release tablet Take 5 mg by mouth every 6 hours.     Yes Historical Provider, MD   diphenhydrAMINE (BENADRYL) 25 MG tablet 25 mg by Per G Tube route every 8 hours as needed for Itching   Yes Historical Provider, MD   ALPRAZolam Kassy Hewitt) 0.5 MG tablet Take 0.5 mg by mouth every 6 hours as needed for Sleep or Anxiety. Yes Historical Provider, MD   QUEtiapine (SEROQUEL) 50 MG tablet Take 1 tablet by mouth 2 times daily for 7 days 1/4/21 3/14/21 Yes NARINDER Barbosa CNP   amiodarone (CORDARONE) 200 MG tablet Take 1 tablet by mouth daily 2/19/20  Yes Shana Bermudez PA-C   doxepin (SINEQUAN) 10 MG capsule Take 1 capsule by mouth nightly 2/13/20  Yes Shana Bermudez PA-C   escitalopram (LEXAPRO) 20 MG tablet Take 1 tablet by mouth daily  Patient taking differently: Take 20 mg by mouth nightly  2/13/20  Yes Shana Bermudez PA-C     Additional information:       VITAL SIGNS   Vitals:    03/22/21 1534   BP: (!) 122/99   Pulse: 64   Resp: 23   Temp:    SpO2: 100%       PHYSICAL:   Heart:  [x]Regular rate and rhythm  []Other:    Lungs:  [x]Clear    []Other:    Abdomen: [x]Soft    []Other:    Mental Status: [x]Alert & Oriented  []Other:      PLANNED PROCEDURE   [x]Biospy []Arteriogram              []Drainage   []Mediport Insertion  []Fistulogram []IV access       []Vertebroplasty / Augmentation  []IVC filter []Dialysis catheter []Biliary drainage  []Other: []CAPD Catheter []Nephrostomy Tube / Stent  SEDATION  Planned agent:[x]Midazolam []Meperidine []Sublimaze [x]Dilaudid []Morphine     []Diazepam  []Other:     ASA Classification:  []1 [x]2 []3 []4 []5  Class 1: A normal healthy patient  Class 2: Pt with mild to moderate systemic disease  Class 3: Severe systemic disease or disturbance  Class 4: Severe systemic disorders that are already life threatening. Class 5: Moribund pt with little chances of survival, for more than 24 hours. Mallampati I Airway Classification:   []1 [x]2 []3 []4    [x]Pre-procedure diagnostic studies complete and results available. Comment:    [x]Previous sedation/anesthesia experiences assessed. Comment:    [x]The patient is an appropriate candidate to undergo the planned procedure sedation and anesthesia.  (Refer to nursing sedation/analgesia documentation record)  [x]Formulation and discussion of sedation/procedure plan, risks, and expectations with patient and/or responsible adult completed. [x]Patient examined immediately prior to the procedure.  (Refer to nursing sedation/analgesia documentation record)    Mikala Martinez MD  Electronically signed 3/22/2021 at 3:38 PM
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
## 2021-03-22 NOTE — PROGRESS NOTES
Patient had removed her dressing off the right lower leg stating that it was too loose. Replaced Curad petroleum dressing, placed 4 x 4's over affected areas. Wrapped RLL with Kerlix and secured with medipore tape. Patient satisfied with new dressing. No other complaints at this time. Call light in place, bed in low/locked position. Bed alarm activated.
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
## 2021-03-22 NOTE — OP NOTE
Department of Radiology  Post Procedure Progress Note      Pre-Procedure Diagnosis:  Discitis    Procedure Performed:  T8-9 disk biopsy    Anesthesia: local / versed and Dilaudid    Findings: successful    Immediate Complications:  None    Estimated Blood Loss: minimal    SEE DICTATED PROCEDURE NOTE FOR COMPLETE DETAILS.     Electronically signed by Leticia Negro MD on 3/22/2021 at 3:39 PM
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
## 2021-03-22 NOTE — H&P
Formulation and discussion of sedation / procedure plans, risks, benefits, side effects and alternatives with patient and/or responsible adult completed.     Electronically signed by Adrian Byers MD on 3/22/2021 at 3:38 PM

## 2021-03-22 NOTE — PLAN OF CARE
Problem: Nutrition  Goal: Optimal nutrition therapy  Outcome: Ongoing  Nutrition Problem #1: Inadequate oral intake  Intervention: Food and/or Nutrient Delivery: Continue Current Diet, Continue Oral Nutrition Supplement, Continue Current Tube Feeding  Nutritional Goals: Patient will receive 75% or more of estimated nutrient needs via oral diet and supplemental TF during LOS.
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
## 2021-03-22 NOTE — CARE COORDINATION
3/22/21, 2:13 PM EDT    DISCHARGE ON 28 [facility] day: 8  Location: [facility]19/019-A Reason for admit: Acute metabolic encephalopathy [N47.52]   Barriers to Discharge: creat 8.7. 92% on 3L. IVF, IV merrem, IV Vanc, duonebs, pain control. To have IR bx of disc space. HD today. PT/OT. Ortho has no surgical plans, TLSO brace ordered. PCP: No primary care provider on file. Readmission Risk Score: 36%  Patient Goals/Plan/Treatment Preferences: return to [facility].
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
## 2021-03-23 NOTE — PLAN OF CARE
Problem: Falls - Risk of:  Goal: Will remain free from falls  Description: Will remain free from falls  Outcome: Ongoing  Note: No falls this shift, call light in reach. Bed alarm on. Up with 1 assist. PT/OT working with patient. Problem: Skin Integrity:  Goal: Absence of new skin breakdown  Description: Absence of new skin breakdown  Outcome: Ongoing  Note: Right lower leg dressing changed. Pt removed dressing earlier stating that they needed to air out. Problem: Airway Clearance - Ineffective:  Goal: Ability to maintain a clear airway will improve  Description: Ability to maintain a clear airway will improve  Outcome: Ongoing  Note: Pt able to keep airway clear     Problem: Gas Exchange - Impaired:  Goal: Levels of oxygenation will improve  Description: Levels of oxygenation will improve  Outcome: Ongoing  Note: Pt remains on 3L per nasal cannula which she wears at home     Problem: Fluid Volume:  Goal: Will show no signs or symptoms of fluid imbalance  Description: Will show no signs or symptoms of fluid imbalance  Outcome: Ongoing  Note: Accurate I/O in progress. Problem: PAIN  Goal: Patient's pain/discomfort is manageable  Outcome: Ongoing  Note: Pt complains of chronic back pain, prn oxycodone given with some relief. Heating pad applied and pt repositioned for comfort. Problem: DISCHARGE BARRIERS  Goal: Patient's continuum of care needs are met  Outcome: Ongoing  Note: Pt kept up to date on plan of care. Plans on returning to [facility] at discharge.  assisting. Problem: Nutrition Deficit:  Goal: Ability to achieve adequate nutritional intake will improve  Description: Ability to achieve adequate nutritional intake will improve  Outcome: Ongoing  Note: Tolerating diet     Care plan reviewed with patient. Patient verbalize understanding of the plan of care and contribute to goal setting.
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
## 2021-03-23 NOTE — PROGRESS NOTES
500 mg Intravenous Q24H    vancomycin (VANCOCIN) intermittent dosing (placeholder)   Other RX Placeholder    famotidine  20 mg Oral Q48H    darbepoetin kolton-polysorbate  40 mcg Subcutaneous Weekly    fentanNYL  25 mcg Intravenous Once    ipratropium-albuterol  1 ampule Inhalation Q4H WA    [Held by provider] apixaban  2.5 mg Oral BID    amiodarone  200 mg Oral Daily    doxepin  10 mg Oral Nightly    escitalopram  20 mg Oral Daily    budesonide-formoterol  2 puff Inhalation BID    midodrine  15 mg Oral TID WC    QUEtiapine  50 mg Oral BID    lactobacillus  1 capsule Oral BID WC    polyethylene glycol  17 g Oral Daily    sodium chloride flush  10 mL Intravenous 2 times per day    melatonin  5 mg Oral Nightly    docusate  100 mg Per G Tube Daily    therapeutic multivitamin-minerals  1 tablet Per G Tube Daily     Continuous Infusions:   sodium chloride 20 mL/hr at 03/22/21 1412       CBC:   Recent Labs     03/22/21  1249 03/23/21  0619   WBC 9.9 8.8   HGB 8.6* 8.6*    310     CMP:    Recent Labs     03/21/21  0632 03/22/21  0457 03/23/21  0619    138 135   K 4.2 4.1 4.3   CL 93* 95* 92*   CO2 23 22* 26   BUN 46* 61* 26*   CREATININE 7.1* 8.7* 4.9*   GLUCOSE 78 75 76   CALCIUM 9.5 9.2 9.3   LABGLOM 6* 5* 9*     Troponin: No results for input(s): TROPONINI in the last 72 hours. BNP: No results for input(s): BNP in the last 72 hours. INR:   Recent Labs     03/22/21  1249   INR 1.29*     Lipids: No results for input(s): CHOL, LDLDIRECT, TRIG, HDL, AMYLASE, LIPASE in the last 72 hours. Liver: No results for input(s): AST, ALT, ALKPHOS, PROT, LABALBU, BILITOT in the last 72 hours. Invalid input(s): BILDIR  Iron:  No results for input(s): IRONS, FERRITIN in the last 72 hours. Invalid input(s): LABIRONS  IR FLUORO GUIDED NEEDLE PLACEMENT   Final Result   Status post successful biopsy of the T8-T9 disc. .            **This report has been created using voice recognition software.   It may contain minor errors which are inherent in voice recognition technology. **      Final report electronically signed by Dr Mony Borrero on 3/22/2021 3:55 PM      MRI THORACIC SPINE WO CONTRAST   Final Result    1. Abnormal signal intensity in approximately the T8 and T9 vertebral bodies and in the intervening disc space anteriorly, paraspinal soft tissue swelling and left pleural effusion. 2. Mild degenerative changes. No significant disc herniation or cord compression at any level. **This report has been created using voice recognition software. It may contain minor errors which are inherent in voice recognition technology. **      Final report electronically signed by DR Isacc Jara on 3/19/2021 3:38 PM      XR CHEST PORTABLE   Final Result   1. Mild interval improvement in pulmonary opacities along the lung bases. This document has been electronically signed by: Latosha Perez MD on    03/19/2021 04:09 AM      CT LUMBAR SPINE WO CONTRAST   Final Result      1. Degenerative changes resulting in mild to moderate canal and bilateral foraminal stenosis at L4-5.   2. There is mild canal and mild-to-moderate bilateral foraminal stenosis at L3-4 and L5-S1.   3. Degenerative change involving the sacroiliac joints bilaterally. 4. Small kidneys which could be secondary to chronic renal failure. 5. Atherosclerotic calcification in the abdominal aorta and iliac arteries. **This report has been created using voice recognition software. It may contain minor errors which are inherent in voice recognition technology. **      Final report electronically signed by DR Isacc Jara on 3/18/2021 4:28 PM      CT THORACIC SPINE WO CONTRAST   Final Result       1. Abnormal density have be centered at approximately the T8-9 disc space anteriorly with associated paraspinal soft tissue swelling suspicious for disc space infection and possible osteomyelitis.  Please correlate with white blood cell count and    sedimentation rate. MRI scan of the thoracic spine, if feasible would be helpful for more complete evaluation. 2. Disc space narrowing at T5-6, T6-T7, T7-8, T8-9, T9-T10, T10-11 and T11-12. 3. Left pleural effusion. 4. No evidence of a pathologic fracture. -            **This report has been created using voice recognition software. It may contain minor errors which are inherent in voice recognition technology. **      Final report electronically signed by DR Nayana Peña on 3/18/2021 4:21 PM      CT CERVICAL SPINE WO CONTRAST   Final Result       1. Possible postoperative changes in the suboccipital region. 2. No significant disc herniation, canal stenosis or cord compression at any level. 3. Tracheostomy tube in place. 4. Bilateral carotid artery calcification. 5. Enlarged left lobe of the thyroid gland. .               **This report has been created using voice recognition software. It may contain minor errors which are inherent in voice recognition technology. **      Final report electronically signed by DR Nayana Peña on 3/18/2021 4:40 PM      FL MODIFIED BARIUM SWALLOW W VIDEO   Final Result   1. Laryngeal penetration and aspiration of thin barium. 2. Additional recommendations from the speech therapist will follow. **This report has been created using voice recognition software. It may contain minor errors which are inherent in voice recognition technology. **      Final report electronically signed by Dr. Nini Ivey on 3/17/2021 11:03 AM      XR CHEST PORTABLE   Final Result   Worsening patchy opacities as evidence for worsening pneumonia. **This report has been created using voice recognition software. It may contain minor errors which are inherent in voice recognition technology. **      Final report electronically signed by Dr. Janine Taveras MD on 3/17/2021 11:25 AM      MRI BRAIN WO CONTRAST   Final Result       1.  Possible postoperative changes in the suboccipital region. 2. Probable ischemic changes in the white matter with no evidence of acute   infarct. 3. Probable mineralization in the basal ganglia and in the substantia nigra bilaterally. 4. Mild inflammatory changes in the left maxillary sinus. 5. Otherwise negative MRI scan of the brain. **This report has been created using voice recognition software. It may contain minor errors which are inherent in voice recognition technology. **      Final report electronically signed by DR Panchito Pandey on 3/17/2021 8:35 AM      XR LUMBAR SPINE (2-3 VIEWS)   Final Result   1. Minimal vertebral body spondylosis scattered in the lumbar spine. 2. Otherwise normal lumbar spine. No fracture. Disc spaces well-maintained. Sacroiliac joints unremarkable. **This report has been created using voice recognition software. It may contain minor errors which are inherent in voice recognition technology. **      Final report electronically signed by Dr. Octavio Metcalf on 3/16/2021 4:00 PM      XR THORACIC SPINE (3 VIEWS)   Final Result   1. Very limited study, due to poor penetration of the bones and difficulty positioning the patient. Tracheostomy tube is in place. 2. Moderately increased thoracic kyphosis. There appears be moderate demineralization of multiple of thoracic vertebra, possibly related to osteoporosis. The anterior aspect of one of the mid thoracic vertebra the lateral view is not sharply defined. A    destructive process cannot be excluded. 3. CT thoracic spine recommended for further evaluation. .            **This report has been created using voice recognition software. It may contain minor errors which are inherent in voice recognition technology. **      Final report electronically signed by Dr. Octavio Metcalf on 3/16/2021 4:02 PM      CT HEAD WO CONTRAST   Final Result       1. Stable CT scan of the brain, no interval change since previous study dated 15 and November 2019. 2. Slight irregularity in the suboccipital region possibly secondary to remote surgery for Chiari malformation. Slightly low-lying cerebellar tonsils. Please correlate with prior surgical findings. 3. Otherwise negative noncontrast CT scan of the brain. .               **This report has been created using voice recognition software. It may contain minor errors which are inherent in voice recognition technology. **      Final report electronically signed by DR Jessie Mares on 3/15/2021 8:41 AM      XR CHEST PORTABLE   Final Result   1. Mild cardiomegaly. Tracheostomy tube. 2. Tiny effusion left side. Moderate bibasilar atelectasis/pneumonia. **This report has been created using voice recognition software. It may contain minor errors which are inherent in voice recognition technology. **      Final report electronically signed by Dr. Pedro Cota on 3/14/2021 3:05 PM            Objective:   Vitals: BP (!) 91/50   Pulse 70   Temp 97.5 °F (36.4 °C) (Oral)   Resp 18   Ht 5' 3\" (1.6 m)   Wt 165 lb 5.5 oz (75 kg)   SpO2 96%   BMI 29.29 kg/m²    Wt Readings from Last 3 Encounters:   03/22/21 165 lb 5.5 oz (75 kg)   01/05/21 195 lb 1.7 oz (88.5 kg)   02/17/20 158 lb 11.7 oz (72 kg)      24HR INTAKE/OUTPUT:      Intake/Output Summary (Last 24 hours) at 3/23/2021 1127  Last data filed at 3/22/2021 1143  Gross per 24 hour   Intake 400 ml   Output 3900 ml   Net -3500 ml       Constitutional:  awake, no apparent distress. Skin:normal with no rash, good skin turgor. HEENT:Pupils are reactive. trach in place. Neck:supple with no thyromegaly or carotid bruit. trach in place. Cardiovascular:  S1, S2  Respiratory: Diminished breath sounds bilaterally, occasional crackles improved. Abdomen: +bs, soft, no tenderness to palpation   Ext: 2+ RLE edema with blistering, wrapped around in clean gauze. Musculoskeletal:Intact  Neuro: no focal deficit, no asterixis.       Electronically signed by Buzz Sharp MD on 3/23/2021 at 11:27 AM  **This report has been created using voice recognition software. It maycontain minor  errors which are inherent in voice recognition technology. **
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
## 2021-03-23 NOTE — PLAN OF CARE
Nutrition  Goal: Optimal nutrition therapy  3/22/2021 2132 by Gissel Black RN  Outcome: Ongoing  3/22/2021 1456 by Semaj Rodríguez RD, LD  Outcome: Ongoing     Problem: Musculor/Skeletal Functional Status  Goal: Highest potential functional level  Outcome: Ongoing
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
## 2021-03-23 NOTE — PLAN OF CARE
Problem: Impaired respiratory status  Goal: Clear lung sounds  Description: Clear lung sounds  Outcome: Not Met This Shift
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
## 2021-03-23 NOTE — CARE COORDINATION
3/23/21, 3:33 PM EDT    DISCHARGE ON GOING [address] day: 9  Location: [facility]19/019-A Reason for admit: Acute metabolic encephalopathy [L61.24]   Procedure: 3/22 bx of T8-9 disc  Barriers to Discharge: Awaiting bx results for plan. Remains on IV Merrem and IV Vanc, duonebs, pain control. ID following. TLSO ordered from [facility]. PT/OT. PCP: No primary care provider on file. Readmission Risk Score: 36%  Patient Goals/Plan/Treatment Preferences: return to [employer]. Update: Chuckie Osgood, house supervisor, states patient is wanting to sign out AMA. Message left for admission's coordinator at [facility] to see if they will accept her back today if RN is unable to convince her to stay.   Electronically signed by Kylah Urbina RN on 3/23/2021 at 3:54 PM

## 2021-03-24 NOTE — PROGRESS NOTES
[address] [address]  SPEECH THERAPY MISSED TREATMENT NOTE  [facility] RENAL TELEMETRY 6K      Date: 3/24/2021  Patient Name: Christian Alexandra        MRN: [id number]    : 1965  (48 y.o.)    REASON FOR MISSED TREATMENT:  Attempted to see patient for completion of speech therapy treatment; upon attempt patient unavailable d/t off unit for completion of dialysis.  Will plan to re-attempt later this date as schedule permits or     JAYE Steiner Apo
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
## 2021-03-24 NOTE — PROGRESS NOTES
Medications:   Scheduled Meds:   vancomycin  500 mg Intravenous Q MWF    lidocaine  3 patch Transdermal Daily    senna  2 tablet Oral Nightly    gabapentin  100 mg Oral BID    nicotine  1 patch Transdermal Daily    meropenem  500 mg Intravenous Q24H    vancomycin (VANCOCIN) intermittent dosing (placeholder)   Other RX Placeholder    famotidine  20 mg Oral Q48H    darbepoetin kolton-polysorbate  40 mcg Subcutaneous Weekly    fentanNYL  25 mcg Intravenous Once    ipratropium-albuterol  1 ampule Inhalation Q4H WA    apixaban  2.5 mg Oral BID    amiodarone  200 mg Oral Daily    doxepin  10 mg Oral Nightly    escitalopram  20 mg Oral Daily    budesonide-formoterol  2 puff Inhalation BID    midodrine  15 mg Oral TID WC    QUEtiapine  50 mg Oral BID    lactobacillus  1 capsule Oral BID WC    polyethylene glycol  17 g Oral Daily    sodium chloride flush  10 mL Intravenous 2 times per day    melatonin  5 mg Oral Nightly    docusate  100 mg Per G Tube Daily    therapeutic multivitamin-minerals  1 tablet Per G Tube Daily     Continuous Infusions:      CBC:   Recent Labs     03/22/21  1249 03/23/21  0619   WBC 9.9 8.8   HGB 8.6* 8.6*    310     CMP:    Recent Labs     03/22/21  0457 03/23/21  0619    135   K 4.1 4.3   CL 95* 92*   CO2 22* 26   BUN 61* 26*   CREATININE 8.7* 4.9*   GLUCOSE 75 76   CALCIUM 9.2 9.3   LABGLOM 5* 9*     Troponin: No results for input(s): TROPONINI in the last 72 hours. BNP: No results for input(s): BNP in the last 72 hours. INR:   Recent Labs     03/22/21  1249   INR 1.29*     Lipids: No results for input(s): CHOL, LDLDIRECT, TRIG, HDL, AMYLASE, LIPASE in the last 72 hours. Liver: No results for input(s): AST, ALT, ALKPHOS, PROT, LABALBU, BILITOT in the last 72 hours. Invalid input(s): BILDIR  Iron:  No results for input(s): IRONS, FERRITIN in the last 72 hours.     Invalid input(s): LABIRONS  RADIOLOGY REPORT   Final Result      IR FLUORO GUIDED NEEDLE PLACEMENT   Final Result   Status post successful biopsy of the T8-T9 disc. .            **This report has been created using voice recognition software. It may contain minor errors which are inherent in voice recognition technology. **      Final report electronically signed by Dr Ayana Silver on 3/22/2021 3:55 PM      MRI THORACIC SPINE WO CONTRAST   Final Result    1. Abnormal signal intensity in approximately the T8 and T9 vertebral bodies and in the intervening disc space anteriorly, paraspinal soft tissue swelling and left pleural effusion. 2. Mild degenerative changes. No significant disc herniation or cord compression at any level. **This report has been created using voice recognition software. It may contain minor errors which are inherent in voice recognition technology. **      Final report electronically signed by DR Debbie Desai on 3/19/2021 3:38 PM      XR CHEST PORTABLE   Final Result   1. Mild interval improvement in pulmonary opacities along the lung bases. This document has been electronically signed by: Viridiana Loza MD on    03/19/2021 04:09 AM      CT LUMBAR SPINE WO CONTRAST   Final Result      1. Degenerative changes resulting in mild to moderate canal and bilateral foraminal stenosis at L4-5.   2. There is mild canal and mild-to-moderate bilateral foraminal stenosis at L3-4 and L5-S1.   3. Degenerative change involving the sacroiliac joints bilaterally. 4. Small kidneys which could be secondary to chronic renal failure. 5. Atherosclerotic calcification in the abdominal aorta and iliac arteries. **This report has been created using voice recognition software. It may contain minor errors which are inherent in voice recognition technology. **      Final report electronically signed by DR Debbie Desai on 3/18/2021 4:28 PM      CT THORACIC SPINE WO CONTRAST   Final Result       1.  Abnormal density have be centered at approximately the T8-9 disc space anteriorly with associated paraspinal soft tissue swelling suspicious for disc space infection and possible osteomyelitis. Please correlate with white blood cell count and    sedimentation rate. MRI scan of the thoracic spine, if feasible would be helpful for more complete evaluation. 2. Disc space narrowing at T5-6, T6-T7, T7-8, T8-9, T9-T10, T10-11 and T11-12. 3. Left pleural effusion. 4. No evidence of a pathologic fracture. -            **This report has been created using voice recognition software. It may contain minor errors which are inherent in voice recognition technology. **      Final report electronically signed by DR Bryan Kocher on 3/18/2021 4:21 PM      CT CERVICAL SPINE WO CONTRAST   Final Result       1. Possible postoperative changes in the suboccipital region. 2. No significant disc herniation, canal stenosis or cord compression at any level. 3. Tracheostomy tube in place. 4. Bilateral carotid artery calcification. 5. Enlarged left lobe of the thyroid gland. .               **This report has been created using voice recognition software. It may contain minor errors which are inherent in voice recognition technology. **      Final report electronically signed by DR Bryan Kocher on 3/18/2021 4:40 PM      FL MODIFIED BARIUM SWALLOW W VIDEO   Final Result   1. Laryngeal penetration and aspiration of thin barium. 2. Additional recommendations from the speech therapist will follow. **This report has been created using voice recognition software. It may contain minor errors which are inherent in voice recognition technology. **      Final report electronically signed by Dr. Anil Clement on 3/17/2021 11:03 AM      XR CHEST PORTABLE   Final Result   Worsening patchy opacities as evidence for worsening pneumonia. **This report has been created using voice recognition software. It may contain minor errors which are inherent in voice recognition technology. **      Final report electronically signed by Dr. Ismael Mosley MD on 3/17/2021 11:25 AM      MRI BRAIN WO CONTRAST   Final Result       1. Possible postoperative changes in the suboccipital region. 2. Probable ischemic changes in the white matter with no evidence of acute   infarct. 3. Probable mineralization in the basal ganglia and in the substantia nigra bilaterally. 4. Mild inflammatory changes in the left maxillary sinus. 5. Otherwise negative MRI scan of the brain. **This report has been created using voice recognition software. It may contain minor errors which are inherent in voice recognition technology. **      Final report electronically signed by DR Elizabeth Walsh on 3/17/2021 8:35 AM      XR LUMBAR SPINE (2-3 VIEWS)   Final Result   1. Minimal vertebral body spondylosis scattered in the lumbar spine. 2. Otherwise normal lumbar spine. No fracture. Disc spaces well-maintained. Sacroiliac joints unremarkable. **This report has been created using voice recognition software. It may contain minor errors which are inherent in voice recognition technology. **      Final report electronically signed by Dr. Salamanca Officer on 3/16/2021 4:00 PM      XR THORACIC SPINE (3 VIEWS)   Final Result   1. Very limited study, due to poor penetration of the bones and difficulty positioning the patient. Tracheostomy tube is in place. 2. Moderately increased thoracic kyphosis. There appears be moderate demineralization of multiple of thoracic vertebra, possibly related to osteoporosis. The anterior aspect of one of the mid thoracic vertebra the lateral view is not sharply defined. A    destructive process cannot be excluded. 3. CT thoracic spine recommended for further evaluation. .            **This report has been created using voice recognition software. It may contain minor errors which are inherent in voice recognition technology. **      Final report electronically signed by Dr. Salamanca Officer on 3/16/2021 4:02 PM      CT HEAD WO CONTRAST   Final Result       1. Stable CT scan of the brain, no interval change since previous study dated 15 and November 2019.   2. Slight irregularity in the suboccipital region possibly secondary to remote surgery for Chiari malformation. Slightly low-lying cerebellar tonsils. Please correlate with prior surgical findings. 3. Otherwise negative noncontrast CT scan of the brain. .               **This report has been created using voice recognition software. It may contain minor errors which are inherent in voice recognition technology. **      Final report electronically signed by DR Luis Contreras on 3/15/2021 8:41 AM      XR CHEST PORTABLE   Final Result   1. Mild cardiomegaly. Tracheostomy tube. 2. Tiny effusion left side. Moderate bibasilar atelectasis/pneumonia. **This report has been created using voice recognition software. It may contain minor errors which are inherent in voice recognition technology. **      Final report electronically signed by Dr. Kelly Thomas on 3/14/2021 3:05 PM            Objective:   Vitals: BP (!) 146/81   Pulse 64   Temp 96.7 °F (35.9 °C)   Resp 15   Ht 5' 3\" (1.6 m)   Wt 169 lb 12.1 oz (77 kg)   SpO2 97%   BMI 30.07 kg/m²    Wt Readings from Last 3 Encounters:   03/24/21 169 lb 12.1 oz (77 kg)   01/05/21 195 lb 1.7 oz (88.5 kg)   02/17/20 158 lb 11.7 oz (72 kg)      24HR INTAKE/OUTPUT:      Intake/Output Summary (Last 24 hours) at 3/24/2021 1051  Last data filed at 3/24/2021 0318  Gross per 24 hour   Intake 540 ml   Output 0 ml   Net 540 ml       Constitutional:  awake, no apparent distress. Skin:normal with no rash, good skin turgor. HEENT:Pupils are reactive. trach in place. Neck:supple with no thyromegaly or carotid bruit. trach in place. Cardiovascular:  S1, S2  Respiratory: Diminished breath sounds bilaterally, occasional crackles improved.   Abdomen: +bs, soft, no tenderness to palpation   Ext: 2+ RLE edema with blistering, wrapped around in clean gauze. Musculoskeletal:Intact  Neuro: no focal deficit, no asterixis. Electronically signed by Leyda Saez MD on 3/24/2021 at 10:51 AM  **This report has been created using voice recognition software. It maycontain minor  errors which are inherent in voice recognition technology. **

## 2021-03-24 NOTE — FLOWSHEET NOTE
03/24/21 0744 03/24/21 1205   Vital Signs   BP (!) 146/81 (!) 150/86   Temp 96.7 °F (35.9 °C) 98 °F (36.7 °C)   Pulse 64 70   Resp 15 18   Weight 169 lb 12.1 oz (77 kg) 164 lb 3.9 oz (74.5 kg)   Post-Hemodialysis Assessment   Post-Treatment Procedures  --  Blood returned; Access bleeding time < 10 minutes   Machine Disinfection Process  --  Acid/Vinegar Clean;Heat Disinfect; Exterior Machine Disinfection   Total Liters Processed (l/min)  --  90.8 l/min   Dialyzer Clearance  --  Lightly streaked   Duration of Treatment (minutes)  --  240 minutes   Hemodialysis Intake (ml)  --  400 ml   Hemodialysis Output (ml)  --  2900 ml   NET Removed (ml)  --  2500 ml   Tolerated Treatment  --  Good   4hr tx completed without complications. Tolerated 2.5L fluid removal well. Pressure held to each site x10 min dressing clean dry and intact upon leaving the unit. Report called to primary nurse. tx to be scanned into EMR.

## 2021-03-24 NOTE — CARE COORDINATION
3/24/21, 2:29 PM EDT    Patient goals/plan/ treatment preferences discussed by  and . Patient goals/plan/ treatment preferences reviewed with patient/ family. Patient/ family verbalize understanding of discharge plan and are in agreement with goal/plan/treatment preferences. Understanding was demonstrated using the teach back method. AVS provided by RN at time of discharge, which includes all necessary medical information pertaining to the patients current course of illness, treatment, post-discharge goals of care, and treatment preferences. Services After Discharge  Services At/After Discharge: In [facility], Nursing Services, Skilled Therapy, Aide Services([place] [facility])   IMM Letter  IMM Letter given to Patient/Family/Significant other/Guardian/POA/by[de-identified] CM-copy remains in room  IMM Letter date given[de-identified] 03/24/21  IMM Letter time given[de-identified] 46     SW notified Malvin Prince with [employer] of discharge today. Pt will return [address] under her medicare. Blue envelope on chart, ARNEL faxed AVS and LACP for transport time.  KARYNA Burrows is aware
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
## 2021-03-24 NOTE — PLAN OF CARE
Problem: Falls - Risk of:  Goal: Will remain free from falls  Description: Will remain free from falls  3/23/2021 2220 by Willie Fofana RN  Outcome: Ongoing  3/23/2021 1448 by Ave Parker RN  Outcome: Ongoing  Note: No falls this shift, call light in reach. Bed alarm on. Up with 1 assist. PT/OT working with patient. Goal: Absence of physical injury  Description: Absence of physical injury  Outcome: Ongoing     Problem: Skin Integrity:  Goal: Will show no infection signs and symptoms  Description: Will show no infection signs and symptoms  Outcome: Ongoing  Goal: Absence of new skin breakdown  Description: Absence of new skin breakdown  3/23/2021 2220 by Willie Fofana RN  Outcome: Ongoing  3/23/2021 1448 by Ave Parker RN  Outcome: Ongoing  Note: Right lower leg dressing changed. Pt removed dressing earlier stating that they needed to air out. Problem: Airway Clearance - Ineffective:  Goal: Clear lung sounds  Description: Clear lung sounds  Outcome: Ongoing  Goal: Ability to maintain a clear airway will improve  Description: Ability to maintain a clear airway will improve  3/23/2021 2220 by Willie Fofana RN  Outcome: Ongoing  3/23/2021 1448 by Ave Parker RN  Outcome: Ongoing  Note: Pt able to keep airway clear     Problem: Gas Exchange - Impaired:  Goal: Levels of oxygenation will improve  Description: Levels of oxygenation will improve  3/23/2021 2220 by Willie Fofana RN  Outcome: Ongoing  3/23/2021 1448 by Ave Parker RN  Outcome: Ongoing  Note: Pt remains on 3L per nasal cannula which she wears at home     Problem: Fluid Volume:  Goal: Will show no signs or symptoms of fluid imbalance  Description: Will show no signs or symptoms of fluid imbalance  3/23/2021 2220 by Willie Ffoana RN  Outcome: Ongoing  3/23/2021 1448 by vAe Parker RN  Outcome: Ongoing  Note: Accurate I/O in progress.       Problem: PAIN  Goal: Patient's pain/discomfort is manageable  3/23/2021 2220 by Willie Fofana RN Outcome: Ongoing  3/23/2021 1448 by Ermias Zayas RN  Outcome: Ongoing  Note: Pt complains of chronic back pain, prn oxycodone given with some relief. Heating pad applied and pt repositioned for comfort. Problem: DISCHARGE BARRIERS  Goal: Patient's continuum of care needs are met  3/23/2021 2220 by Ally Montague RN  Outcome: Ongoing  3/23/2021 1448 by Ermias Zayas RN  Outcome: Ongoing  Note: Pt kept up to date on plan of care. Plans on returning to [facility] long term at discharge.  assisting.       Problem: Nutrition Deficit:  Goal: Ability to achieve adequate nutritional intake will improve  Description: Ability to achieve adequate nutritional intake will improve  3/23/2021 2220 by Ally Montague RN  Outcome: Ongoing  3/23/2021 1448 by Ermias Zayas RN  Outcome: Ongoing  Note: Tolerating diet     Problem: Infection - Methicillin-Resistant Staphylococcus Aureus Infection:  Goal: Absence of methicillin-resistant Staphylococcus aureus infection  Description: Absence of methicillin-resistant Staphylococcus aureus infection  Outcome: Ongoing     Problem: Pain:  Goal: Pain level will decrease  Description: Pain level will decrease  Outcome: Ongoing  Goal: Control of acute pain  Description: Control of acute pain  Outcome: Ongoing  Goal: Control of chronic pain  Description: Control of chronic pain  Outcome: Ongoing     Problem: Nutrition  Goal: Optimal nutrition therapy  Outcome: Ongoing     Problem: Musculor/Skeletal Functional Status  Goal: Highest potential functional level  Outcome: Ongoing

## 2021-03-24 NOTE — PROGRESS NOTES
[facility]  PHYSICAL THERAPY MISSED TREATMENT NOTE  STRZ RENAL TELEMETRY 6K    Date: 3/24/2021  Patient Name: Joie Maravilla        MRN: [id number]   : 1965  (48 y.o.)  Gender: female   Referring Practitioner: Dr. Anne-Marie Lam  Diagnosis: acute metabolic encephalopathy         REASON FOR MISSED TREATMENT:  Pt at hemodialysis this morning. Query made to RN about status of TLSO being arranged. Shilpa Reid.  Donn Alpers, [place] [address]

## 2021-04-07 NOTE — PROGRESS NOTES
Physician Progress Note      Ольга Bronson  CSN #:                  [id number]  :                       1965  ADMIT DATE:       3/14/2021 1:57 PM  Liane Navarro DATE:        3/24/2021 4:09 PM  RESPONDING  PROVIDER #:        Yoly Hoffman DO          QUERY TEXT:    Pt  with ESRD and COPD with tracheostomy was admitted for pneumonia. Pt noted   to have swallow study was performed and showed laryngeal penetration and   aspiration. If possible, please make selection below ,or document in the   progress notes and discharge summary if you are evaluating and/or treating any   of the following:      Note: CAP and HCAP indicate where the pneumonia was acquired, not a specific   type. The medical record reflects the following:  Risk Factors: COPD with tracheostomy , dysphagia, peg tube , metabolic   encephalopathy, chronic resp failure  Clinical Indicators: swallow study was performed and showed laryngeal   penetration and aspiration, CXR - Tiny effusion left side. Moderate bibasilar   atelectasis/pneumonia, procal 0.56, WBC 14.2, 11.9,  Treatment:  admission, labs, imaging, IV Zosyn for 3 days and switched to IV   Meropenem for 7 days    Thank Radha Spears  RN, BSN, [facility]  Clinical   P: [phone]  F: [phone]  Options provided:  -- Gram negative pneumonia (excluding  haemophilus influenza)  -- Aspiration pneumonia  -- Other - I will add my own diagnosis  -- Disagree - Not applicable / Not valid  -- Disagree - Clinically unable to determine / Unknown  -- Refer to Clinical Documentation Reviewer    PROVIDER RESPONSE TEXT:    This patient has aspiration pneumonia.     Query created by: Kamari Lan on 2021 9:04 AM      Electronically signed by:  Yoly Hoffman DO 2021 10:00 AM
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
## 2021-04-16 NOTE — TELEPHONE ENCOUNTER
Attempted to contact [address] [place] [address] to schedule an appointment for Divine Kidd, no answer. Divine Kidd is referred to [facility] for a trach removal. Dr. Autumn Acosta first available appointment is [email or web address]

## 2021-06-07 PROBLEM — R42 DIZZINESS AND GIDDINESS: Status: ACTIVE | Noted: 2020-12-01

## 2021-06-07 PROBLEM — J41.0 SIMPLE CHRONIC BRONCHITIS (HCC): Status: ACTIVE | Noted: 2021-01-01

## 2021-06-07 PROBLEM — Z72.0 TOBACCO USER: Status: ACTIVE | Noted: 2021-01-01

## 2021-06-07 PROBLEM — F32.A DEPRESSIVE DISORDER: Status: ACTIVE | Noted: 2021-01-01

## 2021-06-07 PROBLEM — A49.02 METHICILLIN RESISTANT STAPHYLOCOCCUS AUREUS INFECTION: Status: ACTIVE | Noted: 2021-01-01

## 2021-06-07 PROBLEM — G93.5 ARNOLD-CHIARI MALFORMATION, TYPE I (HCC): Status: ACTIVE | Noted: 2021-01-01

## 2021-06-07 PROBLEM — R49.0 HOARSENESS: Status: ACTIVE | Noted: 2021-01-01

## 2021-06-07 PROBLEM — N28.9 KIDNEY DISORDER: Status: ACTIVE | Noted: 2021-01-01

## 2021-06-07 PROBLEM — Z99.81 OXYGEN DEPENDENT: Status: ACTIVE | Noted: 2021-01-01

## 2021-06-07 PROBLEM — R31.9 BLOOD IN THE URINE: Status: ACTIVE | Noted: 2021-01-01

## 2021-06-07 PROBLEM — J95.09 OTHER TRACHEOSTOMY COMPLICATION (HCC): Status: ACTIVE | Noted: 2021-01-01

## 2021-06-07 PROBLEM — J34.89 NASAL VALVE STENOSIS: Status: ACTIVE | Noted: 2021-01-01

## 2021-06-07 PROBLEM — J34.89 NASAL OBSTRUCTION: Status: ACTIVE | Noted: 2021-01-01

## 2021-06-07 PROBLEM — J45.909 ASTHMA: Status: ACTIVE | Noted: 2021-01-01

## 2021-06-07 PROBLEM — Z93.0 TRACHEOSTOMY DEPENDENCE (HCC): Status: ACTIVE | Noted: 2021-01-01

## 2021-06-07 PROBLEM — F17.200 TOBACCO DEPENDENCE SYNDROME: Status: ACTIVE | Noted: 2021-01-01
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
## 2021-06-10 NOTE — TELEPHONE ENCOUNTER
Placed form in your box from Dr. Sebas Blankenship office, need clearance to stop eliquis. Area you pcp? States Dr. Tyrone Davis? Please review. Since Dr. Jacques Favre out on PTO routed to Dr. Berenice Thakur,  Thank you! Attending Attestation (For Attendings USE Only)...

## 2021-06-11 NOTE — TELEPHONE ENCOUNTER
Does not appear that patient has ever been seen in our clinic  I do not feel comfortable completing these forms  Unclear if patient sees Dr. Mc Reeder or not

## 2021-06-11 NOTE — TELEPHONE ENCOUNTER
Anupama Brush is being scheduled for surgery with Dr Rachelle Kuo and will need medical clearance from Dr Lauren Cowan. We were informed that Dr Lauren Cowan sees her in the nursing home at [facility]. The aid from [facility] was with the patient at her appointment with Dr Rachelle Kuo this week and took the form with her back to [facility]. Anupama Brush is being scheduled for a Suspension Microlaryngoscopy with dilation and debridement, Therapeuric Bronchoscopy with dilation and debridement and a bilateral nasal valve repair on 7/29/21. Please advise.

## 2021-06-14 NOTE — TELEPHONE ENCOUNTER
Does not appear I have ever seen this patient before. Regardless, I do not see patients at [facility]. I would advise patient see Dr. Nathaly Oro if this is her PCP as listed in her chart. Agree with Dr. Laya Hall message. Thank you.

## 2021-06-18 NOTE — TELEPHONE ENCOUNTER
I spoke to Dr Marguerite Caba' office and he no longer makes rounds at [facility] as of 6/1/21. I then called [facility] and [facility] in the nurses station. She said Dr Trevin Jasso is who took over her care as of 6/1/21. He comes on Tuesdays to see the patients. She put a message on the patients chart and asked me to refax a clearance form. Faxed to [phone].

## 2021-07-21 NOTE — TELEPHONE ENCOUNTER
Anabell Holden from [facility] wanted to let Dr Florentin Hubbard know that Deirdre Walden has cut back on her smoking but they still catch her going out about 3 times a day to smoke. They will keep educating her of the importance of this and work more with this in the upcoming week as well. Surgery is scheduled 7/29/21.

## 2021-07-21 NOTE — PROGRESS NOTES
Arrival ADSJ:[id number]  Directions given:SDS  Who will be transporting-aid from [facility]  Who will be coming with patient-aid- daughters may be coming   Need to have someone with patient to sign post-op instruction sheet if MAC or    General anesthesia  NPO: take heart and BP medications am of surgery with small sip of water  Is patient oriented-yes  Does patient have POA-maybe one of her daughters but she signs her own consent    If patient has POA need to bring POA papers  Is patient ambulatory-wheelchair    Does patient use assistive devices-stands and pivots  Is patient non-weight bearing-yes    How many people does it take to move patient-1    Height-63 inches  Weight-147.2 lb  Fax: MAR, allergy list, medical/surgical history    General instructions:  NPO after midnight  Bring insurance info and drivers license  Wear comfortable clean clothing  Do not bring jewelry   Shower night before and morning of surgery with a liquid antibacterial soap  Follow all instructions given by your physician   needed at discharge  Name of nurse you spoke with from nursing home-Samreen

## 2021-07-21 NOTE — TELEPHONE ENCOUNTER
Q.  There is only so much we can do to help a patient despite there addictions and proclivities.     Thank you    PFC

## 2021-07-23 NOTE — TELEPHONE ENCOUNTER
Eboni from [facility] ([phone]) said they do not have any orders from Dr Cristina Hanna to hold the patients Eliquis. They will continue this until the day of surgery unless Dr Paulette Mosquera prefers it to be stopped. Pre Admission said some surgeons do not hold Eliquis, some hold for 48 hours and some for a week. Double checking on this for [place] since there were no orders from her PCP to hold it. Okay to continue? ? Please advise.

## 2021-07-26 NOTE — TELEPHONE ENCOUNTER
I was able to speak to Dr Karen Yee regarding this patient's Eliquis and he would like her to hold this medication at least 48 hours prior to surgery on 7/29/21. I did speak to Allie Alcantara at [facility] and she did let the director of nursing know this information but asked that we fax an order to stop Eliquis to [phone].

## 2021-07-26 NOTE — TELEPHONE ENCOUNTER
I was able to speak to [facility] at [facility] and she relayed this information to KARYNA Koch who noted in the patient's chart about the Eliquis needing held at least 48 hours prior to surgery on 7/29/21. She does still need an order faxed to them. She gave me an alternate fax as well. Both fax #'s are good. [phone]    Letter faxed to both #'s.

## 2021-07-29 NOTE — ANESTHESIA POSTPROCEDURE EVALUATION
Department of Anesthesiology  Postprocedure Note    Patient: Carine Zambrano  MRN: [id number]  YOB: 1965  Date of evaluation: 7/29/2021  Time:  3:17 PM     Procedure Summary     Date: 07/29/21 Room / Location: [address]    Anesthesia Start: 8465 Anesthesia Stop: 3744    Procedure: SUSPENSION MICROLARYNGOSCOPY WITH DILATION AND DEBRIDEMENT, THERAPEAUTIC BRONCHOSCOPY WITH DILATION AND DEBRIDEMENT, BILATERAL NASAL VALVE REPAIR WITH JEFT VENTILATION (Bilateral ) Diagnosis: (TRACHEOSTOMY DEPENDENCE, OTHER TRACHEOSTOMY COMPLICATION, HOARSENESS, SIMPLE CHRONIC BRONCHITIS AND NASAL OBSTRUCTION, NASAL VALVE STENOSIS, PXYGEN DEPENDENT)    Surgeons: Pamela Reilly MD Responsible Provider: Charity Cuba MD    Anesthesia Type: general ASA Status: 4          Anesthesia Type: general    Bashir Phase I: Bashir Score: 9    Bashir Phase II:      Last vitals: Reviewed and per EMR flowsheets.        Anesthesia Post Evaluation    Patient location during evaluation: PACU  Patient participation: complete - patient participated  Level of consciousness: awake and alert  Airway patency: patent  Nausea & Vomiting: no nausea and no vomiting  Complications: no  Cardiovascular status: hemodynamically stable  Respiratory status: acceptable (trach)  Hydration status: euvolemic  Comments: New Arrhythmia intraop (acc jxnal), HD stable, cardiology consult ordered, 12 lead EKG completed, patient denies CP

## 2021-07-29 NOTE — FLOWSHEET NOTE
Pt admitted to [facility] room 16 and oriented to unit. Pt switched to wall O2 @2L by nasal cannula, pt wears 2L at home. Pt verbalized permission for first name, last initial and physicians name on white board. SDS board and discharge criteria explained, pt and family verbalized understanding. Pt denies thoughts of harming self or others. Call light in reach. Family not present, daughter Stephanie's number on chart.
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
## 2021-07-29 NOTE — PROGRESS NOTES
Admitted to PACU from OR  Report received from anesthesia. Pt arouses easily. NO s/s discomfort or distress at this time.

## 2021-07-29 NOTE — PROGRESS NOTES
Pt arrived in PACU w/ arrhythmia. Anesthesia states was that way during case but it is new. 12 lead EKG ordered/obtained. Pt asymptomatic. Dr Jessica Keith at bedside-aware. Dr Jessica Keith saw EKG. EKG appears to be junctional rhythm. Order for cardiology consult noted.

## 2021-07-29 NOTE — PROGRESS NOTES
Transported to floor in stable condition. o2 over trach in place. Pt arouses easily-pt at tolerable level.

## 2021-07-29 NOTE — CONSULTS
[facility] [employer]      Patient:  Adriana Query  YOB: 1965    MRN: [id number]     Acct: [de-identified]    Primary Care Physician: Cynthia Mcclain. Moris Mao MD    Reason for consult: Comanagement    Assessment/Plan:  1. Status post suspension micro lower endoscopy with dilation debridement, therapeutic bronchoscopy with dilation and debridement, bilateral nasal valve repair with jet ventilation on 7/29/2021--per ENT  2. Chronic hypoxic respiratory failure, trach dependent  3. Hyperkalemia--nephrology is on the case and per the RN may have a run of dialysis today  4. Possible atrial fibrillation--patient is on amiodarone along with Eliquis however that is currently on hold per ENT;   5. End-stage renal disease--on hemodialysis Monday, Wednesday, Friday; nephrology has been consulted  6. Essential hypertension, uncontrolled--blood pressure on the higher side however she does not take any medications except however she is on midodrine 2.5 mg 3 times a day, will monitor closely      HISTORY OF PRESENT ILLNESS:   History obtained from chart review and the patient.   The patient is a 48 y.o. female whom I have been requested to see by Dr. Abel Collins for evaluation of medical management; patient had an ENT procedure done today; patient has chronic respiratory failure and is trach dependent, she also has end-stage renal disease on hemodialysis along with hypertension; she is currently alert and oriented, she complains of pain to her nose however she denies any other complaints; she awakens easily and participates well; she denies lightheadedness or dizziness, chest pain, shortness of breath, nausea; there was some concern about junctional rhythm and so cardiology was consulted; she is currently hemodynamically stable with a heart rate 55; she is satting 99% on 45% FiO2 with 15 L via trach mask; she offers no other complaints at this time, appears she has a history of atrial fibrillation as she takes 3451    sodium chloride       PRN Meds:.hydrOXYzine, OXYGEN 3 L, sodium chloride flush, sodium chloride, ondansetron **OR** ondansetron, HYDROmorphone **OR** HYDROmorphone **OR** acetaminophen, oxyCODONE **OR** oxyCODONE, sodium chloride nebulizer    Allergies:  Codeine and Morphine    Social History:    reports that she has quit smoking. Her smoking use included cigarettes. She has a 25.00 pack-year smoking history. She has quit using smokeless tobacco. She reports previous alcohol use. She reports previous drug use.       Family History:       Problem Relation Age of Onset    Asthma Sister          Review of Systems:  Constitutional: ROS: negative for - chills or fever  Head: no headache, no head injury, no migraine   Eyes ROS: denies blurred/double vision  Ears ROS: no hearing difficulty, no tinnitus  Mouth and Throat ROS: no ulceration, dysphagia, dental caries  Psychological ROS: no depression, no anxiety, no panic attacks, denies suicide/homicide ideation  Endocrine ROS: denies polyuria, polydypsia, no heat or cold intolerance  Respiratory ROS: no cough, shortness of breath, or wheezing  Cardiovascular ROS: no chest pain or dyspnea on exertion  Gastrointestinal ROS: no abdominal pain, change in bowel habits, or black or bloody stools  Genito-Urinary ROS: denies dysuria, frequency, urgency; denies hematuria  Musculoskeletal ROS: negative  Neurological ROS: no syncope, no seizures, no numbness or tingling of hands, no numbness or tingling of feet, no paresis  Dermatology: no skin rash, no eczema  Endocrine: no polyuria, polydypsia, no heat/cold intolerance  Hematology: denies bruising easily, denies bleeding problems, denies clotting disorders            Physical Exam:    Vitals:  Patient Vitals for the past 24 hrs:   BP Temp Temp src Pulse Resp SpO2 Height Weight   07/29/21 1415 (!) 145/72 98.2 °F (36.8 °C) Temporal 55 17 99 % -- --   07/29/21 1405 136/74 -- -- 50 20 97 % -- --   07/29/21 1355 (!) 151/75 97.9 °F (36.6 °C) Temporal 50 16 94 % -- --   07/29/21 1350 (!) 144/93 -- -- 81 16 97 % -- --   07/29/21 1345 (!) 159/82 -- -- 50 20 97 % -- --   07/29/21 1340 (!) 145/80 -- -- (!) 49 16 97 % -- --   07/29/21 1335 (!) 159/79 -- -- 51 16 98 % -- --   07/29/21 1330 (!) 152/77 -- -- 51 12 98 % -- --   07/29/21 1325 (!) 155/77 -- -- (!) 48 16 98 % -- --   07/29/21 1320 (!) 159/73 -- -- (!) 47 16 98 % -- --   07/29/21 1315 (!) 168/76 -- -- (!) 48 20 97 % -- --   07/29/21 1310 (!) 143/78 -- -- 50 12 94 % -- --   07/29/21 1305 129/77 -- -- (!) 47 12 92 % -- --   07/29/21 1300 (!) 156/75 99.7 °F (37.6 °C) Temporal (!) 48 20 95 % -- --   07/29/21 0800 (!) 140/74 97.2 °F (36.2 °C) Temporal 56 18 95 % 5' 3\" (1.6 m) 147 lb (66.7 kg)     Weight: Weight: 147 lb (66.7 kg)     24 hour intake/output:    Intake/Output Summary (Last 24 hours) at 7/29/2021 1551  Last data filed at 7/29/2021 1412  Gross per 24 hour   Intake 50 ml   Output 10 ml   Net 40 ml       General appearance - oriented to person, place, and time and chronically ill appearing; appears older than stated age  HEENT-atraumatic, normocephalic; PERRL; conjunctiva pink; sclera anicteric; oral mucosa pink and moist; trachea midline; neck supple; no carotid bruit auscultated; no JVD; trach noted with trach mask in place  Respiratory - decreased air entry noted anterior aspect  Cardiovascular - normal rate and regular rhythm  Gastrointestinal - soft, nontender, nondistended, active bowel sounds x4  Obese: No  Neurological - alert and oriented to person, place, time; cranial nerves 2-12 grossly intact; she whispers when she speaks  Musculoskeletal - no joint tenderness, deformity or swelling, movement of extremities x 4 intact; no lower extremity edema; pedal and posterior tibialis pulses 2+  Integumentary - pink, warm, dry      Review of Labs and Diagnostic Testing:    BMP:    Recent Labs     07/29/21  0850   K 5.5*     EKG: Junctional rhythm with heart rate 58      Thank you Silver Brown for allowing me to participate in this patient's care.     Leonor Thomas, APRN - CNP, CNP

## 2021-07-29 NOTE — BRIEF OP NOTE
Brief Postoperative Note      Patient: Essence Shaver  YOB: 1965  MRN: [id number]    Date of Procedure: 7/29/2021    Pre-Op Diagnosis: TRACHEOSTOMY DEPENDENCE, OTHER TRACHEOSTOMY COMPLICATION, HOARSENESS, SIMPLE CHRONIC BRONCHITIS AND NASAL OBSTRUCTION, NASAL VALVE STENOSIS, PXYGEN DEPENDENT    Post-Op Diagnosis: Same       Procedure(s):  SUSPENSION MICROLARYNGOSCOPY WITH DILATION AND DEBRIDEMENT, THERAPEAUTIC BRONCHOSCOPY WITH DILATION AND DEBRIDEMENT, BILATERAL NASAL VALVE REPAIR WITH JEFT VENTILATION    Surgeon(s):  Ky Mcneal MD    Assistant:  * No surgical staff found *    Anesthesia: General    Estimated Blood Loss (mL): less than 50     Complications: None    Specimens:   ID Type Source Tests Collected by Time Destination   A : [address] Tissue Larynx SURGICAL PATHOLOGY Ky Mcneal MD 7/29/2021 1101    B : ria stomal granulation tissue Tissue Larynx SURGICAL PATHOLOGY Ky Mcneal MD 7/29/2021 1113    C : right medial vivienne Tissue Nose SURGICAL PATHOLOGY Ky Mcneal MD 7/29/2021 1205        Implants:  * No implants in log *      Drains:   Gastrostomy/Enterostomy/Jejunostomy PEG-Jejunostomy LLQ 1 20 fr (Active)       Urethral Catheter (Active)       Findings: 1. 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 Subglottis widely patent  3. Peristomal trachea with shallow webs across approximately 3 tracheal rings and a dense suprastomal shelf. Shelf lasered away and webs dilated to 18 mm  4. Peristomal granulation tissue resected  5.   Likely to be ready for decannulation tomorrow assuming no problems tonight    Electronically signed by Ky Mcneal MD on 7/29/2021 at 1:04 PM
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
## 2021-07-29 NOTE — PROGRESS NOTES
Pt admitted to  [address] [place] [address] from OR for scheduled surgery    IV normal saline infusing into the hand right, condition patent and no redness at a rate of 30 mls/ hour. IV site free of s/s of infection or infiltration. Vital signs obtained. Assessment and data collection initiated. Two nurse skin assessment performed by Samreen TRONCOSO and Mayte Doyle RN. Oriented to room. Policies and procedures for [facility] explained. All questions answered with no further questions at this time. Fall prevention and safety brochure discussed with patient. Bed alarm on. Call light in reach. Oriented to room. Digna Grijalva, RN, RN 7/29/2021 5:54 PM     Explained patients right to have family, representative or physician notified of their admission. Patient has Declined for physician to be notified. Patient has Declined for family/representative to be notified. Patient would like family notified once per shift? No     Has trach mask on. O2 >90.
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
## 2021-07-29 NOTE — OP NOTE
degrees for the procedure. I performed a timeout verifying the patient's identity and planned procedure. Suspension microlaryngoscopy with balloon dilation: The patient asleep but protect her dentition with heat activated molded plastic given that she had many anterior missing teeth and laryngoscopy with laterally distracted the remaining teeth. I was able to bridge these teeth with the molded plastic and effectively protected them. I passed a Lori over the plastic into the vallecula and extended the airway anteriorly. I then used a 30 degree optical magnifying telescope to carefully examine the airway and found to be abnormal for the presence of a shallow posterior glottic web with some tethering of the arytenoids superiorly. Glottic aperture was adequate but warranted improvement in preparation for decannulation. I also visualized the patient's distal subglottis and peristomal trachea and found to be obstructed with a very thick suprastomal shelf obstructing more than half of the suprastomal airway with anteriorly based very thick tissues including some cartilaginous elements. The posterior peristomal airway was also scalloped with cicatricial scars that were relatively shallow but in series with potentially limit airflow. Refer to my attention to widening the laryngeal airway. I placed an 18 mm pneumatic dilator and centered on the posterior commissure and extended to full 18 mm. This significantly widened the posterior commissure particularly across the cicatricial band that was both between the arytenoids and superiorly along the upper part of the interarytenoid mucosa. Bleeding was self-limited.     Bronchoscopic resection of obstructing soft tissue: Bringing onto the field a CO2 fiber laser and setting of that 12 W average power density and 60 mJ per pulse with continuous pulse generation, I used multiple handpieces to incise and excised the obstructing soft tissue shelf in the suprastomal trachea. I handed off representative portions of this to be placed in formalin for permanent section. I achieved hemostasis with suction cautery and topical epinephrine. Bronchoscopic balloon dilation of the trachea: 1 stridor additional bleeding I removed the jet ventilation catheter which was ventilated the patient for the majority of what was described above and then passed the 18 mm balloon into the trachea and extended to full 18 mm for approximately the amount of time necessary to no longer need to add any further fluid to sustain the 6 katt pressure. This opened up the peristomal airway well. Bleeding responded well to topical epinephrine. Tracheostomy debridement: I then debrided the tracheostomy of endoluminal granulation tissue that might slow the healing of the of the tracheostomy tract after decannulation. I used cup forceps to remove this tissue and placed in saline to be transferred to formalin for permanent section. I also cleaned out the distal airway with suction catheter. There was very thick secretions distally consistent with the patient's long and heavy smoking history. After finishing the airway portion of the operation I remove the Lori laryngoscope and intubated the patient's tracheostomy with the #6 endotracheal tube for the remainder of the case. Bilateral nostril reconstruction: During my attention the patient's nasal airway. A new field was prepped and draped in usual fashion for sterile approach to the soft tissues of the nostrils. The patient's left nostril was markedly obstructed with a cicatricial scar that was laterally and inferiorly based. On the right side the patient's nostril was obstructed by hypertrophic medial gloria cartilage and thickened overlying skin. I injected the columella and the lateral left nostril with 1-50,000 epinephrine avoiding infiltrating the nasal tip with this liquid.   And then used a [place] blade to incise the left nostril superior laterally and inferiorly to make a transverse incision through the cicatrix and then in a Y configuration more posteriorly so that I could rotate posteriorly based tissue into the incised cicatrix in a VY advancement flap configuration. I then placed a 12 mm balloon into the nostril and extended it to 6 katt or full 12 mm until no further volume needed to be added to maintain the 6 atmospheric pressure. This markedly enlarged nostril. On the right side, I made a vertical incision through the skin overlying the medial gloria cartilage and dissected down to the medial gloria removing a significant portion of it along with a protruding portion of the dorsal septum going out onto the maxillary spine that was also pushing the cartilage medially. I resected both and handed off this tissue to be placed in formalin for permanent section. I irrigated out all 3 cavities and used microbipolar forceps for hemostasis. All 3 wounds made I closed the V-Y advancement on the left side with 4-0 PDS on a P3 cutting needle into figure-of-eight sutures on either side of the widened transverse incision of both wounds. This was a total of 4 sutures on the lateral and inferior nostril. On the left side I closed the wound with 4-0 Monocryl sutures on a P3 cutting needle in a superficial figure-of-eight configuration x3 markedly enlarging the right nasal airway in the process. I suctioned away the bloody effluent in both nasal airways and consider the operation concluded. The patient was reversed from general anesthesia and began to breathe on her own. At this point I converted the patient's endotracheal tube to a #4 tracheostomy cannula which remained open to allow the patient to be transported to recovery in satisfactory condition. Estimated blood loss in the case was less than 10 cc and the patient received proximately a liter of intravenous lactated Ringer's intraoperatively. No blood products were transfused.   No

## 2021-07-30 NOTE — CARE COORDINATION
7/30/21, 3:50 PM EDT    Patient goals/plan/ treatment preferences discussed by  and . Patient goals/plan/ treatment preferences reviewed with patient/ family. Patient/ family verbalize understanding of discharge plan and are in agreement with goal/plan/treatment preferences. Understanding was demonstrated using the teach back method. AVS provided by RN at time of discharge, which includes all necessary medical information pertaining to the patients current course of illness, treatment, post-discharge goals of care, and treatment preferences. Services After Discharge  Services At/After Discharge: Skilled Therapy, In [facility]         Planning discharge tomorrow, ARNEL faxed forms to [facility] w/c transport. ARNEL spoke with mane velasquez for return. Blue packet on chart, discharge instruction sheet for nurse to follow. No Precert required.

## 2021-07-30 NOTE — PROGRESS NOTES
Kannan Strange is a 48 y.o. female patient was admitted to the ICU status post suspension laryngoscopy with dilation and therapeutic bronchoscopy with dilation and debridement. The patient reports breathing comfortably but having some bloody discharge that has been suction from her airway since surgery. She is soon to go and get her dialysis. No diagnosis found. Past Medical History:   Diagnosis Date    Anxiety disorder     Asthma     Chronic kidney disease     Chronic respiratory failure with hypoxia (HCC)     COPD (chronic obstructive pulmonary disease) (HCC)     Elevated troponin     Hemodialysis patient ([facility]     M-W-F in [place]    Hypertension     Smoker      No past surgical history pertinent negatives on file.   Scheduled Meds:   ALPRAZolam  0.5 mg Oral TID    amiodarone  200 mg Oral Daily    benztropine  1 mg Oral Nightly    carbidopa-levodopa  1 tablet Oral Nightly    citalopram  20 mg Oral Daily    docusate sodium  100 mg Oral Daily    [START ON 7/30/2021] famotidine  20 mg Oral Once per day on Mon Wed Fri    budesonide-formoterol  2 puff Inhalation BID    ipratropium-albuterol  3 mL Inhalation 4x Daily    melatonin  6 mg Oral Nightly    midodrine  2.5 mg Oral TID AC    mirtazapine  7.5 mg Oral Nightly    [START ON 7/30/2021] therapeutic multivitamin-minerals  1 tablet Oral Daily    nicotine  1 patch Transdermal Q24H    polyethylene glycol  17 g Per G Tube Daily    QUEtiapine  25 mg Oral BID    senna  1 tablet Oral Nightly    sevelamer  1,600 mg Oral TID WC    [Held by provider] apixaban  2.5 mg Oral BID    sodium chloride flush  5-40 mL Intravenous 2 times per day    sodium chloride nebulizer  3 mL Nebulization TID     Continuous Infusions:   sodium chloride 30 mL/hr at 07/29/21 1722    sodium chloride       PRN Meds:hydrOXYzine, sodium chloride flush, sodium chloride, ondansetron **OR** ondansetron, HYDROmorphone **OR** HYDROmorphone **OR** acetaminophen, oxyCODONE **OR** oxyCODONE, sodium chloride nebulizer    Allergies   Allergen Reactions    Codeine      Other reaction(s): Codeine, itching    Morphine      Other reaction(s): Itching     Active Problems:    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HCC)  Resolved Problems:    * No resolved hospital problems. *    Blood pressure (!) 145/86, pulse (!) 48, temperature 97.9 °F (36.6 °C), resp. rate 22, height 5' 3\" (1.6 m), weight 146 lb 13.2 oz (66.6 kg), SpO2 95 %. Subjective   As described above  Objective     The patient's tracheostomy was clear of obstructive debris  She was able to digitally occlude the outlet and speak clearly and breathe comfortably      Assessment & Plan     The patient is immediately postop from airway surgery in preparation for likely decannulation in the morning    She is scheduled for dialysis this evening    Hospitalist service has join me in the patient's care given her complex medical issues    Assuming no problems tonight, I will remove her cannula in the morning and apply Xeroform gauze to her neck for her to be in the process of decannulation. I explained all this in detail to the patient and her nurse at the bedside this evening. I also called her daughter again at her bedside and left second voicemail message at her direction. Tessa Medina.  Fortino Leroy MD  Cell: [phone]    Ky Mcneal MD  7/29/2021

## 2021-07-30 NOTE — PROGRESS NOTES
Hospitalist Progress Note    Patient:  Juliann Craven      Unit/Bed:4B-03/003-A    YOB: 1965    MRN: [id number]       Acct: [de-identified]     PCP: Grace Hardin. Rajat Chapman MD    Date of Admission: 7/29/2021    Assessment/Plan:    1. Status post suspension micro lower endoscopy with dilation debridement, therapeutic bronchoscopy with dilation and debridement, bilateral nasal valve repair with jet ventilation on 7/29/2021--per ENT  2. Chronic hypoxic respiratory failure, trach dependent--patient decannulated this morning per ENT  3. Hyperkalemia--nephrology is on the case; planning HD today  4. Chronic atrial fibrillation--patient is on amiodarone along with Eliquis however that is currently on hold per ENT and resume when okay with him   5. End-stage renal disease--on hemodialysis Monday, Wednesday, Friday;  per nephrology   6. Essential hypertension, uncontrolled--blood pressure on the higher side however she does not take any medications except however she is on midodrine 2.5 mg 3 times a day, will monitor closely  7. Thrombocytopenia--platelets down to 922 and on July 6, 2021 was at 131 so we will watch closely, no signs of bleeding    Expected discharge date: Per ENT    Disposition:    [] Home       [] TCU       [] Rehab       [] Psych       [x] SNF       [] [place]       [] Other-    Chief Complaint: Following medically    Hospital Course:  The patient is a 48 y.o. female whom I have been requested to see by Dr. Usman Cam for evaluation of medical management; patient had an ENT procedure done today; patient has chronic respiratory failure and is trach dependent, she also has end-stage renal disease on hemodialysis along with hypertension; she is currently alert and oriented, she complains of pain to her nose however she denies any other complaints; she awakens easily and participates well; she denies lightheadedness or dizziness, chest pain, shortness of breath, nausea; there was some concern about junctional rhythm and so cardiology was consulted; she is currently hemodynamically stable with a heart rate 55; she is satting 99% on 45% FiO2 with 15 L via trach mask; she offers no other complaints at this time, appears she has a history of atrial fibrillation as she takes amiodarone and Eliquis however the Eliquis is on hold per ENT.     7/30--> patient had decannulation of the trach today; currently on high flow; patient tells me she feels well and wants to go home after dialysis today; she is hemodynamically stable, potassium and creatinine are on the higher side however she is planning for dialysis today    Subjective (past 24 hours): Relates to some pain to her right lower leg which is chronic     Medications:  Reviewed    Infusion Medications    sodium chloride       Scheduled Medications    [START ON 8/2/2021] sodium zirconium cyclosilicate  10 g Oral Once per day on Mon Thu    sodium chloride nebulizer  3 mL Nebulization TID    albumin human  25 g Intravenous Once    ALPRAZolam  0.5 mg Oral TID    amiodarone  200 mg Oral Daily    benztropine  1 mg Oral Nightly    carbidopa-levodopa  1 tablet Oral Nightly    citalopram  20 mg Oral Daily    docusate sodium  100 mg Oral Daily    famotidine  20 mg Oral Once per day on Mon Wed Fri    budesonide-formoterol  2 puff Inhalation BID    ipratropium-albuterol  3 mL Inhalation 4x Daily    melatonin  6 mg Oral Nightly    midodrine  2.5 mg Oral TID AC    mirtazapine  7.5 mg Oral Nightly    therapeutic multivitamin-minerals  1 tablet Oral Daily    nicotine  1 patch Transdermal Q24H    polyethylene glycol  17 g Per G Tube Daily    QUEtiapine  25 mg Oral BID    senna  1 tablet Oral Nightly    sevelamer  1,600 mg Oral TID WC    [Held by provider] apixaban  2.5 mg Oral BID    sodium chloride flush  5-40 mL Intravenous 2 times per day    sodium chloride nebulizer  3 mL Nebulization TID     PRN Meds: sodium chloride (Inhalant), lower extremities, Tingling COMPARISON: No prior study. TECHNIQUE: Ankle-brachial indices were calculated bilaterally. . Doppler waveforms were also generated for both ankles. FINDINGS: ABIs and Doppler waveforms are within normal limits. ELI RIGHT SHARLENE----->1.08 PTA----->1.07 ELI LEFT SHARLENE----->1.15 PTA----->1.07     Normal study. **This report has been created using voice recognition software. It may contain minor errors which are inherent in voice recognition technology. ** Final report electronically signed by Dr. Caitlin Toussaint on 7/14/2021 10:49 AM      DVT prophylaxis: [] Lovenox                                 [] SCDs                                 [] SQ Heparin                                 [] Encourage ambulation           [] Already on Anticoagulation     Code Status: Full Code    Tele:   [x] yes             [] no    Active Hospital Problems    Diagnosis Date Noted    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[facility] [Z93.0] 06/07/2021       Electronically signed by NARINDER Dillard CNP on 7/30/2021 at 11:42 AM
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
## 2021-07-30 NOTE — PROGRESS NOTES
[facility]   Nephrology progress note  7/30/2021, 8:37 AM      Pt Name:    Lalitha Merchant  MRN:     [id number]     YOB: 1965  Admit Date:    7/29/2021  7:18 AM  Primary Care Physician:  Naomy Hamilton. Shi Nava MD   Room number  4B-03/003-A    Chief Complaint: Nephrology following for ESRD and HD    Subjective:  Patient seen and examined  Tracheostomy in place  On trach collar  No acute distress    Objective:  24HR INTAKE/OUTPUT:      Intake/Output Summary (Last 24 hours) at 7/30/2021 0837  Last data filed at 7/29/2021 2116  Gross per 24 hour   Intake 450 ml   Output 2410 ml   Net -1960 ml     I/O last 3 completed shifts: In: 450 [I.V.:50]  Out: 2410 [Blood:10]  No intake/output data recorded. Admission weight: 147 lb (66.7 kg)  Wt Readings from Last 3 Encounters:   07/29/21 142 lb 6.7 oz (64.6 kg)   06/07/21 172 lb (78 kg)   06/02/21 169 lb (76.7 kg)     Body mass index is 25.23 kg/m². Physical examination  VITALS:     Vitals:    07/29/21 2300 07/30/21 0000 07/30/21 0400 07/30/21 0800   BP:  107/72 101/64 107/81   Pulse:  61 57 58   Resp:  17 16 17   Temp:  98.2 °F (36.8 °C) 98.6 °F (37 °C) 98.5 °F (36.9 °C)   TempSrc:  Oral Oral Oral   SpO2: 96% 92% 91% 98%   Weight:       Height:         General Appearance: alert and cooperative with exam, appears comfortable, no distress  Lungs: Air entry diminished, no use of accessory muscles  Heart:  S1, S2 heard  GI: soft, non-tender, no guarding  Extremities: No significant LE edema      Lab Data  CBC:   Recent Labs     07/30/21  0501   WBC 8.0   HGB 14.5   HCT 49.1*   *     BMP:  Recent Labs     07/29/21  0850 07/30/21  0501   NA  --  136   K 5.5* 5.4*   CL  --  98   CO2  --  21*   BUN  --  39*   CREATININE  --  7.5*   GLUCOSE  --  84   CALCIUM  --  9.3     Hepatic: No results for input(s): LABALBU, AST, ALT, ALB, BILITOT, ALKPHOS in the last 72 hours.       Meds:  Infusion:    sodium chloride 30 mL/hr at 07/29/21 1722    sodium chloride       Meds:    ALPRAZolam  0.5 mg Oral TID    amiodarone  200 mg Oral Daily    benztropine  1 mg Oral Nightly    carbidopa-levodopa  1 tablet Oral Nightly    citalopram  20 mg Oral Daily    docusate sodium  100 mg Oral Daily    famotidine  20 mg Oral Once per day on Mon Wed Fri    budesonide-formoterol  2 puff Inhalation BID    ipratropium-albuterol  3 mL Inhalation 4x Daily    melatonin  6 mg Oral Nightly    midodrine  2.5 mg Oral TID AC    mirtazapine  7.5 mg Oral Nightly    therapeutic multivitamin-minerals  1 tablet Oral Daily    nicotine  1 patch Transdermal Q24H    polyethylene glycol  17 g Per G Tube Daily    QUEtiapine  25 mg Oral BID    senna  1 tablet Oral Nightly    sevelamer  1,600 mg Oral TID WC    [Held by provider] apixaban  2.5 mg Oral BID    sodium chloride flush  5-40 mL Intravenous 2 times per day    sodium chloride nebulizer  3 mL Nebulization TID     Meds prn: hydrOXYzine, sodium chloride flush, sodium chloride, ondansetron **OR** ondansetron, HYDROmorphone **OR** HYDROmorphone **OR** acetaminophen, oxyCODONE **OR** oxyCODONE, sodium chloride nebulizer       Impression and Plan:  1. ESRD on hemodialysis. We will plan hemodialysis treatment today  Patient is on 4 times weekly dialysis at her nursing home  Had dialysis treatment last night and tolerated well    2. Hyperkalemia. Will use 2K bath. Will resume Lokelma. 3.  Chronic respiratory failure status post tracheostomy: ENT planning possible decannulation today  4. Status post laryngoscopy with dilation  5. Anemia in ESRD  6. History of severe COPD  7.   Recurrent smoking        Zay Ramirez MD  [facility]

## 2021-07-30 NOTE — PLAN OF CARE
Problem: Pain:  Goal: Pain level will decrease  Description: Pain level will decrease  7/30/2021 1500 by Bienvenido Stevenson RN  Outcome: Ongoing  Note: Pain Assessment: 0-10  Pain Level: 0   Patient's Stated Pain Goal: 4   Is pain goal met at this time? Yes     Non-Pharmaceutical Pain Intervention(s): Repositioned       Problem: Pain:  Goal: Control of acute pain  Description: Control of acute pain  7/30/2021 1500 by Bienvenido Stevenson RN  Outcome: Ongoing  Note: Patient reports pain at times. Verbalizes understanding of pain scale and available options for pain management. Reposition for comfort. Problem: Pain:  Goal: Control of chronic pain  Description: Control of chronic pain  7/30/2021 1500 by Bienvenido Stevenson RN  Outcome: Ongoing  Note: Patient reports pain at times. Verbalizes understanding of pain scale and available options for pain management. Reposition for comfort. Problem: Impaired respiratory status  Goal: Clear lung sounds  7/30/2021 1500 by Bienvenido Stevenson RN  Outcome: Ongoing  Note: Patient decanulated this shift. Tolerating nasal canula, refuses heated hi flow as ordered. Care plan reviewed with patient. Patient verbalize understanding of the plan of care and contribute to goal setting but would benefit from continual reminding.

## 2021-07-30 NOTE — FLOWSHEET NOTE
07/29/21 2116   Vital Signs   /70   Temp 97.2 °F (36.2 °C)   Pulse 79   Resp 18   Weight 142 lb 6.7 oz (64.6 kg)   Weight Method Bed scale   Percent Weight Change -3   Post-Hemodialysis Assessment   Post-Treatment Procedures Blood returned; Access bleeding time < 10 minutes   Machine Disinfection Process Acid/Vinegar Clean;Heat Disinfect; Exterior Machine Disinfection   Rinseback Volume (ml) 400 ml   Total Liters Processed (l/min) 57.6 l/min   Dialyzer Clearance Lightly streaked   Duration of Treatment (minutes) 180 minutes   Heparin amount administered during treatment (units) 0 units   Hemodialysis Intake (ml) 400 ml   Hemodialysis Output (ml) 2400 ml   NET Removed (ml) 2000 ml   stable 3 hour treatment. 2 liters net uf. pressure held both cannulation sites  x 10 minutes,.

## 2021-07-30 NOTE — PROGRESS NOTES
Patient was off the floor at dialysis at the time of my arrival.  Discussed the patient with her nurse. The patient was decannulated by Dr. Koko Barrett earlier today. Patient is overall tolerating decannulation fairly well. She does have brief episodes of coughing occasionally, but is not having any significant shortness of breath reportedly.    -3 times daily 0.9% saline nebulizers  -As needed 3% saline nebulizers for thick mucus  -Patient on heated high flow nasal cannula for pressure support.   Pressure of 60 and titrate the FiO2 as tolerated by oxygen demands.  -Plan for patient to be observed for a full 48 hours post-decannulation to ensure she tolerates well  -Contact ENT with changes in respiratory status or any other questions/concerns
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
## 2021-07-30 NOTE — CARE COORDINATION
7/30/21, 3:15 PM EDT  Discharge Planning Evaluation  Social work consult received, patient from [facility]. Patient/Family preference is to return to [facility]  The patient's current payor source at the facility is Medicaid,  Medicare skilled days available: ECF checking. Insurance precert:  NO  Spoke with [place] at the facility. Patient bed hold: YES  Anticipated transport plan: [facility] W[facility]C  Do they require COVID 19 test to return to [facility]: NO  Is there a required time frame which which COVID test needs done: N/A    ARNEL spoke with [place] at [facility], states ok for pt to return over weekend. No Covid test required. SW spoke with pt, agreeable to returning to [facility] and states she uses [facility] w/c transport, pt states she has her w/c here with her.

## 2021-07-30 NOTE — FLOWSHEET NOTE
07/30/21 1345   Vital Signs   /69   Temp 97.7 °F (36.5 °C)   Pulse 54   Resp 21   SpO2 99 %   Weight 142 lb 6.7 oz (64.6 kg)   Weight Method Bed scale   Percent Weight Change 0   Pain Assessment   Pain Assessment 0-10   Pain Level 0   Post-Hemodialysis Assessment   Post-Treatment Procedures Blood returned; Access bleeding time < 10 minutes   Machine Disinfection Process Acid/Vinegar Clean;Heat Disinfect; Exterior Machine Disinfection   Rinseback Volume (ml) 400 ml   Total Liters Processed (l/min) 52.9 l/min   Dialyzer Clearance Lightly streaked   Duration of Treatment (minutes) 180 minutes   Hemodialysis Output (ml) 600 ml   NET Removed (ml) 0 ml   Tolerated Treatment Poor   Patient Response to Treatment hypotensive   Bilateral Breath Sounds Diminished   Edema None   completed 3 hr HD TX, removed No Fluid, d/t hypotension, Midodrine 2.5mg given pre HD TX. Pt given Albumin 25g for BP support. pt access dressing clean, dry and intact. Dr. Ariana Lennon notified. report given to primary nurse.

## 2021-07-30 NOTE — PROGRESS NOTES
Patient refusing to wear heated high flow as ordered. 1710 Patient agreeable to heated high flow at this time.

## 2021-07-31 PROBLEM — Z93.0 TRACHEOSTOMY STATUS (HCC): Status: ACTIVE | Noted: 2021-01-01

## 2021-07-31 NOTE — PROGRESS NOTES
Department of Otolaryngology  Progress Note    Chief Complaint:  Tracheostomy status    SUBJECTIVE:  Patient is s/p suspension microlaryngoscopy with dilation and debridement, therapeutic bronchoscopy with dilation and debridement and bilateral nasal valve repair utilizing jet ventilation 7/29/2021 with Dr Lit Martínez. Her tracheostomy was successfully decannulated yesterday morning, over 24 hours ago. She is tolerating high flow oxygen for humidification overnight. SBP 70-80s overnight; given midodrine per Hospitalist; SBP anywhere from 80s-150 this morning. Dr Yvonne Strange (EP) saw this morning - no documented recurrence of atrial fibrillation since 2010; amiodarone and anticoagulation discontinued, plan 30-day event monitor on discharge. Patient reports feeling great, eager for discharge. She did wear the high flow overnight, although temperature setting is low at 32 degrees and she is sleeping upon arrival with both cannulas at edge of right nostril blowing at least partially on her face. A complete multi-organ review of systems was performed using a new patient questionnaire, and reviewed by me.   ENT:  negative except as noted in HPI  CONSTITUTIONAL:  negative except as noted in HPI  EYES:  negative except as noted in HPI  RESPIRATORY:  negative except as noted in HPI  CARDIOVASCULAR:  negative except as noted in HPI  GASTROINTESTINAL:  negative except as noted in HPI  GENITOURINARY:  negative except as noted in HPI  MUSCULOSKELETAL:  negative except as noted in HPI  SKIN:  negative except as noted in HPI  ENDOCRINE/METABOLIC: negative except as noted in HPI  HEMATOLOGIC/LYMPHATIC:  negative except as noted in HPI  ALLERGY/IMMUN: negative except as noted in HPI  NEUROLOGICAL:  negative except as noted in HPI  BEHAVIOR/PSYCH:  negative except as noted in HPI    OBJECTIVE      Physical  VITALS:  BP (!) 151/85   Pulse 68   Temp 97.5 °F (36.4 °C) (Oral)   Resp 22   Ht 5' 3\" (1.6 m)   Wt 142 lb 13.7 oz (64.8 kg)   SpO2 99%   BMI 25.31 kg/m²     Patient sleeping upon arrival, no snoring or apneas noted, respirations easy and unlabored. High flow cannula sitting at right nostril, not properly positioned. Patient awakens easily. States she is feeling well and inquires about discharge. High flow removed and oxygen saturation is 90% on room air. Placed on 2L per nasal cannula - saturations around 93%. Encouraged deep breath with cough while occluding stoma; expectorated thick yellow sputum; showed patient how to use the Epimenio Drop herself.     Data  CBC with Differential:    Lab Results   Component Value Date    WBC 8.0 07/30/2021    RBC 5.50 07/30/2021    HGB 14.5 07/30/2021    HCT 49.1 07/30/2021     07/30/2021    MCV 89.3 07/30/2021    MCH 26.4 07/30/2021    MCHC 29.5 07/30/2021    RDW 17.0 01/05/2021    NRBC 0 03/24/2021    SEGSPCT 71.1 03/24/2021    LYMPHOPCT 10 01/05/2021    MONOPCT 6.9 03/24/2021    BASOPCT 1 01/05/2021    MONOSABS 0.6 03/24/2021    LYMPHSABS 1.3 03/24/2021    EOSABS 0.4 03/24/2021    BASOSABS 0.1 03/24/2021    DIFFTYPE see below 01/14/2021       Inpatient Medications  Current Facility-Administered Medications: [START ON 8/1/2021] polyethylene glycol (GLYCOLAX) packet 17 g, 17 g, Oral, Daily  [START ON 8/2/2021] sodium zirconium cyclosilicate (LOKELMA) oral suspension 10 g, 10 g, Oral, Once per day on Mon Thu  sodium chloride (Inhalant) 3 % nebulizer solution 4 mL, 4 mL, Nebulization, PRN  sodium chloride nebulizer 0.9 % solution 3 mL, 3 mL, Nebulization, TID  ALPRAZolam (XANAX) tablet 0.5 mg, 0.5 mg, Oral, TID  amiodarone (CORDARONE) tablet 200 mg, 200 mg, Oral, Daily  benztropine (COGENTIN) tablet 1 mg, 1 mg, Oral, Nightly  carbidopa-levodopa (SINEMET)  MG per tablet 1 tablet, 1 tablet, Oral, Nightly  citalopram (CELEXA) tablet 20 mg, 20 mg, Oral, Daily  docusate sodium (COLACE) capsule 100 mg, 100 mg, Oral, Daily  famotidine (PEPCID) tablet 20 mg, 20 mg, Oral, Once per day on Mon Wed Fri  budesonide-formoterol (SYMBICORT) 160-4.5 MCG/ACT inhaler 2 puff, 2 puff, Inhalation, BID  hydrOXYzine (ATARAX) tablet 10 mg, 10 mg, Oral, Q6H PRN  ipratropium-albuterol (DUONEB) nebulizer solution 3 mL, 3 mL, Inhalation, 4x Daily  melatonin tablet 6 mg, 6 mg, Oral, Nightly  midodrine (PROAMATINE) tablet 2.5 mg, 2.5 mg, Oral, TID AC  mirtazapine (REMERON) tablet 7.5 mg, 7.5 mg, Oral, Nightly  therapeutic multivitamin-minerals 1 tablet, 1 tablet, Oral, Daily  nicotine (NICODERM CQ) 14 MG/24HR 1 patch, 1 patch, Transdermal, Q24H  QUEtiapine (SEROQUEL) tablet 25 mg, 25 mg, Oral, BID  senna (SENOKOT) tablet 8.6 mg, 1 tablet, Oral, Nightly  sevelamer (RENVELA) tablet 1,600 mg, 1,600 mg, Oral, TID WC  [Held by provider] apixaban (ELIQUIS) tablet 2.5 mg, 2.5 mg, Oral, BID  sodium chloride flush 0.9 % injection 5-40 mL, 5-40 mL, Intravenous, 2 times per day  sodium chloride flush 0.9 % injection 5-40 mL, 5-40 mL, Intravenous, PRN  0.9 % sodium chloride infusion, 25 mL, Intravenous, PRN  ondansetron (ZOFRAN-ODT) disintegrating tablet 4 mg, 4 mg, Oral, Q8H PRN **OR** ondansetron (ZOFRAN) injection 4 mg, 4 mg, Intravenous, Q6H PRN  HYDROmorphone (DILAUDID) injection 0.5 mg, 0.5 mg, Intravenous, Q4H PRN **OR** HYDROmorphone (DILAUDID) injection 0.25 mg, 0.25 mg, Intravenous, Q4H PRN **OR** acetaminophen (TYLENOL) tablet 650 mg, 650 mg, Oral, Q4H PRN  oxyCODONE (ROXICODONE) immediate release tablet 5 mg, 5 mg, Oral, Q4H PRN **OR** oxyCODONE (ROXICODONE) immediate release tablet 10 mg, 10 mg, Oral, Q4H PRN  sodium chloride nebulizer 0.9 % solution 3 mL, 3 mL, Nebulization, PRN    ASSESSMENT AND PLAN    S/p suspension microlaryngoscopy with dilation and debridement, therapeutic bronchoscopy with dilation and debridement and bilateral nasal valve repair utilizing jet ventilation 7/29/2021 with Dr Castillo Bath  - May transfer to regular telemetry unit  - Okay for high flow off during the day, but needs to wear all night.  Please have RT increase temperature to 37 degrees. FiO2 titration per patient saturations. Prefer to keep at [facility] for now. Discussed importance of compliance again with patient and that the cannula needs to be properly within the nostrils. - Frequent encouragement for deep breathing and coughing; encourage use of Younker to clear expectorated sputum.    - Continue saline nebulizers, may use 3% saline neb prn for thick secretions. - Ambulate and up in chair  - Anticipate discharge over next 1-2 days, but need to ensure that her thick secretions are managed to prevent pneumonia now that she does not have a tracheostomy to suction through.         Electronically signed by NARINDER Dent CNP on 7/31/2021 at 9:38 AM

## 2021-07-31 NOTE — PROGRESS NOTES
Hospitalist Progress Note    Patient:  Wu Jeff      Unit/Bed:4B-03/003-A    YOB: 1965    MRN: [id number]       Acct: [de-identified]     PCP: Anabell Fitzpatrick. Cyntha Klinefelter, MD    Date of Admission: 7/29/2021    Assessment/Plan:    1. Hypotension, chronic--she is on midodrine 2.5 mg 3 times a day, last evening she had episodes of hypotension however she was asymptomatic and she was given a dose of midodrine 5 mg x 1 with improvement; she also received albumin 25 g x 1 dose yesterday; watch closely; patient is completely asymptomatic  2. Status post suspension micro lower endoscopy with dilation debridement, therapeutic bronchoscopy with dilation and debridement, bilateral nasal valve repair with jet ventilation on 7/29/2021--per ENT  3. Chronic hypoxic respiratory failure--patient tracheostomy decannulated 7/30 per ENT; on high flow oxygen at 30% FiO2 and 60 L satting 98 to 99%  4. Hyperkalemia--nephrology is on the case; resolved, had HD 7/30  5. Chronic atrial fibrillation--cardiology note reviewed and states she only had an initial episode of atrial fibrillation in 2010 and feels it is reasonable to discontinue the amiodarone as she is young and has COPD along with discontinuing her anticoagulation also, per cardiology note she should be discharged home with a 30-day event monitor (I placed order in epic) and if any documentation of recurrent atrial fibrillation they can consider initiation of beta-blockers or alternative antiarrhythmic therapy   6. End-stage renal disease--on hemodialysis Monday, Wednesday, Friday;  per nephrology; had HD on 7/30  7. Essential hypertension, uncontrolled--see #1   8.  Thrombocytopenia--platelets down to 413 and on July 6, 2021 was at 131 so we will watch closely, no signs of bleeding    Expected discharge date: Per ENT    Disposition:    [] Home       [] TCU       [] Rehab       [] Psych       [x] SNF       [] [place]       [] Other-    Chief Complaint: Following medically    Hospital Course: The patient is a 48 y.o. female whom I have been requested to see by Dr. Nyla Carlos for evaluation of medical management; patient had an ENT procedure done today; patient has chronic respiratory failure and is trach dependent, she also has end-stage renal disease on hemodialysis along with hypertension; she is currently alert and oriented, she complains of pain to her nose however she denies any other complaints; she awakens easily and participates well; she denies lightheadedness or dizziness, chest pain, shortness of breath, nausea; there was some concern about junctional rhythm and so cardiology was consulted; she is currently hemodynamically stable with a heart rate 55; she is satting 99% on 45% FiO2 with 15 L via trach mask; she offers no other complaints at this time, appears she has a history of atrial fibrillation as she takes amiodarone and Eliquis however the Eliquis is on hold per ENT.     7/30--> patient had decannulation of the trach today; currently on high flow; patient tells me she feels well and wants to go home after dialysis today; she is hemodynamically stable, potassium and creatinine are on the higher side however she is planning for dialysis today    7/31--> patient had episode of hypotension yesterday however she did have HD; she is chronically on midodrine 2.5 mg 3 times a day and she was given a second dose of midodrine 5 mg last evening and she also had a dose of albumin yesterday morning at 1130; cardiology note reviewed and feels very comfortable in discontinuing the amiodarone and the anticoagulation and planning home with a 30-day event monitor    Subjective (past 24 hours): Relates to some pain to her right lower leg which is chronic rates 6 out of 10 and also complains of pain to her nose rates 6 out of 10; she sitting up eating her diet and does not offer any other complaints except she wants to go home    Medications:  Reviewed    Infusion Medications    sodium chloride       Scheduled Medications    [START ON 8/2/2021] sodium zirconium cyclosilicate  10 g Oral Once per day on Mon Thu    sodium chloride nebulizer  3 mL Nebulization TID    ALPRAZolam  0.5 mg Oral TID    amiodarone  200 mg Oral Daily    benztropine  1 mg Oral Nightly    carbidopa-levodopa  1 tablet Oral Nightly    citalopram  20 mg Oral Daily    docusate sodium  100 mg Oral Daily    famotidine  20 mg Oral Once per day on Mon Wed Fri    budesonide-formoterol  2 puff Inhalation BID    ipratropium-albuterol  3 mL Inhalation 4x Daily    melatonin  6 mg Oral Nightly    midodrine  2.5 mg Oral TID AC    mirtazapine  7.5 mg Oral Nightly    therapeutic multivitamin-minerals  1 tablet Oral Daily    nicotine  1 patch Transdermal Q24H    polyethylene glycol  17 g Per G Tube Daily    QUEtiapine  25 mg Oral BID    senna  1 tablet Oral Nightly    sevelamer  1,600 mg Oral TID WC    [Held by provider] apixaban  2.5 mg Oral BID    sodium chloride flush  5-40 mL Intravenous 2 times per day    sodium chloride nebulizer  3 mL Nebulization TID     PRN Meds: sodium chloride (Inhalant), hydrOXYzine, sodium chloride flush, sodium chloride, ondansetron **OR** ondansetron, HYDROmorphone **OR** HYDROmorphone **OR** acetaminophen, oxyCODONE **OR** oxyCODONE, sodium chloride nebulizer      Intake/Output Summary (Last 24 hours) at 7/31/2021 0720  Last data filed at 7/31/2021 0330  Gross per 24 hour   Intake 1138.82 ml   Output 600 ml   Net 538.82 ml       Diet:  ADULT DIET; Full Liquid; No Added Salt (3-4 gm); 1500 ml    Exam:  BP (!) 91/56   Pulse 59   Temp 98.2 °F (36.8 °C) (Oral)   Resp 16   Ht 5' 3\" (1.6 m)   Wt 142 lb 13.7 oz (64.8 kg)   SpO2 94%   BMI 25.31 kg/m²     General appearance: No apparent distress, appears stated age and cooperative. HEENT: Pupils equal, round, and reactive to light. Conjunctivae/corneas clear. Neck: Supple, with full range of motion.  No jugular venous Already on Anticoagulation     Code Status: Full Code    Tele:   [x] yes sinus rhythm heart rate 68             [] no    Active Hospital Problems    Diagnosis Date Noted    Junctional rhythm [I49.8]      Priority: High    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[facility] [Z93.0] 06/07/2021       Electronically signed by NARINDER Chew CNP on 7/31/2021 at 7:20 AM
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
## 2021-07-31 NOTE — PLAN OF CARE
Problem: Pain:  Goal: Pain level will decrease  Description: Pain level will decrease  7/31/2021 0133 by Lolis Elizabeth RN  Outcome: Ongoing  Note: Pain Assessment: 0-10  Pain Level: 6   Patient's Stated Pain Goal: 4   Is pain goal met at this time? No     Non-Pharmaceutical Pain Intervention(s): Rest, Repositioned, Relaxation techniques  Pt receiving PRN oxycodone and dilaudid for pain as tolerated by low BPs. Pt appears to be resting comfortably. Problem: Impaired respiratory status  Goal: Clear lung sounds  7/31/2021 0133 by Lolis Elizabeth RN  Outcome: Ongoing  Note: Lung sounds clear and diminished this shift. Problem: Falls - Risk of:  Goal: Will remain free from falls  Description: Will remain free from falls  Outcome: Ongoing  Note: Pt free of falls this shift. Fall precautions in place. Bed alarm on. Call light and patient belongings within reach. Problem: Falls - Risk of:  Goal: Absence of physical injury  Description: Absence of physical injury  Outcome: Ongoing  Note: No new physical injury noted this shift. Problem: Skin Integrity:  Goal: Will show no infection signs and symptoms  Description: Will show no infection signs and symptoms  Outcome: Ongoing  Note: No signs and symptoms of infection noted this shift. Pt afebrile. Labs monitored. Alcohol cap applied to IV. Problem: Skin Integrity:  Goal: Absence of new skin breakdown  Description: Absence of new skin breakdown  Outcome: Ongoing  Note: No new skin breakdown noted this shift. Pt repositioned every 2 hours and as needed. Pillow support provided. Problem: Cardiovascular  Goal: No DVT, peripheral vascular complications  Outcome: Ongoing  Note: No S/S of DVT noted this shift. Extremities warm with palpable pulses. Eliquis held for procedure yesterday. Problem: Cardiovascular  Goal: Hemodynamic stability  Outcome: Ongoing  Note: Vital signs monitored every 4 hours and PRN.  Pt had SBP in the 70s-80s
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
## 2021-07-31 NOTE — FLOWSHEET NOTE
07/31/21 0019   Provider Notification   Reason for Communication Review case   Provider Name Phill Serna   Provider Notification Advance Practice Clinician (CNS/NP/CNM/CRNA/PA)   Method of Communication Secure Message   Response See orders   Notification Time 0019   Secure message sent to update pt SBP 70s-80s. Confirmed with manual.     0021- Message read. Order received for one time dose 5 mg midodrine.

## 2021-07-31 NOTE — PROGRESS NOTES
Once per day on Mon Thu    sodium chloride nebulizer  3 mL Nebulization TID    ALPRAZolam  0.5 mg Oral TID    amiodarone  200 mg Oral Daily    benztropine  1 mg Oral Nightly    carbidopa-levodopa  1 tablet Oral Nightly    citalopram  20 mg Oral Daily    docusate sodium  100 mg Oral Daily    famotidine  20 mg Oral Once per day on Mon Wed Fri    budesonide-formoterol  2 puff Inhalation BID    ipratropium-albuterol  3 mL Inhalation 4x Daily    melatonin  6 mg Oral Nightly    midodrine  2.5 mg Oral TID AC    mirtazapine  7.5 mg Oral Nightly    therapeutic multivitamin-minerals  1 tablet Oral Daily    nicotine  1 patch Transdermal Q24H    QUEtiapine  25 mg Oral BID    senna  1 tablet Oral Nightly    sevelamer  1,600 mg Oral TID WC    [Held by provider] apixaban  2.5 mg Oral BID    sodium chloride flush  5-40 mL Intravenous 2 times per day     Continuous Infusions:   sodium chloride         CBC:   Recent Labs     07/30/21  0501   WBC 8.0   HGB 14.5   *     CMP:    Recent Labs     07/29/21  0850 07/30/21  0501 07/31/21  0409   NA  --  136 136   K 5.5* 5.4* 4.5   CL  --  98 97*   CO2  --  21* 25   BUN  --  39* 23*   CREATININE  --  7.5* 5.8*   GLUCOSE  --  84 84   CALCIUM  --  9.3 9.3   LABGLOM  --  6* 8*     Troponin: No results for input(s): TROPONINI in the last 72 hours. BNP: No results for input(s): BNP in the last 72 hours. INR: No results for input(s): INR in the last 72 hours. Lipids: No results for input(s): CHOL, LDLDIRECT, TRIG, HDL, AMYLASE, LIPASE in the last 72 hours. Liver: No results for input(s): AST, ALT, ALKPHOS, PROT, LABALBU, BILITOT in the last 72 hours. Invalid input(s): BILDIR  Iron:  No results for input(s): IRONS, FERRITIN in the last 72 hours. Invalid input(s): LABIRONS  XR CHEST PORTABLE   Final Result   1. Moderate cardiomegaly. A tracheostomy tube is present and appears be in good position. 2. Mild bibasilar atelectasis/pneumonia.             **This report has been created using voice recognition software. It may contain minor errors which are inherent in voice recognition technology. **      Final report electronically signed by Dr. Elza Sanchez on 7/29/2021 2:22 PM            Objective:   Vitals: BP (!) 151/85   Pulse 68   Temp 97.5 °F (36.4 °C) (Oral)   Resp 22   Ht 5' 3\" (1.6 m)   Wt 142 lb 13.7 oz (64.8 kg)   SpO2 99%   BMI 25.31 kg/m²    Wt Readings from Last 3 Encounters:   07/31/21 142 lb 13.7 oz (64.8 kg)   06/07/21 172 lb (78 kg)   06/02/21 169 lb (76.7 kg)      24HR INTAKE/OUTPUT:      Intake/Output Summary (Last 24 hours) at 7/31/2021 0853  Last data filed at 7/31/2021 0330  Gross per 24 hour   Intake 1138.82 ml   Output 600 ml   Net 538.82 ml       Constitutional: Well-developed middle-age lady comfortably asleep and in no obvious distress  Skin:normal with no rash or lesions  HEENT: Head is normal.. Darlynn Magic Oral mucosa is moist.  High flow oxygen is noted. Neck:supple with no thyromegaly or carotid bruit  Cardiovascular:  S1, S2 without murmur or rubs   Respiratory: Decreased breath sound. Abdomen: Soft. Good bowel sounds. Ext: No LE edema  Musculoskeletal:Intact  Neuro: Deferred      Electronically signed by Pooja Reyna MD on 7/31/2021 at 8:53 AM  **This report has been created using voice recognition software. It maycontain minor  errors which are inherent in voice recognition technology. **
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
## 2021-08-01 NOTE — FLOWSHEET NOTE
08/01/21 0930   Provider Notification   Reason for Communication Review case;Patient request   Provider Name Dr. Tucker Cranbury   Provider Notification Physician   Method of Communication Secure Message   Response Waiting for response   Notification Time 0930   She still is on a clear liquid diet with 1500 ml fluid restriction. She is asking if this can be changed as she wants more to eat & drink.  Thanks

## 2021-08-01 NOTE — PROGRESS NOTES
8/2/2021] sodium zirconium cyclosilicate  10 g Oral Once per day on Mon Thu    sodium chloride nebulizer  3 mL Nebulization TID    ALPRAZolam  0.5 mg Oral TID    benztropine  1 mg Oral Nightly    carbidopa-levodopa  1 tablet Oral Nightly    citalopram  20 mg Oral Daily    docusate sodium  100 mg Oral Daily    famotidine  20 mg Oral Once per day on Mon Wed Fri    budesonide-formoterol  2 puff Inhalation BID    ipratropium-albuterol  3 mL Inhalation 4x Daily    melatonin  6 mg Oral Nightly    midodrine  2.5 mg Oral TID AC    mirtazapine  7.5 mg Oral Nightly    therapeutic multivitamin-minerals  1 tablet Oral Daily    nicotine  1 patch Transdermal Q24H    QUEtiapine  25 mg Oral BID    senna  1 tablet Oral Nightly    sevelamer  1,600 mg Oral TID WC    sodium chloride flush  5-40 mL Intravenous 2 times per day     Continuous Infusions:   sodium chloride         CBC:   Recent Labs     07/30/21  0501   WBC 8.0   HGB 14.5   *     CMP:    Recent Labs     07/30/21  0501 07/31/21  0409 08/01/21  0411    136 133*   K 5.4* 4.5 4.5   CL 98 97* 95*   CO2 21* 25 24   BUN 39* 23* 30*   CREATININE 7.5* 5.8* 7.5*   GLUCOSE 84 84 80   CALCIUM 9.3 9.3 9.2   LABGLOM 6* 8* 6*     Troponin: No results for input(s): TROPONINI in the last 72 hours. BNP: No results for input(s): BNP in the last 72 hours. INR: No results for input(s): INR in the last 72 hours. Lipids: No results for input(s): CHOL, LDLDIRECT, TRIG, HDL, AMYLASE, LIPASE in the last 72 hours. Liver: No results for input(s): AST, ALT, ALKPHOS, PROT, LABALBU, BILITOT in the last 72 hours. Invalid input(s): BILDIR  Iron:  No results for input(s): IRONS, FERRITIN in the last 72 hours. Invalid input(s): LABIRONS  XR CHEST PORTABLE   Final Result   1. Moderate cardiomegaly. A tracheostomy tube is present and appears be in good position. 2. Mild bibasilar atelectasis/pneumonia.             **This report has been created using voice recognition software. It may contain minor errors which are inherent in voice recognition technology. **      Final report electronically signed by Dr. Darwin Saunders on 7/29/2021 2:22 PM            Objective:   Vitals: /70   Pulse 64   Temp 98.5 °F (36.9 °C) (Oral)   Resp 25   Ht 5' 3\" (1.6 m)   Wt 142 lb 13.7 oz (64.8 kg)   SpO2 98%   BMI 25.31 kg/m²    Wt Readings from Last 3 Encounters:   07/31/21 142 lb 13.7 oz (64.8 kg)   06/07/21 172 lb (78 kg)   06/02/21 169 lb (76.7 kg)      24HR INTAKE/OUTPUT:      Intake/Output Summary (Last 24 hours) at 8/1/2021 0729  Last data filed at 7/31/2021 1349  Gross per 24 hour   Intake 540 ml   Output 0 ml   Net 540 ml       Constitutional: Well-developed middle-age lady comfortably asleep and in no obvious distress  Skin:normal with no rash or lesions  HEENT: Head is normal.. Alphonse Lemon Oral mucosa is moist.  High flow oxygen is noted. Neck:supple with no thyromegaly or carotid bruit  Cardiovascular:  S1, S2 without murmur or rubs   Respiratory: Decreased breath sound. Abdomen: Soft. Good bowel sounds. Ext: No LE edema  Musculoskeletal:Intact  Neuro: Deferred      Electronically signed by Maria Alejandra Coles MD on 8/1/2021 at 7:29 AM  **This report has been created using voice recognition software. It maycontain minor  errors which are inherent in voice recognition technology. **

## 2021-08-01 NOTE — PROGRESS NOTES
Carine Zambrano is a 48 y.o. female patient who is postop day 3 status post suspension laryngoscopy and therapeutic bronchoscopy with dilation and debridement to enable successful decannulation. She is postop day 2 post decannulation and remains in a stepdown bed out of her need for high flow nasal cannula. No diagnosis found. Past Medical History:   Diagnosis Date    Anxiety disorder     Asthma     Chronic kidney disease     Chronic respiratory failure with hypoxia (HCC)     COPD (chronic obstructive pulmonary disease) (HCC)     Elevated troponin     Hemodialysis patient ([facility] [address]     [facility]F [facility]    Hypertension     Smoker      No past surgical history pertinent negatives on file.   Scheduled Meds:   polyethylene glycol  17 g Oral Daily    [START ON 8/2/2021] sodium zirconium cyclosilicate  10 g Oral Once per day on Mon Thu    sodium chloride nebulizer  3 mL Nebulization TID    ALPRAZolam  0.5 mg Oral TID    benztropine  1 mg Oral Nightly    carbidopa-levodopa  1 tablet Oral Nightly    citalopram  20 mg Oral Daily    docusate sodium  100 mg Oral Daily    famotidine  20 mg Oral Once per day on Mon Wed Fri    budesonide-formoterol  2 puff Inhalation BID    ipratropium-albuterol  3 mL Inhalation 4x Daily    melatonin  6 mg Oral Nightly    midodrine  2.5 mg Oral TID AC    mirtazapine  7.5 mg Oral Nightly    therapeutic multivitamin-minerals  1 tablet Oral Daily    nicotine  1 patch Transdermal Q24H    QUEtiapine  25 mg Oral BID    senna  1 tablet Oral Nightly    sevelamer  1,600 mg Oral TID WC    sodium chloride flush  5-40 mL Intravenous 2 times per day     Continuous Infusions:   sodium chloride       PRN Meds:sodium chloride (Inhalant), hydrOXYzine, sodium chloride flush, sodium chloride, ondansetron **OR** ondansetron, HYDROmorphone **OR** HYDROmorphone **OR** acetaminophen, oxyCODONE **OR** oxyCODONE, sodium chloride nebulizer    Allergies   Allergen Reactions    Codeine Other reaction(s): Codeine, itching    Morphine      Other reaction(s): Itching     Active Problems:    Junctional rhythm    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HCC)    Tracheostomy status ([facility] [address]  Resolved Problems:    * No resolved hospital problems. *    Blood pressure 85/66, pulse 62, temperature 97.8 °F (36.6 °C), temperature source Oral, resp. rate 25, height 5' 3\" (1.6 m), weight 142 lb 13.7 oz (64.8 kg), SpO2 91 %. Subjective   The patient wants to go home wants to be off the high flow nasal cannula. She is continuing to get nicotine through 824 mg patch. Objective     She is on nasal cannula O2 at 1 to 2 L/min and satting in the mid 90s. She is breathing without labor. Her respiratory sounds on auscultation include scattered wheezing and low-volume rhonchi. 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 When she coughs she is still producing thick secretions. Assessment & Plan     The patient is improved compared to 24 hours ago but still warrants inpatient care given the poor quality of her lungs and the prospects that she will need instrumentation of her tracheostomy fistula for airway cleansing and even oxygen supplementation. As such I will write for her to be transferred to a floor bed on telemetry so that pulse oximetry can be followed but so that she can also ambulate with an oxygen tank to make sure she is significantly improved. If she does well overnight she can be discharged home tomorrow assuming no downturn in any of these factors. I explained all this in detail to the patient to her chagrin but her understanding. I also discussed her care with nursing. Efren Melchor.  Viktor Kyle MD  Cell: [phone]    Annia Gooden MD  8/1/2021

## 2021-08-01 NOTE — PROGRESS NOTES
Completed stoma care per order. Cleaned with normal saline, applied bacitracin. Covered with xeroform gauze and covered with gauze.

## 2021-08-02 NOTE — PROGRESS NOTES
Previous nurse verbally told this nurse that she did the wound dressing in the previous shift. This was not charted on. This nurse charted it as done on their shift as to be sure that it was done on this calender day per the order.
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
## 2021-08-02 NOTE — PLAN OF CARE
Problem: Impaired respiratory status  Goal: Clear lung sounds  8/2/2021 0949 by Montana Wang RCP  Outcome: Ongoing  Note: Txs to help improve lung aeration.

## 2021-08-02 NOTE — CARE COORDINATION
8/2/21, 9:52 AM EDT    DISCHARGE ON GOING EVALUATION    Christian Montanez day: 2  Location: [facility]25/025-A Reason for admit: Tracheostomy dependence [facility]) [Z93.0]  Tracheostomy status ([facility] [address] [Z93.0]   Procedure: 07/29  SUSPENSION MICROLARYNGOSCOPY WITH DILATION AND DEBRIDEMENT, THERAPEAUTIC BRONCHOSCOPY WITH DILATION AND DEBRIDEMENT, BILATERAL NASAL VALVE REPAIR WITH JEFT VENTILATION; tracheostomy granulation tissue resection  07/30 de-cannulated this am  Barriers to Discharge: Some hypotension over weekend but has improved. O2 at 1L/NC. Taking PO. Last HD was 7-30-21. Having a run this am. Anticipate discharge today. PCP: Addis Arechiga. Rober Samano MD  Readmission Risk Score: 25%  Patient Goals/Plan/Treatment Preferences: :Jeremy Mcleod is from [facility]; She has HD at [facility] 4x/week thru fistula. Planning discharge back to [facility] today.

## 2021-08-02 NOTE — DISCHARGE SUMMARY
status ([facility] 75.) Z93.0     Consultants:  none  Procedures/Diagnostic Test: Patient is s/p suspension microlaryngoscopy with dilation and debridement, therapeutic bronchoscopy with dilation and debridement and bilateral nasal valve repair utilizing jet ventilation 7/29/2021 with Dr Paulette Mosquera. Hospital Course: Ana Yee originally presented to the hospital on 7/29/2021  7:18 AM for airway monitoring in preparation for decannulation of her tracheostomy. She was admitted to ICU and had successful decannulation. She has returned to baseline use of 2L oxygen per nasal cannula. At time of discharge, Ana Yee was tolerating a regular diet, having bowel movements, ambulating on her own accord and had adequate analgesia on oral pain medications. Patient had no signs or symptoms of complications. She was discharged in stable condition back to [facility]. PHYSICAL EXAMINATION     Discharge Vitals:  height is 5' 3\" (1.6 m) and weight is 141 lb 8.6 oz (64.2 kg). Her oral temperature is 97.7 °F (36.5 °C). Her blood pressure is 141/98 (abnormal) and her pulse is 60. Her respiration is 18 and oxygen saturation is 98%. General appearance - alert, well appearing, and in no distress and oriented to person, place, and time  No stridor or labored breathing. Good vocal quality. Occasional non-prod cough. Stoma nearly close, small air leak yet when dressing off and patient coughs. Healing well. Left nasal valve healing. LABS     No results for input(s): WBC, HGB, HCT, PLT, NA, K, CL, CO2, BUN, CREATININE in the last 72 hours.     DISCHARGE INSTRUCTIONS     Discharge Medications:      Medication List      START taking these medications    sodium chloride nebulizer 0.9 % solution  Take 3 mLs by nebulization 3 times daily        CHANGE how you take these medications    midodrine 2.5 MG tablet  Commonly known as: PROAMATINE  Take 1 tablet by mouth 3 times daily (before meals)  What changed:   · medication strength  · how much to take  · when to take this  · additional instructions     sodium zirconium cyclosilicate 10 g Pack oral suspension  Commonly known as: LOKELMA  Take 10 g by mouth twice a week  What changed:   · when to take this  · additional instructions        CONTINUE taking these medications    acetaminophen 325 MG tablet  Commonly known as: TYLENOL     ALPRAZolam 0.5 MG tablet  Commonly known as: XANAX     * AMINO ACIDS-PROTEIN HYDROLYS PO     * PRO-STAT SUGAR FREE PO     benztropine 1 MG tablet  Commonly known as: COGENTIN     carbidopa-levodopa  MG per tablet  Commonly known as: SINEMET     citalopram 20 MG tablet  Commonly known as: CELEXA     docusate sodium 100 MG capsule  Commonly known as: COLACE     famotidine 20 MG tablet  Commonly known as: PEPCID     fluticasone-salmeterol 230-21 MCG/ACT inhaler  Commonly known as: Advair HFA  Inhale 2 puffs into the lungs 2 times daily     HYDROcodone-acetaminophen 5-325 MG per tablet  Commonly known as: NORCO     hydrocortisone 2.5 % cream     hydrOXYzine 50 MG tablet  Commonly known as: ATARAX     ipratropium-albuterol 0.5-2.5 (3) MG/3ML Soln nebulizer solution  Commonly known as: DUONEB     MELATONIN PO     mirtazapine 15 MG tablet  Commonly known as: REMERON     nicotine 14 MG/24HR  Commonly known as: NICODERM CQ     OXYGEN     polyethylene glycol 17 g packet  Commonly known as: GLYCOLAX     QUEtiapine 25 MG tablet  Commonly known as: SEROQUEL     senna 8.6 MG tablet  Commonly known as: SENOKOT     sevelamer 800 MG tablet  Commonly known as: RENVELA     therapeutic multivitamin-minerals tablet     UNABLE TO FIND     UNABLE TO FIND     Vitamin E 100 UNIT/GM Crea         * This list has 2 medication(s) that are the same as other medications prescribed for you. Read the directions carefully, and ask your doctor or other care provider to review them with you.             STOP taking these medications    amiodarone 200 MG tablet  Commonly known as: CORDARONE     apixaban 2.5 MG Tabs tablet  Commonly known as: ELIQUIS     melatonin 3 MG Tabs tablet           Where to Get Your Medications      Information about where to get these medications is not yet available    Ask your nurse or doctor about these medications  · midodrine 2.5 MG tablet  · sodium chloride nebulizer 0.9 % solution  · sodium zirconium cyclosilicate 10 g Pack oral suspension         Electronically signed by NARINDER Awan CNP on 8/10/2021 at 5:45 PM

## 2021-08-02 NOTE — PROCEDURES
30 day event monitor applied. Instructions given. No further questions. Aurea HESTER and Jessica FIGUEROA.

## 2021-08-02 NOTE — CARE COORDINATION
8/2/21, 7:46 AM EDT    DISCHARGE BARRIERS        Patient transferred to 6K 25. Report given to unit Russ Solis, regarding discharge plan for this patient.

## 2021-08-02 NOTE — CARE COORDINATION
8/2/21, 11:31 AM EDT    Patient goals/plan/ treatment preferences discussed by  and . Patient goals/plan/ treatment preferences reviewed with patient/ family. Patient/ family verbalize understanding of discharge plan and are in agreement with goal/plan/treatment preferences. Understanding was demonstrated using the teach back method. AVS provided by RN at time of discharge, which includes all necessary medical information pertaining to the patients current course of illness, treatment, post-discharge goals of care, and treatment preferences. Services After Discharge  Services At/After Discharge: Skilled Therapy, Nursing Services, Aide Services ([place] [address])   IMM Letter  IMM Letter given to Patient/Family/Significant other/Guardian/POA/by[de-identified] Intake. IMM Letter date given[de-identified] 07/31/21  IMM Letter time given[de-identified] 8787     Patient returning to [place] [address] today under her medicaid LOC. CHRISTOPHER updated [place] at [facility] and notified her of Patient's return today. Patient does not need COVID testing prior to discharge. Blue envelope on chart and Ambulette arranged for 5:00pm today. RN updated. CHRISTOPHER left message with Isa Lucero Patient's daughter regarding discharge. Christopher was unable to contact Carolin.

## 2021-08-02 NOTE — FLOWSHEET NOTE
08/02/21 0740 08/02/21 1100   Vital Signs   BP (!) 164/89 (!) 170/88   Temp 97.5 °F (36.4 °C) 97.4 °F (36.3 °C)   Pulse 60 67   Resp 14 14   SpO2  --  98 %   Weight 143 lb 11.8 oz (65.2 kg) 141 lb 8.6 oz (64.2 kg)   Weight Method Bed scale Bed scale   Percent Weight Change -3.92 -1.53   Post-Hemodialysis Assessment   Post-Treatment Procedures  --  Blood returned; Access bleeding time < 10 minutes   Total Liters Processed (l/min)  --  59.8 l/min   Dialyzer Clearance  --  Lightly streaked   Duration of Treatment (minutes)  --  180 minutes   Hemodialysis Intake (ml)  --  400 ml   Hemodialysis Output (ml)  --  1400 ml   NET Removed (ml)  --  1000 ml   Tolerated Treatment  --  Good   3 hour treatment completed. 1L of fluid removed. Patient tolerated well with minimal issues. Pressure held to access for 10 mins x2. Dressing clean/dry and intact upon leaving the unit. Report called to primary nurse.

## 2021-08-02 NOTE — PLAN OF CARE
Problem: Pain:  Goal: Pain level will decrease  Description: Pain level will decrease  8/2/2021 1112 by Celso Melendez RN  Outcome: Ongoing  Note: Denied pain this AM prior to diaysis     Problem: Pain:  Goal: Control of acute pain  Description: Control of acute pain  8/2/2021 1112 by Celso Melendez RN  Outcome: Ongoing  Note: Denies     Problem: Pain:  Goal: Control of chronic pain  Description: Control of chronic pain  8/2/2021 1112 by Celso Melendez RN  Outcome: Ongoing  Note: Denies      Problem: Falls - Risk of:  Goal: Will remain free from falls  Description: Will remain free from falls  8/2/2021 1112 by Celso Melendez RN  Outcome: Ongoing  Note: Bed alarm     Problem: Falls - Risk of:  Goal: Absence of physical injury  Description: Absence of physical injury  8/2/2021 1112 by Celso Melendez RN  Outcome: Ongoing  Note: Free from injury.  Call light is with in reach,     Problem: Skin Integrity:  Goal: Will show no infection signs and symptoms  Description: Will show no infection signs and symptoms  8/2/2021 1112 by Celso Melendez RN  Outcome: Ongoing  Note: See assessment     Problem: Skin Integrity:  Goal: Absence of new skin breakdown  Problem: Cardiovascular  Goal: No DVT, peripheral vascular complications  0/8/3941 0807 by Celso Melendez RN  Outcome: Ongoing  Note: ambulating     Problem: Cardiovascular  Goal: Hemodynamic stability  8/2/2021 1112 by Celso Melendez RN  Outcome: Ongoing  Note: See labs     Problem: Respiratory  Goal: No pulmonary complications  1/2/1409 6312 by Celso Melendez RN  Outcome: Ongoing  Note: 2L oxygen home baseline     Problem: Respiratory  Goal: O2 Sat > 90%  8/2/2021 1112 by Celso Melendez RN  Outcome: Ongoing  Note: See vitals     Problem: Respiratory  Goal: Supplemental O2 requirements decreased  8/2/2021 1112 by Celso Melendez RN  Outcome: Ongoing  Note: 2L oxygen home baseline     Problem: Discharge Planning:  Goal: Patients continuum of care needs are met  Description: Patients continuum of care needs are met  8/2/2021 1112 by Norm Haile RN  Outcome: Ongoing  Note: Care needs are being met. Problem: Impaired respiratory status  Goal: Clear lung sounds  8/2/2021 1112 by Norm Haile RN  Outcome: Ongoing  Note: See assessment     Note: No skin break down noted at this time. Encouraged patient to reposition self in bed. Problem: Skin Integrity:  Goal: Absence of new skin breakdown  Description: Absence of new skin breakdown  8/2/2021 1112 by Norm Haile RN  Outcome: Ongoing  Note: Skin intact   Care plan reviewed with patient. Patient verbalize understanding of the plan of care and contribute to goal setting.

## 2021-08-17 NOTE — PROGRESS NOTES
[address] [address]  Otolaryngology Head and Neck Surgery  Dr. Elsa Soto MD  Pt Name: Jeanne Heart  MRN: [id number]  YOB: 1965  Date of evaluation: 2021  Primary Care Physician: Sylvia Mccabe. Brett Shahid MD        Reason for Endoscopy: The patient has a history of prolonged intubation and conversion to tracheostomy and is status post laryngeal dilation and steroid injections as well as tracheal debridement to enable decannulation. 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 Type of Endoscopy: Flexible Fiberoptic Nasolaryngoscopy therapeutic bronchoscopy with bronchoalveolar lavage    Anesthesia: Topical Lidocaine after Afrin vasoconstriction    Consent: taken and witnessed        History of Chief Complaint: No chief complaint on file. Past Medical History   has a past medical history of Anxiety disorder, Asthma, Chronic kidney disease, Chronic respiratory failure with hypoxia ([address] COPD (chronic obstructive pulmonary disease) ([address] Elevated troponin, Hemodialysis patient ([facility] Hypertension, and Smoker. Past Surgical History   has a past surgical history that includes  section; other surgical history; Lithotripsy; cystourethroscopy (2003,2003); Upper gastrointestinal endoscopy (Left, 2019); tracheostomy (N/A, 2020); Gastrostomy tube placement (N/A, 2020); and laryngoscopy (Bilateral, 2021). Medications  Current Medications:   Current Outpatient Medications   Medication Sig Dispense Refill    sodium chloride, Inhalant, 3 % nebulizer solution Take 4 mLs by nebulization 2 times daily for 4 days , then prn thick secretions after 4 days.  32 mL 0    sodium chloride nebulizer 0.9 % solution Take 3 mLs by nebulization 3 times daily 270 mL 2    sodium zirconium cyclosilicate (LOKELMA) 10 g PACK oral suspension Take 10 g by mouth twice a week 10 packet 0    midodrine (PROAMATINE) 2.5 MG tablet Take 1 tablet by mouth 3 times daily (before meals) 90 tablet 3    citalopram (CELEXA) 20 MG tablet Take 20 mg by mouth daily      famotidine (PEPCID) 20 MG tablet Take 20 mg by mouth three times a week Mon, Wed, Fri      mirtazapine (REMERON) 15 MG tablet Take 7.5 mg by mouth nightly      UNABLE TO FIND Take by mouth 2 times daily Magic Cup Supplement      UNABLE TO FIND Take by mouth 3 times daily (with meals) 6 oz nutritional juice with meals      OXYGEN Inhale 3 L into the lungs May titrate to keep sat above 90%      benztropine (COGENTIN) 1 MG tablet Take 1 mg by mouth nightly      docusate sodium (COLACE) 100 MG capsule Take 100 mg by mouth daily      MELATONIN PO Take 6 mg by mouth nightly      nicotine (NICODERM CQ) 14 MG/24HR Place 1 patch onto the skin every 24 hours      HYDROcodone-acetaminophen (NORCO) 5-325 MG per tablet Take 1 tablet by mouth every 6 hours as needed for Pain.  Amino Acids-Protein Hydrolys (PRO-STAT SUGAR FREE PO) Take 30 mLs by mouth daily      QUEtiapine (SEROQUEL) 25 MG tablet Take 25 mg by mouth 2 times daily      Vitamin E 100 UNIT/GM CREA Apply topically 2 times daily Right leg and face      acetaminophen (TYLENOL) 325 MG tablet Take by mouth every 6 hours as needed for Pain 2 tab      hydrocortisone 2.5 % cream Apply topically 2 times daily Apply topically 2 times daily.       hydrOXYzine (ATARAX) 50 MG tablet Take by mouth every 6 hours as needed for Itching 2 tab      sevelamer (RENVELA) 800 MG tablet Take 2 tablets by mouth 3 times daily (with meals)       carbidopa-levodopa (SINEMET)  MG per tablet Take 1 tablet by mouth nightly       fluticasone-salmeterol (ADVAIR HFA) 230-21 MCG/ACT inhaler Inhale 2 puffs into the lungs 2 times daily 1 Inhaler 3    ipratropium-albuterol (DUONEB) 0.5-2.5 (3) MG/3ML SOLN nebulizer solution Inhale 3 mLs into the lungs 4 times daily And every 4 hours as needed      AMINO capsule Take 100 mg by mouth daily    Historical Provider, MD   MELATONIN PO Take 6 mg by mouth nightly    Historical Provider, MD   nicotine (NICODERM CQ) 14 MG/24HR Place 1 patch onto the skin every 24 hours    Historical Provider, MD   HYDROcodone-acetaminophen (NORCO) 5-325 MG per tablet Take 1 tablet by mouth every 6 hours as needed for Pain. Historical Provider, MD   Amino Acids-Protein Hydrolys (PRO-STAT SUGAR FREE PO) Take 30 mLs by mouth daily    Historical Provider, MD   QUEtiapine (SEROQUEL) 25 MG tablet Take 25 mg by mouth 2 times daily    Historical Provider, MD   Vitamin E 100 UNIT/GM CREA Apply topically 2 times daily Right leg and face    Historical Provider, MD   acetaminophen (TYLENOL) 325 MG tablet Take by mouth every 6 hours as needed for Pain 2 tab    Historical Provider, MD   hydrocortisone 2.5 % cream Apply topically 2 times daily Apply topically 2 times daily.     Historical Provider, MD   hydrOXYzine (ATARAX) 50 MG tablet Take by mouth every 6 hours as needed for Itching 2 tab    Historical Provider, MD   sevelamer (RENVELA) 800 MG tablet Take 2 tablets by mouth 3 times daily (with meals)     Historical Provider, MD   carbidopa-levodopa (SINEMET)  MG per tablet Take 1 tablet by mouth nightly     Historical Provider, MD   fluticasone-salmeterol (ADVAIR HFA) 230-21 MCG/ACT inhaler Inhale 2 puffs into the lungs 2 times daily 3/24/21   Joyce Levels, DO   ipratropium-albuterol (DUONEB) 0.5-2.5 (3) MG/3ML SOLN nebulizer solution Inhale 3 mLs into the lungs 4 times daily And every 4 hours as needed    Historical Provider, MD   AMINO ACIDS-PROTEIN HYDROLYS PO Take 30 mLs by mouth 2 times daily May add water to med for ease of administration    Historical Provider, MD   polyethylene glycol (GLYCOLAX) 17 g packet 17 g by Per G Tube route daily    Historical Provider, MD   Multiple Vitamins-Minerals (THERAPEUTIC MULTIVITAMIN-MINERALS) tablet Take 1 tablet by mouth daily     Historical 0.9% nebulized saline. I explained the basis of my recommendations to her satisfaction. I also removed a suture in her left nostril which was protruding into the lumen of her nasal airway. Of note is the fact that both nostrils were widely patent consistent with an effective bilateral vestibuloplasty during her last operation.             Selena Joyce MD   Electronically signed 8/17/2021 at 4:10 PM

## 2021-08-17 NOTE — LETTER
[address] [facility] and [address]  Phone: [phone]  Fax: [phone]    Klever Huynh MD    August 17, 2021     Joseph Jorgensen. [address] Sparks MD  [address] [id number]    Patient: Issac Gibson   MR Number: [id number]   YOB: 1965   Date of Visit: 8/17/2021       Dear Joseph Jorgensen. Leora: Thank you for referring Mau Hines to me for evaluation/treatment. Below are the relevant portions of my assessment and plan of care. If you have questions, please do not hesitate to call me. I look forward to following Frank Alonso along with you.     Sincerely,    MD Klever Lozano MD

## 2021-08-23 NOTE — TELEPHONE ENCOUNTER
Nonda Goodell from [facility]. He states they cannot get the Karlie Estrin in due to cost w/ins. The doctor at the facility is going to order kayexalate 15 grams twice weekly. They are also going to do weekly potassium checks. They just wanted to let you know.

## 2021-08-23 NOTE — TELEPHONE ENCOUNTER
I spoke with someone last week at the nursing home and they were going to  lokelma box (two boxes) from office last week. Did they not pick it up? Kayexalate will be okay too.

## 2021-08-24 NOTE — TELEPHONE ENCOUNTER
I called and spoke w/Quyen at [facility]. She was not aware that anybody was going to  samples. She will talk w/the DON and call us back.

## 2021-08-25 NOTE — TELEPHONE ENCOUNTER
I called and spoke w/Luisa at [facility]. She states somebody was going to  some lokelma from the office and they were going to do that instead of the kayexalate.

## 2021-09-03 NOTE — PROCEDURES
[address] [place] [address]                                 EVENT MONITOR    PATIENT NAME: Jeniffer Scales                        :        1965  MED REC NO:   [id number]                           ROOM:       0025  ACCOUNT NO:   [de-identified]                           ADMIT DATE: 2021  PROVIDER:     Renae Chakraborty M.D. CLINICAL HISTORY AND INDICATION:  This is a patient with palpitations. EVENT MONITOR DESCRIPTION:  Event monitor was attached to the patient  between 2021 and 2021. EVENT MONITOR FINDINGS:  Baseline rhythm showed sinus rhythm. The  patient had no specific premature atrial beats, sometimes consecutive 3  to 4 beats; however, no obvious sustained arrhythmias. No pauses and no  V-tach. CONCLUSION:  1. Sinus rhythm. 2.  Nonspecific atrial activity with PACs and 3 to 4 beats of atrial  tachyarrhythmia that is relatively nonspecific. Otherwise, no sustained  arrhythmias.         Aleksandr Light M.D.    D: 2021 16:03:36       T: 2021 20:34:44     [email or web address]  Job#: [id number]     Doc#: [id number]    CC:

## 2021-09-21 PROBLEM — R41.82 ALTERED MENTAL STATUS: Status: ACTIVE | Noted: 2021-01-01
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
## 2021-09-21 NOTE — PROGRESS NOTES
[address] ICU STEPDOWN TELEMETRY 4K  EVALUATION    Time:   Time In: 8183  Time Out: 0920  Timed Code Treatment Minutes: 30 Minutes  Minutes: 41          Date: 2021  Patient Name: Delmis Dinh,   Gender: female      MRN: [id number]  : 1965  (48 y.o.)  Referring Practitioner: Shun Allan DO  Diagnosis: AMS  Additional Pertinent Hx: Per H&P:55 y.o. female who presented to [facility] with altered mental status. The patient is awake, confused, oriented to person and place but not to place. The patient is on supplimental oxygen, NC-5L, O sat-99%, where baseline o2 -4L. She is brought from nursing home for altered mental status and fall incident; however, she denies fall incident, trauma or injury. She complains of generalized fatigue, weakness and body pain. Restrictions/Precautions:  Restrictions/Precautions: Contact Precautions, Fall Risk  Position Activity Restriction  Other position/activity restrictions: monitor O2    Subjective  Chart Reviewed: Yes, Orders, Progress Notes, History and Physical  Patient assessed for rehabilitation services?: Yes  Family / Caregiver Present: No    Subjective: Pt seated in bed upon arrival, agreeable to OT session with minimal encouragement. Pain:  Pain Assessment  Patient Currently in Pain: Yes  Pain Assessment: 0-10  Pain Level: 4  Pain Type: Chronic pain  Pain Location: Leg  Pain Orientation: Right  Pain Descriptors: Burning  Pain Frequency: Intermittent  Clinical Progression: Gradually worsening    Vitals: Oxygen: Pt at 88% on 3L O2 via NC on arrival to room. Titrated to 5L with pt then able to maintain >90% (as was unable to maintain at 4L); Sp02 decreased to 78% while on 5L when functional mobility completed. Pt was unable to recover to 90% while on 5L, therefore titrated up to 6L with pt able to recover to 90% within ~3-4 minutes.  OTR was then able to titrate back down to 4L with pt maintaining at 90% or greater. Social/Functional History:  Type of Home: Facility ([facility])  Home Equipment: Rolling walker           ADL Assistance: Needs assistance  Homemaking Assistance: Needs assistance  Ambulation Assistance: Needs assistance  Transfer Assistance: Needs assistance          Additional Comments: Question accuracy of pt's reported PLOF due to confusion: Pt reported was using RW for short distance mobility to/from w/c; pt has been living at [facility] for the past 6 months. Pt reported had therapy 3x/week. VISION:Corrected    HEARING:  WFL    COGNITION: Decreased Recall, Decreased Insight, Impaired Memory and Decreased Problem Solving  Anxious with all activity    RANGE OF MOTION:  Bilateral Upper Extremity:  WFL    STRENGTH:  Bilateral Upper Extremity:  Impaired - grossly 4-/5     ADL:   Feeding: with set-up. breakfast  Lower Extremity Dressing: Stand By Assistance and with set-up.  ; donning crocs, socks    BALANCE:  Sitting Balance:  Supervision. Increased time required to sit at EOB due to SOB/anxiety with moving (~15 minutes required)  Standing Balance: [employer]. BED MOBILITY:  Supine to Sit: Stand By Assistance    Sit to Supine: Stand By Assistance    Scooting: Supervision    TRANSFERS:  Sit to Stand:  [employer], with increased time for completion, cues for hand placement. Stand to Sit: [employer], with increased time for completion, cues for hand placement. FUNCTIONAL MOBILITY:  Assistive Device: Rolling Walker  Assist Level:  Contact Guard Assistance-minimal assistance   Distance: ~3-4 ft X2  Had initially planned to walk to bedside chair, although once in front of chair pt demoed increased anxiety thus increasing SOB and returned self impulsively to EOB. Pt declined anything further, almost immediately returned self to supine after sitting at EOB. Activity Tolerance:  Patient tolerance of  treatment: poor.  Increased time required due to SOB/anxiety. Assessment:  Assessment: Pt presents requiring increased assistance for ADLs, transfers, and functional mobility compared to PLOF. Pt will continue to benefit from OT services to improve independence with these tasks, in addition to overall strength/endurance to facilitate return to PLOF. Performance deficits / Impairments: Decreased functional mobility , Decreased ADL status, Decreased strength, Decreased safe awareness, Decreased cognition, Decreased endurance, Decreased balance  Prognosis: Fair  REQUIRES OT FOLLOW UP: Yes  Decision Making: Medium Complexity    Treatment Initiated: Treatment and education initiated within context of evaluation. Evaluation time included review of current medical information, gathering information related to past medical, social and functional history, completion of standardized testing, formal and informal observation of tasks, assessment of data and development of plan of care and goals. Treatment time included skilled education and facilitation of tasks to increase safety and independence with ADL's for improved functional independence and quality of life. Discharge Recommendations:  Subacute/Skilled Nursing Facility    Patient Education:  OT Education: OT Role, Plan of Care, Transfer Training, Energy Conservation, Orientation    Equipment Recommendations:  Equipment Needed: No    Plan:  Times per week: 3-5x  Current Treatment Recommendations: Strengthening, Balance Training, Functional Mobility Training, Endurance Training, Safety Education & Training, Patient/Caregiver Education & Training, Self-Care / ADL. See long-term goal time frame for expected duration of plan of care. If no long-term goals established, a short length of stay is anticipated.     Goals:     Short term goals  Time Frame for Short term goals: by discharge  Short term goal 1: Pt will increase activity tolerance for functional mobility to/from [facility] or chair, progressing to bathroom as able, with SBA and Sp02 >+90% in prep for ADL completion. Short term goal 2: Pt will complete BADL tasks with minimal assistance using ECT prn, to increase independence and ease with self care tasks. Short term goal 3: Pt will tolerate dynamic standing >2 minutes with SBA in prep for toileting tasks. Following session, patient left in safe position with all fall risk precautions in place.

## 2021-09-21 NOTE — ED NOTES
Bed: 016A  Expected date:   Expected time:   Means of arrival: [facility]  Comments:     Nadira Alfredo, EMT-P  09/20/21 6548
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
## 2021-09-21 NOTE — PLAN OF CARE
Problem: Discharge Planning:  Goal: Discharged to appropriate level of care  Description: Discharged to appropriate level of care  Outcome: Completed  SW consult received and completed. See  note 9/21.

## 2021-09-21 NOTE — ED NOTES
Patient resting in semi fowlers position with eyes closed. Breathing easy and unlabored. VSS. No other concerns voiced at this time.       Porter Pereira RN  09/21/21 0040       Porter Pereira RN  09/21/21 5277

## 2021-09-21 NOTE — ED PROVIDER NOTES
[facility]  eMERGENCY dEPARTMENT eNCOUnter          CHIEF COMPLAINT       Chief Complaint   Patient presents with    Altered Mental Status    Fever       Nurses Notes reviewed and I agree except as noted in the HPI. HISTORY OF PRESENT ILLNESS    Breana Holly is a 48 y.o. female who presents fevers and altered mental status. Apparently the patient is on oxygen at her nursing home. On 4 L they said she was 89%. Patient is now on 5 L and is 94 to 98%. Patient has no conversational dyspnea. Patient does appear slightly confused. Patient is a dialysis patient she gives me a story that she gets dialysis 4 times a week she cannot remember who her nephrologist was. Patient states that she got very dizzy today while trying to get into her wheelchair. She states she fell but then cannot describe the fall to me. She states she did not lose consciousness and she is not in any pain. Currently the patient is resting comfortably on the cot no apparent distress she obviously can move all her extremities. She states she is taking all her medications as prescribed. She is living at a nursing home. Patient has no other physical complaints at this time. REVIEW OF SYSTEMS     Review of Systems   Constitutional: Negative for activity change, appetite change, diaphoresis, fatigue and unexpected weight change. HENT: Negative for congestion, ear discharge, ear pain, hearing loss, rhinorrhea, sinus pressure, sore throat, trouble swallowing and voice change. Eyes: Negative for photophobia, pain, discharge, redness and itching. Respiratory: Positive for cough and shortness of breath. Negative for choking, chest tightness and wheezing. Cardiovascular: Negative for chest pain, palpitations and leg swelling. Gastrointestinal: Negative for abdominal distention, abdominal pain, blood in stool, constipation, diarrhea, nausea and vomiting.    Endocrine: Negative for polydipsia, polyphagia and polyuria. Genitourinary: Negative for decreased urine volume, difficulty urinating, dysuria, enuresis, frequency, hematuria and urgency. Musculoskeletal: Negative for arthralgias, back pain, gait problem, myalgias, neck pain and neck stiffness. Skin: Negative for pallor and rash. Allergic/Immunologic: Negative for immunocompromised state. Neurological: Negative for dizziness, tremors, seizures, syncope, facial asymmetry, weakness, light-headedness, numbness and headaches. Hematological: Negative for adenopathy. Does not bruise/bleed easily. Psychiatric/Behavioral: Positive for confusion. Negative for agitation, hallucinations and suicidal ideas. The patient is not nervous/anxious. PAST MEDICAL HISTORY    has a past medical history of Anxiety disorder, Asthma, Chronic kidney disease, Chronic respiratory failure with hypoxia ([facility] [address] COPD (chronic obstructive pulmonary disease) ([facility] [address] Elevated troponin, Hemodialysis patient ([facility] [address] Hypertension, and Smoker. SURGICAL HISTORY      has a past surgical history that includes  section; other surgical history; Lithotripsy; cystourethroscopy (2003,2003); Upper gastrointestinal endoscopy (Left, 2019); tracheostomy (N/A, 2020); Gastrostomy tube placement (N/A, 2020); and laryngoscopy (Bilateral, 2021). CURRENT MEDICATIONS       Previous Medications    ACETAMINOPHEN (TYLENOL) 325 MG TABLET    Take by mouth every 6 hours as needed for Pain 2 tab    ALPRAZOLAM (XANAX) 0.5 MG TABLET    Take 0.5 mg by mouth 3 times daily.      AMINO ACIDS-PROTEIN HYDROLYS (PRO-STAT SUGAR FREE PO)    Take 30 mLs by mouth daily    AMINO ACIDS-PROTEIN HYDROLYS PO    Take 30 mLs by mouth 2 times daily May add water to med for ease of administration    BENZTROPINE (COGENTIN) 1 MG TABLET    Take 1 mg by mouth nightly    CARBIDOPA-LEVODOPA (SINEMET)  MG PER TABLET    Take 1 tablet by mouth nightly     CITALOPRAM (CELEXA) 20 MG TABLET    Take 20 mg by mouth daily    DOCUSATE SODIUM (COLACE) 100 MG CAPSULE    Take 100 mg by mouth daily    FAMOTIDINE (PEPCID) 20 MG TABLET    Take 20 mg by mouth three times a week Mon, Wed, Fri    FLUTICASONE-SALMETEROL (ADVAIR HFA) 230-21 MCG/ACT INHALER    Inhale 2 puffs into the lungs 2 times daily    HYDROCODONE-ACETAMINOPHEN (NORCO) 5-325 MG PER TABLET    Take 1 tablet by mouth every 6 hours as needed for Pain. HYDROCORTISONE 2.5 % CREAM    Apply topically 2 times daily Apply topically 2 times daily.     HYDROXYZINE (ATARAX) 50 MG TABLET    Take by mouth every 6 hours as needed for Itching 2 tab    IPRATROPIUM-ALBUTEROL (DUONEB) 0.5-2.5 (3) MG/3ML SOLN NEBULIZER SOLUTION    Inhale 3 mLs into the lungs 4 times daily And every 4 hours as needed    MELATONIN PO    Take 6 mg by mouth nightly    MIDODRINE (PROAMATINE) 2.5 MG TABLET    Take 1 tablet by mouth 3 times daily (before meals)    MIRTAZAPINE (REMERON) 15 MG TABLET    Take 7.5 mg by mouth nightly    MULTIPLE VITAMINS-MINERALS (THERAPEUTIC MULTIVITAMIN-MINERALS) TABLET    Take 1 tablet by mouth daily     NICOTINE (NICODERM CQ) 14 MG/24HR    Place 1 patch onto the skin every 24 hours    OXYGEN    Inhale 3 L into the lungs May titrate to keep sat above 90%    POLYETHYLENE GLYCOL (GLYCOLAX) 17 G PACKET    17 g by Per G Tube route daily    QUETIAPINE (SEROQUEL) 25 MG TABLET    Take 25 mg by mouth 2 times daily    SENNA (SENOKOT) 8.6 MG TABLET    Take 1 tablet by mouth nightly     SEVELAMER (RENVELA) 800 MG TABLET    Take 2 tablets by mouth 3 times daily (with meals)     SODIUM ZIRCONIUM CYCLOSILICATE (LOKELMA) 10 G PACK ORAL SUSPENSION    Take 10 g by mouth twice a week    UNABLE TO FIND    Take by mouth 2 times daily Magic Cup Supplement    UNABLE TO FIND    Take by mouth 3 times daily (with meals) 6 oz nutritional juice with meals    VITAMIN E 100 UNIT/GM CREA    Apply topically 2 times daily Right leg and face       ALLERGIES     is allergic to codeine and morphine. FAMILY HISTORY     She indicated that the status of her sister is unknown.   family history includes Asthma in her sister. SOCIAL HISTORY      reports that she has quit smoking. Her smoking use included cigarettes. She has a 25.00 pack-year smoking history. She has quit using smokeless tobacco. She reports previous alcohol use. She reports previous drug use. PHYSICAL EXAM     INITIAL VITALS:  weight is 150 lb (68 kg). Her oral temperature is 102.9 °F (39.4 °C). Her blood pressure is 126/78 and her pulse is 75. Her respiration is 18 and oxygen saturation is 96%. Physical Exam  Vitals and nursing note reviewed. Constitutional:       General: She is not in acute distress. Appearance: She is well-developed. She is not diaphoretic. HENT:      Head: Normocephalic and atraumatic. Right Ear: External ear normal.      Left Ear: External ear normal.      Nose: Nose normal.      Mouth/Throat:      Pharynx: No oropharyngeal exudate. Eyes:      General: No scleral icterus. Right eye: No discharge. Left eye: No discharge. Conjunctiva/sclera: Conjunctivae normal.      Pupils: Pupils are equal, round, and reactive to light. Neck:      Thyroid: No thyromegaly. Vascular: No JVD. Trachea: No tracheal deviation. Cardiovascular:      Rate and Rhythm: Normal rate and regular rhythm. Heart sounds: Normal heart sounds, S1 normal and S2 normal. No murmur heard. No friction rub. No gallop. Pulmonary:      Effort: Pulmonary effort is normal.      Breath sounds: Normal breath sounds. No stridor. No wheezing, rhonchi or rales. Chest:      Chest wall: No tenderness. Abdominal:      General: Bowel sounds are normal. There is no distension. Palpations: Abdomen is soft. There is no mass. Tenderness: There is no abdominal tenderness. There is no guarding or rebound. Hernia: No hernia is present. Musculoskeletal:         General: No tenderness. Normal range of motion. Cervical back: Normal range of motion and neck supple. Lymphadenopathy:      Cervical: No cervical adenopathy. Skin:     General: Skin is warm and dry. Capillary Refill: Capillary refill takes less than 2 seconds. Findings: No bruising, ecchymosis, lesion or rash. Neurological:      General: No focal deficit present. Mental Status: She is alert and oriented to person, place, and time. Mental status is at baseline. Cranial Nerves: No cranial nerve deficit. Sensory: No sensory deficit. Motor: No weakness. Coordination: Coordination normal.      Gait: Gait normal.      Deep Tendon Reflexes: Reflexes are normal and symmetric. Reflexes normal.   Psychiatric:         Speech: Speech normal.         Behavior: Behavior normal.         Thought Content: Thought content normal.         Judgment: Judgment normal.           DIFFERENTIAL DIAGNOSIS:   Pneumonia bronchitis COVID-19 urinary tract infection encephalopathy metabolic encephalopathy electrolyte disorder    DIAGNOSTIC RESULTS     EKG: All EKG's are interpreted by the Emergency Department Physician who either signs or Co-signs this chart in the absence of a cardiologist.  EKG revealed normal sinus rhythm, normal axis, ventricular rate of 74 NE interval 170 QRS duration 90 QT interval 434 QTC of 481. When compared with EKG dated 3/16/2021 no significant changes appreciated. RADIOLOGY: non-plain film images(s) such as CT, Ultrasound and MRI are read by the radiologist.  CT HEAD WO CONTRAST   Final Result   No acute intracranial findings. This document has been electronically signed by: Erika Emerson MD on    09/20/2021 11:37 PM      All CTs at this facility use dose modulation techniques and iterative    reconstructions, and/or weight-based dosing   when appropriate to reduce radiation to a low as reasonably achievable. XR CHEST PORTABLE   Final Result   Right lower lung consolidation.    Left lower lung airspace disease.       This document has been electronically signed by: Sujit Pressley MD on    09/20/2021 10:35 PM            LABS:   Labs Reviewed   BRAIN NATRIURETIC PEPTIDE - Abnormal; Notable for the following components:       Result Value    Pro-BNP > 14222.0 (*)     All other components within normal limits   CBC WITH AUTO DIFFERENTIAL - Abnormal; Notable for the following components:    WBC 22.1 (*)     RBC 3.02 (*)     Hemoglobin 8.4 (*)     Hematocrit 27.6 (*)     MCHC 30.4 (*)     RDW-CV 17.9 (*)     RDW-SD 59.9 (*)     Segs Absolute 19.1 (*)     Monocytes Absolute 1.5 (*)     Immature Grans (Abs) 0.19 (*)     All other components within normal limits   BASIC METABOLIC PANEL - Abnormal; Notable for the following components:    Sodium 133 (*)     Chloride 91 (*)     Glucose 126 (*)     BUN 41 (*)     CREATININE 5.8 (*)     All other components within normal limits   HEPATIC FUNCTION PANEL - Abnormal; Notable for the following components:    Bilirubin, Direct 0.5 (*)     Alkaline Phosphatase 253 (*)     ALT <5 (*)     All other components within normal limits   TROPONIN - Abnormal; Notable for the following components:    Troponin T 0.131 (*)     All other components within normal limits   C-REACTIVE PROTEIN - Abnormal; Notable for the following components:    CRP 26.27 (*)     All other components within normal limits   GLOMERULAR FILTRATION RATE, ESTIMATED - Abnormal; Notable for the following components:    Est, Glom Filt Rate 8 (*)     All other components within normal limits   COVID-19, RAPID   RAPID INFLUENZA A/B ANTIGENS   LIPASE   MAGNESIUM   ETHANOL   ANION GAP   OSMOLALITY   SEDIMENTATION RATE   AMMONIA   BLOOD GAS, ARTERIAL       EMERGENCY DEPARTMENT COURSE:   Vitals:    Vitals:    09/20/21 2128 09/20/21 2247   BP: 134/77 126/78   Pulse: 73 75   Resp: 16 18   Temp: 102.9 °F (39.4 °C)    TempSrc: Oral    SpO2: (!) 89% 96%   Weight: 150 lb (68 kg)      Was evaluated bedside appropriate labs and imaging were ordered. Patient did have a temperature of 103. She was normotensive. She was awake alert and oriented in bed. Here today she has 22,000 white blood cell count. She had been given Tylenol. Chest x-ray shows that she has what appears to be a right lower lobe consolidation. At this point she had a recent otolaryngological surgery, coupled with the fact that she has what appears to be the same area that an aspiration pneumonia would be I think she has an aspiration type pneumonia. Patient is in a nursing home. She will be given Zosyn and vancomycin. At this point I feel the patient needs to be admitted. I called the hospitalist group and discussed case with them. They graciously accepted the admission. Patient is admitted in stable condition pending further evaluation and treatment. CRITICAL CARE:   None    CONSULTS:  Hospitalist    PROCEDURES:  None    FINAL IMPRESSION      1. Pneumonia of right lower lobe due to infectious organism          DISPOSITION/PLAN   Admission    PATIENT REFERRED TO:  No follow-up provider specified.     DISCHARGE MEDICATIONS:  New Prescriptions    No medications on file       (Please note that portions of this note were completed with a voice recognition program.  Efforts were made to edit the dictations but occasionally words are mis-transcribed.)    DO Irwin Bradley DO  09/21/21 9871

## 2021-09-21 NOTE — CARE COORDINATION
9/21/21, 9:23 AM EDT  Discharge Planning Evaluation  Social work consult received, patient from F. Patient/Family preference is to return to [facility]. The patient's current payor source at the facility is Medicaid. Medicare skilled days available: yes  Insurance precert:  No   Spoke with Desire Avilez at the facility [facility]. Patient bed hold: yes  Anticipated transport plan: ambulette  Do they require COVID 19 test to return to [facility]   Is there a required time frame which which COVID test needs done:24hr     ARNEL met with patient this morning, she plans to return to [facility] at discharged. ARNEL did offer to contact family for patient, she declines, reports daughter Lestine Dance will be coming up to visit today. Spoke with Desire Avilez at [facility], asked to have information faxed over on patient, reports they will do this. They are able to accept back at discharge. Desire Avilez did report patient has been refusing dialysis 1-2 days a week, \"when it doesn't fit her schedule\".

## 2021-09-21 NOTE — ED NOTES
Respiratory at bedside collecting blood gas. No concerns voiced at this time.       Erline Bosworth, RN  09/20/21 2980

## 2021-09-21 NOTE — PROGRESS NOTES
[address] [facility] PHYSICAL THERAPY  EVALUATION  [facility] ICU STEPDOWN TELEMETRY 4K - 4K-03/003-A    Time In: 1016  Time Out: 1033  Timed Code Treatment Minutes: 9 Minutes  Minutes: 17          Date: 2021  Patient Name: Thomas Carnes,  Gender:  female        MRN: [id number]  : 1965  (48 y.o.)      Referring Practitioner: Mauricio Monahan DO  Diagnosis: altered mental status  Additional Pertinent Hx: Per HPI: \"49 y.o. female who presented to [facility] with altered mental status. The patient is awake, confused, oriented to person and place but not to place. The patient is on supplimental oxygen, NC-5L, O sat-99%, where baseline o2 -4L. She is brought from nursing home for altered mental status and fall incident; however, she denies fall incident, trauma or injury. She complains of generalized fatigue, weakness and body pain. Patient denies chest pain, chills, seizures, abdominal pain, heart palpitations\"     Restrictions/Precautions:  Restrictions/Precautions: Contact Precautions, Fall Risk    Subjective:  Chart Reviewed: Yes  Patient assessed for rehabilitation services?: Yes  Family / Caregiver Present: No  Subjective: RN approved PT evaluation. Pt in bed, stating she already had therapy this AM (OT), however, was agreeable to PT with encouragement. She states she is feeling very shakey and attributes this to taking gabapentin. Pt states she feels too unsteady to walk, and did suddenly sit back into bed following STS transfers. Pt did have some difficulty following commands throughout session, appearing not to understand what the PT was asking of her. Session cut short due to doctors arriving for rounding on patient. General:  Follows Commands: Within Functional Limits    Vision: Impaired  Vision Exceptions: Wears glasses at all times    Hearing: Within functional limits    Pain: none stated     Vitals: Vitals not assessed per clinical judgement, see nursing flowsheet.  Pt on 3L O2 -- no SOB during session     Social/Functional History:    Type of Home: Facility ([facility])  Home Equipment: Rolling walker     ADL Assistance: Needs assistance  Homemaking Assistance: Needs assistance  Ambulation Assistance: Needs assistance  Transfer Assistance: Needs assistance      Additional Comments: Pt reported was using RW for short distance mobility to/from w/c; pt has been living at [facility] for the past 6 months    OBJECTIVE:  Range of Motion:  Bilateral Lower Extremity: WFL    Strength:  Bilateral Lower Extremity: Impaired - at least 3/5 GMT, however, unable to assess GMT due to severe shaking with attempts    Balance:  Static Sitting Balance:  generally supervision, but up to Mod A at times, as pt leaned posteriorly during attempts at GMT  Dynamic Sitting Balance: Stand By Assistance, with increased time for completion  Static Standing Balance: Contact Guard Assistance, X 1    Bed Mobility:  Supine to Sit: Stand By Assistance, with head of bed raised, with verbal cues , with increased time for completion  Sit to Supine: Stand By Assistance, X 1, with head of bed raised, with verbal cues , with increased time for completion   Scooting: Stand By Assistance, with increased time for completion    Transfers:  Sit to Stand: Minimal Assistance, X 1, with increased time for completion, cues for hand placement, with verbal cues  Stand to Sit:Contact Guard Assistance, pt quickly descended into sitting position with poor eccentric control, stating she was unable to remain standing    Ambulation:  Not Tested due to weak legs, unsteadiness in standing. Exercise:  Patient was guided in 1 set(s) 5 reps of exercise to both lower extremities. Heelslides, Hip abduction/adduction and Straight leg raises. Several verbal and manual cues, as pt was having difficulty following commands. Exercises were completed for increased independence with functional mobility.     Functional Outcome Measures: Completed  AM-PAC Inpatient not state goals  Short term goals  Time Frame for Short term goals: by hospital discharge  Short term goal 1: Supine to/from sit with modified independence for ease of transfers at discharge site. Short term goal 2: Sit to/from stand with modified independence for ease of transfers at discharge site. Short term goal 3: PT to assess ambulation. Short term goal 4: Pt to tolerate 5 minutes of dynamic standing balance/pre-gait activities to progress to ambulation. Long term goals  Time Frame for Long term goals : N/A due to short ELOS. Following session, patient left in safe position with all fall risk precautions in place.     Britton Leon, PT, DPT

## 2021-09-21 NOTE — H&P
History & Physical        Patient:  Danielle Bell  YOB: 1965    MRN: [id number]     Acct: [de-identified]    PCP: No primary care provider on file. Date of Admission: 9/20/2021    Date of Service: Pt seen/examined on 09/21/21  and Admitted to Inpatient with expected LOS greater than two midnights due to medical therapy. Chief Complaint:  Altered mental status    PLAN:     # Suspected sepsis, 2/4 SIRS criteria met (WBW-22.1, temp 102 \"F)  -high fever on admission, altered mental status, very high WBC  -CXR positive for right lower lobe consolidation  -elevated troponin, ESR, CRP, -procal is elevated -2.89  Plan  -IV fluids 10cc/kg since pt has had ESRD on HD  -on IV antibiotics rosephin/azithromycin to treat high likely cause- CAP  -will monitor clinically, on tele   -pending lactate, procal, blood cultures, VRE/MRSA screening  # Right lower lobe community acquired pneumonia  --CXR positive for right lower lobe consolidation, fever on admission  -on IV antibiotics rosephin/azithromycin, based on culture/procal trending results will optimize abx regimen  -will monitor clinically, on tele   -pending lactate, procal, blood cultures, VRE/MRSA screening  -procal is elevated -2.89  #  Encephalopathy - multifactorial ESRD on HD -uremia and ongoing acute systemic infection process are main factors  - will monitor, receiveing IV fluids/abx for sepsis/pneumonia,  # ESRD on HD, anuric  -nephrology consulted for HD, monitoring clinically, daily bmp# Moderate normocytic anemia- multifactorial, chronic disease ESRD is big contributory factor  -nephrology consulted for HD  #CHF?  HFpEF EF- 55% on 1/14/21  BNP -84389  -currently clinically not in acute exacerbation, no peripheral pitting edema note, no rales noted on chest auscultation  # Elevated troponin- 0.131- mostly due to chronic process ESRD, based on EMR pt has had chronically elevated troponin  Will monitor, ECG negative for ischemic changes, pt denies TRACHEOTOMY performed by Amanda Merritt MD at [address] Left 11/25/2019    EGD BIOPSY performed by Brynn Lofton MD at [facility]       Medications Prior to Admission:      Prior to Admission medications    Medication Sig Start Date End Date Taking? Authorizing Provider   sodium zirconium cyclosilicate (LOKELMA) 10 g PACK oral suspension Take 10 g by mouth twice a week 8/2/21   Chhaya Cbarera APRN - CNP   midodrine (PROAMATINE) 2.5 MG tablet Take 1 tablet by mouth 3 times daily (before meals) 8/1/21   Milagro Paris APRN - CNP   citalopram (CELEXA) 20 MG tablet Take 20 mg by mouth daily    Historical Provider, MD   famotidine (PEPCID) 20 MG tablet Take 20 mg by mouth three times a week Mon, Wed, Fri    Historical Provider, MD   mirtazapine (REMERON) 15 MG tablet Take 7.5 mg by mouth nightly    Historical Provider, MD   UNABLE TO FIND Take by mouth 2 times daily Magic Cup Supplement    Historical Provider, MD   UNABLE TO FIND Take by mouth 3 times daily (with meals) 6 oz nutritional juice with meals    Historical Provider, MD   OXYGEN Inhale 3 L into the lungs May titrate to keep sat above 90%    Historical Provider, MD   benztropine (COGENTIN) 1 MG tablet Take 1 mg by mouth nightly    Historical Provider, MD   docusate sodium (COLACE) 100 MG capsule Take 100 mg by mouth daily    Historical Provider, MD   MELATONIN PO Take 6 mg by mouth nightly    Historical Provider, MD   nicotine (NICODERM CQ) 14 MG/24HR Place 1 patch onto the skin every 24 hours    Historical Provider, MD   HYDROcodone-acetaminophen (NORCO) 5-325 MG per tablet Take 1 tablet by mouth every 6 hours as needed for Pain.     Historical Provider, MD   Amino Acids-Protein Hydrolys (PRO-STAT SUGAR FREE PO) Take 30 mLs by mouth daily    Historical Provider, MD   QUEtiapine (SEROQUEL) 25 MG tablet Take 25 mg by mouth 2 times daily    Historical Provider, MD   Vitamin E 100 UNIT/GM CREA Apply topically 2 times daily Right leg and face    Historical Provider, MD   acetaminophen (TYLENOL) 325 MG tablet Take by mouth every 6 hours as needed for Pain 2 tab    Historical Provider, MD   hydrocortisone 2.5 % cream Apply topically 2 times daily Apply topically 2 times daily. Historical Provider, MD   hydrOXYzine (ATARAX) 50 MG tablet Take by mouth every 6 hours as needed for Itching 2 tab    Historical Provider, MD   sevelamer (RENVELA) 800 MG tablet Take 2 tablets by mouth 3 times daily (with meals)     Historical Provider, MD   carbidopa-levodopa (SINEMET)  MG per tablet Take 1 tablet by mouth nightly     Historical Provider, MD   fluticasone-salmeterol (ADVAIR HFA) 230-21 MCG/ACT inhaler Inhale 2 puffs into the lungs 2 times daily 3/24/21   Dank Grover DO   ipratropium-albuterol (DUONEB) 0.5-2.5 (3) MG/3ML SOLN nebulizer solution Inhale 3 mLs into the lungs 4 times daily And every 4 hours as needed    Historical Provider, MD   AMINO ACIDS-PROTEIN HYDROLYS PO Take 30 mLs by mouth 2 times daily May add water to med for ease of administration    Historical Provider, MD   polyethylene glycol (GLYCOLAX) 17 g packet 17 g by Per G Tube route daily    Historical Provider, MD   Multiple Vitamins-Minerals (THERAPEUTIC MULTIVITAMIN-MINERALS) tablet Take 1 tablet by mouth daily     Historical Provider, MD   senna (SENOKOT) 8.6 MG tablet Take 1 tablet by mouth nightly     Historical Provider, MD   ALPRAZolam (XANAX) 0.5 MG tablet Take 0.5 mg by mouth 3 times daily. Historical Provider, MD       Allergies:  Codeine and Morphine    Social History:      The patient currently lives     TOBACCO:   reports that she has quit smoking. Her smoking use included cigarettes. She has a 25.00 pack-year smoking history. She has quit using smokeless tobacco.  ETOH:   reports previous alcohol use. Family History:       Reviewed in detail and negative for DM, CAD, Cancer, CVA.  Positive as follows:        Problem Relation Age of Onset    Asthma Sister        Diet:  ADULT DIET; Full Liquid    REVIEW OF SYSTEMS:   All 13 review of symptoms are negative except as listed above in the HPI. PHYSICAL EXAM:    BP (!) 150/90   Pulse 59   Temp 102.9 °F (39.4 °C) (Oral)   Resp 14   Wt 150 lb (68 kg)   SpO2 97%   BMI 26.57 kg/m²     General appearance:  No apparent distress, appears stated age and cooperative. HEENT:  Normal cephalic, atraumatic without obvious deformity. Pupils equal, round, and reactive to light. Extra ocular muscles intact. Conjunctivae/corneas clear. Neck: Supple, with full range of motion. No jugular venous distention. Trachea midline. Respiratory:  Normal respiratory effort. Clear to auscultation, bilaterally without Rales/Wheezes/Rhonchi. Cardiovascular:  Regular rate and rhythm with normal S1/S2 without murmurs, rubs or gallops. Abdomen: Soft, non-tender, non-distended with normal bowel sounds. Musculoskeletal:  No clubbing, cyanosis or edema bilaterally. Full range of motion without deformity. Skin: Skin color, texture, turgor normal.  No rashes or lesions. Neurologic:  Neurovascularly intact without any focal sensory/motor deficits. Cranial nerves: II-XII intact, grossly non-focal.  Psychiatric:  Alert and oriented, thought content appropriate, normal insight  Capillary Refill: Brisk,< 3 seconds   Peripheral Pulses: +2 palpable, equal bilaterally       Labs:     Recent Labs     09/20/21 2200   WBC 22.1*   HGB 8.4*   HCT 27.6*        Recent Labs     09/20/21 2200   *   K 5.2   CL 91*   CO2 29   BUN 41*   CREATININE 5.8*   CALCIUM 9.5     Recent Labs     09/20/21  2200   AST 15   ALT <5*   BILIDIR 0.5*   BILITOT 0.8   ALKPHOS 253*     No results for input(s): INR in the last 72 hours. No results for input(s): Jax Breeze in the last 72 hours.     Urinalysis:    No results found for: NITRU, WBCUA, BACTERIA, RBCUA, BLOODU, SPECGRAV, GLUCOSEU    Intake & Output:  No intake/output data recorded. No intake/output data recorded. Radiology:     CXR: I have reviewed the CXR with the following interpretation:   EKG:  I have reviewed the EKG with the following interpretation:     CT HEAD WO CONTRAST   Final Result   No acute intracranial findings. This document has been electronically signed by: Emiliano Blake MD on    09/20/2021 11:37 PM      All CTs at this facility use dose modulation techniques and iterative    reconstructions, and/or weight-based dosing   when appropriate to reduce radiation to a low as reasonably achievable. XR CHEST PORTABLE   Final Result   Right lower lung consolidation. Left lower lung airspace disease. This document has been electronically signed by: Emiliano Blake MD on    09/20/2021 10:35 PM           DVT prophylaxis: {dvt prophylaxis:63224    Code Status: Full Code      PT/OT Eval Status:     Disposition:SNF    Active Hospital Problems    Diagnosis Date Noted    Altered mental status [R41.82] 09/21/2021               Thank you No primary care provider on file. for the opportunity to be involved in this patient's care.     Electronically signed by Nichole Obrien DO on 9/21/2021 at 3:05 AM

## 2021-09-21 NOTE — CARE COORDINATION
9/21/21, 9:23 AM EDT  DISCHARGE PLANNING EVALUATION:    Breana Holly       Admitted: 9/20/2021/ 2126   Hospital day: 0   Location: -03/003-A Reason for admit: Altered mental status [R41.82]  Pneumonia of right lower lobe due to infectious organism [J18.9]   PMH:  has a past medical history of Anxiety disorder, Asthma, Chronic kidney disease, Chronic respiratory failure with hypoxia ([facility] [address] COPD (chronic obstructive pulmonary disease) ([facility] [address] Elevated troponin, Hemodialysis patient ([facility] [address] Hypertension, and Smoker. Procedure:   9/20 CXR  Right lower lung consolidation. Left lower lung airspace disease. Barriers to Discharge:  AMS/Pneumonia. Na+ 132, Creatinine 6.2, WBC 19.7, H 8.6, elevated BNP; monitor. Nephrology following. Oxygen 3L, IV AB continued  PCP: at [facility]  Readmission Risk Score: 29%    Patient Goals/Plan/Treatment Preferences: plans return [facility], HD patient there, has nebulizer, oxygen 4L; therapy following; collaborated ARNEL Carrera  Transportation/Food Security/Housekeeping Addressed:  No issues identified.

## 2021-09-21 NOTE — ED NOTES
ED to inpatient nurses report    Chief Complaint   Patient presents with    Altered Mental Status    Fever      Present to ED from home  LOC: alert and orientated to name, place, date  Vital signs   Vitals:    09/20/21 2128 09/20/21 2247 09/20/21 2354   BP: 134/77 126/78 112/80   Pulse: 73 75 70   Resp: 16 18 20   Temp: 102.9 °F (39.4 °C)     TempSrc: Oral     SpO2: (!) 89% 96% 97%   Weight: 150 lb (68 kg)        Oxygen Baseline 4 L O2 NC    Current needs required 4 L O2 NC   LDAs:   Peripheral IV 03/22/21 (Active)     Mobility: Requires assistance * 2  Pending ED orders: Zosyn and Vancomycin  Present condition: Stable      Electronically signed by Padma Jacques RN on 9/21/2021 at MARTI/ Kamilah De Edgar Torres RN  09/21/21 0001 spoke with np orders received  secuirty called. pt medicated with im medication as orders

## 2021-09-21 NOTE — ED TRIAGE NOTES
Patient presents to ED with chief complaint of Altered mental status and fever. Nursing home states that patient just didn't seem herself. Patient states that she has been feeling more tired than usual. Patient O2 saturation 89% on arrival on patients normal 4L O2. Patient now 94% on 5L nasal cannula. Patient resting in bed. Respirations easy and unlabored. No distress noted. Call light within reach.

## 2021-09-21 NOTE — ED NOTES
Patient resting in semi fowlers position with eyes closed, breathing easy and unlabored. VSS. Call light within reach. Will continue to monitor.       Annemarie Brush RN  09/21/21 4667

## 2021-09-21 NOTE — ED NOTES
Received report from Ad anderson RN at this time. Patient resting in semi fowlers position. Readjusted patient's nasal cannula back in place. No other concerns voiced at this time.       Joslyn Harris RN  09/20/21 7898

## 2021-09-21 NOTE — FLOWSHEET NOTE
The patient is a 29-year-old female with a past medical history of ESRD on HD, COPD, HTN who presented to [facility] for AMS and fever. Per report the patient was brought from her nursing home for AMS and having fallen, the patient denies any fall but does state dizziness and shortness of breath after getting up from her wheelchair. She reports having had HD yesterday. She follows with . Nephrology was consulted. In the ED she was febrile with temperature of 102.6, leukocytosis 22.2, ESR 65, CRP 26.27, pro-Felipe 2.89, ammonia 78. CXR will right lower lobe consolidation. Pneumonia panel pending. She was started on azithromycin and Rocephin for empiric antibiotic coverage. Due to history of HFpEF (EF 55% in 01/2021) she was started on 10 cc/kg fluids. This was later discontinued by nephrology. Baseline home O2 is 4 L NC. She is currently on this. Was hemodynamically stable, vital signs WNL. Records from nursing home was requested. Resumed home medications.

## 2021-09-21 NOTE — ED NOTES
Patient resting in semi fowlers position, breathing easy and unlabored. Admitting physician at bedside. VSS. Call light within reach. Admitting physician lowered patient's oxygen to 3 L at this time. Patient's SpO2 level at 98%.      Helayne Riedel, KARYNA  09/21/21 [address], RN  09/21/21 0225

## 2021-09-22 NOTE — PROGRESS NOTES
[facility]   Nephrology progress note  9/22/2021, 8:32 AM      Pt Name:    Matt Jones  MRN:     [id number]     YOB: 1965  Admit Date:    9/20/2021  9:26 PM  Primary Care Physician:  No primary care provider on file. Room number  4K-03/003-A    Chief Complaint: Nephrology following for ESRD and hemodialysis    Subjective:  Patient seen and examined  No chest pain or shortness of breath  Feels okay  Awake and alert  Seen earlier today during rounds in the dialysis unit    Objective:  24HR INTAKE/OUTPUT:    Intake/Output Summary (Last 24 hours) at 9/22/2021 0832  Last data filed at 9/22/2021 0400  Gross per 24 hour   Intake 1245.66 ml   Output 0 ml   Net 1245.66 ml     I/O last 3 completed shifts: In: 1245.7 [P.O.:800; I.V.:445.7]  Out: 0   No intake/output data recorded. Admission weight: 150 lb (68 kg)  Wt Readings from Last 3 Encounters:   09/22/21 155 lb 13.8 oz (70.7 kg)   08/17/21 141 lb (64 kg)   08/02/21 141 lb 8.6 oz (64.2 kg)     Body mass index is 27.61 kg/m².     Physical examination  VITALS:     Vitals:    09/21/21 2145 09/21/21 2340 09/22/21 0400 09/22/21 0737   BP: 135/66 (!) 81/53 133/79 138/87   Pulse: 71 76 64 74   Resp: 20 20 18 18   Temp: 101.9 °F (38.8 °C) 99.9 °F (37.7 °C) 97.8 °F (36.6 °C) 98.5 °F (36.9 °C)   TempSrc: Oral Oral Oral    SpO2: 100% 93% 97%    Weight:   153 lb 9.6 oz (69.7 kg) 155 lb 13.8 oz (70.7 kg)     General Appearance: alert and cooperative with exam, appears comfortable, no distress  Mouth/Throat: Oral mucosa moist  Neck: No JVD  Lungs:  no use of accessory muscles  GI: soft, non-tender, no guarding  Extremities: No pitting LE edema      Lab Data  CBC:   Recent Labs     09/20/21 2200 09/21/21  0537 09/22/21  0600   WBC 22.1* 19.7* 19.6*   HGB 8.4* 8.6* 9.5*   HCT 27.6* 27.7* 32.3*    141 134     BMP:  Recent Labs     09/20/21 2200 09/21/21  0537 09/22/21  0600   * 132* 127*   K 5.2 5.1 5.3*   CL 91* 91* 87*   CO2 29 28 23 BUN 41* 44* 55*   CREATININE 5.8* 6.2* 7.9*   GLUCOSE 126* 111* 113*   CALCIUM 9.5 9.2 9.3   MG 2.2  --   --      Hepatic:   Recent Labs     09/20/21  2200   LABALBU 3.6   AST 15   ALT <5*   BILITOT 0.8   ALKPHOS 253*         Meds:  Infusion:    sodium chloride       Meds:    cefepime  1,000 mg IntraVENous Q24H    vancomycin  1,000 mg IntraVENous Once    ALPRAZolam  0.5 mg Oral TID    benztropine  1 mg Oral Nightly    carbidopa-levodopa  1 tablet Oral Nightly    citalopram  20 mg Oral Daily    ipratropium-albuterol  3 mL Inhalation 4x Daily    sodium chloride flush  5-40 mL IntraVENous 2 times per day    azithromycin  250 mg IntraVENous Q24H    heparin (porcine)  5,000 Units SubCUTAneous Q8H    budesonide-formoterol  2 puff Inhalation BID    mirtazapine  7.5 mg Oral Nightly    QUEtiapine  25 mg Oral BID    famotidine  20 mg Oral Once per day on Mon Wed Fri    melatonin  6 mg Oral Nightly    midodrine  2.5 mg Oral TID AC    docusate sodium  100 mg Oral Daily    sevelamer  1,600 mg Oral TID WC     Meds prn: sodium chloride flush, sodium chloride, ondansetron **OR** ondansetron, polyethylene glycol, acetaminophen **OR** acetaminophen       Impression and Plan:  1. ESRD on hemodialysis  Seen on dialysis  Tolerating dialysis treatment well  Hemodynamically stable  2. Acute febrile illness with significant leukocytosis. Elevated procalcitonin as well as ESR and CRP. On broad-spectrum IV antibiotics including vancomycin and cefepime. Follow cultures  3. Mild hyponatremia secondary to ESRD  4.   Hyperkalemia: Will correct with dialysis    D/W patient and HD RN    Pedro Luis Michelle MD  [facility]

## 2021-09-22 NOTE — PROGRESS NOTES
Hospitalist Progress Note    Patient:  Miguel Angel Mccarty      Unit/Bed:4K-03/003-A    YOB: 1965    MRN: [id number]       Acct: [de-identified]     PCP: No primary care provider on file. Date of Admission: 9/20/2021    Assessment/Plan:    1. Pneumonia - likely community-acquired        - CXR positive for RLL consolidation. Started IV Rocephin and azithromycin however continued fevers to T101.9. Due to history of MRSA Rocephin discontinued. Started cefepime for broader antibiotic coverage.        - Pneumonia panel and respiratory culture pending. MRSA screen positive. Pending fecal MRSA screen. Will de-escalate antibiotics as appropriate per cultures. - We will repeat CXR if continues symptomatic. Continue azithromycin, day 2 and cefepime, day 1.    2. 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 Per chart review self weaned off vent. Followed by Dr. Maribel Augustine. - 9/21 SpO2 97% on 3L NC. Continued SOB with exertion, likely baseline from end stage COPD. 3. Altered mental status - unsure etiology. resolved         - Presented AAOx1 to self. CT head negative s/p fall. Likely due to uremic encephalopathy vs acute hypoxic respiratory failure. 9/21 Resolved after starting 6L NC.     4. Suspected sepsis - secondary to pneumonia         - Presented 2/4 SIRS criteria with WBC 22.1, T102.9, ESR 65, CRP 26.27, pro-Felipe 2.89. Started on broad spectrum antibiotic coverage. See above.        -We will recheck blood cultures x2, urine culture if Temp >101        - 9/22 Trending leukocytosis, currently afebrile. 5. Leukocytosis - secondary to infectious etiology        - 9/22 stable, WBC 19.6 from previous WBC 19.7. We will continue monitor WBC with daily CBC.     6. Cellulitis, right lower extremity        - Chronic, however worsening pain and pruitis. Noticeable fluctuant, indurated and well demarcated skin lesion that is warm from tibiotalar joint to mid tibia. Superficial skin excoriations. Per nurse patient continues to scratch this. This marked to evaluate for progression on antibiotic therapy. - Cannot rule out DVT. Currently on Heparin. Venous Doppler bilateral ordered. 7. ESRD        - Receives hemodialysis Monday, Tuesday, Thursday, Friday. Jessica Lucero, nephrology outpatient. Continue home Renvela 1600 mg po TID.         - Nephrology following. We will continue monitor renal function with daily BMP. Received dialysis today. 8. COPD - GOLD stage IV        - Baseline 3L NC. Continue home Symbicort 2 puff inhalation BID, Douneb 3 mL inhalation QID        - 9/22 weaned to baseline 3 L NC. Continune to wean O2 as tolerated for SpO2 > 92%    9. History of HFpEF        - Last Echo 1/2021 EF 55%, mild to moderate dilated RV, mild mitral regurg, IVC dilated. - Presented BNP [id number]. Per chart review baseline. 10. Elevated troponin - likely demand ischemia        - Repeat Troponins in 0.100s, EKG nonsignificant. No CP. Will continue to monitor. 11. Normocytic anemia - likely reactive        - 9/22 improved, Hgb 9.5 from previous 8.6. Continue monitor Hgb with daily CBC. Transfuse pRBC if Hgb < 7.0.     12. Hyponatremia - secondary to hemodialysis        - 9/22 Sodium 127 post dialysis from prior 132. Receiving hemodialysis. We will continue monitor sodium with daily BMP. Nephrology following    13. Hyperkalemia        - 9/22 mildly elevated at 5.3 post dialysis from prior 5.1. Receiving hemodialysis. We will continue monitor potassium with daily BMP.   Nephrology following      Expected discharge date:  TBD    Disposition:    [] Home       [] TCU       [] Rehab       [] Psych       [] SNF        [] [facility]       [x] Other - [facility]    Chief Complaint: Shortness of breath    Hospital Course: The patient is a 81-year-old female with a past medical history of ESRD on HD (M,T,Th,F), COPD on 3L NC, HTN and history of HFpEF (EF 55% 1/2021) who presented to [facility] ED from nursing home 9/20 for altered mental status and fall. Per report the patient experienced dizziness and shortness of breath after getting up from her wheelchair and fell. Patient was unsure if she fell, but denies LOC and OCH pain. Prior to the event the patient reports having productive cough that she described as \"brownish\", with associated fatigue for the past 2 to 3 days. Of note, the patient follows with Dr. Fernando Peters, Nephrology and reports getting HD 9/19. In the ED she AAOx1 to self and febrile with temperature of 102. 6. Labs as follows: leukocytosis 22.7, ESR 65, CRP 26.27, pro-Felipe 2.89, ammonia 78. BNP 13500, troponin elevated at 0.1s but stable. EKG nonsignifcant. CXR revealed right lower lobe consolidation. Pneumonia panel collected. Started on azithromycin and Rocephin for empiric antibiotic coverage. She was started on 10 cc/kg fluids due to history of HFpEF. This was later discontinued by nephrology. 9/21 The patient was back to baseline. She was AAO x4 and weaned to baseline of 4L NC. Records were requested from retirement. Due to history of MRSA Rocephin was discontinued and cefepime was started. Subjective (past 24 hours):   Mildly febrile overnight with single episode of 101.9. The patient was weaned to 3 L NC. This morning the patient went for HD. She reports feeling better but continues to complain of shortness of breath. This worse with activity. Denies chest pain or palpitations. The patient also complains of increased pain in her lower right extremity. There is a noticeable fluctuant, indurated skin lesion with superficial excoriations. Per nurse the patient continues to scratch this. It is warm to touch.   I have marked it in order to see progression on antibiotics. ROS (12 point review of systems completed. Pertinent positives noted. Otherwise ROS is negative). Medications:  Reviewed    Infusion Medications    sodium chloride       Scheduled Medications    cefepime  1,000 mg IntraVENous Q24H    vancomycin  1,000 mg IntraVENous Once    pregabalin  75 mg Oral TID    ALPRAZolam  0.5 mg Oral TID    benztropine  1 mg Oral Nightly    carbidopa-levodopa  1 tablet Oral Nightly    citalopram  20 mg Oral Daily    ipratropium-albuterol  3 mL Inhalation 4x Daily    sodium chloride flush  5-40 mL IntraVENous 2 times per day    azithromycin  250 mg IntraVENous Q24H    heparin (porcine)  5,000 Units SubCUTAneous Q8H    budesonide-formoterol  2 puff Inhalation BID    mirtazapine  7.5 mg Oral Nightly    QUEtiapine  25 mg Oral BID    famotidine  20 mg Oral Once per day on Mon Wed Fri    melatonin  6 mg Oral Nightly    midodrine  2.5 mg Oral TID AC    docusate sodium  100 mg Oral Daily    sevelamer  1,600 mg Oral TID      PRN Meds: sodium chloride flush, sodium chloride, ondansetron **OR** ondansetron, polyethylene glycol, acetaminophen **OR** acetaminophen      Intake/Output Summary (Last 24 hours) at 9/22/2021 1417  Last data filed at 9/22/2021 1341  Gross per 24 hour   Intake 1771.8 ml   Output 3800 ml   Net -2028.2 ml       Diet:  ADULT DIET; Dysphagia - Soft and Bite Sized    Exam:  /71   Pulse 80   Temp 98.3 °F (36.8 °C) (Oral)   Resp 18   Wt 149 lb 4 oz (67.7 kg)   SpO2 93%   BMI 26.44 kg/m²     General appearance: Mild acute distress, appears stated age and cooperative. HEENT: Pupils equal, round, and reactive to light. Conjunctivae/corneas clear. Neck: Supple, with full range of motion. Trachea midline. Respiratory:  Decreased respiratory effort. Clear to auscultation, bilaterally without Rales/Wheezes/Rhonchi. Cardiovascular: Regular rate and rhythm with normal S1/S2 without murmurs, rubs or gallops.   Abdomen: Soft, non-tender, non-distended with normal bowel sounds. Musculoskeletal: passive and active ROM x 4 extremities. Skin: Right sided fluctuant, indurated and warm with skin excoriations extending from tibiotalar joint to mid-tibia  Neurologic:  Neurovascularly intact without any focal sensory/motor deficits. Cranial nerves: II-XII intact, grossly non-focal.  Psychiatric: Alert and oriented, thought content appropriate, normal insight  Capillary Refill: Brisk,< 3 seconds   Peripheral Pulses: +2 palpable, equal bilaterally       Labs:   Recent Labs     09/20/21 2200 09/21/21  0537 09/22/21  0600   WBC 22.1* 19.7* 19.6*   HGB 8.4* 8.6* 9.5*   HCT 27.6* 27.7* 32.3*    141 134     Recent Labs     09/20/21 2200 09/21/21  0537 09/22/21  0600   * 132* 127*   K 5.2 5.1 5.3*   CL 91* 91* 87*   CO2 29 28 23   BUN 41* 44* 55*   CREATININE 5.8* 6.2* 7.9*   CALCIUM 9.5 9.2 9.3     Recent Labs     09/20/21 2200   AST 15   ALT <5*   BILIDIR 0.5*   BILITOT 0.8   ALKPHOS 253*     No results for input(s): INR in the last 72 hours. No results for input(s): Sara Beat in the last 72 hours. Microbiology:    9/21/21 Respiratory culture - pending  9/21/21 Pneumonia panel - pending      Urinalysis:    No results found for: Ngozi Quispe, BACTERIA, RBCUA, BLOODU, [address], [address]    Radiology:  CT HEAD WO CONTRAST   Final Result   No acute intracranial findings. This document has been electronically signed by: Mable Eddy MD on    09/20/2021 11:37 PM      All CTs at this facility use dose modulation techniques and iterative    reconstructions, and/or weight-based dosing   when appropriate to reduce radiation to a low as reasonably achievable. XR CHEST PORTABLE   Final Result   Right lower lung consolidation. Left lower lung airspace disease.       This document has been electronically signed by: Mable Eddy MD on    09/20/2021 10:35 PM          DVT prophylaxis: [] Lovenox [] SCDs                                 [x] SQ Heparin                                 [] Encourage ambulation           [] Already on Anticoagulation     Code Status: Full Code    PT/OT Eval Status: N/A    Tele:   [x] yes             [] no    Electronically signed by Duwayne Merlin, DO on 9/22/2021 at 2:17 PM

## 2021-09-22 NOTE — PROGRESS NOTES
Pharmacy Medication History Note    List of current medications patient is taking is complete. Source of information: ECF MAR, Dispense report, Epic medication history. Changes made to medication list:  Medications removed (include reason, ex. therapy complete or physician discontinued):  Nutritional juice removed  Nicotine Patch not continued at [facility], removed. Duplicate Amino-acid removed. Medications added/doses adjusted:  Benadryl 25mg Q6H prn itching added  Hydroxyzine dose adjusted to 100mg q6h prn itching/anxiety  Lyrica 25mg TID x30 days (started 9/19)  NaCl 3% nebulizer 4ml Q24H PRN for airway. NaCl 0.9% nebulizer 1 amp TID PRN. Other notes (ex. Recent course of antibiotics, Coumadin dosing):  Denies use of other OTC or herbal medications.     Allergies reviewed    Electronically signed by Garrett Ventura on 9/22/2021 at 9:54 AM

## 2021-09-22 NOTE — CARE COORDINATION
DISCHARGE PLANNING UPDATE    Barriers to Discharge: AMS/Pneumonia/Fall; Nephrology following. Na+ 127, Creatinine 7.9, WBC 19.6, H 9.5; monitor. SVT, febrile, BP 80/s last 24h; monitor. Oxygen 3L continued. Plans HD today.  IV AB continued  Discharge Plan:   plans return [facility], HD patient there, has nebulizer, oxygen 4L; therapy following; collaborated ARNEL Pierce

## 2021-09-22 NOTE — PROGRESS NOTES
[address] [address]  PHYSICAL THERAPY MISSED TREATMENT NOTE  [facility] ICU STEPDOWN TELEMETRY 4K    Date: 2021  Patient Name: Camelia Akins        MRN: [id number]   : 1965  (48 y.o.)  Gender: female   Referring Practitioner: Margareth Bunn DO  Diagnosis: altered mental status         REASON FOR MISSED TREATMENT:  Missed Treat. Pt off unit for dialysis.

## 2021-09-22 NOTE — PROGRESS NOTES
reported. Current Diet: Full liquid diet    Respiratory Status:  Nasal Canula    Behavioral Observation:  Alert    Oral Mechanism Evaluation:      Facial / Labial WFL    Lingual WFL    Dentition Impaired Most dentition missing; dentures at nursing home   Velum Not Tested    Vocal Quality Not Tested    Sensation Not Tested    Cough Not Tested      Patient Evaluated Using: Thin liquid via cup and straw, puree, soft solid    Oral Phase:  WFL    Pharyngeal Phase: Impaired:  Suspected Pharyngeal Residue   *not able to fully validate presence of pharyngeal phase deficits without formal instrumentation/supportive imaging     Signs and Symptoms of Laryngeal Penetration/Aspiration: Delayed Cough    Impressions: Patient presents with a oral phase that is [facility] and mild pharyngeal dysphagia; certainly not able to fully validate presence of pharyngeal phase deficits without formal instrumentation/supportive imaging. Patient demonstrated no overt s/s of aspiration with thin liquid via cup and straw trials; with puree trials, patient demonstrated delayed coughing x3, which is concerning for possible airway invasion event and/or presence of pharyngeal residue; certainly not able to fully validate presence of pharyngeal phase deficits without formal instrumentation/supportive imaging; patient's swallow physiology appears functional to support goal for comfortable oral intake without distress. ST recommends patient consume a soft and bite sized diet with thin liquids, f/u dysphagia interventions to be prioritized as schedule permits to determine potential needs for instrumental evaluation. RECOMMENDATIONS/ASSESSMENT:  Instrumental Evaluation: Instrumental evaluation not indicated at this time.   Diet Recommendations:  Soft and bite sized diet+thin liquids  Strategies:  Full Upright Position, Small Bite/Sip, Pulmonary Monitoring, Oral Care after all Meals and Alternate Solids and Liquids   Rehabilitation Potential: Good    EDUCATION:  Learner: Patient  Education:  Reviewed results and recommendations of this evaluation, Reviewed diet and strategies, Reviewed ST goals and Plan of Care and Reviewed recommendations for follow-up  Evaluation of Education: Verbalizes understanding and Family not present    PLAN:  Skilled SLP intervention on acute care 3-5 x per week or until goals met and/or pt plateaus in function. Specific interventions for next session may include: dysphagia management, monitor mentation. PATIENT GOAL:    Did not state. Will further assess during treatment. SHORT TERM GOALS:  Short-term Goals  Timeframe for Short-term Goals: 2 weeks  Goal 1: Patient will safely consume a soft and bite sized diet with no overt s/s of aspiration to contribute to nutrition/hydration. Goal 2: Patient will complete advanced PO trials (hard solid) to determine readiness for dietary upgrade. Goal 3: Patient will complete oral care after meals to minimize and assist with bacterial colonization. Goal 4: ST will monitor mentation and complete MOCA cognition evaluation when clinically indicated. LONG TERM GOALS:  No LTGs established due to short ELOS.         QUINN Sanchez., Student Intern  Keyur Louis M.A., [facility]

## 2021-09-22 NOTE — PROGRESS NOTES
Pharmacy Vancomycin Consult     Vancomycin Day: 2  Current Dosinmg x1   Current indication: Pneumonia    Temp max:  101.9    Recent Labs     21  0537 21  0600   BUN 44* 55*   CREATININE 6.2* 7.9*   WBC 19.7* 19.6*       Intake/Output Summary (Last 24 hours) at 2021 0813  Last data filed at 2021 0400  Gross per 24 hour   Intake 1245.66 ml   Output 0 ml   Net 1245.66 ml       Cultures/Sensitivites  Date Source Result   21 PNA Panel --   -- -- --   -- -- --   -- -- --   -- -- --   -- -- --         Ht Readings from Last 1 Encounters:   21 5' 3\" (1.6 m)        Wt Readings from Last 1 Encounters:   21 155 lb 13.8 oz (70.7 kg)       Body mass index is 27.61 kg/m². CrCl cannot be calculated (Unknown ideal weight.). Vanc, Rdm: 18.9 (7097)    Assessment/Plan:  Give vancomycin 1000mg x 1 dose following hemodialysis today. Will follow up later today to see next planned dialysis session in order to appropriately time next level/doses.     Tang Myers, PharmD
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
## 2021-09-23 NOTE — PROGRESS NOTES
[facility]   Nephrology progress note  9/23/2021, 8:47 AM      Pt Name:    Essence Shaver  MRN:     [id number]     YOB: 1965  Admit Date:    9/20/2021  9:26 PM  Primary Care Physician:  No primary care provider on file. Room number  4K-03/003-A    Chief Complaint: Nephrology following for ESRD and hemodialysis    Subjective:  Patient seen and examined  Seen and examined earlier today  No acute distress  Awake and alert    Objective:  24HR INTAKE/OUTPUT:      Intake/Output Summary (Last 24 hours) at 9/23/2021 0847  Last data filed at 9/23/2021 0410  Gross per 24 hour   Intake 1913.42 ml   Output 3800 ml   Net -1886.58 ml     I/O last 3 completed shifts: In: 1913.4 [P.O.:750; I.V.:363.4]  Out: 3800   No intake/output data recorded. Admission weight: 150 lb (68 kg)  Wt Readings from Last 3 Encounters:   09/23/21 151 lb 3.8 oz (68.6 kg)   08/17/21 141 lb (64 kg)   08/02/21 141 lb 8.6 oz (64.2 kg)     Body mass index is 26.79 kg/m².     Physical examination  VITALS:     Vitals:    09/22/21 2350 09/23/21 0410 09/23/21 0620 09/23/21 0745   BP:  (!) 153/81 (!) 147/77 (!) 183/85   Pulse:  60 57 66   Resp:  16  16   Temp:  97.6 °F (36.4 °C)  97.9 °F (36.6 °C)   TempSrc:  Oral  Oral   SpO2: 99% 99%  98%   Weight:  151 lb 3.8 oz (68.6 kg)       General Appearance: alert and cooperative with exam, appears comfortable, no distress  Mouth/Throat: Oral mucosa moist  Neck: No JVD  Lungs:  no use of accessory muscles  GI: soft, non-tender, no guarding  Extremities: No pitting LE edema      Lab Data  CBC:   Recent Labs     09/21/21  0537 09/22/21  0600 09/23/21  0527   WBC 19.7* 19.6* 12.8*   HGB 8.6* 9.5* 8.0*   HCT 27.7* 32.3* 26.5*    134 145     BMP:  Recent Labs     09/20/21  2200 09/20/21  2200 09/21/21  0537 09/22/21  0600 09/23/21  0527   *   < > 132* 127* 134*   K 5.2   < > 5.1 5.3* 4.2   CL 91*   < > 91* 87* 96*   CO2 29   < > 28 23 27   BUN 41*   < > 44* 55* 27*   CREATININE 5.8*   < > 6.2* 7.9* 4.6*   GLUCOSE 126*   < > 111* 113* 87   CALCIUM 9.5   < > 9.2 9.3 9.0   MG 2.2  --   --   --   --     < > = values in this interval not displayed. Hepatic:   Recent Labs     09/20/21  2200   LABALBU 3.6   AST 15   ALT <5*   BILITOT 0.8   ALKPHOS 253*         Meds:  Infusion:    sodium chloride       Meds:    vancomycin (VANCOCIN) intermittent dosing (placeholder)   Other RX Placeholder    cefepime  1,000 mg IntraVENous Q24H    pregabalin  75 mg Oral TID    ALPRAZolam  0.5 mg Oral TID    benztropine  1 mg Oral Nightly    carbidopa-levodopa  1 tablet Oral Nightly    citalopram  20 mg Oral Daily    ipratropium-albuterol  3 mL Inhalation 4x Daily    sodium chloride flush  5-40 mL IntraVENous 2 times per day    azithromycin  250 mg IntraVENous Q24H    heparin (porcine)  5,000 Units SubCUTAneous Q8H    budesonide-formoterol  2 puff Inhalation BID    mirtazapine  7.5 mg Oral Nightly    QUEtiapine  25 mg Oral BID    famotidine  20 mg Oral Once per day on Mon Wed Fri    melatonin  6 mg Oral Nightly    midodrine  2.5 mg Oral TID AC    docusate sodium  100 mg Oral Daily    sevelamer  1,600 mg Oral TID      Meds prn: sodium chloride flush, sodium chloride, ondansetron **OR** ondansetron, polyethylene glycol, acetaminophen **OR** acetaminophen       Impression and Plan:  1. ESRD on hemodialysis  Had dialysis treatment yesterday  Electrolytes have improved  Volume status stable  No need for renal replacement therapy today  We will plan dialysis treatment tomorrow and continue with Mondays, Wednesdays, Fridays    2. Hyponatremia. Resolved  3. Hyperkalemia corrected with dialysis  4. Acute febrile illness with significant leukocytosis. Chest x-ray shows right lung consolidation.   On IV antibiotics    D/W patient   Juani Zuniga MD  [facility]

## 2021-09-23 NOTE — PROGRESS NOTES
[address] [place] [address]  INPATIENT PHYSICAL THERAPY  DAILY NOTE  [facility] ICU STEPDOWN TELEMETRY 4K - 4K-[id number]-A    Time In: 930  Time Out: 941  Timed Code Treatment Minutes: 11 Minutes  Minutes: 11          Date: 2021  Patient Name: Jovan Hanley,  Gender:  female        MRN: [id number]  : 1965  (48 y.o.)     Referring Practitioner: Sherry   Diagnosis: altered mental status  Additional Pertinent Hx: Per HPI: \"49 y.o. female who presented to [address] [place] [address] with altered mental status. The patient is awake, confused, oriented to person and place but not to place. The patient is on supplimental oxygen, NC-5L, O sat-99%, where baseline o2 -4L. She is brought from nursing home for altered mental status and fall incident; however, she denies fall incident, trauma or injury. She complains of generalized fatigue, weakness and body pain. Patient denies chest pain, chills, seizures, abdominal pain, heart palpitations\"     Prior Level of Function:  Type of Home: Facility ([facility])  Home Equipment: Rolling walker        ADL Assistance: Needs assistance  Homemaking Assistance: Needs assistance  Ambulation Assistance: Needs assistance  Transfer Assistance: Needs assistance  Additional Comments: Question accuracy of pt's reported PLOF due to confusion: Pt reported was using RW for short distance mobility to/from w/c; pt has been living at [facility] for the past 6 months. Pt reported had therapy 3x/week. Restrictions/Precautions:  Restrictions/Precautions: Contact Precautions, Fall Risk  Position Activity Restriction  Other position/activity restrictions: monitor O2     SUBJECTIVE: pt sleep with resp therapist in room upon arrival and unable to arouse pt. Once took covers back and adjusted bed pt waking up but would fall right back asleep. With encouragement pt sat EOB and more alert and spontaneous to std. Then spontaneous to sit and lay back down. Pt falling asleep again once returned to sup in bed. RN aware    PAIN: no c/o pain    Vitals: Vitals not assessed per clinical judgement, see nursing flowsheet    OBJECTIVE:  Bed Mobility:  Rolling to Right: Minimal Assistance   Supine to Sit: Contact Guard Assistance  Sit to Supine: Stand By Assistance   Scooting: Stand By Assistance fwd and bwd in sitting  HOB up 20 degrees    Transfers:  Sit to Stand: Stand By Assistance  Stand to Sit:Stand By Assistance    Ambulation:  [address] to [facility]  Distance: 2'x2  Surface: Level Tile  Device:No Device  Gait Deviations:  Pt with spontaneous movement, cues for safety, no LOB but unsteady    Balance:  Static Sitting Balance:  Stand By Assistance  Static Standing Balance: Contact Guard Assistance        Functional Outcome Measures: Completed  AM-PAC Inpatient Mobility Raw Score : 17  AM-PAC Inpatient T-Scale Score : 42.13    ASSESSMENT:  Assessment: Patient progressing toward established goals. Activity Tolerance:  Patient tolerance of  treatment: fair. Equipment Recommendations:Equipment Needed: No  Discharge Recommendations:  Continue to assess pending progress, ECF with PT    Plan: Times per week: 3-5x GM  Times per day: Daily  Current Treatment Recommendations: Strengthening, Neuromuscular Re-education, Home Exercise Program, Safety Education & Training, Balance Training, Endurance Training, Patient/Caregiver Education & Training, Functional Mobility Training, Transfer Training, Gait Training    Patient Education  Patient Education: [employer] Mobility, Transfers, Gait    Goals:  Patient goals : did not state goals  Short term goals  Time Frame for Short term goals: by hospital discharge  Short term goal 1: Supine to/from sit with modified independence for ease of transfers at discharge site. Short term goal 2: Sit to/from stand with modified independence for ease of transfers at discharge site.   Short term goal 3: amb >50'x1 without AD and SBA to walk safely in room  Short term goal 4: Pt to tolerate 5 minutes of dynamic standing balance/pre-gait activities to progress to ambulation. Long term goals  Time Frame for Long term goals : N/A due to short ELOS. Following session, patient left in safe position with all fall risk precautions in place.

## 2021-09-23 NOTE — FLOWSHEET NOTE
09/22/21 2250   Provider Notification   Reason for Communication Evaluate   Provider Name [facility]   Provider Notification Resident   Method of Communication Secure Message   Response See orders   Notification Time [phone] RN sent secure message to Dr. [facility] regarding patient in room 9B12. still confused however patient has been very lethargic this shift able to arouse but then falls asleep during conversation. temp 100.8, otherwise vitals are stable. on 4L NC, 3L is baseline and O2 is 99% but she does have it fall off frequently and desats. RN asked if he would like ABGs for patients lethargy. 8808- Dr. [facility] ordered ABGs and blood sugar. 18- RN notified physician of results of ABGs and blood sugar. No new orders at this time.

## 2021-09-23 NOTE — PROGRESS NOTES
[address] THERAPY MISSED TREATMENT NOTE  [facility] ICU STEPDOWN TELEMETRY 4K  4K-003-A      Date: 2021  Patient Name: Jovan Hanley        CSN: [id number]   : 1965  (48 y.o.)  Gender: female   Referring Practitioner: Jarod Allan DO  Diagnosis: AMS         REASON FOR MISSED TREATMENT: OT attempted X2 this date. At 1013, pt sleeping, unable to be woke. At 1427, pt was still eating lunch,and reported will need some time.  Will check back as able

## 2021-09-23 NOTE — PROGRESS NOTES
Hospitalist Progress Note    Patient:  Haja Pozo      Unit/Bed:4K-03/003-A    YOB: 1965    MRN: [id number]       Acct: [de-identified]     PCP: No primary care provider on file. Date of Admission: 9/20/2021    Assessment/Plan:    1. Pneumonia - likely community acquired        - CXR positive for RLL consolidation. Started IV Rocephin and azithromycin however continued fevers to T101.9. Due to history of MRSA Rocephin discontinued. Started cefepime for broader antibiotic coverage.        - Pneumonia panel and respiratory culture pending. MRSA screen positive. Pending fecal MRSA screen. Will de-escalate antibiotics as appropriate per cultures. - We will repeat CXR if continues symptomatic. Continue azithromycin, day 3 and cefepime, day 2.    2. 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 Per chart review self weaned off vent. Followed by Dr. Albertina Felty. - 9/23 SpO2 96% on 3L NC. Continued SOB with exertion, likely baseline from end stage COPD. 3. Altered mental status - likely metabolic encephalopathy, resolved        - Presented AAOx1 to self. CT head negative s/p fall. Likely due to metabolic encephalopathy. 9/21 Resolved after starting 6L NC and starting antibiotics. 4. Suspected sepsis - secondary pneumonia        - Presented 2/4 SIRS criteria with WBC 22.1, T102.9, ESR 65, CRP 26.27, pro-Felipe 2.89. Started on broad spectrum antibiotic coverage. See above. - 9/23 Temp 100.8. Reordered blood cultures x2, urine culture and lactate. - 9/23 Trending leukocytosis, currently afebrile. 5. Leukocytosis        - 9/23 improved, WBC 12.8 from previous WBC 19.6. We will continue monitor WBC with daily CBC.     6. Unlikely Cellulitis, right lower extremity        - Chronic, however worsening pain and pruitis. Noticeable fluctuant, indurated and well demarcated skin lesion that is warm from tibiotalar joint to mid tibia. Superficial skin excoriations. Per nurse patient continues to scratch this. This marked to evaluate for progression on antibiotic therapy. - Ordered venous Doppler bilateral for concerns of DVT. Currently on Heparin . 9/22 Negative for DVTs. - 9/23 Further investigation revealed chronic issue managed at nursing home with hydrocortisone cream and banding. Currently no concern for infection. 7. ESRD        - Receives hemodialysis Monday, Tuesday, Thursday, Friday. Jess Stein, nephrology outpatient. Continue home Renvela 1600 mg po TID.         - Nephrology following. We will continue monitor renal function with daily BMP. Received dialysis today. 8. COPD - GOLD stage IV        - Baseline 3L NC. Continue home Symbicort 2 puff inhalation BID, Douneb 3 mL inhalation QID        - 9/23 weaned to baseline 3 L NC. Continune to wean O2 as tolerated for SpO2 > 92%    9. History of HFpEF        - Last Echo 1/2021 EF 55%, mild to moderate dilated RV, mild mitral regurg, IVC dilated. - Presented BNP 374019. Per chart review baseline. 10. Elevated troponin - likely demand ischemia        - Repeat Troponins in 0.100s, EKG nonsignificant. No CP. Will continue to monitor. 11. Normocytic anemia - likely reactive        - 9/23 improved, Hgb 8.0 from previous 9.6. Repeat hemoglobin 7.9. No gross signs of GI bleed. Heparin held. Check iron studies, reticulocyte count, vitamin B 12 and folate, LDH, haptoglobin, bilirubin. - Continue monitor Hgb with daily CBC. Transfuse pRBC if Hgb < 7.0.     12. Hyponatremia - secondary to hemodialysis        - 9/23 improving post dialysis, Sodium 134 from prior 124. Receiving hemodialysis. We will continue monitor sodium with daily BMP. Nephrology following    13.  Hyperkalemia - resolved post dialysis        - 9/23 mildly elevated at 4.2 from previous 5.3. Receiving hemodialysis. We will continue monitor potassium with daily BMP. Nephrology following      Expected discharge date:  TBD    Disposition:    [] Home       [] TCU       [] Rehab       [] Psych       [] SNF       [] [facility]       [x] Other- [facility]    Chief Complaint: Shortness of breath    Hospital Course: The patient is a 81-year-old female with a past medical history of ESRD on HD (M,T,Th,F), COPD on 3L NC, HTN and history of HFpEF (EF 55% 1/2021) who presented to [facility] ED from nursing home 9/20 for altered mental status and fall. Per report the patient experienced dizziness and shortness of breath after getting up from her wheelchair and fell. Patient was unsure if she fell, but denies LOC and OCH pain. Prior to the event the patient reports having productive cough that she described as \"brownish\", with associated fatigue for the past 2 to 3 days. Of note, the patient follows with Dr. James Silver, Nephrology and reports getting HD 9/19.     In the ED she AAOx1 to self and febrile with temperature of 102. 6. Labs as follows: leukocytosis 22.7, ESR 65, CRP 26.27, pro-Felipe 2.89, ammonia 78. BNP 87022, troponin elevated at 0.1s but stable. EKG nonsignifcant. CXR revealed right lower lobe consolidation. Pneumonia panel collected. Started on azithromycin and Rocephin for empiric antibiotic coverage. She was started on 10 cc/kg fluids due to history of HFpEF. This was later discontinued by nephrology.       9/21 The patient was back to baseline. She was AAO x4 and weaned to baseline of 4L NC. Records were requested from [facility]. Due to history of MRSA Rocephin was discontinued and cefepime was started. 9/22 the patient continued to demonstrate intermittent episodes of fevers. She was complaining of some tenderness in her right lower extremity.   There was a noticeable right lower extremity fluctuant, indurated skin lesion with superficial excoriations. Per nurse the patient continues to scratch this. Venous Doppler was negative for DVT. Per patient this is chronic and is treated with a hydrocortisone cream at nursing facility. Subjective (past 24 hours):   Overnight single episode of fever of 100.8. Order blood cultures x2, urine cultures, lactic acid. This morning the patient is doing well. She denies any new complaints. She states her breathing has improved back to baseline with 3 L NC. Hemodynamically stable. Currently on azithromycin day 3, cefepime day 2. ROS (12 point review of systems completed. Pertinent positives noted. Otherwise ROS is negative). Medications:  Reviewed    Infusion Medications    sodium chloride       Scheduled Medications    vancomycin (VANCOCIN) intermittent dosing (placeholder)   Other RX Placeholder    cefepime  1,000 mg IntraVENous Q24H    pregabalin  75 mg Oral TID    ALPRAZolam  0.5 mg Oral TID    benztropine  1 mg Oral Nightly    carbidopa-levodopa  1 tablet Oral Nightly    citalopram  20 mg Oral Daily    ipratropium-albuterol  3 mL Inhalation 4x Daily    sodium chloride flush  5-40 mL IntraVENous 2 times per day    azithromycin  250 mg IntraVENous Q24H    heparin (porcine)  5,000 Units SubCUTAneous Q8H    budesonide-formoterol  2 puff Inhalation BID    mirtazapine  7.5 mg Oral Nightly    QUEtiapine  25 mg Oral BID    famotidine  20 mg Oral Once per day on Mon Wed Fri    melatonin  6 mg Oral Nightly    midodrine  2.5 mg Oral TID AC    docusate sodium  100 mg Oral Daily    sevelamer  1,600 mg Oral TID      PRN Meds: sodium chloride flush, sodium chloride, ondansetron **OR** ondansetron, polyethylene glycol, acetaminophen **OR** acetaminophen      Intake/Output Summary (Last 24 hours) at 9/23/2021 1309  Last data filed at 9/23/2021 1119  Gross per 24 hour   Intake 1233.42 ml   Output 0 ml   Net 1233.42 ml       Diet:  ADULT DIET;  Dysphagia - Soft and Bite Sized    Exam:  /64   Pulse 66   Temp 98.1 °F (36.7 °C) (Oral)   Resp 16   Wt 151 lb 3.8 oz (68.6 kg)   SpO2 96%   BMI 26.79 kg/m²     General appearance:  No apparent distress, appears stated age and cooperative. HEENT: Pupils equal, round, and reactive to light. Conjunctivae/corneas clear. Neck: Supple, with full range of motion. Trachea midline. Respiratory:  Decreased respiratory effort. Clear to auscultation, bilaterally without Rales/Wheezes/Rhonchi. Cardiovascular: Regular rate and rhythm with normal S1/S2 without murmurs, rubs or gallops. Abdomen: Soft, non-tender, non-distended with normal bowel sounds. Musculoskeletal: passive and active ROM x 4 extremities. Skin: Chronic right sided fluctuant, indurated with skin excoriations extending from tibiotalar joint to mid-tibia  Neurologic:  Neurovascularly intact without any focal sensory/motor deficits. Cranial nerves: II-XII intact, grossly non-focal.  Psychiatric: Alert and oriented, thought content appropriate, normal insight  Capillary Refill: Brisk,< 3 seconds   Peripheral Pulses: +2 palpable, equal bilaterally       Labs:   Recent Labs     09/21/21  0537 09/21/21  0537 09/22/21  0600 09/23/21  0527 09/23/21  1226   WBC 19.7*  --  19.6* 12.8*  --    HGB 8.6*   < > 9.5* 8.0* 7.9*   HCT 27.7*   < > 32.3* 26.5* 25.8*     --  134 145  --     < > = values in this interval not displayed. Recent Labs     09/21/21  0537 09/22/21  0600 09/23/21  0527   * 127* 134*   K 5.1 5.3* 4.2   CL 91* 87* 96*   CO2 28 23 27   BUN 44* 55* 27*   CREATININE 6.2* 7.9* 4.6*   CALCIUM 9.2 9.3 9.0     Recent Labs     09/20/21  2200   AST 15   ALT <5*   BILIDIR 0.5*   BILITOT 0.8   ALKPHOS 253*     No results for input(s): INR in the last 72 hours. No results for input(s): Verlena Braulio in the last 72 hours.     Microbiology:    9/21/21 Respiratory culture - pending  9/21/21 Pneumonia panel - pending    Urinalysis:    No results found for: Keyur Arrow, BACTERIA, RBCUA, BLOODU, [address], [address]    Radiology:  VL DUP LOWER EXTREMITY VENOUS BILATERAL   Final Result      No evidence of deep venous thrombosis in either lower extremity. **This report has been created using voice recognition software. It may contain minor errors which are inherent in voice recognition technology. **             Final report electronically signed by Dr. Elizabeth Quick on 9/22/2021 6:00 PM      CT HEAD WO CONTRAST   Final Result   No acute intracranial findings. This document has been electronically signed by: Micah Jasso MD on    09/20/2021 11:37 PM      All CTs at this facility use dose modulation techniques and iterative    reconstructions, and/or weight-based dosing   when appropriate to reduce radiation to a low as reasonably achievable. XR CHEST PORTABLE   Final Result   Right lower lung consolidation. Left lower lung airspace disease.       This document has been electronically signed by: Micah Jasso MD on    09/20/2021 10:35 PM          DVT prophylaxis: [] Lovenox                                 [] SCDs                                 [x] SQ Heparin                                 [] Encourage ambulation           [] Already on Anticoagulation     Code Status: Full Code    PT/OT Eval Status: PT/OT consulted    Tele:   [x] yes             [] no    Electronically signed by Jonnie Saunders DO on 9/23/2021 at 1:09 PM

## 2021-09-23 NOTE — PROGRESS NOTES
[address] [facility] THERAPY MISSED TREATMENT NOTE  STRZ ICU STEPDOWN TELEMETRY 4K      Date: 2021  Patient Name: Thomas Carnes        MRN: [id number]    : 1965  (48 y.o.)    REASON FOR MISSED TREATMENT:  ST attempted to see patient with RN permission at 36. RN noted that patient had intermittent levels of alertness. ST attempted to rouse patient via call of name, and shoulder touch. Patient would open eyes then immediately fall back asleep. At this time, due to poor alertness, ST to try back later on this date as schedule permits.      Mercedes Od[place], [place], [address] [place] [address]

## 2021-09-23 NOTE — PROGRESS NOTES
Dependent/Unable. Hobbies: Unable to determine  Vision Status: impaired  Hearing: WFL  Type of Home: Facility ([facility])  Home Equipment: Rolling walker    ORAL MOTOR:  Facial / Labial Not Tested    Lingual Not Tested    Dentition Not Tested    Velum Not Tested    Vocal Quality Not Tested    Sensation Not Tested    Cough Not Tested    *Please refer to CSE completed     SPEECH / VOICE:  Speech and Voice appear to be grossly intact for basic and complex daily communication    LANGUAGE:  Receptive:  1 Step Commands: 0/2  Simple Yes/No Questions: 2/2    Expressive:  Confrontational Namin/2  Conversational Speech: impaired    COGNITION:  Ronny Cognitive Assessment (MOCA) version 7.3 completed. Pt scored /30. Normal is greater than or equal to 26/30. Orientation: 1/6  Immediate Recall: 0/5 x2, MAX verbal cues required for direct repetition   Short-Term Recall: 0/5  Divergent Namin, N=11  Problem Solvin/2  Reasonin/2  Thought Organization: impaired  Attention: 0/3  Math Computation: 0/3  Executive Functionin/5 per MOCA    SWALLOWING:  Current Diet: soft and bite sized and thin  Impaired - see CSE completed          RECOMMENDATIONS/ASSESSMENT:  DIAGNOSTIC IMPRESSIONS:  At this time, patient presents with SEVERE cognitive impairment as evidenced by the skilled findings outlined above. Deficits noted include: receptive and expressive language abilities, executive functioning, immediate/short term recall, attention, math computation, word repetition, divergent naming, reasoning, basic orientation, and thought organization. During assessment, patient perseverative on counting backwards from 10. When given task, patient would repeat several task instructions, then provide tangential, unrelated speech response. NO dysphonia, mild dysarthria suspected d/t poor dentition. At this time, ST STRONGLY recommends direct 1:1 supervision upon hospital d/c to ensure patient safety in d/c environment. ST recommends skilled ST intervention to address aforementioned cognitive deficits and return patient to PLOF. Rehabilitation Potential: fair    EDUCATION:  Learner: Patient  Education:  Reviewed results and recommendations of this evaluation  Evaluation of Education: No evidence of learning    PLAN:  Skilled SLP intervention on acute care 3-5 x per week or until goals met and/or pt plateaus in function. Specific interventions for next session may include: dysphagia/cognitive tx. PATIENT GOAL:    Did not state. Will further assess during treatment. SHORT TERM GOALS:  Short-term Goals  Timeframe for Short-term Goals: 2 weeks  Goal 1: Patient will safely consume a soft and bite sized diet with no overt s/s of aspiration to contribute to nutrition/hydration. Goal 2: Patient will complete advanced PO trials (hard solid) to determine readiness for dietary upgrade. Goal 3: Patient will complete oral care after meals to minimize and assist with bacterial colonization. Goal 4: Patient will complete basic problem solving, reasoning and naming tasks with 60% accuracy mod verbal cues to improve overall cognitive functioning  Goal 5: Patient will complete basic orientation,  thought organization, sequencing, and executive functioning tasks with 60% accuracy mod verbal cues to return to PLOF  Goal 6: Patient will complete sustained, divided and alternating attention tasks with no more than 3 errors in 5 minutes in order to actively participate in ADL/IADL task completion.     LONG TERM GOALS:  No LTG's established at this time due to [address], [place], [address]

## 2021-09-23 NOTE — CARE COORDINATION
DISCHARGE PLANNING UPDATE    Barriers to Discharge:   AMS/Pneumonia/Fall; Nephrology following. Creatinine 4.6, WBC 12.8, H 8; monitor.  Oxygen 3L, IV Zithromax day 3, IV Maxipime day 2 continued; await afebrile 24h; collaborated w Attending    Discharge Plan: plans return [facility] w HD; has nebulizer, oxygen 4L, AVF; collaborated w ARNEL Tomlinson
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
## 2021-09-23 NOTE — PROGRESS NOTES
Physician Progress Note      PATIENTEmisela Sousa  CSN #:                  [id number]  :                       1965  ADMIT DATE:       2021 9:26 PM  [address] [place] DATE:  RESPONDING  PROVIDER #:        Ivana Littlejohn MD          QUERY TEXT:    Dr Humaira Sloan, Dr. Anne Leo,    Pt admitted with Sepsis and has encephalopathy documented. Pt was 89% on 4LNC   & has PNA & ESRD. If possible, please document in progress notes and discharge   summary further specificity regarding the type of encephalopathy:    The medical record reflects the following:  Risk Factors: HTN, CKD, COPD, Dialysis  Clinical Indicators: Pt was 89% on 4LNC & has PNA & ESRD. Per HP:   Encephalopathy - multifactorial ESRD on HD -uremia and ongoing acute systemic   infection process are main factors - will monitor, receiving IV fluids/abx for   sepsis/pneumonia. Treatment: IV Zosyn, Vanc, Zithromax, Rocephin. Options provided:  -- Metabolic encephalopathy  -- Septic encephalopathy  -- Other - I will add my own diagnosis  -- Disagree - Not applicable / Not valid  -- Disagree - Clinically unable to determine / Unknown  -- Refer to Clinical Documentation Reviewer    PROVIDER RESPONSE TEXT:    This patient has metabolic encephalopathy.     Query created by: Josey Chanel on 2021 7:17 AM      Electronically signed by:  Ivana Littlejohn MD 2021 9:00 AM

## 2021-09-24 NOTE — PROGRESS NOTES
[facility]   Nephrology progress note  9/24/2021, 10:29 AM      Pt Name:    Issac Gibson  MRN:     [id number]     YOB: 1965  Admit Date:    9/20/2021  9:26 PM  Primary Care Physician:  No primary care provider on file. Room number  4K-03/003-A    Chief Complaint: Nephrology following for ESRD and hemodialysis    Subjective:  Patient seen and examined  Seen earlier today during rounds on dialysis in the dialysis unit  Tolerating dialysis treatment well  Tolerating ultrafiltration  Blood pressure 320 systolic  Blood flow rate of 450  Dialysate flow rate of 800    Objective:  24HR INTAKE/OUTPUT:      Intake/Output Summary (Last 24 hours) at 9/24/2021 1029  Last data filed at 9/23/2021 1833  Gross per 24 hour   Intake 600 ml   Output --   Net 600 ml     I/O last 3 completed shifts: In: 600 [P.O.:600]  Out: -   No intake/output data recorded. Admission weight: 150 lb (68 kg)  Wt Readings from Last 3 Encounters:   09/24/21 156 lb 1.4 oz (70.8 kg)   08/17/21 141 lb (64 kg)   08/02/21 141 lb 8.6 oz (64.2 kg)     Body mass index is 27.65 kg/m².     Physical examination  VITALS:     Vitals:    09/23/21 2115 09/24/21 0027 09/24/21 0545 09/24/21 0811   BP: 132/74 (!) 106/57 107/66 109/66   Pulse: 66 70 70 68   Resp: 16 20 20 18   Temp: 97.3 °F (36.3 °C) 97.9 °F (36.6 °C) 98.4 °F (36.9 °C) 97.8 °F (36.6 °C)   TempSrc: Oral Axillary Oral    SpO2: 96% 97% 97% 90%   Weight:    156 lb 1.4 oz (70.8 kg)     General Appearance: alert and cooperative with exam, appears comfortable, no distress  Mouth/Throat: Oral mucosa moist  Neck: No JVD  Lungs:  no use of accessory muscles  GI: soft, non-tender, no guarding  Extremities: No pitting LE edema      Lab Data  CBC:   Recent Labs     09/22/21  0600 09/22/21  0600 09/23/21  0527 09/23/21  1226 09/24/21  0442   WBC 19.6*  --  12.8*  --  9.1   HGB 9.5*   < > 8.0* 7.9* 8.2*   HCT 32.3*   < > 26.5* 25.8* 27.4*     --  145  --  165    < > = values in this interval not displayed. BMP:  Recent Labs     09/22/21  0600 09/23/21  0527 09/24/21  0442   * 134* 134*   K 5.3* 4.2 4.4   CL 87* 96* 96*   CO2 23 27 25   BUN 55* 27* 37*   CREATININE 7.9* 4.6* 6.6*   GLUCOSE 113* 87 75   CALCIUM 9.3 9.0 9.1     Hepatic:   Recent Labs     09/23/21  1419   BILITOT 0.5         Meds:  Infusion:    sodium chloride       Meds:    vancomycin  1,000 mg IntraVENous Once    [START ON 9/25/2021] azithromycin  250 mg Oral Daily    vancomycin (VANCOCIN) intermittent dosing (placeholder)   Other RX Placeholder    cefepime  1,000 mg IntraVENous Q24H    pregabalin  75 mg Oral TID    ALPRAZolam  0.5 mg Oral TID    benztropine  1 mg Oral Nightly    carbidopa-levodopa  1 tablet Oral Nightly    citalopram  20 mg Oral Daily    ipratropium-albuterol  3 mL Inhalation 4x Daily    sodium chloride flush  5-40 mL IntraVENous 2 times per day    [Held by provider] heparin (porcine)  5,000 Units SubCUTAneous Q8H    budesonide-formoterol  2 puff Inhalation BID    mirtazapine  7.5 mg Oral Nightly    QUEtiapine  25 mg Oral BID    famotidine  20 mg Oral Once per day on Mon Wed Fri    melatonin  6 mg Oral Nightly    midodrine  2.5 mg Oral TID AC    docusate sodium  100 mg Oral Daily    sevelamer  1,600 mg Oral TID      Meds prn: sodium chloride flush, sodium chloride, ondansetron **OR** ondansetron, polyethylene glycol, acetaminophen **OR** acetaminophen       Impression and Plan:  1. ESRD on hemodialysis  Seen on dialysis  Tolerating dialysis treatment well  Continue with ultrafiltration  Continue dialysis 3 times weekly  Electrolytes are stable    2. Hyponatremia. Resolved  3. Hyperkalemia corrected with dialysis  4. Acute febrile illness with significant leukocytosis. Chest x-ray shows right lung consolidation. On IV antibiotics  5. Anemia in ESRD.   We will add Retacrit    D/W patient   Emilia Alexandra MD  [facility]

## 2021-09-24 NOTE — PROGRESS NOTES
Discharge teaching and instructions for diagnosis/procedure of AMS completed with patient using teachback method. AVS reviewed. Printed prescriptions given to patient. Patient voiced understanding regarding prescriptions, follow up appointments, and care of self at home. Discharged via cart/stretcher to [facility] per EMS transportation.

## 2021-09-24 NOTE — PROGRESS NOTES
Pharmacy Vancomycin Consult     Vancomycin Day: 4  Current Dosing: HD intermittent dosing  Current indication: HAP    Temp max:  98.4    Recent Labs     09/23/21  0527 09/24/21  0442   BUN 27* 37*   CREATININE 4.6* 6.6*   WBC 12.8* 9.1       Intake/Output Summary (Last 24 hours) at 9/24/2021 0835  Last data filed at 9/23/2021 1833  Gross per 24 hour   Intake 600 ml   Output --   Net 600 ml       Cultures/Sensitivites  Date Source Result   9/21/21 PNA Panel --   9/23/21 BC x 2 NGTD   -- -- --   -- -- --   -- -- --   -- -- --         Ht Readings from Last 1 Encounters:   07/29/21 5' 3\" (1.6 m)        Wt Readings from Last 1 Encounters:   09/24/21 156 lb 1.4 oz (70.8 kg)       Body mass index is 27.65 kg/m². CrCl cannot be calculated (Unknown ideal weight. ). Assessment/Plan:  Due to unknown dialysis plans yesterday, no level ordered to draw this AM prior to dialysis. Patient already in dialysis session. Give vancomycin 1000mg x 1 dose following hemodialysis today. Will assess need to order level prior to dialysis on planned dialysis session on Monday based on hospital course and plans for discharge.

## 2021-09-24 NOTE — DISCHARGE SUMMARY
12.8.  Complete antibiotic regiment. 6.   Unlikely Cellulitis, right lower extremity        - Chronic, however worsening pain and pruitis.  Noticeable fluctuant, indurated and well demarcated skin lesion that is warm from tibiotalar joint to mid tibia.  Superficial skin excoriations.  Per nurse patient continues to scratch this. This marked to evaluate for progression on antibiotic therapy. - Ordered venous Doppler bilateral for concerns of DVT. Currently on Heparin . 9/22 Negative for DVTs. - 9/23 Further investigation revealed chronic issue managed at nursing home with hydrocortisone cream and banding. Currently no concern for infection. 7.   ESRD        - Receives hemodialysis Monday, Tuesday, Thursday, Friday.  Follows , nephrology outpatient.  Continue home Renvela 1600 mg po TID. Follow with nephrology outpatient.           8. COPD - GOLD stage IV        - Baseline 3L NC.  Continue home Symbicort 2 puff inhalation BID, Douneb 3 mL inhalation QID        - 9/23 weaned to baseline 3 L NC.  Continune to wean O2 as tolerated for SpO2 > 92%    9. History of HFpEF        - Last Echo 1/2021 EF 55%, mild to moderate dilated RV, mild mitral regurg, IVC dilated.        - Presented BNP 298820.  Per chart review baseline. 10.  Elevated troponin - likely demand ischemia        - Repeat Troponins in 0.100s, EKG nonsignificant.  No CP.  Will continue to monitor. 11.  Normocytic anemia - likely reactive        - 9/24 improved, Hgb 8.2 from previous 8.0 . No gross signs of GI bleed. Heparin held. Iron 46, iron saturation 30, TIBC 152, folate 5.4, vitamin B12 1504. This indicative of anemia of chronic disease. 12.  Hyponatremia - secondary to hemodialysis        - 9/24 stable post dialysis, Sodium 134 from prior 134.  Receiving hemodialysis. 13.  Hyperkalemia - resolved post dialysis        - 9/23 mildly elevated at 4.2 from previous 5. 3.  Receiving hemodialysis.      The patient was seen and examined on day of discharge and this discharge summary is in conjunction with any daily progress note from day of discharge. Hospital Course: Rosibel Ndiaye is a 48 y.o. female admitted to [address] [place] [address] on 9/20/2021 for Shortness of breath. The patient is a 78-year-old female with a past medical history of ESRD on HD (M,T,Th,F), COPD on 3L NC, HTN and history of HFpEF (EF 55% 1/2021) who presented to [facility] ED from nursing home 9/20 for altered mental status and fall.  Per report the patient experienced dizziness and shortness of breath after getting up from her wheelchair and fell. Brisa Pérez was unsure if she fell, but denies LOC and OCH pain. General Coop to the event the patient reports having productive cough that she described as \"brownish\", with associated fatigue for the past 2 to 3 days. Of note, the patient follows with Dr. Liane Borges, Nephrology and reports getting HD 9/19.     In the [facility] to self and febrile with temperature of 102. 6. Labs as follows: leukocytosis 22.7, ESR 65, CRP 26.27, pro-Felipe 2.89, ammonia 78.  BNP 41581, troponin elevated at 0.1s but stable. EKG nonsignifcant.  CXR revealed right lower lobe consolidation. Pneumonia panel collected.  Started on azithromycin and Rocephin for empiric antibiotic coverage.  She was started on 10 cc/kg fluids due to history of HFpEF.  This was later discontinued by nephrology.       9/21 The patient was back to baseline.  She was AAO x4 and weaned to baseline of 4L NC.  Records were requested from nursing home.  Due to history of MRSA Rocephin was discontinued and cefepime was started. 9/22 the patient continued to demonstrate intermittent episodes of fevers. She was complaining of some tenderness in her right lower extremity. There was a noticeable right lower extremity fluctuant, indurated skin lesion with superficial excoriations.  Per nurse the patient continues to scratch this.   Venous Doppler was negative for DVT. Per patient this is chronic and is treated with a hydrocortisone cream at nursing facility. 9/24 the patient is afebrile, leukocytosis resolved and back to baseline. She is hemodynamically stable vital signs WNL. The patient is medically stable for discharge. She will continue antibiotic regiment, repeat labs in one week and follow up with primary. Exam:     Vitals:  Vitals:    09/24/21 0811 09/24/21 1158 09/24/21 1345 09/24/21 1535   BP: 109/66 (!) 141/75 117/76 (!) 151/78   Pulse: 68 74 86 74   Resp: 18 18 18 19   Temp: 97.8 °F (36.6 °C) 98 °F (36.7 °C) 98.1 °F (36.7 °C) 98.2 °F (36.8 °C)   TempSrc:   Oral Oral   SpO2: 90%  94% 97%   Weight: 156 lb 1.4 oz (70.8 kg) 150 lb 9.2 oz (68.3 kg)       Weight: Weight: 150 lb 9.2 oz (68.3 kg)     24 hour intake/output:    Intake/Output Summary (Last 24 hours) at 9/24/2021 1752  Last data filed at 9/24/2021 1345  Gross per 24 hour   Intake 2190.02 ml   Output 2900 ml   Net -709.98 ml         General appearance:  No apparent distress, appears stated age and cooperative. HEENT: Pupils equal, round, and reactive to light. Conjunctivae/corneas clear. Neck: Supple, with full range of motion. Trachea midline. Respiratory:  Normal respiratory effort. Clear to auscultation, bilaterally without Rales/Wheezes/Rhonchi. Cardiovascular: Regular rate and rhythm with normal S1/S2 without murmurs, rubs or gallops. Abdomen: Soft, non-tender, non-distended with normal bowel sounds. Musculoskeletal: passive and active ROM x 4 extremities. Skin: Chronic right sided fluctuant, indurated with skin excoriations extending from tibiotalar joint to mid-tibia  Neurologic:  Neurovascularly intact without any focal sensory/motor deficits. Cranial nerves: II-XII intact, grossly non-focal.  Psychiatric: Alert and oriented, thought content appropriate, normal insight  Capillary Refill: Brisk,< 3 seconds   Peripheral Pulses: +2 palpable, equal bilaterally       Labs:  For convenience and continuity at follow-up the following most recent labs are provided:      CBC:    Lab Results   Component Value Date    WBC 9.1 09/24/2021    HGB 8.2 09/24/2021    HCT 27.4 09/24/2021     09/24/2021       Renal:    Lab Results   Component Value Date     09/24/2021    K 4.4 09/24/2021    K 3.9 03/24/2021    CL 96 09/24/2021    CO2 25 09/24/2021    BUN 37 09/24/2021    CREATININE 6.6 09/24/2021    CALCIUM 9.1 09/24/2021    PHOS 6.8 07/06/2021         Significant Diagnostic Studies    Radiology:   VL DUP LOWER EXTREMITY VENOUS BILATERAL   Final Result      No evidence of deep venous thrombosis in either lower extremity. **This report has been created using voice recognition software. It may contain minor errors which are inherent in voice recognition technology. **             Final report electronically signed by Dr. Neda Chamorro on 9/22/2021 6:00 PM      CT HEAD WO CONTRAST   Final Result   No acute intracranial findings. This document has been electronically signed by: Ricardo Robins MD on    09/20/2021 11:37 PM      All CTs at this facility use dose modulation techniques and iterative    reconstructions, and/or weight-based dosing   when appropriate to reduce radiation to a low as reasonably achievable. XR CHEST PORTABLE   Final Result   Right lower lung consolidation. Left lower lung airspace disease. This document has been electronically signed by: Ricardo Robins MD on    09/20/2021 10:35 PM             Consults:     IP CONSULT TO NEPHROLOGY  IP CONSULT TO SOCIAL WORK  IP CONSULT TO SOCIAL WORK  PHARMACY TO DOSE VANCOMYCIN    Disposition: SNF  Condition at Discharge: Stable    Code Status:  Full Code     Patient Instructions:    Discharge lab work: CBC, BMP qWeek. Follow up with primary  Activity: activity as tolerated  Diet: ADULT DIET;  Dysphagia - Soft and Bite Sized      Follow-up visits:    [facility] [address] [facility]  [address]  [id number]  [phone]    [facility] physician to follow    MD Marilyn Pat   [address]  [address] [place] [address]    Go on 10/11/2021  Nephrology, Your appointment time is @ 1:00 PM, arrive 15 minutes early, please bring photo ID and medications         Discharge Medications:      Cierra Galeas5 [facility] Medication Instructions WYP:[id number]    Printed on:09/24/21 4032   Medication Information                      acetaminophen (TYLENOL) 325 MG tablet  Take by mouth every 6 hours as needed for Pain 2 tab             ALPRAZolam (XANAX) 0.5 MG tablet  Take 0.5 mg by mouth 3 times daily. AMINO ACIDS-PROTEIN HYDROLYS PO  Take 30 mLs by mouth 2 times daily May add water to med for ease of administration             azithromycin (ZITHROMAX) 250 MG tablet  Take 1 tablet by mouth daily for 4 days             benztropine (COGENTIN) 1 MG tablet  Take 1 mg by mouth nightly             carbidopa-levodopa (SINEMET)  MG per tablet  Take 1 tablet by mouth nightly              citalopram (CELEXA) 20 MG tablet  Take 20 mg by mouth daily             diphenhydrAMINE (BENADRYL) 25 MG tablet  Take 25 mg by mouth every 6 hours as needed for Itching or Allergies             docusate sodium (COLACE) 100 MG capsule  Take 100 mg by mouth daily             famotidine (PEPCID) 20 MG tablet  Take 20 mg by mouth three times a week Mon, Wed, Fri             fluticasone-salmeterol (ADVAIR HFA) 230-21 MCG/ACT inhaler  Inhale 2 puffs into the lungs 2 times daily             HYDROcodone-acetaminophen (NORCO) 5-325 MG per tablet  Take 1 tablet by mouth every 6 hours as needed for Pain.             hydrocortisone 2.5 % cream  Apply topically 2 times daily Apply topically 2 times daily.              hydrOXYzine (ATARAX) 50 MG tablet  Take 100 mg by mouth every 6 hours as needed for Itching 2 tab =100mg             ipratropium-albuterol (DUONEB) 0.5-2.5 (3) MG/3ML SOLN nebulizer solution  Inhale 3 mLs into the lungs 4 times daily And every 4 hours as needed             levoFLOXacin (LEVAQUIN) 250 MG tablet  Take 1 tablet by mouth daily for 4 days             MELATONIN PO  Take 6 mg by mouth nightly             midodrine (PROAMATINE) 2.5 MG tablet  Take 1 tablet by mouth 3 times daily (before meals)             mirtazapine (REMERON) 15 MG tablet  Take 7.5 mg by mouth nightly             Multiple Vitamins-Minerals (THERAPEUTIC MULTIVITAMIN-MINERALS) tablet  Take 1 tablet by mouth daily              OXYGEN  Inhale 3 L into the lungs May titrate to keep sat above 90%             polyethylene glycol (GLYCOLAX) 17 g packet  17 g by Per G Tube route daily             pregabalin (LYRICA) 25 MG capsule  Take 25 mg by mouth 3 times daily. QUEtiapine (SEROQUEL) 25 MG tablet  Take 25 mg by mouth 2 times daily             senna (SENOKOT) 8.6 MG tablet  Take 1 tablet by mouth nightly              sevelamer (RENVELA) 800 MG tablet  Take 2 tablets by mouth 3 times daily (with meals)              sodium zirconium cyclosilicate (LOKELMA) 10 g PACK oral suspension  Take 10 g by mouth twice a week             Vitamin E 100 UNIT/GM CREA  Apply topically 2 times daily Right leg and face                 Time Spent on discharge is more than 45 minutes in the examination, evaluation, counseling and review of medications and discharge plan. Discharge Medications for PCI/MI (performed or attempted):           Signed: Thank you No primary care provider on file. for the opportunity to be involved in this patient's care.     Electronically signed by Duwayne Merlin, DO on 9/24/2021 at 5:52 PM

## 2021-09-24 NOTE — FLOWSHEET NOTE
09/24/21 0811 09/24/21 1158   Vital Signs   /66 (!) 141/75   Temp 97.8 °F (36.6 °C) 98 °F (36.7 °C)   Pulse 68 74   Resp 18 18   SpO2 90 %  --    Weight 156 lb 1.4 oz (70.8 kg) 150 lb 9.2 oz (68.3 kg)   Weight Method Bed scale Bed scale   Percent Weight Change 3.21 -3.53   Post-Hemodialysis Assessment   Post-Treatment Procedures  --  Blood returned; Access bleeding time < 10 minutes   Machine Disinfection Process  --  Acid/Vinegar Clean;Heat Disinfect; Exterior Machine Disinfection   Rinseback Volume (ml)  --  400 ml   Total Liters Processed (l/min)  --  89.4 l/min   Dialyzer Clearance  --  Lightly streaked   Duration of Treatment (minutes)  --  210 minutes   Heparin amount administered during treatment (units)  --  0 units   Hemodialysis Intake (ml)  --  400 ml   Hemodialysis Output (ml)  --  2900 ml   NET Removed (ml)  --  2500 ml   Tolerated Treatment  --  Good   stable 3.5 hour treatment. 2.5 liters net uf.

## 2021-09-24 NOTE — PROGRESS NOTES
Pharmacy Intravenous to Oral Protocol  Medication changed per P&T protocol: Zithromax    Patient meets criteria based on the followin. IV therapy > 24 hours - yes  2. Nausea/vomiting - no  3. Regular diet - yes  4. Tolerating other oral medications: yes  5. Afebrile for 24 hours: yes  6.  WBC trending downward: yes    Changed Zitrhomax 250mg IV q24h to Zitrhomax 250mg PO q24h      Manuel Hines, ErenD

## 2021-09-24 NOTE — CARE COORDINATION
9/24/21, 9:01 AM EDT    Patient goals/plan/ treatment preferences discussed by  and . Patient goals/plan/ treatment preferences reviewed with patient/ family. Patient/ family verbalize understanding of discharge plan and are in agreement with goal/plan/treatment preferences. Understanding was demonstrated using the teach back method. AVS provided by RN at time of discharge, which includes all necessary medical information pertaining to the patients current course of illness, treatment, post-discharge goals of care, and treatment preferences. Services After Discharge  Services At/After Discharge: Aide Services, Nursing Services, [place], PT, Housekeeping, In ambulance [place])   IMM Letter  IMM Letter date given[de-identified] 09/21/21       Patient to discharge back to [facility] today or over the weekend. Patient is aware and agreeable to discharge plan. SW called [facility] and spoke with Patricio Lindsay and notified her of potential discharge today. RN made aware and discharge instructions and transport forms placed on chart.

## 2021-09-24 NOTE — PROGRESS NOTES
[address] [facility] THERAPY  STRZ ICU STEPDOWN TELEMETRY 4K  DAILY NOTE    TIME   SLP Individual Minutes  Time In: 1448  Time Out: 1524  Minutes: 21  Timed Code Treatment Minutes: 10 Minutes        Dysphagia tx: 11 minutes   Cog tx: 10 minutes     Date: 2021  Patient Name: Lena Conrad      CSN: [id number]   : 1965  (48 y.o.)  Gender: female   Referring Physician:  Nayely Benson MD  Diagnosis: Altered mental status  Secondary Diagnosis: dysphagia and cognitive impairment  Precautions: aspiration/fall risk  Current Diet: Soft and Bite-Size with Thin Liquids   Swallowing Strategies: Full Upright Position, Small Bite/Sip, Pulmonary Monitoring, Oral Care after all Meals and Alternate Solids and Liquids    Date of Last MBS/FEES: Not Applicable    Pain:   - Pain location: R Leg - RN aware following session     Subjective:  RN Venice Bernheim approved completion of skilled dysphagia and cognitive tx this date. Upon arrival to room, patient awake in bed. Patient agreeable to complete session this date. Pleasant and cooperative throughout; however, noted confusion throughout. Short-Term Goals:  SHORT TERM GOAL #1:  Goal 1: Patient will safely consume a soft and bite sized diet with no overt s/s of aspiration to contribute to nutrition/hydration. INTERVENTIONS: ST completed po trials of hard/coarse texture and thin liquids via cup this date. With solids, patient demonstrated evidence of adequate bolus control/manipulation, mildly extended/uncoordinated mastication, adequate bolus formation, complete bolus clearance, and no overt s/s of aspiration. With thin liquids via cup sip, patient demonstrated evidence of adequate labial seal, suspected adequate bolus control/containment, suspected mildly delayed swallow initiation evident by intermittent audible swallow, and no overt s/s of aspiration with 5/5 trials completed this date.  It is recommended patient continue with a soft and bite-size diet with thin liquids at this time. SHORT TERM GOAL #2:  Goal 2: Patient will complete advanced PO trials (hard solid) to determine readiness for dietary upgrade. INTERVENTIONS: Not appropriate at this time. Patient reporting she typically wears dentures that are not present at the hospital at this time. SHORT TERM GOAL #3:  Goal 3: Patient will complete oral care after meals to minimize and assist with bacterial colonization. INTERVENTIONS: Did not address this date d/t focus on other goals. SHORT TERM GOAL #4:  Goal 4: Patient will complete basic problem solving, reasoning and naming tasks with 60% accuracy mod verbal cues to improve overall cognitive functioning  INTERVENTIONS:   Call Light  - patient independently identified use for call light  - patient required mod verbal cues to identify 3/3 reasons to use call light this date. *fair insight to need for call light; however, suspect poor carryover/implementation of call light for assistance    SHORT TERM GOAL #5:  Goal 5: Patient will complete basic orientation,  thought organization, sequencing, and executive functioning tasks with 60% accuracy mod verbal cues to return to OF  INTERVENTIONS:  [address]- mod cues - provided multiple choice cue in a FO2  Month - independent  ELIECER - min cue to utilize calendar  Date - min cue to utilize calendar  Time - independent with use of clock   *improved temporal/spatial awareness this date    Comprehension of Monthly Calendar   Patient located target information 2/5 trials given mod cues and 3/5 trials given max cues. *poor visual scanning  *poor selective/sustained attention to task   *decreased basic math computation     SHORT TERM GOAL #6:  Goal 6: Patient will complete sustained, divided and alternating attention tasks with no more than 3 errors in 5 minutes in order to actively participate in ADL/IADL task completion.   INTERVENTIONS: See goal 5 for further details. Long-Term Goals:  No long term goals recommended d/t short ELOS        EDUCATION:  Learner: Patient  Education:  Reviewed diet and strategies, Reviewed ST goals and Plan of Care, Education Related to Potential Risks and Complications Due to Impairment/Illness/Injury and Education Related to [employer] and Wellness  Evaluation of Education: Verbalizes understanding, Demonstrates with assistance, Needs further instruction and Family not present    ASSESSMENT/PLAN:  Activity Tolerance:  Patient tolerance of  treatment: good. Adequate engagement      Assessment/Plan: Patient progressing toward established goals. Continues to require skilled care of licensed speech pathologist to progress toward achievement of established goals and plan of care. .     Plan for Next Session: Cog tx and Advanced PO trials      [facility]) [address] Borg[address] M[address]

## 2021-09-24 NOTE — PROGRESS NOTES
[address] THERAPY MISSED TREATMENT NOTE  STR[facility] ICU STEPDOWN TELEMETRY 4K  [id number]      Date: 2021  Patient Name: March Lanes        CSN: [id number]   : 1965  (48 y.o.)  Gender: female   Referring Practitioner: Jarod Jackson DO  Diagnosis: AMS         REASON FOR MISSED TREATMENT: Patient Off Floor for Dialysis.  Will check back as able

## 2021-09-29 NOTE — TELEPHONE ENCOUNTER
Patient was scheduled for a new pt appt with Dr. Vignesh Dahl on 09/23/2021 that was cancelled d/t the patient being hospitalized. Ashwin Bingham is calling back to r/s the appt. Dr. Vignesh Dahl has no availability until January that I am able to schedule. Please advise  Ashwin Bingham will be there today 09/29, if another day, please ask for patient's nurse.   # [phone]

## 2021-09-30 NOTE — TELEPHONE ENCOUNTER
I just spoke with [facility] Dialysis nurse and patient had dialysis treatment today. [facility] will check another potassium level tomorrow. Please ask [facility] to change lokelma 10 grams on Tuesdays, Thursday, Saturday and Sundays.

## 2021-09-30 NOTE — TELEPHONE ENCOUNTER
Spoke to Palmer Arriaga and she will start giving pt Lokelma as directed. Understood that the dialysis nurse will repeat potassium 10/1.

## 2021-09-30 NOTE — TELEPHONE ENCOUNTER
Quyen from [facility] phoned - pts potassaium today is 6.3 and she is doing lokelma on mondays and fridays - please advise Yesi Leon [phone]

## 2021-10-05 NOTE — TELEPHONE ENCOUNTER
Spoke to Cristal Cervantes and she understood the [facility] change and repeat potassium tomorrow.  She is putting a one time dose order for today in per Dr. Chi Hernández.

## 2021-10-05 NOTE — TELEPHONE ENCOUNTER
Verbal order per Dr. Patterson Comfort 10 g every day and then repeat potassium tomorrow. Plus a one time dose for today.

## 2021-10-05 NOTE — TELEPHONE ENCOUNTER
Thank you  I also reviewed medication list with NH and advised NH to ensure she is on low K diet  NH will recheck K and fax us the results.

## 2021-10-07 NOTE — TELEPHONE ENCOUNTER
----- Message from Tarun Benz MD sent at 10/7/2021 12:13 PM EDT -----  Please call Cristal CARRILLO and increase lokelma 10 grams daily

## 2021-10-07 NOTE — TELEPHONE ENCOUNTER
----- Message from Xena Moralez MD sent at 10/7/2021 12:13 PM EDT -----  Please call Cristal [facility] and increase lokelma 10 grams daily

## 2021-10-12 PROBLEM — J34.89 LESION OF NOSTRIL: Status: ACTIVE | Noted: 2021-01-01

## 2021-10-12 PROBLEM — J95.04 TRACHEOCUTANEOUS FISTULA FOLLOWING TRACHEOSTOMY (HCC): Status: ACTIVE | Noted: 2021-01-01
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
## 2021-10-27 NOTE — OP NOTE
Department of Radiology  Post Procedure Progress Note      Pre-Procedure Diagnosis: Malfunctioning dialysis fistula/graft     Procedure Performed:  Dialysis fistulagram with angioplasty     Anesthesia: local / versed and dilaudid    Findings: successful    Immediate Complications:  None    Estimated Blood Loss: minimal    SEE DICTATED PROCEDURE NOTE FOR COMPLETE DETAILS.     Electronically signed by Halley Brink MD on 10/27/2021 at 9:36 AM

## 2021-10-27 NOTE — PROGRESS NOTES
Patient being discharged in stable condition. Written and verbal instructions given to patient, caregiver and called to patient ECF.  they voice no questions or concerns.

## 2021-10-27 NOTE — H&P
[address] [address]  Sedation/Analgesia History & Physical    Pt Name: Mark Blevins  MRN: [id number]  YOB: 1965  Provider Performing Procedure: Mahsa Garcia MD, MD  Primary Care Physician: No primary care provider on file. PRE-PROCEDURE   DNR-CCA/DNR-CC []Yes [x]No  Brief History/Pre-Procedure Diagnosis: Renal failure, malfunctioning fistula          MEDICAL HISTORY  []CAD/Valve  []Liver Disease  [x]Lung Disease []Diabetes  [x]Hypertension [x]Renal Disease  []Additional information:       has a past medical history of Anxiety disorder, Asthma, Chronic kidney disease, Chronic respiratory failure with hypoxia ([facility] [address] COPD (chronic obstructive pulmonary disease) ([facility] [address] Elevated troponin, Hemodialysis patient ([facility] [address] Hypertension, and Smoker. SURGICAL HISTORY   has a past surgical history that includes  section; other surgical history; Lithotripsy; cystourethroscopy (2003,2003); Upper gastrointestinal endoscopy (Left, 2019); tracheostomy (N/A, 2020); Gastrostomy tube placement (N/A, 2020); and laryngoscopy (Bilateral, 2021). Additional information:       ALLERGIES   Allergies as of 10/27/2021    (No Known Allergies)     Additional information:       MEDICATIONS   Coumadin Use Last 5 Days [x]No []Yes  Antiplatelet drug therapy use last 5 days  [x]No []Yes  Other anticoagulant use last 5 days  [x]No []Yes    Current Outpatient Medications:     sodium chloride, Inhalant, 3 % nebulizer solution, Take 4 mLs by nebulization 2 times daily at 0800 and 1400, Disp: 240 mL, Rfl: 5    pregabalin (LYRICA) 25 MG capsule, Take 25 mg by mouth 2 times daily.  , Disp: , Rfl:     sodium zirconium cyclosilicate (LOKELMA) 10 g PACK oral suspension, Take 10 g by mouth twice a week (Patient taking differently: Take 10 g by mouth twice a week Mon, Fri), Disp: 10 packet, Rfl: 0    midodrine (PROAMATINE) 2.5 MG tablet, Take 1 tablet by mouth 3 times daily (before meals) (Patient taking differently: Take 5 mg by mouth 3 times daily ), Disp: 90 tablet, Rfl: 3    citalopram (CELEXA) 20 MG tablet, Take 20 mg by mouth daily, Disp: , Rfl:     famotidine (PEPCID) 20 MG tablet, Take 20 mg by mouth three times a week Mon, Wed, Fri, Disp: , Rfl:     mirtazapine (REMERON) 15 MG tablet, Take 7.5 mg by mouth nightly, Disp: , Rfl:     OXYGEN, Inhale 3 L into the lungs May titrate to keep sat above 90%, Disp: , Rfl:     benztropine (COGENTIN) 1 MG tablet, Take 1 mg by mouth nightly, Disp: , Rfl:     docusate sodium (COLACE) 100 MG capsule, Take 100 mg by mouth daily, Disp: , Rfl:     MELATONIN PO, Take 6 mg by mouth nightly, Disp: , Rfl:     HYDROcodone-acetaminophen (NORCO) 5-325 MG per tablet, Take 1 tablet by mouth every 6 hours as needed for Pain., Disp: , Rfl:     QUEtiapine (SEROQUEL) 25 MG tablet, Take 25 mg by mouth 2 times daily, Disp: , Rfl:     Vitamin E 100 UNIT/GM CREA, Apply topically 2 times daily Right leg and face, Disp: , Rfl:     acetaminophen (TYLENOL) 325 MG tablet, Take by mouth every 6 hours as needed for Pain 2 tab, Disp: , Rfl:     hydrocortisone 2.5 % cream, Apply topically 2 times daily Apply topically 2 times daily. , Disp: , Rfl:     hydrOXYzine (ATARAX) 50 MG tablet, Take 100 mg by mouth every 6 hours as needed for Itching 2 tab =100mg, Disp: , Rfl:     sevelamer (RENVELA) 800 MG tablet, Take 2 tablets by mouth 3 times daily (with meals) , Disp: , Rfl:     carbidopa-levodopa (SINEMET)  MG per tablet, Take 1 tablet by mouth nightly , Disp: , Rfl:     fluticasone-salmeterol (ADVAIR HFA) 230-21 MCG/ACT inhaler, Inhale 2 puffs into the lungs 2 times daily, Disp: 1 Inhaler, Rfl: 3    ipratropium-albuterol (DUONEB) 0.5-2.5 (3) MG/3ML SOLN nebulizer solution, Inhale 3 mLs into the lungs 4 times daily And every 4 hours as needed, Disp: , Rfl:     AMINO ACIDS-PROTEIN HYDROLYS PO, Take 30 mLs by mouth 2 times daily May add water to med for ease of administration, Disp: , Rfl:     polyethylene glycol (GLYCOLAX) 17 g packet, 17 g by Per G Tube route daily, Disp: , Rfl:     Multiple Vitamins-Minerals (THERAPEUTIC MULTIVITAMIN-MINERALS) tablet, Take 1 tablet by mouth daily , Disp: , Rfl:     diphenhydrAMINE (BENADRYL) 25 MG tablet, Take 25 mg by mouth every 6 hours as needed for Itching or Allergies, Disp: , Rfl:     senna (SENOKOT) 8.6 MG tablet, Take 1 tablet by mouth nightly , Disp: , Rfl:     ALPRAZolam (XANAX) 0.5 MG tablet, Take 0.5 mg by mouth 3 times daily. , Disp: , Rfl:     Current Facility-Administered Medications:     0.45 % sodium chloride infusion, , IntraVENous, Continuous, Jonathon Hennessy MD, Last Rate: 20 mL/hr at 10/27/21 0705, New Bag at 10/27/21 0705  Prior to Admission medications    Medication Sig Start Date End Date Taking? Authorizing Provider   sodium chloride, Inhalant, 3 % nebulizer solution Take 4 mLs by nebulization 2 times daily at 0800 and 1400 10/12/21 11/11/21 Yes Charlotte Morales MD   pregabalin (LYRICA) 25 MG capsule Take 25 mg by mouth 2 times daily.   9/19/21 10/27/21 Yes Historical Provider, MD   sodium zirconium cyclosilicate (LOKELMA) 10 g PACK oral suspension Take 10 g by mouth twice a week  Patient taking differently: Take 10 g by mouth twice a week Mon, Fri 8/2/21  Yes NARINDER Rodas CNP   midodrine (PROAMATINE) 2.5 MG tablet Take 1 tablet by mouth 3 times daily (before meals)  Patient taking differently: Take 5 mg by mouth 3 times daily  8/1/21  Yes NARINDER Rodas CNP   citalopram (CELEXA) 20 MG tablet Take 20 mg by mouth daily   Yes Historical Provider, MD   famotidine (PEPCID) 20 MG tablet Take 20 mg by mouth three times a week Mon, Wed, Fri   Yes Historical Provider, MD   mirtazapine (REMERON) 15 MG tablet Take 7.5 mg by mouth nightly   Yes Historical Provider, MD   OXYGEN Inhale 3 L into the lungs May titrate to keep sat above 90%   Yes Historical Provider, MD   benztropine (COGENTIN) 1 MG tablet Take 1 mg by mouth nightly   Yes Historical Provider, MD   docusate sodium (COLACE) 100 MG capsule Take 100 mg by mouth daily   Yes Historical Provider, MD   MELATONIN PO Take 6 mg by mouth nightly   Yes Historical Provider, MD   HYDROcodone-acetaminophen (NORCO) 5-325 MG per tablet Take 1 tablet by mouth every 6 hours as needed for Pain. Yes Historical Provider, MD   QUEtiapine (SEROQUEL) 25 MG tablet Take 25 mg by mouth 2 times daily   Yes Historical Provider, MD   Vitamin E 100 UNIT/GM CREA Apply topically 2 times daily Right leg and face   Yes Historical Provider, MD   acetaminophen (TYLENOL) 325 MG tablet Take by mouth every 6 hours as needed for Pain 2 tab   Yes Historical Provider, MD   hydrocortisone 2.5 % cream Apply topically 2 times daily Apply topically 2 times daily.    Yes Historical Provider, MD   hydrOXYzine (ATARAX) 50 MG tablet Take 100 mg by mouth every 6 hours as needed for Itching 2 tab =100mg   Yes Historical Provider, MD   sevelamer (RENVELA) 800 MG tablet Take 2 tablets by mouth 3 times daily (with meals)    Yes Historical Provider, MD   carbidopa-levodopa (SINEMET)  MG per tablet Take 1 tablet by mouth nightly    Yes Historical Provider, MD   fluticasone-salmeterol (ADVAIR HFA) 230-21 MCG/ACT inhaler Inhale 2 puffs into the lungs 2 times daily 3/24/21  Yes Coral Kerr,    ipratropium-albuterol (DUONEB) 0.5-2.5 (3) MG/3ML SOLN nebulizer solution Inhale 3 mLs into the lungs 4 times daily And every 4 hours as needed   Yes Historical Provider, MD   AMINO ACIDS-PROTEIN HYDROLYS PO Take 30 mLs by mouth 2 times daily May add water to med for ease of administration   Yes Historical Provider, MD   polyethylene glycol (GLYCOLAX) 17 g packet 17 g by Per G Tube route daily   Yes Historical Provider, MD   Multiple Vitamins-Minerals (THERAPEUTIC MULTIVITAMIN-MINERALS) tablet Take 1 tablet by mouth daily    Yes Historical Provider, MD   diphenhydrAMINE (BENADRYL) 25 MG tablet Take 25 mg by mouth every 6 hours as needed for Itching or Allergies    Historical Provider, MD   senna (SENOKOT) 8.6 MG tablet Take 1 tablet by mouth nightly     Historical Provider, MD   ALPRAZolam (XANAX) 0.5 MG tablet Take 0.5 mg by mouth 3 times daily. Historical Provider, MD     Additional information:       VITAL SIGNS   Vitals:    10/27/21 0930   BP: 113/78   Pulse: 64   Resp: 11   Temp:    SpO2: 94%       PHYSICAL:   Heart:  [x]Regular rate and rhythm  []Other:    Lungs:  [x]Clear    []Other:    Abdomen: [x]Soft    []Other:    Mental Status: [x]Alert & Oriented  []Other:      PLANNED PROCEDURE   []Biospy []Arteriogram              []Drainage   []Mediport Insertion  [x]Fistulogram []IV access       []Vertebroplasty / Augmentation  []IVC filter []Dialysis catheter []Biliary drainage  []Other: []CAPD Catheter []Nephrostomy Tube / Stent  SEDATION  Planned agent:[x]Midazolam []Meperidine []Sublimaze [x]Dilaudid []Morphine     []Diazepam  []Other:     ASA Classification:  []1 [x]2 []3 []4 []5  Class 1: A normal healthy patient  Class 2: Pt with mild to moderate systemic disease  Class 3: Severe systemic disease or disturbance  Class 4: Severe systemic disorders that are already life threatening. Class 5: Moribund pt with little chances of survival, for more than 24 hours. Mallampati I Airway Classification:   []1 [x]2 []3 []4    [x]Pre-procedure diagnostic studies complete and results available. Comment:    [x]Previous sedation/anesthesia experiences assessed. Comment:    [x]The patient is an appropriate candidate to undergo the planned procedure sedation and anesthesia. (Refer to nursing sedation/analgesia documentation record)  [x]Formulation and discussion of sedation/procedure plan, risks, and expectations with patient and/or responsible adult completed. [x]Patient examined immediately prior to the procedure.  (Refer to nursing sedation/analgesia documentation record)    Brett Garibay MD,

## 2021-10-27 NOTE — PROGRESS NOTES
3290 Patient received in IR for procedure. Necklace in denture cup.   9734 This procedure has been fully reviewed with the patient and written informed consent has been obtained. 2048 Procedure started with Dr. Yony Comer. 6F sheath. 1011 Angioplasty of arterial anastomosis of the AV fistula with Maricarmen 4x40.   [id number] 6F sheath accessed. 8792 Procedure completed; patient tolerated well. Split nots deployed x2. OP-sites applied. Quick clot to upper site and op-site applied. 5739 Patient on cart; comfort ensured. Dressing remains dry and intact with area soft. 4850 Report called to Toby Mason on OPN.  1562 Patient taken to OPN via cart. Mary Jo Dahl

## 2021-10-27 NOTE — H&P
Formulation and discussion of sedation / procedure plans, risks, benefits, side effects and alternatives with patient and/or responsible adult completed.     Electronically signed by Lois Arevalo MD on 10/27/2021 at 9:35 AM
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
## 2021-11-02 NOTE — TELEPHONE ENCOUNTER
Quyen called from [employer] she said she is sending someone here to  samples of Lokelma for Kavitha Rodríguezponce. she said a potassium level was done today. Kavithadavid Rodríguezponce is taking Lokelma 10 g qd. I let her know we need orders from Dr. Dinesh Uriostegui before we give out samples. She said okay.
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
## 2021-11-11 NOTE — PROGRESS NOTES
Attempted PAT call at the [facility] but nurse unavailable to come to the phone. Will try again later.

## 2021-11-17 PROBLEM — T30.0 FULL THICKNESS BURN: Status: ACTIVE | Noted: 2021-01-01

## 2021-11-17 NOTE — PROGRESS NOTES
Pharmacy Renal Adjustment    Luna Carreon is a 55 y.o. female. Pharmacy renally adjust the following medications per P&T approved policy: Zosyn    No results for input(s): BUN, CREATININE in the last 72 hours. Estimated Creatinine Clearance: 6 mL/min (based on SCr of 9.35 mg/dL). Height:   Ht Readings from Last 1 Encounters:   11/17/21 5' 3\" (1.6 m)     Weight:  Wt Readings from Last 1 Encounters:   11/17/21 152 lb 3.2 oz (69 kg)       Plan: Adjustments based on renal function:          Decrease Zosyn to 3.375g extended infusion q12h. Continue to follow for renal replacement plans.      Bakari Valdez, PharmD  6:40 PM  11/17/2021
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
## 2021-11-17 NOTE — PROGRESS NOTES
Oriented to sds       19  Refuses flu and pneumonia vaccine. Family updated to stay in room. Informed family to take belonging with them if they leave. Keep nothing of value in room unattended. pt was asked and agreed to first name and last initial being put on white boards. fall risks applied. SCD Applied to patient. Warming blanket applied to patient. Pt denies any abuse or thoughts of suicide. Spoke to Dr Kruse Fearing got order for benadryl.   When dr Neelima Mills came in he gave order of benadryl 25 mg now
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
## 2021-11-18 NOTE — CARE COORDINATION
11/18/21, 3:14 PM EST    DISCHARGE PLANNING EVALUATION    Patient is from [facility]. She states that she will be returning there when discharged which she thought was this evening. Call to KARYNA Koch at facility ([facility])-she advised that they were anticipating patient return this evening. Obtained number for report and to fax orders. She is to be notified when discharge is known so that she can attempt to arrange transport. If not, it will need to be arranged through [address] [facility]. KARYNA Ray updated and directions provided. No other needs at this time. 11/18/21, 3:18 PM EST    Patient goals/plan/ treatment preferences discussed by  and . Patient goals/plan/ treatment preferences reviewed with patient/ family. Patient/ family verbalize understanding of discharge plan and are in agreement with goal/plan/treatment preferences. Understanding was demonstrated using the teach back method. AVS provided by RN at time of discharge, which includes all necessary medical information pertaining to the patients current course of illness, treatment, post-discharge goals of care, and treatment preferences.

## 2021-11-18 NOTE — FLOWSHEET NOTE
11/18/21 0830 11/18/21 1246   Vital Signs   /73 119/80   Temp 97.9 °F (36.6 °C) 97.6 °F (36.4 °C)   Pulse 61 71   Resp 18 18   SpO2 94 %  --    Weight 147 lb 11.3 oz (67 kg) 144 lb 6.4 oz (65.5 kg)   Weight Method Bed scale Bed scale   Percent Weight Change -2.95 -2.24   Post-Hemodialysis Assessment   Post-Treatment Procedures  --  Blood returned; Access bleeding time < 10 minutes   Machine Disinfection Process  --  Acid/Vinegar Clean; Heat Disinfect; Exterior Machine Disinfection   Rinseback Volume (ml)  --  400 ml   Total Liters Processed (l/min)  --  71.6 l/min   Dialyzer Clearance  --  Lightly streaked   Duration of Treatment (minutes)  --  240 minutes   Heparin amount administered during treatment (units)  --  0 units   Hemodialysis Intake (ml)  --  400 ml   Hemodialysis Output (ml)  --  1800 ml   NET Removed (ml)  --  1400 ml   Tolerated Treatment  --  Good   stable 4 hour treatment. 1.4 liters net uf. albumin given during treatment. report called to primary nurse. treatment record printed to be scanned into emr.
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
## 2021-11-18 NOTE — PROGRESS NOTES
Patient educated on importance of early ambulation, use of incentive spirometer, and daily chg bath post op to prevent complications. Patient verbalized understanding. Chg bath completed at this time. Patient states she is too tired to ambulate or use incentive spirometer at this time.

## 2021-11-18 NOTE — FLOWSHEET NOTE
11/18/21 1533   Provider Notification   Reason for Communication Review case   Provider Name Dr. Wayne Mcmahon   Provider Notification Physician   Method of Communication Secure Message   Response Waiting for response   Notification Time [phone]     Notified Potassium recheck after Dialysis is pending, asked if he was okay with D/C.
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
## 2021-11-18 NOTE — PROGRESS NOTES
Renal Adjustment Follow-up    Recent Labs     11/18/21  0508   BUN 37*   CREATININE 7.9*     Estimated Creatinine Clearance: 7 mL/min (A) (based on SCr of 7.9 mg/dL [facility])).   Patient is HD MWF    Plan: Change Zosyn to 2.25 grams IVPB Q8H traditional infusion    Mike Blanco, ErenD, BCPS  11/18/2021  7:51 AM
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
## 2021-11-19 NOTE — PROGRESS NOTES
[facility] arrived to  patient. Papers were given to patient during day shift. No further questions from patient. Called [facility] to notify them of patient's ETA. Patient left with all belongings.

## 2021-11-19 NOTE — PROGRESS NOTES
Called [facility] at this time to give report. Spoke with nurse, Kingsley Broderick . All questions answered at this time.

## 2021-11-23 PROBLEM — T14.8XXA BLISTERING OF SKIN: Status: ACTIVE | Noted: 2021-01-01
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
## 2021-11-30 PROBLEM — T20.30XA: Status: ACTIVE | Noted: 2021-01-01
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
## 2021-12-01 NOTE — TELEPHONE ENCOUNTER
[facility] asking if patient can be started on elavil 10 mg daily it is prescribed by pain management.

## 2021-12-25 PROBLEM — E87.79 OTHER FLUID OVERLOAD: Status: ACTIVE | Noted: 2020-01-28

## 2021-12-25 PROBLEM — Z99.2 ACUTE RENAL FAILURE SUPERIMPOSED ON CHRONIC KIDNEY DISEASE, ON CHRONIC DIALYSIS (HCC): Status: ACTIVE | Noted: 2021-01-01

## 2021-12-25 PROBLEM — N18.9 ACUTE RENAL FAILURE SUPERIMPOSED ON CHRONIC KIDNEY DISEASE, ON CHRONIC DIALYSIS (HCC): Status: ACTIVE | Noted: 2021-01-01

## 2021-12-25 PROBLEM — N17.9 ACUTE RENAL FAILURE SUPERIMPOSED ON CHRONIC KIDNEY DISEASE, ON CHRONIC DIALYSIS (HCC): Status: ACTIVE | Noted: 2021-01-01
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
## 2021-12-25 NOTE — CONSULTS
Patient seen and examined by me during hemodialysis  Tolerating procedure okay. Has a nonfunctioning AV fistula  Blood pressure 95 UF 3 L.   We will get IR to do a fistulogram. Currently has a femoral dialysis catheter  Full consult to follow  Might need dialysis tomorrow as well    Melchor Alejo
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
## 2021-12-25 NOTE — ED NOTES
Patient in bed and talking with Dr. Livia Pierson regarding procedure at the bedside. Patient provided education regarding current situation with pt plan of care. Patient expresses understanding and consent. Patient VSS and patient does not appear to be in any current distress at this time. Will continue to monitor. Call light within reach.        Jeannine Mendoza RN  12/25/21 8801

## 2021-12-25 NOTE — ED NOTES
Pt up in bed with blankets over bottom half. Patient updated on plan of care at this time and expresses no concerns regarding treatment. Patient VSS. Respirations are regular and unlabored, patient is alert and oriented x4. Patient bed rails up x2 and call light within reach. Will continue to monitor.        Jeannine Mendoza RN  12/25/21 5540

## 2021-12-25 NOTE — ED NOTES
Pt to the ED via EMS. Patient presents with complaints of not getting blood return from her dialysis fistula thurs and Friday this past week. Patient states that she did receive dialysis on Tuesday. Patient is alert and oriented x 4. Respirations are regular and unlabored. Call light within reach.      Elidia Merida RN  12/25/21 2635
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
## 2021-12-25 NOTE — ED NOTES
Bed: 042A  Expected date:   Expected time:   Means of arrival:   Comments:     Christine Goldman RN  12/25/21 1122

## 2021-12-26 NOTE — PROGRESS NOTES
Hospitalist Progress Note  [facility] Renal Telemetry 6K       Patient: Nando Jo  Unit/Bed: 9W-59/375-E  YOB: 1965  MRN: [id number]  Acct: [de-identified]   AdmittingDiagnosis: Hyperkalemia [E87.5]  Malfunction of arteriovenous dialysis fistula, initial encounter [facility]) [T82.590A]  Acute renal failure superimposed on chronic kidney disease, on chronic dialysis, unspecified acute renal failure type ([address] [N17.9, N18.9, Z99.2]  Admit Date: 12/25/2021  Hospital Day: 1    Subjective:    Feels significantly better, K + is still a bit high and is scheduled for dialysis today     Objective:   BP (!) 140/87   Pulse 73   Temp 97.9 °F (36.6 °C) (Oral)   Resp 16   Wt 128 lb 1.4 oz (58.1 kg)   SpO2 100%   BMI 22.69 kg/m²     Intake/Output Summary (Last 24 hours) at 12/26/2021 1115  Last data filed at 12/25/2021 1928  Gross per 24 hour   Intake 400 ml   Output 2400 ml   Net -2000 ml     Physical Exam    Physical Exam  Constitutional:       Appearance: Normal appearance. HENT:      Head: Normocephalic and atraumatic. Right Ear: External ear normal.      Left Ear: External ear normal.      Nose: Nose normal.      Mouth/Throat:      Mouth: Mucous membranes are moist.      Pharynx: Oropharynx is clear. Eyes:      Conjunctiva/sclera: Conjunctivae normal.      Pupils: Pupils are equal, round, and reactive to light. Cardiovascular:      Rate and Rhythm: Normal rate. Pulses: Normal pulses. Pulmonary:      Breath sounds: Rales present. Abdominal:      General: Bowel sounds are normal.      Palpations: Abdomen is soft. Musculoskeletal:      Cervical back: Normal range of motion. Right lower leg: Edema present. Left lower leg: Edema present. Skin:     General: Skin is warm. Capillary Refill: Capillary refill takes 2 to 3 seconds. Neurological:      General: No focal deficit present. Mental Status: She is alert.    Psychiatric:         Mood and Affect: Mood normal    DVT TECHNIQUE:  AP supine abdomen single view  FINDINGS: There is a left femoral approach temporary dialysis catheter present with the tip overlying the rightward L4-L5 level which projects over the expected location of the confluence of the iliac veins. Scattered loops of gas and stool-filled bowel are seen overlying the abdomen and pelvis. No acute osseous findings are seen. There is a moderate amount retained stool throughout the colon. 1. There is a left femoral approach temporary dialysis catheter present with the tip overlying the rightward L4-L5 level which projects over the expected location of the confluence of the iliac veins. 2. Nonobstructive bowel gas pattern with moderate retained stool throughout the colon. **This report has been created using voice recognition software. It may contain minor errors which are inherent in voice recognition technology. ** Final report electronically signed by Dr. Clark Vera on 12/25/2021 3:54 PM      Assessment/Plan:    1. Acute on chronic renal failure,  Patient has missed outpatient dialysis due to malfunction of the AV fistula, cont the same as inpatient      2. Pulmonary arterial hypertension  -Continue to monitor on dialysis      3. COPD  -Continue aggressive bronchodilator treatment as needed currently asymptomatic     4. Protein energy malnutrition  - Continue high-protein diet     5. Schizoaffective disorder  - Continue home dosage of Seroquel and Xanax     6.  Continued tobacco abuse  -Encouraged to quit, will place a nicotine patch      Electronically signed by Lesia Kearns MD on 12/26/2021 at 11:15 AM    Rounding Hospitalist

## 2021-12-26 NOTE — FLOWSHEET NOTE
12/25/21 1600 12/25/21 1928   Vital Signs   BP (!) 121/44 103/61   Temp 98.7 °F (37.1 °C) 98.6 °F (37 °C)   Pulse 56 99   Resp 16 18   SpO2 97 % 97 %   Post-Hemodialysis Assessment   Post-Treatment Procedures  --  Blood returned;Catheter Capped, clamped with Saline x2 ports   Machine Disinfection Process  --  Acid/Vinegar Clean;Heat Disinfect; Exterior Machine Disinfection   Rinseback Volume (ml)  --  400 ml   Total Liters Processed (l/min)  --  57.8 l/min   Dialyzer Clearance  --  Lightly streaked   Duration of Treatment (minutes)  --  180 minutes   Heparin amount administered during treatment (units)  --  0 units   Hemodialysis Intake (ml)  --  400 ml   Hemodialysis Output (ml)  --  2400 ml   NET Removed (ml)  --  2000 ml   stable 3 hour treatment. 2 liters net. cath lines flushed with 0.9 ns, clamped and teogs in place. report called to primary nurse.

## 2021-12-26 NOTE — FLOWSHEET NOTE
12/26/21 1207   Provider Notification   Reason for Communication Review case;Patient request   Provider Name Dr. Shaista Donis   Provider Notification Physician   Method of Communication Secure Message   Response No new orders       Spoke with Provider regarding patient request for more pain medication. Patient states Tylenol given is not effective and requesting for more. Patient has LLQ abdominal pain radiating to left flank. Patient unable to describe pain to me. The patient states it started after temporary catheter was placed. Per patient pain level is 8 out of 10. Patient resting comfortably in bed and eating snack. Per physician continue with Ibuprofen and Tylenol As ordered, will continue to monitor.

## 2021-12-26 NOTE — RT PROTOCOL NOTE
RT Inhaler-Nebulizer Bronchodilator Protocol Note    There is a bronchodilator order in the chart from a provider indicating to follow the RT Bronchodilator Protocol and there is an Initiate RT Inhaler-Nebulizer Bronchodilator Protocol order as well (see protocol at bottom of note). CXR Findings:  No results found. The findings from the last RT Protocol Assessment were as follows:   History Pulmonary Disease: Chronic pulmonary disease  Respiratory Pattern: Dyspnea on exertion or RR 21-25 bpm  Breath Sounds: Slightly diminished and/or crackles  Cough: Strong, spontaneous, non-productive  Indication for Bronchodilator Therapy:    Bronchodilator Assessment Score: 6     Pt says she only uses tx's twice a day at home    Aerosolized bronchodilator medication orders have been revised according to the RT Inhaler-Nebulizer Bronchodilator Protocol below. Respiratory Therapist to perform RT Therapy Protocol Assessment initially then follow the protocol. Repeat RT Therapy Protocol Assessment PRN for score 0-3 or on second treatment, BID, and PRN for scores above 3. No Indications  adjust the frequency to every 6 hours PRN wheezing or bronchospasm, if no treatments needed after 48 hours then discontinue using Per Protocol order mode. If indication present, adjust the RT bronchodilator orders based on the Bronchodilator Assessment Score as indicated below. Use Inhaler orders unless patient has one or more of the following: on home nebulizer, not able to hold breath for 10 seconds, is not alert and oriented, cannot activate and use MDI correctly, or respiratory rate 25 breaths per minute or more, then use the equivalent nebulizer order(s) with same Frequency and PRN reasons based on the score. If a patient is on this medication at home then do not decrease Frequency below that used at home.     0-3  enter or revise RT bronchodilator order(s) to equivalent RT Bronchodilator order with Frequency of every 4 hours PRN for wheezing or increased work of breathing using Per Protocol order mode. 4-6  enter or revise RT Bronchodilator order(s) to two equivalent RT bronchodilator orders with one order with BID Frequency and one order with Frequency of every 4 hours PRN wheezing or increased work of breathing using Per Protocol order mode. 7-10  enter or revise RT Bronchodilator order(s) to two equivalent RT bronchodilator orders with one order with TID Frequency and one order with Frequency of every 4 hours PRN wheezing or increased work of breathing using Per Protocol order mode. 11-13  enter or revise RT Bronchodilator order(s) to one equivalent RT bronchodilator order with QID Frequency and an Albuterol order with Frequency of every 4 hours PRN wheezing or increased work of breathing using Per Protocol order mode. Greater than 13  enter or revise RT Bronchodilator order(s) to one equivalent RT bronchodilator order with every 4 hours Frequency and an Albuterol order with Frequency of every 2 hours PRN wheezing or increased work of breathing using Per Protocol order mode. RT to enter RT Home Evaluation for COPD & MDI Assessment order using Per Protocol order mode.     Electronically signed by Alistair Strauss RCP on 12/26/2021 at 9:16 AM

## 2021-12-26 NOTE — FLOWSHEET NOTE
12/26/21 1240 12/26/21 1533   Vital Signs   BP (!) 98/59 (!) 112/48   Temp 97.8 °F (36.6 °C) 97.8 °F (36.6 °C)   Pulse 70 70   Resp 18  --    Weight 134 lb 11.2 oz (61.1 kg) 133 lb 6.1 oz (60.5 kg)   Weight Method Bed scale Bed scale   Percent Weight Change 5.16 -0.98   Post-Hemodialysis Assessment   Post-Treatment Procedures  --  Blood returned;Catheter Capped, clamped with Saline x2 ports   Machine Disinfection Process  --  Acid/Vinegar Clean;Heat Disinfect; Exterior Machine Disinfection   Rinseback Volume (ml)  --  400 ml   Total Liters Processed (l/min)  --  32.6 l/min   Dialyzer Clearance  --  Lightly streaked   Duration of Treatment (minutes)  --  120 minutes   Hemodialysis Intake (ml)  --  400 ml   Hemodialysis Output (ml)  --  1075 ml   NET Removed (ml)  --  600 ml   Tolerated Treatment  --  Good   Pt completed 2 hours of 3 hours; stopping early due to patient refusal to finish. Screaming at this RN ' take me off or I will do it myself\" 600ml removed during HD; patient tolerated fluid removal well. Both ports of hemodialysis catheter flushed and clamped per protocol. Treatment record printed for scanning.  Report given to primary RN

## 2021-12-26 NOTE — PROGRESS NOTES
[facility]         Renal Inpatient Follow-Up note         12/26/2021 10:36 AM    Pt Name:   Azul Rendon  YOB: 1965  Attending:   Em Mcclendon MD    Chief Complaint : Azul Rendon is a 55 y.o. female being followed by nephrology for ESRD and hyperkalemia    Interval History :   Patient seen and examined by me. No distress  Feels okay complains of pain all over wants something for pain  No chest pain or shortness of breath     Scheduled Medications :    [START ON 12/27/2021] polyethylene glycol  17 g Oral Daily    ipratropium-albuterol  3 mL Inhalation BID    ALPRAZolam  0.5 mg Oral TID    benztropine  1 mg Oral Nightly    carbidopa-levodopa  1 tablet Oral Nightly    citalopram  20 mg Oral Daily    docusate sodium  100 mg Oral Daily    [START ON 12/27/2021] famotidine  20 mg Oral Once per day on Mon Wed Fri    budesonide-formoterol  2 puff Inhalation BID    melatonin  6 mg Oral Nightly    midodrine  2.5 mg Oral TID AC    mirtazapine  7.5 mg Oral Nightly    nicotine  1 patch TransDERmal Daily    QUEtiapine  25 mg Oral BID    senna  1 tablet Oral Nightly    sevelamer  1,600 mg Oral TID WC    sodium chloride flush  10 mL IntraVENous 2 times per day    heparin (porcine)  5,000 Units SubCUTAneous BID      sodium chloride         Vitals :  BP (!) 140/87   Pulse 73   Temp 97.9 °F (36.6 °C) (Oral)   Resp 16   Wt 128 lb 1.4 oz (58.1 kg)   SpO2 100%   BMI 22.69 kg/m²     24HR INTAKE/OUTPUT:      Intake/Output Summary (Last 24 hours) at 12/26/2021 1036  Last data filed at 12/25/2021 1928  Gross per 24 hour   Intake 400 ml   Output 2400 ml   Net -2000 ml     Last 3 weights  Wt Readings from Last 3 Encounters:   12/26/21 128 lb 1.4 oz (58.1 kg)   11/30/21 152 lb (68.9 kg)   11/18/21 144 lb 6.4 oz (65.5 kg)           Physical Exam :  General Appearance:  Well developed.  No distress  Mouth/Throat:  Oral mucosa moist  Neck:  Supple, no JVD  Lungs:  Breath sounds: clear  Heart[de-identified]  S1,S2 heard  Abdomen:  Soft, non - tender  Musculoskeletal:  Edema -trace         Last 3 CBC   Recent Labs     12/25/21  1226   WBC 9.5   RBC 4.70   HGB 12.7   HCT 41.2   *     Last 3 CMP  Recent Labs     12/25/21  1226 12/25/21  2146     --    K 7.8* 5.7*   CL 93*  --    CO2 24  --    BUN 73*  --    CREATININE 12.7*  --    CALCIUM 9.1  --              ASSESSMENT / Plan   1. Renal -ESRD on home hemodialysis at [address] [place] [address]  ? Last dialysis was on Tuesday Presented with shortness of breath and hyperkalemia  ? She has a nonfunctioning AV fistula so had a temporary dialysis catheter placed by ER-status post HD yesterday  ? However the potassium last night was 5.7 would expect it to high today as well we will get her dialyzed again today     2. Electrolytes -hyperkalemia secondary to missed dialysis we will get her dialyzed today  3. Acid-base status appears to be stable  4. Shortness of breath may be related to missed dialysis/COPD currently better  5. Chronic hypotension on midodrine  6. Nonfunctioning AV fistula for IR fistulogram tomorrow-order placed  7. Meds reviewed and discussed with patient and HD staff      Dr. Kary Real MD, M,D.  [facility].

## 2021-12-26 NOTE — FLOWSHEET NOTE
12/26/21 1410   Provider Notification   Reason for Communication Critical Value (comment)  (Potassium and Creatinine Level)   Provider Name Mai Kidd    Provider Notification Nurse   Method of Communication Call   Response No new orders       Critical valued received from Chemistry. Patient currently in dialysis, notified Mai Kidd ( Dialysis RN ) of lab results. Per Mai Kidd, will notified Dr. Letitia Emerson regarding critical values.

## 2021-12-27 NOTE — CARE COORDINATION
12/27/21, 7:19 AM EST  DISCHARGE PLANNING EVALUATION:    Shane Taylor       Admitted: 12/25/2021/ 1126   Hospital day: 2   Location: -28/028-A Reason for admit: Hyperkalemia [E87.5]  Malfunction of arteriovenous dialysis fistula, initial encounter ([facility] [T82.590A]  Acute renal failure superimposed on chronic kidney disease, on chronic dialysis, unspecified acute renal failure type ([address] [N17.9, N18.9, Z99.2]   PMH:  has a past medical history of Anemia, Anxiety disorder, Asthma, Chronic atrial fibrillation ([facility] Chronic kidney disease, Chronic respiratory failure with hypoxia ([facility] Congestive heart failure ([facility] COPD (chronic obstructive pulmonary disease) ([facility] Diabetes mellitus ([facility] Elevated troponin, GERD (gastroesophageal reflux disease), Hemodialysis patient ([facility] Hypertension, Long term (current) use of anticoagulants, Pneumonia, Protein calorie malnutrition ([facility] Pulmonary hypertension ([facility] Schizoaffective disorder, bipolar type ([facility] and Smoker. Procedure: none  Barriers to Discharge:  K+ 6.1, creat 6.7, trops elev. 98% 2L. Fistulagram ordered. HD today, nephrology managing. PCP: No primary care provider on file. Readmission Risk Score: 20.9 ( )%    Patient Goals/Plan/Treatment Preferences[facility]  Transportation/Food Security/Housekeeping Addressed:  No issues identified.

## 2021-12-27 NOTE — FLOWSHEET NOTE
12/27/21 0832 12/27/21 1017   Vital Signs   /86 124/86   Temp 97.9 °F (36.6 °C) 97.9 °F (36.6 °C)   Pulse 86 86   Resp 18 18   Weight 133 lb 9.6 oz (60.6 kg) 133 lb 9.6 oz (60.6 kg)   Weight Method Bed scale Bed scale   Post-Hemodialysis Assessment   Post-Treatment Procedures  --  Blood returned;Catheter Capped, clamped with Saline x2 ports   Machine Disinfection Process  --  Acid/Vinegar Clean;Heat Disinfect; Exterior Machine Disinfection   Total Liters Processed (l/min)  --  13 l/min   Dialyzer Clearance  --  Lightly streaked   Duration of Treatment (minutes)  --  71 minutes   Heparin amount administered during treatment (units)  --  0 units   NET Removed (ml)  --  0 ml   Tolerated Treatment  --  Fair   Femoral cath not functioning properly. Dr. Aleena Atwood notified. Gave ok to replace cath. TX paused and blood returned. Pt sent to IR to replace cath. Will complete dialysis tx once new dialysis cath is placed. pt completed 1 hour and 11 min of tx. Bilateral cath ports flushed with normal saline and clamped. Pt trasnported to IR. Primary RN updated.

## 2021-12-27 NOTE — PROGRESS NOTES
Dr. Tony Avila notified of patient's critical potassium level of 6.1 and creatinine level of 6.72. Stated patient would be receiving dialysis today.

## 2021-12-27 NOTE — FLOWSHEET NOTE
12/27/21 1345 12/27/21 1643   Vital Signs   BP (!) 91/58 (!) 150/93   Temp 97.9 °F (36.6 °C) 97.5 °F (36.4 °C)   Pulse 96 88   Resp  --  20   Weight 134 lb 4.2 oz (60.9 kg) 134 lb 4.2 oz (60.9 kg)   Weight Method Bed scale Bed scale   Post-Hemodialysis Assessment   Post-Treatment Procedures  --  Blood returned;Catheter Capped, clamped with Saline x2 ports   Machine Disinfection Process  --  Acid/Vinegar Clean;Heat Disinfect; Exterior Machine Disinfection   Total Liters Processed (l/min)  --  47.1 l/min   Dialyzer Clearance  --  Lightly streaked   Duration of Treatment (minutes)  --  165 minutes   Heparin amount administered during treatment (units)  --  0 units   Hemodialysis Intake (ml)  --  400 ml   Hemodialysis Output (ml)  --  400 ml   NET Removed (ml)  --  0 ml   Tolerated Treatment  --  Good   Completed 2 hours and 45 hours of tx. for a total of 4.0 hour today. Removed no fluid. Pt tolerated tx well. Bilateral cath ports flushed with normal saline and clamped. CVC drsg clean, dry, and intact. Report called to primary RN. TX record printed for scanning into EMR.

## 2021-12-27 NOTE — PROGRESS NOTES
18 Pt arrived to special procedure room 2 for temporary dialysis catheter insertion with removal of groin temporary dialysis catheter. 1035 Procedure explained using teach back method. Pt states understanding. Dr Ofe Taylor into assess patient and explain procedure. This procedure has been fully reviewed with the patient and written informed consent has been obtained. 1042 Patient assisted to table in supine position. Comfort ensured. 1051 Pre-procedure images obtained. 1100 Procedure begins. 1120 Procedure complete. Post-procedure images obtained. 1126 Left femoral 14F 24cm hemodialysis catheter removed. Manual pressure held by Sharon Mann RT.  1129 Patient assisted to cart in semi fowlers position. Comfort ensured. 1134 Patient transported to  dialysis in stable condition.

## 2021-12-27 NOTE — PROGRESS NOTES
[facility]   Nephrology progress note  12/27/2021, 10:12 AM      Pt Name:    Teri Sanches  MRN:     [id number]     YOB: 1965  Admit Date:    12/25/2021 11:26 AM  Primary Care Physician:  No primary care provider on file. Room number  6K-28/028-A    Chief Complaint: Nephrology following for ESRD and HD    Subjective:  Patient seen and examined  No chest pain or shortness of breath  Feels okay  Seen in the dialysis unit    Objective:  24HR INTAKE/OUTPUT:    Intake/Output Summary (Last 24 hours) at 12/27/2021 1012  Last data filed at 12/26/2021 1533  Gross per 24 hour   Intake 500 ml   Output 1075 ml   Net -575 ml     I/O last 3 completed shifts: In: 600 [P.O.:200]  Out: 1075   No intake/output data recorded. Admission weight: 128 lb 1.4 oz (58.1 kg)  Wt Readings from Last 3 Encounters:   12/26/21 133 lb 6.1 oz (60.5 kg)   11/30/21 152 lb (68.9 kg)   11/18/21 144 lb 6.4 oz (65.5 kg)     Body mass index is 23.63 kg/m².     Physical examination  VITALS:     Vitals:    12/26/21 2120 12/26/21 2306 12/27/21 0400 12/27/21 0745   BP: (!) 104/57 135/79 118/88 (!) 149/85   Pulse: 79 78 68 78   Resp: 18 18 18 18   Temp: 98 °F (36.7 °C) 97.9 °F (36.6 °C) 98 °F (36.7 °C) 97.8 °F (36.6 °C)   TempSrc: Oral Oral Oral Oral   SpO2: 96% 92% 98% 94%   Weight:         General Appearance: alert and cooperative with exam, appears comfortable, no distress  Mouth/Throat: Oral mucosa moist  Neck: No JVD  Lungs: no use of accessory muscles  GI: soft, non-tender, no guarding  Extremities: No LE edema      Lab Data  CBC:   Recent Labs     12/25/21  1226 12/27/21  0448   WBC 9.5 6.4   HGB 12.7 13.1   HCT 41.2 44.3   * 120*     BMP:  Recent Labs     12/25/21  1226 12/25/21  2146 12/26/21  1300 12/27/21  0448     --  139 140   K 7.8* 5.7* 6.5* 6.1*   CL 93*  --  99 101   CO2 24  --  21* 22*   BUN 73*  --  31* 21   CREATININE 12.7*  --  8.2* 6.7*   GLUCOSE 77  --  166* 73   CALCIUM 9.1  --  9.2 9.7 Hepatic: No results for input(s): LABALBU, AST, ALT, ALB, BILITOT, ALKPHOS in the last 72 hours. Meds:  Infusion:    sodium chloride       Meds:    polyethylene glycol  17 g Oral Daily    ipratropium-albuterol  3 mL Inhalation BID    ALPRAZolam  0.5 mg Oral TID    benztropine  1 mg Oral Nightly    carbidopa-levodopa  1 tablet Oral Nightly    citalopram  20 mg Oral Daily    docusate sodium  100 mg Oral Daily    famotidine  20 mg Oral Once per day on Mon Wed Fri    budesonide-formoterol  2 puff Inhalation BID    melatonin  6 mg Oral Nightly    midodrine  2.5 mg Oral TID AC    mirtazapine  7.5 mg Oral Nightly    nicotine  1 patch TransDERmal Daily    QUEtiapine  25 mg Oral BID    senna  1 tablet Oral Nightly    sevelamer  1,600 mg Oral TID WC    sodium chloride flush  10 mL IntraVENous 2 times per day    heparin (porcine)  5,000 Units SubCUTAneous BID     Meds prn: ipratropium-albuterol, sodium chloride flush, sodium chloride, ondansetron **OR** ondansetron, polyethylene glycol, acetaminophen **OR** acetaminophen, ibuprofen       Impression and Plan:  1. ESRD on hemodialysis  Seen in the dialysis unit  Tolerating dialysis but having some issues with tach and catheter  Will need fistulogram by IR today  If fistula can be used later today then will plan to remove temp catheter. 2.  Hyperkalemia. 3.  Mild metabolic acidosis  4. History of COPD  5. Chronic hypotension. On midodrine  6. Hyperphosphatemia. We will check phosphorus levels in a.m. Continue phosphate binders  7.  Noncompliance: Discussed with patient in detail  D/W patient and HD RN    Mayito Bingham MD  [facility]

## 2021-12-27 NOTE — PROGRESS NOTES
Hospitalist Progress Note  [facility] Renal Telemetry 6K       Patient: Lloyd Hammonds  Unit/Bed: 4S-89/990-E  YOB: 1965  MRN: [id number]  Acct: [de-identified]   AdmittingDiagnosis: Hyperkalemia [E87.5]  Malfunction of arteriovenous dialysis fistula, initial encounter [facility]) [T82.590A]  Acute renal failure superimposed on chronic kidney disease, on chronic dialysis, unspecified acute renal failure type ([address] [N17.9, N18.9, Z99.2]  Admit Date: 12/25/2021  Hospital Day: 2    Subjective:    Complains of occasional pain in the left groin at the site of temporary catheter dialysis    Objective:   /86   Pulse 86   Temp 97.9 °F (36.6 °C)   Resp 18   Wt 133 lb 9.6 oz (60.6 kg)   SpO2 94%   BMI 23.67 kg/m²     Intake/Output Summary (Last 24 hours) at 12/27/2021 1119  Last data filed at 12/26/2021 1533  Gross per 24 hour   Intake 500 ml   Output 1075 ml   Net -575 ml     Physical Exam    Constitutional:       Appearance: Normal appearance. HENT:      Head: Normocephalic and atraumatic.      Right Ear: External ear normal.      Left Ear: External ear normal.      Nose: Nose normal.      Mouth/Throat:      Mouth: Mucous membranes are moist.      Pharynx: Oropharynx is clear. Eyes:      Conjunctiva/sclera: Conjunctivae normal.      Pupils: Pupils are equal, round, and reactive to light. Cardiovascular:      Rate and Rhythm: Normal rate.      Pulses: Normal pulses. Pulmonary:      Breath sounds: Rales present. Abdominal:      General: Bowel sounds are normal.      Palpations: Abdomen is soft. Musculoskeletal:      Cervical back: Normal range of motion.      Right lower leg: Edema present.      Left lower leg: Edema present. Skin:     General: Skin is warm.      Capillary Refill: Capillary refill takes 2 to 3 seconds. Neurological:      General: No focal deficit present.      Mental Status: She is alert.    Psychiatric:         Mood and Affect: Mood normal    DVT Prophylaxis: Heparin    Data:  CBC:   Lab Results   Component Value Date    WBC 6.4 12/27/2021    HGB 13.1 12/27/2021    HCT 44.3 12/27/2021    MCV 91.3 12/27/2021     12/27/2021     BMP:   Lab Results   Component Value Date     12/27/2021    K 6.1 12/27/2021     12/27/2021    CO2 22 12/27/2021    PHOS 6.8 07/06/2021    BUN 21 12/27/2021    CREATININE 6.7 12/27/2021    CALCIUM 9.7 12/27/2021     ABGs:   Lab Results   Component Value Date    PH 7.53 09/20/2021    PCO2 38 09/20/2021    PO2 80 09/20/2021    HCO3 32 09/20/2021    O2SAT 97 09/20/2021     Troponin: No results found for: TROPONINI   LFTs   Lab Results   Component Value Date    AST 15 09/20/2021    ALT <5 09/20/2021    BILITOT 0.5 09/23/2021    ALKPHOS 253 09/20/2021          Imaging   XR CHEST (2 VW)    Result Date: 12/25/2021  PROCEDURE: XR CHEST (2 VW) CLINICAL INFORMATION: SOB COMPARISON: 9/20/2021 TECHNIQUE:  AP and lateral chest x-ray  FINDINGS: The heart size appears within normal limits. Patchy airspace opacities are seen at the right lung base which may represent atelectasis or pneumonia. Minimal blunting of the left costophrenic angle is demonstrated which may represent a minimal left pleural effusion. No pneumothorax is seen. No acute osseous findings are demonstrated. 1. Patchy airspace opacities are seen at the right lung base which may represent atelectasis or pneumonia. 2. Minimal blunting of the left costophrenic angle is demonstrated which may represent a minimal left pleural effusion. **This report has been created using voice recognition software. It may contain minor errors which are inherent in voice recognition technology. ** Final report electronically signed by Dr. Geeta Riggs on 12/25/2021 1:11 PM    XR ABDOMEN (KUB) (SINGLE AP VIEW)    Result Date: 12/25/2021  PROCEDURE: XR ABDOMEN (KUB) (SINGLE AP VIEW) CLINICAL INFORMATION: confirm dialysis catheter placement COMPARISON: 11/23/2019 TECHNIQUE:  AP supine abdomen single view  FINDINGS: There is a left femoral approach temporary dialysis catheter present with the tip overlying the rightward L4-L5 level which projects over the expected location of the confluence of the iliac veins. Scattered loops of gas and stool-filled bowel are seen overlying the abdomen and pelvis. No acute osseous findings are seen. There is a moderate amount retained stool throughout the colon. 1. There is a left femoral approach temporary dialysis catheter present with the tip overlying the rightward L4-L5 level which projects over the expected location of the confluence of the iliac veins. 2. Nonobstructive bowel gas pattern with moderate retained stool throughout the colon. **This report has been created using voice recognition software. It may contain minor errors which are inherent in voice recognition technology. ** Final report electronically signed by Dr. Puja Pascal on 12/25/2021 3:54 PM      Assessment/Plan:     1. Acute on chronic renal failure,  Patient has missed outpatient dialysis due to malfunction of the AV fistula, cont the same as inpatient      2. Pulmonary arterial hypertension  -Continue to monitor on dialysis      3. COPD  -Continue aggressive bronchodilator treatment as needed currently asymptomatic     4. Protein energy malnutrition  - Continue high-protein diet     5. Schizoaffective disorder  - Continue home dosage of Seroquel and Xanax     6.  Continued tobacco abuse  -Encouraged to quit, will place a nicotine patch        Electronically signed by Kajal Batista MD on 12/27/2021 at 11:19 AM    [facility] Hospitalist
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
## 2021-12-28 NOTE — OP NOTE
Department of Radiology  Post Procedure Progress Note      Pre-Procedure Diagnosis:  Renal failure    Procedure Performed:  Temporary R IJ HD CVC placement    Anesthesia: local     Findings: successful    Immediate Complications:  None    Estimated Blood Loss: minimal    SEE DICTATED PROCEDURE NOTE FOR COMPLETE DETAILS.     Electronically signed by Halley Brink MD on 12/28/2021 at 10:42 AM

## 2021-12-28 NOTE — PROGRESS NOTES
Prior to entering room, pt was sitting quietly in bed. When this RN entered, she immediately began complaining of pain and asking this RN spoke with the doctor. This RN explained that she had just finished getting report and needed to assess her first, including vitals. She was also reminded that she was given pain medication at [facility] during bedside shift report. Her daughter called her while this RN was in the room and she began complaining that it was over an hour since her pain medication was given and started crying. Daughter told her to give it time to work. This RN also offered an ice pack, pt refused. Will continue to assess pain level and encourage a calm environment. Scheduled meds were also given at this time.

## 2021-12-28 NOTE — PROGRESS NOTES
[facility]   Nephrology progress note  12/28/2021, 11:43 AM      Pt Name:    Dana Brown  MRN:     [id number]     YOB: 1965  Admit Date:    12/25/2021 11:26 AM  Primary Care Physician:  No primary care provider on file. Room number  6K-28/028-A    Chief Complaint: Nephrology following for ESRD and HD    Subjective:  Patient seen and examined  Seen earlier today during rounds in the dialysis unit  Tolerating dialysis treatment well  Awaiting fistulogram    Objective:  24HR INTAKE/OUTPUT:      Intake/Output Summary (Last 24 hours) at 12/28/2021 1143  Last data filed at 12/28/2021 1020  Gross per 24 hour   Intake 1000 ml   Output 1800 ml   Net -800 ml     I/O last 3 completed shifts: In: 600 [P.O.:200]  Out: 400   I/O this shift: In: 400   Out: 1400   Admission weight: 128 lb 1.4 oz (58.1 kg)  Wt Readings from Last 3 Encounters:   12/28/21 137 lb 2 oz (62.2 kg)   11/30/21 152 lb (68.9 kg)   11/18/21 144 lb 6.4 oz (65.5 kg)     Body mass index is 24.29 kg/m².     Physical examination  VITALS:     Vitals:    12/28/21 0723 12/28/21 1015 12/28/21 1020 12/28/21 1108   BP: 130/82 105/71 (!) 102/54 104/69   Pulse: 82 90 60 88   Resp: (!) 76 16 13 16   Temp: 97.6 °F (36.4 °C) 97.8 °F (36.6 °C) 97.6 °F (36.4 °C) 98.2 °F (36.8 °C)   TempSrc:  Axillary  Oral   SpO2:  95%  93%   Weight: 139 lb 5.3 oz (63.2 kg)  137 lb 2 oz (62.2 kg)      General Appearance: alert and cooperative with exam, appears comfortable, no distress  Mouth/Throat: Oral mucosa moist  Lungs: no use of accessory muscles  Extremities: No LE edema      Lab Data  CBC:   Recent Labs     12/25/21  1226 12/27/21  0448   WBC 9.5 6.4   HGB 12.7 13.1   HCT 41.2 44.3   * 120*     BMP:  Recent Labs     12/26/21  1300 12/27/21  0448 12/28/21  0438    140 139   K 6.5* 6.1* 4.8   CL 99 101 99   CO2 21* 22* 25   BUN 31* 21 11   CREATININE 8.2* 6.7* 4.4*   GLUCOSE 166* 73 75   CALCIUM 9.2 9.7 9.2   PHOS  --   --  4.3 Hepatic: No results for input(s): LABALBU, AST, ALT, ALB, BILITOT, ALKPHOS in the last 72 hours. Meds:  Infusion:    sodium chloride       Meds:    morphine  2 mg IntraVENous Once    polyethylene glycol  17 g Oral Daily    ipratropium-albuterol  3 mL Inhalation BID    ALPRAZolam  0.5 mg Oral TID    benztropine  1 mg Oral Nightly    carbidopa-levodopa  1 tablet Oral Nightly    citalopram  20 mg Oral Daily    docusate sodium  100 mg Oral Daily    famotidine  20 mg Oral Once per day on Mon Wed Fri    budesonide-formoterol  2 puff Inhalation BID    melatonin  6 mg Oral Nightly    midodrine  2.5 mg Oral TID AC    mirtazapine  7.5 mg Oral Nightly    nicotine  1 patch TransDERmal Daily    QUEtiapine  25 mg Oral BID    senna  1 tablet Oral Nightly    sevelamer  1,600 mg Oral TID WC    sodium chloride flush  10 mL IntraVENous 2 times per day    heparin (porcine)  5,000 Units SubCUTAneous BID     Meds prn: HYDROcodone 5 mg - acetaminophen, diphenhydrAMINE, ipratropium-albuterol, sodium chloride flush, sodium chloride, ondansetron **OR** ondansetron, polyethylene glycol, ibuprofen       Impression and Plan:  1. ESRD on hemodialysis  Seen in the dialysis unit  Tolerating dialysis  Will need fistulogram by IR today  If fistula can be used later today then will plan to remove temp catheter. 2.  Hyperkalemia. Improved  3. Mild metabolic acidosis  4. History of COPD  5. Chronic hypotension. On midodrine  6. Hyperphosphatemia. Continue phosphate binders  7. Noncompliance: Discussed with patient in detail    Addendum  Was notified by nursing staff in the dialysis unit that patient wanted to terminate her dialysis treatment early. Not very compliant overall.       Rita Ivey MD  [facility]

## 2021-12-28 NOTE — PROGRESS NOTES
12 Pt in specials radiology for fistulogram. Explained procedure to pt and pt verbalizes understanding. 5 Dr Emeterio Fong to speak to pt. Consent signed. 1520 Moved to table and attached to monitor. 1535 Left arm prepped and draped. 5 Dr Emeterio Fong to start procedure. 1606 Angioplasty of arterial anastamosis of left arm fistula with 4 x 40 [place] 35 balloon. 1612 Procedure complete. Prepping for sheath removal.  1617 Report to Anuradha TRONCOSO.  1620 Sheaths x 2 removed as slip-nots inserted. Sites without redness, swelling or hematoma. Op-site dressings applied. 1623 Pt positioned on bed for comfort. Offers no complaints. 1625 Pt transferred to 6K per bed.

## 2021-12-28 NOTE — CARE COORDINATION
12/28/21, 11:31 AM EST  Discharge Planning Evaluation  Social work consult received, patient from [facility]. Patient/Family preference is to return to [employer] [facility]. The patient's current payor source at the facility is Medicaid. Medicare skilled days available: yes  Insurance precert:  no  Spoke with Daniel at the facility.   Patient bed hold: yes  Anticipated transport plan: ambulette  Do they require COVID 19 test to return to [facility]  Is there a required time frame which which COVID test needs done:prior to discharge

## 2021-12-28 NOTE — PROGRESS NOTES
normal limits. Patchy airspace opacities are seen at the right lung base which may represent atelectasis or pneumonia. Minimal blunting of the left costophrenic angle is demonstrated which may represent a minimal left pleural effusion. No pneumothorax is seen. No acute osseous findings are demonstrated. 1. Patchy airspace opacities are seen at the right lung base which may represent atelectasis or pneumonia. 2. Minimal blunting of the left costophrenic angle is demonstrated which may represent a minimal left pleural effusion. **This report has been created using voice recognition software. It may contain minor errors which are inherent in voice recognition technology. ** Final report electronically signed by Dr. Corinne Alosa on 12/25/2021 1:11 PM    XR ABDOMEN (KUB) (SINGLE AP VIEW)    Result Date: 12/25/2021  PROCEDURE: XR ABDOMEN (KUB) (SINGLE AP VIEW) CLINICAL INFORMATION: confirm dialysis catheter placement COMPARISON: 11/23/2019 TECHNIQUE:  AP supine abdomen single view  FINDINGS: There is a left femoral approach temporary dialysis catheter present with the tip overlying the rightward L4-L5 level which projects over the expected location of the confluence of the iliac veins. Scattered loops of gas and stool-filled bowel are seen overlying the abdomen and pelvis. No acute osseous findings are seen. There is a moderate amount retained stool throughout the colon. 1. There is a left femoral approach temporary dialysis catheter present with the tip overlying the rightward L4-L5 level which projects over the expected location of the confluence of the iliac veins. 2. Nonobstructive bowel gas pattern with moderate retained stool throughout the colon. **This report has been created using voice recognition software. It may contain minor errors which are inherent in voice recognition technology. ** Final report electronically signed by Dr. Corinne Alosa on 12/25/2021 3:54 PM    IR FLUORO GUIDED CVA DEVICE PLMT/REPLACE/REMOVAL    Result Date: 12/27/2021  NON-TUNNELED DIALYSIS CATHETER INSERTION: CLINICAL INFORMATION: 15-year-old female with renal failure. The patient has a malfunctioning left common femoral vein dialysis catheter as well as a left-sided malfunctioning fistula. The patient has missed multiple dialysis treatments. PERFORMED BY: Dr. Meli Mcginnis M.D. APPROACH : Right Internal Jugular Vein, ultrasound guidance, micropuncture technique. NEW DIALYSIS CATHETER:  14 [place] Hemocath, 20 cm in length. NEW DIALYSIS CATHETER TIP:  Right [facility] OLD DIALYSIS CATHETER: 14 [place] 24 cm Hemo-Cath, left common femoral vein access. FLUOROSCOPY TIME: 31 seconds FLUOROSCOPIC IMAGES: 3 PROCEDURE:  Signed informed consent was obtained prior to performing this procedure. The patient was not sedated during this procedure but was monitored with EKG and pulse-ox monitoring devices by a registered nurse. Following local anesthesia and utilizing MAXIMAL STERILE BARRIER TECHNIQUE, the vein listed above was punctured with a micropuncture needle, utilizing ultrasound guidance, an image was obtained confirming needle position and vessel patency. .  A .018 wire  was passed through this needle, followed by insertion of a 4 [place]thia dilator. Following guide wire and catheter exchange, a percutaneous tract was dilated to a 14 [place] size. Then, the temporary dialysis catheter was advanced over an .035 guide wire, with fluoroscopic guidance, with the tip at the location as specified above. This was all performed with fluoroscopic guidance. The hub of the catheter was then sutured to the skin with 3-0 silk suture. An antibacterial Biopatch was applied to the catheter exit site along with a sterile OpSite dressing. The patient's existing left common femoral vein hemodialysis catheter was then removed. Pressure was applied and hemostasis was achieved.  The patient tolerated the procedures well with no immediate postprocedural complication. Successful, uncomplicated placement of a right IJ access temporary hemodialysis catheter and subsequent removal of malfunctioning left common femoral vein HD CVC. **This report has been created using voice recognition software. It may contain minor errors which are inherent in voice recognition technology. ** Final report electronically signed by Dr Jeremi Jeffrey on 12/27/2021 12:41 PM    IR FLUORO GUIDED CVA DEVICE PLMT/REPLACE/REMOVAL    Result Date: 12/27/2021  NON-TUNNELED DIALYSIS CATHETER INSERTION: CLINICAL INFORMATION: 60-year-old female with renal failure. The patient has a malfunctioning left common femoral vein dialysis catheter as well as a left-sided malfunctioning fistula. The patient has missed multiple dialysis treatments. PERFORMED BY: Dr. Jeremi Jeffrey M.D. APPROACH : Right Internal Jugular Vein, ultrasound guidance, micropuncture technique. NEW DIALYSIS CATHETER:  14 [place] Hemocath, 20 cm in length. NEW DIALYSIS CATHETER TIP:  Right [facility] OLD DIALYSIS CATHETER: 14 [place] 24 cm Hemo-Cath, left common femoral vein access. FLUOROSCOPY TIME: 31 seconds FLUOROSCOPIC IMAGES: 3 PROCEDURE:  Signed informed consent was obtained prior to performing this procedure. The patient was not sedated during this procedure but was monitored with EKG and pulse-ox monitoring devices by a registered nurse. Following local anesthesia and utilizing MAXIMAL STERILE BARRIER TECHNIQUE, the vein listed above was punctured with a micropuncture needle, utilizing ultrasound guidance, an image was obtained confirming needle position and vessel patency. .  A .018 wire  was passed through this needle, followed by insertion of a 4 [place]thia dilator. Following guide wire and catheter exchange, a percutaneous tract was dilated to a 14 [place] size. Then, the temporary dialysis catheter was advanced over an .035 guide wire, with fluoroscopic guidance, with the tip at the location as specified above.   This was all performed with fluoroscopic guidance. The hub of the catheter was then sutured to the skin with 3-0 silk suture. An antibacterial Biopatch was applied to the catheter exit site along with a sterile OpSite dressing. The patient's existing left common femoral vein hemodialysis catheter was then removed. Pressure was applied and hemostasis was achieved. The patient tolerated the procedures well with no immediate postprocedural complication. Successful, uncomplicated placement of a right IJ access temporary hemodialysis catheter and subsequent removal of malfunctioning left common femoral vein HD CVC. **This report has been created using voice recognition software. It may contain minor errors which are inherent in voice recognition technology. ** Final report electronically signed by Dr Kayleen Muse on 12/27/2021 12:41 PM      Assessment/Plan:    1. Acute on chronic renal failure,  Appreciate nephrology consult, and continue dialysis, patient has a temporary I internal jugular catheter    2. Pulmonary arterial hypertension  -Continue to monitor on dialysis      3. COPD  -Continue aggressive bronchodilator treatment as needed currently asymptomatic     4. Protein energy malnutrition  - Continue high-protein diet     5. Schizoaffective disorder  - Continue home dosage of Seroquel and Xanax     6.  Continued tobacco abuse  -Encouraged to quit, will place a nicotine patch        Electronically signed by Em Mcclendon MD on 12/28/2021 at 11:00 AM    Rounding Hospitalist

## 2021-12-28 NOTE — H&P
Formulation and discussion of sedation / procedure plans, risks, benefits, side effects and alternatives with patient and/or responsible adult completed.     Electronically signed by Elba Kurtz MD on 12/28/2021 at 10:41 AM

## 2021-12-28 NOTE — FLOWSHEET NOTE
12/28/21 0723 12/28/21 1020   Vital Signs   /82 (!) 102/54   Temp 97.6 °F (36.4 °C) 97.6 °F (36.4 °C)   Pulse 82 60   Resp (!) 76 13   Weight 139 lb 5.3 oz (63.2 kg) 137 lb 2 oz (62.2 kg)   Weight Method Bed scale Bed scale   Percent Weight Change 3.78 -1.58   Post-Hemodialysis Assessment   Post-Treatment Procedures  --  Blood returned;Catheter Capped, clamped with Saline x2 ports   Machine Disinfection Process  --  Acid/Vinegar Clean;Heat Disinfect; Exterior Machine Disinfection   Total Liters Processed (l/min)  --  41.4 l/min   Dialyzer Clearance  --  Lightly streaked   Duration of Treatment (minutes)  --  250 minutes   Heparin amount administered during treatment (units)  --  0 units   Hemodialysis Intake (ml)  --  400 ml   Hemodialysis Output (ml)  --  1400 ml   NET Removed (ml)  --  1000 ml   Tolerated Treatment  --  Good   Stable 2.5 hour tx. Removed 1.0 L of fluid. Tolerated fluid removal well. Patient asked to be taken off machine after 2.5 hours instead of finishing total treatment. Dr. Wayne Mcmahon notified. Bilateral cath ports flushed with normal saline and clamped. CVC dressing clean, dry, and intact. Report given to primary RN. TX record printed for scanning into EMR.

## 2021-12-29 NOTE — CARE COORDINATION
12/29/21, 9:33 AM EST    Patient goals/plan/ treatment preferences discussed by  and . Patient goals/plan/ treatment preferences reviewed with patient/ family. Patient/ family verbalize understanding of discharge plan and are in agreement with goal/plan/treatment preferences. Understanding was demonstrated using the teach back method. AVS provided by RN at time of discharge, which includes all necessary medical information pertaining to the patients current course of illness, treatment, post-discharge goals of care, and treatment preferences. Services After Discharge  Services At/After Discharge: Nursing Services,Skilled [facility] ([place] [id number], medicaidd)   IMM Letter  IMM Letter given to Patient/Family/Significant other/Guardian/POA/by[de-identified] staff  IMM Letter date given[de-identified] 12/25/21  IMM Letter time given[de-identified] 2050     Patient return to [place] [id number] today and ambulette at 1:15pm arranged. RN updated and blue envelope placed on chart.

## 2021-12-29 NOTE — PROGRESS NOTES
Report called to . Nurse verbalizes understanding of discharge instructions and orders at this time.

## 2021-12-29 NOTE — PROGRESS NOTES
Discharge teaching and instructions for diagnosis/procedure of fistula malfunction completed with patient using teachback method. AVS reviewed. Printed prescriptions given to patient. Patient voiced understanding regarding prescriptions, follow up appointments, and care of self at home. Discharged in a wheelchair to  skilled nursing per EMS transportation.

## 2021-12-29 NOTE — DISCHARGE SUMMARY
Hospitalist Discharge Summary        Patient: Nina Blanco  YOB: 1965  MRN: [id number]   Acct: [de-identified]    Primary Care Physician: No primary care provider on file. Admit date  12/25/2021    Discharge date: No discharge date for patient encounter. Chief Complaint on presentation :-  MIGUEL    Discharge Assessment and Plan:-      1. Acute on chronic renal failure,  - Extremely noncompliant patient. Refuse to complete dialysis last night. Plan for dialysis today and patient is adamant on leaving afterwards. She is hemodynamically stable and potassium is normal. Will d/c today.     2. Pulmonary arterial hypertension  -Continue to monitor on dialysis      3. COPD  -Continue aggressive bronchodilator treatment as needed currently asymptomatic     4. Protein energy malnutrition  - Continue high-protein diet     5. Schizoaffective disorder  - Continue home dosage of Seroquel and Xanax     6. Continued tobacco abuse  -Encouraged to quit, will place a nicotine patch    Initial H and P and Hospital course:-    presents via EMS for shortness of breath and fatigue she is not completed her dialysis twice last week secondary to malfunction of the fistula however she was reluctant to come to the hospital due to the holidays  But this a.m. was significantly short of breath and was prompted to come  She otherwise has no new or acute symptoms. Admitted for fistula malfuction. Had IJ placed and dialysis afterwards. IR was consulted and had fistulogram that showed stenosis which was intervened. Successful angioplasty. Had dialysis afterwards and will be discharged today.     Physical Exam:-  Vitals:   Patient Vitals for the past 24 hrs:   BP Temp Temp src Pulse Resp SpO2 Weight   12/29/21 0900 130/80 99.3 °F (37.4 °C) Oral 91 16 94 %    12/29/21 0740 (!) 92/48 97.5 °F (36.4 °C)  66 18  135 lb 5.8 oz (61.4 kg)   12/29/21 0427     16     12/29/21 0352 123/77 97.7 °F (36.5 °C) Oral 86 16 93 %  12/28/21 2311 (!) 91/59 97.6 °F (36.4 °C) Oral 82 16 94 %    12/28/21 2207 (!) 90/59         12/28/21 1954 (!) 78/60 97.5 °F (36.4 °C) Oral 84 16 97 %    12/28/21 1641     16 97 %    12/28/21 1620 110/82   87 17 98 %    12/28/21 1615 102/81   88 12 95 %    12/28/21 1610 88/72   91 21 96 %    12/28/21 1605 91/69   87 13 93 %    12/28/21 1600 91/76   86 12 94 %    12/28/21 1555 98/79   86 14 98 %    12/28/21 1550 104/84   89 15 96 %    12/28/21 1545 105/80   90 16 99 %    12/28/21 1540 (!) 122/92   87 15 97 %    12/28/21 1530 (!) 121/90   85 21 98 %    12/28/21 1108 104/69 98.2 °F (36.8 °C) Oral 88 16 93 %    12/28/21 1020 (!) 102/54 97.6 °F (36.4 °C)  60 13  137 lb 2 oz (62.2 kg)   12/28/21 1015 105/71 97.8 °F (36.6 °C) Axillary 90 16 95 %      Weight:   Weight: 135 lb 5.8 oz (61.4 kg)   24 hour intake/output:     Intake/Output Summary (Last 24 hours) at 12/29/2021 0932  Last data filed at 12/29/2021 0358  Gross per 24 hour   Intake 800 ml   Output 1400 ml   Net -600 ml         Discharge Medications:-      Medication List      CONTINUE taking these medications    acetaminophen 325 MG tablet  Commonly known as: TYLENOL     ALPRAZolam 0.5 MG tablet  Commonly known as: XANAX     AMINO ACIDS-PROTEIN HYDROLYS PO     benztropine 1 MG tablet  Commonly known as: COGENTIN     carbidopa-levodopa  MG per tablet  Commonly known as: SINEMET     citalopram 20 MG tablet  Commonly known as: CELEXA     diphenhydrAMINE 25 MG tablet  Commonly known as: BENADRYL     docusate sodium 100 MG capsule  Commonly known as: COLACE     famotidine 20 MG tablet  Commonly known as: PEPCID     fluticasone-salmeterol 230-21 MCG/ACT inhaler  Commonly known as: Advair HFA  Inhale 2 puffs into the lungs 2 times daily     hydrocortisone 2.5 % cream     hydrOXYzine 50 MG tablet  Commonly known as: ATARAX     ipratropium-albuterol 0.5-2.5 (3) MG/3ML Soln nebulizer solution  Commonly known as: Sapphire Bateman MELATONIN PO     midodrine 2.5 MG tablet  Commonly known as: PROAMATINE  Take 1 tablet by mouth 3 times daily (before meals)     mirtazapine 15 MG tablet  Commonly known as: REMERON     nicotine 14 MG/24HR  Commonly known as: NICODERM CQ     OXYGEN     polyethylene glycol 17 g packet  Commonly known as: GLYCOLAX     QUEtiapine 25 MG tablet  Commonly known as: SEROQUEL     senna 8.6 MG tablet  Commonly known as: SENOKOT     sevelamer 800 MG tablet  Commonly known as: RENVELA     sodium zirconium cyclosilicate 10 g Pack oral suspension  Commonly known as: Lokelma  Take 10 g by mouth daily Lot # [id number]  Exp [id number]     therapeutic multivitamin-minerals tablet     Vitamin E 100 UNIT/GM Crea        STOP taking these medications    fluconazole 150 MG tablet  Commonly known as: DIFLUCAN     sulfamethoxazole-trimethoprim 800-160 MG per tablet  Commonly known as: BACTRIM DS;SEPTRA DS             Labs :-  Recent Results (from the past 72 hour(s))   Basic Metabolic Panel    Collection Time: 12/26/21  1:00 PM   Result Value Ref Range    Sodium 139 135 - 145 meq/L    Potassium 6.5 (HH) 3.5 - 5.2 meq/L    Chloride 99 98 - 111 meq/L    CO2 21 (L) 23 - 33 meq/L    Glucose 166 (H) 70 - 108 mg/dL    BUN 31 (H) 7 - 22 mg/dL    CREATININE 8.2 (HH) 0.4 - 1.2 mg/dL    Calcium 9.2 8.5 - 10.5 mg/dL   Anion Gap    Collection Time: 12/26/21  1:00 PM   Result Value Ref Range    Anion Gap 19.0 (H) 8.0 - 16.0 meq/L   Glomerular Filtration Rate, Estimated    Collection Time: 12/26/21  1:00 PM   Result Value Ref Range    Est, Glom Filt Rate 5 (A) LR/QSB/8.28N4   Basic Metabolic Panel w/ Reflex to MG    Collection Time: 12/27/21  4:48 AM   Result Value Ref Range    Sodium 140 135 - 145 meq/L    Potassium reflex Magnesium 6.1 (HH) 3.5 - 5.2 meq/L    Chloride 101 98 - 111 meq/L    CO2 22 (L) 23 - 33 meq/L    Glucose 73 70 - 108 mg/dL    BUN 21 7 - 22 mg/dL    CREATININE 6.7 (HH) 0.4 - 1.2 mg/dL    Calcium 9.7 8.5 - 10.5 mg/dL   CBC Collection Time: 12/27/21  4:48 AM   Result Value Ref Range    WBC 6.4 4.8 - 10.8 thou/mm3    RBC 4.85 4.20 - 5.40 mill/mm3    Hemoglobin 13.1 12.0 - 16.0 gm/dl    Hematocrit 44.3 37.0 - 47.0 %    MCV 91.3 81.0 - 99.0 fL    MCH 27.0 26.0 - 33.0 pg    MCHC 29.6 (L) 32.2 - 35.5 gm/dl    RDW-CV 15.9 (H) 11.5 - 14.5 %    RDW-SD 53.1 (H) 35.0 - 45.0 fL    Platelets 766 (L) 718 - 400 thou/mm3    MPV 8.6 (L) 9.4 - 12.4 fL   Anion Gap    Collection Time: 12/27/21  4:48 AM   Result Value Ref Range    Anion Gap 17.0 (H) 8.0 - 16.0 meq/L   Glomerular Filtration Rate, Estimated    Collection Time: 12/27/21  4:48 AM   Result Value Ref Range    Est, Glom Filt Rate 6 (A) AA/PPG/0.07V1   Basic Metabolic Panel    Collection Time: 12/28/21  4:38 AM   Result Value Ref Range    Sodium 139 135 - 145 meq/L    Potassium 4.8 3.5 - 5.2 meq/L    Chloride 99 98 - 111 meq/L    CO2 25 23 - 33 meq/L    Glucose 75 70 - 108 mg/dL    BUN 11 7 - 22 mg/dL    CREATININE 4.4 (HH) 0.4 - 1.2 mg/dL    Calcium 9.2 8.5 - 10.5 mg/dL   Phosphorus    Collection Time: 12/28/21  4:38 AM   Result Value Ref Range    Phosphorus 4.3 2.4 - 4.7 mg/dL   Anion Gap    Collection Time: 12/28/21  4:38 AM   Result Value Ref Range    Anion Gap 15.0 8.0 - 16.0 meq/L   Glomerular Filtration Rate, Estimated    Collection Time: 12/28/21  4:38 AM   Result Value Ref Range    Est, Glom Filt Rate 10 (A) XI/BBS/9.77H4   Basic Metabolic Panel    Collection Time: 12/29/21  5:42 AM   Result Value Ref Range    Sodium 140 135 - 145 meq/L    Potassium 4.8 3.5 - 5.2 meq/L    Chloride 97 (L) 98 - 111 meq/L    CO2 27 23 - 33 meq/L    Glucose 91 70 - 108 mg/dL    BUN 15 7 - 22 mg/dL    CREATININE 5.3 (HH) 0.4 - 1.2 mg/dL    Calcium 9.7 8.5 - 10.5 mg/dL   Anion Gap    Collection Time: 12/29/21  5:42 AM   Result Value Ref Range    Anion Gap 16.0 8.0 - 16.0 meq/L   Glomerular Filtration Rate, Estimated    Collection Time: 12/29/21  5:42 AM   Result Value Ref Range    Est, Glom Filt Rate 8 (A) ml/min/1.73m2        Microbiology:    Blood culture #1:   Lab Results   Component Value Date    BC No growth-preliminary No growth  09/23/2021       Blood culture #2:No results found for: [address]    Organism:    Lab Results   Component Value Date    LABGRAM  03/19/2021     Rare segmented neutrophils observed. Moderate epithelial cells observed. Rare gram positive cocci in pairs. MRSA culture only:No results found for: [facility]    Urine culture: No results found for: Lindsay Bile  Lab Results   Component Value Date    ORG Culture screen is positive for MRSA colonization 11/17/2021        Respiratory culture: No results found for: CULTRESP    Aerobic and Anaerobic :  No results found for: LABAERO  No results found for: LABANAE    Urinalysis:    No results found for: Obie Lasso, BACTERIA, RBCUA, BLOODU, [address], [address]    Radiology:-  XR CHEST (2 VW)    Result Date: 12/25/2021  PROCEDURE: XR CHEST (2 VW) CLINICAL INFORMATION: SOB COMPARISON: 9/20/2021 TECHNIQUE:  AP and lateral chest x-ray  FINDINGS: The heart size appears within normal limits. Patchy airspace opacities are seen at the right lung base which may represent atelectasis or pneumonia. Minimal blunting of the left costophrenic angle is demonstrated which may represent a minimal left pleural effusion. No pneumothorax is seen. No acute osseous findings are demonstrated. 1. Patchy airspace opacities are seen at the right lung base which may represent atelectasis or pneumonia. 2. Minimal blunting of the left costophrenic angle is demonstrated which may represent a minimal left pleural effusion. **This report has been created using voice recognition software. It may contain minor errors which are inherent in voice recognition technology. ** Final report electronically signed by Dr. Tyrone Jay on 12/25/2021 1:11 PM    XR ABDOMEN (KUB) (SINGLE AP VIEW)    Result Date: 12/25/2021  PROCEDURE: XR ABDOMEN (KUB) (SINGLE AP VIEW) CLINICAL INFORMATION: confirm dialysis catheter placement COMPARISON: 11/23/2019 TECHNIQUE:  AP supine abdomen single view  FINDINGS: There is a left femoral approach temporary dialysis catheter present with the tip overlying the rightward L4-L5 level which projects over the expected location of the confluence of the iliac veins. Scattered loops of gas and stool-filled bowel are seen overlying the abdomen and pelvis. No acute osseous findings are seen. There is a moderate amount retained stool throughout the colon. 1. There is a left femoral approach temporary dialysis catheter present with the tip overlying the rightward L4-L5 level which projects over the expected location of the confluence of the iliac veins. 2. Nonobstructive bowel gas pattern with moderate retained stool throughout the colon. **This report has been created using voice recognition software. It may contain minor errors which are inherent in voice recognition technology. ** Final report electronically signed by Dr. Kareen Hsieh on 12/25/2021 3:54 PM       Follow-up scheduled after discharge :-    in the next few days with No primary care provider on file.     Consultations during this hospital stay:-  [] NONE [] Cardiology  [] Nephrology  [] Hemo onco   [] GI   [] ID  [] Endocrine  [] Pulm    [] Neuro    [] Psych   [] Urology  [] ENT   [] G SURGERY   []Ortho    []CV surg    [] Palliative  [] Hospice [] Pain management   []    []TCU   [] PT/OT  OTHERS:-    Disposition: NH  Condition at Discharge: Stable    Time Spent:- 35 minutes    Electronically signed by Esequiel Rocha MD on 12/29/21 at 8:38 AM EST   Discharging Hospitalist

## 2021-12-29 NOTE — FLOWSHEET NOTE
12/29/21 0740 12/29/21 0850   Vital Signs   BP (!) 92/48 111/78   Temp 97.5 °F (36.4 °C) 97.5 °F (36.4 °C)   Pulse 66 66   Resp 18 18   Weight 135 lb 5.8 oz (61.4 kg) 135 lb 5.8 oz (61.4 kg)   Weight Method Bed scale Bed scale   Percent Weight Change -1.29 0   Post-Hemodialysis Assessment   Post-Treatment Procedures  --  Blood returned; Access bleeding time < 10 minutes   Machine Disinfection Process  --  Acid/Vinegar Clean;Heat Disinfect; Exterior Machine Disinfection   Rinseback Volume (ml)  --  400 ml   Total Liters Processed (l/min)  --  18.7 l/min   Dialyzer Clearance  --  Lightly streaked   Duration of Treatment (minutes)  --  60 minutes   Hemodialysis Intake (ml)  --  200 ml   Hemodialysis Output (ml)  --  200 ml   NET Removed (ml)  --  0 ml   Tolerated Treatment  --  Good   Pt completed 1 hour of 2 hour treatment. Stopping early due to request. No fluid removed during HD as patient was below EDW; Pt tolerated tx well. Both needle sites held x10 min each with dressings dry and intact. Report given to primary RN.  Treatment record printed for scanning

## 2021-12-29 NOTE — PROGRESS NOTES
[facility]   Nephrology progress note  12/29/2021, 12:14 PM      Pt Name:    Radha Gutierrez  MRN:     [id number]     YOB: 1965  Admit Date:    12/25/2021 11:26 AM  Primary Care Physician:  No primary care provider on file. Room number  6K-28/028-A    Chief Complaint: Nephrology following for ESRD and HD    Subjective:  Patient seen and examined  Seen earlier today during rounds  Very noncompliant  Terminated dialysis treatment early again today    Objective:  24HR INTAKE/OUTPUT:      Intake/Output Summary (Last 24 hours) at 12/29/2021 1214  Last data filed at 12/29/2021 0850  Gross per 24 hour   Intake 600 ml   Output 200 ml   Net 400 ml     I/O last 3 completed shifts: In: 800 [P.O.:400]  Out: 1400   I/O this shift:  In: 200   Out: 200   Admission weight: 128 lb 1.4 oz (58.1 kg)  Wt Readings from Last 3 Encounters:   12/29/21 135 lb 5.8 oz (61.4 kg)   11/30/21 152 lb (68.9 kg)   11/18/21 144 lb 6.4 oz (65.5 kg)     Body mass index is 23.98 kg/m².     Physical examination  VITALS:     Vitals:    12/29/21 0740 12/29/21 0850 12/29/21 0900 12/29/21 1100   BP: (!) 92/48 111/78 130/80 112/76   Pulse: 66 66 91 92   Resp: 18 18 16 16   Temp: 97.5 °F (36.4 °C) 97.5 °F (36.4 °C) 99.3 °F (37.4 °C) 98.7 °F (37.1 °C)   TempSrc:   Oral Oral   SpO2:   94% 96%   Weight: 135 lb 5.8 oz (61.4 kg) 135 lb 5.8 oz (61.4 kg)       General Appearance: alert and cooperative with exam, appears comfortable, no distress  Mouth/Throat: Oral mucosa moist  Lungs: no use of accessory muscles  Extremities: No LE edema      Lab Data  CBC:   Recent Labs     12/27/21  0448   WBC 6.4   HGB 13.1   HCT 44.3   *     BMP:  Recent Labs     12/27/21  0448 12/28/21  0438 12/29/21  0542    139 140   K 6.1* 4.8 4.8    99 97*   CO2 22* 25 27   BUN 21 11 15   CREATININE 6.7* 4.4* 5.3*   GLUCOSE 73 75 91   CALCIUM 9.7 9.2 9.7   PHOS  --  4.3  --      Hepatic: No results for input(s): LABALBU, AST, ALT, ALB, BILITOT, ALKPHOS in the last 72 hours. Meds:  Infusion:    sodium chloride       Meds:    morphine  2 mg IntraVENous Once    polyethylene glycol  17 g Oral Daily    ipratropium-albuterol  3 mL Inhalation BID    ALPRAZolam  0.5 mg Oral TID    benztropine  1 mg Oral Nightly    carbidopa-levodopa  1 tablet Oral Nightly    citalopram  20 mg Oral Daily    docusate sodium  100 mg Oral Daily    famotidine  20 mg Oral Once per day on Mon Wed Fri    budesonide-formoterol  2 puff Inhalation BID    melatonin  6 mg Oral Nightly    midodrine  2.5 mg Oral TID AC    mirtazapine  7.5 mg Oral Nightly    nicotine  1 patch TransDERmal Daily    QUEtiapine  25 mg Oral BID    senna  1 tablet Oral Nightly    sevelamer  1,600 mg Oral TID WC    sodium chloride flush  10 mL IntraVENous 2 times per day    heparin (porcine)  5,000 Units SubCUTAneous BID     Meds prn: HYDROcodone 5 mg - acetaminophen, diphenhydrAMINE, ipratropium-albuterol, sodium chloride flush, sodium chloride, ondansetron **OR** ondansetron, polyethylene glycol, ibuprofen       Impression and Plan:  1. ESRD on hemodialysis  Had dialysis treatment only for 1 hour  Patient terminated dialysis treatment early   Fistula working well now  Very noncompliant    2. Hyperkalemia. Improved  3. Mild metabolic acidosis  4. History of COPD  5. Chronic hypotension. On midodrine  6. Hyperphosphatemia. Continue phosphate binders  7.   Noncompliance: Discussed with patient in detail    D/W RN  Pt to resume dialysis at [address], [place]  [facility]

## 2021-12-29 NOTE — PROGRESS NOTES
[facility] stated that they needed Xanax script sent to them due to not having a doctor to prescribe it for the holiday weekend and if the patient needed it- it would have to be ordered per hospitalist until Monday. This RN updated Dr Aracelis Harper who ordered and signed script to be faxed to [facility]. Script faxed per this RN.

## 2022-01-01 ENCOUNTER — APPOINTMENT (OUTPATIENT)
Dept: CT IMAGING | Age: 57
DRG: 177 | End: 2022-01-01
Payer: MEDICARE

## 2022-01-01 ENCOUNTER — HOSPITAL ENCOUNTER (INPATIENT)
Age: 57
LOS: 2 days | Discharge: HOME | DRG: 177 | End: 2022-01-19
Attending: EMERGENCY MEDICINE | Admitting: INTERNAL MEDICINE
Payer: MEDICARE

## 2022-01-01 ENCOUNTER — HOSPITAL ENCOUNTER (OUTPATIENT)
Dept: INTERVENTIONAL RADIOLOGY/VASCULAR | Age: 57
Discharge: HOME | End: 2022-01-05
Payer: MEDICARE

## 2022-01-01 ENCOUNTER — APPOINTMENT (OUTPATIENT)
Dept: GENERAL RADIOLOGY | Age: 57
DRG: 177 | End: 2022-01-01
Payer: MEDICARE

## 2022-01-01 ENCOUNTER — HOSPITAL ENCOUNTER (EMERGENCY)
Age: 57
Discharge: HOME | End: 2022-01-06
Attending: EMERGENCY MEDICINE
Payer: MEDICARE

## 2022-01-01 VITALS
DIASTOLIC BLOOD PRESSURE: 95 MMHG | RESPIRATION RATE: 18 BRPM | TEMPERATURE: 97.1 F | OXYGEN SATURATION: 96 % | HEART RATE: 67 BPM | SYSTOLIC BLOOD PRESSURE: 175 MMHG

## 2022-01-01 VITALS
TEMPERATURE: 98.3 F | RESPIRATION RATE: 20 BRPM | DIASTOLIC BLOOD PRESSURE: 108 MMHG | SYSTOLIC BLOOD PRESSURE: 160 MMHG | HEART RATE: 77 BPM | OXYGEN SATURATION: 96 %

## 2022-01-01 VITALS
TEMPERATURE: 98 F | RESPIRATION RATE: 18 BRPM | HEART RATE: 87 BPM | SYSTOLIC BLOOD PRESSURE: 142 MMHG | BODY MASS INDEX: 21.68 KG/M2 | HEIGHT: 63 IN | WEIGHT: 122.36 LBS | DIASTOLIC BLOOD PRESSURE: 86 MMHG | OXYGEN SATURATION: 92 %

## 2022-01-01 DIAGNOSIS — N18.6 STAGE 5 CHRONIC KIDNEY DISEASE ON CHRONIC DIALYSIS (HCC): Primary | ICD-10-CM

## 2022-01-01 DIAGNOSIS — I82.602 THROMBOSIS OF LEFT UPPER EXTREMITY: ICD-10-CM

## 2022-01-01 DIAGNOSIS — F05 DELIRIUM DUE TO ANOTHER MEDICAL CONDITION: ICD-10-CM

## 2022-01-01 DIAGNOSIS — T82.838A BLEEDING FROM PERIPHERALLY INSERTED CENTRAL CATHETER (PICC), INITIAL ENCOUNTER (HCC): Primary | ICD-10-CM

## 2022-01-01 DIAGNOSIS — Z99.2 STAGE 5 CHRONIC KIDNEY DISEASE ON CHRONIC DIALYSIS (HCC): Primary | ICD-10-CM

## 2022-01-01 DIAGNOSIS — I95.3 HEMODIALYSIS-ASSOCIATED HYPOTENSION: ICD-10-CM

## 2022-01-01 LAB
ALBUMIN SERPL-MCNC: 2.8 G/DL (ref 3.5–5.1)
ALBUMIN SERPL-MCNC: 3.2 G/DL (ref 3.5–5.1)
ALP BLD-CCNC: 240 U/L (ref 38–126)
ALP BLD-CCNC: 253 U/L (ref 38–126)
ALT SERPL-CCNC: < 5 U/L (ref 11–66)
ALT SERPL-CCNC: < 5 U/L (ref 11–66)
ANION GAP SERPL CALCULATED.3IONS-SCNC: 10 MEQ/L (ref 8–16)
ANION GAP SERPL CALCULATED.3IONS-SCNC: 13 MEQ/L (ref 8–16)
ANION GAP SERPL CALCULATED.3IONS-SCNC: 16 MEQ/L (ref 8–16)
ANISOCYTOSIS: PRESENT
AST SERPL-CCNC: 18 U/L (ref 5–40)
AST SERPL-CCNC: 20 U/L (ref 5–40)
BASE EXCESS MIXED: 2.6 MMOL/L (ref -2–3)
BASOPHILIA: ABNORMAL
BASOPHILS # BLD: 0.7 %
BASOPHILS # BLD: 1 %
BASOPHILS ABSOLUTE: 0 THOU/MM3 (ref 0–0.1)
BASOPHILS ABSOLUTE: 0.1 THOU/MM3 (ref 0–0.1)
BILIRUB SERPL-MCNC: 0.3 MG/DL (ref 0.3–1.2)
BILIRUB SERPL-MCNC: 0.4 MG/DL (ref 0.3–1.2)
BILIRUBIN DIRECT: < 0.2 MG/DL (ref 0–0.3)
BUN BLDV-MCNC: 14 MG/DL (ref 7–22)
BUN BLDV-MCNC: 17 MG/DL (ref 7–22)
BUN BLDV-MCNC: 44 MG/DL (ref 7–22)
CALCIUM SERPL-MCNC: 8.1 MG/DL (ref 8.5–10.5)
CALCIUM SERPL-MCNC: 8.2 MG/DL (ref 8.5–10.5)
CALCIUM SERPL-MCNC: 9.1 MG/DL (ref 8.5–10.5)
CHLORIDE BLD-SCNC: 102 MEQ/L (ref 98–111)
CHLORIDE BLD-SCNC: 95 MEQ/L (ref 98–111)
CHLORIDE BLD-SCNC: 99 MEQ/L (ref 98–111)
CO2: 22 MEQ/L (ref 23–33)
CO2: 27 MEQ/L (ref 23–33)
CO2: 28 MEQ/L (ref 23–33)
COLLECTED BY:: ABNORMAL
CREAT SERPL-MCNC: 10.1 MG/DL (ref 0.4–1.2)
CREAT SERPL-MCNC: 5.1 MG/DL (ref 0.4–1.2)
CREAT SERPL-MCNC: 5.9 MG/DL (ref 0.4–1.2)
EKG ATRIAL RATE: 78 BPM
EKG P AXIS: 46 DEGREES
EKG P-R INTERVAL: 158 MS
EKG Q-T INTERVAL: 430 MS
EKG QRS DURATION: 88 MS
EKG QTC CALCULATION (BAZETT): 490 MS
EKG R AXIS: -3 DEGREES
EKG T AXIS: 114 DEGREES
EKG VENTRICULAR RATE: 78 BPM
EOSINOPHIL # BLD: 4.1 %
EOSINOPHIL # BLD: 4.6 %
EOSINOPHILS ABSOLUTE: 0.3 THOU/MM3 (ref 0–0.4)
EOSINOPHILS ABSOLUTE: 0.3 THOU/MM3 (ref 0–0.4)
ERYTHROCYTE [DISTWIDTH] IN BLOOD BY AUTOMATED COUNT: 17.9 % (ref 11.5–14.5)
ERYTHROCYTE [DISTWIDTH] IN BLOOD BY AUTOMATED COUNT: 17.9 % (ref 11.5–14.5)
ERYTHROCYTE [DISTWIDTH] IN BLOOD BY AUTOMATED COUNT: 19.2 % (ref 11.5–14.5)
ERYTHROCYTE [DISTWIDTH] IN BLOOD BY AUTOMATED COUNT: 56.6 FL (ref 35–45)
ERYTHROCYTE [DISTWIDTH] IN BLOOD BY AUTOMATED COUNT: 58 FL (ref 35–45)
ERYTHROCYTE [DISTWIDTH] IN BLOOD BY AUTOMATED COUNT: 58.6 FL (ref 35–45)
GFR SERPL CREATININE-BSD FRML MDRD: 4 ML/MIN/1.73M2
GFR SERPL CREATININE-BSD FRML MDRD: 7 ML/MIN/1.73M2
GFR SERPL CREATININE-BSD FRML MDRD: 9 ML/MIN/1.73M2
GLUCOSE BLD-MCNC: 66 MG/DL (ref 70–108)
GLUCOSE BLD-MCNC: 68 MG/DL (ref 70–108)
GLUCOSE BLD-MCNC: 70 MG/DL (ref 70–108)
GLUCOSE BLD-MCNC: 73 MG/DL (ref 70–108)
GLUCOSE BLD-MCNC: 74 MG/DL (ref 70–108)
GLUCOSE BLD-MCNC: 75 MG/DL (ref 70–108)
GLUCOSE BLD-MCNC: 86 MG/DL (ref 70–108)
GLUCOSE BLD-MCNC: 91 MG/DL (ref 70–108)
HBV SURFACE AB TITR SER: NEGATIVE {TITER}
HCO3, MIXED: 26 MMOL/L (ref 23–28)
HCT VFR BLD CALC: 32.4 % (ref 37–47)
HCT VFR BLD CALC: 35.5 % (ref 37–47)
HCT VFR BLD CALC: 38.9 % (ref 37–47)
HEMOGLOBIN: 10 GM/DL (ref 12–16)
HEMOGLOBIN: 10.5 GM/DL (ref 12–16)
HEMOGLOBIN: 11.8 GM/DL (ref 12–16)
HEPATITIS B CORE IGM ANTIBODY: NEGATIVE
HEPATITIS B SURFACE ANTIGEN: NEGATIVE
IMMATURE GRANS (ABS): 0.1 THOU/MM3 (ref 0–0.07)
IMMATURE GRANS (ABS): 0.11 THOU/MM3 (ref 0–0.07)
IMMATURE GRANULOCYTES: 1.6 %
IMMATURE GRANULOCYTES: 1.9 %
LACTIC ACID, SEPSIS: 1.9 MMOL/L (ref 0.5–1.9)
LACTIC ACID, SEPSIS: 2.1 MMOL/L (ref 0.5–1.9)
LYMPHOCYTES # BLD: 20.3 %
LYMPHOCYTES # BLD: 24 %
LYMPHOCYTES ABSOLUTE: 1.2 THOU/MM3 (ref 1–4.8)
LYMPHOCYTES ABSOLUTE: 1.5 THOU/MM3 (ref 1–4.8)
MCH RBC QN AUTO: 26.7 PG (ref 26–33)
MCH RBC QN AUTO: 27.2 PG (ref 26–33)
MCH RBC QN AUTO: 27.3 PG (ref 26–33)
MCHC RBC AUTO-ENTMCNC: 29.6 GM/DL (ref 32.2–35.5)
MCHC RBC AUTO-ENTMCNC: 30.3 GM/DL (ref 32.2–35.5)
MCHC RBC AUTO-ENTMCNC: 30.9 GM/DL (ref 32.2–35.5)
MCV RBC AUTO: 88 FL (ref 81–99)
MCV RBC AUTO: 89.8 FL (ref 81–99)
MCV RBC AUTO: 90.3 FL (ref 81–99)
MONOCYTES # BLD: 7.1 %
MONOCYTES # BLD: 9.2 %
MONOCYTES ABSOLUTE: 0.4 THOU/MM3 (ref 0.4–1.3)
MONOCYTES ABSOLUTE: 0.6 THOU/MM3 (ref 0.4–1.3)
NUCLEATED RED BLOOD CELLS: 0 /100 WBC
NUCLEATED RED BLOOD CELLS: 0 /100 WBC
O2 SAT, MIXED: 78 %
OSMOLALITY CALCULATION: 286.4 MOSMOL/KG (ref 275–300)
PCO2, MIXED VENOUS: 37 MMHG (ref 41–51)
PH, MIXED: 7.47 (ref 7.31–7.41)
PLATELET # BLD: 110 THOU/MM3 (ref 130–400)
PLATELET # BLD: 115 THOU/MM3 (ref 130–400)
PLATELET # BLD: 157 THOU/MM3 (ref 130–400)
PMV BLD AUTO: 10.3 FL (ref 9.4–12.4)
PMV BLD AUTO: 10.4 FL (ref 9.4–12.4)
PMV BLD AUTO: 12.2 FL (ref 9.4–12.4)
PO2 MIXED: 40 MMHG (ref 25–40)
POTASSIUM REFLEX MAGNESIUM: 4.2 MEQ/L (ref 3.5–5.2)
POTASSIUM REFLEX MAGNESIUM: 4.6 MEQ/L (ref 3.5–5.2)
POTASSIUM SERPL-SCNC: 4.6 MEQ/L (ref 3.5–5.2)
PRO-BNP: ABNORMAL PG/ML (ref 0–900)
RBC # BLD: 3.68 MILL/MM3 (ref 4.2–5.4)
RBC # BLD: 3.93 MILL/MM3 (ref 4.2–5.4)
RBC # BLD: 4.33 MILL/MM3 (ref 4.2–5.4)
SARS-COV-2: DETECTED
SCAN OF BLOOD SMEAR: NORMAL
SEG NEUTROPHILS: 60.1 %
SEG NEUTROPHILS: 65.4 %
SEGMENTED NEUTROPHILS ABSOLUTE COUNT: 3.7 THOU/MM3 (ref 1.8–7.7)
SEGMENTED NEUTROPHILS ABSOLUTE COUNT: 3.8 THOU/MM3 (ref 1.8–7.7)
SODIUM BLD-SCNC: 137 MEQ/L (ref 135–145)
SODIUM BLD-SCNC: 137 MEQ/L (ref 135–145)
SODIUM BLD-SCNC: 138 MEQ/L (ref 135–145)
TOTAL PROTEIN: 5.5 G/DL (ref 6.1–8)
TOTAL PROTEIN: 6.1 G/DL (ref 6.1–8)
TROPONIN T: 0.15 NG/ML
WBC # BLD: 5.7 THOU/MM3 (ref 4.8–10.8)
WBC # BLD: 6.3 THOU/MM3 (ref 4.8–10.8)
WBC # BLD: 7.1 THOU/MM3 (ref 4.8–10.8)

## 2022-01-01 PROCEDURE — 5A1D70Z PERFORMANCE OF URINARY FILTRATION, INTERMITTENT, LESS THAN 6 HOURS PER DAY: ICD-10-PCS | Performed by: INTERNAL MEDICINE

## 2022-01-01 PROCEDURE — 94640 AIRWAY INHALATION TREATMENT: CPT

## 2022-01-01 PROCEDURE — 82803 BLOOD GASES ANY COMBINATION: CPT

## 2022-01-01 PROCEDURE — 36558 INSERT TUNNELED CV CATH: CPT

## 2022-01-01 PROCEDURE — 85025 COMPLETE CBC W/AUTO DIFF WBC: CPT

## 2022-01-01 PROCEDURE — 84484 ASSAY OF TROPONIN QUANT: CPT

## 2022-01-01 PROCEDURE — 94761 N-INVAS EAR/PLS OXIMETRY MLT: CPT

## 2022-01-01 PROCEDURE — 90935 HEMODIALYSIS ONE EVALUATION: CPT

## 2022-01-01 PROCEDURE — 36415 COLL VENOUS BLD VENIPUNCTURE: CPT

## 2022-01-01 PROCEDURE — 85027 COMPLETE CBC AUTOMATED: CPT

## 2022-01-01 PROCEDURE — 76937 US GUIDE VASCULAR ACCESS: CPT

## 2022-01-01 PROCEDURE — 6360000002 HC RX W HCPCS: Performed by: INTERNAL MEDICINE

## 2022-01-01 PROCEDURE — 6370000000 HC RX 637 (ALT 250 FOR IP): Performed by: INTERNAL MEDICINE

## 2022-01-01 PROCEDURE — 2580000003 HC RX 258: Performed by: RADIOLOGY

## 2022-01-01 PROCEDURE — 2500000003 HC RX 250 WO HCPCS: Performed by: RADIOLOGY

## 2022-01-01 PROCEDURE — 6360000002 HC RX W HCPCS: Performed by: EMERGENCY MEDICINE

## 2022-01-01 PROCEDURE — 2580000003 HC RX 258: Performed by: INTERNAL MEDICINE

## 2022-01-01 PROCEDURE — 1200000000 HC SEMI PRIVATE

## 2022-01-01 PROCEDURE — 83605 ASSAY OF LACTIC ACID: CPT

## 2022-01-01 PROCEDURE — 96374 THER/PROPH/DIAG INJ IV PUSH: CPT

## 2022-01-01 PROCEDURE — 2700000000 HC OXYGEN THERAPY PER DAY

## 2022-01-01 PROCEDURE — 6360000004 HC RX CONTRAST MEDICATION: Performed by: STUDENT IN AN ORGANIZED HEALTH CARE EDUCATION/TRAINING PROGRAM

## 2022-01-01 PROCEDURE — 2580000003 HC RX 258: Performed by: STUDENT IN AN ORGANIZED HEALTH CARE EDUCATION/TRAINING PROGRAM

## 2022-01-01 PROCEDURE — 71275 CT ANGIOGRAPHY CHEST: CPT

## 2022-01-01 PROCEDURE — 99284 EMERGENCY DEPT VISIT MOD MDM: CPT

## 2022-01-01 PROCEDURE — 82948 REAGENT STRIP/BLOOD GLUCOSE: CPT

## 2022-01-01 PROCEDURE — 80048 BASIC METABOLIC PNL TOTAL CA: CPT

## 2022-01-01 PROCEDURE — 71045 X-RAY EXAM CHEST 1 VIEW: CPT

## 2022-01-01 PROCEDURE — 96361 HYDRATE IV INFUSION ADD-ON: CPT

## 2022-01-01 PROCEDURE — 99223 1ST HOSP IP/OBS HIGH 75: CPT | Performed by: INTERNAL MEDICINE

## 2022-01-01 PROCEDURE — 86705 HEP B CORE ANTIBODY IGM: CPT

## 2022-01-01 PROCEDURE — 99232 SBSQ HOSP IP/OBS MODERATE 35: CPT | Performed by: INTERNAL MEDICINE

## 2022-01-01 PROCEDURE — 93005 ELECTROCARDIOGRAM TRACING: CPT | Performed by: STUDENT IN AN ORGANIZED HEALTH CARE EDUCATION/TRAINING PROGRAM

## 2022-01-01 PROCEDURE — 87340 HEPATITIS B SURFACE AG IA: CPT

## 2022-01-01 PROCEDURE — 99222 1ST HOSP IP/OBS MODERATE 55: CPT | Performed by: INTERNAL MEDICINE

## 2022-01-01 PROCEDURE — 77001 FLUOROGUIDE FOR VEIN DEVICE: CPT

## 2022-01-01 PROCEDURE — 6370000000 HC RX 637 (ALT 250 FOR IP): Performed by: RADIOLOGY

## 2022-01-01 PROCEDURE — 80076 HEPATIC FUNCTION PANEL: CPT

## 2022-01-01 PROCEDURE — 2500000003 HC RX 250 WO HCPCS

## 2022-01-01 PROCEDURE — 86706 HEP B SURFACE ANTIBODY: CPT

## 2022-01-01 PROCEDURE — 99285 EMERGENCY DEPT VISIT HI MDM: CPT

## 2022-01-01 PROCEDURE — 93010 ELECTROCARDIOGRAM REPORT: CPT | Performed by: NUCLEAR MEDICINE

## 2022-01-01 PROCEDURE — 90935 HEMODIALYSIS ONE EVALUATION: CPT | Performed by: INTERNAL MEDICINE

## 2022-01-01 PROCEDURE — 6360000002 HC RX W HCPCS: Performed by: RADIOLOGY

## 2022-01-01 PROCEDURE — 80053 COMPREHEN METABOLIC PANEL: CPT

## 2022-01-01 PROCEDURE — 99233 SBSQ HOSP IP/OBS HIGH 50: CPT | Performed by: PHYSICIAN ASSISTANT

## 2022-01-01 PROCEDURE — C1881 DIALYSIS ACCESS SYSTEM: HCPCS

## 2022-01-01 PROCEDURE — 99239 HOSP IP/OBS DSCHRG MGMT >30: CPT | Performed by: PHYSICIAN ASSISTANT

## 2022-01-01 PROCEDURE — 83880 ASSAY OF NATRIURETIC PEPTIDE: CPT

## 2022-01-01 RX ORDER — LIDOCAINE HYDROCHLORIDE AND EPINEPHRINE 10; 10 MG/ML; UG/ML
INJECTION, SOLUTION INFILTRATION; PERINEURAL
Status: COMPLETED
Start: 2022-01-01 | End: 2022-01-01

## 2022-01-01 RX ORDER — LANOLIN ALCOHOL/MO/W.PET/CERES
6 CREAM (GRAM) TOPICAL NIGHTLY
Status: DISCONTINUED | OUTPATIENT
Start: 2022-01-01 | End: 2022-01-01 | Stop reason: HOSPADM

## 2022-01-01 RX ORDER — LORAZEPAM 2 MG/ML
1 INJECTION INTRAMUSCULAR ONCE
Status: COMPLETED | OUTPATIENT
Start: 2022-01-01 | End: 2022-01-01

## 2022-01-01 RX ORDER — CITALOPRAM 20 MG/1
20 TABLET ORAL DAILY
Status: DISCONTINUED | OUTPATIENT
Start: 2022-01-01 | End: 2022-01-01 | Stop reason: HOSPADM

## 2022-01-01 RX ORDER — POLYETHYLENE GLYCOL 3350 17 G/17G
17 POWDER, FOR SOLUTION ORAL DAILY
Status: DISCONTINUED | OUTPATIENT
Start: 2022-01-01 | End: 2022-01-01 | Stop reason: HOSPADM

## 2022-01-01 RX ORDER — MIDAZOLAM HYDROCHLORIDE 1 MG/ML
1 INJECTION INTRAMUSCULAR; INTRAVENOUS ONCE
Status: COMPLETED | OUTPATIENT
Start: 2022-01-01 | End: 2022-01-01

## 2022-01-01 RX ORDER — MIDODRINE HYDROCHLORIDE 2.5 MG/1
2.5 TABLET ORAL
Status: DISCONTINUED | OUTPATIENT
Start: 2022-01-01 | End: 2022-01-01

## 2022-01-01 RX ORDER — CLINDAMYCIN PHOSPHATE 600 MG/50ML
600 INJECTION INTRAVENOUS
Status: COMPLETED | OUTPATIENT
Start: 2022-01-01 | End: 2022-01-01

## 2022-01-01 RX ORDER — ACETAMINOPHEN 325 MG/1
650 TABLET ORAL EVERY 6 HOURS PRN
Status: DISCONTINUED | OUTPATIENT
Start: 2022-01-01 | End: 2022-01-01 | Stop reason: SDUPTHER

## 2022-01-01 RX ORDER — SENNA PLUS 8.6 MG/1
1 TABLET ORAL NIGHTLY
Status: DISCONTINUED | OUTPATIENT
Start: 2022-01-01 | End: 2022-01-01 | Stop reason: HOSPADM

## 2022-01-01 RX ORDER — ACETAMINOPHEN 325 MG/1
650 TABLET ORAL EVERY 6 HOURS PRN
Status: DISCONTINUED | OUTPATIENT
Start: 2022-01-01 | End: 2022-01-01 | Stop reason: HOSPADM

## 2022-01-01 RX ORDER — FAMOTIDINE 20 MG/1
20 TABLET, FILM COATED ORAL
Status: DISCONTINUED | OUTPATIENT
Start: 2022-01-01 | End: 2022-01-01 | Stop reason: HOSPADM

## 2022-01-01 RX ORDER — MULTIVITAMIN WITH IRON
1 TABLET ORAL DAILY
Status: DISCONTINUED | OUTPATIENT
Start: 2022-01-01 | End: 2022-01-01 | Stop reason: HOSPADM

## 2022-01-01 RX ORDER — DEXTROSE MONOHYDRATE 50 MG/ML
100 INJECTION, SOLUTION INTRAVENOUS PRN
Status: DISCONTINUED | OUTPATIENT
Start: 2022-01-01 | End: 2022-01-01 | Stop reason: HOSPADM

## 2022-01-01 RX ORDER — POLYETHYLENE GLYCOL 3350 17 G/17G
17 POWDER, FOR SOLUTION ORAL DAILY PRN
Status: DISCONTINUED | OUTPATIENT
Start: 2022-01-01 | End: 2022-01-01 | Stop reason: HOSPADM

## 2022-01-01 RX ORDER — AZITHROMYCIN 250 MG/1
500 TABLET, FILM COATED ORAL EVERY 24 HOURS
Status: DISCONTINUED | OUTPATIENT
Start: 2022-01-01 | End: 2022-01-01

## 2022-01-01 RX ORDER — ALPRAZOLAM 0.5 MG/1
0.5 TABLET ORAL NIGHTLY PRN
COMMUNITY

## 2022-01-01 RX ORDER — SODIUM CHLORIDE 450 MG/100ML
INJECTION, SOLUTION INTRAVENOUS CONTINUOUS
Status: DISCONTINUED | OUTPATIENT
Start: 2022-01-01 | End: 2022-01-01 | Stop reason: HOSPADM

## 2022-01-01 RX ORDER — NICOTINE 21 MG/24HR
1 PATCH, TRANSDERMAL 24 HOURS TRANSDERMAL EVERY 24 HOURS
Status: DISCONTINUED | OUTPATIENT
Start: 2022-01-01 | End: 2022-01-01 | Stop reason: HOSPADM

## 2022-01-01 RX ORDER — DIPHENHYDRAMINE HCL 25 MG
25 TABLET ORAL EVERY 6 HOURS PRN
Status: DISCONTINUED | OUTPATIENT
Start: 2022-01-01 | End: 2022-01-01 | Stop reason: HOSPADM

## 2022-01-01 RX ORDER — DOCUSATE SODIUM 100 MG/1
100 CAPSULE, LIQUID FILLED ORAL DAILY
Status: DISCONTINUED | OUTPATIENT
Start: 2022-01-01 | End: 2022-01-01 | Stop reason: HOSPADM

## 2022-01-01 RX ORDER — HYDROXYZINE HYDROCHLORIDE 25 MG/1
100 TABLET, FILM COATED ORAL EVERY 6 HOURS PRN
Status: DISCONTINUED | OUTPATIENT
Start: 2022-01-01 | End: 2022-01-01 | Stop reason: HOSPADM

## 2022-01-01 RX ORDER — ACETAMINOPHEN 650 MG/1
650 SUPPOSITORY RECTAL EVERY 6 HOURS PRN
Status: DISCONTINUED | OUTPATIENT
Start: 2022-01-01 | End: 2022-01-01 | Stop reason: HOSPADM

## 2022-01-01 RX ORDER — SODIUM CHLORIDE, SODIUM LACTATE, POTASSIUM CHLORIDE, AND CALCIUM CHLORIDE .6; .31; .03; .02 G/100ML; G/100ML; G/100ML; G/100ML
250 INJECTION, SOLUTION INTRAVENOUS ONCE
Status: COMPLETED | OUTPATIENT
Start: 2022-01-01 | End: 2022-01-01

## 2022-01-01 RX ORDER — SODIUM POLYSTYRENE SULFONATE 15 G/60ML
15 SUSPENSION ORAL; RECTAL ONCE
COMMUNITY

## 2022-01-01 RX ORDER — SODIUM CHLORIDE 9 MG/ML
25 INJECTION, SOLUTION INTRAVENOUS PRN
Status: DISCONTINUED | OUTPATIENT
Start: 2022-01-01 | End: 2022-01-01 | Stop reason: HOSPADM

## 2022-01-01 RX ORDER — MIRTAZAPINE 7.5 MG/1
7.5 TABLET, FILM COATED ORAL NIGHTLY
Status: DISCONTINUED | OUTPATIENT
Start: 2022-01-01 | End: 2022-01-01 | Stop reason: HOSPADM

## 2022-01-01 RX ORDER — BENZTROPINE MESYLATE 1 MG/1
1 TABLET ORAL NIGHTLY
Status: DISCONTINUED | OUTPATIENT
Start: 2022-01-01 | End: 2022-01-01 | Stop reason: HOSPADM

## 2022-01-01 RX ORDER — ONDANSETRON 4 MG/1
4 TABLET, ORALLY DISINTEGRATING ORAL EVERY 8 HOURS PRN
Status: DISCONTINUED | OUTPATIENT
Start: 2022-01-01 | End: 2022-01-01 | Stop reason: HOSPADM

## 2022-01-01 RX ORDER — DEXTROSE MONOHYDRATE 25 G/50ML
12.5 INJECTION, SOLUTION INTRAVENOUS PRN
Status: DISCONTINUED | OUTPATIENT
Start: 2022-01-01 | End: 2022-01-01 | Stop reason: HOSPADM

## 2022-01-01 RX ORDER — DEXAMETHASONE 6 MG/1
6 TABLET ORAL DAILY
Qty: 10 TABLET | Refills: 0 | Status: SHIPPED | OUTPATIENT
Start: 2022-01-01 | End: 2022-01-28

## 2022-01-01 RX ORDER — ALPRAZOLAM 0.5 MG/1
0.5 TABLET ORAL NIGHTLY PRN
Status: DISCONTINUED | OUTPATIENT
Start: 2022-01-01 | End: 2022-01-01 | Stop reason: HOSPADM

## 2022-01-01 RX ORDER — IPRATROPIUM BROMIDE AND ALBUTEROL SULFATE 2.5; .5 MG/3ML; MG/3ML
3 SOLUTION RESPIRATORY (INHALATION) 4 TIMES DAILY
Status: DISCONTINUED | OUTPATIENT
Start: 2022-01-01 | End: 2022-01-01 | Stop reason: HOSPADM

## 2022-01-01 RX ORDER — ONDANSETRON 2 MG/ML
4 INJECTION INTRAMUSCULAR; INTRAVENOUS EVERY 6 HOURS PRN
Status: DISCONTINUED | OUTPATIENT
Start: 2022-01-01 | End: 2022-01-01 | Stop reason: HOSPADM

## 2022-01-01 RX ORDER — MIDODRINE HYDROCHLORIDE 10 MG/1
10 TABLET ORAL
Status: DISCONTINUED | OUTPATIENT
Start: 2022-01-01 | End: 2022-01-01 | Stop reason: HOSPADM

## 2022-01-01 RX ORDER — QUETIAPINE FUMARATE 25 MG/1
25 TABLET, FILM COATED ORAL 2 TIMES DAILY
Status: DISCONTINUED | OUTPATIENT
Start: 2022-01-01 | End: 2022-01-01 | Stop reason: HOSPADM

## 2022-01-01 RX ORDER — SEVELAMER CARBONATE 800 MG/1
1600 TABLET, FILM COATED ORAL
Status: DISCONTINUED | OUTPATIENT
Start: 2022-01-01 | End: 2022-01-01 | Stop reason: HOSPADM

## 2022-01-01 RX ORDER — HEPARIN SODIUM 5000 [USP'U]/ML
5000 INJECTION, SOLUTION INTRAVENOUS; SUBCUTANEOUS EVERY 8 HOURS SCHEDULED
Status: DISCONTINUED | OUTPATIENT
Start: 2022-01-01 | End: 2022-01-01 | Stop reason: HOSPADM

## 2022-01-01 RX ORDER — HYDROCODONE BITARTRATE AND ACETAMINOPHEN 5; 325 MG/1; MG/1
1 TABLET ORAL EVERY 6 HOURS PRN
COMMUNITY

## 2022-01-01 RX ORDER — SODIUM CHLORIDE 0.9 % (FLUSH) 0.9 %
5-40 SYRINGE (ML) INJECTION EVERY 12 HOURS SCHEDULED
Status: DISCONTINUED | OUTPATIENT
Start: 2022-01-01 | End: 2022-01-01 | Stop reason: HOSPADM

## 2022-01-01 RX ORDER — NICOTINE POLACRILEX 4 MG
15 LOZENGE BUCCAL PRN
Status: DISCONTINUED | OUTPATIENT
Start: 2022-01-01 | End: 2022-01-01 | Stop reason: HOSPADM

## 2022-01-01 RX ORDER — DEXAMETHASONE 6 MG/1
6 TABLET ORAL DAILY
Qty: 9 TABLET | Refills: 0 | OUTPATIENT
Start: 2022-01-01 | End: 2022-01-28

## 2022-01-01 RX ORDER — LIDOCAINE 40 MG/G
CREAM TOPICAL ONCE
Status: COMPLETED | OUTPATIENT
Start: 2022-01-01 | End: 2022-01-01

## 2022-01-01 RX ORDER — BUDESONIDE AND FORMOTEROL FUMARATE DIHYDRATE 160; 4.5 UG/1; UG/1
2 AEROSOL RESPIRATORY (INHALATION) 2 TIMES DAILY
Status: DISCONTINUED | OUTPATIENT
Start: 2022-01-01 | End: 2022-01-01 | Stop reason: HOSPADM

## 2022-01-01 RX ORDER — HYDROCODONE BITARTRATE AND ACETAMINOPHEN 5; 325 MG/1; MG/1
1 TABLET ORAL EVERY 6 HOURS PRN
Status: DISCONTINUED | OUTPATIENT
Start: 2022-01-01 | End: 2022-01-01 | Stop reason: HOSPADM

## 2022-01-01 RX ORDER — SODIUM CHLORIDE 0.9 % (FLUSH) 0.9 %
5-40 SYRINGE (ML) INJECTION PRN
Status: DISCONTINUED | OUTPATIENT
Start: 2022-01-01 | End: 2022-01-01 | Stop reason: HOSPADM

## 2022-01-01 RX ADMIN — DOCUSATE SODIUM 100 MG: 100 CAPSULE, LIQUID FILLED ORAL at 11:23

## 2022-01-01 RX ADMIN — SENNOSIDES 8.6 MG: 8.6 TABLET, COATED ORAL at 21:13

## 2022-01-01 RX ADMIN — MIDODRINE HYDROCHLORIDE 10 MG: 10 TABLET ORAL at 16:49

## 2022-01-01 RX ADMIN — IOPAMIDOL 80 ML: 755 INJECTION, SOLUTION INTRAVENOUS at 16:01

## 2022-01-01 RX ADMIN — Medication 1 TABLET: at 11:23

## 2022-01-01 RX ADMIN — SODIUM CHLORIDE, POTASSIUM CHLORIDE, SODIUM LACTATE AND CALCIUM CHLORIDE 250 ML: 600; 310; 30; 20 INJECTION, SOLUTION INTRAVENOUS at 13:34

## 2022-01-01 RX ADMIN — SEVELAMER CARBONATE 1600 MG: 800 TABLET, FILM COATED ORAL at 15:56

## 2022-01-01 RX ADMIN — Medication 6 MG: at 22:14

## 2022-01-01 RX ADMIN — CITALOPRAM HYDROBROMIDE 20 MG: 20 TABLET ORAL at 11:24

## 2022-01-01 RX ADMIN — MIRTAZAPINE 7.5 MG: 7.5 TABLET ORAL at 20:56

## 2022-01-01 RX ADMIN — LORAZEPAM 1 MG: 2 INJECTION INTRAMUSCULAR; INTRAVENOUS at 15:41

## 2022-01-01 RX ADMIN — Medication 6 MG: at 20:56

## 2022-01-01 RX ADMIN — MIDODRINE HYDROCHLORIDE 2.5 MG: 2.5 TABLET ORAL at 11:24

## 2022-01-01 RX ADMIN — HEPARIN SODIUM 5000 UNITS: 5000 INJECTION INTRAVENOUS; SUBCUTANEOUS at 22:14

## 2022-01-01 RX ADMIN — ALPRAZOLAM 0.5 MG: 0.5 TABLET ORAL at 21:17

## 2022-01-01 RX ADMIN — SODIUM CHLORIDE, PRESERVATIVE FREE 10 ML: 5 INJECTION INTRAVENOUS at 20:20

## 2022-01-01 RX ADMIN — BUDESONIDE AND FORMOTEROL FUMARATE DIHYDRATE 2 PUFF: 160; 4.5 AEROSOL RESPIRATORY (INHALATION) at 08:29

## 2022-01-01 RX ADMIN — QUETIAPINE FUMARATE 25 MG: 25 TABLET ORAL at 11:23

## 2022-01-01 RX ADMIN — SEVELAMER CARBONATE 1600 MG: 800 TABLET, FILM COATED ORAL at 16:49

## 2022-01-01 RX ADMIN — CARBIDOPA AND LEVODOPA 1 TABLET: 25; 100 TABLET ORAL at 20:20

## 2022-01-01 RX ADMIN — HEPARIN SODIUM 5000 UNITS: 5000 INJECTION INTRAVENOUS; SUBCUTANEOUS at 05:59

## 2022-01-01 RX ADMIN — CARBIDOPA AND LEVODOPA 1 TABLET: 25; 100 TABLET ORAL at 20:56

## 2022-01-01 RX ADMIN — HYDROCODONE BITARTRATE AND ACETAMINOPHEN 1 TABLET: 5; 325 TABLET ORAL at 20:20

## 2022-01-01 RX ADMIN — QUETIAPINE FUMARATE 25 MG: 25 TABLET ORAL at 08:27

## 2022-01-01 RX ADMIN — MIDODRINE HYDROCHLORIDE 2.5 MG: 2.5 TABLET ORAL at 15:56

## 2022-01-01 RX ADMIN — HEPARIN SODIUM 5000 UNITS: 5000 INJECTION INTRAVENOUS; SUBCUTANEOUS at 06:48

## 2022-01-01 RX ADMIN — SEVELAMER CARBONATE 1600 MG: 800 TABLET, FILM COATED ORAL at 08:27

## 2022-01-01 RX ADMIN — Medication 6 MG: at 21:13

## 2022-01-01 RX ADMIN — BUDESONIDE AND FORMOTEROL FUMARATE DIHYDRATE 2 PUFF: 160; 4.5 AEROSOL RESPIRATORY (INHALATION) at 17:54

## 2022-01-01 RX ADMIN — BENZTROPINE MESYLATE 1 MG: 1 TABLET ORAL at 20:20

## 2022-01-01 RX ADMIN — Medication 1 TABLET: at 08:29

## 2022-01-01 RX ADMIN — CITALOPRAM HYDROBROMIDE 20 MG: 20 TABLET ORAL at 08:28

## 2022-01-01 RX ADMIN — IPRATROPIUM BROMIDE AND ALBUTEROL SULFATE 3 ML: .5; 3 SOLUTION RESPIRATORY (INHALATION) at 17:54

## 2022-01-01 RX ADMIN — SODIUM CHLORIDE 25 ML: 9 INJECTION, SOLUTION INTRAVENOUS at 22:35

## 2022-01-01 RX ADMIN — SODIUM CHLORIDE, PRESERVATIVE FREE 10 ML: 5 INJECTION INTRAVENOUS at 11:26

## 2022-01-01 RX ADMIN — MIDODRINE HYDROCHLORIDE 10 MG: 10 TABLET ORAL at 06:57

## 2022-01-01 RX ADMIN — FAMOTIDINE 20 MG: 20 TABLET, FILM COATED ORAL at 21:17

## 2022-01-01 RX ADMIN — HEPARIN SODIUM 5000 UNITS: 5000 INJECTION INTRAVENOUS; SUBCUTANEOUS at 21:13

## 2022-01-01 RX ADMIN — QUETIAPINE FUMARATE 25 MG: 25 TABLET ORAL at 20:56

## 2022-01-01 RX ADMIN — BENZTROPINE MESYLATE 1 MG: 1 TABLET ORAL at 21:13

## 2022-01-01 RX ADMIN — AZITHROMYCIN MONOHYDRATE 500 MG: 250 TABLET ORAL at 21:13

## 2022-01-01 RX ADMIN — CARBIDOPA AND LEVODOPA 1 TABLET: 25; 100 TABLET ORAL at 21:13

## 2022-01-01 RX ADMIN — SEVELAMER CARBONATE 1600 MG: 800 TABLET, FILM COATED ORAL at 11:23

## 2022-01-01 RX ADMIN — MIRTAZAPINE 7.5 MG: 7.5 TABLET ORAL at 21:13

## 2022-01-01 RX ADMIN — HEPARIN SODIUM 5000 UNITS: 5000 INJECTION INTRAVENOUS; SUBCUTANEOUS at 13:56

## 2022-01-01 RX ADMIN — BUDESONIDE AND FORMOTEROL FUMARATE DIHYDRATE 2 PUFF: 160; 4.5 AEROSOL RESPIRATORY (INHALATION) at 17:14

## 2022-01-01 RX ADMIN — BENZTROPINE MESYLATE 1 MG: 1 TABLET ORAL at 20:56

## 2022-01-01 RX ADMIN — SODIUM CHLORIDE, PRESERVATIVE FREE 10 ML: 5 INJECTION INTRAVENOUS at 08:25

## 2022-01-01 RX ADMIN — LIDOCAINE 4%: 4 CREAM TOPICAL at 07:42

## 2022-01-01 RX ADMIN — LIDOCAINE HYDROCHLORIDE AND EPINEPHRINE: 10; 10 INJECTION, SOLUTION INFILTRATION; PERINEURAL at 16:52

## 2022-01-01 RX ADMIN — IPRATROPIUM BROMIDE AND ALBUTEROL SULFATE 3 ML: .5; 3 SOLUTION RESPIRATORY (INHALATION) at 17:08

## 2022-01-01 RX ADMIN — CLINDAMYCIN PHOSPHATE 600 MG: 600 INJECTION, SOLUTION INTRAVENOUS at 07:41

## 2022-01-01 RX ADMIN — SODIUM CHLORIDE: 4.5 INJECTION, SOLUTION INTRAVENOUS at 07:41

## 2022-01-01 RX ADMIN — IPRATROPIUM BROMIDE AND ALBUTEROL SULFATE 3 ML: .5; 3 SOLUTION RESPIRATORY (INHALATION) at 20:42

## 2022-01-01 RX ADMIN — MIDAZOLAM 0.5 MG: 1 INJECTION INTRAMUSCULAR; INTRAVENOUS at 08:38

## 2022-01-01 RX ADMIN — HYDROCODONE BITARTRATE AND ACETAMINOPHEN 1 TABLET: 5; 325 TABLET ORAL at 11:32

## 2022-01-01 RX ADMIN — CITALOPRAM HYDROBROMIDE 20 MG: 20 TABLET ORAL at 21:13

## 2022-01-01 RX ADMIN — MIRTAZAPINE 7.5 MG: 7.5 TABLET ORAL at 22:14

## 2022-01-01 RX ADMIN — SODIUM CHLORIDE, PRESERVATIVE FREE 10 ML: 5 INJECTION INTRAVENOUS at 21:18

## 2022-01-01 RX ADMIN — QUETIAPINE FUMARATE 25 MG: 25 TABLET ORAL at 22:14

## 2022-01-01 RX ADMIN — FAMOTIDINE 20 MG: 20 TABLET, FILM COATED ORAL at 08:26

## 2022-01-01 RX ADMIN — HEPARIN SODIUM 5000 UNITS: 5000 INJECTION INTRAVENOUS; SUBCUTANEOUS at 16:49

## 2022-01-01 RX ADMIN — QUETIAPINE FUMARATE 25 MG: 25 TABLET ORAL at 21:13

## 2022-01-01 RX ADMIN — HYDROMORPHONE HYDROCHLORIDE 1 MG: 1 INJECTION, SOLUTION INTRAMUSCULAR; INTRAVENOUS; SUBCUTANEOUS at 08:38

## 2022-01-01 RX ADMIN — SENNOSIDES 8.6 MG: 8.6 TABLET, COATED ORAL at 20:20

## 2022-01-01 RX ADMIN — CEFTRIAXONE SODIUM 1000 MG: 1 INJECTION, POWDER, FOR SOLUTION INTRAMUSCULAR; INTRAVENOUS at 22:30

## 2022-01-01 RX ADMIN — SENNOSIDES 8.6 MG: 8.6 TABLET, COATED ORAL at 20:56

## 2022-01-01 RX ADMIN — MIDODRINE HYDROCHLORIDE 2.5 MG: 2.5 TABLET ORAL at 06:48

## 2022-01-01 RX ADMIN — DOCUSATE SODIUM 100 MG: 100 CAPSULE, LIQUID FILLED ORAL at 21:17

## 2022-01-01 ASSESSMENT — PAIN DESCRIPTION - DESCRIPTORS
DESCRIPTORS: THROBBING

## 2022-01-01 ASSESSMENT — PAIN DESCRIPTION - ONSET
ONSET: ON-GOING

## 2022-01-01 ASSESSMENT — PAIN SCALES - GENERAL
PAINLEVEL_OUTOF10: 0
PAINLEVEL_OUTOF10: 8
PAINLEVEL_OUTOF10: 4
PAINLEVEL_OUTOF10: 8
PAINLEVEL_OUTOF10: 0
PAINLEVEL_OUTOF10: 5
PAINLEVEL_OUTOF10: 7
PAINLEVEL_OUTOF10: 5
PAINLEVEL_OUTOF10: 0
PAINLEVEL_OUTOF10: 0
PAINLEVEL_OUTOF10: 6
PAINLEVEL_OUTOF10: 8
PAINLEVEL_OUTOF10: 7
PAINLEVEL_OUTOF10: 0
PAINLEVEL_OUTOF10: 0

## 2022-01-01 ASSESSMENT — PAIN DESCRIPTION - PAIN TYPE
TYPE: ACUTE PAIN
TYPE: CHRONIC PAIN

## 2022-01-01 ASSESSMENT — PAIN DESCRIPTION - PROGRESSION
CLINICAL_PROGRESSION: NOT CHANGED

## 2022-01-01 ASSESSMENT — PAIN - FUNCTIONAL ASSESSMENT
PAIN_FUNCTIONAL_ASSESSMENT: ACTIVITIES ARE NOT PREVENTED
PAIN_FUNCTIONAL_ASSESSMENT: 0-10
PAIN_FUNCTIONAL_ASSESSMENT: 0-10
PAIN_FUNCTIONAL_ASSESSMENT: ACTIVITIES ARE NOT PREVENTED
PAIN_FUNCTIONAL_ASSESSMENT: ACTIVITIES ARE NOT PREVENTED

## 2022-01-01 ASSESSMENT — PAIN DESCRIPTION - ORIENTATION
ORIENTATION: RIGHT
ORIENTATION: RIGHT
ORIENTATION: RIGHT;LEFT
ORIENTATION: RIGHT
ORIENTATION: RIGHT;LEFT
ORIENTATION: RIGHT

## 2022-01-01 ASSESSMENT — PAIN DESCRIPTION - LOCATION
LOCATION: LEG

## 2022-01-01 ASSESSMENT — ENCOUNTER SYMPTOMS
EYES NEGATIVE: 1
NAUSEA: 1
GASTROINTESTINAL NEGATIVE: 1
RESPIRATORY NEGATIVE: 1
SHORTNESS OF BREATH: 1

## 2022-01-01 ASSESSMENT — PAIN DESCRIPTION - FREQUENCY
FREQUENCY: CONTINUOUS

## 2022-01-05 NOTE — PROGRESS NOTES
[age] pt returned from procedure. Ice pack on tunneled dialysis catheter. Pt states it hurts and then she falls right back asleep. Pt vitals stable. Pt sleeping with mouth open. Easy to wake up then falls back asleep.

## 2022-01-05 NOTE — PROGRESS NOTES
___m_ Safety:       (Environmental)   [place] to environment   Ensure ID band is correct and in place/ allergy band as needed   Assess for fall risk   Initiate fall precautions as applicable (fall band, side rails, etc.)   Call light within reach   Bed in low position/ wheels locked    _m___ Pain:        Assess pain level and characteristics   Administer analgesics as ordered   Assess effectiveness of pain management and report to MD as needed    __m__ Knowledge Deficit:   Assess baseline knowledge   Provide teaching at level of understanding   Provide teaching via preferred learning method   Evaluate teaching effectiveness    __m__ Hemodynamic/Respiratory Status:       (Pre and Post Procedure Monitoring)   Assess/Monitor vital signs and LOC   Assess Baseline SpO2 prior to any sedation   Obtain weight/height   Assess vital signs/ LOC until patient meets discharge criteria   Monitor procedure site and notify MD of any issues    _m___ Infection-Risk of Central Venous Catheter:   Monitor for infection signs and symptoms (catheter site redness, temperature elevation, etc)   Assess for infection risks   Educate regarding infection prevention   Manage central venous catheter (flushes/ dressing changes per protocol)

## 2022-01-05 NOTE — H&P
Formulation and discussion of sedation / procedure plans, risks, benefits, side effects and alternatives with patient and/or responsible adult completed.     Electronically signed by Lev Myers MD on 1/5/2022 at 8:37 AM

## 2022-01-05 NOTE — BRIEF OP NOTE
Department of Radiology  Post Procedure Progress Note      Pre-Procedure Diagnosis:  Renal Failure    Procedure Performed: Dialysis Catheter insertion     Anesthesia: local , versed and dilaudid    Findings: successful    Immediate Complications:  None    Estimated Blood Loss: minimal    SEE DICTATED PROCEDURE NOTE FOR COMPLETE DETAILS.       Danita Jones MD   1/5/2022 9:09 Tom Postoperative Note

## 2022-01-06 NOTE — ED NOTES
Pt moved to rm 30- placed on her home dose O2. Pt encouraged to try to relax and that the provider will be in to see her soon. Bleeding noted around areas that aren't taped. Will monitor.       Sana Gill RN  01/06/22 5818

## 2022-01-06 NOTE — ED NOTES
Dressing changed. Bleeding mostly from suture site, right chest, where port is held in place, some seepage noted from insertion site as well. Pt states it started after she had 3 hrs of dialysis today, otherwise denies needs or concerns. States the only medications she had had today is her pain medication. Pt appears in no acute distress, calling multiple family members during assessment. Pt had to be asked to refrain from phone calls until assessment and dressing change was complete.       Cha Kemp RN  01/06/22 9006

## 2022-01-06 NOTE — ED NOTES
Patient resting in bed. Respirations easy and unlabored. No distress noted. Call light within reach.        Milli Bailey RN  01/06/22 0461

## 2022-01-06 NOTE — ED NOTES
Pt is getting restless and anxious- states she doesn't want to be alone and she's worried she's gonna bleed to death. Multiple attempts to reassure her made but not helping. Pt states she is in pain all over and her restless legs are bothering her.       Ermias Kelley RN  01/06/22 1495

## 2022-01-06 NOTE — ED PROVIDER NOTES
[facility] EMERGENCY DEPT      CHIEF COMPLAINT       Chief Complaint   Patient presents with    Other     dialysis port bleeding- placed yesterday       Nurses Notes reviewed and I agree except as noted in the HPI. HISTORY OF PRESENT ILLNESS    Quita Dennis is a 55 y.o. female who presents with complaint of bleeding from a recent dialysis port. Patient does not take blood thinners. Onset: Acute  Duration: Hours prior to arrival  Timing: Persistent  Location of Pain: Pain around the port site. Intesity/severity: Mild persistent bleeding  Modifying Factors: Recent port placement  Relieved by;  Previous Episodes; Tx Before arrival: None  REVIEW OF SYSTEMS      Review of Systems   Constitutional: Negative for fever, chills, diaphoresis and fatigue. HENT: Negative for congestion, drooling, facial swelling and sore throat. Eyes: Negative for photophobia, pain and discharge. Respiratory: Negative for cough, shortness of breath, wheezing and stridor. Cardiovascular: Negative for chest pain, palpitations and leg swelling. Gastrointestinal: Negative for abdominal pain, blood in stool and abdominal distention. Genitourinary: Negative for dysuria, urgency, hematuria and difficulty urinating. Musculoskeletal: Negative for gait problem, neck pain and neck stiffness. Skin; No rash, No itching  Neurological: Negative for seizures, weakness and numbness.      PAST MEDICAL HISTORY    has a past medical history of Anemia, Anxiety disorder, Asthma, Chronic atrial fibrillation (HCC), Chronic kidney disease, Chronic respiratory failure with hypoxia ([address] Congestive heart failure ([address] COPD (chronic obstructive pulmonary disease) ([address] Diabetes mellitus ([address] Elevated troponin, GERD (gastroesophageal reflux disease), Hemodialysis patient ([address] Hypertension, Long term (current) use of anticoagulants, Pneumonia, Protein calorie malnutrition ([address] Pulmonary hypertension ([address] Schizoaffective disorder, bipolar type ([facility] and Smoker. SURGICAL HISTORY      has a past surgical history that includes  section; other surgical history; Lithotripsy; cystourethroscopy (2003,2003); Upper gastrointestinal endoscopy (Left, 2019); tracheostomy (N/A, 2020); Gastrostomy tube placement (N/A, 2020); laryngoscopy (Bilateral, 2021); Neck surgery (N/A, 2021); and tracheostomy (N/A, 2021). CURRENT MEDICATIONS       Previous Medications    ACETAMINOPHEN (TYLENOL) 325 MG TABLET    Take by mouth every 6 hours as needed for Pain 2 tab    ALPRAZOLAM (XANAX) 0.5 MG TABLET    Take 0.5 mg by mouth nightly as needed for Sleep. AMINO ACIDS-PROTEIN HYDROLYS PO    Take 30 mLs by mouth 2 times daily May add water to med for ease of administration    BENZTROPINE (COGENTIN) 1 MG TABLET    Take 1 mg by mouth nightly    CARBIDOPA-LEVODOPA (SINEMET)  MG PER TABLET    Take 1 tablet by mouth nightly     CITALOPRAM (CELEXA) 20 MG TABLET    Take 20 mg by mouth daily    DIPHENHYDRAMINE (BENADRYL) 25 MG TABLET    Take 25 mg by mouth every 6 hours as needed for Itching or Allergies    DOCUSATE SODIUM (COLACE) 100 MG CAPSULE    Take 100 mg by mouth daily    FAMOTIDINE (PEPCID) 20 MG TABLET    Take 20 mg by mouth three times a week Mon, Wed, Fri    FLUTICASONE-SALMETEROL (ADVAIR HFA) 230-21 MCG/ACT INHALER    Inhale 2 puffs into the lungs 2 times daily    HYDROCODONE-ACETAMINOPHEN (NORCO) 5-325 MG PER TABLET    Take 1 tablet by mouth every 6 hours as needed for Pain. HYDROCORTISONE 2.5 % CREAM    Apply topically 2 times daily Apply topically 2 times daily.     HYDROXYZINE (ATARAX) 50 MG TABLET    Take 100 mg by mouth every 6 hours as needed for Itching 2 tab =100mg    IPRATROPIUM-ALBUTEROL (DUONEB) 0.5-2.5 (3) MG/3ML SOLN NEBULIZER SOLUTION    Inhale 3 mLs into the lungs 4 times daily And every 4 hours as needed    MELATONIN PO    Take 6 mg by mouth nightly    MIDODRINE (PROAMATINE) 2.5 MG TABLET    Take 1 tablet by mouth 3 times daily (before meals)    MIRTAZAPINE (REMERON) 15 MG TABLET    Take 7.5 mg by mouth nightly    MULTIPLE VITAMINS-MINERALS (THERAPEUTIC MULTIVITAMIN-MINERALS) TABLET    Take 1 tablet by mouth daily     NICOTINE (NICODERM CQ) 14 MG/24HR    Place 1 patch onto the skin every 24 hours    OXYGEN    Inhale 3 L into the lungs May titrate to keep sat above 90%    POLYETHYLENE GLYCOL (GLYCOLAX) 17 G PACKET    17 g by Per G Tube route daily    QUETIAPINE (SEROQUEL) 25 MG TABLET    Take 25 mg by mouth 2 times daily    SENNA (SENOKOT) 8.6 MG TABLET    Take 1 tablet by mouth nightly     SEVELAMER (RENVELA) 800 MG TABLET    Take 2 tablets by mouth 3 times daily (with meals)     SODIUM POLYSTYRENE (KAYEXALATE) 15 GM/60ML SUSPENSION    Take 15 g by mouth once    SODIUM ZIRCONIUM CYCLOSILICATE (LOKELMA) 10 G PACK ORAL SUSPENSION    Take 10 g by mouth daily Lot # [id number]  Exp [id number]    VITAMIN E 100 UNIT/GM CREA    Apply topically 2 times daily Right leg and face       ALLERGIES     is allergic to bactrim [sulfamethoxazole-trimethoprim]. FAMILY HISTORY     She indicated that the status of her sister is unknown.   family history includes Asthma in her sister. SOCIAL HISTORY      reports that she quit smoking about a year ago. Her smoking use included cigarettes. She has a 25.00 pack-year smoking history. She has quit using smokeless tobacco. She reports previous alcohol use. She reports previous drug use. PHYSICAL EXAM     INITIAL VITALS:  oral temperature is 98.3 °F (36.8 °C). Her blood pressure is 160/108 (abnormal) and her pulse is 77. Her respiration is 20 and oxygen saturation is 96%. Physical Exam   Constitutional:  well-developed and well-nourished. HENT: Head: Normocephalic, atraumatic, Bilateral external ears normal, Oropharynx mosit, No oral exudates, Nose normal.   Eyes: PERRL, EOMI, Conjunctiva normal, No discharge.  No scleral icterus  Neck: Normal range of motion, No tenderness, Supple  Lympatics: No lymphadenopathy. Cardiovascular: Normal rate, regular rhythm, S1 normal and S2 normal.  Exam reveals no gallop. Pulmonary/Chest: Effort normal and breath sounds normal. No accessory muscle usage or stridor. No respiratory distress. no wheezes. has no rales. exhibits no tenderness. Abdominal: Soft. Bowel sounds are normal.  exhibits no distension. There is no tenderness. There is no rebound and no guarding. Extremities: No edema, no tenderness, no cyanosis, no clubbing. Musculoskeletal: Good range of motion in major joints is observed. No major deformities noted. Bleeding from a recent dialysis catheter site. Neurological: Alert and oriented ×3, normal motor function, normal sensory function, no focal deficits. GCS 15. Skin: Skin is warm, dry and intact. No rash noted. No erythema. Psychiatric: Affect normal, judgment normal, mood normal.  DIFFERENTIAL DIAGNOSIS:       DIAGNOSTIC RESULTS     EKG: All EKG's are interpreted by the Emergency Department Physician who either signs or Co-signs this chart in the absence of a cardiologist.      RADIOLOGY: non-plain film images(s) such as CT, Ultrasound and MRI are read by the radiologist.  Plain radiographic images are visualized and preliminarily interpreted by the emergency physician unless otherwise stated below. LABS:   Labs Reviewed - No data to display    EMERGENCY DEPARTMENT COURSE:   Vitals:    Vitals:    01/06/22 1532 01/06/22 1538 01/06/22 1632 01/06/22 1703   BP: (!) 161/103   (!) 160/108   Pulse: 80 80 77    Resp: 20  20    Temp:   98.3 °F (36.8 °C)    TempSrc:   Oral    SpO2: 98%  95% 96%       Bleeding controlled with lidocaine with epi injection around the bleeding vessels and pressure dressing. CRITICAL CARE:     CONSULTS:  None    PROCEDURES:      FINAL IMPRESSION      1.  Bleeding from peripherally inserted central catheter (PICC), initial encounter [facility])          DISPOSITION/PLAN   Decision To Discharge    PATIENT REFERRED TO:  [facility] EMERGENCY DEPT  [address] [address]  [phone]  Go to   If symptoms worsen      DISCHARGE MEDICATIONS:  New Prescriptions    No medications on file       (Please note that portions of this note were completed with a voice recognition program.  Efforts were made to edit the dictations but occasionally words are mis-transcribed.)    [place]as, [address] [place] [address],   01/07/22 0782

## 2022-01-17 PROBLEM — J18.9 PNEUMONIA: Status: ACTIVE | Noted: 2022-01-01

## 2022-01-17 NOTE — ED NOTES
Pt upset, states \"I don't want to stay here, I wan to go home\". Provider notified.       Maritza Aaron RN  01/17/22 5912

## 2022-01-17 NOTE — ED NOTES
Pt pulling off medical equipment and monitoring equipment. Pt unable to explain to provider what is going on. Per Dr. Corine Wilson, pt is to be [facility] because she does not have the mental capacity to explain back to provider the severity of situation.       Joanna Biggs RN  01/17/22 9131
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
## 2022-01-17 NOTE — ED PROVIDER NOTES
[facility]          Pt Name: Luna Carreon  MRN: [id number]  [place] 1965  Date of evaluation: 1/17/2022  Treating Resident Physician: Chaparrita Cain MD  Supervising Physician: Padma Joy MD.     [address] [place] [address]       Chief Complaint   Patient presents with    Hypotension     History obtained from the patient. HISTORY OF PRESENT ILLNESS    HPI  Luna Carreon is a 55 y.o. female who presents to the emergency department for evaluation of hypotension patient with CKD stage V who was sent from hemodialysis today because of abnormal blood pressures before hemodialysis therapy, patient refers she was hypotensive reason why she was sent to the ED. Patient denies shortness of breath, no chest pain, no abdominal pain, no lightheadedness or syncope however vital signs are arriving show hypotension oxygen saturation in 57%. Patient additionally refers her dry weight has changed from 160 pound to 140 pound. Patient was recently diagnosed with COVID-19 pneumonia. The patient has no other acute complaints at this time. REVIEW OF SYSTEMS   Review of Systems   Constitutional: Negative. HENT: Negative. Eyes: Negative. Respiratory: Negative. Cardiovascular: Negative. Gastrointestinal: Negative. Endocrine: Negative. Genitourinary: Negative. Skin: Negative. Neurological: Negative. Psychiatric/Behavioral: Negative.           PAST MEDICAL AND SURGICAL HISTORY     Past Medical History:   Diagnosis Date    Anemia     Anxiety disorder     Asthma     Chronic atrial fibrillation (HCC)     Chronic kidney disease     Chronic respiratory failure with hypoxia (HCC)     Congestive heart failure (HCC)     COPD (chronic obstructive pulmonary disease) (HCC)     Diabetes mellitus (HCC)     Elevated troponin     GERD (gastroesophageal reflux disease)     Hemodialysis patient ([facility]     M-W-F in [facility] hours as needed, Disp: , Rfl:     AMINO ACIDS-PROTEIN HYDROLYS PO, Take 30 mLs by mouth 2 times daily May add water to med for ease of administration, Disp: , Rfl:     polyethylene glycol (GLYCOLAX) 17 g packet, 17 g by Per G Tube route daily, Disp: , Rfl:     Multiple Vitamins-Minerals (THERAPEUTIC MULTIVITAMIN-MINERALS) tablet, Take 1 tablet by mouth daily , Disp: , Rfl:     senna (SENOKOT) 8.6 MG tablet, Take 1 tablet by mouth nightly , Disp: , Rfl:       SOCIAL HISTORY     Social History     Social History Narrative    Not on file     Social History     Tobacco Use    Smoking status: Former Smoker     Packs/day: 1.00     Years: 25.00     Pack years: 25.00     Types: Cigarettes     Quit date: 2020     Years since quittin.0    Smokeless tobacco: Former User   Vaping Use    Vaping Use: Never used   Substance Use Topics    Alcohol use: Not Currently    Drug use: Not Currently         ALLERGIES     Allergies   Allergen Reactions    Bactrim [Sulfamethoxazole-Trimethoprim] Hives and Itching         FAMILY HISTORY     Family History   Problem Relation Age of Onset    Asthma Sister          PREVIOUS RECORDS   Previous records reviewed: History of hypertension, diabetes, COPD, CHF, CKD stage V in hemodialysis, current therapy 4 days/week, last 1 last Friday. Manohar Titus PHYSICAL EXAM     ED Triage Vitals [22 1241]   BP Temp Temp Source Pulse Resp SpO2 Height Weight   88/69 97.3 °F (36.3 °C) Oral 79 18 (!) 57 % 5' 3\" (1.6 m) 140 lb (63.5 kg)     Initial vital signs and nursing assessment reviewed and abnormal from Hypotension. . Body mass index is 24.8 kg/m². Pulsoximetry is abnormal per my interpretation. Additional Vital Signs:  Vitals:    22 1624   BP: 98/88   Pulse: 67   Resp: 18   Temp:    SpO2: 99%       Physical Exam  Constitutional:       Appearance: Normal appearance. HENT:      Head: Normocephalic and atraumatic.       Nose: Nose normal.      Mouth/Throat:      Mouth: Mucous membranes are dry. Eyes:      Pupils: Pupils are equal, round, and reactive to light. Cardiovascular:      Rate and Rhythm: Normal rate and regular rhythm. Pulmonary:      Effort: Pulmonary effort is normal.      Breath sounds: Rhonchi (BiLateral) and rales (Bilateral) present. Abdominal:      General: Abdomen is flat. There is no distension. Palpations: Abdomen is soft. Tenderness: There is no abdominal tenderness. There is no guarding. Musculoskeletal:         General: Swelling (Mild bilateral) present. Normal range of motion. Cervical back: Normal range of motion and neck supple. No rigidity or tenderness. Skin:     General: Skin is warm. Capillary Refill: Capillary refill takes less than 2 seconds. Neurological:      General: No focal deficit present. Mental Status: She is alert and oriented to person, place, and time. Sensory: No sensory deficit. Motor: No weakness. Psychiatric:         Mood and Affect: Mood normal.             MEDICAL DECISION MAKING   Initial Assessment:   3 55year-old patient, CKD stage V hemodialysis, episode of hypotension and oxygen desaturation, physical examination showed bilateral rales and rhonchi, previous COVID-19 infection. Other differentials include Alex embolism, COVID-19 pneumonia/acute hypoxemic respiratory failure, hemodialysis overload, sepsis, myocardial infarction. 2. Clinical impression  a. Hypotension /cardiogenic versus obstructive shock.   b. Hypoxemic respiratory failure  c. COVID 19 pneumonia  Plan:    IV fluids 250 cc   Chest x-ray, CT chest scan, CBC, BMP, troponin, BNP, EKG   Reassessment        ED RESULTS   Laboratory results:  Labs Reviewed   CBC WITH AUTO DIFFERENTIAL - Abnormal; Notable for the following components:       Result Value    RBC 3.93 (*)     Hemoglobin 10.5 (*)     Hematocrit 35.5 (*)     MCHC 29.6 (*)     RDW-CV 17.9 (*)     RDW-SD 58.6 (*)     Platelets 383 (*)     Immature Grans (Abs) 0.10 (*)     All other components within normal limits   BASIC METABOLIC PANEL W/ REFLEX TO MG FOR LOW K - Abnormal; Notable for the following components:    Chloride 95 (*)     BUN 44 (*)     CREATININE 10.1 (*)     Calcium 8.1 (*)     All other components within normal limits   HEPATIC FUNCTION PANEL - Abnormal; Notable for the following components:    Albumin 3.2 (*)     Alkaline Phosphatase 240 (*)     ALT <5 (*)     All other components within normal limits   TROPONIN - Abnormal; Notable for the following components:    Troponin T 0.155 (*)     All other components within normal limits   BRAIN NATRIURETIC PEPTIDE - Abnormal; Notable for the following components:    Pro-BNP 88387.0 (*)     All other components within normal limits   LACTATE, SEPSIS - Abnormal; Notable for the following components:    Lactic Acid, Sepsis 2.1 (*)     All other components within normal limits   BLOOD GAS, VENOUS - Abnormal; Notable for the following components:    PH MIXED 7.47 (*)     PCO2, MIXED VENOUS 37 (*)     All other components within normal limits   GLOMERULAR FILTRATION RATE, ESTIMATED - Abnormal; Notable for the following components:    Est, Glom Filt Rate 4 (*)     All other components within normal limits   LACTATE, SEPSIS   ANION GAP   OSMOLALITY   SCAN OF BLOOD SMEAR       Radiologic studies results:  CTA Chest W WO Contrast   Final Result    No PE. Bilateral airspace infiltrates. cardiomegaly. **This report has been created using voice recognition software. It may contain minor errors which are inherent in voice recognition technology. **      Final report electronically signed by Dr. Dori Perez on 1/17/2022 4:22 PM      XR CHEST PORTABLE   Final Result   Right subclavian central venous catheter tip terminates at the cavoatrial projection. No pneumothorax is observed. Patchy bilateral lower lobe airspace opacities, right greater than left are slightly more prominent in the left lower lobe.     Small bilateral pleural effusions are unchanged. **This report has been created using voice recognition software. It may contain minor errors which are inherent in voice recognition technology. **      Final report electronically signed by Dr Yevgeniy Hampton on 1/17/2022 1:36 PM          ED Medications administered this visit:   Medications   lactated ringers bolus (0 mLs IntraVENous Stopped 1/17/22 1441)   LORazepam (ATIVAN) injection 1 mg (1 mg IntraVENous Given 1/17/22 1541)   iopamidol (ISOVUE-370) 76 % injection 80 mL (80 mLs IntraVENous Given 1/17/22 1601)         ED COURSE      Reassessment  55year-old patient, coming to the ED for hypertension and severe hypoxemia; CT chest scan negative for pulmonary embolism however physical examination shows signs of overload, elevated troponin elevated BNP chest x-ray with congestion creatinine in 10 patient wife consider patient requires admission, further assessment for nephrology and dialysis therapy. During new assessment patient refers she wants to go home, she refers does not care about physicians opinions and starts pulling out monitor wires, IV access and oxygen cannula, working on weight from bed room and asking to speak with physician. We have explained multiple occasions reasons for admission explaining high risk of death out of hospital; patient is a still requesting going home; however when we ask patient about repeat with we have to explain she is not able, not making logic sentences and repeating just that she wants to go home. Spoke with her daughter who is her legal guardian explaining widely the reasons for coming and reasons for admission. Daughter agrees and understands decision as well as diagnosis of mental status alteration with no capacity of decision. Given sudden agitation patient we decided pharmacological mild sedation with low-dose benzodiazepine achieving control.     Final diagnosis  Acute hypoxemic respiratory failure  Fluid overload  CKD stage V hemodialysis  Mental status alteration/delirium    Patient admitted          MEDICATION CHANGES     New Prescriptions    No medications on file         FINAL DISPOSITION     Final diagnoses:   Stage 5 chronic kidney disease on chronic dialysis ([facility]   Hemodialysis-associated hypotension   Delirium due to another medical condition     Condition: condition: good  Dispo: Admit to med/surg floor      This transcription was electronically signed. Parts of this transcriptions may have been dictated by use of voice recognition software and electronically transcribed, and parts may have been transcribed with the assistance of an ED scribe. The transcription may contain errors not detected in proofreading. Please refer to my supervising physician's documentation if my documentation differs.     Electronically Signed: Yovanny Canchola MD, 01/17/22, 4:47 PM       Yovanny Canchola MD  Resident  01/17/22 3534

## 2022-01-17 NOTE — ED NOTES
Pt placed in soft restraints at this time per verbal order from provider.       Maritza Aaron, KARYNA  01/17/22 8611

## 2022-01-17 NOTE — ED NOTES
Pt daughter, Christian Kidd, called and updated on pt situation.  Christian Kidd agreed with POC and verbalized understanding of what is going on     Shae Mann RN  01/17/22 7920

## 2022-01-17 NOTE — ED NOTES
Bed: 006A  Expected date:   Expected time:   Means of arrival:   Comments:  ems     Yary Browning RN  01/17/22 1668

## 2022-01-17 NOTE — ED NOTES
Pt oxygen saturation will drop to 86% and increase back to 92-94% while on 4L NC. Dr. Claudia Khan at bedside discussing risks of leaving AMA with pt.       Conni Cowden, RN  01/17/22 5265

## 2022-01-17 NOTE — ED PROVIDER NOTES
I personally saw and examined the patient. I have reviewed and agree with the resident findings, including all diagnostic interpretations and treatment plans as written. I was present for the key portion of any procedures performed and the inclusive time noted in any critical care statement. Patient demanded to leave [address] I evaluated the patient to determine capacity. I explained to the patient that she had low oxygen levels and that this could be very dangerous resulting in confusion, falling, or even death. Patient was unable to repeat back to me that she had low oxygen levels. She continued to demand that there was nothing wrong with her. Patient did state that she was very fearful about staying here because her  . I did acknowledge that this is very stressful to be at the same location where her  passed away. However I did explain that this does not mean that she does not need treatment for her low oxygen levels. Because the patient was unable to repeat back to me her condition and she appeared confused I determined that she did not have capacity to make decisions for herself. She does have a power of , her daughter, and we spoke to her on the phone. She was in agreement that the patient needed to stay and supported her decision to remove her decision making capacity decisions. This was explained to the hospitalist when we admitted the patient. I did place an [address] [place] since no family members were here and we needed security to watch her to ensure that she did not leave on her own accord.       Ananth Comer MD  22 [id number]

## 2022-01-17 NOTE — ED NOTES
Pt resting on cot in stable condition. Vitals WNL.  Call light in reach     Milner Marty, RN  01/17/22 5044

## 2022-01-17 NOTE — ED NOTES
Pt removed self from restraints at this time. Pt coached into sitting back on cot.  Pt talking on phone with daughter Brennan Llanes at this time     Ina Benavides RN  01/17/22 4913

## 2022-01-18 NOTE — CONSULTS
[facility]    [address], [address]  [facility]  1/18/2022 9:54 AM    Pt Name:    Dana Brown  MRN:     [id number]   [id number]  YOB: 1965  Admit Date:    1/17/2022 12:34 PM  Primary Care Physician:  No primary care provider on file. [facility] Number:   [id number]    Reason for Consult:  ESRD  Requesting provider:  Dr. Peri Villanueva    History:   The patient is a 55 y.o. with severe medical noncompliance, ESRD, smoking, history of COPD, diabetes who was sent from a nursing home yesterday after she was noted to have blood pressure in systolic 43C. Patient is chronically short of breath but reported worsening shortness of breath with her pulse oximetry dropping to 60%. Patient was recently diagnosed with COVID-19 pneumonia. Anyway, nephrology was consulted for management of ESRD. Patient is extremely noncompliant with recommendations. She has chronically high potassium levels despite getting dialyzed 4 times a week at her dialysis unit in the nursing home. Patient is on midodrine. In the emergency room patient was agitated and was pulling medical equipment and was refusing to be admitted. Patient did receive low-dose IV fluids in the ER. See ER notes for details. Patient seen and examined this morning in the dialysis unit. Patient was dialyzed last night for 2.5 hours because she missed her dialysis treatment.     Past Medical History:  Past Medical History:   Diagnosis Date    Anemia     Anxiety disorder     Asthma     Chronic atrial fibrillation (HCC)     Chronic kidney disease     Chronic respiratory failure with hypoxia (HCC)     Congestive heart failure (HCC)     COPD (chronic obstructive pulmonary disease) (HCC)     Diabetes mellitus (HCC)     Elevated troponin     GERD (gastroesophageal reflux disease)     [facility]     M-W-F in [facility] Hypertension     Long term (current) use of anticoagulants     Substance and Sexual Activity    Alcohol use: Not Currently    Drug use: Not Currently    Sexual activity: Not on file   Other Topics Concern    Not on file   Social History Narrative    Not on file     Social Determinants of Health     Financial Resource Strain:     Difficulty of Paying Living Expenses: Not on file   Food Insecurity:     Worried About Running Out of Food in the Last Year: Not on file    Janie of Food in the Last Year: Not on file   Transportation Needs:     Lack of Transportation (Medical): Not on file    Lack of Transportation (Non-Medical): Not on file   Physical Activity:     Days of Exercise per Week: Not on file    Minutes of Exercise per Session: Not on file   Stress:     Feeling of Stress : Not on file   Social Connections:     Frequency of Communication with Friends and Family: Not on file    Frequency of Social Gatherings with Friends and Family: Not on file    Attends Religion Services: Not on file    Active Member of [address] [place] [address] or Organizations: Not on file    Attends Club or Organization Meetings: Not on file    Marital Status: Not on file   Intimate Partner Violence:     Fear of Current or Ex-Partner: Not on file    Emotionally Abused: Not on file    Physically Abused: Not on file    Sexually Abused: Not on file   Housing Stability:     Unable to Pay for Housing in the Last Year: Not on file    Number of [place] in the Last Year: Not on file    Unstable Housing in the Last Year: Not on file       Home Meds:  Prior to Admission medications    Medication Sig Start Date End Date Taking? Authorizing Provider   ALPRAZolam Jennett Dubin) 0.5 MG tablet Take 0.5 mg by mouth nightly as needed for Sleep. Yes Historical Provider, MD   HYDROcodone-acetaminophen (NORCO) 5-325 MG per tablet Take 1 tablet by mouth every 6 hours as needed for Pain.    Yes Historical Provider, MD   sodium polystyrene (KAYEXALATE) 15 GM/60ML suspension Take 15 g by mouth once   Yes Historical Provider, MD   nicotine (NICODERM CQ) 14 MG/24HR Place 1 patch onto the skin every 24 hours   Yes Historical Provider, MD   sodium zirconium cyclosilicate (LOKELMA) 10 g PACK oral suspension Take 10 g by mouth daily Lot # [id number]  Exp [id number] 11/2/21  Yes Mik Dumont MD   diphenhydrAMINE (BENADRYL) 25 MG tablet Take 25 mg by mouth every 6 hours as needed for Itching or Allergies   Yes Historical Provider, MD   midodrine (PROAMATINE) 2.5 MG tablet Take 1 tablet by mouth 3 times daily (before meals) 8/1/21  Yes Milagro Paris, APRN - CNP   citalopram (CELEXA) 20 MG tablet Take 20 mg by mouth daily   Yes Historical Provider, MD   famotidine (PEPCID) 20 MG tablet Take 20 mg by mouth three times a week Mon, Wed, Fri   Yes Historical Provider, MD   mirtazapine (REMERON) 15 MG tablet Take 7.5 mg by mouth nightly   Yes Historical Provider, MD   OXYGEN Inhale 3 L into the lungs May titrate to keep sat above 90%   Yes Historical Provider, MD   benztropine (COGENTIN) 1 MG tablet Take 1 mg by mouth nightly   Yes Historical Provider, MD   docusate sodium (COLACE) 100 MG capsule Take 100 mg by mouth daily   Yes Historical Provider, MD   MELATONIN PO Take 6 mg by mouth nightly   Yes Historical Provider, MD   QUEtiapine (SEROQUEL) 25 MG tablet Take 25 mg by mouth 2 times daily   Yes Historical Provider, MD   Vitamin E 100 UNIT/GM CREA Apply topically 2 times daily Right leg and face   Yes Historical Provider, MD   acetaminophen (TYLENOL) 325 MG tablet Take by mouth every 6 hours as needed for Pain 2 tab   Yes Historical Provider, MD   hydrocortisone 2.5 % cream Apply topically 2 times daily Apply topically 2 times daily.    Yes Historical Provider, MD   hydrOXYzine (ATARAX) 50 MG tablet Take 100 mg by mouth every 6 hours as needed for Itching 2 tab =100mg   Yes Historical Provider, MD   sevelamer (RENVELA) 800 MG tablet Take 2 tablets by mouth 3 times daily (with meals)    Yes Historical Provider, MD   carbidopa-levodopa (SINEMET)  MG per tablet Take 1 tablet by mouth nightly    Yes Historical Provider, MD   fluticasone-salmeterol (ADVAIR HFA) 230-21 MCG/ACT inhaler Inhale 2 puffs into the lungs 2 times daily 3/24/21  Yes Carla Braden,    ipratropium-albuterol (DUONEB) 0.5-2.5 (3) MG/3ML SOLN nebulizer solution Inhale 3 mLs into the lungs 4 times daily And every 4 hours as needed   Yes Historical Provider, MD   AMINO ACIDS-PROTEIN HYDROLYS PO Take 30 mLs by mouth 2 times daily May add water to med for ease of administration   Yes Historical Provider, MD   polyethylene glycol (GLYCOLAX) 17 g packet 17 g by Per G Tube route daily   Yes Historical Provider, MD   Multiple Vitamins-Minerals (THERAPEUTIC MULTIVITAMIN-MINERALS) tablet Take 1 tablet by mouth daily    Yes Historical Provider, MD   senna (SENOKOT) 8.6 MG tablet Take 1 tablet by mouth nightly    Yes Historical Provider, MD       Review of Systems:  Constitutional: Reports shortness of breath at times. Reports some cough. Denies any chest pain  Head: Negative for headaches  Eyes: Negative for blurry vision or discharge  Ears: Negative for ear pain or hearing changes  Nose: Negative for runny nose or epistaxis  Respiratory: Positive for cough and shortness of breath. Cardiovascular: Negative for chest pain  GI: Negative for nausea, vomiting and diarrhea.   Negative for hematochezia and melena  Musculoskeletal: Negative for joint pain, moves all ext  Neuro: Negative for numbness or tingling, negative for slurred speech      All other review of systems were reviewed and negative    Current Meds:  Infusion:    dextrose      sodium chloride Stopped (01/17/22 1303)     Meds:    polyethylene glycol  17 g Per G Tube Daily    multivitamin  1 tablet Oral Daily    senna  1 tablet Oral Nightly    ipratropium-albuterol  3 mL Inhalation 4x Daily    budesonide-formoterol  2 puff Inhalation BID    sevelamer  1,600 mg Oral TID WC    carbidopa-levodopa  1 tablet Oral Nightly  benztropine  1 mg Oral Nightly    docusate sodium  100 mg Oral Daily    melatonin  6 mg Oral Nightly    QUEtiapine  25 mg Oral BID    citalopram  20 mg Oral Daily    famotidine  20 mg Oral Once per day on Mon Wed Fri    mirtazapine  7.5 mg Oral Nightly    midodrine  2.5 mg Oral TID AC    nicotine  1 patch TransDERmal Q24H    sodium chloride flush  5-40 mL IntraVENous 2 times per day    heparin (porcine)  5,000 Units SubCUTAneous 3 times per day    cefTRIAXone (ROCEPHIN) IV  1,000 mg IntraVENous Q24H    And    azithromycin  500 mg Oral Q24H     Meds prn: glucose, dextrose, glucagon (rDNA), dextrose, hydrOXYzine, diphenhydrAMINE, ALPRAZolam, HYDROcodone-acetaminophen, sodium chloride flush, sodium chloride, ondansetron **OR** ondansetron, polyethylene glycol, acetaminophen **OR** acetaminophen     Allergies/Intolerances: ALLERGIES: Bactrim [sulfamethoxazole-trimethoprim]    24HR INTAKE/OUTPUT:      Intake/Output Summary (Last 24 hours) at 1/18/2022 0954  Last data filed at 1/18/2022 0315  Gross per 24 hour   Intake 603.77 ml   Output 1900 ml   Net -1296.23 ml     I/O last 3 completed shifts: In: 603.8 [P.O.:200; I.V.:3.8]  Out: 1900   No intake/output data recorded. Admission weight: 140 lb (63.5 kg)  Wt Readings from Last 3 Encounters:   01/18/22 126 lb 12.2 oz (57.5 kg)   12/29/21 135 lb 5.8 oz (61.4 kg)   11/30/21 152 lb (68.9 kg)     Body mass index is 22.46 kg/m².     Physical Examination:  VITALS:   Vitals:    01/17/22 2304 01/18/22 0315 01/18/22 0645 01/18/22 0756   BP: (!) 147/82 (!) 140/88 (!) 136/90 (!) 144/78   Pulse: 66 66  67   Resp: 18 18  (!) 33   Temp: 98 °F (36.7 °C) 97.8 °F (36.6 °C)  97.9 °F (36.6 °C)   TempSrc: Oral Oral     SpO2: 97% 95%  96%   Weight:    126 lb 12.2 oz (57.5 kg)   Height:         Weight:   Wt Readings from Last 3 Encounters:   01/18/22 126 lb 12.2 oz (57.5 kg)   12/29/21 135 lb 5.8 oz (61.4 kg)   11/30/21 152 lb (68.9 kg)     Constitutional and General Appearance: alert and cooperative with exam, appears comfortable, no distress, not diaphoretic  Eyes: no icteric sclera in left eye or right eye,  no pallor conjunctiva in left or right eye, no discharge seen from left eye or right eye  Ears and Nose: normal external appearance of left and right ear. Both ear lobules are nontender to palpation. Normal external appearance of nose. No active drainage from nose. Oral: moist oral mucus membranes  Neck: No jugular venous distention  Lungs: Air entry B/L, no use of accessory muscles or labored breathing. Poor effort  Chest: No chest wall tenderness  Heart: S1, S2  Extremities: No pitting  LE edema, no tenderness  GI: soft, non-tender, no guarding, no distention    Lab Data  CBC:   Recent Labs     01/17/22  1329 01/18/22  0743   WBC 6.3 5.7   HGB 10.5* 10.0*   HCT 35.5* 32.4*   * 115*     BMP:  Recent Labs     01/17/22  1329 01/18/22  0743    137   K 4.2 4.6   CL 95* 99   CO2 27 28   BUN 44* 17   CREATININE 10.1* 5.9*   GLUCOSE 91 68*   CALCIUM 8.1* 8.2*     Hepatic:   Recent Labs     01/17/22  1329 01/18/22  0743   LABALBU 3.2* 2.8*   AST 18 20   ALT <5* <5*   BILITOT 0.3 0.4   ALKPHOS 240* 253*       Additional Labs: proBNP 58,000  Diagnostics: Chest x-ray shows patchy bilateral lower airspace opacities    Echo: EF 55%      Impression and Plan:  1. ESRD on hemodialysis. Patient is on dialysis at the nursing home 4 times a week. Very noncompliant with dietary and fluid restrictions. Recently diagnosed with COVID and now presented with hypoxemic respiratory failure with pulse oximetry 60%. Respiratory status improved this morning. HAd Dialysis treatment last night with 1.5 L of net ultrafiltration. Patient seen and examined in the dialysis unit this morning. Tolerating dialysis treatment well. Plan is to remove another 1.5 L of fluid  2.  Acute on chronic hypoxic respiratory failure: Has underlying COPD now respiratory status worsened likely due to recent COVID  3. History of COPD requiring chronic oxygen  4. History of medical noncompliance  5. Anemia in ESRD  6. Hypotension. Improved continue with midodrine with holding parameters. Will increase midodrine to 10 mg 3 times daily    Thank you for the consult. Please feel free to call me if you have any questions.      Jabari Pereira MD  [facility]

## 2022-01-18 NOTE — CARE COORDINATION
1/18/22, 12:37 PM EST  Discharge Planning Evaluation  Social work consult received, patient from [facility]. Patient/Family preference is to return to [facility]. Patient reported that she was ready to get back to [facility] and wants to be discharged. The patient's current payor source at the facility is Medicare/Medicaid. Medicare skilled days available: Yes  Insurance precert:  No  Spoke with Camron Marie at the facility. Patient bed hold: Yes  Anticipated transport plan: Ambulance  Do they require COVID 19 test to return to [facility]: No  Is there a required time frame which which COVID test needs done: N/A        ARNEL spoke with Camron Marie at [facility] who confirmed that patient can return as she is a bed hold. ARNEL notified him of patient in isolation for [facility]. Camron Marie reported that he was aware and will still accept patient back at discharge.

## 2022-01-18 NOTE — CARE COORDINATION
01/18/22 0853   Readmission Assessment   Number of Days since last admission? 8-30 days   Previous Disposition SNF   Who is being Interviewed Caregiver  (From [facility])   What was the patient's/caregiver's perception as to why they think they needed to return back to the hospital? Other (Comment)  (Sent to ED from Dialysis for low BP, low sats)   Did you visit your Primary Care Physician after you left the hospital, before you returned this time? No   Why weren't you able to visit your PCP? Other (Comment)  (From ECF)   Did you see a specialist, such as Cardiac, Pulmonary, Orthopedic Physician, etc. after you left the hospital? Other (Comment)  (From ECF)   Who advised the patient to return to the hospital? Other (Comment)  (Sent to ED from Dialysis)   Does the patient report anything that got in the way of taking their medications? No   In our efforts to provide the best possible care to you and others like you, can you think of anything that we could have done to help you after you left the hospital the first time, so that you might not have needed to return so soon?  Other (Comment)  (Readmitted from ECF)
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
## 2022-01-18 NOTE — PROGRESS NOTES
Pt admitted to  6[facility] 16 from hemodialysis. Pt  Alert and oriented x 4. Vital signs obtained. Assessment and data collection initiated. Two nurses skin assessment performed by Marti Lemon RN and Princess Lani TRONCOSO. Oriented to room. Fall prevention and safety discussed with patient. Bed alarm on. Call light in reach.

## 2022-01-18 NOTE — PROGRESS NOTES
Patient seen and examined during dialysis  Tolerating dialysis treatment well  Currently using dialysis catheter  Blood pressure stable at 130s  Last blood pressure 125/87 HR 80  Pulse oximetry 96% on nasal cannula  Ultrafiltration goal 1.5 to 2 kg  Discussed with dialysis staff  Full consult to follow

## 2022-01-18 NOTE — PROGRESS NOTES
Hospitalist Progress Note      Patient:  Henry Camacho    Unit/Bed:6A-16/016-A  YOB: 1965  MRN: [id number]   Acct: [de-identified]   PCP: No primary care provider on file. Date of Admission: 1/17/2022    Assessment/Plan:    1. Acute bilateral pneumonia, suspected viral etiology: pt was admitted and started on IV antibiotics with ceftriaxone and azithromycin. Given patient is afebrile, no leukocytosis, nontoxic in appearance, and without a productive cough we will stop antibiotics. 2. Acute on chronic hypoxic respiratory failure: Patient is on 3 L NC at home. She was found to have an SPO2 of 57% on room air and placed on 6 L. Now weaned down to 2L NC. Monitor respiratory status. 3. Acute metabolic encephalopathy: Patient appears to be slightly confused this is likely secondary to being hypoxic. We will continue to monitor. Per nursing home, her baseline is A&O x4.  4. Hypotension, resolved  5. ESRD on HD: Nephrology has been consulted, appreciate assistance:  6. Elevated troponin: Chronic, likely in setting of ESRD. Denies any chest pain. 7. History of COPD without acute exacerbation: Continue with breathing treatments. Defer ABX as no productive cough. 8. Tobacco use: Patient continues to smoke, encouraged cessation.       Chief Complaint: Hypotension    Initial H and P:-    \"Gracia Galeas is a 55 y. o. female who presents to the emergency department for evaluation of hypotension patient with CKD stage V who was sent from hemodialysis today because of abnormal blood pressures before hemodialysis therapy, patient refers she was hypotensive reason why she was sent to the ED.  Patient denies shortness of breath, no chest pain, no abdominal pain, no lightheadedness or syncope however vital signs are arriving show hypotension oxygen saturation in 57%.  Patient additionally refers her dry weight has changed from 160 pound to 140 pound.  Patient was recently diagnosed with COVID-19 pneumonia. \"    Subjective (past 24 hours):   1/18: Patient is doing okay, she is sitting in bed. Patient was [facility] in the ED secondary to wanting to leave AMA with an SPO2 57%. Felt that pt did no have capacity to make decisions for her safety. She remains confused today. Patient is found multiple times to have her oxygen out of her nose. She reports that it \"falls out when she sleeps \". Patient appears to be slightly confused, she is alert to location, situation, and self. She is struggling to remember the month/year and the president. According to the nursing home, patient's baseline is alert and oriented x4. Patient denies any chest pain or shortness of breath. Patient denies productive cough. No nausea/vomiting/diarrhea. Denies fever/chills. Past medical history, family history, social history and allergies reviewed again and is unchanged since admission. ROS (All review of systems completed. Pertinent positives noted.  Otherwise All other systems reviewed and negative.)     Medications:  Reviewed    Infusion Medications    dextrose      sodium chloride Stopped (01/17/22 7865)     Scheduled Medications    polyethylene glycol  17 g Per G Tube Daily    multivitamin  1 tablet Oral Daily    senna  1 tablet Oral Nightly    ipratropium-albuterol  3 mL Inhalation 4x Daily    budesonide-formoterol  2 puff Inhalation BID    sevelamer  1,600 mg Oral TID WC    carbidopa-levodopa  1 tablet Oral Nightly    benztropine  1 mg Oral Nightly    docusate sodium  100 mg Oral Daily    melatonin  6 mg Oral Nightly    QUEtiapine  25 mg Oral BID    citalopram  20 mg Oral Daily    famotidine  20 mg Oral Once per day on Mon Wed Fri    mirtazapine  7.5 mg Oral Nightly    midodrine  2.5 mg Oral TID AC    nicotine  1 patch TransDERmal Q24H    sodium chloride flush  5-40 mL IntraVENous 2 times per day    heparin (porcine)  5,000 Units SubCUTAneous 3 times per day    cefTRIAXone (ROCEPHIN) IV  1,000 mg IntraVENous Q24H    And    azithromycin  500 mg Oral Q24H     PRN Meds: glucose, dextrose, glucagon (rDNA), dextrose, hydrOXYzine, diphenhydrAMINE, ALPRAZolam, HYDROcodone-acetaminophen, sodium chloride flush, sodium chloride, ondansetron **OR** ondansetron, polyethylene glycol, acetaminophen **OR** acetaminophen      Intake/Output Summary (Last 24 hours) at 1/18/2022 1014  Last data filed at 1/18/2022 0315  Gross per 24 hour   Intake 603.77 ml   Output 1900 ml   Net -1296.23 ml       Diet:  ADULT DIET; Regular; Low Potassium (Less than 3000 mg/day); Low Phosphorus (Less than 1000 mg)    Exam:  BP (!) 144/78   Pulse 67   Temp 97.9 °F (36.6 °C)   Resp (!) 33   Ht 5' 3\" (1.6 m)   Wt 126 lb 12.2 oz (57.5 kg)   SpO2 96%   BMI 22.46 kg/m²   General appearance: Chronically ill-appearing, no apparent distress, appears stated age and cooperative. HEENT: Pupils equal, round, and reactive to light. Conjunctivae/corneas clear. Neck: Supple, with full range of motion. No jugular venous distention. Trachea midline. Respiratory:  Normal respiratory effort. Diminished to auscultation, bilaterally without Rales/Wheezes/Rhonchi. Cardiovascular: Regular rate and rhythm with normal S1/S2 without murmurs, rubs or gallops. Abdomen: Soft, non-tender, non-distended with normal bowel sounds. Musculoskeletal: passive and active ROM x 4 extremities. Skin: Skin color, texture, turgor normal.  No rashes or lesions. Neurologic:  Neurovascularly intact without any focal sensory/motor deficits.  Cranial nerves: II-XII intact, grossly non-focal.  Psychiatric: Alert and oriented x3 to location, self and situation, not to time, thought content appropriate, poor insight  Capillary Refill: Brisk,< 3 seconds   Peripheral Pulses: +2 palpable, equal bilaterally     Labs:   Recent Labs     01/17/22  1329 01/18/22  0743   WBC 6.3 5.7   HGB 10.5* 10.0*   HCT 35.5* 32.4*   * 115*     Recent Labs recognition software. It may contain minor errors which are inherent in voice recognition technology. **      Final report electronically signed by Dr Prashanth Casas on 1/17/2022 1:36 PM        CTA Chest W WO Contrast    Result Date: 1/17/2022  PROCEDURE: CTA CHEST W WO CONTRAST CLINICAL INFORMATION: PE. COMPARISON: No prior study. TECHNIQUE: 3 mm axial images were obtained through the chest after the administration of IV contrast.  A non-contrast localizer was obtained. 3D reconstructions were performed on the scanner to include MIP coronal and sagittal images through the chest. Isovue was the intravenous contrast utilized. All CT scans at this facility use dose modulation, iterative reconstruction, and/or weight-based dosing when appropriate to reduce radiation dose to as low as reasonably achievable. FINDINGS: There is adequate opacification of the pulmonary arterial system. No pulmonary emboli are present. The aorta is within acceptable limits. The heart is enlarged. There is no pericardial effusion. There is no pleural effusion. 12 mm pretracheal lymph node is present. Prominent bilateral hilar nodes. Centrilobular emphysematous changes in the upper lungs. Peripheral groundglass infiltrates in the lingula the right middle lobe and in both lung bases. No suspicious osseous lesions are present. There are no suspicious findings in the imaged aspects of  the upper abdomen. No PE. Bilateral airspace infiltrates. cardiomegaly. **This report has been created using voice recognition software. It may contain minor errors which are inherent in voice recognition technology. ** Final report electronically signed by Dr. Kashif Verde on 1/17/2022 4:22 PM    XR CHEST PORTABLE    Result Date: 1/17/2022  PROCEDURE: XR CHEST PORTABLE CLINICAL INFORMATION: Hypotension. COMPARISON: Chest x-ray 12/25/2021. TECHNIQUE: AP portable chest radiograph performed.  FINDINGS: Lines/tubes: Large bore right subclavian central venous catheter tip terminates at the cavoatrial projection. Heart/mediastinum: The heart size is normal. The pulmonary vascularity is unremarkable. Lungs: Patchy bilateral lower lobe airspace opacities, right greater than left are more prominent at the left lung base. Small bilateral pleural effusions are unchanged. No pneumothorax is identified. Bones: The visualized skeletal structures appear intact. Right subclavian central venous catheter tip terminates at the cavoatrial projection. No pneumothorax is observed. Patchy bilateral lower lobe airspace opacities, right greater than left are slightly more prominent in the left lower lobe. Small bilateral pleural effusions are unchanged. **This report has been created using voice recognition software. It may contain minor errors which are inherent in voice recognition technology. ** Final report electronically signed by Dr Lazarus Silversmith on 1/17/2022 1:36 PM      Electronically signed by Edwar Hayes PA-C on 1/18/2022 at 10:14 AM

## 2022-01-18 NOTE — CARE COORDINATION
1/18/22, 2:56 PM EST    Patient goals/plan/ treatment preferences discussed by  and . Patient goals/plan/ treatment preferences reviewed with patient/ family. Patient/ family verbalize understanding of discharge plan and are in agreement with goal/plan/treatment preferences. Understanding was demonstrated using the teach back method. AVS provided by RN at time of discharge, which includes all necessary medical information pertaining to the patients current course of illness, treatment, post-discharge goals of care, and treatment preferences. Services After Discharge  Services At/After Discharge: Aide Services,Nursing Services,In ambulance,Housekeeping [place])   IMM Letter  IMM Letter given to Patient/Family/Significant other/Guardian/POA/by[de-identified] Pt Access  IMM Letter date given[de-identified] 01/17/22  IMM Letter time given[de-identified] 2042       Patient to discharge back to [facility] today via ambulette. ARNEL notified Ronda Cooper at [facility] who confirmed that they would accept patient back today. ARNEL faxed transportation forms and confirmed time of 4:30pm.     RN made aware and discharge instructions placed on chart.

## 2022-01-18 NOTE — CARE COORDINATION
1/18/22, 8:41 AM EST  DISCHARGE PLANNING EVALUATION:    Rebecca Victor       Admitted: 1/17/2022/ 1234   Hospital day: 1   Location: -16/016-A Reason for admit: Pneumonia [J18.9]  Hemodialysis-associated hypotension [I95.3]  Delirium due to another medical condition [F05]  Stage 5 chronic kidney disease on chronic dialysis (HCC) [N18.6, Z99.2]   PMH:  has a past medical history of Anemia, Anxiety disorder, Asthma, Chronic atrial fibrillation (HCC), Chronic kidney disease, Chronic respiratory failure with hypoxia ([address] Congestive heart failure ([address] COPD (chronic obstructive pulmonary disease) ([facility] Diabetes mellitus ([address] Elevated troponin, GERD (gastroesophageal reflux disease), Hemodialysis patient ([facility] Hypertension, Long term (current) use of anticoagulants, Pneumonia, Protein calorie malnutrition ([facility] Pulmonary hypertension ([facility] Schizoaffective disorder, bipolar type ([facility] and Smoker. Procedure:   1/17 CXR: Right subclavian central venous catheter tip terminates at the cavoatrial projection. No pneumothorax is observed. Patchy bilateral lower lobe airspace opacities, right greater than left are slightly more prominent in the left lower lobe. Small bilateral pleural effusions are unchanged. 1/17 CTA Chest: No PE. Bilateral airspace infiltrates. Cardiomegaly. Barriers to Discharge:  Admitted through ED with hypotension. Was sent to ED from hemodialysis unit with abnormal BP's. O2 sat 57%. Started on 6L/nc, down to 2L/nc. Respiratory rate up to 33 today. Cogentin, Symbicort, Sinemet, Rocephin iv daily, Zithromax po daily, Celexa, Colace, Pepcid, Heparin subq q8hr, DuoNebs qid, Melatonin, Midodrine, Remeron, MVI, Nicotine patch, Glycolax, Seroquel, Senokot, Renvela. Given Ativan iv x1 and fluid bolus x1. Consulted Nephrology to continue dialysis. PCP: No primary care provider on file. Readmission Risk Score: 27 ( )%    Patient Goals/Plan/Treatment Preferences: Pt is from [facility]. SW consulted. Transportation/Food Security/Housekeeping Addressed:  No issues identified. 3:06 PM [facility] notified of pt's discharge planned for today.

## 2022-01-18 NOTE — FLOWSHEET NOTE
01/17/22 1700 01/17/22 1954   Vital Signs   /70 134/89   Temp 98.5 °F (36.9 °C) 97.6 °F (36.4 °C)   Pulse 75 74   Resp 17 22   SpO2 97 % 94 %   Post-Hemodialysis Assessment   Post-Treatment Procedures  --  Blood returned;Catheter Capped, clamped with Saline x2 ports   Machine Disinfection Process  --  Acid/Vinegar Clean;Heat Disinfect; Exterior Machine Disinfection   Total Liters Processed (l/min)  --  44.1 l/min   Dialyzer Clearance  --  Lightly streaked   Duration of Treatment (minutes)  --  150 minutes   Heparin amount administered during treatment (units)  --  0 units   Hemodialysis Intake (ml)  --  400 ml   Hemodialysis Output (ml)  --  1900 ml   NET Removed (ml)  --  1500 ml   Tolerated Treatment  --  Good   2.5 hour treatment completed. 1.5L of fluid removed. Patient tolerated fluid removed well. Cath dressing changed. Lines flushed with 10 ml of 0.9 NS, clamped and tego secured. Report given to primary nurse.  Charting printed and placed in bin to be scanned into EMR

## 2022-01-18 NOTE — FLOWSHEET NOTE
01/18/22 0756 01/18/22 1107   Vital Signs   BP (!) 144/78 107/81   Temp 97.9 °F (36.6 °C) 97.9 °F (36.6 °C)   Pulse 67 79   Resp (!) 33 22   SpO2 96 % 93 %   Weight 126 lb 12.2 oz (57.5 kg) 123 lb 7.3 oz (56 kg)   Weight Method Bed scale Bed scale   Post-Hemodialysis Assessment   Post-Treatment Procedures  --  Blood returned;Catheter Capped, clamped with Saline x2 ports   Machine Disinfection Process  --  Acid/Vinegar Clean;Heat Disinfect; Exterior Machine Disinfection   Total Liters Processed (l/min)  --  53.8 l/min   Dialyzer Clearance  --  Lightly streaked   Duration of Treatment (minutes)  --  180 minutes   Heparin amount administered during treatment (units)  --  0 units   Hemodialysis Intake (ml)  --  400 ml   Hemodialysis Output (ml)  --  1900 ml   NET Removed (ml)  --  1500 ml   Tolerated Treatment  --  Good   Stable 3.0 hour tx. Removed 1.5 L of fluid. pt tolerated fluid removal well. Bilateral cath ports flushed with normal saline and clamped. CVC drsg clean, dry, and intact. Report called to primary RN. TX record printed for scanning into EMR.

## 2022-01-18 NOTE — CARE COORDINATION
1/18/22, 3:39 PM EST    DISCHARGE PLANNING EVALUATION    Received lew from KARYNA Augustine on 6A. Patient was to be discharged today to [facility]. Patient was [facility] and PA is not comfortable lifting the [facility] at this time so discharge is cancelled for this evening. [facility] ([facility] notified.

## 2022-01-19 NOTE — PROGRESS NOTES
Spoke with Tammy at [facility] and gave report on patient. Called [facility] and scheduled transport for 8:30-9:00 pm via wheelchair.

## 2022-01-19 NOTE — FLOWSHEET NOTE
01/19/22 1232 01/19/22 1450   Vital Signs   /71 103/72   Temp 97.8 °F (36.6 °C) 98 °F (36.7 °C)   Pulse 82 89   Resp 18 18   SpO2  --  93 %   Weight 122 lb 5.7 oz (55.5 kg) 122 lb 5.7 oz (55.5 kg)   Weight Method Bed scale Bed scale   Post-Hemodialysis Assessment   Post-Treatment Procedures  --  Blood returned;Catheter Capped, clamped with Saline x2 ports   Machine Disinfection Process  --  Acid/Vinegar Clean;Heat Disinfect; Exterior Machine Disinfection   Total Liters Processed (l/min)  --  39 l/min   Dialyzer Clearance  --  Lightly streaked   Duration of Treatment (minutes)  --  120 minutes   Heparin amount administered during treatment (units)  --  0 units   Hemodialysis Intake (ml)  --  1185 ml   Hemodialysis Output (ml)  --  600 ml   NET Removed (ml)  --  585 ml   Tolerated Treatment  --  Good   System alarming + TMP pressure and attempting to clot. Unable to clear. TX paused and blood returned. Attempted to re-line machine and re-start [address] pt refusing stating she does not want to finish dialysis. Dr. Tato Canchola notified. pt completed 2 hours of 3 hour TX. Removed 585 ml of fluid. pt tolerated well. Bilateral cath ports flushed with normal saline and clamped. Drsg clean, dry, and intact upon leaving unit. TX record printed for scanning into EMR. Report called to primary RN.

## 2022-01-19 NOTE — CARE COORDINATION
Discharge Planning Update:     Hospitalist and Nephrology following. Pt to dialysis today. O2 on at 2L/nc. Sats 95%. Nursing reports pt had episodes of desaturations overnight into the 70's. Creatinine today is 5.1. Continue home meds. Discharge held yesterday due to [facility] in ED. Possible discharge today. Patient Goals/Plan/Treatment Preferences: [facility] is from [facility]. SW following.
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
## 2022-01-19 NOTE — PROGRESS NOTES
Spoke with patient's daughter and informed her that the patient will be discharging back to [facility] around 8:30-9:00 pm.

## 2022-01-19 NOTE — PROGRESS NOTES
CALCIUM 8.1* 8.2* 9.1     Hepatic:   Recent Labs     01/17/22  1329 01/18/22  0743   LABALBU 3.2* 2.8*   AST 18 20   ALT <5* <5*   BILITOT 0.3 0.4   ALKPHOS 240* 253*         Meds:  Infusion:    dextrose      sodium chloride Stopped (01/17/22 2314)     Meds:    midodrine  10 mg Oral TID AC    polyethylene glycol  17 g Per G Tube Daily    multivitamin  1 tablet Oral Daily    senna  1 tablet Oral Nightly    ipratropium-albuterol  3 mL Inhalation 4x Daily    budesonide-formoterol  2 puff Inhalation BID    sevelamer  1,600 mg Oral TID WC    carbidopa-levodopa  1 tablet Oral Nightly    benztropine  1 mg Oral Nightly    docusate sodium  100 mg Oral Daily    melatonin  6 mg Oral Nightly    QUEtiapine  25 mg Oral BID    citalopram  20 mg Oral Daily    famotidine  20 mg Oral Once per day on Mon Wed Fri    mirtazapine  7.5 mg Oral Nightly    nicotine  1 patch TransDERmal Q24H    sodium chloride flush  5-40 mL IntraVENous 2 times per day    heparin (porcine)  5,000 Units SubCUTAneous 3 times per day     Meds prn: glucose, dextrose, glucagon (rDNA), dextrose, hydrOXYzine, diphenhydrAMINE, ALPRAZolam, HYDROcodone-acetaminophen, sodium chloride flush, sodium chloride, ondansetron **OR** ondansetron, polyethylene glycol, acetaminophen **OR** acetaminophen       Impression and Plan:  1. ESRD on hemodialysis  We will plan dialysis treatment today  D/W HD staff  Patient can be discharged thereafter from nephrology standpoint  Electrolytes are stable    2. acute on chronic hypoxic respiratory failure in setting of underlying COPD and now COVID-19 respiratory illness likely  3. History of severe noncompliance  4. History of COPD  5. Encephalopathy.   Improved    D/W patient and HD staff    Ok Dia MD  [facility]

## 2025-04-26 NOTE — PROGRESS NOTES
Department of Orthopedic Surgery  Spine Service  Attending Progress Note        Subjective:  No new issues. MRI showed T8-9 discitis/osteomyelitis w/ plans for biopsy today    Vitals  VITALS:  BP (!) 143/77   Pulse 62   Temp 98 °F (36.7 °C) (Oral)   Resp 20   Ht 5' 3\" (1.6 m)   Wt 169 lb 12.1 oz (77 kg)   SpO2 92%   BMI 30.07 kg/m²   24HR INTAKE/OUTPUT:      Intake/Output Summary (Last 24 hours) at 3/22/2021 4291  Last data filed at 3/21/2021 2340  Gross per 24 hour   Intake 1650 ml   Output 0 ml   Net 1650 ml     URINARY CATHETER OUTPUT (Matute):     DRAIN/TUBE OUTPUT:         PHYSICAL EXAM:          LABS:    HgB:    Lab Results   Component Value Date    HGB 7.7 03/20/2021         MRI Thoracic Spine  Impression    1. Abnormal signal intensity in approximately the T8 and T9 vertebral bodies and in the intervening disc space anteriorly, paraspinal soft tissue swelling and left pleural effusion. 2. Mild degenerative changes. No significant disc herniation or cord compression at any level.             ASSESSMENT AND PLAN:    T8-9 osteomyelitis/discitis    1:  Plan for biopsy 3/22. Antibiotic management per ID. No surgical plans at this time. TLSO brace when out of bed and ambulating. Follow up with Dr. John Conception 4 weeks at discharge for reevaluation. Luna Corley PA-C 1 = Total assistance

## (undated) DEVICE — HOLDER TUBE TRACH LG COTTON STRP HK LOOP CLOSURE BRTRC FOAM

## (undated) DEVICE — Z DISCONTINUED BY MEDLINE USE 2280062 TUBE TRACH SZ 8 L79MM OD12.2MM ID7.6MM CUF DISP INNR CANN

## (undated) DEVICE — INTENDED FOR TISSUE SEPARATION, AND OTHER PROCEDURES THAT REQUIRE A SHARP SURGICAL BLADE TO PUNCTURE OR CUT.: Brand: BARD-PARKER ® CARBON RIB-BACK BLADES

## (undated) DEVICE — AGENT HEMSTAT 3GM OXIDIZED REGENERATED CELOS ABSRB FOR CONT (ORDER MULTIPLES OF 5EA)

## (undated) DEVICE — UNIVERSAL MONOPOLAR LAPAROSCOPIC CABLE 10FT, 4MM PIN CONNECTOR: Brand: CONMED

## (undated) DEVICE — GLOVE SURG SZ 65 THK91MIL LTX FREE SYN POLYISOPRENE

## (undated) DEVICE — PACK PROCEDURE SURG SET UP SRMC

## (undated) DEVICE — PACK-MAJOR

## (undated) DEVICE — 3M™ STERI-DRAPE™ INSTRUMENT POUCH 1018: Brand: STERI-DRAPE™

## (undated) DEVICE — GLOVE ORANGE PI 8   MSG9080

## (undated) DEVICE — ADHESIVE SKIN CLSR 0.7ML TOP DERMBND ADV

## (undated) DEVICE — SUTURE PDS II SZ 3-0 L27IN ABSRB VLT L26MM SH 1/2 CIR Z316H

## (undated) DEVICE — SUTURE PROL SZ 2-0 L18IN NONABSORBABLE BLU FS L26MM 3/8 CIR 8685H

## (undated) DEVICE — SUTURE VCRL SZ 3-0 L27IN ABSRB UD L26MM SH 1/2 CIR J416H

## (undated) DEVICE — PENROSE DRAIN 18 X .5" SILICONE: Brand: MEDLINE

## (undated) DEVICE — SYRINGE MED 10ML LUERLOCK TIP W/O SFTY DISP

## (undated) DEVICE — HERCULES 3 STAGE BALLOON ESOPHAGEAL: Brand: HERCULES

## (undated) DEVICE — SVMMC HD AND NK PK

## (undated) DEVICE — GLOVE ORANGE PI 7 1/2   MSG9075

## (undated) DEVICE — GLOVE SURG SZ 75 L12IN FNGR THK94MIL TRNSLUC YEL LTX

## (undated) DEVICE — GAUZE,SPONGE,8"X4",12PLY,XRAY,STRL,LF: Brand: MEDLINE

## (undated) DEVICE — GLOVE SURG SZ 7.5 L11.73IN FNGR THK9.8MIL STRW LTX POLYMER

## (undated) DEVICE — GLOVE ORANGE PI 7   MSG9070

## (undated) DEVICE — SPONGE,PEANUT,XRAY,ST,SM,3/8",5/CARD: Brand: MEDLINE INDUSTRIES, INC.

## (undated) DEVICE — SOLUTION SCRB 4OZ 10% POVIDONE IOD ANTIMIC BTL

## (undated) DEVICE — JELLY,LUBE,STERILE,FLIP TOP,TUBE,2-OZ: Brand: MEDLINE

## (undated) DEVICE — SYRINGE MED 10ML TRNSLUC BRL PLUNG BLK MRK POLYPR CTRL

## (undated) DEVICE — BINDER ABD UNISX 9IN 62IN L AND XL UNIV

## (undated) DEVICE — CODMAN® SURGICAL PATTIES 1/2" X 1/2" (1.27CM X 1.27CM): Brand: CODMAN®

## (undated) DEVICE — SUTURE PERMA-HAND SZ 2-0 L30IN NONABSORBABLE BLK L26MM SH K833H

## (undated) DEVICE — DRAIN,WOUND,15FR,3/16,FULL-FLUTED: Brand: MEDLINE

## (undated) DEVICE — CLIP LIG M TI 6 SIL HNDL FOR OPN AND ENDOSCP SGL APPL

## (undated) DEVICE — FIBER LASER 295 MH 40 W LNG 0.5X1.04 MMX2 MR FIBERLASE

## (undated) DEVICE — SUTURE MCRYL + SZ 3-0 L27IN ABSRB UD PS1 L24MM 3/8 CIR REV MCP936H

## (undated) DEVICE — POUCH INSTR W6.75XL11.5IN FRST 2 PKT ADH FOR ORTH AND

## (undated) DEVICE — BANDAGE,GAUZE,4.5"X4.1YD,STERILE,LF: Brand: MEDLINE

## (undated) DEVICE — SUTURE PERMAHAND SZ 2-0 L12X18IN NONABSORBABLE BLK SILK A185H

## (undated) DEVICE — BREAST HERNIA PACK: Brand: MEDLINE INDUSTRIES, INC.

## (undated) DEVICE — Device

## (undated) DEVICE — Z DISCONTINUED BY MEDLINE USE 2711682 TRAY SKIN PREP DRY W/ PREM GLV

## (undated) DEVICE — SUTURE MCRYL SZ 4-0 L27IN ABSRB UD L19MM PS-2 1/2 CIR PRIM Y426H

## (undated) DEVICE — SUTURE MCRYL SZ 3-0 L27IN ABSRB UD L26MM SH 1/2 CIR Y416H

## (undated) DEVICE — DRAPE,INSTRUMENT,MAGNETIC,10X16: Brand: MEDLINE

## (undated) DEVICE — SUTURE MCRYL SZ 4 0 L18IN ABSRB UD P 3 3 8 CIR REV CUT NDL MCP494G

## (undated) DEVICE — BLADE OPHTH ORNG GRINDLESS SMALLER ALTERNATIVE TO NO15 GEN

## (undated) DEVICE — GOWN,AURORA,NONRNF,XL,30/CS: Brand: MEDLINE

## (undated) DEVICE — KIT PEG 20FR STD PUL EN ACCS DEV ENDOVIVE

## (undated) DEVICE — PENCIL SMK EVAC ALL IN 1 DSGN ENH VISIBILITY IMPROVED AIR

## (undated) DEVICE — DILATION SYRINGE: Brand: COOK

## (undated) DEVICE — CORD,CAUTERY,BIPOLAR,STERILE: Brand: MEDLINE

## (undated) DEVICE — APPLICATOR MEDICATED 10.5 CC SOLUTION HI LT ORNG CHLORAPREP

## (undated) DEVICE — TUBE LUKENS 20CC 6 1/4": Brand: ALLEGIANCE

## (undated) DEVICE — DRAPE,TOP,102X53,STERILE: Brand: MEDLINE

## (undated) DEVICE — SUTURE MCRYL SZ 4-0 L27IN ABSRB UD RB-1 L17MM 1/2 CIR Y214H

## (undated) DEVICE — KIT,ANTI FOG,W/SPONGE & FLUID,SOFT PACK: Brand: MEDLINE

## (undated) DEVICE — DISPOSABLE SLIM BIPOLAR FORCEPS, NON-STICK,: Brand: SPETZLER-MALIS

## (undated) DEVICE — SOLUTION PREP POVIDONE IOD FOR SKIN MUCOUS MEM PRIOR TO

## (undated) DEVICE — MARKER,SKIN,WI/RULER AND LABELS: Brand: MEDLINE

## (undated) DEVICE — SUTURE PERMAHAND SZ 3-0 L18IN NONABSORBABLE BLK SILK BRAID A184H

## (undated) DEVICE — DRAPE,SPLIT ,77X120: Brand: MEDLINE

## (undated) DEVICE — SPONGE DRN W4XL4IN RAYON/POLYESTER 6 PLY NONWOVEN PRECUT

## (undated) DEVICE — SUTURE PROL SZ 2-0 L30IN NONABSORBABLE BLU L26MM SH 1/2 CIR 8833H